# Patient Record
Sex: FEMALE | Race: WHITE | NOT HISPANIC OR LATINO | Employment: OTHER | URBAN - METROPOLITAN AREA
[De-identification: names, ages, dates, MRNs, and addresses within clinical notes are randomized per-mention and may not be internally consistent; named-entity substitution may affect disease eponyms.]

---

## 2017-01-05 DIAGNOSIS — R20.0 NUMBNESS: Primary | ICD-10-CM

## 2017-01-05 DIAGNOSIS — F41.9 ANXIETY: ICD-10-CM

## 2017-01-05 DIAGNOSIS — I10 HYPERTENSION GOAL BP (BLOOD PRESSURE) < 140/90: Primary | ICD-10-CM

## 2017-01-05 RX ORDER — NORTRIPTYLINE HCL 10 MG
30 CAPSULE ORAL AT BEDTIME
Qty: 90 CAPSULE | Refills: 6 | OUTPATIENT
Start: 2017-01-05

## 2017-01-05 NOTE — TELEPHONE ENCOUNTER
Losartan      Last Written Prescription Date: 08/04/16  Last Fill Quantity: 90, # refills: 2  Last Office Visit with [facility] prescribing provider: 09/20/16   Next 5 appointments (look out 90 days)     Mar 20, 2017  1:20 PM   Return Visit with Bradford Colvin MD   [facility] [place] ([facility])    [address]  [place] [address]   [phone]                   POTASSIUM   Date Value Ref Range Status   12/12/2016 3.8 3.4 - 5.3 mmol/L Final     CREATININE   Date Value Ref Range Status   12/12/2016 0.81 0.52 - 1.04 mg/dL Final     BP Readings from Last 3 Encounters:   12/19/16 126/79   12/09/16 116/76   11/28/16 125/75     Bupropion       Last Written Prescription Date: 10/11/16  Last Fill Quantity: 60; # refills: 2  Last Office Visit with [facility] or [facility] prescribing provider:  09/20/16   Next 5 appointments (look out 90 days)     Mar 20, 2017  1:20 PM   Return Visit with Bradford Colvin MD   [facility] [place] ([facility] [place])    [address]  [place] [address]   [phone]                   Last PHQ-9 score on record=   PHQ-9 SCORE 5/18/2015   Total Score 3       AST       18   12/12/2016  ALT       21   12/12/2016

## 2017-01-05 NOTE — TELEPHONE ENCOUNTER
NORTRIPTYLINE     Last Written Prescription Date: 9-19-16  Last Fill Quantity: 90, # refills: 6  Last Office Visit with [facility] or [facility] prescribing provider: 9-20-16   Next 5 appointments (look out 90 days)     Mar 20, 2017  1:20 PM   Return Visit with Bradford Colvin MD   [facility] [place] ([facility] [place])    [address]  [place] [address]   [phone]                   BP Readings from Last 3 Encounters:   12/19/16 126/79   12/09/16 116/76   11/28/16 125/75     Last PHQ-9 score on record=   PHQ-9 SCORE 5/18/2015   Total Score 3

## 2017-01-06 RX ORDER — BUPROPION HYDROCHLORIDE 75 MG/1
75 TABLET ORAL 2 TIMES DAILY
Qty: 60 TABLET | Refills: 2 | Status: SHIPPED | OUTPATIENT
Start: 2017-01-06 | End: 2017-05-14

## 2017-01-06 RX ORDER — LOSARTAN POTASSIUM 25 MG/1
25 TABLET ORAL DAILY
Qty: 90 TABLET | Refills: 1 | Status: SHIPPED | OUTPATIENT
Start: 2017-01-06 | End: 2017-07-17

## 2017-01-09 DIAGNOSIS — M32.9 SYSTEMIC LUPUS ERYTHEMATOSUS (H): Primary | ICD-10-CM

## 2017-01-09 NOTE — TELEPHONE ENCOUNTER
azaTHIOprine (IMURAN) 50 MG tablet      Last Written Prescription Date:  12/19/16  Last Fill Quantity: 270,   # refills: 1  Last Office Visit with [facility] provider: 12/19/16  Future Office visit:    Next 5 appointments (look out 90 days)     Mar 20, 2017  1:20 PM   Return Visit with Bradford Covlin MD   [facility] [place] ([facility] [place])    [address]  [place] [address]   [phone]                   Routing refill request to provider for review/approval because:  Drug not on the [facility] or [facility] refill protocol or controlled substance

## 2017-01-11 RX ORDER — AZATHIOPRINE 50 MG/1
150 TABLET ORAL DAILY
Qty: 270 TABLET | Refills: 1 | OUTPATIENT
Start: 2017-01-11

## 2017-01-11 NOTE — TELEPHONE ENCOUNTER
Rheumatology team: Refill request for azathioprine was received and refused. The last azathioprine prescription was written on 12/19/2016 for a 90 day supply with one refill. Please check with the patient and the pharmacy to clarify.    Bradford Colvin MD  1/11/2017 12:20 PM

## 2017-01-11 NOTE — TELEPHONE ENCOUNTER
Called and spoke to Pharmacy. Pharmacist states that this was sent in error. Also called patient and she states that she has not requested medication to be filled. Closing encounter.

## 2017-01-26 ENCOUNTER — MYC MEDICAL ADVICE (OUTPATIENT)
Dept: RHEUMATOLOGY | Facility: CLINIC | Age: 42
End: 2017-01-26

## 2017-02-01 ENCOUNTER — THERAPY VISIT (OUTPATIENT)
Dept: OCCUPATIONAL THERAPY | Facility: CLINIC | Age: 42
End: 2017-02-01
Payer: COMMERCIAL

## 2017-02-01 DIAGNOSIS — M79.641 PAIN OF RIGHT HAND: Primary | ICD-10-CM

## 2017-02-01 PROCEDURE — 29125 APPL SHORT ARM SPLINT STATIC: CPT | Mod: GO | Performed by: OCCUPATIONAL THERAPIST

## 2017-02-01 PROCEDURE — 97112 NEUROMUSCULAR REEDUCATION: CPT | Mod: GO | Performed by: OCCUPATIONAL THERAPIST

## 2017-02-01 PROCEDURE — 97110 THERAPEUTIC EXERCISES: CPT | Mod: GO | Performed by: OCCUPATIONAL THERAPIST

## 2017-02-01 NOTE — PROGRESS NOTES
Hand Therapy Progress Note    Current Date:  2017    Reporting period is 2016 to 2017    Diagnosis:   Right hand pain  DOI:  16 (therapy referral)    Subjective:   Subjective changes noted by patient:  I still have a lot of pain and my left hand hurts now too.    Functional changes noted by patient:  Improvement in Self Care Tasks (hygiene)  Patient has noted adverse reaction to:  None    Objective:  ROM:  WNL's fingers, thumb and wrist    Strength:   (Measured in pounds)    16   Trials Left Right Right   1  2  3 56  57  58 46+  54+  51+ 65+  58+  64+   Average: 57 50 62     Lat Pinch  16   Trials Left Right Right   1  2  3 20  19  18 15+  16+  13+ 15+  14+  12+   Average: 19 15 14     3 Pt Pinch  16   Trials Left Right Right   1  2  3 18  17  18 12+  12+  12+ 9+  11+  9+   Average: 18 12 10     Edema:  None    Sensation:  Intermittent glove like numbness all fingers; per patient report    Resisted Testin/8/16   Supination + slight   Pronation +   Wrist Ext  +   EDC +   Wrist Flex +   FDS ++   EPB +   APL +   EPL +     Palpation:  Diffuse tenderness elbow, forearm, wrist and hand   16   LEP - + slight   Extensors + slight + slight   PIN site + slight + slight   Dorsal wrist + +   Dorsal hand + +   MEP - +   Flexors - +   Volar wrist + +   Palm ++ -   1st dc + slight +   Thenars + +     Special Tests:  Pain Report:  - none    + mild    ++ moderate    +++ severe    16   Finkelsteins - +   ULTT Radial nerve + end range +   ULTT Median nerve ++ wrist ext +     Pain Report:  VAS(0-10) 16   Least: 1/10 1/10   Worst: 7/10 5-6/10   Location:  Diffuse through hand and thumb    Please refer to the daily flowsheet for treatment provided today.     Assessment:  Response to therapy has been improvement to:  Strength:    Pain:  intensity of pain is decreased slightly  Response to therapy has been lack of progress in:   Strength:  pinch  Pain:  Continued diffuse tenderness  Paresthesias:  Glove like numbness and tingling    Overall Assessment:  Patient is progressing slowly and would benefit from continued therapy to achieve rehab potential  STG/LTG:  STGoals have been reviewed and progress or achievement has occurred;  see goal sheet for details and updates.  I have re-evaluated this patient and find that the nature, scope, duration and intensity of the therapy is appropriate for the medical condition of the patient.    Plan:  Frequency/Duration:  Recommend continuing with the current treatment plan. 2 X a month, once daily  for 2 months    Recommendations for Continued Therapy  Treatment Plan:    Modalities:  US and Paraffin  Therapeutic Exercise:  AROM, PROM, Tendon Gliding, Isotonics, Isometrics and Stabilization  Neuromuscular re-education:  Nerve Gliding, Posture and Kinesiotaping  Manual Techniques:  Friction massage and Myofascial release  Orthotic Fabrication:  Forearm based orthosis  Self Care:  Self Care Tasks and Ergonomic Considerations    Home Exercise Program:  Warmth for stiffness to hand and forearm extensors and flexors  MFR to palm and forearm extensors and flexors with tennis ball  Ice/cold after activity for pain  Tendon gliding with 3 fists and FDS  Forearm E/F stretches  Partial proximal median nerve gliding  Partial radial nerve gliding  Gentle isometric  and pinch strengthening    Next Visit:  See in 2 weeks  Assess response to for  and pinch strengthening  Progress to wrist isometrics  Consider US if pain more localized or paraffin for diffuse joint pain/stiffness  Assess response to night resting orthosis

## 2017-02-02 ENCOUNTER — MYC MEDICAL ADVICE (OUTPATIENT)
Dept: RHEUMATOLOGY | Facility: CLINIC | Age: 42
End: 2017-02-02

## 2017-02-02 DIAGNOSIS — M32.9 SYSTEMIC LUPUS ERYTHEMATOSUS (H): Primary | ICD-10-CM

## 2017-02-02 NOTE — TELEPHONE ENCOUNTER
The patient replied via another my chart message encounter. She wants to restart Benlysta.  I placed the order for the infusion and replied to her in the other my chart encounter.    Bradford Colvin MD  2/2/2017 5:07 PM

## 2017-02-17 ENCOUNTER — INFUSION THERAPY VISIT (OUTPATIENT)
Dept: INFUSION THERAPY | Facility: CLINIC | Age: 42
End: 2017-02-17
Payer: COMMERCIAL

## 2017-02-17 VITALS
HEART RATE: 85 BPM | SYSTOLIC BLOOD PRESSURE: 120 MMHG | WEIGHT: 139.9 LBS | DIASTOLIC BLOOD PRESSURE: 72 MMHG | OXYGEN SATURATION: 97 % | BODY MASS INDEX: 22.58 KG/M2 | TEMPERATURE: 97.4 F

## 2017-02-17 DIAGNOSIS — M32.9 SYSTEMIC LUPUS ERYTHEMATOSUS (H): Primary | ICD-10-CM

## 2017-02-17 PROCEDURE — 99207 ZZC NO CHARGE LOS: CPT

## 2017-02-17 PROCEDURE — 96413 CHEMO IV INFUSION 1 HR: CPT | Performed by: NURSE PRACTITIONER

## 2017-02-17 RX ADMIN — Medication 250 ML: at 14:08

## 2017-02-17 NOTE — PROGRESS NOTES
"Infusion Nursing Note:  Yovana Enriquez presents today for Benlysta.    Patient seen by provider today: No   present during visit today: Not Applicable.    Note: Patient new to this infusion center. Stated at [place] she was given Tyoenol, benadry and methylpred as a pre-medication fro m.    Intravenous Access:  Peripheral IV placed.    Treatment Conditions:  Rheumatology Infusion Checklist: PRIOR TO INFUSION OF BIOLOGICAL MEDICATIONS OR ANY OF THESE AS LISTED: Benlysta (benlimumab) \".rheumbiologicalchecklist\"    Prior to Infusion of biological medications or any of these as listed:    1. Elevated temperature, fever, chills, productive cough or abnormal vital signs, night sweats, coughing up blood or sputum, no appetite or abnormal vital signs : NO    2. Open wounds or new incisions: NO    3. Recent hospitalization: NO    4.  Recent surgeries:  NO    5. Any upcoming surgeries or dental procedures?:NO    6. Any current or recent bouts of illness or infection? On any antibiotics? : NO    7. Any new, sudden or worsening abdominal pain :NO    8. Vaccination within 4 weeks? Patient or someone in the household is scheduled to receive vaccination? No live virus vaccines prior to or during treatment :NO    9. Any nervous system diseases [i.e. multiple sclerosis, Guillain-[place], seizures, neurological  changes]: NO    10. Pregnant or breast feeding; or plans on pregnancy in the future: NO    11. Signs of worsening depression or suicidal ideations while taking benlysta:NO    12. New-onset medical symptoms: NO    13.  New cancer diagnosis or on chemotherapy or radiation NO    14.  Evaluate for any sign of active TB [Unexplained weight loss, Loss of appetite, Night sweats, Fever, Fatigue, Chills, Coughing for 3 weeks or longer, Hemoptysis (coughing up blood), Chest pain]: NO    **Note: If answered yes to any of the above, hold the infusion and contact ordering rheumatologist or on-call rheumatologist. "   .      Post Infusion Assessment:  Patient tolerated infusion without incident.  Site patent and intact, free from redness, edema or discomfort.  No evidence of extravasations.  Access discontinued per protocol.  Rheumatology Post Infusion: POST-INFUSION OF BIOLOGICAL MEDICATION:    Reviewed with patient.  Given biologic medication or medication hand-out. Inform patient if any fever, chills or signs of infection, new symptoms, abdominal pain, heart palpitations, shortness of breath, reaction, weakness, neurological changes, seek medical attention immediately and should not receive infusions. No live virus vaccines prior to or during treatment or up to 6 months post infusion. If the patient has an upcoming procedure or surgery, this should be discussed with the rheumatologist and surgeon or provider..    Discharge Plan:   Discharge instructions reviewed with: Patient.  Patient and/or family verbalized understanding of discharge instructions and all questions answered.  Copy of AVS reviewed with patient and/or family.  Patient will schedule return visit for 4 weeks from now upon departure.  Patient discharged in stable condition accompanied by: self.  Departure Mode: Ambulatory.    Nancy Padilla RN

## 2017-02-17 NOTE — MR AVS SNAPSHOT
After Visit Summary   2/17/2017    Yovana Enriquez    MRN: [id number]           Patient Information     Date Of Birth          1975        Visit Information        Provider Department      2/17/2017 1:30 PM [place] [facility]        Today's Diagnoses     Systemic lupus erythematosus (H)    -  1       Follow-ups after your visit        Your next 10 appointments already scheduled     Feb 22, 2017  2:30 PM CST   [facility] with Bridgett Plascencia   [facility] [place] ([facility] [place] [facility])    [address]  [place] [address] [phone]            Mar 15, 2017  6:00 PM CDT   LAB with BE LAB   [facility] [place] ([facility]    [address]  [place] [address] [phone]           Patient must bring picture ID.  Patient should be prepared to give a urine specimen  Please do not eat 10-12 hours before your appointment if you are coming in fasting for labs on lipids, cholesterol, or glucose (sugar).  Pregnant women should follow their Care Team instructions. Water with medications is okay. Do not drink coffee or other fluids.   If you have concerns about taking  your medications, please ask at office or if scheduling via "[employer]"t, send a message by clicking on Secure Messaging, Message Your Care Team.            Mar 17, 2017  1:30 PM CDT   Level 2 with [place] [facility] ([facility])    [address] [place] [id number] [phone]            Mar 20, 2017  1:20 PM CDT   Return Visit with Bradford Colvin MD   [facility] [place] ([facility])    [address]  [place] [address] [phone]              Who to contact     If you have questions or need follow up information about today's clinic visit or your schedule please contact [facility] directly at [phone].  Normal  or non-critical lab and imaging results will be communicated to you by [employer], letter or phone within 4 business days after the clinic has received the results. If you do not hear from us within 7 days, please contact the clinic through [employer] or phone. If you have a critical or abnormal lab result, we will notify you by phone as soon as possible.  Submit refill requests through [employer] or call your pharmacy and they will forward the refill request to us. Please allow 3 business days for your refill to be completed.          Additional Information About Your Visit        [employer] Information     [employer] gives you secure access to your electronic health record. If you see a primary care provider, you can also send messages to your care team and make appointments. If you have questions, please call your primary care clinic.  If you do not have a primary care provider, please call [phone] and they will assist you.      [employer] is an electronic gateway that provides easy, online access to your medical records. With [employer], you can request a clinic appointment, read your test results, renew a prescription or communicate with your care team.     To access your existing account, please contact your [facility] or call [phone] for assistance.        Care EveryWhere ID     This is your Care EveryWhere ID. This could be used by other organizations to access your [facility] medical records  [id number]        Your Vitals Were     Pulse Temperature Pulse Oximetry BMI (Body Mass Index)          85 97.4  F (36.3  C) (Oral) 97% 22.58 kg/m2         Blood Pressure from Last 3 Encounters:   02/17/17 120/72   12/19/16 126/79   12/09/16 116/76    Weight from Last 3 Encounters:   02/17/17 63.5 kg (139 lb 14.4 oz)   12/19/16 65.1 kg (143 lb 9.6 oz)   11/28/16 64.9 kg (143 lb)              Today, you had the following     No orders found for display       Primary Care Provider Office Phone # Fax #     Bradford Mario PA-C [phone]       [facility] [place] [address]  [place] [address]        Thank you!     Thank you for choosing [facility]  for your care. Our goal is always to provide you with excellent care. Hearing back from our patients is one way we can continue to improve our services. Please take a few minutes to complete the written survey that you may receive in the mail after your visit with us. Thank you!             Your Updated Medication List - Protect others around you: Learn how to safely use, store and throw away your medicines at www.disposemymeds.org.          This list is accurate as of: 2/17/17  4:12 PM.  Always use your most recent med list.                   Brand Name Dispense Instructions for use    amLODIPine 5 MG tablet    NORVASC    90 tablet    Take 1 tablet (5 mg) by mouth daily       azaTHIOprine 50 MG tablet    IMURAN    270 tablet    Take 3 tablets (150 mg) by mouth daily       blood glucose monitoring test strip    no brand specified    1 Month    Used for testing glucose 2-3 times daily       buPROPion 75 MG tablet    WELLBUTRIN    60 tablet    Take 1 tablet (75 mg) by mouth 2 times daily       Calcium + D3 600-200 MG-UNIT Tabs      1 tablet daily       cevimeline 30 MG capsule    EVOXAC    180 capsule    Take 1 capsule (30 mg) by mouth 2 times daily       diclofenac 1 % Gel topical gel    VOLTAREN    100 g    Apply 2-4 grams to knees or shoulders four times daily as needed using enclosed dosing card.       FIRST-MOUTHWASH BLM Susp     100 mL    Take 5-10 mLs by mouth every 6 hours as needed       FLOVENT IN          fluticasone 50 MCG/ACT spray    FLONASE    1 Bottle    Spray 1-2 sprays into both nostrils daily       hydroxychloroquine 200 MG tablet    PLAQUENIL    180 tablet    Take 1 tablet (200 mg) by mouth 2 times daily       hyoscyamine 0.125 MG tablet    ANASPAZ/LEVSIN    40 tablet    Take 1-2 tablets (125-250 mcg) by mouth  every 4 hours as needed for cramping       losartan 25 MG tablet    COZAAR    90 tablet    Take 1 tablet (25 mg) by mouth daily       MICROLET LANCETS Misc     100 each    1 each daily.       MULTIVITAMIN PO      Take  by mouth.       nortriptyline 10 MG capsule    PAMELOR    90 capsule    Take 3 capsules (30 mg) by mouth At Bedtime       omeprazole 20 MG CR capsule    priLOSEC    30 capsule    Take 1 capsule (20 mg) by mouth daily       predniSONE 5 MG tablet    DELTASONE    90 tablet    Take 1 tablet (5 mg) by mouth daily       pregabalin 150 MG capsule    LYRICA    270 capsule    Take 1 capsule (150 mg) by mouth 3 times daily . 3 month supply.

## 2017-03-24 ENCOUNTER — INFUSION THERAPY VISIT (OUTPATIENT)
Dept: INFUSION THERAPY | Facility: CLINIC | Age: 42
End: 2017-03-24
Payer: COMMERCIAL

## 2017-03-24 VITALS
OXYGEN SATURATION: 99 % | WEIGHT: 136 LBS | DIASTOLIC BLOOD PRESSURE: 70 MMHG | SYSTOLIC BLOOD PRESSURE: 108 MMHG | BODY MASS INDEX: 21.95 KG/M2 | TEMPERATURE: 97 F | HEART RATE: 93 BPM

## 2017-03-24 DIAGNOSIS — M32.9 SYSTEMIC LUPUS ERYTHEMATOSUS (H): Primary | ICD-10-CM

## 2017-03-24 PROCEDURE — 99207 ZZC NO CHARGE LOS: CPT

## 2017-03-24 PROCEDURE — 96413 CHEMO IV INFUSION 1 HR: CPT | Performed by: INTERNAL MEDICINE

## 2017-03-24 RX ADMIN — Medication 250 ML: at 14:13

## 2017-03-24 ASSESSMENT — PAIN SCALES - GENERAL: PAINLEVEL: NO PAIN (1)

## 2017-03-24 NOTE — PROGRESS NOTES
"Infusion Nursing Note:  Yovana Enriquez presents today for Benlysta.    Patient seen by provider today: No   present during visit today: Not Applicable.    Note: N/A.    Intravenous Access:  Peripheral IV placed.    Treatment Conditions:  Rheumatology Infusion Checklist: PRIOR TO INFUSION OF BIOLOGICAL MEDICATIONS OR ANY OF THESE AS LISTED: Benlysta (benlimumab) \".rheumbiologicalchecklist\"    Prior to Infusion of biological medications or any of these as listed:    1. Elevated temperature, fever, chills, productive cough or abnormal vital signs, night sweats, coughing up blood or sputum, no appetite or abnormal vital signs : NO    2. Open wounds or new incisions: NO    3. Recent hospitalization: NO    4.  Recent surgeries:  NO    5. Any upcoming surgeries or dental procedures?:NO    6. Any current or recent bouts of illness or infection? On any antibiotics? : NO    7. Any new, sudden or worsening abdominal pain :NO    8. Vaccination within 4 weeks? Patient or someone in the household is scheduled to receive vaccination? No live virus vaccines prior to or during treatment :NO    9. Any nervous system diseases [i.e. multiple sclerosis, Guillain-[place], seizures, neurological  changes]: NO    10. Pregnant or breast feeding; or plans on pregnancy in the future: NO    11. Signs of worsening depression or suicidal ideations while taking benlysta:NO    12. New-onset medical symptoms: NO    13.  New cancer diagnosis or on chemotherapy or radiation NO    14.  Evaluate for any sign of active TB [Unexplained weight loss, Loss of appetite, Night sweats, Fever, Fatigue, Chills, Coughing for 3 weeks or longer, Hemoptysis (coughing up blood), Chest pain]: NO    **Note: If answered yes to any of the above, hold the infusion and contact ordering rheumatologist or on-call rheumatologist.   .      Post Infusion Assessment:  Patient tolerated infusion without incident.  No evidence of extravasations.  Access discontinued " per protocol.    Discharge Plan:    Patient will return 4/21/17 for next appointment.   Patient discharged in stable condition accompanied by: self and friend.    Nancy Knight RN

## 2017-03-24 NOTE — MR AVS SNAPSHOT
After Visit Summary   3/24/2017    Yovana Enriquez    MRN: [id number]           Patient Information     Date Of Birth          1975        Visit Information        Provider Department      3/24/2017 1:30 PM Leonore 8 [facility]        Today's Diagnoses     Systemic lupus erythematosus (H)    -  1       Follow-ups after your visit        Follow-up notes from your care team     Return in about 4 weeks (around 4/21/2017).      Your next 10 appointments already scheduled     Apr 03, 2017  6:00 PM CDT   LAB with BE LAB   [facility]    [address] [facility]  [place] [id number] [phone]           Patient must bring picture ID.  Patient should be prepared to give a urine specimen  Please do not eat 10-12 hours before your appointment if you are coming in fasting for labs on lipids, cholesterol, or glucose (sugar).  Pregnant women should follow their Care Team instructions. Water with medications is okay. Do not drink coffee or other fluids.   If you have concerns about taking  your medications, please ask at office or if scheduling via [employer], send a message by clicking on Secure Messaging, Message Your Care Team.            Apr 10, 2017  3:00 PM CDT   Return Visit with Bradford Colvin MD   [facility] [place] ([facility])    [address] [facility] [id number] [phone]            Apr 21, 2017  1:30 PM CDT   Level 2 with [address] ([facility])    [address] [place] [id number] [phone]              Who to contact     If you have questions or need follow up information about today's clinic visit or your schedule please contact [facility] directly at [phone].  Normal or non-critical lab and imaging results will be communicated to you by MyChart, letter or phone within 4 business days after the  clinic has received the results. If you do not hear from us within 7 days, please contact the clinic through "[employer] or phone. If you have a critical or abnormal lab result, we will notify you by phone as soon as possible.  Submit refill requests through "[employer] or call your pharmacy and they will forward the refill request to us. Please allow 3 business days for your refill to be completed.          Additional Information About Your Visit        [employer] Information     "[employer] gives you secure access to your electronic health record. If you see a primary care provider, you can also send messages to your care team and make appointments. If you have questions, please call your primary care clinic.  If you do not have a primary care provider, please call [phone] and they will assist you.      "[employer] is an electronic gateway that provides easy, online access to your medical records. With "[employer] you can request a clinic appointment, read your test results, renew a prescription or communicate with your care team.     To access your existing account, please contact your [facility] or call [phone] for assistance.        Care EveryWhere ID     This is your Care EveryWhere ID. This could be used by other organizations to access your [place] medical records  [id number]        Your Vitals Were     Pulse Temperature Pulse Oximetry BMI (Body Mass Index)          93 97  F (36.1  C) (Oral) 99% 21.95 kg/m2         Blood Pressure from Last 3 Encounters:   03/24/17 108/70   02/17/17 120/72   12/19/16 126/79    Weight from Last 3 Encounters:   03/24/17 61.7 kg (136 lb)   02/17/17 63.5 kg (139 lb 14.4 oz)   12/19/16 65.1 kg (143 lb 9.6 oz)              We Performed the Following     Treatment Conditions     Treatment Conditions     Treatment Conditions     Treatment Conditions        Primary Care Provider Office Phone # Fax #    Bradford Mario PA-C [phone]       [place]  [facility] [place] [address]  [place] [address]        Thank you!     Thank you for choosing [facility]  for your care. Our goal is always to provide you with excellent care. Hearing back from our patients is one way we can continue to improve our services. Please take a few minutes to complete the written survey that you may receive in the mail after your visit with us. Thank you!             Your Updated Medication List - Protect others around you: Learn how to safely use, store and throw away your medicines at www.disposemymeds.org.          This list is accurate as of: 3/24/17  4:12 PM.  Always use your most recent med list.                   Brand Name Dispense Instructions for use    amLODIPine 5 MG tablet    NORVASC    90 tablet    Take 1 tablet (5 mg) by mouth daily       azaTHIOprine 50 MG tablet    IMURAN    270 tablet    Take 3 tablets (150 mg) by mouth daily       blood glucose monitoring test strip    no brand specified    1 Month    Used for testing glucose 2-3 times daily       buPROPion 75 MG tablet    WELLBUTRIN    60 tablet    Take 1 tablet (75 mg) by mouth 2 times daily       Calcium + D3 600-200 MG-UNIT Tabs      1 tablet daily       cevimeline 30 MG capsule    EVOXAC    180 capsule    Take 1 capsule (30 mg) by mouth 2 times daily       diclofenac 1 % Gel topical gel    VOLTAREN    100 g    Apply 2-4 grams to knees or shoulders four times daily as needed using enclosed dosing card.       FIRST-MOUTHWASH BLM Susp     100 mL    Take 5-10 mLs by mouth every 6 hours as needed       FLOVENT IN          fluticasone 50 MCG/ACT spray    FLONASE    1 Bottle    Spray 1-2 sprays into both nostrils daily       hydroxychloroquine 200 MG tablet    PLAQUENIL    180 tablet    Take 1 tablet (200 mg) by mouth 2 times daily       hyoscyamine 0.125 MG tablet    ANASPAZ/LEVSIN    40 tablet    Take 1-2 tablets (125-250 mcg) by mouth every 4 hours as needed for cramping       losartan 25 MG  tablet    COZAAR    90 tablet    Take 1 tablet (25 mg) by mouth daily       MICROLET LANCETS Misc     100 each    1 each daily.       MULTIVITAMIN PO      Take  by mouth.       nortriptyline 10 MG capsule    PAMELOR    90 capsule    Take 3 capsules (30 mg) by mouth At Bedtime       omeprazole 20 MG CR capsule    priLOSEC    30 capsule    Take 1 capsule (20 mg) by mouth daily       predniSONE 5 MG tablet    DELTASONE    90 tablet    Take 1 tablet (5 mg) by mouth daily       pregabalin 150 MG capsule    LYRICA    270 capsule    Take 1 capsule (150 mg) by mouth 3 times daily . 3 month supply.

## 2017-04-05 DIAGNOSIS — M32.9 SYSTEMIC LUPUS ERYTHEMATOSUS (H): ICD-10-CM

## 2017-04-05 LAB
ALBUMIN UR-MCNC: NEGATIVE MG/DL
AMORPH CRY #/AREA URNS HPF: ABNORMAL /HPF
APPEARANCE UR: CLEAR
BASOPHILS # BLD AUTO: 0 10E9/L (ref 0–0.2)
BASOPHILS NFR BLD AUTO: 0.4 %
BILIRUB UR QL STRIP: NEGATIVE
COLOR UR AUTO: YELLOW
DIFFERENTIAL METHOD BLD: ABNORMAL
EOSINOPHIL # BLD AUTO: 0 10E9/L (ref 0–0.7)
EOSINOPHIL NFR BLD AUTO: 0.8 %
ERYTHROCYTE [DISTWIDTH] IN BLOOD BY AUTOMATED COUNT: 11.8 % (ref 10–15)
ERYTHROCYTE [SEDIMENTATION RATE] IN BLOOD BY WESTERGREN METHOD: 5 MM/H (ref 0–20)
GLUCOSE UR STRIP-MCNC: NEGATIVE MG/DL
HCT VFR BLD AUTO: 33.6 % (ref 35–47)
HGB BLD-MCNC: 11.6 G/DL (ref 11.7–15.7)
HGB UR QL STRIP: NEGATIVE
KETONES UR STRIP-MCNC: NEGATIVE MG/DL
LEUKOCYTE ESTERASE UR QL STRIP: NEGATIVE
LYMPHOCYTES # BLD AUTO: 0.7 10E9/L (ref 0.8–5.3)
LYMPHOCYTES NFR BLD AUTO: 13.2 %
MCH RBC QN AUTO: 34.7 PG (ref 26.5–33)
MCHC RBC AUTO-ENTMCNC: 34.5 G/DL (ref 31.5–36.5)
MCV RBC AUTO: 101 FL (ref 78–100)
MONOCYTES # BLD AUTO: 0.3 10E9/L (ref 0–1.3)
MONOCYTES NFR BLD AUTO: 6.1 %
MUCOUS THREADS #/AREA URNS LPF: PRESENT /LPF
NEUTROPHILS # BLD AUTO: 4.2 10E9/L (ref 1.6–8.3)
NEUTROPHILS NFR BLD AUTO: 79.5 %
NITRATE UR QL: NEGATIVE
NON-SQ EPI CELLS #/AREA URNS LPF: ABNORMAL /LPF
PH UR STRIP: 7 PH (ref 5–7)
PLATELET # BLD AUTO: 210 10E9/L (ref 150–450)
RBC # BLD AUTO: 3.34 10E12/L (ref 3.8–5.2)
RBC #/AREA URNS AUTO: ABNORMAL /HPF (ref 0–2)
SP GR UR STRIP: 1.02 (ref 1–1.03)
URN SPEC COLLECT METH UR: ABNORMAL
UROBILINOGEN UR STRIP-ACNC: 1 EU/DL (ref 0.2–1)
WBC # BLD AUTO: 5.2 10E9/L (ref 4–11)
WBC #/AREA URNS AUTO: ABNORMAL /HPF (ref 0–2)

## 2017-04-05 PROCEDURE — 85025 COMPLETE CBC W/AUTO DIFF WBC: CPT | Performed by: INTERNAL MEDICINE

## 2017-04-05 PROCEDURE — 86225 DNA ANTIBODY NATIVE: CPT | Performed by: INTERNAL MEDICINE

## 2017-04-05 PROCEDURE — 81001 URINALYSIS AUTO W/SCOPE: CPT | Performed by: INTERNAL MEDICINE

## 2017-04-05 PROCEDURE — 82550 ASSAY OF CK (CPK): CPT | Performed by: INTERNAL MEDICINE

## 2017-04-05 PROCEDURE — 85652 RBC SED RATE AUTOMATED: CPT | Performed by: INTERNAL MEDICINE

## 2017-04-05 PROCEDURE — 86140 C-REACTIVE PROTEIN: CPT | Performed by: INTERNAL MEDICINE

## 2017-04-05 PROCEDURE — 84156 ASSAY OF PROTEIN URINE: CPT | Performed by: INTERNAL MEDICINE

## 2017-04-05 PROCEDURE — 86160 COMPLEMENT ANTIGEN: CPT | Performed by: INTERNAL MEDICINE

## 2017-04-05 PROCEDURE — 80053 COMPREHEN METABOLIC PANEL: CPT | Performed by: INTERNAL MEDICINE

## 2017-04-05 PROCEDURE — 36415 COLL VENOUS BLD VENIPUNCTURE: CPT | Performed by: INTERNAL MEDICINE

## 2017-04-06 LAB
ALBUMIN SERPL-MCNC: 3.7 G/DL (ref 3.4–5)
ALP SERPL-CCNC: 49 U/L (ref 40–150)
ALT SERPL W P-5'-P-CCNC: 25 U/L (ref 0–50)
ANION GAP SERPL CALCULATED.3IONS-SCNC: 7 MMOL/L (ref 3–14)
AST SERPL W P-5'-P-CCNC: 20 U/L (ref 0–45)
BILIRUB SERPL-MCNC: 0.2 MG/DL (ref 0.2–1.3)
BUN SERPL-MCNC: 9 MG/DL (ref 7–30)
C3 SERPL-MCNC: 58 MG/DL (ref 76–169)
C4 SERPL-MCNC: 10 MG/DL (ref 15–50)
CALCIUM SERPL-MCNC: 8.3 MG/DL (ref 8.5–10.1)
CHLORIDE SERPL-SCNC: 106 MMOL/L (ref 94–109)
CK SERPL-CCNC: 133 U/L (ref 30–225)
CO2 SERPL-SCNC: 27 MMOL/L (ref 20–32)
CREAT SERPL-MCNC: 0.81 MG/DL (ref 0.52–1.04)
CREAT UR-MCNC: 93 MG/DL
CRP SERPL-MCNC: <2.9 MG/L (ref 0–8)
GFR SERPL CREATININE-BSD FRML MDRD: 77 ML/MIN/1.7M2
GLUCOSE SERPL-MCNC: 94 MG/DL (ref 70–99)
POTASSIUM SERPL-SCNC: 3.9 MMOL/L (ref 3.4–5.3)
PROT SERPL-MCNC: 6.3 G/DL (ref 6.8–8.8)
PROT UR-MCNC: 0.14 G/L
PROT/CREAT 24H UR: 0.15 G/G CR (ref 0–0.2)
SODIUM SERPL-SCNC: 140 MMOL/L (ref 133–144)

## 2017-04-07 LAB — DSDNA AB SER-ACNC: NORMAL IU/ML

## 2017-04-10 ENCOUNTER — OFFICE VISIT (OUTPATIENT)
Dept: RHEUMATOLOGY | Facility: CLINIC | Age: 42
End: 2017-04-10
Payer: COMMERCIAL

## 2017-04-10 VITALS
HEIGHT: 66 IN | SYSTOLIC BLOOD PRESSURE: 104 MMHG | OXYGEN SATURATION: 100 % | DIASTOLIC BLOOD PRESSURE: 71 MMHG | WEIGHT: 139.4 LBS | HEART RATE: 92 BPM | BODY MASS INDEX: 22.4 KG/M2

## 2017-04-10 DIAGNOSIS — M35.00 SJOGREN'S SYNDROME (H): ICD-10-CM

## 2017-04-10 DIAGNOSIS — Z79.899 HIGH RISK MEDICATION USE: ICD-10-CM

## 2017-04-10 DIAGNOSIS — I73.00 RAYNAUD'S PHENOMENON WITHOUT GANGRENE: ICD-10-CM

## 2017-04-10 DIAGNOSIS — M32.9 SYSTEMIC LUPUS ERYTHEMATOSUS (H): Primary | ICD-10-CM

## 2017-04-10 PROCEDURE — 99213 OFFICE O/P EST LOW 20 MIN: CPT | Performed by: INTERNAL MEDICINE

## 2017-04-10 RX ORDER — CEVIMELINE HYDROCHLORIDE 30 MG/1
30 CAPSULE ORAL 2 TIMES DAILY
Qty: 180 CAPSULE | Refills: 1 | Status: SHIPPED | OUTPATIENT
Start: 2017-04-10 | End: 2017-07-10

## 2017-04-10 RX ORDER — AZATHIOPRINE 50 MG/1
150 TABLET ORAL DAILY
Qty: 270 TABLET | Refills: 1 | Status: SHIPPED | OUTPATIENT
Start: 2017-04-10 | End: 2017-07-10

## 2017-04-10 RX ORDER — HYDROXYCHLOROQUINE SULFATE 200 MG/1
200 TABLET, FILM COATED ORAL 2 TIMES DAILY
Qty: 180 TABLET | Refills: 1 | Status: SHIPPED | OUTPATIENT
Start: 2017-04-10 | End: 2017-07-10

## 2017-04-10 RX ORDER — PREDNISONE 5 MG/1
TABLET ORAL
Qty: 134 TABLET | Refills: 0 | Status: SHIPPED | OUTPATIENT
Start: 2017-04-10 | End: 2017-07-10

## 2017-04-10 RX ORDER — AMLODIPINE BESYLATE 5 MG/1
5 TABLET ORAL DAILY
Qty: 90 TABLET | Refills: 1 | Status: SHIPPED | OUTPATIENT
Start: 2017-04-10 | End: 2017-06-15

## 2017-04-10 NOTE — MR AVS SNAPSHOT
After Visit Summary   4/10/2017    Yovana Enriquez    MRN: [id number]           Patient Information     Date Of Birth          1975        Visit Information        Provider Department      4/10/2017 3:00 PM Bradford Colvin MD [facility] [place]        Today's Diagnoses     Systemic lupus erythematosus (H)    -  1    High risk medication use        Sicca syndrome (H)        Raynaud's phenomenon without gangrene          Care Instructions      Dr. Colvin s Rheumatology Clinics  Locations Clinic Hours Telephone Number   [place]  [address] [facility]  [place] [address]     Wednesday: 7:20AM - 4:00PM  Thursday:     7:20AM - 4:00PM  Friday:          7:20AM - 11:00AM       To schedule an appointment with  Dr. Colvin,  please contact  Specialty [phone]       [place]  [address]  [place] [address]       Monday:       7:20AM - 4:00PM      [address] [place] [address]       Tuesday:      7:20AM - 4:00PM          Thank you!    Qing Zapata CMA            Follow-ups after your visit        Your next 10 appointments already scheduled     2017  1:30 PM CDT   Level 2 with [address] ([facility])    [address] [place] [id number] [phone]            2017  6:00 PM CDT   LAB with BE LAB   [facility] [place] ([facility])    [address] [facility] [phone]           Patient must bring picture ID.  Patient should be prepared to give a urine specimen  Please do not eat 10-12 hours before your appointment if you are coming in fasting for labs on lipids, cholesterol, or glucose (sugar).  Pregnant women should follow their Care Team instructions. Water with medications is okay. Do not drink coffee or other fluids.   If you have concerns about taking  your medications, please ask  at office or if scheduling via [employer], send a message by clicking on Secure Messaging, Message Your Care Team.            Jul 10, 2017  1:20 PM CDT   Return Visit with Bradford Colvin MD   [facility] [place] ([facility] [place])    [address] [facility] [place] [id number] [phone]              Future tests that were ordered for you today     Open Future Orders        Priority Expected Expires Ordered    M Tuberculosis by Quantiferon Routine 7/3/2017 8/4/2017 4/10/2017    CK total Routine 7/3/2017 8/4/2017 4/10/2017    Complement C3 Routine 7/3/2017 8/4/2017 4/10/2017    DNA double stranded antibodies Routine 7/3/2017 8/4/2017 4/10/2017    CRP inflammation Routine 7/3/2017 8/4/2017 4/10/2017    Comprehensive metabolic panel Routine 7/3/2017 8/4/2017 4/10/2017    Complement C4 Routine 7/3/2017 8/4/2017 4/10/2017    Erythrocyte sedimentation rate auto Routine 7/3/2017 8/4/2017 4/10/2017    Protein  random urine Routine 7/3/2017 8/4/2017 4/10/2017    UA with Microscopic reflex to Culture Routine 7/3/2017 8/4/2017 4/10/2017    CBC with platelets differential Routine 7/3/2017 8/4/2017 4/10/2017            Who to contact     If you have questions or need follow up information about today's clinic visit or your schedule please contact [facility] [place] directly at [phone].  Normal or non-critical lab and imaging results will be communicated to you by [employer], letter or phone within 4 business days after the clinic has received the results. If you do not hear from us within 7 days, please contact the clinic through [employer] or phone. If you have a critical or abnormal lab result, we will notify you by phone as soon as possible.  Submit refill requests through tidy or call your pharmacy and they will forward the refill request to us. Please allow 3 business days for your refill to be completed.          Additional Information About Your Visit        [employer] Information     tidy gives  "you secure access to your electronic health record. If you see a primary care provider, you can also send messages to your care team and make appointments. If you have questions, please call your primary care clinic.  If you do not have a primary care provider, please call [phone] and they will assist you.        Care EveryWhere ID     This is your Care EveryWhere ID. This could be used by other organizations to access your [place] medical records  [id number]        Your Vitals Were     Pulse Height Pulse Oximetry BMI (Body Mass Index)          92 1.676 m (5' 6\") 100% 22.5 kg/m2         Blood Pressure from Last 3 Encounters:   04/10/17 104/71   03/24/17 108/70   02/17/17 120/72    Weight from Last 3 Encounters:   04/10/17 63.2 kg (139 lb 6.4 oz)   03/24/17 61.7 kg (136 lb)   02/17/17 63.5 kg (139 lb 14.4 oz)                 Today's Medication Changes          These changes are accurate as of: 4/10/17  3:19 PM.  If you have any questions, ask your nurse or doctor.               These medicines have changed or have updated prescriptions.        Dose/Directions    predniSONE 5 MG tablet   Commonly known as:  DELTASONE   This may have changed:    - how much to take  - how to take this  - when to take this  - additional instructions   Used for:  Systemic lupus erythematosus (H)   Changed by:  Bradford Colvin MD        Prednisone 40mg daily x2days, then 20mg daily x5days, then 15mg daily x5days, then 10mg daily x5days, then 5mg thereafter.   Quantity:  134 tablet   Refills:  0            Where to get your medicines      These [id number] [place] [facility] [address]  [address] [address]     Phone:  [phone]     amLODIPine 5 MG tablet    azaTHIOprine 50 MG tablet    cevimeline 30 MG capsule    hydroxychloroquine 200 MG tablet    predniSONE 5 MG tablet                Primary Care Provider Office Phone # Fax #    Bradford Mraio PA-C [phone] "       [facility] [place] [address]  [facility] [address]        Thank you!     Thank you for choosing [facility]  for your care. Our goal is always to provide you with excellent care. Hearing back from our patients is one way we can continue to improve our services. Please take a few minutes to complete the written survey that you may receive in the mail after your visit with us. Thank you!             Your Updated Medication List - Protect others around you: Learn how to safely use, store and throw away your medicines at www.disposemymeds.org.          This list is accurate as of: 4/10/17  3:19 PM.  Always use your most recent med list.                   Brand Name Dispense Instructions for use    amLODIPine 5 MG tablet    NORVASC    90 tablet    Take 1 tablet (5 mg) by mouth daily       azaTHIOprine 50 MG tablet    IMURAN    270 tablet    Take 3 tablets (150 mg) by mouth daily       blood glucose monitoring test strip    no brand specified    1 Month    Used for testing glucose 2-3 times daily       buPROPion 75 MG tablet    WELLBUTRIN    60 tablet    Take 1 tablet (75 mg) by mouth 2 times daily       Calcium + D3 600-200 MG-UNIT Tabs      1 tablet daily       cevimeline 30 MG capsule    EVOXAC    180 capsule    Take 1 capsule (30 mg) by mouth 2 times daily       diclofenac 1 % Gel topical gel    VOLTAREN    100 g    Apply 2-4 grams to knees or shoulders four times daily as needed using enclosed dosing card.       FIRST-MOUTHWASH BLM Susp     100 mL    Take 5-10 mLs by mouth every 6 hours as needed       FLOVENT IN          fluticasone 50 MCG/ACT spray    FLONASE    1 Bottle    Spray 1-2 sprays into both nostrils daily       hydroxychloroquine 200 MG tablet    PLAQUENIL    180 tablet    Take 1 tablet (200 mg) by mouth 2 times daily       hyoscyamine 0.125 MG tablet    ANASPAZ/LEVSIN    40 tablet    Take 1-2 tablets (125-250 mcg) by mouth every 4 hours as needed for cramping       losartan 25 MG  tablet    COZAAR    90 tablet    Take 1 tablet (25 mg) by mouth daily       MICROLET LANCETS Misc     100 each    1 each daily.       MULTIVITAMIN PO      Take  by mouth.       nortriptyline 10 MG capsule    PAMELOR    90 capsule    Take 3 capsules (30 mg) by mouth At Bedtime       omeprazole 20 MG CR capsule    priLOSEC    30 capsule    Take 1 capsule (20 mg) by mouth daily       predniSONE 5 MG tablet    DELTASONE    134 tablet    Prednisone 40mg daily x2days, then 20mg daily x5days, then 15mg daily x5days, then 10mg daily x5days, then 5mg thereafter.       pregabalin 150 MG capsule    LYRICA    270 capsule    Take 1 capsule (150 mg) by mouth 3 times daily . 3 month supply.

## 2017-04-10 NOTE — PATIENT INSTRUCTIONS
Dr. Colvin s Rheumatology Clinics  Locations Clinic Hours Telephone Number   [place]  [address]  [place] [address]     Wednesday: 7:20AM - 4:00PM  Thursday:     7:20AM - 4:00PM  Friday:          7:20AM - 11:00AM       To schedule an appointment with  Dr. Colvin,  please contact  Specialty [phone]       [place]  [address]  [place] [address]       Monday:       7:20AM - 4:00PM      [place]y [place]  [address]  [place] [address]       Tuesday:      7:20AM - 4:00PM          Thank you!    Qing Zapata CMA

## 2017-04-10 NOTE — PROGRESS NOTES
Rheumatology Clinic Visit      Yovana Enriquez MRN# [id number]   YOB: 1975 Age: 41 year old      Date of visit: 4/10/17   PCP: Bradford Mario    Chief Complaint   Patient presents with:  Systemic Lupus Erythematous: patient states she is doing all right off and on      Assessment and Plan     1. Systemic lupus erythematosus (YANELIS by EIA positive in the past, leukopenia/lymphocytopenia, hypocomplementemia, polyarthralgias, oral sores, history of malar rash, photophobia, photosensitivity, Raynaud's Phenomenon): Previously on methotrexate that was discontinued for unclear reasons but the patient reports that she tolerated it well. Currently on azathioprine 150 mg (2.30mg/kg; calculated 12/19/2016), hydroxychloroquine 400 mg daily, prednisone 5 mg daily, and Benlysta.  Note that Benlysta was previously discontinued because there was concern that her mild leukopenia may represent a flare of lupus and rituximab was going to be considered; however, the leukopenia was mild and although she improved with prednisone it is unclear if she was truly having a lupus flare or if her leukocyte count was varying in the range that she typically varies. Given that she was not having any other symptoms for this and her leukocyte count improved, Benlysta was restarted. Later, she wanted to discontinue Benlysta but she had worsening of her symptoms and therefore was restarted with improvement of her symptoms again. Overall, she tells me that she has been doing okay, but more recently with worsening arthralgias across her MCPs and with worsening fatigue. Couple levels are also slightly lower, they could correlate with an increased disease activity that could explain her symptoms. Treat current symptoms as mild flare with prednisone taper.    - Continue azathioprine 150 mg daily  - Continue Evoxac 30 mg twice a day  - Continue hydroxychloroquine 200 mg twice a day (toxicity monitoring eye exam last done on 11/28/2016)  -  Prednisone taper: Prednisone 40mg daily x2days, then 20mg daily x5days, then 15mg daily x5days, then 10mg daily x5days, then 5mg daily thereafter  - Continue Benlysta infusions   - Labs one week prior to next rheumatology clinic visit: CBC, CMP, ESR, CRP, CK, C3, C4, dsDNA, UA, Uprotein:creatinine    2. Secondary Sjogren syndrome: Doing well with Evoxac. Dry eyes are not much of an issue currently. See #1.    3. Raynaud's Phenomenon: Doing well with amlodipine 5mg daily.  - Continue amlodipine 5mg daily (may discontinue during warmer months)    4. Bilateral trochanteric bursitis: She receives steroid injections from pain clinic.    5. Fibromyalgia: Managed by the pain clinic and PCP.    6. GI upset: Symptoms of irritable bowel syndrome. She also believes that she has some food allergies, specifically dairy and gluten.  She has seen GI in the past.     Ms. Enriquez verbalized agreement with and understanding of the rational for the diagnosis and treatment plan.  All questions were answered to best of my ability and the patient's satisfaction. Ms. Enriquez was advised to contact the clinic with any questions that may arise after the clinic visit.      Thank you for involving me in the care of the patient    Return to clinic: 3 months      HPI   Yovana Enriquez is a 41 year old female with medical history significant for subclinical hyperthyroidism, hypertension, irritable bowel syndrome, fibromyalgia, hypertension, non-celiac gluten sensitivity (reportedly with bowel biopsies and laboratory workup that did not show celiac disease), anxiety, and systemic lupus erythematosus who presents for follow-up of SLE.    Today, Ms. Enriquez reports that in the last couple weeks she has felt more run down. Worsening fatigue. Also having pain across the bilateral MCPs that is worse in the morning and improves with activity. Morning stiffness for approximately 30-60 minutes. Raynaud's phenomenon is well controlled with amlodipine.  She says that her Raynaud's phenomenon is also improving with the warmer weather recently. Bilateral trochanteric bursitis and she is planning to see the pain clinic for repeat injection soon.     Denies fevers, chills, nausea, vomiting. No abdominal pain.  No chest pain/pressure, palpitations, or shortness of breath. No neck pain. No rash currently. No LE swelling.  She has photosensitivity and photophobia.   Sicca symptoms are well-controlled with Evoxac and frequent sips of water. No eye pain or redness. No history of serositis. No history of DVT, pulmonary embolism, or miscarriage.    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  PULM: No SOB, wheeze, cough.  GI:  See history of present illness. No blood in stool. No abdominal pain, nausea, vomiting. See history of present illness  : No blood in urine.  MSK: See HPI.  NEURO: See history of present illness  EXT: No LE swelling    Active Problem List     Patient Active Problem List   Diagnosis     Systemic lupus erythematosus (H)     Menorrhagia     History of ovarian cyst     Dysmenorrhea     History of gestational diabetes mellitus, not currently pregnant     Intramural leiomyoma of uterus     Hyperlipidemia LDL goal <130     Subclinical hyperthyroidism     Anxiety     High risk medication use     Fibromyalgia     Chronic constipation     Irritable bowel syndrome     Health Care Home     Hypertension goal BP (blood pressure) < 140/90     Non-celiac gluten sensitivity     Pain of right hand     Raynaud's phenomenon without gangrene     Past Medical History     Past Medical History:   Diagnosis Date     Chronic constipation 12/16/2013     Dysmenorrhea 10/1/2012     Fibromyalgia 11/15/2013     Health Care Home 3/19/2014    **See  Letters for [facility] Care Plan: Emergency Care Plan       High risk medication use 9/13/2013     History of gestational diabetes mellitus, not currently pregnant 10/1/2012     History of ovarian cyst 10/1/2012     Hyperlipidemia LDL goal <130 10/18/2012     Hypertension goal BP (blood pressure) < 140/90 1/19/2015     Intramural leiomyoma of uterus 10/5/2012     Irritable bowel syndrome 3/11/2014    Patient states no history colonoscopy       Menorrhagia 10/1/2012     Non-celiac gluten sensitivity 2/8/2016     PONV (postoperative nausea and vomiting)      Subclinical hyperthyroidism 1/17/2013     Systemic lupus erythematosus (H) 4/23/2012     Past Surgical History     Past Surgical History:   Procedure Laterality Date     COLONOSCOPY N/A 2/20/2015    Procedure: COMBINED COLONOSCOPY, SINGLE OR MULTIPLE BIOPSY/POLYPECTOMY BY BIOPSY;  Surgeon: Fred Cullen MD;  Location: [facility] OR     COLONOSCOPY WITH CO2 INSUFFLATION N/A 2/20/2015    Procedure: COLONOSCOPY WITH CO2 INSUFFLATION;  Surgeon: Fred Cullen MD;  Location:  OR     ENT SURGERY  10-24-14    Bottom lip biopsies     ESOPHAGOSCOPY, GASTROSCOPY, DUODENOSCOPY (EGD), COMBINED N/A 2/20/2015    Procedure: COMBINED ESOPHAGOSCOPY, GASTROSCOPY, DUODENOSCOPY (EGD), BIOPSY SINGLE OR MULTIPLE;  Surgeon: Fred Cullen MD;  Location: [facility] OR      BREATH HYDROGEN TEST  12/31/2013    Procedure: HYDROGEN BREATH TEST;  Surgeon: Camden Almazan MD;  Location: [facility]      HYSTEROSCOPY, SURGICAL; W/ ENDOMETRIAL ABLATION, ANY METHOD  10-19-12     SHOULDER SURGERY Right     R shoulder     TUBAL LIGATION  2004     Allergy   No Known Allergies  Current Medication List     Current Outpatient Prescriptions   Medication Sig     losartan (COZAAR) 25 MG tablet Take 1 tablet (25 mg) by mouth daily     buPROPion (WELLBUTRIN) 75 MG tablet Take 1 tablet (75 mg) by mouth 2 times daily     hyoscyamine (ANASPAZ/LEVSIN) 0.125 MG tablet Take 1-2 tablets (125-250  mcg) by mouth every 4 hours as needed for cramping     predniSONE (DELTASONE) 5 MG tablet Take 1 tablet (5 mg) by mouth daily     hydroxychloroquine (PLAQUENIL) 200 MG tablet Take 1 tablet (200 mg) by mouth 2 times daily     azaTHIOprine (IMURAN) 50 MG tablet Take 3 tablets (150 mg) by mouth daily     cevimeline (EVOXAC) 30 MG capsule Take 1 capsule (30 mg) by mouth 2 times daily     amLODIPine (NORVASC) 5 MG tablet Take 1 tablet (5 mg) by mouth daily     pregabalin (LYRICA) 150 MG capsule Take 1 capsule (150 mg) by mouth 3 times daily . 3 month supply.     nortriptyline (PAMELOR) 10 MG capsule Take 3 capsules (30 mg) by mouth At Bedtime     fluticasone (FLONASE) 50 MCG/ACT nasal spray Spray 1-2 sprays into both nostrils daily     omeprazole (PRILOSEC) 20 MG capsule Take 1 capsule (20 mg) by mouth daily     DPH-Lido-AlHydr-MgHydr-Simeth (FIRST-MOUTHWASH BLM) SUSP Take 5-10 mLs by mouth every 6 hours as needed     diclofenac (VOLTAREN) 1 % GEL Apply 2-4 grams to knees or shoulders four times daily as needed using enclosed dosing card.     Calcium Carb-Cholecalciferol (CALCIUM + D3) 600-200 MG-UNIT TABS 1 tablet daily     Fluticasone Propionate, Inhal, (FLOVENT IN)      Multiple Vitamin (MULTIVITAMIN OR) Take  by mouth.     blood glucose test strip Used for testing glucose 2-3 times daily     MICROLET LANCETS MISC 1 each daily.     No current facility-administered medications for this visit.          Social History   See HPI    Family History     Family History   Problem Relation Age of Onset     Respiratory Maternal Grandfather      CANCER Maternal Grandfather      Asthma Son      Circulatory Maternal Grandmother      Brain anurysm     Hypertension Mother      Glaucoma Mother 50     drops     Hypertension Sister      Hypertension Sister      DIABETES No family hx of      CEREBROVASCULAR DISEASE No family hx of      Thyroid Disease No family hx of      Macular Degeneration No family hx of        Physical Exam     Temp  "Readings from Last 3 Encounters:   03/24/17 97  F (36.1  C) (Oral)   02/17/17 97.4  F (36.3  C) (Oral)   12/09/16 97  F (36.1  C) (Oral)     BP Readings from Last 5 Encounters:   04/10/17 104/71   03/24/17 108/70   02/17/17 120/72   12/19/16 126/79   12/09/16 116/76     Pulse Readings from Last 1 Encounters:   04/10/17 92     Resp Readings from Last 1 Encounters:   08/05/16 16     Estimated body mass index is 22.5 kg/(m^2) as calculated from the following:    Height as of this encounter: 1.676 m (5' 6\").    Weight as of this encounter: 63.2 kg (139 lb 6.4 oz).    GEN: NAD  HEENT: MMM. No oral lesions. Anicteric, noninjected sclera  CV: S1, S2. RRR. No m/r/g.  PULM: CTA bilaterally. No w/c.  MSK:  Hands, wrists, elbows, and shoulders without swelling or tenderness to palpation. Bilateral hips tender to direct palpation. Knees, ankles, and feet without swelling or tenderness to palpation. Negative MTP squeeze. All fibromyalgia tender points are positive.   NEURO: UE and LE strengths 5/5 and equal bilaterally.   SKIN: No rash. No evidence of current or past ischemic insults to the distal fingertips by inspection.   EXT: No LE edema  PSYCH: Alert. Appropriate.    Labs / Imaging (select studies)   RF/CCP  Recent Labs   Lab Test  09/13/13   1504  02/13/12   1404   CCPABY  <20 Interpretation:  Negative  <20 Interpretation:  Negative   RHF  12   --      YANELIS/RNP/Sm/SSA/SSB/dsDNA  Recent Labs   Lab Test  04/05/17   1820  12/12/16   1827  09/20/16   1518  07/25/16   1433  03/23/16   1759  01/04/16   1806   07/01/14   1211   09/13/13   1504  02/13/12   1404   ANAIGG   --    --    --   <1:40  Reference range: <1:40  (Note)  <1:40  INTERPRETIVE INFORMATION: YANELIS by IFA, IgG  Anti-nuclear antibodies (YANELIS) are seen in a variety of  systemic rheumatic diseases and are determined by indirect  fluorescence assay (IFA) using HEp-2 substrate with an  IgG-specific conjugate. YANELIS titers less than or equal to  1:80 have variable relevance " while titers greater than or  equal to 1:160 are considered clinically significant. These  antibodies may precede clinical disease onset; however,  healthy individuals and those with advanced age have been  reported to be positive for YANELIS. When observed, one of the  five basic patterns is reported: homogeneous,  peripheral/rim, speckled, centromere, or nucleolar. If  cytoplasmic fluorescence is observed, it is noted. IFA  methodology is subjective and has occasionally been shown  to lack sensitivity for anti-SSA/Ro antibodies.  Negative results do not necessarily rule out the presence  of SSc. If clinical suspicion remains, consider further  te sting for U3-RNP, PM/Scl, or Th/To antibodies associated  with SSc.  Performed by [facility],  [address] [place] [address],[place] [id number] [phone]  [email or web address], Alcon Krishna MD, Lab. Director     --    --    --    --    --    --    --    RNPIGG   --    --    --    --    --    --    --   <0.2  Negative     --    --    --    SMIGG   --    --    --    --   <0.2  Negative   Antibody index (AI) values reflect qualitative changes in antibody   concentration that cannot be directly associated with clinical condition or   disease state.    <0.2  Negative   Antibody index (AI) values reflect qualitative changes in antibody   concentration that cannot be directly associated with clinical condition or   disease state.     --   <0.2  Negative     --    --    --    ENASM   --    --    --    --    --    --    --    --    --   0  0   SSAIGG   --    --    --    --    --    --    --   <0.2  Negative     --    --    --    ENASSA   --    --    --    --    --    --    --    --    --   3  12   SSBIGG   --    --    --    --    --    --    --   <0.2  Negative     --    --    --    ENASSB   --    --    --    --    --    --    --    --    --   0  0   ENARMP   --    --    --    --    --    --    --    --    --   0  0   SCLIGG   --    --    --    --    --    --    --   <0.2  Negative     --     --    --    [id number]  58*  69*  84  80  82  111   < >   --    < >  81  78*   [id number]  10*  12*  14*  12*  13*  18   < >   --    < >  14*  14*    < > = values in this interval not displayed.     Recent Labs   Lab Test  04/05/17 1820  12/12/16   1827  09/20/16   1518  07/25/16   1433  03/23/16   1759   DNA  <1  Negative    <1  Negative    <1  Negative    <1  Negative    <1  Negative       Antiphospholipid Antibodies  Recent Labs   Lab Test  09/13/13   1504  02/13/12   1404   CARG  <15.0 Interpretation:  Negative  <15.0 Interpretation:  Negative   SANCHO  <12.5 Interpretation:  Negative  <12.5 Interpretation:  Negative   LUPINT   --   Negative (Note) COMMENTS: INR is normal. APTT is normal.  1:2 Mix is not indicated. DRVVT Screen is normal. Thrombin time is normal. NEGATIVE TEST; A LUPUS ANTICOAGULANT WAS NOT DETECTED IN THIS SPECIMEN WITHIN THE LIMITS OF THE TESTING REPERTOIRE. If the clinical picture is strongly suggestive of an antiphospholipid syndrome, recommend anticardiolipin and beta-2-glycoprotein (IgG and IgM) antibody tests. Hyun Denis M.D.  [phone] 2-.  NINR =  1.01 N = 0.86-1.14  (No additional charge) NTT = 16.0 N = 13.0-19.0 sec  (No additional charge)  APTT'S:    Seconds Reagent =  Stago LS Normal  =  36 Patient  =  41 1:2 Mix  =  N/A Reference =  31-43   DILUTE VANNESSA VIPER VENOM TEST: DRVVT Screen Ratio = 0.80 Normal = <1.28      CBC  Recent Labs   Lab Test  04/05/17 1820 12/12/16 1827 09/20/16   1518   WBC  5.2  7.6  4.7   RBC  3.34*  3.64*  3.74*   HGB  11.6*  12.7  12.7   HCT  33.6*  36.8  37.0   MCV  101*  101*  99   RDW  11.8  12.9  12.3   PLT  210  251  273   MCH  34.7*  34.9*  34.0*   MCHC  34.5  34.5  34.3   NEUTROPHIL  79.5  85.4  63.4   LYMPH  13.2  7.9  25.4   MONOCYTE  6.1  5.8  8.7   EOSINOPHIL  0.8  0.5  1.9   BASOPHIL  0.4  0.4  0.6   ANEU  4.2  6.4  3.0   ALYM  0.7*  0.6*  1.2   ZOË  0.3  0.4  0.4   AEOS  0.0  0.0  0.1   ABAS  0.0  0.0  0.0     CMP  Recent  Labs   Lab Test  04/05/17 1820 12/12/16 1827  09/20/16   1518   NA  140  140  140   POTASSIUM  3.9  3.8  4.0   CHLORIDE  106  104  108   CO2  27  31  29   ANIONGAP  7  5  3   GLC  94  98  100*   BUN  9  12  11   CR  0.81  0.81  0.86   GFRESTIMATED  77  77  72   GFRESTBLACK  >90   GFR Calc    >90   GFR Calc    88   MELANIA  8.3*  8.7  8.5   BILITOTAL  0.2  0.4  0.2   ALBUMIN  3.7  3.8  3.5   PROTTOTAL  6.3*  6.5*  6.3*   ALKPHOS  49  61  53   AST  20  18  23   ALT  25  21  25     HgA1c  Recent Labs   Lab Test  10/05/12   0824   A1C  5.3     Calcium/VitaminD  Recent Labs   Lab Test  04/05/17 1820 12/12/16 1827  09/20/16   1518   01/17/13   1039   MELANIA  8.3*  8.7  8.5   < >  9.4   VITDT   --    --    --    --   55    < > = values in this interval not displayed.     ESR/CRP  Recent Labs   Lab Test  04/05/17 1820 12/12/16   1827  09/20/16   1518   SED  5  3  5   CRP  <2.9  <2.9  <2.9     CK/Aldolase  Recent Labs   Lab Test  04/05/17 1820 12/12/16   1827  09/20/16   1518   CKT  133  71  131     TSH/T4  Recent Labs   Lab Test  10/07/16   0714  02/27/15   1058  01/30/15   1156  04/02/14   1539   TSH  0.37*   --   0.45  0.33*   T4  1.16  1.01   --   1.00     Lipid Panel  Recent Labs   Lab Test  10/07/16   0714  12/18/12   1112   CHOL  137  135   TRIG  48  108   HDL  75  63   LDL  52  51   VLDL   --   22   CHOLHDLRATIO   --   2.1   NHDL  62   --      Hepatitis B  Recent Labs   Lab Test  03/29/16   1417  02/13/14   1835  05/22/12   1633   HEPBANG  Nonreactive  Negative  Negative     Hepatitis C  Recent Labs   Lab Test  03/29/16   1417  02/13/14   1835  05/22/12   1633   HCVAB  Nonreactive   Assay performance characteristics have not been established for newborns,   infants, and children    Negative  Negative     Lyme ab screening  Recent Labs   Lab Test  05/30/15   1355   LYMEGM  0.55     Tuberculosis Screening  Recent Labs   Lab Test  03/29/16   1417  02/13/14   1835  05/22/12   1632    TBRSLT  Negative  Negative  Negative   TBAGN  0.00  0.00  0.00     Immunization History     Immunization History   Administered Date(s) Administered     Influenza (IIV3) 10/01/2012, 09/23/2013     Influenza Vaccine IM 3yrs+ 4 Valent IIV4 09/20/2016     Pneumococcal (PCV 13) 04/25/2016     Pneumococcal 23 valent 10/01/2012     Tdap (Adacel,Boostrix) 12/17/2010          Chart documentation done in part with Dragon Voice recognition Software. Although reviewed after completion, some word and grammatical error may remain.    Bradford Colvin MD

## 2017-04-10 NOTE — NURSING NOTE
"Chief Complaint   Patient presents with     Systemic Lupus Erythematous     patient states she is doing all right off and on       Initial /71 (BP Location: Left arm, Patient Position: Chair, Cuff Size: Adult Regular)  Pulse 92  Ht 1.676 m (5' 6\")  Wt 63.2 kg (139 lb 6.4 oz)  SpO2 100%  BMI 22.5 kg/m2 Estimated body mass index is 22.5 kg/(m^2) as calculated from the following:    Height as of this encounter: 1.676 m (5' 6\").    Weight as of this encounter: 63.2 kg (139 lb 6.4 oz).  BP completed using cuff size: regular         RAPID3 (0-30) Cumulative Score  10.7          RAPID3 Weighted Score (divide #4 by 3 and that is the weighted score)  3.567         "

## 2017-04-12 ENCOUNTER — OFFICE VISIT (OUTPATIENT)
Dept: PALLIATIVE MEDICINE | Facility: CLINIC | Age: 42
End: 2017-04-12
Payer: COMMERCIAL

## 2017-04-12 VITALS
HEIGHT: 65 IN | SYSTOLIC BLOOD PRESSURE: 113 MMHG | BODY MASS INDEX: 22.99 KG/M2 | WEIGHT: 138 LBS | HEART RATE: 99 BPM | DIASTOLIC BLOOD PRESSURE: 75 MMHG

## 2017-04-12 DIAGNOSIS — M76.891 ENTHESOPATHY OF HIP REGION ON BOTH SIDES: Primary | ICD-10-CM

## 2017-04-12 DIAGNOSIS — M76.892 ENTHESOPATHY OF HIP REGION ON BOTH SIDES: Primary | ICD-10-CM

## 2017-04-12 PROCEDURE — 20610 DRAIN/INJ JOINT/BURSA W/O US: CPT | Mod: 50 | Performed by: PSYCHIATRY & NEUROLOGY

## 2017-04-12 ASSESSMENT — PAIN SCALES - GENERAL: PAINLEVEL: MILD PAIN (3)

## 2017-04-12 NOTE — NURSING NOTE
"Chief Complaint   Patient presents with     Pain     Hip pain        Initial /75  Pulse 99  Ht 1.651 m (5' 5\")  Wt 62.6 kg (138 lb)  BMI 22.96 kg/m2 Estimated body mass index is 22.96 kg/(m^2) as calculated from the following:    Height as of this encounter: 1.651 m (5' 5\").    Weight as of this encounter: 62.6 kg (138 lb).  Medication Reconciliation: complete     Soraya Razo MA      "

## 2017-04-12 NOTE — PROGRESS NOTES
Pre procedure Diagnosis: bilateral trochanteric bursitis   Post procedure Diagnosis: Same  Procedure performed: bilateral trochanteric bursa injections   Anesthesia: none  Complications: none  Operators: Magdalena Lockett MD     Indications:   Yovana Enriquez is a 41 year old female seen by me in clinic.  She has bilateral hip pain, and last had a bursa injection in 11/2016 that was helpful.  Exam shows bilateral trochanteric bursa tenderness and they have tried conservative treatment including medications.    Options/alternatives, benefits and risks were discussed with the patient including bleeding, infection, tissue trauma including tendons and muscles, and weakness.  Questions were answered to her satisfaction and she agrees to proceed. Voluntary informed consent was obtained and signed.     Vitals were reviewed: Yes  Allergies were reviewed:  Yes   Medications were reviewed:  Yes   Pre-procedure pain score: 3/10    Procedure:  After getting informed consent, a Pause for the Cause was performed.  Patient was prepped and draped in sterile fashion.     The area over the right trochanter was palpated, and the tender area was identified, corresponding to the area of the trochanteric bursa.  The area was cleaned with Chloroprep.   A 25 G 3.5 inch needle was inserted, aiming towards the trochanter.  When bone was encountered, the needle was slightly drawn.  A solution containing local anesthesic and steroid was injected.  The needle was removed.  Hemostasis was achieved. Bandaids were placed as appropriate.    The procedure was repeated on the opposite side.    In total, 3ml of 0.5% bupivacaine, 3ml of 1% lidocaine, and 40mg of kenalog was injected, divided equally between the two sides.    Hemostasis was achieved, the area was cleaned, and bandaids were placed when appropriate.  The patient tolerated the procedure well, and was taken to the recovery room.    Images were saved to PACS.    Post-procedure pain score:  3/10  Follow-up includes:   -f/u phone call in one week  -f/u with referring provider    Magdalena Lockett MD  [facility]

## 2017-04-12 NOTE — MR AVS SNAPSHOT
After Visit Summary   4/12/2017    Yovana Enriquez    MRN: [id number]           Patient Information     Date Of Birth          1975        Visit Information        Provider Department      4/12/2017 1:30 PM Keena Lockett MD [facility]        Care Instructions    [facility]   Post Procedure Instructions    Today you had: k   bursa injection      Medications used:  lidocaine   bupivicaine   kenolog           Go to the emergency room if you develop any shortness of breath    Monitor the injection sites for signs and symptoms of infection-fever, chills, redness, swelling, warmth, or drainage to areas.    You may have soreness at injection sites for up to 24 hours.    You may apply ice to the painful areas to help minimize the discomfort of the needle pokes.    Do not apply heat to sites for at least 12 hours.    You may use anti-inflammatory medications or Tylenol for pain control if necessary  Nurse line: [phone]  After hours provider line: [phone]  Appointment line: [phone]            Follow-ups after your visit        Your next 10 appointments already scheduled     Apr 20, 2017  1:30 PM CDT   Level 2 with Pr[facility] ([facility])    [id number] [place] [address] [phone]            Jul 03, 2017  6:00 PM CDT   LAB with BE LAB   [facility] ([facility])    [address] [facility] [id number] [phone]           Patient must bring picture ID.  Patient should be prepared to give a urine specimen  Please do not eat 10-12 hours before your appointment if you are coming in fasting for labs on lipids, cholesterol, or glucose (sugar).  Pregnant women should follow their Care Team instructions. Water with medications is okay. Do not drink coffee or other fluids.   If you have concerns about taking  your medications, please ask at  "office or if scheduling via Traiana, send a message by clicking on Secure Messaging, Message Your Care Team.            Jul 10, 2017  1:20 PM CDT   Return Visit with Bradford Colvin MD   [place] [facility] ([facility] [place])    [address]  [place] [address] [phone]              Who to contact     If you have questions or need follow up information about today's clinic visit or your schedule please contact [facility] [place] directly at [phone].  Normal or non-critical lab and imaging results will be communicated to you by Ioterahart, letter or phone within 4 business days after the clinic has received the results. If you do not hear from us within 7 days, please contact the clinic through Se[employer]at[employer] or phone. If you have a critical or abnormal lab result, we will notify you by phone as soon as possible.  Submit refill requests through Traiana or call your pharmacy and they will forward the refill request to us. Please allow 3 business days for your refill to be completed.          Additional Information About Your Visit        Traiana Information     Traiana gives you secure access to your electronic health record. If you see a primary care provider, you can also send messages to your care team and make appointments. If you have questions, please call your primary care clinic.  If you do not have a primary care provider, please call [phone] and they will assist you.        Care EveryWhere ID     This is your Care EveryWhere ID. This could be used by other organizations to access your [place] medical records  [id number]        Your Vitals Were     Pulse Height BMI (Body Mass Index)             99 1.651 m (5' 5\") 22.96 kg/m2          Blood Pressure from Last 3 Encounters:   04/12/17 113/75   04/10/17 104/71   03/24/17 108/70    Weight from Last 3 Encounters:   04/12/17 62.6 kg (138 lb)   04/10/17 63.2 kg (139 lb 6.4 oz)   03/24/17 61.7 kg (136 lb)              Today, " you had the following     No orders found for display       Primary Care Provider Office Phone # Fax #    Bradford Mario PA-C [phone]       [facility] [address] [place] [facility] [address]        Thank you!     Thank you for choosing [facility]  for your care. Our goal is always to provide you with excellent care. Hearing back from our patients is one way we can continue to improve our services. Please take a few minutes to complete the written survey that you may receive in the mail after your visit with us. Thank you!             Your Updated Medication List - Protect others around you: Learn how to safely use, store and throw away your medicines at www.disposemymeds.org.          This list is accurate as of: 4/12/17  1:39 PM.  Always use your most recent med list.                   Brand Name Dispense Instructions for use    amLODIPine 5 MG tablet    NORVASC    90 tablet    Take 1 tablet (5 mg) by mouth daily       azaTHIOprine 50 MG tablet    IMURAN    270 tablet    Take 3 tablets (150 mg) by mouth daily       blood glucose monitoring test strip    no brand specified    1 Month    Used for testing glucose 2-3 times daily       buPROPion 75 MG tablet    WELLBUTRIN    60 tablet    Take 1 tablet (75 mg) by mouth 2 times daily       Calcium + D3 600-200 MG-UNIT Tabs      1 tablet daily       cevimeline 30 MG capsule    EVOXAC    180 capsule    Take 1 capsule (30 mg) by mouth 2 times daily       diclofenac 1 % Gel topical gel    VOLTAREN    100 g    Apply 2-4 grams to knees or shoulders four times daily as needed using enclosed dosing card.       FIRST-MOUTHWASH BLM Susp     100 mL    Take 5-10 mLs by mouth every 6 hours as needed       FLOVENT IN          fluticasone 50 MCG/ACT spray    FLONASE    1 Bottle    Spray 1-2 sprays into both nostrils daily       hydroxychloroquine 200 MG tablet    PLAQUENIL    180 tablet    Take 1 tablet (200 mg) by mouth 2 times daily        hyoscyamine 0.125 MG tablet    ANASPAZ/LEVSIN    40 tablet    Take 1-2 tablets (125-250 mcg) by mouth every 4 hours as needed for cramping       losartan 25 MG tablet    COZAAR    90 tablet    Take 1 tablet (25 mg) by mouth daily       MICROLET LANCETS Misc     100 each    1 each daily.       MULTIVITAMIN PO      Take  by mouth.       nortriptyline 10 MG capsule    PAMELOR    90 capsule    Take 3 capsules (30 mg) by mouth At Bedtime       omeprazole 20 MG CR capsule    priLOSEC    30 capsule    Take 1 capsule (20 mg) by mouth daily       predniSONE 5 MG tablet    DELTASONE    134 tablet    Prednisone 40mg daily x2days, then 20mg daily x5days, then 15mg daily x5days, then 10mg daily x5days, then 5mg thereafter.       pregabalin 150 MG capsule    LYRICA    270 capsule    Take 1 capsule (150 mg) by mouth 3 times daily . 3 month supply.

## 2017-04-18 NOTE — PROGRESS NOTES
Discharge Summary - Hand Therapy  Pt cancelled appointment 2/15/17 and has not returned for therapy.  Assume all goals met to pt satisfaction.  D/C Hand Therapy.

## 2017-04-20 ENCOUNTER — TELEPHONE (OUTPATIENT)
Dept: RHEUMATOLOGY | Facility: CLINIC | Age: 42
End: 2017-04-20

## 2017-04-20 NOTE — TELEPHONE ENCOUNTER
Huddled with Dr. Colvin, patient should wait a week to have her infusion due to the cold. Qing Zapata CMA    Called patient and informed her she should cancel and wait till next week when she feels better. Patient stated she will call and cancel. Thanked me for the quick call back. Qing Zapata CMA

## 2017-04-20 NOTE — TELEPHONE ENCOUNTER
Patient has a cold and today she is scheduled for her infusion wondering if this is ok for her to do  Please call to advice  Thank you

## 2017-04-25 ENCOUNTER — INFUSION THERAPY VISIT (OUTPATIENT)
Dept: INFUSION THERAPY | Facility: CLINIC | Age: 42
End: 2017-04-25
Payer: COMMERCIAL

## 2017-04-25 VITALS
RESPIRATION RATE: 18 BRPM | SYSTOLIC BLOOD PRESSURE: 119 MMHG | OXYGEN SATURATION: 97 % | BODY MASS INDEX: 23.05 KG/M2 | TEMPERATURE: 98.6 F | WEIGHT: 138.5 LBS | DIASTOLIC BLOOD PRESSURE: 76 MMHG | HEART RATE: 90 BPM

## 2017-04-25 DIAGNOSIS — M32.9 SYSTEMIC LUPUS ERYTHEMATOSUS (H): Primary | ICD-10-CM

## 2017-04-25 PROCEDURE — 96413 CHEMO IV INFUSION 1 HR: CPT | Performed by: INTERNAL MEDICINE

## 2017-04-25 PROCEDURE — 99207 ZZC NO CHARGE NURSE ONLY: CPT

## 2017-04-25 RX ADMIN — Medication 250 ML: at 14:52

## 2017-04-25 ASSESSMENT — PAIN SCALES - GENERAL: PAINLEVEL: MILD PAIN (2)

## 2017-04-25 NOTE — PROGRESS NOTES
Infusion Nursing Note:  Yovana Enriquez presents today for Benlysta.    Patient seen by provider today: No   present during visit today: Not Applicable.    Note: N/A.    Intravenous Access:  Peripheral IV placed.    Treatment Conditions:  Rheumatology Infusion Checklist: PRIOR TO INFUSION OF BIOLOGICAL MEDICATIONS OR ANY OF THESE AS LISTED: Benlysta (benlimumab)     Prior to Infusion of biological medications or any of these as listed:    1. Elevated temperature, fever, chills, productive cough or abnormal vital signs, night sweats, coughing up blood or sputum, no appetite or abnormal vital signs : NO    2. Open wounds or new incisions: NO    3. Recent hospitalization: NO    4.  Recent surgeries:  NO    5. Any upcoming surgeries or dental procedures?:NO    6. Any current or recent bouts of illness or infection? On any antibiotics? : NO    7. Any new, sudden or worsening abdominal pain :NO    8. Vaccination within 4 weeks? Patient or someone in the household is scheduled to receive vaccination? No live virus vaccines prior to or during treatment :NO    9. Any nervous system diseases [i.e. multiple sclerosis, Guillain-Tuscumbia, seizures, neurological  changes]: NO    10. Pregnant or breast feeding; or plans on pregnancy in the future: NO    11. Signs of worsening depression or suicidal ideations while taking benlysta:NO    12. New-onset medical symptoms: NO    13.  New cancer diagnosis or on chemotherapy or radiation NO    14.  Evaluate for any sign of active TB [Unexplained weight loss, Loss of appetite, Night sweats, Fever, Fatigue, Chills, Coughing for 3 weeks or longer, Hemoptysis (coughing up blood), Chest pain]: NO    **Note: If answered yes to any of the above, hold the infusion and contact ordering rheumatologist or on-call rheumatologist.   .      Post Infusion Assessment:  Patient tolerated infusion without incident.  Blood return noted pre and post infusion.  Site patent and intact, free from  redness, edema or discomfort.  No evidence of extravasations.  Access discontinued per protocol.    Discharge Plan:   Copy of AVS reviewed with patient and/or family.  Patient will return 5/26 for next appointment.  Patient discharged in stable condition accompanied by: self.  Departure Mode: Ambulatory.    Yessica Guzmán RN

## 2017-04-25 NOTE — MR AVS SNAPSHOT
After Visit Summary   4/25/2017    Yovana Enriquez    MRN: [id number]           Patient Information     Date Of Birth          1975        Visit Information        Provider Department      4/25/2017 2:30 PM [address]        Today's Diagnoses     Systemic lupus erythematosus (H)    -  1       Follow-ups after your visit        Your next 10 appointments already scheduled     May 26, 2017  1:30 PM CDT   Level 2 with [address] ([facility])    [address] [place] [id number] [phone]            Jul 03, 2017  6:00 PM CDT   LAB with BE LAB   [facility]    [address] [facility] [address] [phone]           Patient must bring picture ID.  Patient should be prepared to give a urine specimen  Please do not eat 10-12 hours before your appointment if you are coming in fasting for labs on lipids, cholesterol, or glucose (sugar).  Pregnant women should follow their Care Team instructions. Water with medications is okay. Do not drink coffee or other fluids.   If you have concerns about taking  your medications, please ask at office or if scheduling via [employer], send a message by clicking on Secure Messaging, Message Your Care Team.            Jul 10, 2017  1:20 PM CDT   Return Visit with Bradford Colvin MD   [facility] ([facility]    [address] [facility] [id number] [phone]              Who to contact     If you have questions or need follow up information about today's clinic visit or your schedule please contact [facility] directly at [phone].  Normal or non-critical lab and imaging results will be communicated to you by MyChart, letter or phone within 4 business days after the clinic has received the results. If you do not hear from us within 7 days, please contact  the clinic through [employer] or phone. If you have a critical or abnormal lab result, we will notify you by phone as soon as possible.  Submit refill requests through [employer] or call your pharmacy and they will forward the refill request to us. Please allow 3 business days for your refill to be completed.          Additional Information About Your Visit        VolvantharAdScale Information     [employer] gives you secure access to your electronic health record. If you see a primary care provider, you can also send messages to your care team and make appointments. If you have questions, please call your primary care clinic.  If you do not have a primary care provider, please call [phone] and they will assist you.      [employer] is an electronic gateway that provides easy, online access to your medical records. With [employer], you can request a clinic appointment, read your test results, renew a prescription or communicate with your care team.     To access your existing account, please contact your [facility] or call [phone] for assistance.        Care EveryWhere ID     This is your Care EveryWhere ID. This could be used by other organizations to access your [place] medical records  [id number]        Your Vitals Were     Pulse Temperature Respirations Pulse Oximetry BMI (Body Mass Index)       90 98.6  F (37  C) (Oral) 18 97% 23.05 kg/m2        Blood Pressure from Last 3 Encounters:   04/25/17 119/76   04/12/17 113/75   04/10/17 104/71    Weight from Last 3 Encounters:   04/25/17 62.8 kg (138 lb 8 oz)   04/12/17 62.6 kg (138 lb)   04/10/17 63.2 kg (139 lb 6.4 oz)              Today, you had the following     No orders found for display       Primary Care Provider Office Phone # Fax #    Bradford Mario PA-C [phone]       [facility] [place] [address] [place] [address]        Thank you!     Thank you for choosing [facility] [[email or web address]]  for your care. Our goal  is always to provide you with excellent care. Hearing back from our patients is one way we can continue to improve our services. Please take a few minutes to complete the written survey that you may receive in the mail after your visit with us. Thank you!             Your Updated Medication List - Protect others around you: Learn how to safely use, store and throw away your medicines at www.disposemymeds.org.          This list is accurate as of: 4/25/17  3:52 PM.  Always use your most recent med list.                   Brand Name Dispense Instructions for use    amLODIPine 5 MG tablet    NORVASC    90 tablet    Take 1 tablet (5 mg) by mouth daily       azaTHIOprine 50 MG tablet    IMURAN    270 tablet    Take 3 tablets (150 mg) by mouth daily       blood glucose monitoring test strip    no brand specified    1 Month    Used for testing glucose 2-3 times daily       buPROPion 75 MG tablet    WELLBUTRIN    60 tablet    Take 1 tablet (75 mg) by mouth 2 times daily       Calcium + D3 600-200 MG-UNIT Tabs      1 tablet daily       cevimeline 30 MG capsule    EVOXAC    180 capsule    Take 1 capsule (30 mg) by mouth 2 times daily       diclofenac 1 % Gel topical gel    VOLTAREN    100 g    Apply 2-4 grams to knees or shoulders four times daily as needed using enclosed dosing card.       FLOVENT IN          fluticasone 50 MCG/ACT spray    FLONASE    1 Bottle    Spray 1-2 sprays into both nostrils daily       hydroxychloroquine 200 MG tablet    PLAQUENIL    180 tablet    Take 1 tablet (200 mg) by mouth 2 times daily       hyoscyamine 0.125 MG tablet    ANASPAZ/LEVSIN    40 tablet    Take 1-2 tablets (125-250 mcg) by mouth every 4 hours as needed for cramping       lidocaine visc 2% & diphenhydramine 12.5mg/5mL & maalox/mylanta w/simethicone (1:1:1 v/v/v) Susp compounding kit     100 mL    Take 5-10 mLs by mouth every 6 hours as needed       losartan 25 MG tablet    COZAAR    90 tablet    Take 1 tablet (25 mg) by mouth daily        MICROLET LANCETS Misc     100 each    1 each daily.       MULTIVITAMIN PO      Take  by mouth.       nortriptyline 10 MG capsule    PAMELOR    90 capsule    Take 3 capsules (30 mg) by mouth At Bedtime       omeprazole 20 MG CR capsule    priLOSEC    30 capsule    Take 1 capsule (20 mg) by mouth daily       predniSONE 5 MG tablet    DELTASONE    134 tablet    Prednisone 40mg daily x2days, then 20mg daily x5days, then 15mg daily x5days, then 10mg daily x5days, then 5mg thereafter.       pregabalin 150 MG capsule    LYRICA    270 capsule    Take 1 capsule (150 mg) by mouth 3 times daily . 3 month supply.

## 2017-04-26 ENCOUNTER — OFFICE VISIT (OUTPATIENT)
Dept: URGENT CARE | Facility: URGENT CARE | Age: 42
End: 2017-04-26
Payer: COMMERCIAL

## 2017-04-26 VITALS
BODY MASS INDEX: 23.1 KG/M2 | DIASTOLIC BLOOD PRESSURE: 82 MMHG | HEART RATE: 88 BPM | SYSTOLIC BLOOD PRESSURE: 128 MMHG | TEMPERATURE: 97.1 F | OXYGEN SATURATION: 100 % | WEIGHT: 138.8 LBS

## 2017-04-26 DIAGNOSIS — J01.90 ACUTE SINUSITIS WITH SYMPTOMS > 10 DAYS: Primary | ICD-10-CM

## 2017-04-26 DIAGNOSIS — J31.0 CHRONIC RHINITIS: ICD-10-CM

## 2017-04-26 DIAGNOSIS — M32.9 SYSTEMIC LUPUS ERYTHEMATOSUS (H): ICD-10-CM

## 2017-04-26 PROCEDURE — 99214 OFFICE O/P EST MOD 30 MIN: CPT | Performed by: FAMILY MEDICINE

## 2017-04-26 NOTE — MR AVS SNAPSHOT
After Visit Summary   4/26/2017    Yovana Enriquez    MRN: [id number]           Patient Information     Date Of Birth          1975        Visit Information        Provider Department      4/26/2017 7:30 PM Loren Alberts MD [facility]        Today's Diagnoses     Acute sinusitis with symptoms > 10 days    -  1    Chronic rhinitis        Systemic lupus erythematosus (H)           Follow-ups after your visit        Your next 10 appointments already scheduled     May 26, 2017  1:30 PM CDT   Level 2 with [address] ([facility])    [address] [place] [id number] [phone]            Jul 03, 2017  6:00 PM CDT   LAB with BE LAB   [facility]    [address] [facility] [id number] [phone]           Patient must bring picture ID.  Patient should be prepared to give a urine specimen  Please do not eat 10-12 hours before your appointment if you are coming in fasting for labs on lipids, cholesterol, or glucose (sugar).  Pregnant women should follow their Care Team instructions. Water with medications is okay. Do not drink coffee or other fluids.   If you have concerns about taking  your medications, please ask at office or if scheduling via [employer], send a message by clicking on Secure Messaging, Message Your Care Team.            Jul 10, 2017  1:20 PM CDT   Return Visit with Bradford Colvin MD   [facility] ([facility])    [address] [facility] [address] [phone]              Who to contact     If you have questions or need follow up information about today's clinic visit or your schedule please contact [facility] directly at [phone].  Normal or non-critical lab and imaging results will be communicated to you by MyChart, letter or phone within 4 business days after the clinic has  received the results. If you do not hear from us within 7 days, please contact the clinic through [employer] or phone. If you have a critical or abnormal lab result, we will notify you by phone as soon as possible.  Submit refill requests through Sensee or call your pharmacy and they will forward the refill request to us. Please allow 3 business days for your refill to be completed.          Additional Information About Your Visit        Sensee Information     S[employer]e gives you secure access to your electronic health record. If you see a primary care provider, you can also send messages to your care team and make appointments. If you have questions, please call your primary care clinic.  If you do not have a primary care provider, please call [phone] and they will assist you.        Care EveryWhere ID     This is your Care EveryWhere ID. This could be used by other organizations to access your [place] medical records  [id number]        Your Vitals Were     Pulse Temperature Pulse Oximetry BMI (Body Mass Index)          88 97.1  F (36.2  C) (Oral) 100% 23.1 kg/m2         Blood Pressure from Last 3 Encounters:   04/26/17 128/82   04/25/17 119/76   04/12/17 113/75    Weight from Last 3 Encounters:   04/26/17 138 lb 12.8 oz (63 kg)   04/25/17 138 lb 8 oz (62.8 kg)   04/12/17 138 lb (62.6 kg)              Today, you had the following     No orders found for display         Today's Medication Changes          These changes are accurate as of: 4/26/17 10:25 PM.  If you have any questions, ask your nurse or doctor.               Start taking these medicines.        Dose/Directions    amoxicillin-clavulanate 875-125 MG per tablet   Commonly known as:  AUGMENTIN   Used for:  Acute sinusitis with symptoms > 10 days        Dose:  1 tablet   Take 1 tablet by mouth 2 times daily for 10 days   Quantity:  20 tablet   Refills:  0            Where to get your medicines      These medications were sent to [facility] [address] [facility] -  [place], [place] - [address] [facility] [address]     Phone:  [phone]     amoxicillin-clavulanate 875-125 MG per tablet                Primary Care Provider Office Phone # Fax #    Bradford Mario PA-C [phone]       [facility] [place] [address]  [facility] [address]        Thank you!     Thank you for choosing [facility]  for your care. Our goal is always to provide you with excellent care. Hearing back from our patients is one way we can continue to improve our services. Please take a few minutes to complete the written survey that you may receive in the mail after your visit with us. Thank you!             Your Updated Medication List - Protect others around you: Learn how to safely use, store and throw away your medicines at www.disposemymeds.org.          This list is accurate as of: 4/26/17 10:25 PM.  Always use your most recent med list.                   Brand Name Dispense Instructions for use    amLODIPine 5 MG tablet    NORVASC    90 tablet    Take 1 tablet (5 mg) by mouth daily       amoxicillin-clavulanate 875-125 MG per tablet    AUGMENTIN    20 tablet    Take 1 tablet by mouth 2 times daily for 10 days       azaTHIOprine 50 MG tablet    IMURAN    270 tablet    Take 3 tablets (150 mg) by mouth daily       blood glucose monitoring test strip    no brand specified    1 Month    Used for testing glucose 2-3 times daily       buPROPion 75 MG tablet    WELLBUTRIN    60 tablet    Take 1 tablet (75 mg) by mouth 2 times daily       Calcium + D3 600-200 MG-UNIT Tabs      1 tablet daily       cevimeline 30 MG capsule    EVOXAC    180 capsule    Take 1 capsule (30 mg) by mouth 2 times daily       diclofenac 1 % Gel topical gel    VOLTAREN    100 g    Apply 2-4 grams to knees or shoulders four times daily as needed using enclosed dosing card.       FLOVENT IN          fluticasone 50 MCG/ACT spray    FLONASE    1 Bottle    Spray 1-2 sprays  into both nostrils daily       hydroxychloroquine 200 MG tablet    PLAQUENIL    180 tablet    Take 1 tablet (200 mg) by mouth 2 times daily       hyoscyamine 0.125 MG tablet    ANASPAZ/LEVSIN    40 tablet    Take 1-2 tablets (125-250 mcg) by mouth every 4 hours as needed for cramping       lidocaine visc 2% & diphenhydramine 12.5mg/5mL & maalox/mylanta w/simethicone (1:1:1 v/v/v) Susp compounding kit     100 mL    Take 5-10 mLs by mouth every 6 hours as needed       losartan 25 MG tablet    COZAAR    90 tablet    Take 1 tablet (25 mg) by mouth daily       MICROLET LANCETS Misc     100 each    1 each daily.       MULTIVITAMIN PO      Take  by mouth.       nortriptyline 10 MG capsule    PAMELOR    90 capsule    Take 3 capsules (30 mg) by mouth At Bedtime       omeprazole 20 MG CR capsule    priLOSEC    30 capsule    Take 1 capsule (20 mg) by mouth daily       predniSONE 5 MG tablet    DELTASONE    134 tablet    Prednisone 40mg daily x2days, then 20mg daily x5days, then 15mg daily x5days, then 10mg daily x5days, then 5mg thereafter.       pregabalin 150 MG capsule    LYRICA    270 capsule    Take 1 capsule (150 mg) by mouth 3 times daily . 3 month supply.

## 2017-04-27 NOTE — PROGRESS NOTES
SUBJECTIVE:                                                    Yovana Enriquez is a 41 year old female who presents to clinic today for the following health issues:      RESPIRATORY SYMPTOMS      Duration: X 2 week    Description  nasal congestion, facial pain/pressure and headache    Severity: moderate    Accompanying signs and symptoms: None    History (predisposing factors):  none    Precipitating or alleviating factors: None    Therapies tried and outcome:  Ibuprofen     Has had a cold for 2 weeks gotten worse past week.  A lot of sinus congestion  Colds, sinus congestion, facial pain  Cough Yes  Greenish discharge  Fever No  Progressively getting worse: YES  Thought was getting better then started getting worse: YES  Getting better:  No  Exposure to pertussis or pertussis like symptoms: No    Problem list and histories reviewed & adjusted, as indicated.  Additional history: as documented    Problem list, Medication list, Allergies, and Medical/Social/Surgical histories reviewed in EPIC and updated as appropriate.    ROS:  Constitutional, HEENT, cardiovascular, pulmonary, gi and gu systems are negative, except as otherwise noted.    OBJECTIVE:                                                    /82  Pulse 88  Temp 97.1  F (36.2  C) (Oral)  Wt 138 lb 12.8 oz (63 kg)  SpO2 100%  BMI 23.1 kg/m2  Body mass index is 23.1 kg/(m^2).  GENERAL: healthy, alert and no distress  EYES: Eyes grossly normal to inspection, PERRL and conjunctivae and sclerae normal  HENT: ear canals and TM's normal, nose and mouth without ulcers or lesions  Sinuses: turbinates erythematous maxillary sinus tenderness   NECK: no adenopathy, no asymmetry, masses, or scars and thyroid normal to palpation  RESP: lungs clear to auscultation - no rales, rhonchi or wheezes   CV: regular rate and rhythm, normal S1 S2, no S3 or S4, no murmur, click or rub, no peripheral edema and peripheral pulses strong  ABDOMEN: soft, nontender, no  hepatosplenomegaly, no masses and bowel sounds normal  MS: no gross musculoskeletal defects noted, no edema  SKIN: no suspicious lesions or rashes  NEURO: Normal strength and tone, mentation intact and speech normal  PSYCH: mentation appears normal, affect normal/bright    Diagnostic Test Results:  No results found for this or any previous visit (from the past 24 hour(s)).     ASSESSMENT/PLAN:                                                        ICD-10-CM    1. Acute sinusitis with symptoms > 10 days J01.90 amoxicillin-clavulanate (AUGMENTIN) 875-125 MG per tablet   2. Chronic rhinitis J31.0    3. Systemic lupus erythematosus (H) M32.9      Prescribed with augmentin  Side effects discussed warned about GI side effects and risk of cdiff.  Chronic rhinitis continue flonase  SLE on immunemodulators increase risk of infection. Follow up with rheumatology  Recommend follow up with primary care provider if no relief, sooner if worse  Adverse reactions of medications discussed.  Over the counter medications discussed.   Aware to come back in if with worsening symptoms or if no relief despite treatment plan  Patient voiced understanding and had no further questions.     MD Loren Moe MD  [facility]

## 2017-04-27 NOTE — NURSING NOTE
"Chief Complaint   Patient presents with     Sinus Problem       Initial /82  Pulse 88  Temp 97.1  F (36.2  C) (Oral)  Wt 138 lb 12.8 oz (63 kg)  SpO2 100%  BMI 23.1 kg/m2 Estimated body mass index is 23.1 kg/(m^2) as calculated from the following:    Height as of 4/12/17: 5' 5\" (1.651 m).    Weight as of this encounter: 138 lb 12.8 oz (63 kg).  Medication Reconciliation: complete   Josselin Cantor CMA      "

## 2017-05-14 DIAGNOSIS — F41.9 ANXIETY: ICD-10-CM

## 2017-05-15 DIAGNOSIS — R20.0 NUMBNESS: ICD-10-CM

## 2017-05-15 RX ORDER — BUPROPION HYDROCHLORIDE 75 MG/1
TABLET ORAL
Qty: 60 TABLET | Refills: 1 | Status: SHIPPED | OUTPATIENT
Start: 2017-05-15 | End: 2017-07-17

## 2017-05-15 RX ORDER — NORTRIPTYLINE HCL 10 MG
30 CAPSULE ORAL AT BEDTIME
Qty: 90 CAPSULE | Refills: 1 | Status: SHIPPED | OUTPATIENT
Start: 2017-05-15 | End: 2017-07-17

## 2017-05-15 NOTE — TELEPHONE ENCOUNTER
Nortriptyline     Last Written Prescription Date: 04/16/17  Last Fill Quantity: 90, # refills: 6  Last Office Visit with [facility], [facility] or  Health prescribing provider: 09/20/16   Next 5 appointments (look out 90 days)     Jul 10, 2017  1:20 PM CDT   Return Visit with Bradford Colvin MD   [facility] [place] ([facility])    [address]  [place] [address]   [phone]                   BP Readings from Last 3 Encounters:   04/26/17 128/82   04/25/17 119/76   04/12/17 113/75     Last PHQ-9 score on record=   PHQ-9 SCORE 5/18/2015   Total Score 3

## 2017-05-15 NOTE — TELEPHONE ENCOUNTER
BUPROPION       Last Written Prescription Date: 4-16-17  Last Fill Quantity: 60; # refills: 2  Last Office Visit with [facility], [facility] or [facility] prescribing provider:  4-10-17   Next 5 appointments (look out 90 days)     Jul 10, 2017  1:20 PM CDT   Return Visit with Bradford Colvin MD   [facility] [place] ([facility] [place])    [address]  [place] [id number] [phone]                   Last PHQ-9 score on record=   PHQ-9 SCORE 5/18/2015   Total Score 3       Lab Results   Component Value Date    AST 20 04/05/2017     Lab Results   Component Value Date    ALT 25 04/05/2017

## 2017-05-26 ENCOUNTER — INFUSION THERAPY VISIT (OUTPATIENT)
Dept: INFUSION THERAPY | Facility: CLINIC | Age: 42
End: 2017-05-26
Payer: COMMERCIAL

## 2017-05-26 VITALS
WEIGHT: 137 LBS | DIASTOLIC BLOOD PRESSURE: 74 MMHG | HEART RATE: 91 BPM | TEMPERATURE: 98.2 F | SYSTOLIC BLOOD PRESSURE: 113 MMHG | RESPIRATION RATE: 16 BRPM | OXYGEN SATURATION: 97 % | BODY MASS INDEX: 22.8 KG/M2

## 2017-05-26 DIAGNOSIS — M32.9 SYSTEMIC LUPUS ERYTHEMATOSUS (H): Primary | ICD-10-CM

## 2017-05-26 PROCEDURE — 99207 ZZC NO CHARGE LOS: CPT

## 2017-05-26 PROCEDURE — 96413 CHEMO IV INFUSION 1 HR: CPT | Performed by: INTERNAL MEDICINE

## 2017-05-26 RX ADMIN — Medication 250 ML: at 14:02

## 2017-05-26 NOTE — MR AVS SNAPSHOT
After Visit Summary   5/26/2017    Yovana Enriquez    MRN: [id number]           Patient Information     Date Of Birth          1975        Visit Information        Provider Department      5/26/2017 1:30 PM BAY 4 INFUSION [facility]        Today's Diagnoses     Systemic lupus erythematosus (H)    -  1       Follow-ups after your visit        Your next 10 appointments already scheduled     Jun 23, 2017  8:00 AM CDT   Level 2 with [place] [facility] ([facility])    [address] [place] [id number] [phone]            Jul 03, 2017  6:00 PM CDT   LAB with BE LAB   [facility] ([facility])    [address] [address] [phone]           Patient must bring picture ID.  Patient should be prepared to give a urine specimen  Please do not eat 10-12 hours before your appointment if you are coming in fasting for labs on lipids, cholesterol, or glucose (sugar).  Pregnant women should follow their Care Team instructions. Water with medications is okay. Do not drink coffee or other fluids.   If you have concerns about taking  your medications, please ask at office or if scheduling via [email or web address], send a message by clicking on Secure Messaging, Message Your Care Team.            Jul 10, 2017  1:20 PM CDT   Return Visit with Bradford Colvin MD   [facility] ([facility])    [address] [id number] [phone]              Who to contact     If you have questions or need follow up information about today's clinic visit or your schedule please contact [facility] directly at [phone].  Normal or non-critical lab and imaging results will be communicated to you by MyChart, letter or phone within 4 business days after the clinic has received the results. If you do not hear from us within 7 days, please contact  the clinic through [email or web address] or phone. If you have a critical or abnormal lab result, we will notify you by phone as soon as possible.  Submit refill requests through [email or web address] or call your pharmacy and they will forward the refill request to us. Please allow 3 business days for your refill to be completed.          Additional Information About Your Visit        [employer] Information     [email or web address] gives you secure access to your electronic health record. If you see a primary care provider, you can also send messages to your care team and make appointments. If you have questions, please call your primary care clinic.  If you do not have a primary care provider, please call [phone] and they will assist you.      [email or web address] is an electronic gateway that provides easy, online access to your medical records. With [email or web address], you can request a clinic appointment, read your test results, renew a prescription or communicate with your care team.     To access your existing account, please contact your [facility] or call [phone] for assistance.        Care EveryWhere ID     This is your Care EveryWhere ID. This could be used by other organizations to access your [place] medical records  [id number]        Your Vitals Were     Pulse Temperature Respirations Pulse Oximetry BMI (Body Mass Index)       91 98.2  F (36.8  C) (Oral) 16 97% 22.8 kg/m2        Blood Pressure from Last 3 Encounters:   05/26/17 113/74   04/26/17 128/82   04/25/17 119/76    Weight from Last 3 Encounters:   05/26/17 62.1 kg (137 lb)   04/26/17 63 kg (138 lb 12.8 oz)   04/25/17 62.8 kg (138 lb 8 oz)              Today, you had the following     No orders found for display       Primary Care Provider Office Phone # Fax #    Bradford Mario PA-C [phone]       [facility] [place] [place] [address]        Thank you!     Thank you for choosing [facility]  for your care. Our goal  is always to provide you with excellent care. Hearing back from our patients is one way we can continue to improve our services. Please take a few minutes to complete the written survey that you may receive in the mail after your visit with us. Thank you!             Your Updated Medication List - Protect others around you: Learn how to safely use, store and throw away your medicines at www.disposemymeds.org.          This list is accurate as of: 5/26/17  4:21 PM.  Always use your most recent med list.                   Brand Name Dispense Instructions for use    amLODIPine 5 MG tablet    NORVASC    90 tablet    Take 1 tablet (5 mg) by mouth daily       azaTHIOprine 50 MG tablet    IMURAN    270 tablet    Take 3 tablets (150 mg) by mouth daily       blood glucose monitoring test strip    no brand specified    1 Month    Used for testing glucose 2-3 times daily       buPROPion 75 MG tablet    WELLBUTRIN    60 tablet    TAKE 1 TABLET (75 MG) BY MOUTH 2 TIMES DAILY       Calcium + D3 600-200 MG-UNIT Tabs      1 tablet daily       cevimeline 30 MG capsule    EVOXAC    180 capsule    Take 1 capsule (30 mg) by mouth 2 times daily       diclofenac 1 % Gel topical gel    VOLTAREN    100 g    Apply 2-4 grams to knees or shoulders four times daily as needed using enclosed dosing card.       FLOVENT IN          fluticasone 50 MCG/ACT spray    FLONASE    1 Bottle    Spray 1-2 sprays into both nostrils daily       hydroxychloroquine 200 MG tablet    PLAQUENIL    180 tablet    Take 1 tablet (200 mg) by mouth 2 times daily       hyoscyamine 0.125 MG tablet    ANASPAZ/LEVSIN    40 tablet    Take 1-2 tablets (125-250 mcg) by mouth every 4 hours as needed for cramping       lidocaine visc 2% & diphenhydramine 12.5mg/5mL & maalox/mylanta w/simethicone (1:1:1 v/v/v) Susp compounding kit     100 mL    Take 5-10 mLs by mouth every 6 hours as needed       losartan 25 MG tablet    COZAAR    90 tablet    Take 1 tablet (25 mg) by mouth daily        MICROLET LANCETS Misc     100 each    1 each daily.       MULTIVITAMIN PO      Take  by mouth.       nortriptyline 10 MG capsule    PAMELOR    90 capsule    Take 3 capsules (30 mg) by mouth At Bedtime       omeprazole 20 MG CR capsule    priLOSEC    30 capsule    Take 1 capsule (20 mg) by mouth daily       predniSONE 5 MG tablet    DELTASONE    134 tablet    Prednisone 40mg daily x2days, then 20mg daily x5days, then 15mg daily x5days, then 10mg daily x5days, then 5mg thereafter.       pregabalin 150 MG capsule    LYRICA    270 capsule    Take 1 capsule (150 mg) by mouth 3 times daily . 3 month supply.

## 2017-05-26 NOTE — PROGRESS NOTES
"Infusion Nursing Note:  Yovana Enriquez presents today for Benlysta.    Patient seen by provider today: No   present during visit today: Not Applicable.    Note: N/A.    Intravenous Access:  Peripheral IV placed.    Treatment Conditions:  Rheumatology Infusion Checklist: PRIOR TO INFUSION OF BIOLOGICAL MEDICATIONS OR ANY OF THESE AS LISTED: Benlysta (benlimumab) \".rheumbiologicalchecklist\"    Prior to Infusion of biological medications or any of these as listed:    1. Elevated temperature, fever, chills, productive cough or abnormal vital signs, night sweats, coughing up blood or sputum, no appetite or abnormal vital signs : NO    2. Open wounds or new incisions: NO    3. Recent hospitalization: NO    4.  Recent surgeries:  NO    5. Any upcoming surgeries or dental procedures?:No      6. Any current or recent bouts of illness or infection? On any antibiotics? : NO    7. Any new, sudden or worsening abdominal pain :NO    8. Vaccination within 4 weeks? Patient or someone in the household is scheduled to receive vaccination? No live virus vaccines prior to or during treatment :NO    9. Any nervous system diseases [i.e. multiple sclerosis, Guillain-Garnavillo, seizures, neurological  changes]: NO    10. Pregnant or breast feeding; or plans on pregnancy in the future: NO    11. Signs of worsening depression or suicidal ideations while taking benlysta:NO    12. New-onset medical symptoms: NO    13.  New cancer diagnosis or on chemotherapy or radiation NO    14.  Evaluate for any sign of active TB [Unexplained weight loss, Loss of appetite, Night sweats, Fever, Fatigue, Chills, Coughing for 3 weeks or longer, Hemoptysis (coughing up blood), Chest pain]: NO    **Note: If answered yes to any of the above, hold the infusion and contact ordering rheumatologist or on-call rheumatologist.   .      Post Infusion Assessment:  Patient tolerated infusion without incident.  No evidence of extravasations.  Access " discontinued per protocol.  Rheumatology Post Infusion: POST-INFUSION OF BIOLOGICAL MEDICATION:    Reviewed with patient.  Inform patient if any fever, chills or signs of infection, new symptoms, abdominal pain, heart palpitations, shortness of breath, reaction, weakness, neurological changes, seek medical attention immediately and should not receive infusions. No live virus vaccines prior to or during treatment or up to 6 months post infusion. If the patient has an upcoming procedure or surgery, this should be discussed with the rheumatologist and surgeon or provider..    Discharge Plan:   Discharge instructions reviewed with: Patient.  Patient and/or family verbalized understanding of discharge instructions and all questions answered.  Patient discharged in stable condition accompanied by: self.  Departure Mode: Ambulatory.    Nacny Padilla RN

## 2017-06-15 DIAGNOSIS — I73.00 RAYNAUD'S PHENOMENON WITHOUT GANGRENE: ICD-10-CM

## 2017-06-15 RX ORDER — AMLODIPINE BESYLATE 5 MG/1
5 TABLET ORAL DAILY
Qty: 90 TABLET | Refills: 1 | Status: SHIPPED | OUTPATIENT
Start: 2017-06-15 | End: 2017-07-17

## 2017-06-15 NOTE — TELEPHONE ENCOUNTER
Prescription approved per [facility] Refill Protocol.  Katelyn Calderon, RN - BC

## 2017-06-15 NOTE — TELEPHONE ENCOUNTER
amLODIPine (NORVASC) 5 MG tablet      Last Written Prescription Date: 4/10/17  Last Fill Quantity: 90, # refills: 1    Last Office Visit with [facility], [facility] or [facility] prescribing provider:  4/10/17   Future Office Visit:    Next 5 appointments (look out 90 days)     Jul 10, 2017  1:20 PM CDT   Return Visit with Bradford Colvin MD   [facility] [place] ([facility] [place])    [address] [facility] [address]  [place] [id number] [phone]                    BP Readings from Last 3 Encounters:   05/26/17 113/74   04/26/17 128/82   04/25/17 119/76

## 2017-06-23 ENCOUNTER — INFUSION THERAPY VISIT (OUTPATIENT)
Dept: INFUSION THERAPY | Facility: CLINIC | Age: 42
End: 2017-06-23
Payer: COMMERCIAL

## 2017-06-23 VITALS
BODY MASS INDEX: 22.53 KG/M2 | DIASTOLIC BLOOD PRESSURE: 81 MMHG | TEMPERATURE: 97.7 F | RESPIRATION RATE: 18 BRPM | WEIGHT: 135.4 LBS | SYSTOLIC BLOOD PRESSURE: 119 MMHG | OXYGEN SATURATION: 100 %

## 2017-06-23 DIAGNOSIS — M32.9 SYSTEMIC LUPUS ERYTHEMATOSUS (H): Primary | ICD-10-CM

## 2017-06-23 PROCEDURE — 96413 CHEMO IV INFUSION 1 HR: CPT | Performed by: INTERNAL MEDICINE

## 2017-06-23 PROCEDURE — 99207 ZZC NO CHARGE LOS: CPT

## 2017-06-23 RX ADMIN — Medication 250 ML: at 08:58

## 2017-06-23 ASSESSMENT — PAIN SCALES - GENERAL: PAINLEVEL: MILD PAIN (3)

## 2017-06-23 NOTE — PROGRESS NOTES
"Infusion Nursing Note:  Yovana Enriquez presents today for Benlysta.    Patient seen by provider today: No   present during visit today: Not Applicable.    Note: N/A.    Intravenous Access:  Peripheral IV placed.    Treatment Conditions:  Rheumatology Infusion Checklist: PRIOR TO INFUSION OF BIOLOGICAL MEDICATIONS OR ANY OF THESE AS LISTED: Benlysta (benlimumab) \".rheumbiologicalchecklist\"    Prior to Infusion of biological medications or any of these as listed:    1. Elevated temperature, fever, chills, productive cough or abnormal vital signs, night sweats, coughing up blood or sputum, no appetite or abnormal vital signs : NO    2. Open wounds or new incisions: NO    3. Recent hospitalization: NO    4.  Recent surgeries:  NO    5. Any upcoming surgeries or dental procedures?:NO    6. Any current or recent bouts of illness or infection? On any antibiotics? : NO    7. Any new, sudden or worsening abdominal pain :NO    8. Vaccination within 4 weeks? Patient or someone in the household is scheduled to receive vaccination? No live virus vaccines prior to or during treatment :NO    9. Any nervous system diseases [i.e. multiple sclerosis, Guillain-Dewitt, seizures, neurological  changes]: NO    10. Pregnant or breast feeding; or plans on pregnancy in the future: NO    11. Signs of worsening depression or suicidal ideations while taking benlysta:NO    12. New-onset medical symptoms: NO    13.  New cancer diagnosis or on chemotherapy or radiation NO    14.  Evaluate for any sign of active TB [Unexplained weight loss, Loss of appetite, Night sweats, Fever, Fatigue, Chills, Coughing for 3 weeks or longer, Hemoptysis (coughing up blood), Chest pain]: NO    **Note: If answered yes to any of the above, hold the infusion and contact ordering rheumatologist or on-call rheumatologist.   .      Post Infusion Assessment:  Patient tolerated infusion without incident.    Discharge Plan:   RTC as scheduled " 7/20.    MIRNA GARCIA RN

## 2017-06-23 NOTE — MR AVS SNAPSHOT
After Visit Summary   6/23/2017    Yovana Enriquez    MRN: [id number]           Patient Information     Date Of Birth          1975        Visit Information        Provider Department      6/23/2017 8:00 AM BAY 1 INFUSION [facility]        Today's Diagnoses     Systemic lupus erythematosus (H)    -  1       Follow-ups after your visit        Your next 10 appointments already scheduled     Jul 03, 2017  6:00 PM CDT   LAB with BE LAB   [facility] [place] ([facility])    [address]  [place] [address] [phone]           Patient must bring picture ID.  Patient should be prepared to give a urine specimen  Please do not eat 10-12 hours before your appointment if you are coming in fasting for labs on lipids, cholesterol, or glucose (sugar).  Pregnant women should follow their Care Team instructions. Water with medications is okay. Do not drink coffee or other fluids.   If you have concerns about taking  your medications, please ask at office or if scheduling via FastPay, send a message by clicking on Secure Messaging, Message Your Care Team.            Jul 10, 2017  1:20 PM CDT   Return Visit with Bradford Colvin MD   [facility] [place] ([facility])    [address]  [place] [address] [phone]            Jul 20, 2017  2:00 PM CDT   Level 2 with BAY 2 INFUSION   [facility]    [address] [place] [id number] [phone]            Aug 18, 2017  2:00 PM CDT   Level 2 with BAY 2 INFUSION   [facility]    [address] [place] [id number] [phone]              Who to contact     If you have questions or need follow up information about today's clinic visit or your schedule please contact [facility] directly at [phone].  Normal or non-critical lab and  imaging results will be communicated to you by MyChart, letter or phone within 4 business days after the clinic has received the results. If you do not hear from us within 7 days, please contact the clinic through Connectbright or phone. If you have a critical or abnormal lab result, we will notify you by phone as soon as possible.  Submit refill requests through Connectbright or call your pharmacy and they will forward the refill request to us. Please allow 3 business days for your refill to be completed.          Additional Information About Your Visit        TwijectorharChayamuni Information     Connectbright gives you secure access to your electronic health record. If you see a primary care provider, you can also send messages to your care team and make appointments. If you have questions, please call your primary care clinic.  If you do not have a primary care provider, please call [phone] and they will assist you.      Connectbright is an electronic gateway that provides easy, online access to your medical records. With Connectbright, you can request a clinic appointment, read your test results, renew a prescription or communicate with your care team.     To access your existing account, please contact your [facility] or call [phone] for assistance.        Care EveryWhere ID     This is your Care EveryWhere ID. This could be used by other organizations to access your [facility] medical records  [id number]        Your Vitals Were     Temperature Respirations Pulse Oximetry BMI (Body Mass Index)          97.7  F (36.5  C) (Oral) 18 100% 22.53 kg/m2         Blood Pressure from Last 3 Encounters:   06/23/17 119/81   05/26/17 113/74   04/26/17 128/82    Weight from Last 3 Encounters:   06/23/17 61.4 kg (135 lb 6.4 oz)   05/26/17 62.1 kg (137 lb)   04/26/17 63 kg (138 lb 12.8 oz)              Today, you had the following     No orders found for display       Primary Care Provider Office Phone # Fax #    Bradford Mcfarland  HELLEN Mario [phone]       [facility] [place] [address]  [place] [address]        Equal Access to Services     YADYMARTIN VANNESSA : Hadii mare ku hadjoceo Sobeali, waaxda luqadaha, qaybta kaalmada adeangleyada, rosy madrigal lasenthilallan washing[place]. So [facility] [phone].    ATENCIÓN: Si habla español, tiene a dexter disposición servicios gratuitos de asistencia lingüística. Llame al [phone].    We comply with applicable federal civil rights laws and [place] laws. We do not discriminate on the basis of race, color, national origin, age, disability sex, sexual orientation or gender identity.            Thank you!     Thank you for choosing [facility]  for your care. Our goal is always to provide you with excellent care. Hearing back from our patients is one way we can continue to improve our services. Please take a few minutes to complete the written survey that you may receive in the mail after your visit with us. Thank you!             Your Updated Medication List - Protect others around you: Learn how to safely use, store and throw away your medicines at www.disposemymeds.org.          This list is accurate as of: 6/23/17  3:58 PM.  Always use your most recent med list.                   Brand Name Dispense Instructions for use Diagnosis    amLODIPine 5 MG tablet    NORVASC    90 tablet    Take 1 tablet (5 mg) by mouth daily    Raynaud's phenomenon without gangrene       azaTHIOprine 50 MG tablet    IMURAN    270 tablet    Take 3 tablets (150 mg) by mouth daily    Systemic lupus erythematosus (H)       blood glucose monitoring test strip    no brand specified    1 Month    Used for testing glucose 2-3 times daily    History of gestational diabetes mellitus, not currently pregnant       buPROPion 75 MG tablet    WELLBUTRIN    60 tablet    TAKE 1 TABLET (75 MG) BY MOUTH 2 TIMES DAILY    Anxiety       Calcium + D3 600-200 MG-UNIT Tabs      1 tablet daily        cevimeline 30 MG  capsule    EVOXAC    180 capsule    Take 1 capsule (30 mg) by mouth 2 times daily    Systemic lupus erythematosus (H), Sjogren's syndrome (H)       diclofenac 1 % Gel topical gel    VOLTAREN    100 g    Apply 2-4 grams to knees or shoulders four times daily as needed using enclosed dosing card.    Fibromyalgia, SLE (systemic lupus erythematosus) (H)       FLOVENT IN           fluticasone 50 MCG/ACT spray    FLONASE    1 Bottle    Spray 1-2 sprays into both nostrils daily    Chronic rhinitis       hydroxychloroquine 200 MG tablet    PLAQUENIL    180 tablet    Take 1 tablet (200 mg) by mouth 2 times daily    Systemic lupus erythematosus (H), High risk medication use       hyoscyamine 0.125 MG tablet    ANASPAZ/LEVSIN    40 tablet    Take 1-2 tablets (125-250 mcg) by mouth every 4 hours as needed for cramping    Irritable bowel syndrome, unspecified type       lidocaine visc 2% & diphenhydramine 12.5mg/5mL & maalox/mylanta w/simethicone (1:1:1 v/v/v) Susp compounding kit     100 mL    Take 5-10 mLs by mouth every 6 hours as needed    Oral ulcer       losartan 25 MG tablet    COZAAR    90 tablet    Take 1 tablet (25 mg) by mouth daily    Hypertension goal BP (blood pressure) < 140/90       MICROLET LANCETS Misc     100 each    1 each daily.    Other abnormal glucose       MULTIVITAMIN PO      Take  by mouth.        nortriptyline 10 MG capsule    PAMELOR    90 capsule    Take 3 capsules (30 mg) by mouth At Bedtime    Numbness       omeprazole 20 MG CR capsule    priLOSEC    30 capsule    Take 1 capsule (20 mg) by mouth daily    Systemic lupus erythematosus (H)       predniSONE 5 MG tablet    DELTASONE    134 tablet    Prednisone 40mg daily x2days, then 20mg daily x5days, then 15mg daily x5days, then 10mg daily x5days, then 5mg thereafter.    Systemic lupus erythematosus (H)       pregabalin 150 MG capsule    LYRICA    270 capsule    Take 1 capsule (150 mg) by mouth 3 times daily . 3 month supply.    Fibromyalgia

## 2017-07-03 DIAGNOSIS — Z79.899 HIGH RISK MEDICATION USE: ICD-10-CM

## 2017-07-03 DIAGNOSIS — M32.9 SYSTEMIC LUPUS ERYTHEMATOSUS (H): ICD-10-CM

## 2017-07-03 LAB
ALBUMIN SERPL-MCNC: 3.6 G/DL (ref 3.4–5)
ALBUMIN UR-MCNC: NEGATIVE MG/DL
ALP SERPL-CCNC: 53 U/L (ref 40–150)
ALT SERPL W P-5'-P-CCNC: 26 U/L (ref 0–50)
ANION GAP SERPL CALCULATED.3IONS-SCNC: 7 MMOL/L (ref 3–14)
APPEARANCE UR: CLEAR
AST SERPL W P-5'-P-CCNC: 20 U/L (ref 0–45)
BASOPHILS # BLD AUTO: 0 10E9/L (ref 0–0.2)
BASOPHILS NFR BLD AUTO: 0.2 %
BILIRUB SERPL-MCNC: 0.3 MG/DL (ref 0.2–1.3)
BILIRUB UR QL STRIP: NEGATIVE
BUN SERPL-MCNC: 8 MG/DL (ref 7–30)
CALCIUM SERPL-MCNC: 8.3 MG/DL (ref 8.5–10.1)
CHLORIDE SERPL-SCNC: 108 MMOL/L (ref 94–109)
CK SERPL-CCNC: 127 U/L (ref 30–225)
CO2 SERPL-SCNC: 26 MMOL/L (ref 20–32)
COLOR UR AUTO: YELLOW
CREAT SERPL-MCNC: 0.85 MG/DL (ref 0.52–1.04)
CREAT UR-MCNC: 112 MG/DL
CRP SERPL-MCNC: <2.9 MG/L (ref 0–8)
DIFFERENTIAL METHOD BLD: ABNORMAL
EOSINOPHIL # BLD AUTO: 0 10E9/L (ref 0–0.7)
EOSINOPHIL NFR BLD AUTO: 0.6 %
ERYTHROCYTE [DISTWIDTH] IN BLOOD BY AUTOMATED COUNT: 12.4 % (ref 10–15)
ERYTHROCYTE [SEDIMENTATION RATE] IN BLOOD BY WESTERGREN METHOD: 5 MM/H (ref 0–20)
GFR SERPL CREATININE-BSD FRML MDRD: 73 ML/MIN/1.7M2
GLUCOSE SERPL-MCNC: 106 MG/DL (ref 70–99)
GLUCOSE UR STRIP-MCNC: NEGATIVE MG/DL
HCT VFR BLD AUTO: 36.2 % (ref 35–47)
HGB BLD-MCNC: 12.8 G/DL (ref 11.7–15.7)
HGB UR QL STRIP: NEGATIVE
KETONES UR STRIP-MCNC: NEGATIVE MG/DL
LEUKOCYTE ESTERASE UR QL STRIP: NEGATIVE
LYMPHOCYTES # BLD AUTO: 0.5 10E9/L (ref 0.8–5.3)
LYMPHOCYTES NFR BLD AUTO: 10.1 %
MCH RBC QN AUTO: 35.1 PG (ref 26.5–33)
MCHC RBC AUTO-ENTMCNC: 35.4 G/DL (ref 31.5–36.5)
MCV RBC AUTO: 99 FL (ref 78–100)
MONOCYTES # BLD AUTO: 0.2 10E9/L (ref 0–1.3)
MONOCYTES NFR BLD AUTO: 4.7 %
NEUTROPHILS # BLD AUTO: 4 10E9/L (ref 1.6–8.3)
NEUTROPHILS NFR BLD AUTO: 84.4 %
NITRATE UR QL: NEGATIVE
NON-SQ EPI CELLS #/AREA URNS LPF: NORMAL /LPF
PH UR STRIP: 7 PH (ref 5–7)
PLATELET # BLD AUTO: 309 10E9/L (ref 150–450)
POTASSIUM SERPL-SCNC: 4 MMOL/L (ref 3.4–5.3)
PROT SERPL-MCNC: 6.5 G/DL (ref 6.8–8.8)
PROT UR-MCNC: 0.16 G/L
PROT/CREAT 24H UR: 0.15 G/G CR (ref 0–0.2)
RBC # BLD AUTO: 3.65 10E12/L (ref 3.8–5.2)
RBC #/AREA URNS AUTO: NORMAL /HPF (ref 0–2)
SODIUM SERPL-SCNC: 141 MMOL/L (ref 133–144)
SP GR UR STRIP: 1.02 (ref 1–1.03)
URN SPEC COLLECT METH UR: NORMAL
UROBILINOGEN UR STRIP-ACNC: 0.2 EU/DL (ref 0.2–1)
WBC # BLD AUTO: 4.7 10E9/L (ref 4–11)
WBC #/AREA URNS AUTO: NORMAL /HPF (ref 0–2)

## 2017-07-03 PROCEDURE — 86480 TB TEST CELL IMMUN MEASURE: CPT | Performed by: INTERNAL MEDICINE

## 2017-07-03 PROCEDURE — 85025 COMPLETE CBC W/AUTO DIFF WBC: CPT | Performed by: INTERNAL MEDICINE

## 2017-07-03 PROCEDURE — 86140 C-REACTIVE PROTEIN: CPT | Performed by: INTERNAL MEDICINE

## 2017-07-03 PROCEDURE — 86225 DNA ANTIBODY NATIVE: CPT | Performed by: INTERNAL MEDICINE

## 2017-07-03 PROCEDURE — 85652 RBC SED RATE AUTOMATED: CPT | Performed by: INTERNAL MEDICINE

## 2017-07-03 PROCEDURE — 82550 ASSAY OF CK (CPK): CPT | Performed by: INTERNAL MEDICINE

## 2017-07-03 PROCEDURE — 36415 COLL VENOUS BLD VENIPUNCTURE: CPT | Performed by: INTERNAL MEDICINE

## 2017-07-03 PROCEDURE — 84156 ASSAY OF PROTEIN URINE: CPT | Performed by: INTERNAL MEDICINE

## 2017-07-03 PROCEDURE — 80053 COMPREHEN METABOLIC PANEL: CPT | Performed by: INTERNAL MEDICINE

## 2017-07-03 PROCEDURE — 81001 URINALYSIS AUTO W/SCOPE: CPT | Performed by: INTERNAL MEDICINE

## 2017-07-03 PROCEDURE — 86160 COMPLEMENT ANTIGEN: CPT | Performed by: INTERNAL MEDICINE

## 2017-07-05 LAB
C3 SERPL-MCNC: 68 MG/DL (ref 76–169)
C4 SERPL-MCNC: 15 MG/DL (ref 15–50)
M TB TUBERC IFN-G BLD QL: NEGATIVE
M TB TUBERC IFN-G/MITOGEN IGNF BLD: 0 IU/ML

## 2017-07-07 LAB — DSDNA AB SER-ACNC: NORMAL IU/ML

## 2017-07-10 ENCOUNTER — OFFICE VISIT (OUTPATIENT)
Dept: RHEUMATOLOGY | Facility: CLINIC | Age: 42
End: 2017-07-10
Payer: COMMERCIAL

## 2017-07-10 VITALS
TEMPERATURE: 98.7 F | HEIGHT: 65 IN | WEIGHT: 135.6 LBS | DIASTOLIC BLOOD PRESSURE: 78 MMHG | SYSTOLIC BLOOD PRESSURE: 118 MMHG | HEART RATE: 96 BPM | BODY MASS INDEX: 22.59 KG/M2 | OXYGEN SATURATION: 98 %

## 2017-07-10 DIAGNOSIS — Z79.899 HIGH RISK MEDICATION USE: ICD-10-CM

## 2017-07-10 DIAGNOSIS — M35.01 SJOGREN'S SYNDROME WITH KERATOCONJUNCTIVITIS SICCA (H): ICD-10-CM

## 2017-07-10 DIAGNOSIS — M32.19 OTHER SYSTEMIC LUPUS ERYTHEMATOSUS WITH OTHER ORGAN INVOLVEMENT (H): Primary | ICD-10-CM

## 2017-07-10 DIAGNOSIS — M75.41 IMPINGEMENT SYNDROME OF RIGHT SHOULDER: ICD-10-CM

## 2017-07-10 PROCEDURE — 99213 OFFICE O/P EST LOW 20 MIN: CPT | Mod: 25 | Performed by: INTERNAL MEDICINE

## 2017-07-10 PROCEDURE — 20610 DRAIN/INJ JOINT/BURSA W/O US: CPT | Mod: RT | Performed by: INTERNAL MEDICINE

## 2017-07-10 RX ORDER — CEVIMELINE HYDROCHLORIDE 30 MG/1
30 CAPSULE ORAL 2 TIMES DAILY
Qty: 180 CAPSULE | Refills: 1 | Status: SHIPPED | OUTPATIENT
Start: 2017-07-10 | End: 2017-11-06

## 2017-07-10 RX ORDER — AZATHIOPRINE 50 MG/1
150 TABLET ORAL DAILY
Qty: 270 TABLET | Refills: 1 | Status: SHIPPED | OUTPATIENT
Start: 2017-07-10 | End: 2017-11-06

## 2017-07-10 RX ORDER — PREDNISONE 5 MG/1
5 TABLET ORAL DAILY
Qty: 90 TABLET | Refills: 1 | Status: SHIPPED | OUTPATIENT
Start: 2017-07-10 | End: 2017-11-06

## 2017-07-10 RX ORDER — HYDROXYCHLOROQUINE SULFATE 200 MG/1
200 TABLET, FILM COATED ORAL 2 TIMES DAILY
Qty: 180 TABLET | Refills: 1 | Status: SHIPPED | OUTPATIENT
Start: 2017-07-10 | End: 2017-11-06

## 2017-07-10 RX ORDER — TRIAMCINOLONE ACETONIDE 40 MG/ML
40 INJECTION, SUSPENSION INTRA-ARTICULAR; INTRAMUSCULAR ONCE
Qty: 1 ML | Refills: 0 | OUTPATIENT
Start: 2017-07-10 | End: 2017-07-10

## 2017-07-10 NOTE — MR AVS SNAPSHOT
After Visit Summary   7/10/2017    Yovana Enriquez    MRN: [id number]           Patient Information     Date Of Birth          1975        Visit Information        Provider Department      7/10/2017 1:20 PM Bradford Colvin MD [facility] [place]        Today's Diagnoses     Other systemic lupus erythematosus with other organ involvement (H)    -  1    Sjogren's syndrome with keratoconjunctivitis sicca (H)        Impingement syndrome of right shoulder        High risk medication use          Care Instructions      Dr. Colvin s Rheumatology Clinics  Locations Clinic Hours Telephone Number   [facility]  [address]  [place] [address]     Wednesday: 7:20AM - 4:00PM  Thursday:     7:20AM - 4:00PM     Friday:          7:20AM - 11:00AM       To schedule an appointment with  Dr. Colvin,  please contact  Specialty [phone]       [facility] [place]  [address]  [place] [address]       Monday:       7:20AM - 4:00PM      [facility]  [address] [place] [address]       Tuesday:      7:20AM - 4:00PM          Thank you!    Qing Zapata CMA              Follow-ups after your visit        Additional Services     [facility] PT, HAND, AND CHIROPRACTIC REFERRAL       **This order will print in the [facility] Scheduling Office**    Physical Therapy, Hand Therapy and Chiropractic Care are available through:    *[facility]  *[facility]  *[facility]    Call one number to schedule at any of the above locations: [phone].    Your provider has referred you to: Physical Therapy at [facility] or [facility]    Indication/Reason for Referral: right shoulder pain  Onset of Illness: months  Therapy Orders: Evaluate and Treat  Special Programs: None  Special Request: None    [place]      Additional Comments for the Therapist or Chiropractor: right shoulder impingement syndrome.  She would like to learn  the exercises that can be done at home.     Please be aware that coverage of these services is subject to the terms and limitations of your health insurance plan.  Call member services at your health plan with any benefit or coverage questions.      Please bring the following to your appointment:    *Your personal calendar for scheduling future appointments  *Comfortable clothing                  Your next 10 appointments already scheduled     Jul 17, 2017  5:40 PM CDT   MyChart Long with Bradford Mario PA-C   [facility] [place] ([facility] [place])    [address]  [place] [address]   [phone]            Jul 20, 2017  2:00 PM CDT   Level 2 with BAY 2 INFUSION   [facility] ([facility]    [address] [place] [address]   [phone]            Aug 18, 2017  2:00 PM CDT   Level 2 with BAY 2 INFUSION   [facility] ([facility])    [address] [place] [address]   [phone]            Oct 02, 2017  6:00 PM CDT   LAB with BE LAB   [facility] [place] ([facility] [place])    [address]  [place] [address]   [phone]           Patient must bring picture ID.  Patient should be prepared to give a urine specimen  Please do not eat 10-12 hours before your appointment if you are coming in fasting for labs on lipids, cholesterol, or glucose (sugar).  Pregnant women should follow their Care Team instructions. Water with medications is okay. Do not drink coffee or other fluids.   If you have concerns about taking  your medications, please ask at office or if scheduling via Sympoz, send a message by clicking on Secure Messaging, Message Your Care Team.            Oct 09, 2017  1:20 PM CDT   Return Visit with Bradford Colvin MD   [facility] ([facility] [place])    [address]  [place] [address]   [phone]              Future  tests that were ordered for you today     Open Future Orders        Priority Expected Expires Ordered    CBC with platelets differential Routine 10/3/2017 10/29/2017 7/10/2017    CK total Routine 10/3/2017 10/29/2017 7/10/2017    Complement C3 Routine 10/3/2017 10/29/2017 7/10/2017    DNA double stranded antibodies Routine 10/3/2017 10/29/2017 7/10/2017    CRP inflammation Routine 10/3/2017 10/29/2017 7/10/2017    Comprehensive metabolic panel Routine 10/3/2017 10/29/2017 7/10/2017    Complement C4 Routine 10/3/2017 10/29/2017 7/10/2017    Erythrocyte sedimentation rate auto Routine 10/3/2017 10/29/2017 7/10/2017    Protein  random urine Routine 10/3/2017 10/29/2017 7/10/2017    UA with Microscopic reflex to Culture Routine 10/3/2017 10/29/2017 7/10/2017            Who to contact     If you have questions or need follow up information about today's clinic visit or your schedule please contact [facility] directly at [phone].  Normal or non-critical lab and imaging results will be communicated to you by Sleep Solutionshart, letter or phone within 4 business days after the clinic has received the results. If you do not hear from us within 7 days, please contact the clinic through [employer] or phone. If you have a critical or abnormal lab result, we will notify you by phone as soon as possible.  Submit refill requests through Roomish or call your pharmacy and they will forward the refill request to us. Please allow 3 business days for your refill to be completed.          Additional Information About Your Visit        Sleep Solutionshart Information     Roomish gives you secure access to your electronic health record. If you see a primary care provider, you can also send messages to your care team and make appointments. If you have questions, please call your primary care clinic.  If you do not have a primary care provider, please call [phone] and they will assist you.        Care EveryWhere ID     This is your Care  "EveryWhere ID. This could be used by other organizations to access your [facility] medical records  [id number]        Your Vitals Were     Pulse Temperature Height Pulse Oximetry BMI (Body Mass Index)       96 98.7  F (37.1  C) (Oral) 1.651 m (5' 5\") 98% 22.57 kg/m2        Blood Pressure from Last 3 Encounters:   07/10/17 118/78   06/23/17 119/81   05/26/17 113/74    Weight from Last 3 Encounters:   07/10/17 61.5 kg (135 lb 9.6 oz)   06/23/17 61.4 kg (135 lb 6.4 oz)   05/26/17 62.1 kg (137 lb)              We Performed the Following     [facility] PT, HAND, AND CHIROPRACTIC REFERRAL          Today's Medication Changes          These changes are accurate as of: 7/10/17  1:56 PM.  If you have any questions, ask your nurse or doctor.               These medicines have changed or have updated prescriptions.        Dose/Directions    predniSONE 5 MG tablet   Commonly known as:  DELTASONE   This may have changed:    - how much to take  - how to take this  - when to take this  - additional instructions   Used for:  Other systemic lupus erythematosus with other organ involvement (H)   Changed by:  Bradford Colvin MD        Dose:  5 mg   Take 1 tablet (5 mg) by mouth daily   Quantity:  90 tablet   Refills:  1            Where to get your medicines      These medications [id number] [place] - [address]  [address], [place] [address]     Phone:  [phone]     azaTHIOprine 50 MG tablet    cevimeline 30 MG capsule    hydroxychloroquine 200 MG tablet    predniSONE 5 MG tablet                Primary Care Provider Office Phone # Fax #    Bradford Mario PA-C [phone]       [facility] [place] [address]  [place] [address]        Equal Access to Services     [facility] VANNESSA AH: Deshaun ayerso Sorenata, waaxda luqadaha, qaybta kaalmada adeegyada, waxay elliot washing[place]. So [facility] [phone].    ATENCIÓN: Si habla español, tiene a dexter disposición servicios " benedicto de asistencia lingüística. Cee galaviz [phone].    We comply with applicable federal civil rights laws and [place] laws. We do not discriminate on the basis of race, color, national origin, age, disability sex, sexual orientation or gender identity.            Thank you!     Thank you for choosing [facility]  for your care. Our goal is always to provide you with excellent care. Hearing back from our patients is one way we can continue to improve our services. Please take a few minutes to complete the written survey that you may receive in the mail after your visit with us. Thank you!             Your Updated Medication List - Protect others around you: Learn how to safely use, store and throw away your medicines at www.disposemymeds.org.          This list is accurate as of: 7/10/17  1:56 PM.  Always use your most recent med list.                   Brand Name Dispense Instructions for use Diagnosis    amLODIPine 5 MG tablet    NORVASC    90 tablet    Take 1 tablet (5 mg) by mouth daily    Raynaud's phenomenon without gangrene       azaTHIOprine 50 MG tablet    IMURAN    270 tablet    Take 3 tablets (150 mg) by mouth daily    Other systemic lupus erythematosus with other organ involvement (H)       blood glucose monitoring test strip    no brand specified    1 Month    Used for testing glucose 2-3 times daily    History of gestational diabetes mellitus, not currently pregnant       buPROPion 75 MG tablet    WELLBUTRIN    60 tablet    TAKE 1 TABLET (75 MG) BY MOUTH 2 TIMES DAILY    Anxiety       Calcium + D3 600-200 MG-UNIT Tabs      1 tablet daily        cevimeline 30 MG capsule    EVOXAC    180 capsule    Take 1 capsule (30 mg) by mouth 2 times daily    Other systemic lupus erythematosus with other organ involvement (H), Sjogren's syndrome with keratoconjunctivitis sicca (H)       diclofenac 1 % Gel topical gel    VOLTAREN    100 g    Apply 2-4 grams to knees or shoulders four times daily  as needed using enclosed dosing card.    Fibromyalgia, SLE (systemic lupus erythematosus) (H)       FLOVENT IN           fluticasone 50 MCG/ACT spray    FLONASE    1 Bottle    Spray 1-2 sprays into both nostrils daily    Chronic rhinitis       hydroxychloroquine 200 MG tablet    PLAQUENIL    180 tablet    Take 1 tablet (200 mg) by mouth 2 times daily    Other systemic lupus erythematosus with other organ involvement (H), High risk medication use       hyoscyamine 0.125 MG tablet    ANASPAZ/LEVSIN    40 tablet    Take 1-2 tablets (125-250 mcg) by mouth every 4 hours as needed for cramping    Irritable bowel syndrome, unspecified type       lidocaine visc 2% & diphenhydramine 12.5mg/5mL & maalox/mylanta w/simethicone (1:1:1 v/v/v) Susp compounding kit     100 mL    Take 5-10 mLs by mouth every 6 hours as needed    Oral ulcer       losartan 25 MG tablet    COZAAR    90 tablet    Take 1 tablet (25 mg) by mouth daily    Hypertension goal BP (blood pressure) < 140/90       MICROLET LANCETS Misc     100 each    1 each daily.    Other abnormal glucose       MULTIVITAMIN PO      Take  by mouth.        nortriptyline 10 MG capsule    PAMELOR    90 capsule    Take 3 capsules (30 mg) by mouth At Bedtime    Numbness       omeprazole 20 MG CR capsule    priLOSEC    30 capsule    Take 1 capsule (20 mg) by mouth daily    Systemic lupus erythematosus (H)       predniSONE 5 MG tablet    DELTASONE    90 tablet    Take 1 tablet (5 mg) by mouth daily    Other systemic lupus erythematosus with other organ involvement (H)       pregabalin 150 MG capsule    LYRICA    270 capsule    Take 1 capsule (150 mg) by mouth 3 times daily . 3 month supply.    Fibromyalgia

## 2017-07-10 NOTE — NURSING NOTE
"Chief Complaint   Patient presents with     Systemic Lupus Erythematous     patient states she had a flare for 2 weeks ago that lasted a week, then she caught a virus.       Initial /78 (BP Location: Left arm, Patient Position: Chair, Cuff Size: Adult Regular)  Pulse 96  Temp 98.7  F (37.1  C) (Oral)  Ht 1.651 m (5' 5\")  Wt 61.5 kg (135 lb 9.6 oz)  SpO2 98%  BMI 22.57 kg/m2 Estimated body mass index is 22.57 kg/(m^2) as calculated from the following:    Height as of this encounter: 1.651 m (5' 5\").    Weight as of this encounter: 61.5 kg (135 lb 9.6 oz).  BP completed using cuff size: regular         RAPID3 (0-30) Cumulative Score  9.3          RAPID3 Weighted Score (divide #4 by 3 and that is the weighted score)  3.1         "

## 2017-07-10 NOTE — NURSING NOTE
The following medication was given:     MEDICATION: Kenalog 40 mg  SITE: Right shoulder  DOSE: 1 ml  LOT #: [id number]   :  [employer]  EXPIRATION DATE:  10/01/2018  NDC#: 2653-4477-94

## 2017-07-10 NOTE — PROGRESS NOTES
Rheumatology Clinic Visit      Yovana Enriquez MRN# [id number]   YOB: 1975 Age: 42 year old      Date of visit: 7/10/17   PCP: Bradford Mario    Chief Complaint   Patient presents with:  Systemic Lupus Erythematous: patient states she had a flare for 2 weeks ago that lasted a week, then she caught a virus.      Assessment and Plan     1. Systemic lupus erythematosus (YANELIS by EIA positive in the past, leukopenia/lymphocytopenia, hypocomplementemia, polyarthralgias, oral sores, history of malar rash, photophobia, photosensitivity, Raynaud's Phenomenon): Previously on methotrexate that was discontinued for unclear reasons but the patient reports that she tolerated it well. Currently on azathioprine 150 mg (2.30mg/kg; calculated 12/19/2016), hydroxychloroquine 400 mg daily, prednisone 5 mg daily, and Benlysta.  Note that Benlysta was previously discontinued because there was concern that her mild leukopenia may represent a flare of lupus and rituximab was going to be considered; however, the leukopenia was mild and although she improved with prednisone it is unclear if she was truly having a lupus flare or if her leukocyte count was varying in the range that she typically varies. Given that she was not having any other symptoms for this and her leukocyte count improved, Benlysta was restarted. Later, she wanted to discontinue Benlysta but she had worsening of her symptoms and therefore was restarted with improvement of her symptoms again. Overall, stable disease.  Labs reviewed with Ms. Enriquez today.    - Continue azathioprine 150 mg daily  - Continue Evoxac 30 mg twice a day  - Continue hydroxychloroquine 200 mg twice a day (toxicity monitoring eye exam last done on 11/28/2016)  - Continue prednisone 5mg daily  - Continue Benlysta infusions   - Labs 1 week prior to the next rheumatology clinic visit: CBC, CMP, ESR, CRP, CK, C3, C4, dsDNA, UA, Uprotein:creatinine    2. Secondary Sjogren syndrome: Doing  well with Evoxac. Dry eyes are not much of an issue currently. See #1.    3. Raynaud's Phenomenon: Doing well with amlodipine 5mg daily.  - Continue amlodipine 5mg daily (may discontinue during warmer months)    4. Bilateral trochanteric bursitis: She receives steroid injections from the pain clinic.    5. Fibromyalgia: Managed by the pain clinic and PCP.    6. Impingement syndrome of the right shoulder: The diagnosis and treatment options were discussed in detail today. Steroid injection of the right shoulder today, as documented in the procedure section. Physical therapy referral given.    Ms. Enriquez verbalized agreement with and understanding of the rational for the diagnosis and treatment plan.  All questions were answered to best of my ability and the patient's satisfaction. Ms. Enriquez was advised to contact the clinic with any questions that may arise after the clinic visit.      Thank you for involving me in the care of the patient    Return to clinic: 3 months      HPI   Yovana Enriquez is a 42 year old female with medical history significant for subclinical hyperthyroidism, hypertension, irritable bowel syndrome, fibromyalgia, hypertension, non-celiac gluten sensitivity (reportedly with bowel biopsies and laboratory workup that did not show celiac disease), anxiety, and systemic lupus erythematosus who presents for follow-up of SLE.    Today, Ms. Enriquez reports that she was doing well until she decided to go fishing with her . She believes that the sun exposure caused her to have worsening fatigue and worsening joint pain for approximately one week. She then got a GI illness that typically last 24 hours for everybody else around her but caused an illness for her to last 7 days. She is now back to feeling better, but still has some achiness in her knees, hands, wrists, feet, and right shoulder. She says that her right shoulder is the most symptomatic. The shoulder is worse when she lays on the  affected side and when she raises her arm above her head. Morning stiffness for approximately 30-60 minutes. Raynaud's phenomenon is controlled with amlodipine.      Denies fevers, chills, nausea, vomiting. No abdominal pain.  No chest pain/pressure, palpitations, or shortness of breath. No neck pain. No rash currently. No LE swelling.  She has photosensitivity and photophobia.   Sicca symptoms are well-controlled with Evoxac and frequent sips of water. No eye pain or redness. No history of serositis. No history of DVT, pulmonary embolism, or miscarriage.    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  PULM: No SOB, wheeze, cough.  GI:  See history of present illness. No blood in stool. No abdominal pain, nausea, vomiting. See history of present illness  : No blood in urine.  MSK: See HPI.  NEURO: See history of present illness  EXT: No LE swelling    Active Problem List     Patient Active Problem List   Diagnosis     Systemic lupus erythematosus (H)     Menorrhagia     History of ovarian cyst     Dysmenorrhea     History of gestational diabetes mellitus, not currently pregnant     Intramural leiomyoma of uterus     Hyperlipidemia LDL goal <130     Subclinical hyperthyroidism     Anxiety     High risk medication use     Fibromyalgia     Chronic constipation     Irritable bowel syndrome     Health Care Home     Hypertension goal BP (blood pressure) < 140/90     Non-celiac gluten sensitivity     Raynaud's phenomenon without gangrene     Sjogren's syndrome (H)     Past Medical History     Past Medical History:   Diagnosis Date     Chronic constipation 12/16/2013     Dysmenorrhea 10/1/2012     Fibromyalgia 11/15/2013     Health Care Home 3/19/2014    **See Letters for [facility] Care Plan:  Emergency Care Plan       High risk medication use 9/13/2013     History of gestational diabetes mellitus, not currently pregnant 10/1/2012     History of ovarian cyst 10/1/2012     Hyperlipidemia LDL goal <130 10/18/2012     Hypertension goal BP (blood pressure) < 140/90 1/19/2015     Intramural leiomyoma of uterus 10/5/2012     Irritable bowel syndrome 3/11/2014    Patient states no history colonoscopy       Menorrhagia 10/1/2012     Non-celiac gluten sensitivity 2/8/2016     PONV (postoperative nausea and vomiting)      Subclinical hyperthyroidism 1/17/2013     Systemic lupus erythematosus (H) 4/23/2012     Past Surgical History     Past Surgical History:   Procedure Laterality Date     COLONOSCOPY N/A 2/20/2015    Procedure: COMBINED COLONOSCOPY, SINGLE OR MULTIPLE BIOPSY/POLYPECTOMY BY BIOPSY;  Surgeon: Fred Cullen MD;  Location: [facility]     COLONOSCOPY WITH CO2 INSUFFLATION N/A 2/20/2015    Procedure: COLONOSCOPY WITH CO2 INSUFFLATION;  Surgeon: Fred Cullen MD;  Location: [facility]     ENT SURGERY  10-24-14    Bottom lip biopsies     ESOPHAGOSCOPY, GASTROSCOPY, DUODENOSCOPY (EGD), COMBINED N/A 2/20/2015    Procedure: COMBINED ESOPHAGOSCOPY, GASTROSCOPY, DUODENOSCOPY (EGD), BIOPSY SINGLE OR MULTIPLE;  Surgeon: Fred Cullen MD;  Location: [facility]     HC BREATH HYDROGEN TEST  12/31/2013    Procedure: HYDROGEN BREATH TEST;  Surgeon: Camden Almazan MD;  Location: [facility]     HC HYSTEROSCOPY, SURGICAL; W/ ENDOMETRIAL ABLATION, ANY METHOD  10-19-12     SHOULDER SURGERY Right     R shoulder     TUBAL LIGATION  2004     Allergy   No Known Allergies  Current Medication List     Current Outpatient Prescriptions   Medication Sig     amLODIPine (NORVASC) 5 MG tablet Take 1 tablet (5 mg) by mouth daily     buPROPion (WELLBUTRIN) 75 MG tablet TAKE 1 TABLET (75 MG) BY MOUTH 2 TIMES DAILY     nortriptyline (PAMELOR) 10 MG capsule Take 3 capsules (30 mg) by mouth At Bedtime     predniSONE  (DELTASONE) 5 MG tablet Prednisone 40mg daily x2days, then 20mg daily x5days, then 15mg daily x5days, then 10mg daily x5days, then 5mg thereafter.     hydroxychloroquine (PLAQUENIL) 200 MG tablet Take 1 tablet (200 mg) by mouth 2 times daily     azaTHIOprine (IMURAN) 50 MG tablet Take 3 tablets (150 mg) by mouth daily     cevimeline (EVOXAC) 30 MG capsule Take 1 capsule (30 mg) by mouth 2 times daily     losartan (COZAAR) 25 MG tablet Take 1 tablet (25 mg) by mouth daily     hyoscyamine (ANASPAZ/LEVSIN) 0.125 MG tablet Take 1-2 tablets (125-250 mcg) by mouth every 4 hours as needed for cramping     pregabalin (LYRICA) 150 MG capsule Take 1 capsule (150 mg) by mouth 3 times daily . 3 month supply.     fluticasone (FLONASE) 50 MCG/ACT nasal spray Spray 1-2 sprays into both nostrils daily     omeprazole (PRILOSEC) 20 MG capsule Take 1 capsule (20 mg) by mouth daily     DPH-Lido-AlHydr-MgHydr-Simeth (FIRST-MOUTHWASH BLM) SUSP Take 5-10 mLs by mouth every 6 hours as needed     diclofenac (VOLTAREN) 1 % GEL Apply 2-4 grams to knees or shoulders four times daily as needed using enclosed dosing card.     blood glucose test strip Used for testing glucose 2-3 times daily     Calcium Carb-Cholecalciferol (CALCIUM + D3) 600-200 MG-UNIT TABS 1 tablet daily     Fluticasone Propionate, Inhal, (FLOVENT IN)      MICROLET LANCETS MISC 1 each daily.     Multiple Vitamin (MULTIVITAMIN OR) Take  by mouth.     No current facility-administered medications for this visit.          Social History   See HPI    Family History     Family History   Problem Relation Age of Onset     Respiratory Maternal Grandfather      CANCER Maternal Grandfather      Asthma Son      Circulatory Maternal Grandmother      Brain anurysm     Hypertension Mother      Glaucoma Mother 50     drops     Hypertension Sister      Hypertension Sister      DIABETES No family hx of      CEREBROVASCULAR DISEASE No family hx of      Thyroid Disease No family hx of       "Macular Degeneration No family hx of        Physical Exam     Temp Readings from Last 3 Encounters:   07/10/17 98.7  F (37.1  C) (Oral)   06/23/17 97.7  F (36.5  C) (Oral)   05/26/17 98.2  F (36.8  C) (Oral)     BP Readings from Last 5 Encounters:   07/10/17 118/78   06/23/17 119/81   05/26/17 113/74   04/26/17 128/82   04/25/17 119/76     Pulse Readings from Last 1 Encounters:   07/10/17 96     Resp Readings from Last 1 Encounters:   06/23/17 18     Estimated body mass index is 22.57 kg/(m^2) as calculated from the following:    Height as of this encounter: 1.651 m (5' 5\").    Weight as of this encounter: 61.5 kg (135 lb 9.6 oz).    GEN: NAD  HEENT: MMM. No oral lesions. Anicteric, noninjected sclera  CV: S1, S2. RRR. No m/r/g.  PULM: CTA bilaterally. No w/c.  MSK:  Hands, wrists, and elbows without swelling or tenderness to palpation. Left shoulder without swelling or tenderness to palpation. Right shoulder tender to palpation on the most lateral aspect and with abduction above 90 ; no swelling or increased warmth. Bilateral hips tender to direct palpation. Knees, ankles, and feet without swelling or tenderness to palpation. Negative MTP squeeze. All fibromyalgia tender points are positive.   NEURO: UE and LE strengths 5/5 and equal bilaterally.   SKIN: No rash. No evidence of current or past ischemic insults to the distal fingertips by inspection.   EXT: No LE edema  PSYCH: Alert. Appropriate.    Labs / Imaging (select studies)   RF/CCP  Recent Labs   Lab Test  09/13/13   1504  02/13/12   1404   CCPABY  <20 Interpretation:  Negative  <20 Interpretation:  Negative   RHF  12   --      YANELIS/RNP/Sm/SSA/SSB  Recent Labs   Lab Test  07/25/16   1433  03/23/16   1759  01/04/16   1806  07/01/14   1211  09/13/13   1504  02/13/12   1404   ANAIGG  <1:40  Reference range: <1:40  (Note)  <1:40  INTERPRETIVE INFORMATION: YANELIS by IFA, IgG  Anti-nuclear antibodies (YANELIS) are seen in a variety of  systemic rheumatic diseases and are " determined by indirect  fluorescence assay (IFA) using HEp-2 substrate with an  IgG-specific conjugate. YANELIS titers less than or equal to  1:80 have variable relevance while titers greater than or  equal to 1:160 are considered clinically significant. These  antibodies may precede clinical disease onset; however,  healthy individuals and those with advanced age have been  reported to be positive for YANELIS. When observed, one of the  five basic patterns is reported: homogeneous,  peripheral/rim, speckled, centromere, or nucleolar. If  cytoplasmic fluorescence is observed, it is noted. IFA  methodology is subjective and has occasionally been shown  to lack sensitivity for anti-SSA/Ro antibodies.  Negative results do not necessarily rule out the presence  of SSc. If clinical suspicion remains, consider further  te sting for U3-RNP, PM/Scl, or Th/To antibodies associated  with SSc.  Performed by [email or web address],  [address] [place] [id number] [phone]  [email or web address] Alcon Krishna MD, Lab. Director     --    --    --    --    --    RNPIGG   --    --    --   <0.2  Negative     --    --    SMIGG   --   <0.2  Negative   Antibody index (AI) values reflect qualitative changes in antibody   concentration that cannot be directly associated with clinical condition or   disease state.    <0.2  Negative   Antibody index (AI) values reflect qualitative changes in antibody   concentration that cannot be directly associated with clinical condition or   disease state.    <0.2  Negative     --    --    ENASM   --    --    --    --   0  0   SSAIGG   --    --    --   <0.2  Negative     --    --    ENASSA   --    --    --    --   3  12   SSBIGG   --    --    --   <0.2  Negative     --    --    ENASSB   --    --    --    --   0  0   ENARMP   --    --    --    --   0  0   SCLIGG   --    --    --   <0.2  Negative     --    --      dsDNA  Recent Labs   Lab Test  07/03/17   1447  04/05/17   1820  12/12/16   1827  09/20/16   1516   07/25/16   1433  03/23/16   1759  01/04/16   1806  10/13/15   1805  07/07/15   1802   DNA  <1  Negative    <1  Negative    <1  Negative    <1  Negative    <1  Negative    <1  Negative    <15  Interpretation:  Negative    <15  Interpretation:  Negative    <15  Interpretation:  Negative       C3/C4  Recent Labs   Lab Test  07/03/17   1447  04/05/17   1820  12/12/16   1827  09/20/16   1518  07/25/16   1433  03/23/16   1759  01/04/16   1806  10/13/15   1805  07/07/15   1802   [id number]  68*  58*  69*  84  80  82  111  77  84   Y1HMHGV  15  10*  12*  14*  12*  13*  18  12*  13*     Histone Antibody  Recent Labs   Lab Test  02/13/12   1404   HSTO  0.4     Antiphospholipid Antibodies  Recent Labs   Lab Test  09/13/13   1504  02/13/12   1404   CARG  <15.0 Interpretation:  Negative  <15.0 Interpretation:  Negative   SANCHO  <12.5 Interpretation:  Negative  <12.5 Interpretation:  Negative   LUPINT   --   Negative (Note) COMMENTS: INR is normal. APTT is normal.  1:2 Mix is not indicated. DRVVT Screen is normal. Thrombin time is normal. NEGATIVE TEST; A LUPUS ANTICOAGULANT WAS NOT DETECTED IN THIS SPECIMEN WITHIN THE LIMITS OF THE TESTING REPERTOIRE. If the clinical picture is strongly suggestive of an antiphospholipid syndrome, recommend anticardiolipin and beta-2-glycoprotein (IgG and IgM) antibody tests. Hyun Denis M.D.  [phone] 2-.  NINR =  1.01 N = 0.86-1.14  (No additional charge) NTT = 16.0 N = 13.0-19.0 sec  (No additional charge)  APTT'S:    Seconds Reagent =  Stago LS Normal  =  36 Patient  =  41 1:2 Mix  =  N/A Reference =  31-43   DILUTE VANNESSA VIPER VENOM TEST: DRVVT Screen Ratio = 0.80 Normal = <1.28      CBC  Recent Labs   Lab Test  07/03/17   1447  04/05/17   1820  12/12/16   1827   WBC  4.7  5.2  7.6   RBC  3.65*  3.34*  3.64*   HGB  12.8  11.6*  12.7   HCT  36.2  33.6*  36.8   MCV  99  101*  101*   RDW  12.4  11.8  12.9   PLT  309  210  251   MCH  35.1*  34.7*  34.9*   MCHC  35.4  34.5  34.5    NEUTROPHIL  84.4  79.5  85.4   LYMPH  10.1  13.2  7.9   MONOCYTE  4.7  6.1  5.8   EOSINOPHIL  0.6  0.8  0.5   BASOPHIL  0.2  0.4  0.4   ANEU  4.0  4.2  6.4   ALYM  0.5*  0.7*  0.6*   ZOË  0.2  0.3  0.4   AEOS  0.0  0.0  0.0   ABAS  0.0  0.0  0.0     CMP  Recent Labs   Lab Test  07/03/17   1447 04/05/17   1820  12/12/16 1827 09/20/16   1518  07/25/16   1433  03/23/16   1759   NA  141  140  140  140  137  140   POTASSIUM  4.0  3.9  3.8  4.0  4.2  3.5   CHLORIDE  108  106  104  108  106  103   CO2  26  27  31  29  25  28   ANIONGAP  7  7  5  3  6  9   GLC  106*  94  98  100*  133*  92   BUN  8  9  12  11  13  11   CR  0.85  0.81  0.81  0.86  0.84  0.86   GFRESTIMATED  73  77  77  72  74  72   GFRESTBLACK  88  >90   GFR Calc    >90   GFR Calc    88  90  88   MELANIA  8.3*  8.3*  8.7  8.5  8.3*  8.9   BILITOTAL  0.3  0.2  0.4  0.2  0.4  0.5   ALBUMIN  3.6  3.7  3.8  3.5  3.5  4.0   PROTTOTAL  6.5*  6.3*  6.5*  6.3*  6.5*  7.2   ALKPHOS  53  49  61  53  54  71   AST  20  20  18  23  25  33   ALT  26  25  21  25  28  37     HgA1c  Recent Labs   Lab Test  10/05/12   0824   A1C  5.3     Calcium/VitaminD  Recent Labs   Lab Test  07/03/17 1447 04/05/17 1820 12/12/16 1827 01/17/13   1039   MELANIA  8.3*  8.3*  8.7   < >  9.4   VITDT   --    --    --    --   55    < > = values in this interval not displayed.     ESR/CRP  Recent Labs   Lab Test  07/03/17 1447 04/05/17 1820 12/12/16   1827   SED  5  5  3   CRP  <2.9  <2.9  <2.9     CK/Aldolase  Recent Labs   Lab Test  07/03/17   1447  04/05/17   1820  12/12/16   1827   CKT  127  133  71     TSH/T4  Recent Labs   Lab Test  10/07/16   0714  02/27/15   1058  01/30/15   1156  04/02/14   1539   TSH  0.37*   --   0.45  0.33*   T4  1.16  1.01   --   1.00     Lipid Panel  Recent Labs   Lab Test  10/07/16   0714  12/18/12   1112   CHOL  137  135   TRIG  48  108   HDL  75  63   LDL  52  51   VLDL   --   22   CHOLHDLRATIO   --   2.1   NHDL  62    --      Hepatitis B  Recent Labs   Lab Test  03/29/16   1417  02/13/14   1835  05/22/12   1633   HEPBANG  Nonreactive  Negative  Negative     Hepatitis C  Recent Labs   Lab Test  03/29/16   1417  02/13/14   1835  05/22/12   1633   HCVAB  Nonreactive   Assay performance characteristics have not been established for newborns,   infants, and children    Negative  Negative     Lyme ab screening  Recent Labs   Lab Test  05/30/15   1355   LYMEGM  0.55     Tuberculosis Screening  Recent Labs   Lab Test  07/03/17   1447  03/29/16   1417  02/13/14   1835   TBRSLT  Negative  Negative  Negative   TBAGN  0.00  0.00  0.00     UA  Recent Labs   Lab Test  07/03/17   1447  04/05/17   1825  12/12/16   1835  09/20/16   1524   03/23/16   1807  01/04/16   1821   07/07/15   1802   COLOR  Yellow  Yellow  Yellow  Yellow   < >  Yellow  Yellow   < >  Yellow   APPEARANCE  Clear  Clear  Clear  Clear   < >  Clear  Clear   < >  Clear   URINEGLC  Negative  Negative  Negative  Negative   < >  Negative  Negative   < >  Negative   URINEBILI  Negative  Negative  Negative  Negative   < >  Negative  Negative   < >  Negative   SG  1.020  1.025  >1.030  >1.030   < >  >1.030  >1.030   < >  1.020   URINEPH  7.0  7.0  6.5  6.0   < >  6.0  7.0   < >  7.0   PROTEIN  Negative  Negative  Negative  Negative   < >  Negative  Negative   < >  Negative   UROBILINOGEN  0.2  1.0  0.2  0.2   < >  0.2  0.2   < >  0.2   NITRITE  Negative  Negative  Negative  Negative   < >  Negative  Negative   < >  Negative   UBLD  Negative  Negative  Small*  Negative   < >  Negative  Negative   < >  Negative   LEUKEST  Negative  Negative  Negative  Negative   < >  Negative  Negative   < >  Negative   WBCU  O - 2  O - 2  10-25*  O - 2   < >  O - 2  O - 2   < >  O - 2   RBCU  O - 2  O - 2  O - 2  O - 2   < >  O - 2  O - 2   < >  O - 2   SQUAMOUSEPI  Few  Few  Few   --    --   Few   --    --   Few   BACTERIA   --    --   Moderate*   --    --    --   Few*   --   Few*   MUCOUS   --    Present*   --   Present*   --   Present*  Present*   --    --     < > = values in this interval not displayed.     Urine Microscopic  Recent Labs   Lab Test  07/03/17   1447  04/05/17   1825  12/12/16   1835  09/20/16   1524   03/23/16   1807  01/04/16   1821   07/07/15   1802   WBCU  O - 2  O - 2  10-25*  O - 2   < >  O - 2  O - 2   < >  O - 2   RBCU  O - 2  O - 2  O - 2  O - 2   < >  O - 2  O - 2   < >  O - 2   SQUAMOUSEPI  Few  Few  Few   --    --   Few   --    --   Few   BACTERIA   --    --   Moderate*   --    --    --   Few*   --   Few*   MUCOUS   --   Present*   --   Present*   --   Present*  Present*   --    --     < > = values in this interval not displayed.     Urine Protein  Recent Labs   Lab Test  07/03/17   1447  04/05/17 1825 12/12/16   1835   UTP  0.16  0.14  0.19   UTPG  0.15  0.15  0.18   UCRR  112  93  106     Immunization History     Immunization History   Administered Date(s) Administered     Influenza (IIV3) 10/01/2012, 09/23/2013     Influenza Vaccine IM 3yrs+ 4 Valent IIV4 09/20/2016     Pneumococcal (PCV 13) 04/25/2016     Pneumococcal 23 valent 10/01/2012     Tdap (Adacel,Boostrix) 12/17/2010       Procedure     Procedure: Steroid injection of the right shoulder   Indication: Pain, impingement syndrome of the right shoulder    The procedure was explained in detail. Risks including infection, pain, structural damage such as cartilage damage and tendon rupture, fat atrophy, skin hyper-/hypo-pigmentation, and medication reaction was explained. The need for rest of the affected joint for one week after the procedure was explained.  The option of not doing the procedure was also provided. All questions were answered and the patient consented to the procedure.     A time-out was performed and the correct patient, procedure, and laterality were verified.    The right shoulder was examined and location for injection was identified. The area was cleaned with chlorhexidine, twice.  Ethyl chloride  was then used for topical anaesthetic.  Then a mixture of lidocaine 1% 2 mL and Kenalog 40mg was injected into the intra-articular space.     The patient tolerated the procedure well. No complications.    MEDICATION: Kenalog 40 mg  LOT #: [id number]   : beSUCCESS  EXPIRATION DATE: 10/01/2018  NDC#: 3400-1636-62          Chart documentation done in part with Dragon Voice recognition Software. Although reviewed after completion, some word and grammatical error may remain.    Bradford Colvin MD

## 2017-07-10 NOTE — PATIENT INSTRUCTIONS
Dr. Colvin s [facility] Clin[facility]  Locations Clinic Hours Telephone Number   [facility]  [address]  [place] [address]     Wednesday: 7:20AM - 4:00PM  Thursday:     7:20AM - 4:00PM     Friday:          7:20AM - 11:00AM       To schedule an appointment with  Dr. Colvin,  please contact  Specialty [phone]       Antony [place]  [address]  [place] [address]       Monday:       7:20AM - 4:00PM      Antony Zavaleta  [address]  [place] [address]       Tuesday:      7:20AM - 4:00PM          Thank you!    Qing Zapata CMA

## 2017-07-15 DIAGNOSIS — R20.0 NUMBNESS: ICD-10-CM

## 2017-07-15 DIAGNOSIS — F41.9 ANXIETY: ICD-10-CM

## 2017-07-15 RX ORDER — BUPROPION HYDROCHLORIDE 75 MG/1
TABLET ORAL
Qty: 60 TABLET | Refills: 1 | Status: CANCELLED | OUTPATIENT
Start: 2017-07-15

## 2017-07-15 RX ORDER — NORTRIPTYLINE HCL 10 MG
CAPSULE ORAL
Qty: 90 CAPSULE | Refills: 1 | Status: CANCELLED | OUTPATIENT
Start: 2017-07-15

## 2017-07-17 ENCOUNTER — OFFICE VISIT (OUTPATIENT)
Dept: FAMILY MEDICINE | Facility: CLINIC | Age: 42
End: 2017-07-17
Payer: COMMERCIAL

## 2017-07-17 VITALS
HEART RATE: 92 BPM | SYSTOLIC BLOOD PRESSURE: 127 MMHG | DIASTOLIC BLOOD PRESSURE: 80 MMHG | RESPIRATION RATE: 16 BRPM | TEMPERATURE: 98 F | BODY MASS INDEX: 21.99 KG/M2 | HEIGHT: 65 IN | OXYGEN SATURATION: 100 % | WEIGHT: 132 LBS

## 2017-07-17 DIAGNOSIS — K21.9 GASTROESOPHAGEAL REFLUX DISEASE WITHOUT ESOPHAGITIS: Primary | ICD-10-CM

## 2017-07-17 DIAGNOSIS — K58.0 IRRITABLE BOWEL SYNDROME WITH DIARRHEA: ICD-10-CM

## 2017-07-17 DIAGNOSIS — R20.0 NUMBNESS: ICD-10-CM

## 2017-07-17 DIAGNOSIS — M79.7 FIBROMYALGIA: ICD-10-CM

## 2017-07-17 DIAGNOSIS — I10 HYPERTENSION GOAL BP (BLOOD PRESSURE) < 140/90: ICD-10-CM

## 2017-07-17 DIAGNOSIS — F41.9 ANXIETY: ICD-10-CM

## 2017-07-17 DIAGNOSIS — I73.00 RAYNAUD'S PHENOMENON WITHOUT GANGRENE: ICD-10-CM

## 2017-07-17 PROCEDURE — 99214 OFFICE O/P EST MOD 30 MIN: CPT | Performed by: PHYSICIAN ASSISTANT

## 2017-07-17 RX ORDER — NORTRIPTYLINE HCL 10 MG
30 CAPSULE ORAL AT BEDTIME
Qty: 90 CAPSULE | Refills: 1 | Status: SHIPPED | OUTPATIENT
Start: 2017-07-17 | End: 2017-09-22

## 2017-07-17 RX ORDER — LOSARTAN POTASSIUM 25 MG/1
25 TABLET ORAL DAILY
Qty: 90 TABLET | Refills: 1 | Status: SHIPPED | OUTPATIENT
Start: 2017-07-17 | End: 2018-01-10

## 2017-07-17 RX ORDER — AMLODIPINE BESYLATE 5 MG/1
5 TABLET ORAL DAILY
Qty: 90 TABLET | Refills: 1 | Status: SHIPPED | OUTPATIENT
Start: 2017-07-17 | End: 2018-02-05

## 2017-07-17 RX ORDER — PREGABALIN 150 MG/1
150 CAPSULE ORAL 3 TIMES DAILY
Qty: 270 CAPSULE | Refills: 1 | Status: SHIPPED | OUTPATIENT
Start: 2017-07-17 | End: 2017-09-06

## 2017-07-17 RX ORDER — BUPROPION HYDROCHLORIDE 75 MG/1
TABLET ORAL
Qty: 60 TABLET | Refills: 1 | Status: SHIPPED | OUTPATIENT
Start: 2017-07-17 | End: 2017-09-22

## 2017-07-17 NOTE — PROGRESS NOTES
SUBJECTIVE:                                                    Yovana Enriquez is a 42 year old female who presents to clinic today for the following health issues:      Hypertension Follow-up      Outpatient blood pressures are not being checked.    Low Salt Diet: low salt      Amount of exercise or physical activity: 4-5 days/week for an average of 30-45 minutes    Problems taking medications regularly: No    Medication side effects: none    Diet: low salt      Recheck of gerd. Will d/c omeprazole in favor of ranitidine.    Problem list and histories reviewed & adjusted, as indicated.  Additional history: as documented        Reviewed and updated as needed this visit by clinical staff  Tobacco  Allergies  Meds  Problems  Med Hx  Surg Hx  Fam Hx  Soc Hx        Reviewed and updated as needed this visit by Provider  Tobacco  Allergies  Meds  Problems  Med Hx  Surg Hx  Fam Hx  Soc Hx          All other systems negative except as outline above  OBJECTIVE:  Eye exam - right eye normal lid, conjunctiva, cornea, pupil and fundus, left eye normal lid, conjunctiva, cornea, pupil and fundus.  Thyroid exam reveals thyroid is normal in size without nodules or tenderness.  CHEST:chest clear to IPPA, no tachypnea, retractions or cyanosis and S1, S2 normal, no murmur, no gallop, rate regular.  The abdomen is soft without tenderness, guarding, mass, rebound or organomegaly. Bowel sounds are normal. No CVA tenderness or inguinal adenopathy noted.  Yovana was seen today for hypertension.    Diagnoses and all orders for this visit:    Gastroesophageal reflux disease without esophagitis  -     ranitidine (ZANTAC) 300 MG tablet; Take 1 tablet (300 mg) by mouth 2 times daily    Raynaud's phenomenon without gangrene  -     amLODIPine (NORVASC) 5 MG tablet; Take 1 tablet (5 mg) by mouth daily    Anxiety  -     buPROPion (WELLBUTRIN) 75 MG tablet; TAKE 1 TABLET (75 MG) BY MOUTH 2 TIMES DAILY    Numbness  -      nortriptyline (PAMELOR) 10 MG capsule; Take 3 capsules (30 mg) by mouth At Bedtime    Hypertension goal BP (blood pressure) < 140/90  -     losartan (COZAAR) 25 MG tablet; Take 1 tablet (25 mg) by mouth daily    Fibromyalgia  -     pregabalin (LYRICA) 150 MG capsule; Take 1 capsule (150 mg) by mouth 3 times daily . 3 month supply.    Irritable bowel syndrome with diarrhea  -     Cancel: GASTROENTEROLOGY ADULT REF CONSULT ONLY  -     GASTROENTEROLOGY ADULT REF CONSULT ONLY      work on lifestyle modification  Recheck in 6 mos.

## 2017-07-17 NOTE — MR AVS SNAPSHOT
After Visit Summary   7/17/2017    Yovana Enriquez    MRN: [id number]           Patient Information     Date Of Birth          1975        Visit Information        Provider Department      7/17/2017 5:40 PM Bradford Mario PA-C [facility] [place]        Today's Diagnoses     Gastroesophageal reflux disease without esophagitis    -  1    Raynaud's phenomenon without gangrene        Anxiety        Numbness        Hypertension goal BP (blood pressure) < 140/90        Fibromyalgia        Irritable bowel syndrome with diarrhea           Follow-ups after your visit        Additional Services     GASTROENTEROLOGY ADULT REF CONSULT ONLY       Preferred Location: [facility], [facility] [phone]      Please be aware that coverage of these services is subject to the terms and limitations of your health insurance plan.  Call member services at your health plan with any benefit or coverage questions.  Any procedures must be performed at a [facility] facility OR coordinated by your clinic's referral office.    Please bring the following with you to your appointment:    (1) Any X-Rays, CTs or MRIs which have been performed.  Contact the facility where they were done to arrange for  prior to your scheduled appointment.    (2) List of current medications   (3) This referral request   (4) Any documents/labs given to you for this referral                  Your next 10 appointments already scheduled     Jul 20, 2017  2:00 PM CDT   [facility]    [address] [place] [address] [phone]            Aug 01, 2017  2:10 PM CDT   KELSEY Extremity with aSul Helton PT   [facility] [place] [facility] ([facility] [place])    [address]  [place] [address] [phone]            Aug 18, 2017  2:00 PM CDT   Level 2 [facility] [place] [facility]   [facility] (  [address] [address]   [phone]            Oct 02, 2017  6:00 PM CDT   LAB with BE LAB   [facility] [place] ([facility])    [address] [facility] [facility]  [place] [address] [phone]           Patient must bring picture ID.  Patient should be prepared to give a urine specimen  Please do not eat 10-12 hours before your appointment if you are coming in fasting for labs on lipids, cholesterol, or glucose (sugar).  Pregnant women should follow their Care Team instructions. Water with medications is okay. Do not drink coffee or other fluids.   If you have concerns about taking  your medications, please ask at office or if scheduling via [employer], send a message by clicking on Secure Messaging, Message Your Care Team.            Oct 09, 2017  1:20 PM CDT   Return Visit with Bradford Colvin MD   [facility] [place] ([facility])    [address] [facility] [facility]  [place] [id number] [phone]              Who to contact     Normal or non-critical lab and imaging results will be communicated to you by [employer]hart, letter or phone within 4 business days after the clinic has received the results. If you do not hear from us within 7 days, please contact the clinic through [employer] or phone. If you have a critical or abnormal lab result, we will notify you by phone as soon as possible.  Submit refill requests through [employer] or call your pharmacy and they will forward the refill request to us. Please allow 3 business days for your refill to be completed.          If you need to speak with a  for additional information , please call: [phone]             Additional Information About Your Visit        [employer] Information     [employer] gives you secure access to your electronic health record. If you see a primary care provider, you can also send messages to your care team and make appointments. If you have questions,  "please call your primary care clinic.  If you do not have a primary care provider, please call [phone] and they will assist you.        Care EveryWhere ID     This is your Care EveryWhere ID. This could be used by other organizations to access your [place] medical records  [id number]        Your Vitals Were     Pulse Temperature Respirations Height Pulse Oximetry BMI (Body Mass Index)    92 98  F (36.7  C) (Tympanic) 16 5' 5\" (1.651 m) 100% 21.97 kg/m2       Blood Pressure from Last 3 Encounters:   07/17/17 127/80   07/10/17 118/78   06/23/17 119/81    Weight from Last 3 Encounters:   07/17/17 132 lb (59.9 kg)   07/10/17 135 lb 9.6 oz (61.5 kg)   06/23/17 135 lb 6.4 oz (61.4 kg)              We Performed the Following     GASTROENTEROLOGY ADULT REF CONSULT ONLY          Today's Medication Changes          These changes are accurate as of: 7/17/17  6:21 PM.  If you have any questions, ask your nurse or doctor.               Start taking these medicines.        Dose/Directions    ranitidine 300 MG tablet   Commonly known as:  ZANTAC   Used for:  Gastroesophageal reflux disease without esophagitis   Started by:  Bradford Mario PA-C        Dose:  300 mg   Take 1 tablet (300 mg) by mouth 2 times daily   Quantity:  180 tablet   Refills:  3         These medicines have changed or have updated prescriptions.        Dose/Directions    buPROPion 75 MG tablet   Commonly known as:  WELLBUTRIN   This may have changed:  See the new instructions.   Used for:  Anxiety   Changed by:  Bradford Mario PA-C        TAKE 1 TABLET (75 MG) BY MOUTH 2 TIMES DAILY   Quantity:  60 tablet   Refills:  1            Where to get your medicines      These [id number] [place] - [address]  [address] [place] [address]     Phone:  [phone]     amLODIPine 5 MG tablet    buPROPion 75 MG tablet    losartan 25 MG tablet    nortriptyline 10 MG capsule    ranitidine 300 MG tablet       "   Some of these will need a paper prescription and others can be bought over the counter.  Ask your nurse if you have questions.     Bring a paper prescription for each of these medications     pregabalin 150 MG capsule                Primary Care Provider Office Phone # Fax #    Bradford Mario PA-C [phone]       [facility] [address]  [facility] [address]        Equal Access to Services     MARTIN HURD : Hadii aad ku hadasho Soomaali, waaxda luqadaha, qaybta kaalmada adeegyada, waxay idiin hayaan adeeg kharash la'aan . So [facility] [phone].    ATENCIÓN: Si habla español, tiene a dexter disposición servicios gratuitos de asistencia lingüística. Llame al [phone].    We comply with applicable federal civil rights laws and [place] laws. We do not discriminate on the basis of race, color, national origin, age, disability sex, sexual orientation or gender identity.            Thank you!     Thank you for choosing [facility]  for your care. Our goal is always to provide you with excellent care. Hearing back from our patients is one way we can continue to improve our services. Please take a few minutes to complete the written survey that you may receive in the mail after your visit with us. Thank you!             Your Updated Medication List - Protect others around you: Learn how to safely use, store and throw away your medicines at www.disposemymeds.org.          This list is accurate as of: 7/17/17  6:21 PM.  Always use your most recent med list.                   Brand Name Dispense Instructions for use Diagnosis    amLODIPine 5 MG tablet    NORVASC    90 tablet    Take 1 tablet (5 mg) by mouth daily    Raynaud's phenomenon without gangrene       azaTHIOprine 50 MG tablet    IMURAN    270 tablet    Take 3 tablets (150 mg) by mouth daily    Other systemic lupus erythematosus with other organ involvement (H)       blood glucose monitoring test strip    no brand specified     1 Month    Used for testing glucose 2-3 times daily    History of gestational diabetes mellitus, not currently pregnant       buPROPion 75 MG tablet    WELLBUTRIN    60 tablet    TAKE 1 TABLET (75 MG) BY MOUTH 2 TIMES DAILY    Anxiety       Calcium + D3 600-200 MG-UNIT Tabs      1 tablet daily        cevimeline 30 MG capsule    EVOXAC    180 capsule    Take 1 capsule (30 mg) by mouth 2 times daily    Other systemic lupus erythematosus with other organ involvement (H), Sjogren's syndrome with keratoconjunctivitis sicca (H)       diclofenac 1 % Gel topical gel    VOLTAREN    100 g    Apply 2-4 grams to knees or shoulders four times daily as needed using enclosed dosing card.    Fibromyalgia, SLE (systemic lupus erythematosus) (H)       FLOVENT IN           fluticasone 50 MCG/ACT spray    FLONASE    1 Bottle    Spray 1-2 sprays into both nostrils daily    Chronic rhinitis       hydroxychloroquine 200 MG tablet    PLAQUENIL    180 tablet    Take 1 tablet (200 mg) by mouth 2 times daily    Other systemic lupus erythematosus with other organ involvement (H), High risk medication use       hyoscyamine 0.125 MG tablet    ANASPAZ/LEVSIN    40 tablet    Take 1-2 tablets (125-250 mcg) by mouth every 4 hours as needed for cramping    Irritable bowel syndrome, unspecified type       lidocaine visc 2% & diphenhydramine 12.5mg/5mL & maalox/mylanta w/simethicone (1:1:1 v/v/v) Susp compounding kit     100 mL    Take 5-10 mLs by mouth every 6 hours as needed    Oral ulcer       losartan 25 MG tablet    COZAAR    90 tablet    Take 1 tablet (25 mg) by mouth daily    Hypertension goal BP (blood pressure) < 140/90       MICROLET LANCETS Misc     100 each    1 each daily.    Other abnormal glucose       MULTIVITAMIN PO      Take  by mouth.        nortriptyline 10 MG capsule    PAMELOR    90 capsule    Take 3 capsules (30 mg) by mouth At Bedtime    Numbness       predniSONE 5 MG tablet    DELTASONE    90 tablet    Take 1 tablet (5 mg)  by mouth daily    Other systemic lupus erythematosus with other organ involvement (H)       pregabalin 150 MG capsule    LYRICA    270 capsule    Take 1 capsule (150 mg) by mouth 3 times daily . 3 month supply.    Fibromyalgia       ranitidine 300 MG tablet    ZANTAC    180 tablet    Take 1 tablet (300 mg) by mouth 2 times daily    Gastroesophageal reflux disease without esophagitis

## 2017-07-20 ENCOUNTER — INFUSION THERAPY VISIT (OUTPATIENT)
Dept: INFUSION THERAPY | Facility: CLINIC | Age: 42
End: 2017-07-20
Payer: COMMERCIAL

## 2017-07-20 VITALS
DIASTOLIC BLOOD PRESSURE: 79 MMHG | TEMPERATURE: 97.4 F | OXYGEN SATURATION: 97 % | RESPIRATION RATE: 16 BRPM | HEART RATE: 90 BPM | SYSTOLIC BLOOD PRESSURE: 128 MMHG

## 2017-07-20 DIAGNOSIS — M32.19 OTHER SYSTEMIC LUPUS ERYTHEMATOSUS WITH OTHER ORGAN INVOLVEMENT (H): Primary | ICD-10-CM

## 2017-07-20 PROCEDURE — 99207 ZZC NO CHARGE LOS: CPT

## 2017-07-20 PROCEDURE — 96413 CHEMO IV INFUSION 1 HR: CPT | Performed by: NURSE PRACTITIONER

## 2017-07-20 RX ADMIN — Medication 100 ML: at 15:04

## 2017-07-20 ASSESSMENT — PAIN SCALES - GENERAL: PAINLEVEL: MILD PAIN (2)

## 2017-07-20 NOTE — PROGRESS NOTES
"Infusion Nursing Note:  Yovana L Attica presents today for Benylsta.    Patient seen by provider today: No   present during visit today: Not Applicable.    Note:    PRIOR TO INFUSION OF BIOLOGICAL MEDICATIONS OR ANY OF THESE AS LISTED: Benlysta (benlimumab) \".rheumbiologicalchecklist\"    Prior to Infusion of biological medications or any of these as listed:    1. Elevated temperature, fever, chills, productive cough or abnormal vital signs, night sweats, coughing up blood or sputum, no appetite or abnormal vital signs : NO    2. Open wounds or new incisions: NO    3. Recent hospitalization: NO    4.  Recent surgeries:  NO    5. Any upcoming surgeries or dental procedures?:NO    6. Any current or recent bouts of illness or infection? On any antibiotics? : NO    7. Any new, sudden or worsening abdominal pain :NO    8. Vaccination within 4 weeks? Patient or someone in the household is scheduled to receive vaccination? No live virus vaccines prior to or during treatment :NO    9. Any nervous system diseases [i.e. multiple sclerosis, Guillain-Birdsnest, seizures, neurological  changes]: NO    10. Pregnant or breast feeding; or plans on pregnancy in the future: NO    11. Signs of worsening depression or suicidal ideations while taking benlysta:NO    12. New-onset medical symptoms: NO    13.  New cancer diagnosis or on chemotherapy or radiation NO    14.  Evaluate for any sign of active TB [Unexplained weight loss, Loss of appetite, Night sweats, Fever, Fatigue, Chills, Coughing for 3 weeks or longer, Hemoptysis (coughing up blood), Chest pain]: NO    **Note: If answered yes to any of the above, hold the infusion and contact ordering rheumatologist or on-call rheumatologist.     Intravenous Access:  Peripheral IV placed.    Treatment Conditions:  Not Applicable.      Post Infusion Assessment:  Patient tolerated infusion without incident.  No evidence of extravasations.  Access discontinued per " protocol.    Discharge Plan:   Schedule reviewed with patient and/or family.  Patient will return 8/10 for next appointment.  Patient discharged in stable condition accompanied by: self.  Departure Mode: Ambulatory.    Radha Sauceda RN

## 2017-07-20 NOTE — MR AVS SNAPSHOT
After Visit Summary   7/20/2017    Yovana Enriquez    MRN: [id number]           Patient Information     Date Of Birth          1975        Visit Information        Provider Department      7/20/2017 2:00 PM [address]        Today's Diagnoses     Other systemic lupus erythematosus with other organ involvement (H)    -  1       Follow-ups after your visit        Your next 10 appointments already scheduled     Aug 01, 2017  2:10 PM CDT   KELSEY Extremity with Saul Helton PT   [facility] [place] [facility] ([facility] [place])    [address] [facility] [address]  [place] [address]   [phone]            Aug 18, 2017  2:00 PM CDT   Level 2 with [address] ([facility])    [address] [place] [address]   [phone]            Oct 02, 2017  6:00 PM CDT   LAB with BE LAB   [facility] [place] ([facility])    [address]  [place] [address]   [phone]           Patient must bring picture ID.  Patient should be prepared to give a urine specimen  Please do not eat 10-12 hours before your appointment if you are coming in fasting for labs on lipids, cholesterol, or glucose (sugar).  Pregnant women should follow their Care Team instructions. Water with medications is okay. Do not drink coffee or other fluids.   If you have concerns about taking  your medications, please ask at office or if scheduling via [employer], send a message by clicking on Secure Messaging, Message Your Care Team.            Oct 09, 2017  1:20 PM CDT   Return Visit with Bradford Colvin MD   [facility] [place] ([facility])    [address]  [place] [address]   [phone]              Who to contact     If you have questions or need follow up information about today's clinic visit or your schedule please contact [facility]  CLINICS directly at [phone].  Normal or non-critical lab and imaging results will be communicated to you by [employer], letter or phone within 4 business days after the clinic has received the results. If you do not hear from us within 7 days, please contact the clinic through [employer] or phone. If you have a critical or abnormal lab result, we will notify you by phone as soon as possible.  Submit refill requests through [employer] or call your pharmacy and they will forward the refill request to us. Please allow 3 business days for your refill to be completed.          Additional Information About Your Visit        [employer] Information     PL[employer] gives you secure access to your electronic health record. If you see a primary care provider, you can also send messages to your care team and make appointments. If you have questions, please call your primary care clinic.  If you do not have a primary care provider, please call [phone] and they will assist you.      [employer] is an electronic gateway that provides easy, online access to your medical records. With PLDT, you can request a clinic appointment, read your test results, renew a prescription or communicate with your care team.     To access your existing account, please contact your [facility] or call [phone] for assistance.        Care EveryWhere ID     This is your Care EveryWhere ID. This could be used by other organizations to access your [place] medical records  [id number]        Your Vitals Were     Pulse Temperature Respirations Pulse Oximetry          90 97.4  F (36.3  C) (Oral) 16 97%         Blood Pressure from Last 3 Encounters:   07/20/17 128/79   07/17/17 127/80   07/10/17 118/78    Weight from Last 3 Encounters:   07/17/17 59.9 kg (132 lb)   07/10/17 61.5 kg (135 lb 9.6 oz)   06/23/17 61.4 kg (135 lb 6.4 oz)              Today, you had the following     No orders found for display       Primary Care  Provider Office Phone # Fax #    Bradford Mario PA-C [phone]       [facility] [place] [address]  [place] [address]        Equal Access to Services     BINTA HURD : Hadii aad ku hadjoceo Soomaali, waaxda luqadaha, qaybta kaalmada adeegyada, waxay idiin haysylvestern adeangle madrigal laparvez [place]. So [facility] [phone].    ATENCIÓN: Si habla español, tiene a dexter disposición servicios gratuitos de asistencia lingüística. Llame al [phone].    We comply with applicable federal civil rights laws and [place] laws. We do not discriminate on the basis of race, color, national origin, age, disability sex, sexual orientation or gender identity.            Thank you!     Thank you for choosing [facility]  for your care. Our goal is always to provide you with excellent care. Hearing back from our patients is one way we can continue to improve our services. Please take a few minutes to complete the written survey that you may receive in the mail after your visit with us. Thank you!             Your Updated Medication List - Protect others around you: Learn how to safely use, store and throw away your medicines at www.disposemymeds.org.          This list is accurate as of: 7/20/17 11:59 PM.  Always use your most recent med list.                   Brand Name Dispense Instructions for use Diagnosis    amLODIPine 5 MG tablet    NORVASC    90 tablet    Take 1 tablet (5 mg) by mouth daily    Raynaud's phenomenon without gangrene       azaTHIOprine 50 MG tablet    IMURAN    270 tablet    Take 3 tablets (150 mg) by mouth daily    Other systemic lupus erythematosus with other organ involvement (H)       blood glucose monitoring test strip    no brand specified    1 Month    Used for testing glucose 2-3 times daily    History of gestational diabetes mellitus, not currently pregnant       buPROPion 75 MG tablet    WELLBUTRIN    60 tablet    TAKE 1 TABLET (75 MG) BY MOUTH 2 TIMES DAILY    Anxiety        Calcium + D3 600-200 MG-UNIT Tabs      1 tablet daily        cevimeline 30 MG capsule    EVOXAC    180 capsule    Take 1 capsule (30 mg) by mouth 2 times daily    Other systemic lupus erythematosus with other organ involvement (H), Sjogren's syndrome with keratoconjunctivitis sicca (H)       diclofenac 1 % Gel topical gel    VOLTAREN    100 g    Apply 2-4 grams to knees or shoulders four times daily as needed using enclosed dosing card.    Fibromyalgia, SLE (systemic lupus erythematosus) (H)       FLOVENT IN           fluticasone 50 MCG/ACT spray    FLONASE    1 Bottle    Spray 1-2 sprays into both nostrils daily    Chronic rhinitis       hydroxychloroquine 200 MG tablet    PLAQUENIL    180 tablet    Take 1 tablet (200 mg) by mouth 2 times daily    Other systemic lupus erythematosus with other organ involvement (H), High risk medication use       hyoscyamine 0.125 MG tablet    ANASPAZ/LEVSIN    40 tablet    Take 1-2 tablets (125-250 mcg) by mouth every 4 hours as needed for cramping    Irritable bowel syndrome, unspecified type       lidocaine visc 2% & diphenhydramine 12.5mg/5mL & maalox/mylanta w/simethicone (1:1:1 v/v/v) Susp compounding kit     100 mL    Take 5-10 mLs by mouth every 6 hours as needed    Oral ulcer       losartan 25 MG tablet    COZAAR    90 tablet    Take 1 tablet (25 mg) by mouth daily    Hypertension goal BP (blood pressure) < 140/90       MICROLET LANCETS Misc     100 each    1 each daily.    Other abnormal glucose       MULTIVITAMIN PO      Take  by mouth.        nortriptyline 10 MG capsule    PAMELOR    90 capsule    Take 3 capsules (30 mg) by mouth At Bedtime    Numbness       predniSONE 5 MG tablet    DELTASONE    90 tablet    Take 1 tablet (5 mg) by mouth daily    Other systemic lupus erythematosus with other organ involvement (H)       pregabalin 150 MG capsule    LYRICA    270 capsule    Take 1 capsule (150 mg) by mouth 3 times daily . 3 month supply.    Fibromyalgia       ranitidine  300 MG tablet    ZANTAC    180 tablet    Take 1 tablet (300 mg) by mouth 2 times daily    Gastroesophageal reflux disease without esophagitis

## 2017-08-07 ENCOUNTER — OFFICE VISIT (OUTPATIENT)
Dept: FAMILY MEDICINE | Facility: CLINIC | Age: 42
End: 2017-08-07
Payer: COMMERCIAL

## 2017-08-07 VITALS
DIASTOLIC BLOOD PRESSURE: 82 MMHG | OXYGEN SATURATION: 99 % | TEMPERATURE: 98.3 F | HEIGHT: 65 IN | SYSTOLIC BLOOD PRESSURE: 130 MMHG | RESPIRATION RATE: 18 BRPM | WEIGHT: 132 LBS | HEART RATE: 85 BPM | BODY MASS INDEX: 21.99 KG/M2

## 2017-08-07 DIAGNOSIS — J01.01 ACUTE RECURRENT MAXILLARY SINUSITIS: Primary | ICD-10-CM

## 2017-08-07 DIAGNOSIS — J20.9 ACUTE BRONCHITIS, UNSPECIFIED ORGANISM: ICD-10-CM

## 2017-08-07 PROCEDURE — 99214 OFFICE O/P EST MOD 30 MIN: CPT | Performed by: PHYSICIAN ASSISTANT

## 2017-08-07 RX ORDER — ALBUTEROL SULFATE 90 UG/1
2 AEROSOL, METERED RESPIRATORY (INHALATION) EVERY 6 HOURS PRN
Qty: 1 INHALER | Refills: 1 | Status: SHIPPED | OUTPATIENT
Start: 2017-08-07 | End: 2018-07-12

## 2017-08-07 RX ORDER — CODEINE PHOSPHATE AND GUAIFENESIN 10; 100 MG/5ML; MG/5ML
2 SOLUTION ORAL
Qty: 120 ML | Refills: 0 | Status: SHIPPED | OUTPATIENT
Start: 2017-08-07 | End: 2017-11-03

## 2017-08-07 RX ORDER — METHYLPREDNISOLONE 4 MG
TABLET, DOSE PACK ORAL
Qty: 21 TABLET | Refills: 0 | Status: SHIPPED | OUTPATIENT
Start: 2017-08-07 | End: 2017-10-13

## 2017-08-07 RX ORDER — AZITHROMYCIN 250 MG/1
TABLET, FILM COATED ORAL
Qty: 6 TABLET | Refills: 0 | Status: SHIPPED | OUTPATIENT
Start: 2017-08-07 | End: 2017-10-13

## 2017-08-07 RX ORDER — BENZONATATE 200 MG/1
200 CAPSULE ORAL 3 TIMES DAILY PRN
Qty: 21 CAPSULE | Refills: 0 | Status: SHIPPED | OUTPATIENT
Start: 2017-08-07 | End: 2017-11-03

## 2017-08-07 NOTE — MR AVS SNAPSHOT
After Visit Summary   8/7/2017    Yovana Enriquez    MRN: [id number]           Patient Information     Date Of Birth          1975        Visit Information        Provider Department      8/7/2017 6:20 PM Bradford Mario PA-C [facility]        Today's Diagnoses     Acute recurrent maxillary sinusitis    -  1    Acute bronchitis, unspecified organism           Follow-ups after your visit        Your next 10 appointments already scheduled     Aug 18, 2017  2:00 PM CDT   Level 2 with [address] ([facility])    [address] [place] [id number] [phone]            Oct 02, 2017  6:00 PM CDT   LAB with [facility]   [facility] ([facility]    [address] [facility] [id number] [phone]           Patient must bring picture ID. Patient should be prepared to give a urine specimen  Please do not eat 10-12 hours before your appointment if you are coming in fasting for labs on lipids, cholesterol, or glucose (sugar). Pregnant women should follow their Care Team instructions. Water with medications is okay. Do not drink coffee or other fluids. If you have concerns about taking  your medications, please ask at office or if scheduling via [facility], send a message by clicking on Secure Messaging, Message Your Care Team.            Oct 09, 2017  1:20 PM CDT   Return Visit with Bradford Colvin MD   [facility] ([facility])    [address] [facility] [id number] [phone]              Who to contact     Normal or non-critical lab and imaging results will be communicated to you by MyChart, letter or phone within 4 business days after the clinic has received the results. If you do not hear from us within 7 days, please contact the clinic through MyChart or phone. If you have a critical or abnormal lab result, we will notify you by phone as soon as  "possible.  Submit refill requests through [employer] or call your pharmacy and they will forward the refill request to us. Please allow 3 business days for your refill to be completed.          If you need to speak with a  for additional information , please call: [phone]             Additional Information About Your Visit        [employer] Information     [employer] gives you secure access to your electronic health record. If you see a primary care provider, you can also send messages to your care team and make appointments. If you have questions, please call your primary care clinic.  If you do not have a primary care provider, please call [phone] and they will assist you.        Care EveryWhere ID     This is your Care EveryWhere ID. This could be used by other organizations to access your [facility] medical records  [id number]        Your Vitals Were     Pulse Temperature Respirations Height Pulse Oximetry BMI (Body Mass Index)    85 98.3  F (36.8  C) (Tympanic) 18 5' 5\" (1.651 m) 99% 21.97 kg/m2       Blood Pressure from Last 3 Encounters:   08/07/17 130/82   07/20/17 128/79   07/17/17 127/80    Weight from Last 3 Encounters:   08/07/17 132 lb (59.9 kg)   07/17/17 132 lb (59.9 kg)   07/10/17 135 lb 9.6 oz (61.5 kg)              Today, you had the following     No orders found for display         Today's Medication Changes          These changes are accurate as of: 8/7/17  6:44 PM.  If you have any questions, ask your nurse or doctor.               Start taking these medicines.        Dose/Directions    albuterol 108 (90 BASE) MCG/ACT Inhaler   Commonly known as:  PROAIR HFA/PROVENTIL HFA/VENTOLIN HFA   Used for:  Acute bronchitis, unspecified organism   Started by:  Bradford Mario PA-C        Dose:  2 puff   Inhale 2 puffs into the lungs every 6 hours as needed for shortness of breath / dyspnea or wheezing   Quantity:  1 Inhaler   Refills:  1       azithromycin 250 MG tablet   Commonly " known as:  ZITHROMAX   Used for:  Acute recurrent maxillary sinusitis   Started by:  Bradford Mario PA-C        Two tablets first day, then one tablet daily for four days.   Quantity:  6 tablet   Refills:  0       benzonatate 200 MG capsule   Commonly known as:  TESSALON   Used for:  Acute bronchitis, unspecified organism   Started by:  Bradford Mario PA-C        Dose:  200 mg   Take 1 capsule (200 mg) by mouth 3 times daily as needed for cough   Quantity:  21 capsule   Refills:  0       guaiFENesin-codeine 100-10 MG/5ML Soln solution   Commonly known as:  ROBITUSSIN AC   Used for:  Acute bronchitis, unspecified organism   Started by:  Bradford Mario PA-C        Dose:  2 tsp.   Take 10 mLs by mouth nightly as needed for cough   Quantity:  120 mL   Refills:  0       methylPREDNISolone 4 MG tablet   Commonly known as:  MEDROL DOSEPAK   Used for:  Acute bronchitis, unspecified organism, Acute recurrent maxillary sinusitis   Started by:  Bradford Mario PA-C        Follow package instructions   Quantity:  21 tablet   Refills:  0            Where to get your medicines      These medications were sent to [id number] [place] [facility] [place] - [address]  [address] [place] [address]     Phone:  [phone]     albuterol 108 (90 BASE) MCG/ACT Inhaler    azithromycin 250 MG tablet    benzonatate 200 MG capsule    methylPREDNISolone 4 MG tablet         Some of these will need a paper prescription and others can be bought over the counter.  Ask your nurse if you have questions.     Bring a paper prescription for each of these medications     guaiFENesin-codeine 100-10 MG/5ML Soln solution                Primary Care Provider Office Phone # Fax #    Bradford Mario PA-C [phone]       [facility] [place] [address]  [place] [address]        Equal Access to Services     BINTA HURD AH: Deshaun Villafana, waarlethda corazonadaha, qaybta kaalsuma mcdermott, rosy dooley  yris ordazmirandamaximus ying'aan ah. So [facility] [phone].    ATENCIÓN: Si payton morales, tiene a dexter disposición servicios gratuitos de asistencia lingüística. Cee galaviz [phone].    We comply with applicable federal civil rights laws and [place] laws. We do not discriminate on the basis of race, color, national origin, age, disability sex, sexual orientation or gender identity.            Thank you!     Thank you for choosing [facility]  for your care. Our goal is always to provide you with excellent care. Hearing back from our patients is one way we can continue to improve our services. Please take a few minutes to complete the written survey that you may receive in the mail after your visit with us. Thank you!             Your Updated Medication List - Protect others around you: Learn how to safely use, store and throw away your medicines at www.disposemymeds.org.          This list is accurate as of: 8/7/17  6:44 PM.  Always use your most recent med list.                   Brand Name Dispense Instructions for use Diagnosis    albuterol 108 (90 BASE) MCG/ACT Inhaler    PROAIR HFA/PROVENTIL HFA/VENTOLIN HFA    1 Inhaler    Inhale 2 puffs into the lungs every 6 hours as needed for shortness of breath / dyspnea or wheezing    Acute bronchitis, unspecified organism       amLODIPine 5 MG tablet    NORVASC    90 tablet    Take 1 tablet (5 mg) by mouth daily    Raynaud's phenomenon without gangrene       azaTHIOprine 50 MG tablet    IMURAN    270 tablet    Take 3 tablets (150 mg) by mouth daily    Other systemic lupus erythematosus with other organ involvement (H)       azithromycin 250 MG tablet    ZITHROMAX    6 tablet    Two tablets first day, then one tablet daily for four days.    Acute recurrent maxillary sinusitis       benzonatate 200 MG capsule    TESSALON    21 capsule    Take 1 capsule (200 mg) by mouth 3 times daily as needed for cough    Acute bronchitis, unspecified organism       blood glucose  monitoring test strip    no brand specified    1 Month    Used for testing glucose 2-3 times daily    History of gestational diabetes mellitus, not currently pregnant       buPROPion 75 MG tablet    WELLBUTRIN    60 tablet    TAKE 1 TABLET (75 MG) BY MOUTH 2 TIMES DAILY    Anxiety       Calcium + D3 600-200 MG-UNIT Tabs      1 tablet daily        cevimeline 30 MG capsule    EVOXAC    180 capsule    Take 1 capsule (30 mg) by mouth 2 times daily    Other systemic lupus erythematosus with other organ involvement (H), Sjogren's syndrome with keratoconjunctivitis sicca (H)       diclofenac 1 % Gel topical gel    VOLTAREN    100 g    Apply 2-4 grams to knees or shoulders four times daily as needed using enclosed dosing card.    Fibromyalgia, SLE (systemic lupus erythematosus) (H)       FLOVENT IN           fluticasone 50 MCG/ACT spray    FLONASE    1 Bottle    Spray 1-2 sprays into both nostrils daily    Chronic rhinitis       guaiFENesin-codeine 100-10 MG/5ML Soln solution    ROBITUSSIN AC    120 mL    Take 10 mLs by mouth nightly as needed for cough    Acute bronchitis, unspecified organism       hydroxychloroquine 200 MG tablet    PLAQUENIL    180 tablet    Take 1 tablet (200 mg) by mouth 2 times daily    Other systemic lupus erythematosus with other organ involvement (H), High risk medication use       hyoscyamine 0.125 MG tablet    ANASPAZ/LEVSIN    40 tablet    Take 1-2 tablets (125-250 mcg) by mouth every 4 hours as needed for cramping    Irritable bowel syndrome, unspecified type       lidocaine visc 2% & diphenhydramine 12.5mg/5mL & maalox/mylanta w/simethicone (1:1:1 v/v/v) Susp compounding kit     100 mL    Take 5-10 mLs by mouth every 6 hours as needed    Oral ulcer       losartan 25 MG tablet    COZAAR    90 tablet    Take 1 tablet (25 mg) by mouth daily    Hypertension goal BP (blood pressure) < 140/90       methylPREDNISolone 4 MG tablet    MEDROL DOSEPAK    21 tablet    Follow package instructions    Acute  bronchitis, unspecified organism, Acute recurrent maxillary sinusitis       MICROLET LANCETS Misc     100 each    1 each daily.    Other abnormal glucose       MULTIVITAMIN PO      Take  by mouth.        nortriptyline 10 MG capsule    PAMELOR    90 capsule    Take 3 capsules (30 mg) by mouth At Bedtime    Numbness       predniSONE 5 MG tablet    DELTASONE    90 tablet    Take 1 tablet (5 mg) by mouth daily    Other systemic lupus erythematosus with other organ involvement (H)       pregabalin 150 MG capsule    LYRICA    270 capsule    Take 1 capsule (150 mg) by mouth 3 times daily . 3 month supply.    Fibromyalgia       ranitidine 300 MG tablet    ZANTAC    180 tablet    Take 1 tablet (300 mg) by mouth 2 times daily    Gastroesophageal reflux disease without esophagitis

## 2017-08-07 NOTE — PROGRESS NOTES
SUBJECTIVE:                                                    Yovana Enriquez is a 42 year old female who presents to clinic today for the following health issues:      Acute Illness   Acute illness concerns: sinus congestion  Onset: increased over the past 2 months but ongoing over the past 2 years    Fever: no    Chills/Sweats: no    Headache (location?): YES    Sinus Pressure:YES    Conjunctivitis:  no    Ear Pain: YES: bilateral    Rhinorrhea: YES    Congestion: YES    Sore Throat: no     Cough: YES-productive of clear sputum    Wheeze: no    Decreased Appetite: no    Nausea: no    Vomiting: no    Diarrhea:  no    Dysuria/Freq.: no    Fatigue/Achiness: no    Sick/Strep Exposure: no     Therapies Tried and outcome: none            Problem list and histories reviewed & adjusted, as indicated.  Additional history: as documented        Reviewed and updated as needed this [facility]       Reviewed and updated as needed this visit by Provider         All other systems negative except as outline above  OBJECTIVE:  ENT exam reveals - bilateral TM fluid noted, neck without nodes, throat normal without erythema or exudate, maxillary sinus tender, post nasal drip noted, nasal mucosa congested and nasal mucosa pale and congested.  CHEST:chest clear to IPPA, no tachypnea, retractions or cyanosis and S1, S2 normal, no murmur, no gallop, rate regular.    Yovana was seen today for sinus problem.    Diagnoses and all orders for this visit:    Acute recurrent maxillary sinusitis  -     azithromycin (ZITHROMAX) 250 MG tablet; Two tablets first day, then one tablet daily for four days.  -     methylPREDNISolone (MEDROL DOSEPAK) 4 MG tablet; Follow package instructions    Acute bronchitis, unspecified organism  -     methylPREDNISolone (MEDROL DOSEPAK) 4 MG tablet; Follow package instructions  -     guaiFENesin-codeine (ROBITUSSIN AC) 100-10 MG/5ML SOLN solution; Take 10 mLs by mouth nightly  as needed for cough  -     benzonatate (TESSALON) 200 MG capsule; Take 1 capsule (200 mg) by mouth 3 times daily as needed for cough  -     albuterol (PROAIR HFA/PROVENTIL HFA/VENTOLIN HFA) 108 (90 BASE) MCG/ACT Inhaler; Inhale 2 puffs into the lungs every 6 hours as needed for shortness of breath / dyspnea or wheezing      Advised supportive and symptomatic treatment.  Follow up with Provider - if condition persists or worsens.

## 2017-08-18 ENCOUNTER — INFUSION THERAPY VISIT (OUTPATIENT)
Dept: INFUSION THERAPY | Facility: CLINIC | Age: 42
End: 2017-08-18
Payer: COMMERCIAL

## 2017-08-18 VITALS
DIASTOLIC BLOOD PRESSURE: 85 MMHG | HEART RATE: 89 BPM | TEMPERATURE: 97.2 F | WEIGHT: 135.7 LBS | RESPIRATION RATE: 16 BRPM | BODY MASS INDEX: 22.58 KG/M2 | OXYGEN SATURATION: 99 % | SYSTOLIC BLOOD PRESSURE: 124 MMHG

## 2017-08-18 DIAGNOSIS — M32.19 OTHER SYSTEMIC LUPUS ERYTHEMATOSUS WITH OTHER ORGAN INVOLVEMENT (H): Primary | ICD-10-CM

## 2017-08-18 PROCEDURE — 99207 ZZC NO CHARGE LOS: CPT

## 2017-08-18 PROCEDURE — 96413 CHEMO IV INFUSION 1 HR: CPT | Performed by: INTERNAL MEDICINE

## 2017-08-18 RX ADMIN — Medication 250 ML: at 14:32

## 2017-08-18 NOTE — PROGRESS NOTES
"Infusion Nursing Note:  Yovana Enriquez presents today for Benlysta.    Patient seen by provider today: No   present during visit today: Not Applicable.    Note: N/A.    Intravenous Access:  Peripheral IV placed.    Treatment Conditions:  Rheumatology Infusion Checklist: PRIOR TO INFUSION OF BIOLOGICAL MEDICATIONS OR ANY OF THESE AS LISTED: Benlysta (benlimumab) \".rheumbiologicalchecklist\"    Prior to Infusion of biological medications or any of these as listed:    1. Elevated temperature, fever, chills, productive cough or abnormal vital signs, night sweats, coughing up blood or sputum, no appetite or abnormal vital signs : NO    2. Open wounds or new incisions: NO    3. Recent hospitalization: NO    4.  Recent surgeries:  NO    5. Any upcoming surgeries or dental procedures?:NO    6. Any current or recent bouts of illness or infection? On any antibiotics? : NO    7. Any new, sudden or worsening abdominal pain :NO    8. Vaccination within 4 weeks? Patient or someone in the household is scheduled to receive vaccination? No live virus vaccines prior to or during treatment :NO    9. Any nervous system diseases [i.e. multiple sclerosis, Guillain-[place], seizures, neurological  changes]: NO    10. Pregnant or breast feeding; or plans on pregnancy in the future: NO    11. Signs of worsening depression or suicidal ideations while taking benlysta:NO    12. New-onset medical symptoms: NO    13.  New cancer diagnosis or on chemotherapy or radiation NO    14.  Evaluate for any sign of active TB [Unexplained weight loss, Loss of appetite, Night sweats, Fever, Fatigue, Chills, Coughing for 3 weeks or longer, Hemoptysis (coughing up blood), Chest pain]: NO    **Note: If answered yes to any of the above, hold the infusion and contact ordering rheumatologist or on-call rheumatologist.   .        Post Infusion Assessment:  Patient tolerated infusion without incident.  No evidence of extravasations.  Access " discontinued per protocol.  Rheumatology Post Infusion: POST-INFUSION OF BIOLOGICAL MEDICATION:    Reviewed with patient. Inform patient if any fever, chills or signs of infection, new symptoms, abdominal pain, heart palpitations, shortness of breath, reaction, weakness, neurological changes, seek medical attention immediately and should not receive infusions. No live virus vaccines prior to or during treatment or up to 6 months post infusion. If the patient has an upcoming procedure or surgery, this should be discussed with the rheumatologist and surgeon or provider..      Discharge Plan:   Discharge instructions reviewed with: Patient.  Patient and/or family verbalized understanding of discharge instructions and all questions answered.  Patient discharged in stable condition accompanied by: self.  Departure Mode: Ambulatory.    Nancy Padilla RN

## 2017-08-18 NOTE — MR AVS SNAPSHOT
After Visit Summary   8/18/2017    Yovana Enriquez    MRN: [id number]           Patient Information     Date Of Birth          1975        Visit Information        Provider Department      8/18/2017 2:00 PM BAY 8 INFUSION [facility]        Today's Diagnoses     Other systemic lupus erythematosus with other organ involvement (H)    -  1       Follow-ups after your visit        Your next 10 appointments already scheduled     Sep 15, 2017  1:30 PM CDT   Level 2 with BAY 10 INFUSION   [facility] ([facility]    [address] [place] [address] [phone]            Oct 02, 2017  6:00 PM CDT   LAB with BE LAB   [facility]    [address] [facility] [id number] [phone]           Patient must bring picture ID. Patient should be prepared to give a urine specimen  Please do not eat 10-12 hours before your appointment if you are coming in fasting for labs on lipids, cholesterol, or glucose (sugar). Pregnant women should follow their Care Team instructions. Water with medications is okay. Do not drink coffee or other fluids. If you have concerns about taking  your medications, please ask at office or if scheduling via [employer], send a message by clicking on Secure Messaging, Message Your Care Team.            Oct 09, 2017  1:20 PM CDT   Return Visit with Bradford Colvin MD   [facility]    [address] [facility] [id number] [phone]            Oct 13, 2017  1:30 PM CDT   Level 2 with [place] 3 INFUSION   [facility]    [address] [place] [address] [phone]              Who to contact     If you have questions or need follow up information about today's clinic visit or your schedule please contact [facility] directly at  [phone].  Normal or non-critical lab and imaging results will be communicated to you by AquaHydratehart, letter or phone within 4 business days after the clinic has received the results. If you do not hear from us within 7 days, please contact the clinic through Dennoot or phone. If you have a critical or abnormal lab result, we will notify you by phone as soon as possible.  Submit refill requests through iSpye or call your pharmacy and they will forward the refill request to us. Please allow 3 business days for your refill to be completed.          Additional Information About Your Visit        [email or web address] Information     iSpye gives you secure access to your electronic health record. If you see a primary care provider, you can also send messages to your care team and make appointments. If you have questions, please call your primary care clinic.  If you do not have a primary care provider, please call [phone] and they will assist you.      iSpye is an electronic gateway that provides easy, online access to your medical records. With iSpye, you can request a clinic appointment, read your test results, renew a prescription or communicate with your care team.     To access your existing account, please contact your [facility] or call [phone] for assistance.        Care EveryWhere ID     This is your Care EveryWhere ID. This could be used by other organizations to access your [place] medical records  [id number]        Your Vitals Were     Pulse Temperature Respirations Pulse Oximetry BMI (Body Mass Index)       89 97.2  F (36.2  C) (Oral) 16 99% 22.58 kg/m2        Blood Pressure from Last 3 Encounters:   08/18/17 124/85   08/07/17 130/82   07/20/17 128/79    Weight from Last 3 Encounters:   08/18/17 61.6 kg (135 lb 11.2 oz)   08/07/17 59.9 kg (132 lb)   07/17/17 59.9 kg (132 lb)              Today, you had the following     No orders found for display       Primary Care  Provider Office Phone # Fax #    Bradford Mario PA-C [phone]       [address] [place] [address]        Equal Access to Services     BINTA HURD : Hadii aad ku hadasho Soomaali, waaxda luqadaha, qaybta kaalmada adeegyada, waxpaula arroyon yris chongtwin [place]. So [facility] [phone].    ATENCIÓN: Si habla español, tiene a dexter disposición servicios gratuitos de asistencia lingüística. Llame al [phone].    We comply with applicable federal civil rights laws and [place] laws. We do not discriminate on the basis of race, color, national origin, age, disability sex, sexual orientation or gender identity.            Thank you!     Thank you for choosing [facility]  for your care. Our goal is always to provide you with excellent care. Hearing back from our patients is one way we can continue to improve our services. Please take a few minutes to complete the written survey that you may receive in the mail after your visit with us. Thank you!             Your Updated Medication List - Protect others around you: Learn how to safely use, store and throw away your medicines at www.disposemymeds.org.          This list is accurate as of: 8/18/17  4:13 PM.  Always use your most recent med list.                   Brand Name Dispense Instructions for use Diagnosis    albuterol 108 (90 BASE) MCG/ACT Inhaler    PROAIR HFA/PROVENTIL HFA/VENTOLIN HFA    1 Inhaler    Inhale 2 puffs into the lungs every 6 hours as needed for shortness of breath / dyspnea or wheezing    Acute bronchitis, unspecified organism       amLODIPine 5 MG tablet    NORVASC    90 tablet    Take 1 tablet (5 mg) by mouth daily    Raynaud's phenomenon without gangrene       azaTHIOprine 50 MG tablet    IMURAN    270 tablet    Take 3 tablets (150 mg) by mouth daily    Other systemic lupus erythematosus with other organ involvement (H)       azithromycin 250 MG tablet    ZITHROMAX    6 tablet    Two tablets first  day, then one tablet daily for four days.    Acute recurrent maxillary sinusitis       benzonatate 200 MG capsule    TESSALON    21 capsule    Take 1 capsule (200 mg) by mouth 3 times daily as needed for cough    Acute bronchitis, unspecified organism       blood glucose monitoring test strip    no brand specified    1 Month    Used for testing glucose 2-3 times daily    History of gestational diabetes mellitus, not currently pregnant       buPROPion 75 MG tablet    WELLBUTRIN    60 tablet    TAKE 1 TABLET (75 MG) BY MOUTH 2 TIMES DAILY    Anxiety       Calcium + D3 600-200 MG-UNIT Tabs      1 tablet daily        cevimeline 30 MG capsule    EVOXAC    180 capsule    Take 1 capsule (30 mg) by mouth 2 times daily    Other systemic lupus erythematosus with other organ involvement (H), Sjogren's syndrome with keratoconjunctivitis sicca (H)       diclofenac 1 % Gel topical gel    VOLTAREN    100 g    Apply 2-4 grams to knees or shoulders four times daily as needed using enclosed dosing card.    Fibromyalgia, SLE (systemic lupus erythematosus) (H)       FLOVENT IN           fluticasone 50 MCG/ACT spray    FLONASE    1 Bottle    Spray 1-2 sprays into both nostrils daily    Chronic rhinitis       guaiFENesin-codeine 100-10 MG/5ML Soln solution    ROBITUSSIN AC    120 mL    Take 10 mLs by mouth nightly as needed for cough    Acute bronchitis, unspecified organism       hydroxychloroquine 200 MG tablet    PLAQUENIL    180 tablet    Take 1 tablet (200 mg) by mouth 2 times daily    Other systemic lupus erythematosus with other organ involvement (H), High risk medication use       hyoscyamine 0.125 MG tablet    ANASPAZ/LEVSIN    40 tablet    Take 1-2 tablets (125-250 mcg) by mouth every 4 hours as needed for cramping    Irritable bowel syndrome, unspecified type       lidocaine visc 2% & diphenhydramine 12.5mg/5mL & maalox/mylanta w/simethicone (1:1:1 v/v/v) Susp compounding kit     100 mL    Take 5-10 mLs by mouth every 6 hours  as needed    Oral ulcer       losartan 25 MG tablet    COZAAR    90 tablet    Take 1 tablet (25 mg) by mouth daily    Hypertension goal BP (blood pressure) < 140/90       methylPREDNISolone 4 MG tablet    MEDROL DOSEPAK    21 tablet    Follow package instructions    Acute bronchitis, unspecified organism, Acute recurrent maxillary sinusitis       MICROLET LANCETS Misc     100 each    1 each daily.    Other abnormal glucose       MULTIVITAMIN PO      Take  by mouth.        nortriptyline 10 MG capsule    PAMELOR    90 capsule    Take 3 capsules (30 mg) by mouth At Bedtime    Numbness       predniSONE 5 MG tablet    DELTASONE    90 tablet    Take 1 tablet (5 mg) by mouth daily    Other systemic lupus erythematosus with other organ involvement (H)       pregabalin 150 MG capsule    LYRICA    270 capsule    Take 1 capsule (150 mg) by mouth 3 times daily . 3 month supply.    Fibromyalgia       ranitidine 300 MG tablet    ZANTAC    180 tablet    Take 1 tablet (300 mg) by mouth 2 times daily    Gastroesophageal reflux disease without esophagitis

## 2017-09-06 ENCOUNTER — TELEPHONE (OUTPATIENT)
Dept: PALLIATIVE MEDICINE | Facility: CLINIC | Age: 42
End: 2017-09-06

## 2017-09-06 DIAGNOSIS — M79.7 FIBROMYALGIA: ICD-10-CM

## 2017-09-06 RX ORDER — PREGABALIN 150 MG/1
150 CAPSULE ORAL 3 TIMES DAILY
Qty: 270 CAPSULE | Refills: 0 | Status: SHIPPED | OUTPATIENT
Start: 2017-09-06 | End: 2018-02-04

## 2017-09-06 NOTE — TELEPHONE ENCOUNTER
Please determine plan of care.  I last saw her in November, and wanted 3 mon followup  If stable, then I will do this refill and have her get refills from PCP, and she can see me prn.    Signed Prescriptions:                        Disp   Refills    pregabalin (LYRICA) 150 MG capsule         270 ca*0        Sig: Take 1 capsule (150 mg) by mouth 3 times daily . 3           month supply.  Authorizing Provider: TETE PANIAGUA MD  [facility]

## 2017-09-06 NOTE — TELEPHONE ENCOUNTER
[facility]    Tennille Peña RN-BSN  [facility] [place]

## 2017-09-06 NOTE — TELEPHONE ENCOUNTER
Information below was relayed to the patient.    AVNI CulverN, RN  Care Coordinator  [facility] [place]

## 2017-09-15 ENCOUNTER — INFUSION THERAPY VISIT (OUTPATIENT)
Dept: INFUSION THERAPY | Facility: CLINIC | Age: 42
End: 2017-09-15
Payer: COMMERCIAL

## 2017-09-15 VITALS
OXYGEN SATURATION: 97 % | WEIGHT: 137 LBS | TEMPERATURE: 97 F | HEART RATE: 85 BPM | SYSTOLIC BLOOD PRESSURE: 136 MMHG | RESPIRATION RATE: 16 BRPM | BODY MASS INDEX: 22.8 KG/M2 | DIASTOLIC BLOOD PRESSURE: 86 MMHG

## 2017-09-15 DIAGNOSIS — M32.19 OTHER SYSTEMIC LUPUS ERYTHEMATOSUS WITH OTHER ORGAN INVOLVEMENT (H): Primary | ICD-10-CM

## 2017-09-15 PROCEDURE — 99207 ZZC NO CHARGE LOS: CPT

## 2017-09-15 PROCEDURE — 96413 CHEMO IV INFUSION 1 HR: CPT | Performed by: INTERNAL MEDICINE

## 2017-09-15 RX ADMIN — Medication 250 ML: at 13:57

## 2017-09-15 NOTE — PROGRESS NOTES
"Infusion Nursing Note:  Yovana Enriquez presents today for Benlysta.    Patient seen by provider today: No   present during visit today: Not Applicable.    Note: N/A.    Intravenous Access:  Peripheral IV placed.    Treatment Conditions:  Rheumatology Infusion Checklist: PRIOR TO INFUSION OF BIOLOGICAL MEDICATIONS OR ANY OF THESE AS LISTED: Benlysta (benlimumab) \".rheumbiologicalchecklist\"    Prior to Infusion of biological medications or any of these as listed:    1. Elevated temperature, fever, chills, productive cough or abnormal vital signs, night sweats, coughing up blood or sputum, no appetite or abnormal vital signs : NO    2. Open wounds or new incisions: NO    3. Recent hospitalization: NO    4.  Recent surgeries:  NO    5. Any upcoming surgeries or dental procedures?:NO    6. Any current or recent bouts of illness or infection? On any antibiotics? : NO    7. Any new, sudden or worsening abdominal pain :NO    8. Vaccination within 4 weeks? Patient or someone in the household is scheduled to receive vaccination? No live virus vaccines prior to or during treatment :NO    9. Any nervous system diseases [i.e. multiple sclerosis, Guillain-[place], seizures, neurological  changes]: NO    10. Pregnant or breast feeding; or plans on pregnancy in the future: NO    11. Signs of worsening depression or suicidal ideations while taking benlysta:NO    12. New-onset medical symptoms: NO    13.  New cancer diagnosis or on chemotherapy or radiation NO    14.  Evaluate for any sign of active TB [Unexplained weight loss, Loss of appetite, Night sweats, Fever, Fatigue, Chills, Coughing for 3 weeks or longer, Hemoptysis (coughing up blood), Chest pain]: NO    **Note: If answered yes to any of the above, hold the infusion and contact ordering rheumatologist or on-call rheumatologist.   .        Post Infusion Assessment:  Patient tolerated infusion without incident.  Site patent and intact, free from redness, edema " or discomfort.  No evidence of extravasations.  Access discontinued per protocol.  Rheumatology Post Infusion: POST-INFUSION OF BIOLOGICAL MEDICATION:    Reviewed with patient. Inform patient if any fever, chills or signs of infection, new symptoms, abdominal pain, heart palpitations, shortness of breath, reaction, weakness, neurological changes, seek medical attention immediately and should not receive infusions. No live virus vaccines prior to or during treatment or up to 6 months post infusion. If the patient has an upcoming procedure or surgery, this should be discussed with the rheumatologist and surgeon or provider..      Discharge Plan:   Discharge instructions reviewed with: Patient.  Patient and/or family verbalized understanding of discharge instructions and all questions answered.  Patient discharged in stable condition accompanied by: self.  Departure Mode: Ambulatory.    Nancy Padilla RN

## 2017-09-15 NOTE — MR AVS SNAPSHOT
After Visit Summary   9/15/2017    Yovana Enriquez    MRN: [id number]           Patient Information     Date Of Birth          1975        Visit Information        Provider Department      9/15/2017 1:30 PM [place] [facility]        Today's Diagnoses     Other systemic lupus erythematosus with other organ involvement (H)    -  1       Follow-ups after your visit        Your next 10 appointments already scheduled     Oct 02, 2017  6:00 PM CDT   [facility]    [address] [facility]  [place] [address]   [phone]           Patient must bring picture ID. Patient should be prepared to give a urine specimen  Please do not eat 10-12 hours before your appointment if you are coming in fasting for labs on lipids, cholesterol, or glucose (sugar). Pregnant women should follow their Care Team instructions. Water with medications is okay. Do not drink coffee or other fluids. If you have concerns about taking  your medications, please ask at office or if scheduling via [employer], send a message by clicking on Secure Messaging, Message Your Care Team.            Oct 09, 2017  1:20 PM CDT   Return Visit with Bradford Colvin MD   [facility] [place] ([facility])    [address] [facility]  [place] [id number] [phone]            Oct 13, 2017  1:30 PM CDT   Level 2 with [address] ([facility])    [address] [place] [address] [phone]            Jan 03, 2018  7:00 AM CST   (Arrive by 6:45 AM)   New Patient Visit with Yuval Griffiths PA-C   [facility] ([email or web address])    [address] [place] [address]  4th Floor  [facility] [address] [phone]              Who to contact     If you have questions or need follow up information about today's clinic visit or your schedule  please contact [facility] directly at [phone].  Normal or non-critical lab and imaging results will be communicated to you by SVTC Technologieshart, letter or phone within 4 business days after the clinic has received the results. If you do not hear from us within 7 days, please contact the clinic through [employer] or phone. If you have a critical or abnormal lab result, we will notify you by phone as soon as possible.  Submit refill requests through [employer] or call your pharmacy and they will forward the refill request to us. Please allow 3 business days for your refill to be completed.          Additional Information About Your Visit        [employer] Information     [employer] gives you secure access to your electronic health record. If you see a primary care provider, you can also send messages to your care team and make appointments. If you have questions, please call your primary care clinic.  If you do not have a primary care provider, please call [phone] and they will assist you.      [employer] is an electronic gateway that provides easy, online access to your medical records. With [employer], you can request a clinic appointment, read your test results, renew a prescription or communicate with your care team.     To access your existing account, please contact your [facility] or call [phone] for assistance.        Care EveryWhere ID     This is your Care EveryWhere ID. This could be used by other organizations to access your [place] medical records  [id number]        Your Vitals Were     Pulse Temperature Respirations Pulse Oximetry BMI (Body Mass Index)       85 97  F (36.1  C) (Oral) 16 97% 22.8 kg/m2        Blood Pressure from Last 3 Encounters:   09/15/17 136/86   08/18/17 124/85   08/07/17 130/82    Weight from Last 3 Encounters:   09/15/17 62.1 kg (137 lb)   08/18/17 61.6 kg (135 lb 11.2 oz)   08/07/17 59.9 kg (132 lb)              Today, you had the following      No orders found for display       Primary Care Provider Office Phone # Fax #    Bradford Shihanna Mario PA-C [phone]       [address] [place] [address]        Equal Access to Services     BINTA HURD : Hadii aad ku hadjoceo Soomaali, waaxda luqadaha, qaybta kaalmada adeegyada, waxpaula arroyon adeangle madrigal lasenthilallan . So [facility] [phone].    ATENCIÓN: Si habla español, tiene a dexter disposición servicios gratuitos de asistencia lingüística. Ll[facility] [phone].    We comply with applicable federal civil rights laws and [place] laws. We do not discriminate on the basis of race, color, national origin, age, disability sex, sexual orientation or gender identity.            Thank you!     Thank you for choosing [facility]  for your care. Our goal is always to provide you with excellent care. Hearing back from our patients is one way we can continue to improve our services. Please take a few minutes to complete the written survey that you may receive in the mail after your visit with us. Thank you!             Your Updated Medication List - Protect others around you: Learn how to safely use, store and throw away your medicines at www.disposemymeds.org.          This list is accurate as of: 9/15/17  3:56 PM.  Always use your most recent med list.                   Brand Name Dispense Instructions for use Diagnosis    albuterol 108 (90 BASE) MCG/ACT Inhaler    PROAIR HFA/PROVENTIL HFA/VENTOLIN HFA    1 Inhaler    Inhale 2 puffs into the lungs every 6 hours as needed for shortness of breath / dyspnea or wheezing    Acute bronchitis, unspecified organism       amLODIPine 5 MG tablet    NORVASC    90 tablet    Take 1 tablet (5 mg) by mouth daily    Raynaud's phenomenon without gangrene       azaTHIOprine 50 MG tablet    IMURAN    270 tablet    Take 3 tablets (150 mg) by mouth daily    Other systemic lupus erythematosus with other organ involvement (H)       azithromycin 250 MG  tablet    ZITHROMAX    6 tablet    Two tablets first day, then one tablet daily for four days.    Acute recurrent maxillary sinusitis       benzonatate 200 MG capsule    TESSALON    21 capsule    Take 1 capsule (200 mg) by mouth 3 times daily as needed for cough    Acute bronchitis, unspecified organism       blood glucose monitoring test strip    no brand specified    1 Month    Used for testing glucose 2-3 times daily    History of gestational diabetes mellitus, not currently pregnant       buPROPion 75 MG tablet    WELLBUTRIN    60 tablet    TAKE 1 TABLET (75 MG) BY MOUTH 2 TIMES DAILY    Anxiety       Calcium + D3 600-200 MG-UNIT Tabs      1 tablet daily        cevimeline 30 MG capsule    EVOXAC    180 capsule    Take 1 capsule (30 mg) by mouth 2 times daily    Other systemic lupus erythematosus with other organ involvement (H), Sjogren's syndrome with keratoconjunctivitis sicca (H)       diclofenac 1 % Gel topical gel    VOLTAREN    100 g    Apply 2-4 grams to knees or shoulders four times daily as needed using enclosed dosing card.    Fibromyalgia, SLE (systemic lupus erythematosus) (H)       FLOVENT IN           fluticasone 50 MCG/ACT spray    FLONASE    1 Bottle    Spray 1-2 sprays into both nostrils daily    Chronic rhinitis       guaiFENesin-codeine 100-10 MG/5ML Soln solution    ROBITUSSIN AC    120 mL    Take 10 mLs by mouth nightly as needed for cough    Acute bronchitis, unspecified organism       hydroxychloroquine 200 MG tablet    PLAQUENIL    180 tablet    Take 1 tablet (200 mg) by mouth 2 times daily    Other systemic lupus erythematosus with other organ involvement (H), High risk medication use       hyoscyamine 0.125 MG tablet    ANASPAZ/LEVSIN    40 tablet    Take 1-2 tablets (125-250 mcg) by mouth every 4 hours as needed for cramping    Irritable bowel syndrome, unspecified type       lidocaine visc 2% & diphenhydramine 12.5mg/5mL & maalox/mylanta w/simethicone (1:1:1 v/v/v) Susp compounding  kit     100 mL    Take 5-10 mLs by mouth every 6 hours as needed    Oral ulcer       losartan 25 MG tablet    COZAAR    90 tablet    Take 1 tablet (25 mg) by mouth daily    Hypertension goal BP (blood pressure) < 140/90       methylPREDNISolone 4 MG tablet    MEDROL DOSEPAK    21 tablet    Follow package instructions    Acute bronchitis, unspecified organism, Acute recurrent maxillary sinusitis       MICROLET LANCETS Misc     100 each    1 each daily.    Other abnormal glucose       MULTIVITAMIN PO      Take  by mouth.        nortriptyline 10 MG capsule    PAMELOR    90 capsule    Take 3 capsules (30 mg) by mouth At Bedtime    Numbness       predniSONE 5 MG tablet    DELTASONE    90 tablet    Take 1 tablet (5 mg) by mouth daily    Other systemic lupus erythematosus with other organ involvement (H)       pregabalin 150 MG capsule    LYRICA    270 capsule    Take 1 capsule (150 mg) by mouth 3 times daily . 3 month supply.    Fibromyalgia       ranitidine 300 MG tablet    ZANTAC    180 tablet    Take 1 tablet (300 mg) by mouth 2 times daily    Gastroesophageal reflux disease without esophagitis

## 2017-09-20 ENCOUNTER — TELEPHONE (OUTPATIENT)
Dept: GASTROENTEROLOGY | Facility: CLINIC | Age: 42
End: 2017-09-20

## 2017-09-20 NOTE — TELEPHONE ENCOUNTER
----- Message from Raheel Mathew sent at 9/15/2017  9:47 AM CDT -----  Regarding: Sent message below - Pt didn't hear back yet so per Sera, sending a request to you  8/28/17 1:55 pm inbasket to Surgery [facility] -1E - IBD and Lupus Dx - symptoms getting worse -  appointment scheduled on 1/3/18 per Pt - Can she get in sooner - please call her at [phone]  Raheel      Thank You  Raheel - call center

## 2017-10-02 DIAGNOSIS — M32.19 OTHER SYSTEMIC LUPUS ERYTHEMATOSUS WITH OTHER ORGAN INVOLVEMENT (H): ICD-10-CM

## 2017-10-02 LAB
ALBUMIN UR-MCNC: NEGATIVE MG/DL
APPEARANCE UR: CLEAR
BASOPHILS # BLD AUTO: 0 10E9/L (ref 0–0.2)
BASOPHILS NFR BLD AUTO: 0.5 %
BILIRUB UR QL STRIP: NEGATIVE
COLOR UR AUTO: YELLOW
DIFFERENTIAL METHOD BLD: ABNORMAL
EOSINOPHIL # BLD AUTO: 0 10E9/L (ref 0–0.7)
EOSINOPHIL NFR BLD AUTO: 0.3 %
ERYTHROCYTE [DISTWIDTH] IN BLOOD BY AUTOMATED COUNT: 11.8 % (ref 10–15)
ERYTHROCYTE [SEDIMENTATION RATE] IN BLOOD BY WESTERGREN METHOD: 4 MM/H (ref 0–20)
GLUCOSE UR STRIP-MCNC: NEGATIVE MG/DL
HCT VFR BLD AUTO: 37.8 % (ref 35–47)
HGB BLD-MCNC: 13 G/DL (ref 11.7–15.7)
HGB UR QL STRIP: NEGATIVE
KETONES UR STRIP-MCNC: NEGATIVE MG/DL
LEUKOCYTE ESTERASE UR QL STRIP: NEGATIVE
LYMPHOCYTES # BLD AUTO: 0.9 10E9/L (ref 0.8–5.3)
LYMPHOCYTES NFR BLD AUTO: 13.4 %
MCH RBC QN AUTO: 34.9 PG (ref 26.5–33)
MCHC RBC AUTO-ENTMCNC: 34.4 G/DL (ref 31.5–36.5)
MCV RBC AUTO: 102 FL (ref 78–100)
MONOCYTES # BLD AUTO: 0.5 10E9/L (ref 0–1.3)
MONOCYTES NFR BLD AUTO: 7.3 %
NEUTROPHILS # BLD AUTO: 5.2 10E9/L (ref 1.6–8.3)
NEUTROPHILS NFR BLD AUTO: 78.5 %
NITRATE UR QL: NEGATIVE
NON-SQ EPI CELLS #/AREA URNS LPF: NORMAL /LPF
PH UR STRIP: 6 PH (ref 5–7)
PLATELET # BLD AUTO: 239 10E9/L (ref 150–450)
RBC # BLD AUTO: 3.72 10E12/L (ref 3.8–5.2)
RBC #/AREA URNS AUTO: NORMAL /HPF
SOURCE: NORMAL
SP GR UR STRIP: 1.01 (ref 1–1.03)
UROBILINOGEN UR STRIP-ACNC: 0.2 EU/DL (ref 0.2–1)
WBC # BLD AUTO: 6.6 10E9/L (ref 4–11)
WBC #/AREA URNS AUTO: NORMAL /HPF

## 2017-10-02 PROCEDURE — 86225 DNA ANTIBODY NATIVE: CPT | Performed by: INTERNAL MEDICINE

## 2017-10-02 PROCEDURE — 36415 COLL VENOUS BLD VENIPUNCTURE: CPT | Performed by: INTERNAL MEDICINE

## 2017-10-02 PROCEDURE — 85652 RBC SED RATE AUTOMATED: CPT | Performed by: INTERNAL MEDICINE

## 2017-10-02 PROCEDURE — 84156 ASSAY OF PROTEIN URINE: CPT | Performed by: INTERNAL MEDICINE

## 2017-10-02 PROCEDURE — 82550 ASSAY OF CK (CPK): CPT | Performed by: INTERNAL MEDICINE

## 2017-10-02 PROCEDURE — 82306 VITAMIN D 25 HYDROXY: CPT | Performed by: INTERNAL MEDICINE

## 2017-10-02 PROCEDURE — 86160 COMPLEMENT ANTIGEN: CPT | Performed by: INTERNAL MEDICINE

## 2017-10-02 PROCEDURE — 80053 COMPREHEN METABOLIC PANEL: CPT | Performed by: INTERNAL MEDICINE

## 2017-10-02 PROCEDURE — 81001 URINALYSIS AUTO W/SCOPE: CPT | Performed by: INTERNAL MEDICINE

## 2017-10-02 PROCEDURE — 86140 C-REACTIVE PROTEIN: CPT | Performed by: INTERNAL MEDICINE

## 2017-10-02 PROCEDURE — 85025 COMPLETE CBC W/AUTO DIFF WBC: CPT | Performed by: INTERNAL MEDICINE

## 2017-10-03 LAB
ALBUMIN SERPL-MCNC: 3.8 G/DL (ref 3.4–5)
ALP SERPL-CCNC: 60 U/L (ref 40–150)
ALT SERPL W P-5'-P-CCNC: 21 U/L (ref 0–50)
ANION GAP SERPL CALCULATED.3IONS-SCNC: 8 MMOL/L (ref 3–14)
AST SERPL W P-5'-P-CCNC: 22 U/L (ref 0–45)
BILIRUB SERPL-MCNC: 0.2 MG/DL (ref 0.2–1.3)
BUN SERPL-MCNC: 10 MG/DL (ref 7–30)
CALCIUM SERPL-MCNC: 8.7 MG/DL (ref 8.5–10.1)
CHLORIDE SERPL-SCNC: 103 MMOL/L (ref 94–109)
CK SERPL-CCNC: 134 U/L (ref 30–225)
CO2 SERPL-SCNC: 27 MMOL/L (ref 20–32)
CREAT SERPL-MCNC: 0.88 MG/DL (ref 0.52–1.04)
CREAT UR-MCNC: 48 MG/DL
CRP SERPL-MCNC: <2.9 MG/L (ref 0–8)
DEPRECATED CALCIDIOL+CALCIFEROL SERPL-MC: 37 UG/L (ref 20–75)
GFR SERPL CREATININE-BSD FRML MDRD: 70 ML/MIN/1.7M2
GLUCOSE SERPL-MCNC: 112 MG/DL (ref 70–99)
POTASSIUM SERPL-SCNC: 3.7 MMOL/L (ref 3.4–5.3)
PROT SERPL-MCNC: 6.8 G/DL (ref 6.8–8.8)
PROT UR-MCNC: 0.07 G/L
PROT/CREAT 24H UR: 0.14 G/G CR (ref 0–0.2)
SODIUM SERPL-SCNC: 138 MMOL/L (ref 133–144)

## 2017-10-04 LAB
C3 SERPL-MCNC: 75 MG/DL (ref 76–169)
C4 SERPL-MCNC: 15 MG/DL (ref 15–50)
DSDNA AB SER-ACNC: <1 IU/ML

## 2017-10-12 NOTE — TELEPHONE ENCOUNTER
APPT INFORMATION    Date & Time: 10/18/17 1PM    Reason for Appt: IBS Sx's    Referring Name/Clinic: Fabiano [facility],  [facility] [place]      Yes / No COMMENT / NOTES DATE & ACTION   Patient Contacted? No Pt was referred. 10/12/17     RECORDS CLINIC NAME  (use N/A if no records ) DATE & ACTION RECEIVED RECS & IMG? Y/N   (may include other helpful notes)   External Clinics: N/A           Internal Clinics:  [facility] [place] Mario PA-C  Recs and referral in Epic / Images in PACS   Colonoscopy & Upper EGD 2/20/15     [facility] [place] Rivera MD   Recs are in [facility] Tegan [place]OVER CNP   Recs are in Epic

## 2017-10-13 ENCOUNTER — INFUSION THERAPY VISIT (OUTPATIENT)
Dept: INFUSION THERAPY | Facility: CLINIC | Age: 42
End: 2017-10-13
Payer: COMMERCIAL

## 2017-10-13 VITALS
TEMPERATURE: 97.5 F | DIASTOLIC BLOOD PRESSURE: 64 MMHG | WEIGHT: 139.8 LBS | BODY MASS INDEX: 23.26 KG/M2 | RESPIRATION RATE: 18 BRPM | SYSTOLIC BLOOD PRESSURE: 113 MMHG | HEART RATE: 89 BPM

## 2017-10-13 DIAGNOSIS — M32.19 OTHER SYSTEMIC LUPUS ERYTHEMATOSUS WITH OTHER ORGAN INVOLVEMENT (H): Primary | ICD-10-CM

## 2017-10-13 PROCEDURE — 96413 CHEMO IV INFUSION 1 HR: CPT | Performed by: NURSE PRACTITIONER

## 2017-10-13 PROCEDURE — 99207 ZZC NO CHARGE NURSE ONLY: CPT

## 2017-10-13 RX ADMIN — Medication 250 ML: at 13:50

## 2017-10-13 ASSESSMENT — ENCOUNTER SYMPTOMS
EYE PAIN: 0
CHILLS: 1
DEPRESSION: 0
STIFFNESS: 1
RECTAL PAIN: 1
DIARRHEA: 1
TASTE DISTURBANCE: 0
HALLUCINATIONS: 0
FEVER: 0
NAUSEA: 1
FATIGUE: 1
JAUNDICE: 0
POOR WOUND HEALING: 0
INCREASED ENERGY: 1
DECREASED APPETITE: 1
NECK PAIN: 1
MYALGIAS: 1
HEARTBURN: 1
JOINT SWELLING: 0
ARTHRALGIAS: 1
ALTERED TEMPERATURE REGULATION: 1
EYE REDNESS: 0
SINUS PAIN: 0
WEIGHT GAIN: 1
DECREASED CONCENTRATION: 0
VOMITING: 1
BOWEL INCONTINENCE: 0
SINUS CONGESTION: 0
ABDOMINAL PAIN: 1
RECTAL BLEEDING: 0
POLYPHAGIA: 1
TROUBLE SWALLOWING: 0
EYE IRRITATION: 0
CONSTIPATION: 1
MUSCLE WEAKNESS: 1
NIGHT SWEATS: 1
DOUBLE VISION: 0
SKIN CHANGES: 0
SMELL DISTURBANCE: 0
NERVOUS/ANXIOUS: 0
BLOATING: 1
PANIC: 0
SORE THROAT: 0
WEIGHT LOSS: 1
HOARSE VOICE: 0
EYE WATERING: 0
POLYDIPSIA: 1
NAIL CHANGES: 0
BLOOD IN STOOL: 0
INSOMNIA: 1
BACK PAIN: 1
NECK MASS: 0
MUSCLE CRAMPS: 0

## 2017-10-13 ASSESSMENT — PAIN SCALES - GENERAL: PAINLEVEL: NO PAIN (1)

## 2017-10-13 NOTE — PROGRESS NOTES
"Infusion Nursing Note:  Yovana Enriquez presents today for benlysta  .    Patient seen by provider today: No   present during visit today: Not Applicable.    Note: N/A.    Intravenous Access:  Peripheral IV placed.    Treatment Conditions:  Rheumatology Infusion Checklist: PRIOR TO INFUSION OF BIOLOGICAL MEDICATIONS OR ANY OF THESE AS LISTED: Benlysta (benlimumab) \".rheumbiologicalchecklist\"    Prior to Infusion of biological medications or any of these as listed:    1. Elevated temperature, fever, chills, productive cough or abnormal vital signs, night sweats, coughing up blood or sputum, no appetite or abnormal vital signs : NO    2. Open wounds or new incisions: NO    3. Recent hospitalization: NO    4.  Recent surgeries:  NO    5. Any upcoming surgeries or dental procedures?:NO    6. Any current or recent bouts of illness or infection? On any antibiotics? : NO    7. Any new, sudden or worsening abdominal pain :NO    8. Vaccination within 4 weeks? Patient or someone in the household is scheduled to receive vaccination? No live virus vaccines prior to or during treatment :NO    9. Any nervous system diseases [i.e. multiple sclerosis, Guillain-B[place]lehem, seizures, neurological  changes]: NO    10. Pregnant or breast feeding; or plans on pregnancy in the future: NO    11. Signs of worsening depression or suicidal ideations while taking benlysta:NO    12. New-onset medical symptoms: NO    13.  New cancer diagnosis or on chemotherapy or radiation NO    14.  Evaluate for any sign of active TB [Unexplained weight loss, Loss of appetite, Night sweats, Fever, Fatigue, Chills, Coughing for 3 weeks or longer, Hemoptysis (coughing up blood), Chest pain]: NO    **Note: If answered yes to any of the above, hold the infusion and contact ordering rheumatologist or on-call rheumatologist.   .        Post Infusion Assessment:  Patient tolerated infusion without incident.  Site patent and intact, free from redness, edema " or discomfort.  No evidence of extravasations.  Access discontinued per protocol.  Rheumatology Post Infusion: POST-INFUSION OF BIOLOGICAL MEDICATION:    Reviewed with patient.  Given biologic medication or medication hand-out. Inform patient if any fever, chills or signs of infection, new symptoms, abdominal pain, heart palpitations, shortness of breath, reaction, weakness, neurological changes, seek medical attention immediately and should not receive infusions. No live virus vaccines prior to or during treatment or up to 6 months post infusion. If the patient has an upcoming procedure or surgery, this should be discussed with the rheumatologist and surgeon or provider..      Discharge Plan:   Patient and/or family verbalized understanding of discharge instructions and all questions answered.    Catalina Cutler RN

## 2017-10-13 NOTE — MR AVS SNAPSHOT
After Visit Summary   10/13/2017    Yovana Enriquez    MRN: [id number]           Patient Information     Date Of Birth          1975        Visit Information        Provider Department      10/13/2017 1:30 PM BAY 3 INFUSION [facility]        Today's Diagnoses     Other systemic lupus erythematosus with other organ involvement (H)    -  1       Follow-ups after your visit        Your next 10 appointments already scheduled     Oct 18, 2017  1:00 PM CDT   (Arrive by 12:45 PM)   New Patient Visit with Inez Sawyer MD   [facility] ([facility])    [address] [place] [address]   [phone]            Nov 06, 2017  3:40 PM CST   Return Visit with Bradford Colvin MD   [facility] ([facility])    [address] [place] [id number] [phone]            Nov 10, 2017  1:30 PM CST   Level 2 with BAY 6 INFUSION   [facility] ([facility])    [address] [place] [address] [phone]            Dec 08, 2017  1:30 PM CST   Level 2 with BAY 3 INFUSION   [facility] ([facility])    [address] [place] [address] [phone]              Who to contact     If you have questions or need follow up information about today's clinic visit or your schedule please contact [facility] directly at [phone].  Normal or non-critical lab and imaging results will be communicated to you by MyChart, letter or phone within 4 business days after the clinic has received the results. If you do not hear from us within 7 days, please contact the clinic through MyChart or phone. If you have a critical or abnormal lab result, we will notify you by phone as soon as possible.  Submit refill requests through [employer] or call your pharmacy and they will forward the  refill request to us. Please allow 3 business days for your refill to be completed.          Additional Information About Your Visit        Dunamuhar[employer] Information     [employer] gives you secure access to your electronic health record. If you see a primary care provider, you can also send messages to your care team and make appointments. If you have questions, please call your primary care clinic.  If you do not have a primary care provider, please call [phone] and they will assist you.      [employer] is an electronic gateway that provides easy, online access to your medical records. With [employer], you can request a clinic appointment, read your test results, renew a prescription or communicate with your care team.     To access your existing account, please contact your [facility] or call [phone] for assistance.        Care EveryWhere ID     This is your Care EveryWhere ID. This could be used by other organizations to access your [place] medical records  [id number]        Your Vitals Were     Pulse Temperature Respirations BMI (Body Mass Index)          89 97.5  F (36.4  C) (Oral) 18 23.26 kg/m2         Blood Pressure from Last 3 Encounters:   10/13/17 113/64   09/15/17 136/86   08/18/17 124/85    Weight from Last 3 Encounters:   10/13/17 63.4 kg (139 lb 12.8 oz)   09/15/17 62.1 kg (137 lb)   08/18/17 61.6 kg (135 lb 11.2 oz)              Today, you had the following     No orders found for display       Primary Care Provider Office Phone # Fax #    Bradford Mario PA-C [phone]       [address]  [place] [address]        Equal Access to Services     MARITN [facility] CenterNEMESIO : Hadii aad ku hadasho Soomaali, waaxda luqadaha, qaybta kaalmada adeangleyatiburcio, rosy [place]. So [facility] [phone].    ATENCIÓN: Si habla español, tiene a dexter disposición servicios gratuitos de asistencia lingüística. Llame al [phone].    We comply with applicable  federal civil rights laws and [place] laws. We do not discriminate on the basis of race, color, national origin, age, disability, sex, sexual orientation, or gender identity.            Thank you!     Thank you for choosing [facility]  for your care. Our goal is always to provide you with excellent care. Hearing back from our patients is one way we can continue to improve our services. Please take a few minutes to complete the written survey that you may receive in the mail after your visit with us. Thank you!             Your Updated Medication List - Protect others around you: Learn how to safely use, store and throw away your medicines at www.disposemymeds.org.          This list is accurate as of: 10/13/17  3:17 PM.  Always use your most recent med list.                   Brand Name Dispense Instructions for use Diagnosis    albuterol 108 (90 BASE) MCG/ACT Inhaler    PROAIR HFA/PROVENTIL HFA/VENTOLIN HFA    1 Inhaler    Inhale 2 puffs into the lungs every 6 hours as needed for shortness of breath / dyspnea or wheezing    Acute bronchitis, unspecified organism       amLODIPine 5 MG tablet    NORVASC    90 tablet    Take 1 tablet (5 mg) by mouth daily    Raynaud's phenomenon without gangrene       azaTHIOprine 50 MG tablet    IMURAN    270 tablet    Take 3 tablets (150 mg) by mouth daily    Other systemic lupus erythematosus with other organ involvement (H)       benzonatate 200 MG capsule    TESSALON    21 capsule    Take 1 capsule (200 mg) by mouth 3 times daily as needed for cough    Acute bronchitis, unspecified organism       blood glucose monitoring test strip    no brand specified    1 Month    Used for testing glucose 2-3 times daily    History of gestational diabetes mellitus, not currently pregnant       buPROPion 75 MG tablet    WELLBUTRIN    60 tablet    TAKE 1 TABLET BY MOUTH 2 TIMES DAILY    Anxiety       Calcium + D3 600-200 MG-UNIT Tabs      1 tablet daily        cevimeline 30  MG capsule    EVOXAC    180 capsule    Take 1 capsule (30 mg) by mouth 2 times daily    Other systemic lupus erythematosus with other organ involvement (H), Sjogren's syndrome with keratoconjunctivitis sicca (H)       diclofenac 1 % Gel topical gel    VOLTAREN    100 g    Apply 2-4 grams to knees or shoulders four times daily as needed using enclosed dosing card.    Fibromyalgia, SLE (systemic lupus erythematosus) (H)       FIRST-MOUTHWASH BLM MT Susp compounding kit     100 mL    Take 5-10 mLs by mouth every 6 hours as needed    Oral ulcer       FLOVENT IN           fluticasone 50 MCG/ACT spray    FLONASE    1 Bottle    Spray 1-2 sprays into both nostrils daily    Chronic rhinitis       guaiFENesin-codeine 100-10 MG/5ML Soln solution    ROBITUSSIN AC    120 mL    Take 10 mLs by mouth nightly as needed for cough    Acute bronchitis, unspecified organism       hydroxychloroquine 200 MG tablet    PLAQUENIL    180 tablet    Take 1 tablet (200 mg) by mouth 2 times daily    Other systemic lupus erythematosus with other organ involvement (H), High risk medication use       hyoscyamine 0.125 MG tablet    ANASPAZ/LEVSIN    40 tablet    Take 1-2 tablets (125-250 mcg) by mouth every 4 hours as needed for cramping    Irritable bowel syndrome, unspecified type       losartan 25 MG tablet    COZAAR    90 tablet    Take 1 tablet (25 mg) by mouth daily    Hypertension goal BP (blood pressure) < 140/90       MICROLET LANCETS Misc     100 each    1 each daily.    Other abnormal glucose       MULTIVITAMIN PO      Take  by mouth.        nortriptyline 10 MG capsule    PAMELOR    90 capsule    TAKE 3 CAPSULES BY MOUTH AT BEDTIME    Numbness       predniSONE 5 MG tablet    DELTASONE    90 tablet    Take 1 tablet (5 mg) by mouth daily    Other systemic lupus erythematosus with other organ involvement (H)       pregabalin 150 MG capsule    LYRICA    270 capsule    Take 1 capsule (150 mg) by mouth 3 times daily . 3 month supply.     Fibromyalgia       ranitidine 300 MG tablet    ZANTAC    180 tablet    Take 1 tablet (300 mg) by mouth 2 times daily    Gastroesophageal reflux disease without esophagitis

## 2017-10-18 ENCOUNTER — PRE VISIT (OUTPATIENT)
Dept: GASTROENTEROLOGY | Facility: CLINIC | Age: 42
End: 2017-10-18

## 2017-10-18 ENCOUNTER — OFFICE VISIT (OUTPATIENT)
Dept: GASTROENTEROLOGY | Facility: CLINIC | Age: 42
End: 2017-10-18

## 2017-10-18 VITALS
WEIGHT: 139.3 LBS | OXYGEN SATURATION: 100 % | DIASTOLIC BLOOD PRESSURE: 71 MMHG | HEART RATE: 99 BPM | BODY MASS INDEX: 23.18 KG/M2 | TEMPERATURE: 98.6 F | SYSTOLIC BLOOD PRESSURE: 112 MMHG

## 2017-10-18 DIAGNOSIS — K59.04 CHRONIC IDIOPATHIC CONSTIPATION: ICD-10-CM

## 2017-10-18 DIAGNOSIS — K59.04 CHRONIC IDIOPATHIC CONSTIPATION: Primary | ICD-10-CM

## 2017-10-18 LAB
T4 FREE SERPL-MCNC: 1.24 NG/DL (ref 0.76–1.46)
TSH SERPL DL<=0.005 MIU/L-ACNC: 0.33 MU/L (ref 0.4–4)

## 2017-10-18 ASSESSMENT — PAIN SCALES - GENERAL: PAINLEVEL: NO PAIN (0)

## 2017-10-18 NOTE — MR AVS SNAPSHOT
After Visit Summary   10/18/2017    Yovana Enriquez    MRN: [id number]           Patient Information     Date Of Birth          1975        Visit Information        Provider Department      10/18/2017 1:00 PM Inez Sawyer MD [facility]        Today's Diagnoses     Chronic idiopathic constipation    -  1      Care Instructions    Thank you for coming today; it was a pleasure meeting you!    As discussed, I suspect you have pelvic floor dysfunction (aka pelvic dyssynergia).  I recommend the following:    ---Pelvic floor clinic consultation for anorectal manometry with possible biofeedback pending results of manometry testing  ---Discuss with your other providers regarding possibly changing the nortriptyline and amlodipine, since they can be constipating  ---Miralax daily (titrate to dose needed to achieve 1-2 BMs per day). Please send me a [employer] message on how you're doing in the next 2 weeks. If unwell with persistent constipation, will consider prescription anti-constipation medication.   ---Continue FODMAP diet for possible overlap IBS-C   ---Check TSH/free T4 today    Please return for follow up in 2-3 months. Keep me posted on [employer].            Follow-ups after your visit        Follow-up notes from your care team     Return in about 3 months (around 1/18/2018).      Your next 10 appointments already scheduled     Oct 18, 2017  2:30 PM CDT   LAB with [facility] ([facility])    [address] [place] [address] [phone]           Patient must bring picture ID. Patient should be prepared to give a urine specimen  Please do not eat 10-12 hours before your appointment if you are coming in fasting for labs on lipids, cholesterol, or glucose (sugar). Pregnant women should follow their Care Team instructions. Water with medications is okay. Do not drink coffee or other fluids. If you have  concerns about taking  your medications, please ask at office or if scheduling via Storyz, send a message by clicking on Secure Messaging, Message Your Care Team.            Nov 06, 2017  3:40 PM CST   Return Visit with Bradford Colvin MD   [facility] ([facility])    [address]  [place] [address]   [phone]            Nov 10, 2017  1:30 PM CST   Level 2 with BAY 6 INFUSION   [facility] ([facility])    [address] [place] [id number] [phone]            Dec 08, 2017  1:30 PM CST   Level 2 with BAY 3 INFUSION   [facility] ([facility])    [address] [place] [address] [phone]            Elias 10, 2018  1:40 PM CST   (Arrive by 1:25 PM)   Return Visit with Inez Sawyer MD   [facility] ([facility])    [address] [place] [address] [phone]              Future tests that were ordered for you today     Open Future Orders        Priority Expected Expires Ordered    TSH with free T4 reflex Routine 10/18/2017 11/17/2017 10/18/2017            Who to contact     Please call your clinic at [phone] to:    Ask questions about your health    Make or cancel appointments    Discuss your medicines    Learn about your test results    Speak to your doctor   If you have compliments or concerns about an experience at your clinic, or if you wish to file a complaint, please contact [facility] Patient Relations at [phone] or email us at [email or web address]         Additional Information About Your Visit        [employer] Experience[employer] Secure [employer] Information     [employer] gives you secure access to your electronic health record. If you see a primary care provider, you can also send messages to your care team and make appointments. If you have questions, please call your  primary care clinic.  If you do not have a primary care provider, please call [phone] and they will assist you.      [employer] is an electronic gateway that provides easy, online access to your medical records. With [employer], you can request a clinic appointment, read your test results, renew a prescription or communicate with your care team.     To access your existing account, please contact your [facility] or call [phone] for assistance.        Care EveryWhere ID     This is your Care EveryWhere ID. This could be used by other organizations to access your [facility] medical records  [id number]        Your Vitals Were     Pulse Temperature Pulse Oximetry BMI (Body Mass Index)          99 98.6  F (37  C) (Oral) 100% 23.18 kg/m2         Blood Pressure from Last 3 Encounters:   10/18/17 112/71   10/13/17 113/64   09/15/17 136/86    Weight from Last 3 Encounters:   10/18/17 63.2 kg (139 lb 4.8 oz)   10/13/17 63.4 kg (139 lb 12.8 oz)   09/15/17 62.1 kg (137 lb)               Primary Care Provider Office Phone # Fax #    Bradford Mario PA-C [phone]       [address] [place] [place] [address]        Equal Access to Services     BINTA HURD : Hadii aad ku hadasho Soomaali, waaxda luqadaha, qaybta kaalmada adeegyada, waxay idiin hayaan adeeg kharamaximus laparvez . So [facility] [phone].    ATENCIÓN: Si habla español, tiene a dexter disposición servicios gratuitos de asistencia lingüística. Llame al [phone].    We comply with applicable federal civil rights laws and [place] laws. We do not discriminate on the basis of race, color, national origin, age, disability, sex, sexual orientation, or gender identity.            Thank you!     Thank you for choosing [facility]  for your care. Our goal is always to provide you with excellent care. Hearing back from our patients is one way we can continue to improve our services. Please take a few  minutes to complete the written survey that you may receive in the mail after your visit with us. Thank you!             Your Updated Medication List - Protect others around you: Learn how to safely use, store and throw away your medicines at www.disposemymeds.org.          This list is accurate as of: 10/18/17  2:18 PM.  Always use your most recent med list.                   Brand Name Dispense Instructions for use Diagnosis    albuterol 108 (90 BASE) MCG/ACT Inhaler    PROAIR HFA/PROVENTIL HFA/VENTOLIN HFA    1 Inhaler    Inhale 2 puffs into the lungs every 6 hours as needed for shortness of breath / dyspnea or wheezing    Acute bronchitis, unspecified organism       amLODIPine 5 MG tablet    NORVASC    90 tablet    Take 1 tablet (5 mg) by mouth daily    Raynaud's phenomenon without gangrene       azaTHIOprine 50 MG tablet    IMURAN    270 tablet    Take 3 tablets (150 mg) by mouth daily    Other systemic lupus erythematosus with other organ involvement (H)       benzonatate 200 MG capsule    TESSALON    21 capsule    Take 1 capsule (200 mg) by mouth 3 times daily as needed for cough    Acute bronchitis, unspecified organism       blood glucose monitoring test strip    no brand specified    1 Month    Used for testing glucose 2-3 times daily    History of gestational diabetes mellitus, not currently pregnant       buPROPion 75 MG tablet    WELLBUTRIN    60 tablet    TAKE 1 TABLET BY MOUTH 2 TIMES DAILY    Anxiety       Calcium + D3 600-200 MG-UNIT Tabs      1 tablet daily        cevimeline 30 MG capsule    EVOXAC    180 capsule    Take 1 capsule (30 mg) by mouth 2 times daily    Other systemic lupus erythematosus with other organ involvement (H), Sjogren's syndrome with keratoconjunctivitis sicca (H)       diclofenac 1 % Gel topical gel    VOLTAREN    100 g    Apply 2-4 grams to knees or shoulders four times daily as needed using enclosed dosing card.    Fibromyalgia, SLE (systemic lupus erythematosus) (H)        FIRST-MOUTHWASH BLM MT Susp compounding kit     100 mL    Take 5-10 mLs by mouth every 6 hours as needed    Oral ulcer       FLOVENT IN           fluticasone 50 MCG/ACT spray    FLONASE    1 Bottle    Spray 1-2 sprays into both nostrils daily    Chronic rhinitis       guaiFENesin-codeine 100-10 MG/5ML Soln solution    ROBITUSSIN AC    120 mL    Take 10 mLs by mouth nightly as needed for cough    Acute bronchitis, unspecified organism       hydroxychloroquine 200 MG tablet    PLAQUENIL    180 tablet    Take 1 tablet (200 mg) by mouth 2 times daily    Other systemic lupus erythematosus with other organ involvement (H), High risk medication use       hyoscyamine 0.125 MG tablet    ANASPAZ/LEVSIN    40 tablet    Take 1-2 tablets (125-250 mcg) by mouth every 4 hours as needed for cramping    Irritable bowel syndrome, unspecified type       losartan 25 MG tablet    COZAAR    90 tablet    Take 1 tablet (25 mg) by mouth daily    Hypertension goal BP (blood pressure) < 140/90       MICROLET LANCETS Misc     100 each    1 each daily.    Other abnormal glucose       MULTIVITAMIN PO      Take  by mouth.        nortriptyline 10 MG capsule    PAMELOR    90 capsule    TAKE 3 CAPSULES BY MOUTH AT BEDTIME    Numbness       predniSONE 5 MG tablet    DELTASONE    90 tablet    Take 1 tablet (5 mg) by mouth daily    Other systemic lupus erythematosus with other organ involvement (H)       pregabalin 150 MG capsule    LYRICA    270 capsule    Take 1 capsule (150 mg) by mouth 3 times daily . 3 month supply.    Fibromyalgia       ranitidine 300 MG tablet    ZANTAC    180 tablet    Take 1 tablet (300 mg) by mouth 2 times daily    Gastroesophageal reflux disease without esophagitis

## 2017-10-18 NOTE — PATIENT INSTRUCTIONS
Thank you for coming today; it was a pleasure meeting you!    As discussed, I suspect you have pelvic floor dysfunction (aka pelvic dyssynergia).  I recommend the following:    ---Pelvic floor clinic consultation for anorectal manometry with possible biofeedback pending results of manometry testing  ---Discuss with your other providers regarding possibly changing the nortriptyline and amlodipine, since they can be constipating  ---Miralax daily (titrate to dose needed to achieve 1-2 BMs per day). Please send me a [employer] message on how you're doing in the next 2 weeks. If unwell with persistent constipation, will consider prescription anti-constipation medication.   ---Stop Fiber  ---Continue FODMAP diet for possible overlap IBS-C   ---Check TSH/free T4 today    Please return for follow up in 2-3 months. Keep me posted on [employer].

## 2017-10-18 NOTE — NURSING NOTE
Chief Complaint   Patient presents with     Consult     new pt       Vitals:    10/18/17 1309   BP: 112/71   Pulse: 99   Temp: 98.6  F (37  C)   TempSrc: Oral   SpO2: 100%   Weight: 139 lb 4.8 oz       Body mass index is 23.18 kg/(m^2).      WOUND EVALUATION:

## 2017-10-18 NOTE — PROGRESS NOTES
"GI CLINIC VISIT    CC/REFERRING MD:  Bradford Mario  REASON FOR CONSULTATION:   Bradford Mario for   Chief Complaint   Patient presents with     Consult     new pt       ASSESSMENT/PLAN:  Ms. Enriquez is a 41 yo woman with chronic constipation in the form of infrequent bowel movements and incomplete evacuation.  Her episodes of diarrhea occur in the setting of stool build up after weeks of no bowel movements. Her JOSE shows paradoxical squeeze, consistent with pelvic floor dysfunction, for which she is at increased risk given four vaginal deliveries.     # Chronic constipation, with suspected pelvic floor dysfunction  # Abdominal pain  # Fatigue  # TSH low   ---Referral to pelvic floor center for anorectal manometry testing with biofeedback, pending manometry result  ---Stop fiber supplementation since this will bulk up stool and worsen fecal stasis  ---Start Miralax; will likely need high dose. Titrate to 1-2 BMs per day. May benefit from Linzess or Amitiza if ineffective.   ---Given there has been some benefit from avoiding certain FODMAP foods, OK to continue for possible IBS overlap  ---Check TSH/free T4 today, given TSH was recently abnormally low       RTC: Return in about 3 months (around 1/18/2018).     A total of 60 minutes, face to face, was spent with this patient, >50% of which was counseling regarding the above delineated issues.      Inez Sawyer MD   of Medicine  Division of Gastroenterology, Hepatology and Nutrition  [facility]    HPI  Ms. Enriquez is a 41 yo woman who is presenting to GI clinic for evaluation of \"IBS\", which was diagnosed about 2 years ago, per the patient.     At baseline, she reports having constipation that was previously treated with Miralax. Over the past 2 years, has had worsening symptoms.  Reports central and lower abdominal pain, severe bloating, weakness, diarrhea.     Her bowel habits have a cyclical pattern as below:  Day #1: up to 10 " loose (non-bloody) BMs times per day. Has severe central/lower abdominal pain and bloating.   Days #2-day #5 No BMs during this period. Second day: feels drained and weak. Around day 4/5, experiences abdominal pain and bloating.    Day #6-1 to 2 weeks: feels OK. Has 1 BM during entire period. Stool is hard with a sense of incomplete evacuation.     Added Benefiber 2 weeks ago, taking 2-3 times per day.  As a result, has had a BM once weekly (still come in spurts).    Endorses chills, fatigue; no fever.  Works as a day care worker.   Has been trying to follow FODMAP diet. Garlic, onion, gluten, dairy trigger episodes of abdominal pain/bloating.       Has tried Miralax, benefiber, dulcolax without regulation of stools.      No abdominal surgeries. Weight fluctuates. No personal distressers.Had 4 vaginal deliveries.     ROS:    No fevers   No weight loss  No blurry vision, double vision or change in vision  No sore throat  No lymphadenopathy  No headache, paraesthesias, or weakness in a limb  No shortness of breath or wheezing  No chest pain or pressure  No arthralgias or myalgias  No rashes or skin changes  No odynophagia or dysphagia  No BRBPR, hematochezia, melena  No dysuria, frequency or urgency  No hot/cold intolerance or polyria    PROBLEM LIST  Patient Active Problem List    Diagnosis Date Noted     Fibromyalgia 11/15/2013     Priority: High     High risk medication use 09/13/2013     Priority: High     Sjogren's syndrome (H) 04/10/2017     Priority: Medium     Raynaud's phenomenon without gangrene 12/19/2016     Priority: Medium     Non-celiac gluten sensitivity 02/08/2016     Priority: Medium     Hypertension goal BP (blood pressure) < 140/90 01/19/2015     Priority: Medium     Health Care Home 03/19/2014     Priority: Medium     **See Letters for [facility] Care Plan: Emergency Care Plan         Irritable bowel syndrome 03/11/2014     Priority: Medium     Patient states no history colonoscopy         Chronic  constipation 12/16/2013     Priority: Medium     Anxiety 04/10/2013     Priority: Medium     Subclinical hyperthyroidism 01/17/2013     Priority: Medium     Hyperlipidemia LDL goal <130 10/18/2012     Priority: Medium     Intramural leiomyoma of uterus 10/05/2012     Priority: Medium     Menorrhagia 10/01/2012     Priority: Medium     History of ovarian cyst 10/01/2012     Priority: Medium     Dysmenorrhea 10/01/2012     Priority: Medium     History of gestational diabetes mellitus, not currently pregnant 10/01/2012     Priority: Medium     Systemic lupus erythematosus (H) 04/23/2012     Priority: Medium     Positive antinuclear antibody, photosensitivity and synovitis         PERTINENT PAST MEDICAL HISTORY:  Past Medical History:   Diagnosis Date     Chronic constipation 12/16/2013     Dysmenorrhea 10/1/2012     Fibromyalgia 11/15/2013     Health Care Home 3/19/2014    **See Letters for H Care Plan: Emergency Care Plan       High risk medication use 9/13/2013     History of gestational diabetes mellitus, not currently pregnant 10/1/2012     History of ovarian cyst 10/1/2012     Hyperlipidemia LDL goal <130 10/18/2012     Hypertension goal BP (blood pressure) < 140/90 1/19/2015     Intramural leiomyoma of uterus 10/5/2012     Irritable bowel syndrome 3/11/2014    Patient states no history colonoscopy       Menorrhagia 10/1/2012     Non-celiac gluten sensitivity 2/8/2016     PONV (postoperative nausea and vomiting)      Subclinical hyperthyroidism 1/17/2013     Systemic lupus erythematosus (H) 4/23/2012       PREVIOUS SURGERIES:  Past Surgical History:   Procedure Laterality Date     COLONOSCOPY N/A 2/20/2015    Procedure: COMBINED COLONOSCOPY, SINGLE OR MULTIPLE BIOPSY/POLYPECTOMY BY BIOPSY;  Surgeon: Fred Cullen MD;  Location: [facility] OR     COLONOSCOPY WITH CO2 INSUFFLATION N/A 2/20/2015    Procedure: COLONOSCOPY WITH CO2 INSUFFLATION;  Surgeon: Fred Cullen MD;  Location: [facility] OR     ENT  SURGERY  10-24-14    Bottom lip biopsies     ESOPHAGOSCOPY, GASTROSCOPY, DUODENOSCOPY (EGD), COMBINED N/A 2/20/2015    Procedure: COMBINED ESOPHAGOSCOPY, GASTROSCOPY, DUODENOSCOPY (EGD), BIOPSY SINGLE OR MULTIPLE;  Surgeon: Fred Cullen MD;  Location: [facility] OR     HC BREATH HYDROGEN TEST  12/31/2013    Procedure: HYDROGEN BREATH TEST;  Surgeon: Camden Almazan MD;  Location: [facility]     HC HYSTEROSCOPY, SURGICAL; W/ ENDOMETRIAL ABLATION, ANY METHOD  10-19-12     SHOULDER SURGERY Right     R shoulder     TUBAL LIGATION  2004       PREVIOUS ENDOSCOPY:  cscope 2/2015 (indication diarrhea) to TI showed rectal erythema, possible cecal compression. Bx showed non-specific mild chronic inflammation.     CT a/p 2/2015 was normal without evidence of kristina-cecal lesions other than small bowel in RLQ. There was fecal debris distending the colon from the cecum to prox sigmoid.     ALLERGIES:   No Known Allergies    PERTINENT MEDICATIONS:    Current Outpatient Prescriptions:      buPROPion (WELLBUTRIN) 75 MG tablet, TAKE 1 TABLET BY MOUTH 2 TIMES DAILY, Disp: 60 tablet, Rfl: 2     nortriptyline (PAMELOR) 10 MG capsule, TAKE 3 CAPSULES BY MOUTH AT BEDTIME, Disp: 90 capsule, Rfl: 2     pregabalin (LYRICA) 150 MG capsule, Take 1 capsule (150 mg) by mouth 3 times daily . 3 month supply., Disp: 270 capsule, Rfl: 0     guaiFENesin-codeine (ROBITUSSIN AC) 100-10 MG/5ML SOLN solution, Take 10 mLs by mouth nightly as needed for cough, Disp: 120 mL, Rfl: 0     benzonatate (TESSALON) 200 MG capsule, Take 1 capsule (200 mg) by mouth 3 times daily as needed for cough, Disp: 21 capsule, Rfl: 0     albuterol (PROAIR HFA/PROVENTIL HFA/VENTOLIN HFA) 108 (90 BASE) MCG/ACT Inhaler, Inhale 2 puffs into the lungs every 6 hours as needed for shortness of breath / dyspnea or wheezing, Disp: 1 Inhaler, Rfl: 1     amLODIPine (NORVASC) 5 MG tablet, Take 1 tablet (5 mg) by mouth daily, Disp: 90 tablet, Rfl: 1     losartan (COZAAR) 25 MG  tablet, Take 1 tablet (25 mg) by mouth daily, Disp: 90 tablet, Rfl: 1     ranitidine (ZANTAC) 300 MG tablet, Take 1 tablet (300 mg) by mouth 2 times daily, Disp: 180 tablet, Rfl: 3     predniSONE (DELTASONE) 5 MG tablet, Take 1 tablet (5 mg) by mouth daily, Disp: 90 tablet, Rfl: 1     hydroxychloroquine (PLAQUENIL) 200 MG tablet, Take 1 tablet (200 mg) by mouth 2 times daily, Disp: 180 tablet, Rfl: 1     azaTHIOprine (IMURAN) 50 MG tablet, Take 3 tablets (150 mg) by mouth daily, Disp: 270 tablet, Rfl: 1     cevimeline (EVOXAC) 30 MG capsule, Take 1 capsule (30 mg) by mouth 2 times daily, Disp: 180 capsule, Rfl: 1     hyoscyamine (ANASPAZ/LEVSIN) 0.125 MG tablet, Take 1-2 tablets (125-250 mcg) by mouth every 4 hours as needed for cramping, Disp: 40 tablet, Rfl: 1     fluticasone (FLONASE) 50 MCG/ACT nasal spray, Spray 1-2 sprays into both nostrils daily, Disp: 1 Bottle, Rfl: 11     DPH-Lido-AlHydr-MgHydr-Simeth (FIRST-MOUTHWASH BLM) SUSP, Take 5-10 mLs by mouth every 6 hours as needed, Disp: 100 mL, Rfl: 2     diclofenac (VOLTAREN) 1 % GEL, Apply 2-4 grams to knees or shoulders four times daily as needed using enclosed dosing card., Disp: 100 g, Rfl: 4     blood glucose test strip, Used for testing glucose 2-3 times daily, Disp: 1 Month, Rfl: 5     Calcium Carb-Cholecalciferol (CALCIUM + D3) 600-200 MG-UNIT TABS, 1 tablet daily, Disp: , Rfl:      Fluticasone Propionate, Inhal, (FLOVENT IN), , Disp: , Rfl:      MICROLET LANCETS MISC, 1 each daily., Disp: 100 each, Rfl: 2     Multiple Vitamin (MULTIVITAMIN OR), Take  by mouth., Disp: , Rfl:     SOCIAL HISTORY:  Social History     Social History     Marital status:      Spouse name: Brian     Number of children: 4     Years of education: 12     Occupational History      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     Social History Main Topics     Smoking status: Never Smoker     Smokeless tobacco: Never Used      Comment: Lives in smoke free household     Alcohol use No      Drug use: No     Sexual activity: Yes     Partners: Male     Birth control/ protection: Surgical      Comment: tubal     Other Topics Concern     Not on file     Social History Narrative       FAMILY HISTORY:  Family History   Problem Relation Age of Onset     Respiratory Maternal Grandfather      CANCER Maternal Grandfather      Asthma Son      Circulatory Maternal Grandmother      Brain anurysm     Hypertension Mother      Glaucoma Mother 50     drops     Hypertension Sister      Hypertension Sister      DIABETES No family hx of      CEREBROVASCULAR DISEASE No family hx of      Thyroid Disease No family hx of      Macular Degeneration No family hx of        Past/family/social history reviewed and no changes    PHYSICAL EXAMINATION:  Constitutional: aaox3, cooperative, pleasant, not dyspneic/diaphoretic, no acute distress  Vitals reviewed: /71  Pulse 99  Temp 98.6  F (37  C) (Oral)  Wt 63.2 kg (139 lb 4.8 oz)  SpO2 100%  BMI 23.18 kg/m2  Wt:   Wt Readings from Last 2 Encounters:   10/18/17 63.2 kg (139 lb 4.8 oz)   10/13/17 63.4 kg (139 lb 12.8 oz)      Eyes: Sclera anicteric/injected  Ears/nose/mouth/throat: Normal oropharynx without ulcers or exudate, mucus membranes moist, hearing intact  Neck: supple, thyroid normal size  CV: RRR, no murmurs. No edema  Respiratory: CTA bl. Unlabored breathing  Lymph: No axillary, submandibular, supraclavicular or inguinal lymphadenopathy  Abd: soft, nondistended, +bs, no hepatosplenomegaly; mild TTP in lower abdomen, no peritoneal signs  Skin: warm, perfused, no jaundice. Pressure type pain in the lower abdomen. No RT or IG.   Psych: Normal affect  MSK: Normal gait  Rectal: small skin tag at 11 o'clock position. No hemorrhoids or other perianal lesions. Increased internal sphincter tone. There is appropriate squeeze upon command and relaxation. However, upon attempted finger evacuation, there is paradoxical sphincter squeeze and near absent perineal descent.       PERTINENT STUDIES:    Orders Only on 10/02/2017   Component Date Value Ref Range Status     WBC 10/02/2017 6.6  4.0 - 11.0 10e9/L Final     RBC Count 10/02/2017 3.72* 3.8 - 5.2 10e12/L Final     Hemoglobin 10/02/2017 13.0  11.7 - 15.7 g/dL Final     Hematocrit 10/02/2017 37.8  35.0 - 47.0 % Final     MCV 10/02/2017 102* 78 - 100 fl Final     MCH 10/02/2017 34.9* 26.5 - 33.0 pg Final     MCHC 10/02/2017 34.4  31.5 - 36.5 g/dL Final     RDW 10/02/2017 11.8  10.0 - 15.0 % Final     Platelet Count 10/02/2017 239  150 - 450 10e9/L Final     Diff Method 10/02/2017 Automated Method   Final     % Neutrophils 10/02/2017 78.5  % Final     % Lymphocytes 10/02/2017 13.4  % Final     % Monocytes 10/02/2017 7.3  % Final     % Eosinophils 10/02/2017 0.3  % Final     % Basophils 10/02/2017 0.5  % Final     Absolute Neutrophil 10/02/2017 5.2  1.6 - 8.3 10e9/L Final     Absolute Lymphocytes 10/02/2017 0.9  0.8 - 5.3 10e9/L Final     Absolute Monocytes 10/02/2017 0.5  0.0 - 1.3 10e9/L Final     Absolute Eosinophils 10/02/2017 0.0  0.0 - 0.7 10e9/L Final     Absolute Basophils 10/02/2017 0.0  0.0 - 0.2 10e9/L Final     CK Total 10/02/2017 134  30 - 225 U/L Final     Complement C3 10/02/2017 75* 76 - 169 mg/dL Final     DNA-ds 10/02/2017 <1  <10 IU/mL Final     CRP Inflammation 10/02/2017 <2.9  0.0 - 8.0 mg/L Final     Sodium 10/02/2017 138  133 - 144 mmol/L Final     Potassium 10/02/2017 3.7  3.4 - 5.3 mmol/L Final     Chloride 10/02/2017 103  94 - 109 mmol/L Final     Carbon Dioxide 10/02/2017 27  20 - 32 mmol/L Final     Anion Gap 10/02/2017 8  3 - 14 mmol/L Final     Glucose 10/02/2017 112* 70 - 99 mg/dL Final     Urea Nitrogen 10/02/2017 10  7 - 30 mg/dL Final     Creatinine 10/02/2017 0.88  0.52 - 1.04 mg/dL Final     GFR Estimate 10/02/2017 70  >60 mL/min/1.7m2 Final     GFR Estimate If Black 10/02/2017 84  >60 mL/min/1.7m2 Final     Calcium 10/02/2017 8.7  8.5 - 10.1 mg/dL Final     Bilirubin Total 10/02/2017 0.2  0.2 - 1.3  mg/dL Final     Albumin 10/02/2017 3.8  3.4 - 5.0 g/dL Final     Protein Total 10/02/2017 6.8  6.8 - 8.8 g/dL Final     Alkaline Phosphatase 10/02/2017 60  40 - 150 U/L Final     ALT 10/02/2017 21  0 - 50 U/L Final     AST 10/02/2017 22  0 - 45 U/L Final     Complement C4 10/02/2017 15  15 - 50 mg/dL Final     Sed Rate 10/02/2017 4  0 - 20 mm/h Final     Protein Random Urine 10/02/2017 0.07  g/L Final     Protein Total Urine g/gr Creatinine 10/02/2017 0.14  0 - 0.2 g/g Cr Final     Color Urine 10/02/2017 Yellow   Final     Appearance Urine 10/02/2017 Clear   Final     Glucose Urine 10/02/2017 Negative  NEG^Negative mg/dL Final     Bilirubin Urine 10/02/2017 Negative  NEG^Negative Final     Ketones Urine 10/02/2017 Negative  NEG^Negative mg/dL Final     Specific Gravity Urine 10/02/2017 1.010  1.003 - 1.035 Final     pH Urine 10/02/2017 6.0  5.0 - 7.0 pH Final     Protein Albumin Urine 10/02/2017 Negative  NEG^Negative mg/dL Final     Urobilinogen Urine 10/02/2017 0.2  0.2 - 1.0 EU/dL Final     Nitrite Urine 10/02/2017 Negative  NEG^Negative Final     Blood Urine 10/02/2017 Negative  NEG^Negative Final     Leukocyte Esterase Urine 10/02/2017 Negative  NEG^Negative Final     Source 10/02/2017 Midstream Urine   Final     WBC Urine 10/02/2017 O - 2  OTO2^O - 2 /HPF Final     RBC Urine 10/02/2017 O - 2  OTO2^O - 2 /HPF Final     Squamous Epithelial /LPF Urine 10/02/2017 Few  FEW^Few /LPF Final     Vitamin D Deficiency screening 10/02/2017 37  20 - 75 ug/L Final     Creatinine Urine 10/02/2017 48  mg/dL Final

## 2017-10-18 NOTE — LETTER
"10/18/2017       RE: Yovana Enriquez  [address] [facility] [id number]     Dear Colleague,    Thank you for referring your patient, Yovana Enriquez, to the [facility] GASTROENTEROLOGY AND IBD CLINIC at [facility]. Please see a copy of my visit note below.    GI CLINIC VISIT    CC/REFERRING MD:  Bradford Mario  REASON FOR CONSULTATION:   Bradford Mario for   Chief Complaint   Patient presents with     Consult     new pt       ASSESSMENT/PLAN:  Ms. Enriquez is a 43 yo woman with chronic constipation in the form of infrequent bowel movements and incomplete evacuation.  Her episodes of diarrhea occur in the setting of stool build up after weeks of no bowel movements. Her JOSE shows paradoxical squeeze, consistent with pelvic floor dysfunction, for which she is at increased risk given four vaginal deliveries.     # Chronic constipation, with suspected pelvic floor dysfunction  # Abdominal pain  # Fatigue  # TSH low   ---Referral to pelvic floor center for anorectal manometry testing with biofeedback, pending manometry result  ---Stop fiber supplementation since this will bulk up stool and worsen fecal stasis  ---Start Miralax; will likely need high dose. Titrate to 1-2 BMs per day. May benefit from Linzess or Amitiza if ineffective.   ---Given there has been some benefit from avoiding certain FODMAP foods, OK to continue for possible IBS overlap  ---Check TSH/free T4 today, given TSH was recently abnormally low       RTC: Return in about 3 months (around 1/18/2018).     A total of 60 minutes, face to face, was spent with this patient, >50% of which was counseling regarding the above delineated issues.      Inez Sawyer MD   of Medicine  Division of Gastroenterology, Hepatology and Nutrition  [facility]    HPI  Ms. Enriquez is a 43 yo woman who is presenting to GI clinic for evaluation of \"IBS\", which was diagnosed about 2 years ago, " per the patient.     At baseline, she reports having constipation that was previously treated with Miralax. Over the past 2 years, has had worsening symptoms.  Reports central and lower abdominal pain, severe bloating, weakness, diarrhea.     Her bowel habits have a cyclical pattern as below:  Day #1: up to 10 loose (non-bloody) BMs times per day. Has severe central/lower abdominal pain and bloating.   Days #2-day #5 No BMs during this period. Second day: feels drained and weak. Around day 4/5, experiences abdominal pain and bloating.    Day #6-1 to 2 weeks: feels OK. Has 1 BM during entire period. Stool is hard with a sense of incomplete evacuation.     Added Benefiber 2 weeks ago, taking 2-3 times per day.  As a result, has had a BM once weekly (still come in spurts).    Endorses chills, fatigue; no fever.  Works as a day care worker.   Has been trying to follow FODMAP diet. Garlic, onion, gluten, dairy trigger episodes of abdominal pain/bloating.       Has tried Miralax, benefiber, dulcolax without regulation of stools.      No abdominal surgeries. Weight fluctuates. No personal distressers.Had 4 vaginal deliveries.     ROS:    No fevers   No weight loss  No blurry vision, double vision or change in vision  No sore throat  No lymphadenopathy  No headache, paraesthesias, or weakness in a limb  No shortness of breath or wheezing  No chest pain or pressure  No arthralgias or myalgias  No rashes or skin changes  No odynophagia or dysphagia  No BRBPR, hematochezia, melena  No dysuria, frequency or urgency  No hot/cold intolerance or polyria    PROBLEM LIST  Patient Active Problem List    Diagnosis Date Noted     Fibromyalgia 11/15/2013     Priority: High     High risk medication use 09/13/2013     Priority: High     Sjogren's syndrome (H) 04/10/2017     Priority: Medium     Raynaud's phenomenon without gangrene 12/19/2016     Priority: Medium     Non-celiac gluten sensitivity 02/08/2016     Priority: Medium      Hypertension goal BP (blood pressure) < 140/90 01/19/2015     Priority: Medium     Health Care Home 03/19/2014     Priority: Medium     **See Letters for [facility] Care Plan: Emergency Care Plan         Irritable bowel syndrome 03/11/2014     Priority: Medium     Patient states no history colonoscopy         Chronic constipation 12/16/2013     Priority: Medium     Anxiety 04/10/2013     Priority: Medium     Subclinical hyperthyroidism 01/17/2013     Priority: Medium     Hyperlipidemia LDL goal <130 10/18/2012     Priority: Medium     Intramural leiomyoma of uterus 10/05/2012     Priority: Medium     Menorrhagia 10/01/2012     Priority: Medium     History of ovarian cyst 10/01/2012     Priority: Medium     Dysmenorrhea 10/01/2012     Priority: Medium     History of gestational diabetes mellitus, not currently pregnant 10/01/2012     Priority: Medium     Systemic lupus erythematosus (H) 04/23/2012     Priority: Medium     Positive antinuclear antibody, photosensitivity and synovitis         PERTINENT PAST MEDICAL HISTORY:  Past Medical History:   Diagnosis Date     Chronic constipation 12/16/2013     Dysmenorrhea 10/1/2012     Fibromyalgia 11/15/2013     Health Care Home 3/19/2014    **See Letters for [facility] Care Plan: Emergency Care Plan       High risk medication use 9/13/2013     History of gestational diabetes mellitus, not currently pregnant 10/1/2012     History of ovarian cyst 10/1/2012     Hyperlipidemia LDL goal <130 10/18/2012     Hypertension goal BP (blood pressure) < 140/90 1/19/2015     Intramural leiomyoma of uterus 10/5/2012     Irritable bowel syndrome 3/11/2014    Patient states no history colonoscopy       Menorrhagia 10/1/2012     Non-celiac gluten sensitivity 2/8/2016     PONV (postoperative nausea and vomiting)      Subclinical hyperthyroidism 1/17/2013     Systemic lupus erythematosus (H) 4/23/2012       PREVIOUS SURGERIES:  Past Surgical History:   Procedure Laterality Date     COLONOSCOPY N/A 2/20/2015     Procedure: COMBINED COLONOSCOPY, SINGLE OR MULTIPLE BIOPSY/POLYPECTOMY BY BIOPSY;  Surgeon: Fred Cullen MD;  Location: [facility] OR     COLONOSCOPY WITH CO2 INSUFFLATION N/A 2/20/2015    Procedure: COLONOSCOPY WITH CO2 INSUFFLATION;  Surgeon: Fred Cullen MD;  Location: [facility] OR     ENT SURGERY  10-24-14    Bottom lip biopsies     ESOPHAGOSCOPY, GASTROSCOPY, DUODENOSCOPY (EGD), COMBINED N/A 2/20/2015    Procedure: COMBINED ESOPHAGOSCOPY, GASTROSCOPY, DUODENOSCOPY (EGD), BIOPSY SINGLE OR MULTIPLE;  Surgeon: Fred Cullen MD;  Location: [facility] OR     HC BREATH HYDROGEN TEST  12/31/2013    Procedure: HYDROGEN BREATH TEST;  Surgeon: Camden Almazan MD;  Location: [facility]     HC HYSTEROSCOPY, SURGICAL; W/ ENDOMETRIAL ABLATION, ANY METHOD  10-19-12     SHOULDER SURGERY Right     R shoulder     TUBAL LIGATION  2004       PREVIOUS ENDOSCOPY:  cscope 2/2015 (indication diarrhea) to TI showed rectal erythema, possible cecal compression. Bx showed non-specific mild chronic inflammation.     CT a/p 2/2015 was normal without evidence of kristina-cecal lesions other than small bowel in RLQ. There was fecal debris distending the colon from the cecum to prox sigmoid.     ALLERGIES:   No Known Allergies    PERTINENT MEDICATIONS:    Current Outpatient Prescriptions:      buPROPion (WELLBUTRIN) 75 MG tablet, TAKE 1 TABLET BY MOUTH 2 TIMES DAILY, Disp: 60 tablet, Rfl: 2     nortriptyline (PAMELOR) 10 MG capsule, TAKE 3 CAPSULES BY MOUTH AT BEDTIME, Disp: 90 capsule, Rfl: 2     pregabalin (LYRICA) 150 MG capsule, Take 1 capsule (150 mg) by mouth 3 times daily . 3 month supply., Disp: 270 capsule, Rfl: 0     guaiFENesin-codeine (ROBITUSSIN AC) 100-10 MG/5ML SOLN solution, Take 10 mLs by mouth nightly as needed for cough, Disp: 120 mL, Rfl: 0     benzonatate (TESSALON) 200 MG capsule, Take 1 capsule (200 mg) by mouth 3 times daily as needed for cough, Disp: 21 capsule, Rfl: 0     albuterol (PROAIR HFA/PROVENTIL  HFA/VENTOLIN HFA) 108 (90 BASE) MCG/ACT Inhaler, Inhale 2 puffs into the lungs every 6 hours as needed for shortness of breath / dyspnea or wheezing, Disp: 1 Inhaler, Rfl: 1     amLODIPine (NORVASC) 5 MG tablet, Take 1 tablet (5 mg) by mouth daily, Disp: 90 tablet, Rfl: 1     losartan (COZAAR) 25 MG tablet, Take 1 tablet (25 mg) by mouth daily, Disp: 90 tablet, Rfl: 1     ranitidine (ZANTAC) 300 MG tablet, Take 1 tablet (300 mg) by mouth 2 times daily, Disp: 180 tablet, Rfl: 3     predniSONE (DELTASONE) 5 MG tablet, Take 1 tablet (5 mg) by mouth daily, Disp: 90 tablet, Rfl: 1     hydroxychloroquine (PLAQUENIL) 200 MG tablet, Take 1 tablet (200 mg) by mouth 2 times daily, Disp: 180 tablet, Rfl: 1     azaTHIOprine (IMURAN) 50 MG tablet, Take 3 tablets (150 mg) by mouth daily, Disp: 270 tablet, Rfl: 1     cevimeline (EVOXAC) 30 MG capsule, Take 1 capsule (30 mg) by mouth 2 times daily, Disp: 180 capsule, Rfl: 1     hyoscyamine (ANASPAZ/LEVSIN) 0.125 MG tablet, Take 1-2 tablets (125-250 mcg) by mouth every 4 hours as needed for cramping, Disp: 40 tablet, Rfl: 1     fluticasone (FLONASE) 50 MCG/ACT nasal spray, Spray 1-2 sprays into both nostrils daily, Disp: 1 Bottle, Rfl: 11     DPH-Lido-AlHydr-MgHydr-Simeth (FIRST-MOUTHWASH BLM) SUSP, Take 5-10 mLs by mouth every 6 hours as needed, Disp: 100 mL, Rfl: 2     diclofenac (VOLTAREN) 1 % GEL, Apply 2-4 grams to knees or shoulders four times daily as needed using enclosed dosing card., Disp: 100 g, Rfl: 4     blood glucose test strip, Used for testing glucose 2-3 times daily, Disp: 1 Month, Rfl: 5     Calcium Carb-Cholecalciferol (CALCIUM + D3) 600-200 MG-UNIT TABS, 1 tablet daily, Disp: , Rfl:      Fluticasone Propionate, Inhal, (FLOVENT IN), , Disp: , Rfl:      MICROLET LANCETS MISC, 1 each daily., Disp: 100 each, Rfl: 2     Multiple Vitamin (MULTIVITAMIN OR), Take  by mouth., Disp: , Rfl:     SOCIAL HISTORY:  Social History     Social History     Marital status:       Spouse name: Brian     Number of children: 4     Years of education: 12     Occupational History      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     Social History Main Topics     Smoking status: Never Smoker     Smokeless tobacco: Never Used      Comment: Lives in smoke free household     Alcohol use No     Drug use: No     Sexual activity: Yes     Partners: Male     Birth control/ protection: Surgical      Comment: tubal     Other Topics Concern     Not on file     Social History Narrative       FAMILY HISTORY:  Family History   Problem Relation Age of Onset     Respiratory Maternal Grandfather      CANCER Maternal Grandfather      Asthma Son      Circulatory Maternal Grandmother      Brain anurysm     Hypertension Mother      Glaucoma Mother 50     drops     Hypertension Sister      Hypertension Sister      DIABETES No family hx of      CEREBROVASCULAR DISEASE No family hx of      Thyroid Disease No family hx of      Macular Degeneration No family hx of        Past/family/social history reviewed and no changes    PHYSICAL EXAMINATION:  Constitutional: aaox3, cooperative, pleasant, not dyspneic/diaphoretic, no acute distress  Vitals reviewed: /71  Pulse 99  Temp 98.6  F (37  C) (Oral)  Wt 63.2 kg (139 lb 4.8 oz)  SpO2 100%  BMI 23.18 kg/m2  Wt:   Wt Readings from Last 2 Encounters:   10/18/17 63.2 kg (139 lb 4.8 oz)   10/13/17 63.4 kg (139 lb 12.8 oz)      Eyes: Sclera anicteric/injected  Ears/nose/mouth/throat: Normal oropharynx without ulcers or exudate, mucus membranes moist, hearing intact  Neck: supple, thyroid normal size  CV: RRR, no murmurs. No edema  Respiratory: CTA bl. Unlabored breathing  Lymph: No axillary, submandibular, supraclavicular or inguinal lymphadenopathy  Abd: soft, nondistended, +bs, no hepatosplenomegaly; mild TTP in lower abdomen, no peritoneal signs  Skin: warm, perfused, no jaundice. Pressure type pain in the lower abdomen. No RT or IG.   Psych: Normal affect  MSK: Normal  gait  Rectal: small skin tag at 11 o'clock position. No hemorrhoids or other perianal lesions. Increased internal sphincter tone. There is appropriate squeeze upon command and relaxation. However, upon attempted finger evacuation, there is paradoxical sphincter squeeze and near absent perineal descent.      PERTINENT STUDIES:    Orders Only on 10/02/2017   Component Date Value Ref Range Status     WBC 10/02/2017 6.6  4.0 - 11.0 10e9/L Final     RBC Count 10/02/2017 3.72* 3.8 - 5.2 10e12/L Final     Hemoglobin 10/02/2017 13.0  11.7 - 15.7 g/dL Final     Hematocrit 10/02/2017 37.8  35.0 - 47.0 % Final     MCV 10/02/2017 102* 78 - 100 fl Final     MCH 10/02/2017 34.9* 26.5 - 33.0 pg Final     MCHC 10/02/2017 34.4  31.5 - 36.5 g/dL Final     RDW 10/02/2017 11.8  10.0 - 15.0 % Final     Platelet Count 10/02/2017 239  150 - 450 10e9/L Final     Diff Method 10/02/2017 Automated Method   Final     % Neutrophils 10/02/2017 78.5  % Final     % Lymphocytes 10/02/2017 13.4  % Final     % Monocytes 10/02/2017 7.3  % Final     % Eosinophils 10/02/2017 0.3  % Final     % Basophils 10/02/2017 0.5  % Final     Absolute Neutrophil 10/02/2017 5.2  1.6 - 8.3 10e9/L Final     Absolute Lymphocytes 10/02/2017 0.9  0.8 - 5.3 10e9/L Final     Absolute Monocytes 10/02/2017 0.5  0.0 - 1.3 10e9/L Final     Absolute Eosinophils 10/02/2017 0.0  0.0 - 0.7 10e9/L Final     Absolute Basophils 10/02/2017 0.0  0.0 - 0.2 10e9/L Final     CK Total 10/02/2017 134  30 - 225 U/L Final     Complement C3 10/02/2017 75* 76 - 169 mg/dL Final     DNA-ds 10/02/2017 <1  <10 IU/mL Final     CRP Inflammation 10/02/2017 <2.9  0.0 - 8.0 mg/L Final     Sodium 10/02/2017 138  133 - 144 mmol/L Final     Potassium 10/02/2017 3.7  3.4 - 5.3 mmol/L Final     Chloride 10/02/2017 103  94 - 109 mmol/L Final     Carbon Dioxide 10/02/2017 27  20 - 32 mmol/L Final     Anion Gap 10/02/2017 8  3 - 14 mmol/L Final     Glucose 10/02/2017 112* 70 - 99 mg/dL Final     Urea Nitrogen  10/02/2017 10  7 - 30 mg/dL Final     Creatinine 10/02/2017 0.88  0.52 - 1.04 mg/dL Final     GFR Estimate 10/02/2017 70  >60 mL/min/1.7m2 Final     GFR Estimate If Black 10/02/2017 84  >60 mL/min/1.7m2 Final     Calcium 10/02/2017 8.7  8.5 - 10.1 mg/dL Final     Bilirubin Total 10/02/2017 0.2  0.2 - 1.3 mg/dL Final     Albumin 10/02/2017 3.8  3.4 - 5.0 g/dL Final     Protein Total 10/02/2017 6.8  6.8 - 8.8 g/dL Final     Alkaline Phosphatase 10/02/2017 60  40 - 150 U/L Final     ALT 10/02/2017 21  0 - 50 U/L Final     AST 10/02/2017 22  0 - 45 U/L Final     Complement C4 10/02/2017 15  15 - 50 mg/dL Final     Sed Rate 10/02/2017 4  0 - 20 mm/h Final     Protein Random Urine 10/02/2017 0.07  g/L Final     Protein Total Urine g/gr Creatinine 10/02/2017 0.14  0 - 0.2 g/g Cr Final     Color Urine 10/02/2017 Yellow   Final     Appearance Urine 10/02/2017 Clear   Final     Glucose Urine 10/02/2017 Negative  NEG^Negative mg/dL Final     Bilirubin Urine 10/02/2017 Negative  NEG^Negative Final     Ketones Urine 10/02/2017 Negative  NEG^Negative mg/dL Final     Specific Gravity Urine 10/02/2017 1.010  1.003 - 1.035 Final     pH Urine 10/02/2017 6.0  5.0 - 7.0 pH Final     Protein Albumin Urine 10/02/2017 Negative  NEG^Negative mg/dL Final     Urobilinogen Urine 10/02/2017 0.2  0.2 - 1.0 EU/dL Final     Nitrite Urine 10/02/2017 Negative  NEG^Negative Final     Blood Urine 10/02/2017 Negative  NEG^Negative Final     Leukocyte Esterase Urine 10/02/2017 Negative  NEG^Negative Final     Source 10/02/2017 Midstream Urine   Final     WBC Urine 10/02/2017 O - 2  OTO2^O - 2 /HPF Final     RBC Urine 10/02/2017 O - 2  OTO2^O - 2 /HPF Final     Squamous Epithelial /LPF Urine 10/02/2017 Few  FEW^Few /LPF Final     Vitamin D Deficiency screening 10/02/2017 37  20 - 75 ug/L Final     Creatinine Urine 10/02/2017 48  mg/dL Final       Again, thank you for allowing me to participate in the care of your patient.      Sincerely,    Inez  MD Silverio

## 2017-10-30 ENCOUNTER — TRANSFERRED RECORDS (OUTPATIENT)
Dept: HEALTH INFORMATION MANAGEMENT | Facility: CLINIC | Age: 42
End: 2017-10-30

## 2017-11-03 ENCOUNTER — OFFICE VISIT (OUTPATIENT)
Dept: FAMILY MEDICINE | Facility: CLINIC | Age: 42
End: 2017-11-03
Payer: COMMERCIAL

## 2017-11-03 VITALS
TEMPERATURE: 97.2 F | HEART RATE: 82 BPM | DIASTOLIC BLOOD PRESSURE: 82 MMHG | SYSTOLIC BLOOD PRESSURE: 120 MMHG | BODY MASS INDEX: 22.76 KG/M2 | WEIGHT: 136.8 LBS | OXYGEN SATURATION: 100 %

## 2017-11-03 DIAGNOSIS — N30.01 ACUTE CYSTITIS WITH HEMATURIA: ICD-10-CM

## 2017-11-03 DIAGNOSIS — R30.0 DYSURIA: Primary | ICD-10-CM

## 2017-11-03 LAB
ALBUMIN UR-MCNC: NEGATIVE MG/DL
APPEARANCE UR: CLEAR
BACTERIA #/AREA URNS HPF: ABNORMAL /HPF
BILIRUB UR QL STRIP: NEGATIVE
COLOR UR AUTO: YELLOW
GLUCOSE UR STRIP-MCNC: NEGATIVE MG/DL
HGB UR QL STRIP: ABNORMAL
KETONES UR STRIP-MCNC: NEGATIVE MG/DL
LEUKOCYTE ESTERASE UR QL STRIP: ABNORMAL
NITRATE UR QL: NEGATIVE
PH UR STRIP: 7.5 PH (ref 5–7)
RBC #/AREA URNS AUTO: ABNORMAL /HPF
SOURCE: ABNORMAL
SP GR UR STRIP: 1.01 (ref 1–1.03)
UROBILINOGEN UR STRIP-ACNC: 0.2 EU/DL (ref 0.2–1)
WBC #/AREA URNS AUTO: ABNORMAL /HPF
WBC CLUMPS #/AREA URNS HPF: PRESENT /HPF

## 2017-11-03 PROCEDURE — 81001 URINALYSIS AUTO W/SCOPE: CPT | Performed by: NURSE PRACTITIONER

## 2017-11-03 PROCEDURE — 87086 URINE CULTURE/COLONY COUNT: CPT | Performed by: NURSE PRACTITIONER

## 2017-11-03 PROCEDURE — 99213 OFFICE O/P EST LOW 20 MIN: CPT | Performed by: NURSE PRACTITIONER

## 2017-11-03 RX ORDER — PHENAZOPYRIDINE HYDROCHLORIDE 200 MG/1
200 TABLET, FILM COATED ORAL 3 TIMES DAILY PRN
Qty: 6 TABLET | Refills: 0 | Status: SHIPPED | OUTPATIENT
Start: 2017-11-03 | End: 2018-01-03

## 2017-11-03 RX ORDER — SULFAMETHOXAZOLE/TRIMETHOPRIM 800-160 MG
1 TABLET ORAL 2 TIMES DAILY
Qty: 10 TABLET | Refills: 0 | Status: SHIPPED | OUTPATIENT
Start: 2017-11-03 | End: 2017-11-08

## 2017-11-03 NOTE — NURSING NOTE
"Chief Complaint   Patient presents with     UTI       Initial /82  Pulse 82  Temp 97.2  F (36.2  C) (Oral)  Wt 136 lb 12.8 oz (62.1 kg)  SpO2 100%  BMI 22.76 kg/m2 Estimated body mass index is 22.76 kg/(m^2) as calculated from the following:    Height as of 8/7/17: 5' 5\" (1.651 m).    Weight as of this encounter: 136 lb 12.8 oz (62.1 kg).  Medication Reconciliation: complete     Mabel Blanco MA  "

## 2017-11-03 NOTE — PROGRESS NOTES
SUBJECTIVE:   Yovana Enriquez is a 42 year old female who presents to clinic today for the following health issues:      Urinary SYMPTOMS     Onset: 2 weeks    Description:   Painful urination (Dysuria): YES  Blood in urine (Hematuria): YES  Frequency/Urgency: YES  Abdominal/Pelvic/Flank Pain : YES    Other vaginal symptoms: none    Progression of Symptoms:  worsening    History:   History of frequent UTI's: no   History of kidney stones: no   Sexually Active: YES  New Partner: no     Possibility of pregnancy: No     Therapies Tried and outcome: none       Problem list and histories reviewed & adjusted, as indicated.  Additional history: as documented    Patient Active Problem List   Diagnosis     Systemic lupus erythematosus (H)     Menorrhagia     History of ovarian cyst     Dysmenorrhea     History of gestational diabetes mellitus, not currently pregnant     Intramural leiomyoma of uterus     Hyperlipidemia LDL goal <130     Subclinical hyperthyroidism     Anxiety     High risk medication use     Fibromyalgia     Chronic constipation     Irritable bowel syndrome     Health Care Home     Hypertension goal BP (blood pressure) < 140/90     Non-celiac gluten sensitivity     Raynaud's phenomenon without gangrene     Sjogren's syndrome (H)     Past Surgical History:   Procedure Laterality Date     COLONOSCOPY N/A 2/20/2015    Procedure: COMBINED COLONOSCOPY, SINGLE OR MULTIPLE BIOPSY/POLYPECTOMY BY BIOPSY;  Surgeon: Fred Cullen MD;  Location:  OR     COLONOSCOPY WITH CO2 INSUFFLATION N/A 2/20/2015    Procedure: COLONOSCOPY WITH CO2 INSUFFLATION;  Surgeon: Fred Cullen MD;  Location:  OR     ENT SURGERY  10-24-14    Bottom lip biopsies     ESOPHAGOSCOPY, GASTROSCOPY, DUODENOSCOPY (EGD), COMBINED N/A 2/20/2015    Procedure: COMBINED ESOPHAGOSCOPY, GASTROSCOPY, DUODENOSCOPY (EGD), BIOPSY SINGLE OR MULTIPLE;  Surgeon: Fred Cullen MD;  Location:  OR      BREATH HYDROGEN  TEST  12/31/2013    Procedure: HYDROGEN BREATH TEST;  Surgeon: Camden Almazan MD;  Location:  GI      HYSTEROSCOPY, SURGICAL; W/ ENDOMETRIAL ABLATION, ANY METHOD  10-19-12     SHOULDER SURGERY Right     R shoulder     TUBAL LIGATION  2004       Social History   Substance Use Topics     Smoking status: Never Smoker     Smokeless tobacco: Never Used      Comment: Lives in smoke free household     Alcohol use No     Family History   Problem Relation Age of Onset     Respiratory Maternal Grandfather      CANCER Maternal Grandfather      Asthma Son      Circulatory Maternal Grandmother      Brain anurysm     Hypertension Mother      Glaucoma Mother 50     drops     Hypertension Sister      Hypertension Sister      DIABETES No family hx of      CEREBROVASCULAR DISEASE No family hx of      Thyroid Disease No family hx of      Macular Degeneration No family hx of          Current Outpatient Prescriptions   Medication Sig Dispense Refill     phenazopyridine (PYRIDIUM) 200 MG tablet Take 1 tablet (200 mg) by mouth 3 times daily as needed for irritation 6 tablet 0     sulfamethoxazole-trimethoprim (BACTRIM DS/SEPTRA DS) 800-160 MG per tablet Take 1 tablet by mouth 2 times daily for 5 days 10 tablet 0     buPROPion (WELLBUTRIN) 75 MG tablet TAKE 1 TABLET BY MOUTH 2 TIMES DAILY 60 tablet 2     nortriptyline (PAMELOR) 10 MG capsule TAKE 3 CAPSULES BY MOUTH AT BEDTIME 90 capsule 2     pregabalin (LYRICA) 150 MG capsule Take 1 capsule (150 mg) by mouth 3 times daily . 3 month supply. 270 capsule 0     albuterol (PROAIR HFA/PROVENTIL HFA/VENTOLIN HFA) 108 (90 BASE) MCG/ACT Inhaler Inhale 2 puffs into the lungs every 6 hours as needed for shortness of breath / dyspnea or wheezing 1 Inhaler 1     amLODIPine (NORVASC) 5 MG tablet Take 1 tablet (5 mg) by mouth daily 90 tablet 1     losartan (COZAAR) 25 MG tablet Take 1 tablet (25 mg) by mouth daily 90 tablet 1     ranitidine (ZANTAC) 300 MG tablet Take 1 tablet (300 mg) by  mouth 2 times daily 180 tablet 3     predniSONE (DELTASONE) 5 MG tablet Take 1 tablet (5 mg) by mouth daily 90 tablet 1     hydroxychloroquine (PLAQUENIL) 200 MG tablet Take 1 tablet (200 mg) by mouth 2 times daily 180 tablet 1     azaTHIOprine (IMURAN) 50 MG tablet Take 3 tablets (150 mg) by mouth daily 270 tablet 1     cevimeline (EVOXAC) 30 MG capsule Take 1 capsule (30 mg) by mouth 2 times daily 180 capsule 1     fluticasone (FLONASE) 50 MCG/ACT nasal spray Spray 1-2 sprays into both nostrils daily 1 Bottle 11     diclofenac (VOLTAREN) 1 % GEL Apply 2-4 grams to knees or shoulders four times daily as needed using enclosed dosing card. 100 g 4     blood glucose test strip Used for testing glucose 2-3 times daily 1 Month 5     Calcium Carb-Cholecalciferol (CALCIUM + D3) 600-200 MG-UNIT TABS 1 tablet daily       Fluticasone Propionate, Inhal, (FLOVENT IN)        MICROLET LANCETS MISC 1 each daily. 100 each 2     Multiple Vitamin (MULTIVITAMIN OR) Take  by mouth.       DPH-Lido-AlHydr-MgHydr-Simeth (FIRST-MOUTHWASH BLM) SUSP Take 5-10 mLs by mouth every 6 hours as needed (Patient not taking: Reported on 11/3/2017) 100 mL 2     No Known Allergies  BP Readings from Last 3 Encounters:   11/03/17 120/82   10/18/17 112/71   10/13/17 113/64    Wt Readings from Last 3 Encounters:   11/03/17 136 lb 12.8 oz (62.1 kg)   10/18/17 139 lb 4.8 oz (63.2 kg)   10/13/17 139 lb 12.8 oz (63.4 kg)                  Labs reviewed in EPIC        Reviewed and updated as needed this visit by clinical staffTobacco  Meds  Med Hx  Surg Hx  Fam Hx  Soc Hx      Reviewed and updated as needed this visit by Provider         ROS:  Constitutional, HEENT, cardiovascular, pulmonary, GI, , musculoskeletal, neuro, skin, endocrine and psych systems are negative, except as otherwise noted.      OBJECTIVE:   /82  Pulse 82  Temp 97.2  F (36.2  C) (Oral)  Wt 136 lb 12.8 oz (62.1 kg)  SpO2 100%  BMI 22.76 kg/m2  Body mass index is 22.76  kg/(m^2).  GENERAL: healthy, alert and no distress  RESP: lungs clear to auscultation - no rales, rhonchi or wheezes  CV: regular rate and rhythm, normal S1 S2, no S3 or S4, no murmur, click or rub, no peripheral edema and peripheral pulses strong  ABDOMEN: soft, nontender, no hepatosplenomegaly, no masses and bowel sounds normal; no CVA tenderness  SKIN: no suspicious lesions or rashes  PSYCH: mentation appears normal, affect normal/bright    Diagnostic Test Results:  See orders    ASSESSMENT/PLAN:         ICD-10-CM    1. Dysuria R30.0 phenazopyridine (PYRIDIUM) 200 MG tablet   2. Acute cystitis with hematuria N30.01 UA reflex to Microscopic and Culture     sulfamethoxazole-trimethoprim (BACTRIM DS/SEPTRA DS) 800-160 MG per tablet     Urine Microscopic     Urine Culture Aerobic Bacterial       See Patient Instructions    WHITNEY Alvarado  [facility] [place]

## 2017-11-03 NOTE — MR AVS SNAPSHOT
"              After Visit Summary   11/3/2017    Yovana Enriquez    MRN: [id number]           Patient Information     Date Of Birth          1975        Visit Information        Provider Department      11/3/2017 11:20 AM Shannon Bowen NP [facility]        Today's Diagnoses     Urinary symptom or sign    -  1    Dysuria        Acute cystitis with hematuria          Care Instructions       * BLADDER INFECTION,Female (Adult)    A bladder infection (\"cystitis\" or \"UTI\") usually causes a constant urge to urinate and a burning when passing urine. Urine may be cloudy, smelly or dark. There may be pain in the lower abdomen. A bladder infection occurs when bacteria from the vaginal area enter the bladder opening (urethra). This can occur from sexual intercourse, wearing tight clothing, dehydration and other factors.  HOME CARE:  1. Drink lots of fluids (at least 6-8 glasses a day, unless you must restrict fluids for other medical reasons). This will force the medicine into your urinary system and flush the bacteria out of your body. Cranberry juice has been shown to help clear out the bacteria.  2. Avoid sexual intercourse until your symptoms are gone.  3. A bladder infection is treated with antibiotics. You may also be given Pyridium (generic = phenazopyridine) to reduce the burning sensation. This medicine will cause your urine to become a bright orange color. The orange urine may stain clothing. You may wear a pad or panty-liner to protect clothing.  PREVENTING FUTURE INFECTIONS:  1. Always wipe from front to back after a bowel movement.  2. Keep the genital area clean and dry.  3. Drink plenty of fluids each day to avoid dehydration.  4. Urinate right after intercourse to flush out the bladder.  5. Wear cotton underwear and cotton-lined panty hose; avoid tight-fitting pants.  6. If you are on birth control pills and are having frequent bladder infections, discuss with your doctor.  FOLLOW UP: " Return to this facility or see your doctor if ALL symptoms are not gone after three days of treatment.  GET PROMPT MEDICAL ATTENTION if any of the following occur:    Fever over 101 F (38.3 C)    No improvement by the third day of treatment    Increasing back or abdominal pain    Repeated vomiting; unable to keep medicine down    Weakness, dizziness or fainting    Vaginal discharge    Pain, redness or swelling in the labia (outer vaginal area)    0241-8833 [employer]. [address] [place] [address], [place] [address]. All rights reserved. This information is not intended as a substitute for professional medical care. Always follow your healthcare professional's instructions.  This information has been modified by your health care provider with permission from the publisher.            Follow-ups after your visit        Follow-up notes from your care team     Return if symptoms worsen or fail to improve.      Your next 10 appointments already scheduled     Nov 06, 2017  3:40 PM CST   [facility]    [address] [facility] [id number] [phone]            Nov 08, 2017  2:00 PM CST   [facility] Ruma [facility]    [address] [facility] [address] [phone]            Nov 10, 2017  1:30 PM CST   Level 2 with BAY 6 INFUSION   [facility]    [address] [address] [place] [address]   [phone]            Dec 08, 2017  1:30 PM CST   Level 2 with BAY 3 INFUSION   [facility]    [address] [address] [place] [address]   [phone]            Elias 10, 2018  1:40 PM CST   (Arrive by 1:25 PM)   Return Visit with Inez Sawyer MD   [facility] ([facility])    [address] [place] [address]  [facility] [address] [phone]              Who to contact     Normal or non-critical lab and imaging results will be communicated to you by [employer], letter or phone within 4 business days after the clinic has received the results. If you do not hear from us within 7 days, please contact the clinic through [employer] or phone. If you have a critical or abnormal lab result, we will notify you by phone as soon as possible.  Submit refill requests through [employer] or call your pharmacy and they will forward the refill request to us. Please allow 3 business days for your refill to be completed.          If you need to speak with a  for additional information , please call: [phone]             Additional Information About Your Visit        [employer] Information     [employer] gives you secure access to your electronic health record. If you see a primary care provider, you can also send messages to your care team and make appointments. If you have questions, please call your primary care clinic.  If you do not have a primary care provider, please call [phone] and they will assist you.        Care EveryWhere ID     This is your Care EveryWhere ID. This could be used by other organizations to access your [place] medical records  [id number]        Your Vitals Were     Pulse Temperature Pulse Oximetry BMI (Body Mass Index)          82 97.2  F (36.2  C) (Oral) 100% 22.76 kg/m2         Blood Pressure from Last 3 Encounters:   11/03/17 120/82   10/18/17 112/71   10/13/17 113/64    Weight from Last 3 Encounters:   11/03/17 136 lb 12.8 oz (62.1 kg)   10/18/17 139 lb 4.8 oz (63.2 kg)   10/13/17 139 lb 12.8 oz (63.4 kg)              We Performed the Following     UA reflex to Microscopic and Culture     Urine Microscopic          Today's Medication Changes          These changes are accurate as of: 11/3/17 11:45 AM.  If you have any questions, ask your nurse or doctor.               Start taking  these medicines.        Dose/Directions    phenazopyridine 200 MG tablet   Commonly known as:  PYRIDIUM   Used for:  Dysuria   Started by:  Shannon Bowen NP        Dose:  200 mg   Take 1 tablet (200 mg) by mouth 3 times daily as needed for irritation   Quantity:  6 tablet   Refills:  0       sulfamethoxazole-trimethoprim 800-160 MG per tablet   Commonly known as:  BACTRIM DS/SEPTRA DS   Used for:  Acute cystitis with hematuria   Started by:  Shannon Bowen NP        Dose:  1 tablet   Take 1 tablet by mouth 2 times daily for 5 days   Quantity:  10 tablet   Refills:  0            Where to get your medicines      These medications were sent to [place] [address] [facility] [place] - [address]  [address] [place] [address]     Phone:  [phone]     phenazopyridine 200 MG tablet    sulfamethoxazole-trimethoprim 800-160 MG per tablet                Primary Care Provider Office Phone # Fax #    Bradford Mario PA-C [phone]       [address] [facility] [address]  [place] [address]        Equal Access to Services     [facility]: Hadii aad ku hadasho Soomaali, waaxda luqadaha, qaybta kaalmada adeegyada, waxay elliot haytwin scott . So [facility] [phone].    ATENCIÓN: Si habla español, tiene a dexter disposición servicios gratuitos de asistencia lingüística. [facility] [phone].    We comply with applicable federal civil rights laws and [place] laws. We do not discriminate on the basis of race, color, national origin, age, disability, sex, sexual orientation, or gender identity.            Thank you!     Thank you for choosing [facility]  for your care. Our goal is always to provide you with excellent care. Hearing back from our patients is one way we can continue to improve our services. Please take a few minutes to complete the written survey that you may receive in the mail after your visit with us. Thank you!             Your Updated Medication List - Protect others  around you: Learn how to safely use, store and throw away your medicines at www.disposemymeds.org.          This list is accurate as of: 11/3/17 11:45 AM.  Always use your most recent med list.                   Brand Name Dispense Instructions for use Diagnosis    albuterol 108 (90 BASE) MCG/ACT Inhaler    PROAIR HFA/PROVENTIL HFA/VENTOLIN HFA    1 Inhaler    Inhale 2 puffs into the lungs every 6 hours as needed for shortness of breath / dyspnea or wheezing    Acute bronchitis, unspecified organism       amLODIPine 5 MG tablet    NORVASC    90 tablet    Take 1 tablet (5 mg) by mouth daily    Raynaud's phenomenon without gangrene       azaTHIOprine 50 MG tablet    IMURAN    270 tablet    Take 3 tablets (150 mg) by mouth daily    Other systemic lupus erythematosus with other organ involvement (H)       blood glucose monitoring test strip    no brand specified    1 Month    Used for testing glucose 2-3 times daily    History of gestational diabetes mellitus, not currently pregnant       buPROPion 75 MG tablet    WELLBUTRIN    60 tablet    TAKE 1 TABLET BY MOUTH 2 TIMES DAILY    Anxiety       Calcium + D3 600-200 MG-UNIT Tabs      1 tablet daily        cevimeline 30 MG capsule    EVOXAC    180 capsule    Take 1 capsule (30 mg) by mouth 2 times daily    Other systemic lupus erythematosus with other organ involvement (H), Sjogren's syndrome with keratoconjunctivitis sicca (H)       diclofenac 1 % Gel topical gel    VOLTAREN    100 g    Apply 2-4 grams to knees or shoulders four times daily as needed using enclosed dosing card.    Fibromyalgia, SLE (systemic lupus erythematosus) (H)       FIRST-MOUTHWASH BLM MT Susp compounding kit     100 mL    Take 5-10 mLs by mouth every 6 hours as needed    Oral ulcer       FLOVENT IN           fluticasone 50 MCG/ACT spray    FLONASE    1 Bottle    Spray 1-2 sprays into both nostrils daily    Chronic rhinitis       hydroxychloroquine 200 MG tablet    PLAQUENIL    180 tablet    Take 1  tablet (200 mg) by mouth 2 times daily    Other systemic lupus erythematosus with other organ involvement (H), High risk medication use       losartan 25 MG tablet    COZAAR    90 tablet    Take 1 tablet (25 mg) by mouth daily    Hypertension goal BP (blood pressure) < 140/90       MICROLET LANCETS Misc     100 each    1 each daily.    Other abnormal glucose       MULTIVITAMIN PO      Take  by mouth.        nortriptyline 10 MG capsule    PAMELOR    90 capsule    TAKE 3 CAPSULES BY MOUTH AT BEDTIME    Numbness       phenazopyridine 200 MG tablet    PYRIDIUM    6 tablet    Take 1 tablet (200 mg) by mouth 3 times daily as needed for irritation    Dysuria       predniSONE 5 MG tablet    DELTASONE    90 tablet    Take 1 tablet (5 mg) by mouth daily    Other systemic lupus erythematosus with other organ involvement (H)       pregabalin 150 MG capsule    LYRICA    270 capsule    Take 1 capsule (150 mg) by mouth 3 times daily . 3 month supply.    Fibromyalgia       ranitidine 300 MG tablet    ZANTAC    180 tablet    Take 1 tablet (300 mg) by mouth 2 times daily    Gastroesophageal reflux disease without esophagitis       sulfamethoxazole-trimethoprim 800-160 MG per tablet    BACTRIM DS/SEPTRA DS    10 tablet    Take 1 tablet by mouth 2 times daily for 5 days    Acute cystitis with hematuria

## 2017-11-03 NOTE — PATIENT INSTRUCTIONS
"   * BLADDER INFECTION,Female (Adult)    A bladder infection (\"cystitis\" or \"UTI\") usually causes a constant urge to urinate and a burning when passing urine. Urine may be cloudy, smelly or dark. There may be pain in the lower abdomen. A bladder infection occurs when bacteria from the vaginal area enter the bladder opening (urethra). This can occur from sexual intercourse, wearing tight clothing, dehydration and other factors.  HOME CARE:  1. Drink lots of fluids (at least 6-8 glasses a day, unless you must restrict fluids for other medical reasons). This will force the medicine into your urinary system and flush the bacteria out of your body. Cranberry juice has been shown to help clear out the bacteria.  2. Avoid sexual intercourse until your symptoms are gone.  3. A bladder infection is treated with antibiotics. You may also be given Pyridium (generic = phenazopyridine) to reduce the burning sensation. This medicine will cause your urine to become a bright orange color. The orange urine may stain clothing. You may wear a pad or panty-liner to protect clothing.  PREVENTING FUTURE INFECTIONS:  1. Always wipe from front to back after a bowel movement.  2. Keep the genital area clean and dry.  3. Drink plenty of fluids each day to avoid dehydration.  4. Urinate right after intercourse to flush out the bladder.  5. Wear cotton underwear and cotton-lined panty hose; avoid tight-fitting pants.  6. If you are on birth control pills and are having frequent bladder infections, discuss with your doctor.  FOLLOW UP: Return to this facility or see your doctor if ALL symptoms are not gone after three days of treatment.  GET PROMPT MEDICAL ATTENTION if any of the following occur:    Fever over 101 F (38.3 C)    No improvement by the third day of treatment    Increasing back or abdominal pain    Repeated vomiting; unable to keep medicine down    Weakness, dizziness or fainting    Vaginal discharge    Pain, redness or swelling in " the labia (outer vaginal area)    7914-5321 [employer]. [address] [place] [address], [place] [address]. All rights reserved. This information is not intended as a substitute for professional medical care. Always follow your healthcare professional's instructions.  This information has been modified by your health care provider with permission from the publisher.

## 2017-11-04 LAB
BACTERIA SPEC CULT: NORMAL
SPECIMEN SOURCE: NORMAL

## 2017-11-06 ENCOUNTER — OFFICE VISIT (OUTPATIENT)
Dept: RHEUMATOLOGY | Facility: CLINIC | Age: 42
End: 2017-11-06
Payer: COMMERCIAL

## 2017-11-06 VITALS
WEIGHT: 137 LBS | BODY MASS INDEX: 22.82 KG/M2 | HEART RATE: 85 BPM | SYSTOLIC BLOOD PRESSURE: 116 MMHG | DIASTOLIC BLOOD PRESSURE: 80 MMHG | HEIGHT: 65 IN | OXYGEN SATURATION: 100 %

## 2017-11-06 DIAGNOSIS — Z79.899 HIGH RISK MEDICATION USE: ICD-10-CM

## 2017-11-06 DIAGNOSIS — M35.01 SJOGREN'S SYNDROME WITH KERATOCONJUNCTIVITIS SICCA (H): ICD-10-CM

## 2017-11-06 DIAGNOSIS — M32.19 OTHER SYSTEMIC LUPUS ERYTHEMATOSUS WITH OTHER ORGAN INVOLVEMENT (H): Primary | ICD-10-CM

## 2017-11-06 DIAGNOSIS — Z23 NEED FOR PROPHYLACTIC VACCINATION AGAINST STREPTOCOCCUS PNEUMONIAE (PNEUMOCOCCUS): ICD-10-CM

## 2017-11-06 PROCEDURE — 90471 IMMUNIZATION ADMIN: CPT | Performed by: INTERNAL MEDICINE

## 2017-11-06 PROCEDURE — 99213 OFFICE O/P EST LOW 20 MIN: CPT | Mod: 25 | Performed by: INTERNAL MEDICINE

## 2017-11-06 PROCEDURE — 90732 PPSV23 VACC 2 YRS+ SUBQ/IM: CPT | Performed by: INTERNAL MEDICINE

## 2017-11-06 RX ORDER — CEVIMELINE HYDROCHLORIDE 30 MG/1
30 CAPSULE ORAL 2 TIMES DAILY
Qty: 180 CAPSULE | Refills: 1 | Status: SHIPPED | OUTPATIENT
Start: 2017-11-06 | End: 2018-02-05

## 2017-11-06 RX ORDER — AZATHIOPRINE 50 MG/1
150 TABLET ORAL DAILY
Qty: 270 TABLET | Refills: 1 | Status: SHIPPED | OUTPATIENT
Start: 2017-11-06 | End: 2018-02-05

## 2017-11-06 RX ORDER — PREDNISONE 2.5 MG/1
2.5 TABLET ORAL DAILY
Qty: 30 TABLET | Refills: 0 | Status: SHIPPED | OUTPATIENT
Start: 2017-11-06 | End: 2017-12-06

## 2017-11-06 RX ORDER — HYDROXYCHLOROQUINE SULFATE 200 MG/1
200 TABLET, FILM COATED ORAL DAILY
Qty: 180 TABLET | Refills: 1 | Status: SHIPPED | OUTPATIENT
Start: 2017-11-06 | End: 2018-01-03

## 2017-11-06 NOTE — NURSING NOTE
"Chief Complaint   Patient presents with     Systemic Lupus Erythematous     patient states she has a little pain, stiffness and tired       Initial /80 (BP Location: Left arm, Patient Position: Chair, Cuff Size: Adult Regular)  Pulse 85  Ht 1.651 m (5' 5\")  Wt 62.1 kg (137 lb)  SpO2 100%  BMI 22.8 kg/m2 Estimated body mass index is 22.8 kg/(m^2) as calculated from the following:    Height as of this encounter: 1.651 m (5' 5\").    Weight as of this encounter: 62.1 kg (137 lb).  BP completed using cuff size: regular         RAPID3 (0-30) Cumulative Score  7.2          RAPID3 Weighted Score (divide #4 by 3 and that is the weighted score)  2.4         "

## 2017-11-06 NOTE — NURSING NOTE
Screening Questionnaire for Adult Immunization    Are you sick today?   No   Do you have allergies to medications, food, a vaccine component or latex?   No   Have you ever had a serious reaction after receiving a vaccination?   No   Do you have a long-term health problem with heart disease, lung disease, asthma, kidney disease, metabolic disease (e.g. diabetes), anemia, or other blood disorder?   No   Do you have cancer, leukemia, HIV/AIDS, or any other immune system problem?   No   In the past 3 months, have you taken medications that affect  your immune system, such as prednisone, other steroids, or anticancer drugs; drugs for the treatment of rheumatoid arthritis, Crohn s disease, or psoriasis; or have you had radiation treatments?   No   Have you had a seizure, or a brain or other nervous system problem?   No   During the past year, have you received a transfusion of blood or blood     products, or been given immune (gamma) globulin or antiviral drug?   No   For women: Are you pregnant or is there a chance you could become        pregnant during the next month?   No   Have you received any vaccinations in the past 4 weeks?   No     Immunization questionnaire answers were all negative.        Per orders of Dr. Colvin, injection of Pneumovax 23 given by Qing Zapata. Patient instructed to remain in clinic for 15 minutes afterwards, and to report any adverse reaction to me immediately.       Screening performed by Qing Zapata on 11/6/2017 at 4:33 PM.

## 2017-11-06 NOTE — MR AVS SNAPSHOT
After Visit Summary   11/6/2017    Yovana Enriquez    MRN: [id number]           Patient Information     Date Of Birth          1975        Visit Information        Provider Department      11/6/2017 3:40 PM Bradford Colvin MD [facility]        Today's Diagnoses     Other systemic lupus erythematosus with other organ involvement (H)        Sjogren's syndrome with keratoconjunctivitis sicca (H)        High risk medication use           Follow-ups after your visit        Your next 10 appointments already scheduled     Nov 08, 2017  2:00 PM CST   New Visit with Ruma Randolph OD   [facility] ([facility])    [address] [facility] [address] [phone]            Nov 10, 2017  1:30 PM CST   Level 2 with [place] 6 INFUSION   [facility] ([facility])    [address] [place] [id number] [phone]            Dec 08, 2017  1:30 PM CST   Level 2 with [place] 3 INFUSION   [facility] ([facility])    [address] [place] [address]   [phone]            Elias 10, 2018  1:40 PM CST   (Arrive by 1:25 PM)   Return Visit with Inez Sawyer MD   [facility] ([facility])    [address] [place] [id number] [phone]            Jan 29, 2018  6:00 PM CST   LAB with BE LAB   [facility]    [address] [facility] [id number] [phone]           Please do not eat 10-12 hours before your appointment if you are coming in fasting for labs on lipids, cholesterol, or glucose (sugar). This does not apply to pregnant women. Water, hot tea and black coffee (with nothing added) are okay. Do not drink other fluids, diet soda or chew gum.            Feb 05, 2018 10:20 AM CST   Return Visit with Bradford Colvin MD   [facility]  [place] ([facility] [place])    [address] [facility]  [place] [address] [phone]              Future tests that were ordered for you today     Open Future Orders        Priority Expected Expires Ordered    CBC with platelets differential Routine 1/30/2018 2/16/2018 11/6/2017    CK total Routine 1/30/2018 2/16/2018 11/6/2017    Complement C3 Routine 1/30/2018 2/16/2018 11/6/2017    DNA double stranded antibodies Routine 1/30/2018 2/16/2018 11/6/2017    CRP inflammation Routine 1/30/2018 2/16/2018 11/6/2017    Comprehensive metabolic panel Routine 1/30/2018 2/16/2018 11/6/2017    Complement C4 Routine 1/30/2018 2/16/2018 11/6/2017    Erythrocyte sedimentation rate auto Routine 1/30/2018 2/16/2018 11/6/2017    Protein  random urine with Creat Ratio Routine 1/30/2018 2/16/2018 11/6/2017    UA with Microscopic reflex to Culture Routine 1/30/2018 2/16/2018 11/6/2017            Who to contact     If you have questions or need follow up information about today's clinic visit or your schedule please contact [facility] [place] directly at [phone].  Normal or non-critical lab and imaging results will be communicated to you by [employer], letter or phone within 4 business days after the clinic has received the results. If you do not hear from us within 7 days, please contact the clinic through [employer] or phone. If you have a critical or abnormal lab result, we will notify you by phone as soon as possible.  Submit refill requests through [employer] or call your pharmacy and they will forward the refill request to us. Please allow 3 business days for your refill to be completed.          Additional Information About Your Visit        [employer] Information     [employer] gives you secure access to your electronic health record. If you see a primary care provider, you can also send messages to your care team and make appointments. If you have questions, please call your primary care clinic.  If you do not have a  "primary care provider, please call [phone] and they will assist you.        Care EveryWhere ID     This is your Care EveryWhere ID. This could be used by other organizations to access your [place] medical records  [id number]        Your Vitals Were     Pulse Height Pulse Oximetry BMI (Body Mass Index)          85 1.651 m (5' 5\") 100% 22.8 kg/m2         Blood Pressure from Last 3 Encounters:   11/06/17 116/80   11/03/17 120/82   10/18/17 112/71    Weight from Last 3 Encounters:   11/06/17 62.1 kg (137 lb)   11/03/17 62.1 kg (136 lb 12.8 oz)   10/18/17 63.2 kg (139 lb 4.8 oz)                 Today's Medication Changes          These changes are accurate as of: 11/6/17  4:30 PM.  If you have any questions, ask your nurse or doctor.               These medicines have changed or have updated prescriptions.        Dose/Directions    hydroxychloroquine 200 MG tablet   Commonly known as:  PLAQUENIL   This may have changed:  when to take this   Used for:  Other systemic lupus erythematosus with other organ involvement (H)   Changed by:  Bradford Colvin MD        Dose:  200 mg   Take 1 tablet (200 mg) by mouth daily   Quantity:  180 tablet   Refills:  1       predniSONE 2.5 MG tablet   Commonly known as:  DELTASONE   This may have changed:    - medication strength  - how much to take   Used for:  Other systemic lupus erythematosus with other organ involvement (H)   Changed by:  Bradford Colvin MD        Dose:  2.5 mg   Take 1 tablet (2.5 mg) by mouth daily   Quantity:  30 tablet   Refills:  0            Where to get your medicines      These medications [id number] [place] - [address]  [address] [place] [address]     Phone:  [phone]     azaTHIOprine 50 MG tablet    cevimeline 30 MG capsule    hydroxychloroquine 200 MG tablet    predniSONE 2.5 MG tablet                Primary Care Provider Office Phone # Fax #    Bradford Mario PA-C [phone] [address] [facility] [place] [address]        Equal Access to Services     BINTA HURD : Hadii aad ku hadjoceo Sobeali, waaxda luqadaha, qaybta kaalmada baldemar, rosy ordazmirandamaximus scott . So [facility] [phone].    ATENCIÓN: Si habla español, tiene a dexter disposición servicios gratuitos de asistencia lingüística. [facility] [phone].    We comply with applicable federal civil rights laws and [place] laws. We do not discriminate on the basis of race, color, national origin, age, disability, sex, sexual orientation, or gender identity.            Thank you!     Thank you for choosing [facility]  for your care. Our goal is always to provide you with excellent care. Hearing back from our patients is one way we can continue to improve our services. Please take a few minutes to complete the written survey that you may receive in the mail after your visit with us. Thank you!             Your Updated Medication List - Protect others around you: Learn how to safely use, store and throw away your medicines at www.disposemymeds.org.          This list is accurate as of: 11/6/17  4:30 PM.  Always use your most recent med list.                   Brand Name Dispense Instructions for use Diagnosis    albuterol 108 (90 BASE) MCG/ACT Inhaler    PROAIR HFA/PROVENTIL HFA/VENTOLIN HFA    1 Inhaler    Inhale 2 puffs into the lungs every 6 hours as needed for shortness of breath / dyspnea or wheezing    Acute bronchitis, unspecified organism       amLODIPine 5 MG tablet    NORVASC    90 tablet    Take 1 tablet (5 mg) by mouth daily    Raynaud's phenomenon without gangrene       azaTHIOprine 50 MG tablet    IMURAN    270 tablet    Take 3 tablets (150 mg) by mouth daily    Other systemic lupus erythematosus with other organ involvement (H)       blood glucose monitoring test strip    no brand specified    1 Month    Used for testing glucose 2-3 times daily    History of gestational diabetes mellitus, not currently  pregnant       buPROPion 75 MG tablet    WELLBUTRIN    60 tablet    TAKE 1 TABLET BY MOUTH 2 TIMES DAILY    Anxiety       Calcium + D3 600-200 MG-UNIT Tabs      1 tablet daily        cevimeline 30 MG capsule    EVOXAC    180 capsule    Take 1 capsule (30 mg) by mouth 2 times daily    Sjogren's syndrome with keratoconjunctivitis sicca (H)       diclofenac 1 % Gel topical gel    VOLTAREN    100 g    Apply 2-4 grams to knees or shoulders four times daily as needed using enclosed dosing card.    Fibromyalgia, SLE (systemic lupus erythematosus) (H)       FIRST-MOUTHWASH BLM MT Susp compounding kit     100 mL    Take 5-10 mLs by mouth every 6 hours as needed    Oral ulcer       FLOVENT IN           fluticasone 50 MCG/ACT spray    FLONASE    1 Bottle    Spray 1-2 sprays into both nostrils daily    Chronic rhinitis       hydroxychloroquine 200 MG tablet    PLAQUENIL    180 tablet    Take 1 tablet (200 mg) by mouth daily    Other systemic lupus erythematosus with other organ involvement (H)       losartan 25 MG tablet    COZAAR    90 tablet    Take 1 tablet (25 mg) by mouth daily    Hypertension goal BP (blood pressure) < 140/90       MICROLET LANCETS Misc     100 each    1 each daily.    Other abnormal glucose       MULTIVITAMIN PO      Take  by mouth.        nortriptyline 10 MG capsule    PAMELOR    90 capsule    TAKE 3 CAPSULES BY MOUTH AT BEDTIME    Numbness       phenazopyridine 200 MG tablet    PYRIDIUM    6 tablet    Take 1 tablet (200 mg) by mouth 3 times daily as needed for irritation    Dysuria       predniSONE 2.5 MG tablet    DELTASONE    30 tablet    Take 1 tablet (2.5 mg) by mouth daily    Other systemic lupus erythematosus with other organ involvement (H)       pregabalin 150 MG capsule    LYRICA    270 capsule    Take 1 capsule (150 mg) by mouth 3 times daily . 3 month supply.    Fibromyalgia       ranitidine 300 MG tablet    ZANTAC    180 tablet    Take 1 tablet (300 mg) by mouth 2 times daily     Gastroesophageal reflux disease without esophagitis       sulfamethoxazole-trimethoprim 800-160 MG per tablet    BACTRIM DS/SEPTRA DS    10 tablet    Take 1 tablet by mouth 2 times daily for 5 days    Acute cystitis with hematuria

## 2017-11-06 NOTE — PROGRESS NOTES
Rheumatology Clinic Visit      Yovana Enriquez MRN# [id number]   YOB: 1975 Age: 42 year old      Date of visit: 11/06/17   PCP: Bradford Mario    Chief Complaint   Patient presents with:  Systemic Lupus Erythematous: patient states she has a little pain, stiffness and tired      Assessment and Plan     1. Systemic lupus erythematosus (YANELIS by EIA positive in the past, leukopenia/lymphocytopenia, hypocomplementemia, polyarthralgias, oral sores, history of malar rash, photophobia, photosensitivity, Raynaud's Phenomenon): Previously on methotrexate that was discontinued for unclear reasons but the patient reports that she tolerated it well. Currently on azathioprine 150 mg (2.40mg/kg; calculated 11/6/2017), hydroxychloroquine 400 mg daily, prednisone 5 mg daily, and Benlysta.  Note that Benlysta was previously discontinued because there was concern that her mild leukopenia may represent a flare of lupus and rituximab was going to be considered; however, the leukopenia was mild and although she improved with prednisone it is unclear if she was truly having a lupus flare or if her leukocyte count was varying in the range that she typically varies. Given that she was not having any other symptoms for this and her leukocyte count improved, Benlysta was restarted. Later, she wanted to discontinue Benlysta but she had worsening of her symptoms and therefore was restarted with improvement of her symptoms again. Overall, stable disease.  Labs reviewed with Ms. Enriquez today.  Reduce prednisone and HCQ as noted below.  If still doing well at f/u then will plan to reduce AZA.  - Continue azathioprine 150 mg daily  - Continue Evoxac 30 mg twice a day  - Reduce hydroxychloroquine to 200mg daily (toxicity monitoring eye exam last done on 11/28/2016; reportedly planned to repeat next week)  - Reduce prednisone to 2.5mg daily x30days, then stop.   - Continue Benlysta infusions   - Labs 1 week prior to the next  rheumatology clinic visit: CBC, CMP, ESR, CRP, CK, C3, C4, dsDNA, UA, Uprotein:creatinine    2. Secondary Sjogren syndrome: Doing well with Evoxac. Dry eyes are not much of an issue currently. See #1.    3. Raynaud's Phenomenon: Amlodipine 5mg daily during colder months was just started by Ms. Enriquez last week.   - Continue amlodipine 5mg daily (may discontinue during warmer months)    4. Bilateral trochanteric bursitis: She receives steroid injections from the pain clinic.    5. Fibromyalgia: Managed by the pain clinic and PCP.    6. Vaccinations: Vaccinations reviewed with Ms. Enriquez.  Risks and benefits of vaccinations were discussed.  - Influenza: up to date  - [id number]: up to date  - [id number]: will receive today    Ms. Enriquez verbalized agreement with and understanding of the rational for the diagnosis and treatment plan.  All questions were answered to best of my ability and the patient's satisfaction. Ms. Enriquez was advised to contact the clinic with any questions that may arise after the clinic visit.      Thank you for involving me in the care of the patient    Return to clinic: 3 months      HPI   Yovana Enriquez is a 42 year old female with medical history significant for subclinical hyperthyroidism, hypertension, irritable bowel syndrome, fibromyalgia, hypertension, non-celiac gluten sensitivity (reportedly with bowel biopsies and laboratory workup that did not show celiac disease), anxiety, and systemic lupus erythematosus who presents for follow-up of SLE.    Today, Ms. Enriquez reports that she is doing well.  Shoulder injection at the last visit was very effective.  Joint pains are minimal.  Morning stiffness for no more than 30-45 minutes.  She is still varying her diet as she finds that certain foods, not well identified though, seem to affect her a lot.  She reports already having GI and Allergy workup.  Raynaud's phenomenon is tx'd with amlodipine, just restarted last week since it is  getting colder.     Denies fevers, chills, nausea, vomiting. No abdominal pain.  No chest pain/pressure, palpitations, or shortness of breath. No neck pain. No rash currently. No LE swelling.  She has photosensitivity and photophobia.   Sicca symptoms are well-controlled with Evoxac and frequent sips of water. No eye pain or redness. No history of serositis. No history of DVT, pulmonary embolism, or miscarriage.    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  PULM: No SOB, wheeze, cough.  GI:  See history of present illness. No blood in stool. No abdominal pain, nausea, vomiting.  : No blood in urine.  MSK: See HPI.  NEURO: See history of present illness  EXT: No LE swelling    Active Problem List     Patient Active Problem List   Diagnosis     Systemic lupus erythematosus (H)     Menorrhagia     History of ovarian cyst     Dysmenorrhea     History of gestational diabetes mellitus, not currently pregnant     Intramural leiomyoma of uterus     Hyperlipidemia LDL goal <130     Subclinical hyperthyroidism     Anxiety     High risk medication use     Fibromyalgia     Chronic constipation     Irritable bowel syndrome     Health Care Home     Hypertension goal BP (blood pressure) < 140/90     Non-celiac gluten sensitivity     Raynaud's phenomenon without gangrene     Sjogren's syndrome (H)     Past Medical History     Past Medical History:   Diagnosis Date     Chronic constipation 12/16/2013     Dysmenorrhea 10/1/2012     Fibromyalgia 11/15/2013     Health Care Home 3/19/2014    **See Letters for [facility] Care Plan: Emergency Care Plan       High risk medication use 9/13/2013     History of gestational diabetes mellitus, not currently pregnant 10/1/2012     History of ovarian cyst  10/1/2012     Hyperlipidemia LDL goal <130 10/18/2012     Hypertension goal BP (blood pressure) < 140/90 1/19/2015     Intramural leiomyoma of uterus 10/5/2012     Irritable bowel syndrome 3/11/2014    Patient states no history colonoscopy       Menorrhagia 10/1/2012     Non-celiac gluten sensitivity 2/8/2016     PONV (postoperative nausea and vomiting)      Subclinical hyperthyroidism 1/17/2013     Systemic lupus erythematosus (H) 4/23/2012     Past Surgical History     Past Surgical History:   Procedure Laterality Date     COLONOSCOPY N/A 2/20/2015    Procedure: COMBINED COLONOSCOPY, SINGLE OR MULTIPLE BIOPSY/POLYPECTOMY BY BIOPSY;  Surgeon: Fred Cullen MD;  Location: [facility]     COLONOSCOPY WITH CO2 INSUFFLATION N/A 2/20/2015    Procedure: COLONOSCOPY WITH CO2 INSUFFLATION;  Surgeon: Fred Cullen MD;  Location: [facility]     ENT SURGERY  10-24-14    Bottom lip biopsies     ESOPHAGOSCOPY, GASTROSCOPY, DUODENOSCOPY (EGD), COMBINED N/A 2/20/2015    Procedure: COMBINED ESOPHAGOSCOPY, GASTROSCOPY, DUODENOSCOPY (EGD), BIOPSY SINGLE OR MULTIPLE;  Surgeon: Fred Cullen MD;  Location: [facility] OR     HC BREATH HYDROGEN TEST  12/31/2013    Procedure: HYDROGEN BREATH TEST;  Surgeon: Camden Almazan MD;  Location: [facility]     HC HYSTEROSCOPY, SURGICAL; W/ ENDOMETRIAL ABLATION, ANY METHOD  10-19-12     SHOULDER SURGERY Right     R shoulder     TUBAL LIGATION  2004     Allergy   No Known Allergies  Current Medication List     Current Outpatient Prescriptions   Medication Sig     phenazopyridine (PYRIDIUM) 200 MG tablet Take 1 tablet (200 mg) by mouth 3 times daily as needed for irritation     sulfamethoxazole-trimethoprim (BACTRIM DS/SEPTRA DS) 800-160 MG per tablet Take 1 tablet by mouth 2 times daily for 5 days     buPROPion (WELLBUTRIN) 75 MG tablet TAKE 1 TABLET BY MOUTH 2 TIMES DAILY     nortriptyline (PAMELOR) 10 MG capsule TAKE 3 CAPSULES BY MOUTH AT BEDTIME     pregabalin (LYRICA) 150 MG  capsule Take 1 capsule (150 mg) by mouth 3 times daily . 3 month supply.     albuterol (PROAIR HFA/PROVENTIL HFA/VENTOLIN HFA) 108 (90 BASE) MCG/ACT Inhaler Inhale 2 puffs into the lungs every 6 hours as needed for shortness of breath / dyspnea or wheezing     amLODIPine (NORVASC) 5 MG tablet Take 1 tablet (5 mg) by mouth daily     losartan (COZAAR) 25 MG tablet Take 1 tablet (25 mg) by mouth daily     ranitidine (ZANTAC) 300 MG tablet Take 1 tablet (300 mg) by mouth 2 times daily     predniSONE (DELTASONE) 5 MG tablet Take 1 tablet (5 mg) by mouth daily     hydroxychloroquine (PLAQUENIL) 200 MG tablet Take 1 tablet (200 mg) by mouth 2 times daily     azaTHIOprine (IMURAN) 50 MG tablet Take 3 tablets (150 mg) by mouth daily     cevimeline (EVOXAC) 30 MG capsule Take 1 capsule (30 mg) by mouth 2 times daily     fluticasone (FLONASE) 50 MCG/ACT nasal spray Spray 1-2 sprays into both nostrils daily     diclofenac (VOLTAREN) 1 % GEL Apply 2-4 grams to knees or shoulders four times daily as needed using enclosed dosing card.     blood glucose test strip Used for testing glucose 2-3 times daily     Calcium Carb-Cholecalciferol (CALCIUM + D3) 600-200 MG-UNIT TABS 1 tablet daily     Fluticasone Propionate, Inhal, (FLOVENT IN)      MICROLET LANCETS MISC 1 each daily.     Multiple Vitamin (MULTIVITAMIN OR) Take  by mouth.     DPH-Lido-AlHydr-MgHydr-Simeth (FIRST-MOUTHWASH BLM) SUSP Take 5-10 mLs by mouth every 6 hours as needed (Patient not taking: Reported on 11/3/2017)     No current facility-administered medications for this visit.          Social History   See HPI    Family History     Family History   Problem Relation Age of Onset     Respiratory Maternal Grandfather      CANCER Maternal Grandfather      Asthma Son      Circulatory Maternal Grandmother      Brain anurysm     Hypertension Mother      Glaucoma Mother 50     drops     Hypertension Sister      Hypertension Sister      DIABETES No family hx of       "CEREBROVASCULAR DISEASE No family hx of      Thyroid Disease No family hx of      Macular Degeneration No family hx of        Physical Exam     Temp Readings from Last 3 Encounters:   11/03/17 97.2  F (36.2  C) (Oral)   10/18/17 98.6  F (37  C) (Oral)   10/13/17 97.5  F (36.4  C) (Oral)     BP Readings from Last 5 Encounters:   11/06/17 116/80   11/03/17 120/82   10/18/17 112/71   10/13/17 113/64   09/15/17 136/86     Pulse Readings from Last 1 Encounters:   11/06/17 85     Resp Readings from Last 1 Encounters:   10/13/17 18     Estimated body mass index is 22.8 kg/(m^2) as calculated from the following:    Height as of this encounter: 1.651 m (5' 5\").    Weight as of this encounter: 62.1 kg (137 lb).    GEN: NAD  HEENT: MMM. No oral lesions. Anicteric, noninjected sclera  CV: S1, S2. RRR. No m/r/g.  PULM: CTA bilaterally. No w/c.  MSK:  Hands, wrists, elbows, shoulders, knees, ankles, and MTPs without swelling or tenderness to palpation. Bilateral hips tender to direct palpation.   NEURO: UE and LE strengths 5/5 and equal bilaterally.   SKIN: No rash. No evidence of current or past ischemic insults to the distal fingertips by visual inspection.   EXT: No LE edema  PSYCH: Alert. Appropriate.    Labs / Imaging (select studies)   RF/CCP  Recent Labs   Lab Test  09/13/13   1504  02/13/12   1404   CCPABY  <20 Interpretation:  Negative  <20 Interpretation:  Negative   RHF  12   --      YANELIS  Recent Labs   Lab Test  07/25/16   1433   ANAIGG  <1:40  Reference range: <1:40  (Note)  <1:40  INTERPRETIVE INFORMATION: YANELIS by IFA, IgG  Anti-nuclear antibodies (YANELIS) are seen in a variety of  systemic rheumatic diseases and are determined by indirect  fluorescence assay (IFA) using HEp-2 substrate with an  IgG-specific conjugate. YANELIS titers less than or equal to  1:80 have variable relevance while titers greater than or  equal to 1:160 are considered clinically significant. These  antibodies may precede clinical disease onset; " however,  healthy individuals and those with advanced age have been  reported to be positive for YANELIS. When observed, one of the  five basic patterns is reported: homogeneous,  peripheral/rim, speckled, centromere, or nucleolar. If  cytoplasmic fluorescence is observed, it is noted. IFA  methodology is subjective and has occasionally been shown  to lack sensitivity for anti-SSA/Ro antibodies.  Negative results do not necessarily rule out the presence  of SSc. If clinical suspicion remains, consider further  te sting for U3-RNP, PM/Scl, or Th/To antibodies associated  with SSc.  Performed by [email or web address],  [address] [place] [id number] [phone]  [email or web address], Alcon Krishna MD, Lab. Director       RNP/Sm/SSA/SSB  Recent Labs   Lab Test  03/23/16   1759  01/04/16   1806  07/01/14   1211  09/13/13   1504  02/13/12   1404   RNPIGG   --    --   <0.2  Negative     --    --    SMIGG  <0.2  Negative   Antibody index (AI) values reflect qualitative changes in antibody   concentration that cannot be directly associated with clinical condition or   disease state.    <0.2  Negative   Antibody index (AI) values reflect qualitative changes in antibody   concentration that cannot be directly associated with clinical condition or   disease state.    <0.2  Negative     --    --    ENASM   --    --    --   0  0   SSAIGG   --    --   <0.2  Negative     --    --    ENASSA   --    --    --   3  12   SSBIGG   --    --   <0.2  Negative     --    --    ENASSB   --    --    --   0  0   ENARMP   --    --    --   0  0   SCLIGG   --    --   <0.2  Negative     --    --      dsDNA  Recent Labs   Lab Test  10/02/17   1814  07/03/17   1447  04/05/17   1820  12/12/16   1827  09/20/16   1518  07/25/16   1433  03/23/16   1759  01/04/16   1806  10/13/15   1805   DNA  <1  <1  Negative    <1  Negative    <1  Negative    <1  Negative    <1  Negative    <1  Negative    <15  Interpretation:  Negative    <15  Interpretation:  Negative        C3/C4  Recent Labs   Lab Test  10/02/17   1814  07/03/17   1447  04/05/17   1820  12/12/16   1827  09/20/16   1518  07/25/16   1433  03/23/16   1759  01/04/16   1806  10/13/15   1805   W9SVWQP  75*  68*  58*  69*  84  80  82  111  77   [id number]  15  15  10*  12*  14*  12*  13*  18  12*     Histone Antibody  Recent Labs   Lab Test  02/13/12   1404   HSTO  0.4     Antiphospholipid Antibodies  Recent Labs   Lab Test  09/13/13   1504  02/13/12   1404   CARG  <15.0 Interpretation:  Negative  <15.0 Interpretation:  Negative   SANCHO  <12.5 Interpretation:  Negative  <12.5 Interpretation:  Negative   LUPINT   --   Negative (Note) COMMENTS: INR is normal. APTT is normal.  1:2 Mix is not indicated. DRVVT Screen is normal. Thrombin time is normal. NEGATIVE TEST; A LUPUS ANTICOAGULANT WAS NOT DETECTED IN THIS SPECIMEN WITHIN THE LIMITS OF THE TESTING REPERTOIRE. If the clinical picture is strongly suggestive of an antiphospholipid syndrome, recommend anticardiolipin and beta-2-glycoprotein (IgG and IgM) antibody tests. Hyun Denis M.D.  [phone] 2-.  NINR =  1.01 N = 0.86-1.14  (No additional charge) NTT = 16.0 N = 13.0-19.0 sec  (No additional charge)  APTT'S:    Seconds Reagent =  Stago LS Normal  =  36 Patient  =  41 1:2 Mix  =  N/A Reference =  31-43   DILUTE VANNESSA VIPER VENOM TEST: DRVVT Screen Ratio = 0.80 Normal = <1.28      IgG  Recent Labs   Lab Test  01/30/15   1156  04/02/14   1539   IGG   --   897   IGA  109  107   IGM   --   361*     CBC  Recent Labs   Lab Test  10/02/17   1814  07/03/17   1447  04/05/17   1820   WBC  6.6  4.7  5.2   RBC  3.72*  3.65*  3.34*   HGB  13.0  12.8  11.6*   HCT  37.8  36.2  33.6*   MCV  102*  99  101*   RDW  11.8  12.4  11.8   PLT  239  309  210   MCH  34.9*  35.1*  34.7*   MCHC  34.4  35.4  34.5   NEUTROPHIL  78.5  84.4  79.5   LYMPH  13.4  10.1  13.2   MONOCYTE  7.3  4.7  6.1   EOSINOPHIL  0.3  0.6  0.8   BASOPHIL  0.5  0.2  0.4   ANEU  5.2  4.0  4.2   ALYM  0.9   0.5*  0.7*   ZOË  0.5  0.2  0.3   AEOS  0.0  0.0  0.0   ABAS  0.0  0.0  0.0     CMP  Recent Labs   Lab Test  10/02/17   1814  07/03/17   1447  04/05/17   1820  12/12/16   1827  09/20/16   1518  07/25/16   1433   NA  138  141  140  140  140  137   POTASSIUM  3.7  4.0  3.9  3.8  4.0  4.2   CHLORIDE  103  108  106  104  108  106   CO2  27  26  27  31  29  25   ANIONGAP  8  7  7  5  3  6   GLC  112*  106*  94  98  100*  133*   BUN  10  8  9  12  11  13   CR  0.88  0.85  0.81  0.81  0.86  0.84   GFRESTIMATED  70  73  77  77  72  74   GFRESTBLACK  84  88  >90   GFR Calc    >90   GFR Calc    88  90   MELANIA  8.7  8.3*  8.3*  8.7  8.5  8.3*   BILITOTAL  0.2  0.3  0.2  0.4  0.2  0.4   ALBUMIN  3.8  3.6  3.7  3.8  3.5  3.5   PROTTOTAL  6.8  6.5*  6.3*  6.5*  6.3*  6.5*   ALKPHOS  60  53  49  61  53  54   AST  22  20  20  18  23  25   ALT  21  26  25  21  25  28     HgA1c  Recent Labs   Lab Test  10/05/12   0824   A1C  5.3     Calcium/VitaminD  Recent Labs   Lab Test  10/02/17   1814  07/03/17   1447  04/05/17   1820   01/17/13   1039   MELANIA  8.7  8.3*  8.3*   < >  9.4   VITDT  37   --    --    --   55    < > = values in this interval not displayed.     ESR/CRP  Recent Labs   Lab Test  10/02/17   1814  07/03/17   1447 04/05/17   1820   SED  4  5  5   CRP  <2.9  <2.9  <2.9     CK/Aldolase  Recent Labs   Lab Test  10/02/17   1814  07/03/17   1447  04/05/17   1820   CKT  134  127  133     TSH/T4  Recent Labs   Lab Test  10/18/17   1445  10/07/16   0714  02/27/15   1058  01/30/15   1156   TSH  0.33*  0.37*   --   0.45   T4  1.24  1.16  1.01   --      Lipid Panel  Recent Labs   Lab Test  10/07/16   0714  12/18/12   1112   CHOL  137  135   TRIG  48  108   HDL  75  63   LDL  52  51   VLDL   --   22   CHOLHDLRATIO   --   2.1   NHDL  62   --      Hepatitis B  Recent Labs   Lab Test  03/29/16   1417  02/13/14   1835  05/22/12   1633   HEPBANG  Nonreactive  Negative  Negative     Hepatitis C  Recent Labs    Lab Test  03/29/16   1417  02/13/14   1835  05/22/12   1633   HCVAB  Nonreactive   Assay performance characteristics have not been established for newborns,   infants, and children    Negative  Negative     Lyme ab screening  Recent Labs   Lab Test  05/30/15   1355   LYMEGM  0.55     Tuberculosis Screening  Recent Labs   Lab Test  07/03/17   1447  03/29/16   1417  02/13/14   1835   TBRSLT  Negative  Negative  Negative   TBAGN  0.00  0.00  0.00     UA  Recent Labs   Lab Test  11/03/17   1115  10/02/17   1817  07/03/17   1447  04/05/17   1825  12/12/16   1835  09/20/16   1524   03/23/16   1807  01/04/16   1821   COLOR  Yellow  Yellow  Yellow  Yellow  Yellow  Yellow   < >  Yellow  Yellow   APPEARANCE  Clear  Clear  Clear  Clear  Clear  Clear   < >  Clear  Clear   URINEGLC  Negative  Negative  Negative  Negative  Negative  Negative   < >  Negative  Negative   URINEBILI  Negative  Negative  Negative  Negative  Negative  Negative   < >  Negative  Negative   SG  1.010  1.010  1.020  1.025  >1.030  >1.030   < >  >1.030  >1.030   URINEPH  7.5*  6.0  7.0  7.0  6.5  6.0   < >  6.0  7.0   PROTEIN  Negative  Negative  Negative  Negative  Negative  Negative   < >  Negative  Negative   UROBILINOGEN  0.2  0.2  0.2  1.0  0.2  0.2   < >  0.2  0.2   NITRITE  Negative  Negative  Negative  Negative  Negative  Negative   < >  Negative  Negative   UBLD  Small*  Negative  Negative  Negative  Small*  Negative   < >  Negative  Negative   LEUKEST  Moderate*  Negative  Negative  Negative  Negative  Negative   < >  Negative  Negative   WBCU  5-10*  O - 2  O - 2  O - 2  10-25*  O - 2   < >  O - 2  O - 2   RBCU  O - 2  O - 2  O - 2  O - 2  O - 2  O - 2   < >  O - 2  O - 2   SQUAMOUSEPI   --   Few  Few  Few  Few   --    --   Few   --    BACTERIA  Few*   --    --    --   Moderate*   --    --    --   Few*   MUCOUS   --    --    --   Present*   --   Present*   --   Present*  Present*    < > = values in this interval not displayed.     Urine  Microscopic  Recent Labs   Lab Test  11/03/17   1115  10/02/17   1817  07/03/17   1447  04/05/17   1825  12/12/16   1835  09/20/16   1524   03/23/16   1807  01/04/16   1821   WBCU  5-10*  O - 2  O - 2  O - 2  10-25*  O - 2   < >  O - 2  O - 2   RBCU  O - 2  O - 2  O - 2  O - 2  O - 2  O - 2   < >  O - 2  O - 2   SQUAMOUSEPI   --   Few  Few  Few  Few   --    --   Few   --    BACTERIA  Few*   --    --    --   Moderate*   --    --    --   Few*   MUCOUS   --    --    --   Present*   --   Present*   --   Present*  Present*    < > = values in this interval not displayed.     Urine Protein  Recent Labs   Lab Test  10/02/17   1817  07/03/17   1447  04/05/17   1825   UTP  0.07  0.16  0.14   UTPG  0.14  0.15  0.15   UCRR  48  112  93       Immunization History     Immunization History   Administered Date(s) Administered     Influenza (IIV3) 10/01/2012, 09/23/2013     Influenza Vaccine IM 3yrs+ 4 Valent IIV4 09/20/2016, 10/30/2017     Pneumococcal (PCV 13) 04/25/2016     Pneumococcal 23 valent 10/01/2012, 11/06/2017     Tdap (Adacel,Boostrix) 12/17/2010            Chart documentation done in part with Dragon Voice recognition Software. Although reviewed after completion, some word and grammatical error may remain.    Bradford Colvin MD

## 2017-11-10 ENCOUNTER — INFUSION THERAPY VISIT (OUTPATIENT)
Dept: INFUSION THERAPY | Facility: CLINIC | Age: 42
End: 2017-11-10
Payer: COMMERCIAL

## 2017-11-10 VITALS
TEMPERATURE: 98 F | DIASTOLIC BLOOD PRESSURE: 83 MMHG | OXYGEN SATURATION: 100 % | WEIGHT: 135.4 LBS | BODY MASS INDEX: 22.53 KG/M2 | SYSTOLIC BLOOD PRESSURE: 126 MMHG | HEART RATE: 91 BPM | RESPIRATION RATE: 18 BRPM

## 2017-11-10 DIAGNOSIS — M32.19 OTHER SYSTEMIC LUPUS ERYTHEMATOSUS WITH OTHER ORGAN INVOLVEMENT (H): Primary | ICD-10-CM

## 2017-11-10 PROCEDURE — 96413 CHEMO IV INFUSION 1 HR: CPT | Performed by: INTERNAL MEDICINE

## 2017-11-10 PROCEDURE — 99207 ZZC NO CHARGE LOS: CPT

## 2017-11-10 RX ADMIN — Medication 500 ML: at 13:59

## 2017-11-10 ASSESSMENT — PAIN SCALES - GENERAL: PAINLEVEL: MILD PAIN (2)

## 2017-11-10 NOTE — PROGRESS NOTES
"Infusion Nursing Note:  Yovana Enriquez presents today for Benlysta.    Patient seen by provider today: No   present during visit today: Not Applicable.    Note: N/A.    Intravenous Access:  Peripheral IV placed.    Treatment Conditions:  Rheumatology Infusion Checklist: PRIOR TO INFUSION OF BIOLOGICAL MEDICATIONS OR ANY OF THESE AS LISTED: Benlysta (benlimumab) \".rheumbiologicalchecklist\"    Prior to Infusion of biological medications or any of these as listed:    1. Elevated temperature, fever, chills, productive cough or abnormal vital signs, night sweats, coughing up blood or sputum, no appetite or abnormal vital signs : NO    2. Open wounds or new incisions: NO    3. Recent hospitalization: NO    4.  Recent surgeries:  NO    5. Any upcoming surgeries or dental procedures?:NO    6. Any current or recent bouts of illness or infection? On any antibiotics? : NO    7. Any new, sudden or worsening abdominal pain :NO    8. Vaccination within 4 weeks? Patient or someone in the household is scheduled to receive vaccination? No live virus vaccines prior to or during treatment :NO    9. Any nervous system diseases [i.e. multiple sclerosis, Guillain-[place], seizures, neurological  changes]: NO    10. Pregnant or breast feeding; or plans on pregnancy in the future: NO    11. Signs of worsening depression or suicidal ideations while taking benlysta:NO    12. New-onset medical symptoms: NO    13.  New cancer diagnosis or on chemotherapy or radiation NO    14.  Evaluate for any sign of active TB [Unexplained weight loss, Loss of appetite, Night sweats, Fever, Fatigue, Chills, Coughing for 3 weeks or longer, Hemoptysis (coughing up blood), Chest pain]: NO    **Note: If answered yes to any of the above, hold the infusion and contact ordering rheumatologist or on-call rheumatologist.     Post Infusion Assessment:  Patient tolerated infusion without incident.  Blood return noted pre and post infusion.  Site patent " and intact, free from redness, edema or discomfort.  Access discontinued per protocol.  Rheumatology Post Infusion: POST-INFUSION OF BIOLOGICAL MEDICATION:    Reviewed with patient.  Given biologic medication or medication hand-out. Inform patient if any fever, chills or signs of infection, new symptoms, abdominal pain, heart palpitations, shortness of breath, reaction, weakness, neurological changes, seek medical attention immediately and should not receive infusions. No live virus vaccines prior to or during treatment or up to 6 months post infusion. If the patient has an upcoming procedure or surgery, this should be discussed with the rheumatologist and surgeon or provider..      Discharge Plan:   Patient discharged in stable condition accompanied by: self.  Departure Mode: Ambulatory.    Cele Arias RN

## 2017-11-10 NOTE — MR AVS SNAPSHOT
After Visit Summary   11/10/2017    Yovana Enriquez    MRN: [id number]           Patient Information     Date Of Birth          1975        Visit Information        Provider Department      11/10/2017 1:30 PM [facility]        Today's Diagnoses     Other systemic lupus erythematosus with other organ involvement (H)    -  1       Follow-ups after your visit        Your next 10 appointments already scheduled     Nov 27, 2017 12:40 PM CST   New Visit with Ruma Randolph OD   [facility]    [address] [facility] [address] [phone]            Dec 08, 2017  1:30 PM CST   Level 2 with [facility] ([facility])    [address] [place] [id number] [phone]            Elias 10, 2018  1:40 PM CST   (Arrive by 1:25 PM)   Return Visit with Inez Sawyer MD   [facility] ([facility])    [address] [place] [id number] [phone]            Jan 29, 2018  6:00 PM CST   LAB with BE LAB   [facility] ([facility]    [address] [facility] [id number] [phone]           Please do not eat 10-12 hours before your appointment if you are coming in fasting for labs on lipids, cholesterol, or glucose (sugar). This does not apply to pregnant women. Water, hot tea and black coffee (with nothing added) are okay. Do not drink other fluids, diet soda or chew gum.            Feb 05, 2018 10:20 AM CST   Return Visit with Bradford Colvin MD   [facility] [place] ([facility])    [address] [facility] [phone]              Who to contact     If you have questions or need follow up information about today's clinic visit or your schedule please contact [facility] directly at  [phone].  Normal or non-critical lab and imaging results will be communicated to you by Kaposthart, letter or phone within 4 business days after the clinic has received the results. If you do not hear from us within 7 days, please contact the clinic through HuoBit or phone. If you have a critical or abnormal lab result, we will notify you by phone as soon as possible.  Submit refill requests through GoChime or call your pharmacy and they will forward the refill request to us. Please allow 3 business days for your refill to be completed.          Additional Information About Your Visit        [employer] Information     Go[employer] gives you secure access to your electronic health record. If you see a primary care provider, you can also send messages to your care team and make appointments. If you have questions, please call your primary care clinic.  If you do not have a primary care provider, please call [phone] and they will assist you.      GoChime is an electronic gateway that provides easy, online access to your medical records. With GoChime, you can request a clinic appointment, read your test results, renew a prescription or communicate with your care team.     To access your existing account, please contact your [facility] or call [phone] for assistance.        Care EveryWhere ID     This is your Care EveryWhere ID. This could be used by other organizations to access your [place] medical records  [id number]        Your Vitals Were     Pulse Temperature Respirations Pulse Oximetry BMI (Body Mass Index)       91 98  F (36.7  C) (Oral) 18 100% 22.53 kg/m2        Blood Pressure from Last 3 Encounters:   11/10/17 126/83   11/06/17 116/80   11/03/17 120/82    Weight from Last 3 Encounters:   11/10/17 61.4 kg (135 lb 6.4 oz)   11/06/17 62.1 kg (137 lb)   11/03/17 62.1 kg (136 lb 12.8 oz)              Today, you had the following     No orders found for display       Primary  Care Provider Office Phone # Fax #    Bradford Mario PA-C [phone]       [address] [place] [address]        Equal Access to Services     BINTA HURD : Hadii aad ku hadasho Soomaali, waaxda luqadaha, qaybta kaalmada adeegyada, waxpaula arroyon yris madrigal laDarintwin [place]. So [facility] [phone].    ATENCIÓN: Si habla español, tiene a dexter disposición servicios gratuitos de asistencia lingüística. Llame al [phone].    We comply with applicable federal civil rights laws and [place] laws. We do not discriminate on the basis of race, color, national origin, age, disability, sex, sexual orientation, or gender identity.            Thank you!     Thank you for choosing [facility]  for your care. Our goal is always to provide you with excellent care. Hearing back from our patients is one way we can continue to improve our services. Please take a few minutes to complete the written survey that you may receive in the mail after your visit with us. Thank you!             Your Updated Medication List - Protect others around you: Learn how to safely use, store and throw away your medicines at www.disposemymeds.org.          This list is accurate as of: 11/10/17  5:01 PM.  Always use your most recent med list.                   Brand Name Dispense Instructions for use Diagnosis    albuterol 108 (90 BASE) MCG/ACT Inhaler    PROAIR HFA/PROVENTIL HFA/VENTOLIN HFA    1 Inhaler    Inhale 2 puffs into the lungs every 6 hours as needed for shortness of breath / dyspnea or wheezing    Acute bronchitis, unspecified organism       amLODIPine 5 MG tablet    NORVASC    90 tablet    Take 1 tablet (5 mg) by mouth daily    Raynaud's phenomenon without gangrene       azaTHIOprine 50 MG tablet    IMURAN    270 tablet    Take 3 tablets (150 mg) by mouth daily    Other systemic lupus erythematosus with other organ involvement (H)       blood glucose monitoring test strip    no brand specified    1  Month    Used for testing glucose 2-3 times daily    History of gestational diabetes mellitus, not currently pregnant       buPROPion 75 MG tablet    WELLBUTRIN    60 tablet    TAKE 1 TABLET BY MOUTH 2 TIMES DAILY    Anxiety       Calcium + D3 600-200 MG-UNIT Tabs      1 tablet daily        cevimeline 30 MG capsule    EVOXAC    180 capsule    Take 1 capsule (30 mg) by mouth 2 times daily    Sjogren's syndrome with keratoconjunctivitis sicca (H)       diclofenac 1 % Gel topical gel    VOLTAREN    100 g    Apply 2-4 grams to knees or shoulders four times daily as needed using enclosed dosing card.    Fibromyalgia, SLE (systemic lupus erythematosus) (H)       FIRST-MOUTHWASH BLM MT Susp compounding kit     100 mL    Take 5-10 mLs by mouth every 6 hours as needed    Oral ulcer       FLOVENT IN           fluticasone 50 MCG/ACT spray    FLONASE    1 Bottle    Spray 1-2 sprays into both nostrils daily    Chronic rhinitis       hydroxychloroquine 200 MG tablet    PLAQUENIL    180 tablet    Take 1 tablet (200 mg) by mouth daily    Other systemic lupus erythematosus with other organ involvement (H)       losartan 25 MG tablet    COZAAR    90 tablet    Take 1 tablet (25 mg) by mouth daily    Hypertension goal BP (blood pressure) < 140/90       MICROLET LANCETS Misc     100 each    1 each daily.    Other abnormal glucose       MULTIVITAMIN PO      Take  by mouth.        nortriptyline 10 MG capsule    PAMELOR    90 capsule    TAKE 3 CAPSULES BY MOUTH AT BEDTIME    Numbness       phenazopyridine 200 MG tablet    PYRIDIUM    6 tablet    Take 1 tablet (200 mg) by mouth 3 times daily as needed for irritation    Dysuria       predniSONE 2.5 MG tablet    DELTASONE    30 tablet    Take 1 tablet (2.5 mg) by mouth daily    Other systemic lupus erythematosus with other organ involvement (H)       pregabalin 150 MG capsule    LYRICA    270 capsule    Take 1 capsule (150 mg) by mouth 3 times daily . 3 month supply.    Fibromyalgia        ranitidine 300 MG tablet    ZANTAC    180 tablet    Take 1 tablet (300 mg) by mouth 2 times daily    Gastroesophageal reflux disease without esophagitis

## 2017-12-08 ENCOUNTER — INFUSION THERAPY VISIT (OUTPATIENT)
Dept: INFUSION THERAPY | Facility: CLINIC | Age: 42
End: 2017-12-08
Payer: COMMERCIAL

## 2017-12-08 VITALS
SYSTOLIC BLOOD PRESSURE: 116 MMHG | TEMPERATURE: 97.7 F | BODY MASS INDEX: 22.27 KG/M2 | DIASTOLIC BLOOD PRESSURE: 81 MMHG | HEART RATE: 78 BPM | OXYGEN SATURATION: 100 % | WEIGHT: 133.8 LBS | RESPIRATION RATE: 16 BRPM

## 2017-12-08 DIAGNOSIS — M32.19 OTHER SYSTEMIC LUPUS ERYTHEMATOSUS WITH OTHER ORGAN INVOLVEMENT (H): Primary | ICD-10-CM

## 2017-12-08 PROCEDURE — 99207 ZZC NO CHARGE NURSE ONLY: CPT

## 2017-12-08 PROCEDURE — 96413 CHEMO IV INFUSION 1 HR: CPT | Performed by: NURSE PRACTITIONER

## 2017-12-08 RX ADMIN — Medication 250 ML: at 13:53

## 2017-12-08 ASSESSMENT — PAIN SCALES - GENERAL: PAINLEVEL: MODERATE PAIN (4)

## 2017-12-08 NOTE — PATIENT INSTRUCTIONS
EDUCATION POST BIOLOGICAL/CHEMOTHERAPY INFUSION  Call the triage nurse at your clinic or seek medical attention if you have chills and/or temperature greater than or equal to 100.5, uncontrolled nausea/vomiting, diarrhea, constipation, dizziness, shortness of breath, chest pain, heart palpitations, weakness or any other new or concerning symptoms, questions or concerns.  You can not have any live virus vaccines prior to or during treatment or up to 6 months post infusion.  If you have an upcoming surgery, medical procedure or dental procedure during treatment, this should be discussed with your ordering physician and your surgeon/dentist.  If you are having any concerning symptom, if you are unsure if you should get your next infusion or wish to speak to a provider before your next infusion, please call your care coordinator or triage nurse at your clinic to notify them so we can adequately serve you.    Yessica Guzmán RN

## 2017-12-08 NOTE — MR AVS SNAPSHOT
After Visit Summary   12/8/2017    Yovana Enriquez    MRN: [id number]           Patient Information     Date Of Birth          1975        Visit Information        Provider Department      12/8/2017 1:30 PM [place] [facility] INF[facility]ION [facility]        Today's Diagnoses     Other systemic lupus erythematosus with other organ involvement (H)    -  1      Care Instructions    EDUCATION POST BIOLOGICAL/CHEMOTHERAPY INFUSION  Call the triage nurse at your clinic or seek medical attention if you have chills and/or temperature greater than or equal to 100.5, uncontrolled nausea/vomiting, diarrhea, constipation, dizziness, shortness of breath, chest pain, heart palpitations, weakness or any other new or concerning symptoms, questions or concerns.  You can not have any live virus vaccines prior to or during treatment or up to 6 months post infusion.  If you have an upcoming surgery, medical procedure or dental procedure during treatment, this should be discussed with your ordering physician and your surgeon/dentist.  If you are having any concerning symptom, if you are unsure if you should get your next infusion or wish to speak to a provider before your next infusion, please call your care coordinator or triage nurse at your clinic to notify them so we can adequately serve you.    Yessica Guzmán RN            Follow-ups after your visit        Your next 10 appointments already scheduled     Jan 05, 2018  1:00 PM CST   Level 2 with [place] [facility] INFUSION   [facility] ([facility])    [address] [place] [id number] [phone]            Jan 08, 2018  9:00 AM CST   PROCEDURE with Keena Lockett MD   [facility] ([facility])    [address] [place] [id number] [phone]            Elias 10, 2018  1:40 PM CST   (Arrive by 1:25 PM)   Return Visit with Inez Sawyer MD   [facility] ([facility])    [address] [facility] [address] [facility] [address]   [phone]            Jan 29, 2018  6:00 PM CST   LAB with BE LAB   [facility] [place] ([facility])    [address] [facility]  [place] [address]   [phone]           Please do not eat 10-12 hours before your appointment if you are coming in fasting for labs on lipids, cholesterol, or glucose (sugar). This does not apply to pregnant women. Water, hot tea and black coffee (with nothing added) are okay. Do not drink other fluids, diet soda or chew gum.            Feb 02, 2018  1:00 PM CST   Level 2 with [place] [facility] INFUSION   [facility] ([facility])    [address] [place] [id number] [phone]            Feb 05, 2018 10:20 AM CST   Return Visit with Bradford Colvin MD   [facility] [place] ([facility])    [address] [facility]  [place] [address]   [phone]            Mar 02, 2018  1:00 PM CST   Level 2 with [place] 8 INFUSION   [facility] ([facility])    [address] [place] [id number] [phone]              Who to contact     If you have questions or need follow up information about today's clinic visit or your schedule please contact [facility] directly at [phone].  Normal or non-critical lab and imaging results will be communicated to you by MyChart, letter or phone within 4 business days after the clinic has received the results. If you do not hear from us within 7 days, please contact the clinic through MyChart or phone. If you have a critical or abnormal lab result, we will notify you by phone as soon as possible.  Submit refill requests through [employer] or call your pharmacy and they will forward the refill request to us. Please allow 3 business days for your refill to be completed.           Additional Information About Your Visit        M[employer]of[employer] Information     [employer] gives you secure access to your electronic health record. If you see a primary care provider, you can also send messages to your care team and make appointments. If you have questions, please call your primary care clinic.  If you do not have a primary care provider, please call [phone] and they will assist you.      [employer] is an electronic gateway that provides easy, online access to your medical records. With [employer], you can request a clinic appointment, read your test results, renew a prescription or communicate with your care team.     To access your existing account, please contact your [facility] or call [phone] for assistance.        Care EveryWhere ID     This is your Care EveryWhere ID. This could be used by other organizations to access your [place] medical records  [id number]        Your Vitals Were     Pulse Temperature Respirations Pulse Oximetry BMI (Body Mass Index)       78 97.7  F (36.5  C) (Oral) 16 100% 22.27 kg/m2        Blood Pressure from Last 3 Encounters:   12/08/17 116/81   11/10/17 126/83   11/06/17 116/80    Weight from Last 3 Encounters:   12/08/17 60.7 kg (133 lb 12.8 oz)   11/10/17 61.4 kg (135 lb 6.4 oz)   11/06/17 62.1 kg (137 lb)              Today, you had the following     No orders found for display       Primary Care Provider Office Phone # Fax #    Bradford Mario PA-C [phone]       [address]  [place] [address]        Equal Access to Services     [facility]: Hadii aad ku hadasho Soomaali, waaxda luqadaha, qaybta kaalmada adeegyada, rosy zarate haytwin scott . So [facility] [phone].    ATENCIÓN: Si habla español, tiene a dexter disposición servicios gratuitos de asistencia lingüística. Llame al [phone].    We comply with applicable federal civil rights laws and [place] laws. We do not discriminate on the  basis of race, color, national origin, age, disability, sex, sexual orientation, or gender identity.            Thank you!     Thank you for choosing [facility]  for your care. Our goal is always to provide you with excellent care. Hearing back from our patients is one way we can continue to improve our services. Please take a few minutes to complete the written survey that you may receive in the mail after your visit with us. Thank you!             Your Updated Medication List - Protect others around you: Learn how to safely use, store and throw away your medicines at www.disposemymeds.org.          This list is accurate as of: 12/8/17  4:06 PM.  Always use your most recent med list.                   Brand Name Dispense Instructions for use Diagnosis    albuterol 108 (90 BASE) MCG/ACT Inhaler    PROAIR HFA/PROVENTIL HFA/VENTOLIN HFA    1 Inhaler    Inhale 2 puffs into the lungs every 6 hours as needed for shortness of breath / dyspnea or wheezing    Acute bronchitis, unspecified organism       amLODIPine 5 MG tablet    NORVASC    90 tablet    Take 1 tablet (5 mg) by mouth daily    Raynaud's phenomenon without gangrene       azaTHIOprine 50 MG tablet    IMURAN    270 tablet    Take 3 tablets (150 mg) by mouth daily    Other systemic lupus erythematosus with other organ involvement (H)       blood glucose monitoring test strip    no brand specified    1 Month    Used for testing glucose 2-3 times daily    History of gestational diabetes mellitus, not currently pregnant       buPROPion 75 MG tablet    WELLBUTRIN    60 tablet    TAKE 1 TABLET BY MOUTH 2 TIMES DAILY    Anxiety       Calcium + D3 600-200 MG-UNIT Tabs      1 tablet daily        cevimeline 30 MG capsule    EVOXAC    180 capsule    Take 1 capsule (30 mg) by mouth 2 times daily    Sjogren's syndrome with keratoconjunctivitis sicca (H)       diclofenac 1 % Gel topical gel    VOLTAREN    100 g    Apply 2-4 grams to knees or shoulders four  times daily as needed using enclosed dosing card.    Fibromyalgia, SLE (systemic lupus erythematosus) (H)       FIRST-MOUTHWASH BLM MT Susp compounding kit     100 mL    Take 5-10 mLs by mouth every 6 hours as needed    Oral ulcer       FLOVENT IN           fluticasone 50 MCG/ACT spray    FLONASE    1 Bottle    Spray 1-2 sprays into both nostrils daily    Chronic rhinitis       hydroxychloroquine 200 MG tablet    PLAQUENIL    180 tablet    Take 1 tablet (200 mg) by mouth daily    Other systemic lupus erythematosus with other organ involvement (H)       losartan 25 MG tablet    COZAAR    90 tablet    Take 1 tablet (25 mg) by mouth daily    Hypertension goal BP (blood pressure) < 140/90       MICROLET LANCETS Misc     100 each    1 each daily.    Other abnormal glucose       MULTIVITAMIN PO      Take  by mouth.        nortriptyline 10 MG capsule    PAMELOR    90 capsule    TAKE 3 CAPSULES BY MOUTH AT BEDTIME    Numbness       phenazopyridine 200 MG tablet    PYRIDIUM    6 tablet    Take 1 tablet (200 mg) by mouth 3 times daily as needed for irritation    Dysuria       pregabalin 150 MG capsule    LYRICA    270 capsule    Take 1 capsule (150 mg) by mouth 3 times daily . 3 month supply.    Fibromyalgia       ranitidine 300 MG tablet    ZANTAC    180 tablet    Take 1 tablet (300 mg) by mouth 2 times daily    Gastroesophageal reflux disease without esophagitis

## 2017-12-08 NOTE — PROGRESS NOTES
Infusion Nursing Note:  Yovana Enriquez presents today for benlysta.    Patient seen by provider today: No   present during visit today: Not Applicable.    Intravenous Access:  Peripheral IV placed.    Treatment Conditions:  ~~~ NOTE: If the patient answers yes to any of the questions below, hold the infusion and contact ordering provider or on-call provider.    1. Have you recently had an elevated temperature, fever, chills, productive cough, coughing for 3 weeks or longer or hemoptysis,  abnormal vital signs, night sweats,  chest pain or have you noticed a decrease in your appetite, unexplained weight loss or fatigue? No  2. Do you have any open wounds or new incisions? No  3. Do you have any recent or upcoming hospitalizations, surgeries or dental procedures? No  4. Do you currently have or recently have had any signs of illness or infection or are you on any antibiotics? No  5. Have you had any new, sudden or worsening abdominal pain? No  6. Have you or anyone in your household received a live vaccination in the past 4 weeks? Please note:  No live vaccines while on biologic/chemotherapy until 6 months after the last treatment.  Patient can receive the flu vaccine (shot only) and the pneumovax.  It is optimal for the patient to get these vaccines mid cycle, but they can be given at any time as long as it is not on the day of the infusion. No  7. Have you recently been diagnosed with any new nervous system diseases (ie. Multiple sclerosis, Guillain [place]gel[place], seizures, neurological changes) or cancer diagnosis? Are you on any form of radiation or chemotherapy? No  8. Are you pregnant or breast feeding or do you have plans of pregnancy in the future? No  9. Have you been having any signs of worsening depression or suicidal ideations?  (benlysta only) No  10. Have there been any other new onset medical symptoms? No  .      Post Infusion Assessment:  Patient tolerated infusion without incident.  Blood  return noted pre and post infusion.  Site patent and intact, free from redness, edema or discomfort.  No evidence of extravasations.  Access discontinued per protocol.    Discharge Plan:   Copy of AVS reviewed with patient and/or family.  Patient will return 1/5/18 for next appointment.  Patient discharged in stable condition accompanied by: self.  Departure Mode: Ambulatory.    Yessica Guzmán RN

## 2017-12-12 ENCOUNTER — TELEPHONE (OUTPATIENT)
Dept: RHEUMATOLOGY | Facility: CLINIC | Age: 42
End: 2017-12-12

## 2017-12-21 ENCOUNTER — MYC MEDICAL ADVICE (OUTPATIENT)
Dept: RHEUMATOLOGY | Facility: CLINIC | Age: 42
End: 2017-12-21

## 2017-12-21 DIAGNOSIS — M32.19 OTHER SYSTEMIC LUPUS ERYTHEMATOSUS WITH OTHER ORGAN INVOLVEMENT (H): Primary | ICD-10-CM

## 2017-12-21 RX ORDER — PREDNISONE 5 MG/1
TABLET ORAL
Qty: 120 TABLET | Refills: 0 | Status: SHIPPED | OUTPATIENT
Start: 2017-12-21 | End: 2018-02-05

## 2017-12-21 NOTE — TELEPHONE ENCOUNTER
This message was received after I had already called the patient.  It does not change the plan outlined in that call.    Bradford Colvin MD  12/21/2017 2:27 PM

## 2017-12-21 NOTE — TELEPHONE ENCOUNTER
Rheumatology Telephone Note:    I called and spoke with Ms. Enriquez.  Worsening joint pain and swelling that was better controlled when on prednisone 5mg daily.  Return of symptoms when on 2.5mg daily.  Therefore, will use prednisone as follows:  - predniSONE (DELTASONE) 5 MG tablet; Prednisone 20mg daily x5days, then 15mg daily x5days, then 10mg daily x5days, then 5mg daily thereafter.  Dispense: 120 tablet; Refill: 0    All questions were answered and she thanked me for the call.     Bradford Colvin MD  12/21/2017 12:15 PM

## 2017-12-23 DIAGNOSIS — R20.0 NUMBNESS: ICD-10-CM

## 2017-12-23 DIAGNOSIS — F41.9 ANXIETY: ICD-10-CM

## 2017-12-26 RX ORDER — NORTRIPTYLINE HCL 10 MG
CAPSULE ORAL
Qty: 90 CAPSULE | Refills: 0 | Status: SHIPPED | OUTPATIENT
Start: 2017-12-26 | End: 2018-01-26

## 2017-12-26 RX ORDER — BUPROPION HYDROCHLORIDE 75 MG/1
TABLET ORAL
Qty: 60 TABLET | Refills: 0 | Status: SHIPPED | OUTPATIENT
Start: 2017-12-26 | End: 2018-01-26

## 2017-12-26 NOTE — TELEPHONE ENCOUNTER
Medication is being filled for 1 time refill only due to:  Patient needs to be seen because Due for follow up in January. Pt given 1 month refill with reminder. .

## 2018-01-03 ENCOUNTER — OFFICE VISIT (OUTPATIENT)
Dept: INTERNAL MEDICINE | Facility: CLINIC | Age: 43
End: 2018-01-03
Payer: COMMERCIAL

## 2018-01-03 VITALS
DIASTOLIC BLOOD PRESSURE: 80 MMHG | BODY MASS INDEX: 22.96 KG/M2 | RESPIRATION RATE: 16 BRPM | HEART RATE: 86 BPM | WEIGHT: 138 LBS | SYSTOLIC BLOOD PRESSURE: 136 MMHG | TEMPERATURE: 98.4 F

## 2018-01-03 DIAGNOSIS — R30.0 DYSURIA: Primary | ICD-10-CM

## 2018-01-03 DIAGNOSIS — M32.19 OTHER SYSTEMIC LUPUS ERYTHEMATOSUS WITH OTHER ORGAN INVOLVEMENT (H): ICD-10-CM

## 2018-01-03 DIAGNOSIS — R82.90 NONSPECIFIC FINDING ON EXAMINATION OF URINE: ICD-10-CM

## 2018-01-03 LAB
ALBUMIN UR-MCNC: NEGATIVE MG/DL
APPEARANCE UR: ABNORMAL
BACTERIA #/AREA URNS HPF: ABNORMAL /HPF
BILIRUB UR QL STRIP: NEGATIVE
COLOR UR AUTO: YELLOW
GLUCOSE UR STRIP-MCNC: NEGATIVE MG/DL
HGB UR QL STRIP: ABNORMAL
KETONES UR STRIP-MCNC: NEGATIVE MG/DL
LEUKOCYTE ESTERASE UR QL STRIP: ABNORMAL
NITRATE UR QL: NEGATIVE
NON-SQ EPI CELLS #/AREA URNS LPF: ABNORMAL /LPF
PH UR STRIP: 7 PH (ref 5–7)
RBC #/AREA URNS AUTO: ABNORMAL /HPF
SOURCE: ABNORMAL
SP GR UR STRIP: 1.02 (ref 1–1.03)
UROBILINOGEN UR STRIP-ACNC: 1 EU/DL (ref 0.2–1)
WBC #/AREA URNS AUTO: ABNORMAL /HPF
WBC CLUMPS #/AREA URNS HPF: PRESENT /HPF

## 2018-01-03 PROCEDURE — 99214 OFFICE O/P EST MOD 30 MIN: CPT | Performed by: INTERNAL MEDICINE

## 2018-01-03 PROCEDURE — 87186 SC STD MICRODIL/AGAR DIL: CPT | Performed by: INTERNAL MEDICINE

## 2018-01-03 PROCEDURE — 87088 URINE BACTERIA CULTURE: CPT | Performed by: INTERNAL MEDICINE

## 2018-01-03 PROCEDURE — 87086 URINE CULTURE/COLONY COUNT: CPT | Performed by: INTERNAL MEDICINE

## 2018-01-03 PROCEDURE — 81001 URINALYSIS AUTO W/SCOPE: CPT | Performed by: INTERNAL MEDICINE

## 2018-01-03 RX ORDER — AMOXICILLIN AND CLAVULANATE POTASSIUM 500; 125 MG/1; MG/1
1 TABLET, FILM COATED ORAL 2 TIMES DAILY
Qty: 14 TABLET | Refills: 0 | Status: SHIPPED | OUTPATIENT
Start: 2018-01-03 | End: 2018-01-05

## 2018-01-03 NOTE — MR AVS SNAPSHOT
After Visit Summary   1/3/2018    Yovana Enriquez    MRN: [id number]           Patient Information     Date Of Birth          1975        Visit Information        Provider Department      1/3/2018 6:00 PM Morris Holder MD [facility]        Today's Diagnoses     Dysuria    -  1    Nonspecific finding on examination of urine           Follow-ups after your visit        Follow-up notes from your care team     Return if symptoms worsen or fail to improve.      Your next 10 appointments already scheduled     Jan 05, 2018  1:00 PM CST   Level 2 with [place] 8 INFUSION   [facility]    [address] [place] [address]   [phone]            Jan 08, 2018  9:00 AM CST   PROCEDURE with Keena Lockett MD   [facility] ([facility])    [address] [id number] [phone]            Elias 10, 2018  1:40 PM CST   (Arrive by 1:25 PM)   Return Visit with Inez Sawyer MD   [facility] ([facility])    [address] [place] [id number] [phone]            Jan 29, 2018  6:00 PM CST   LAB with BE LAB   [facility] ([facility])    [address] [id number] [phone]           Please do not eat 10-12 hours before your appointment if you are coming in fasting for labs on lipids, cholesterol, or glucose (sugar). This does not apply to pregnant women. Water, hot tea and black coffee (with nothing added) are okay. Do not drink other fluids, diet soda or chew gum.            Feb 02, 2018  1:00 PM CST   Level 2 with [place] 4 INFUSION   [facility]    [address] [place] [address]   [phone]            Feb 05, 2018 10:20 AM CST   Return Visit with [place] Colvin MD   [place]  [facility] ([facility])    [address] [place] [id number] [phone]            Mar 02, 2018  1:00 PM CST   Level 2 with BAY 8 INFUSION   [facility] ([facility])    [address] [place] [address] [phone]              Who to contact     Normal or non-critical lab and imaging results will be communicated to you by Kreditechhart, letter or phone within 4 business days after the clinic has received the results. If you do not hear from us within 7 days, please contact the clinic through TrackTikt or phone. If you have a critical or abnormal lab result, we will notify you by phone as soon as possible.  Submit refill requests through CallistoTV or call your pharmacy and they will forward the refill request to us. Please allow 3 business days for your refill to be completed.          If you need to speak with a  for additional information , please call: [phone]             Additional Information About Your Visit        CallistoTV Information     CallistoTV gives you secure access to your electronic health record. If you see a primary care provider, you can also send messages to your care team and make appointments. If you have questions, please call your primary care clinic.  If you do not have a primary care provider, please call [phone] and they will assist you.        Care EveryWhere ID     This is your Care EveryWhere ID. This could be used by other organizations to access your [facility] medical records  [id number]        Your Vitals Were     Pulse Temperature Respirations BMI (Body Mass Index)          86 98.4  F (36.9  C) (Oral) 16 22.96 kg/m2         Blood Pressure from Last 3 Encounters:   01/03/18 136/80   12/08/17 116/81   11/10/17 126/83    Weight from Last 3 Encounters:   01/03/18 138 lb (62.6 kg)   12/08/17 133 lb 12.8 oz (60.7 kg)   11/10/17 135 lb 6.4 oz (61.4 kg)              We Performed the  Following     *UA reflex to Microscopic and Culture ([place] [facility] (except [place] and [place])     Urine Culture Aerobic Bacterial     Urine Microscopic          Today's Medication Changes          These changes are accurate as of: 1/3/18  6:52 PM.  If you have any questions, ask your nurse or doctor.               Start taking these medicines.        Dose/Directions    amoxicillin-clavulanate 500-125 MG per tablet   Commonly known as:  AUGMENTIN   Used for:  Dysuria   Started by:  Morris Holder MD        Dose:  1 tablet   Take 1 tablet by mouth 2 times daily   Quantity:  14 tablet   Refills:  0            Where to get your medicines      These [id number] [place] - [address]  [address] [place] [address]     Phone:  [phone]     amoxicillin-clavulanate 500-125 MG per tablet                Primary Care Provider Office Phone # Fax #    [place] Mario PA-C [phone] [address]  [place] [address]        Equal Access to Services     [facility]: Hadii aad ku hadasho Soomaali, waaxda luqadaha, qaybta kaalmada adeegyada, waxay idiin hayaan adeeg kharash sonia . So [facility] [phone].    ATENCIÓN: Si habla español, tiene a dexter disposición servicios gratuitos de asistencia lingüística. Ll[facility] [phone].    We comply with applicable federal civil rights laws and [place] laws. We do not discriminate on the basis of race, color, national origin, age, disability, sex, sexual orientation, or gender identity.            Thank you!     Thank you for choosing [facility]  for your care. Our goal is always to provide you with excellent care. Hearing back from our patients is one way we can continue to improve our services. Please take a few minutes to complete the written survey that you may receive in the mail after your visit with us. Thank you!             Your Updated Medication List - Protect others  around you: Learn how to safely use, store and throw away your medicines at www.disposemymeds.org.          This list is accurate as of: 1/3/18  6:52 PM.  Always use your most recent med list.                   Brand Name Dispense Instructions for use Diagnosis    albuterol 108 (90 BASE) MCG/ACT Inhaler    PROAIR HFA/PROVENTIL HFA/VENTOLIN HFA    1 Inhaler    Inhale 2 puffs into the lungs every 6 hours as needed for shortness of breath / dyspnea or wheezing    Acute bronchitis, unspecified organism       amLODIPine 5 MG tablet    NORVASC    90 tablet    Take 1 tablet (5 mg) by mouth daily    Raynaud's phenomenon without gangrene       amoxicillin-clavulanate 500-125 MG per tablet    AUGMENTIN    14 tablet    Take 1 tablet by mouth 2 times daily    Dysuria       azaTHIOprine 50 MG tablet    IMURAN    270 tablet    Take 3 tablets (150 mg) by mouth daily    Other systemic lupus erythematosus with other organ involvement (H)       blood glucose monitoring test strip    no brand specified    1 Month    Used for testing glucose 2-3 times daily    History of gestational diabetes mellitus, not currently pregnant       buPROPion 75 MG tablet    WELLBUTRIN    60 tablet    TAKE 1 TABLET BY MOUTH 2 TIMES DAILY    Anxiety       Calcium + D3 600-200 MG-UNIT Tabs      1 tablet daily        cevimeline 30 MG capsule    EVOXAC    180 capsule    Take 1 capsule (30 mg) by mouth 2 times daily    Sjogren's syndrome with keratoconjunctivitis sicca (H)       diclofenac 1 % Gel topical gel    VOLTAREN    100 g    Apply 2-4 grams to knees or shoulders four times daily as needed using enclosed dosing card.    Fibromyalgia, SLE (systemic lupus erythematosus) (H)       FLOVENT IN           fluticasone 50 MCG/ACT spray    FLONASE    1 Bottle    Spray 1-2 sprays into both nostrils daily    Chronic rhinitis       hydroxychloroquine 200 MG tablet    PLAQUENIL    180 tablet    Take 1 tablet (200 mg) by mouth daily    Other systemic lupus  erythematosus with other organ involvement (H)       losartan 25 MG tablet    COZAAR    90 tablet    Take 1 tablet (25 mg) by mouth daily    Hypertension goal BP (blood pressure) < 140/90       MICROLET LANCETS Misc     100 each    1 each daily.    Other abnormal glucose       MULTIVITAMIN PO      Take  by mouth.        nortriptyline 10 MG capsule    PAMELOR    90 capsule    TAKE 3 CAPSULES BY MOUTH AT BEDTIME    Numbness       predniSONE 5 MG tablet    DELTASONE    120 tablet    Prednisone 20mg daily x5days, then 15mg daily x5days, then 10mg daily x5days, then 5mg daily thereafter.    Other systemic lupus erythematosus with other organ involvement (H)       pregabalin 150 MG capsule    LYRICA    270 capsule    Take 1 capsule (150 mg) by mouth 3 times daily . 3 month supply.    Fibromyalgia       ranitidine 300 MG tablet    ZANTAC    180 tablet    Take 1 tablet (300 mg) by mouth 2 times daily    Gastroesophageal reflux disease without esophagitis

## 2018-01-04 NOTE — TELEPHONE ENCOUNTER
Routing refill request to provider for review/approval because:  Drug not on the [facility] refill protocol     Requested Prescriptions   Pending Prescriptions Disp Refills     hydroxychloroquine (PLAQUENIL) 200 MG tablet 180 tablet 1     Sig: Take 1 tablet (200 mg) by mouth daily    There is no refill protocol information for this order        Katelyn Calderon RN - BC

## 2018-01-04 NOTE — PROGRESS NOTES
SUBJECTIVE:   Yovana Enriquez is a 42 year old female who presents to clinic today for the following health issues:      URINARY TRACT SYMPTOMS  Onset: 2 days    Description:   Painful urination (Dysuria): YES  Blood in urine (Hematuria): YES  Delay in urine (Hesitency): YES    Intensity: moderate, severe    Progression of Symptoms:  worsening and constant    Accompanying Signs & Symptoms:  Fever/chills: no   Flank pain YES  Nausea and vomiting: YES- nausea  Any vaginal symptoms: abnormal vaginal bleeding  Abdominal/Pelvic Pain: YES    History:   History of frequent UTI's: YES- recently  History of kidney stones: no   Sexually Active: YES  Possibility of pregnancy: No    Precipitating factors:   none    Therapies Tried and outcome: Increase fluid intake    1. Dysuria    2. Nonspecific finding on examination of urine        PMH: Updated and/or reviewed in chart.    PSH: Updated and/or reviewed in chart.    Family History: Updated and/or reviewed in chart.     ROS:  Constitutional, HEENT, cardiovascular, pulmonary, gi and gu systems are otherwise negative.    OBJECTIVE:                                                    /80 (BP Location: Left arm, Patient Position: Sitting, Cuff Size: Adult Regular)  Pulse 86  Temp 98.4  F (36.9  C) (Oral)  Resp 16  Wt 138 lb (62.6 kg)  BMI 22.96 kg/m2    GEN: no acute distress   No significant abdominal discomfort or back tenderness to palpation     Results for orders placed or performed in visit on 01/03/18   *UA reflex to Microscopic and Culture ([place] [facility] (except [place] and [place])   Result Value Ref Range    Color Urine Yellow     Appearance Urine Slightly Cloudy     Glucose Urine Negative NEG^Negative mg/dL    Bilirubin Urine Negative NEG^Negative    Ketones Urine Negative NEG^Negative mg/dL    Specific Gravity Urine 1.020 1.003 - 1.035    Blood Urine Small (A) NEG^Negative    pH Urine 7.0 5.0 - 7.0 pH    Protein Albumin Urine Negative  NEG^Negative mg/dL    Urobilinogen Urine 1.0 0.2 - 1.0 EU/dL    Nitrite Urine Negative NEG^Negative    Leukocyte Esterase Urine Small (A) NEG^Negative    Source Midstream Urine    Urine Microscopic   Result Value Ref Range    WBC Urine 25-50 (A) OTO2^O - 2 /HPF    RBC Urine 5-10 (A) OTO2^O - 2 /HPF    WBC Clumps Present (A) NEG^Negative /HPF    Squamous Epithelial /LPF Urine Few FEW^Few /LPF    Bacteria Urine Moderate (A) NEG^Negative /HPF   Urine Culture Aerobic Bacterial   Result Value Ref Range    Specimen Description Midstream Urine     Culture Micro (A)      >100,000 colonies/mL  Lactose fermenting gram negative rods  Susceptibility testing in progress      Culture Micro <10,000 colonies/mL  mixed urogenital love         ASSESSMENT/PLAN:                                                    1. UTI / Dysuria  2. Nonspecific finding on examination of urine  Empiric Augmentin OK - checked for medication interactions with her SLE and Sjogren's medications and found minor interaction with Bactrim (possible marrow suppression with prolonged combined use).  If no improvement, patient to call within 2 days otherwise we'll tailor from sensitivities ASAP.  - *UA reflex to Microscopic and Culture ([place] [facility] (except [place] and [place])  - Urine Microscopic  - amoxicillin-clavulanate (AUGMENTIN) 500-125 MG per tablet; Take 1 tablet by mouth 2 times daily  Dispense: 14 tablet; Refill: 0  - Urine Culture Aerobic Bacterial     Orders Placed This Encounter     *UA reflex to Microscopic and Culture ([place] [facility] (except [place] and [place])     Urine Microscopic     amoxicillin-clavulanate (AUGMENTIN) 500-125 MG per tablet      Addendum    1/5/2018 8:53 AM resistent to amox-clav, sensitive to cefepime; changed treatment to cefdinir 300 mg twice daily x 5 days.  [facility] msg sent to patient - staff to call and confirm receipt.        Morris Holder MD

## 2018-01-04 NOTE — NURSING NOTE
"Chief Complaint   Patient presents with     UTI       Initial /80 (BP Location: Left arm, Patient Position: Sitting, Cuff Size: Adult Regular)  Pulse 86  Temp 98.4  F (36.9  C) (Oral)  Resp 16  Wt 138 lb (62.6 kg)  BMI 22.96 kg/m2 Estimated body mass index is 22.96 kg/(m^2) as calculated from the following:    Height as of 11/6/17: 5' 5\" (1.651 m).    Weight as of this encounter: 138 lb (62.6 kg).  Medication Reconciliation: complete    "

## 2018-01-05 ENCOUNTER — INFUSION THERAPY VISIT (OUTPATIENT)
Dept: INFUSION THERAPY | Facility: CLINIC | Age: 43
End: 2018-01-05
Payer: COMMERCIAL

## 2018-01-05 VITALS
RESPIRATION RATE: 16 BRPM | BODY MASS INDEX: 23.18 KG/M2 | TEMPERATURE: 97.6 F | WEIGHT: 139.3 LBS | DIASTOLIC BLOOD PRESSURE: 74 MMHG | HEART RATE: 68 BPM | SYSTOLIC BLOOD PRESSURE: 122 MMHG | OXYGEN SATURATION: 98 %

## 2018-01-05 DIAGNOSIS — M32.19 OTHER SYSTEMIC LUPUS ERYTHEMATOSUS WITH OTHER ORGAN INVOLVEMENT (H): Primary | ICD-10-CM

## 2018-01-05 LAB
BACTERIA SPEC CULT: ABNORMAL
BACTERIA SPEC CULT: ABNORMAL
SPECIMEN SOURCE: ABNORMAL

## 2018-01-05 PROCEDURE — 99207 ZZC NO CHARGE LOS: CPT

## 2018-01-05 PROCEDURE — 96413 CHEMO IV INFUSION 1 HR: CPT | Performed by: NURSE PRACTITIONER

## 2018-01-05 RX ORDER — CEFDINIR 300 MG/1
300 CAPSULE ORAL 2 TIMES DAILY
Qty: 10 CAPSULE | Refills: 0 | Status: SHIPPED | OUTPATIENT
Start: 2018-01-05 | End: 2018-01-10

## 2018-01-05 RX ADMIN — Medication 250 ML: at 13:35

## 2018-01-05 NOTE — PROGRESS NOTES
"Infusion Nursing Note:  Yovana MARY Zoe presents today for Benlysta.    Patient seen by provider today: No   present during visit today: Not Applicable.    Note: N/A.    Intravenous Access:  PIV    Treatment Conditions:  Rheumatology Infusion Checklist: PRIOR TO INFUSION OF BIOLOGICAL MEDICATIONS OR ANY OF THESE AS LISTED: Benlysta (benlimumab) \".rheumbiologicalchecklist\"    Prior to Infusion of biological medications or any of these as listed:    1. Elevated temperature, fever, chills, productive cough or abnormal vital signs, night sweats, coughing up blood or sputum, no appetite or abnormal vital signs : NO    2. Open wounds or new incisions: NO    3. Recent hospitalization: NO    4.  Recent surgeries:  NO    5. Any upcoming surgeries or dental procedures?:NO    6. Any current or recent bouts of illness or infection? On any antibiotics? : NO    7. Any new, sudden or worsening abdominal pain :NO    8. Vaccination within 4 weeks? Patient or someone in the household is scheduled to receive vaccination? No live virus vaccines prior to or during treatment :NO    9. Any nervous system diseases [i.e. multiple sclerosis, Guillain-[place], seizures, neurological  changes]: NO    10. Pregnant or breast feeding; or plans on pregnancy in the future: NO    11. Signs of worsening depression or suicidal ideations while taking benlysta:NO    12. New-onset medical symptoms: NO    13.  New cancer diagnosis or on chemotherapy or radiation NO    14.  Evaluate for any sign of active TB [Unexplained weight loss, Loss of appetite, Night sweats, Fever, Fatigue, Chills, Coughing for 3 weeks or longer, Hemoptysis (coughing up blood), Chest pain]: NO    **Note: If answered yes to any of the above, hold the infusion and contact ordering rheumatologist or on-call rheumatologist.   .        Post Infusion Assessment:  Patient tolerated infusion without incident.  No evidence of extravasations.  Access discontinued per " protocol.    Discharge Plan:   Discharge instructions reviewed with: Patient.  Patient and/or family verbalized understanding of discharge instructions and all questions answered.  Patient discharged in stable condition accompanied by: self.  Departure Mode: Ambulatory.    Nancy Padilla RN

## 2018-01-05 NOTE — MR AVS SNAPSHOT
After Visit Summary   1/5/2018    Yovana Enriquez    MRN: [id number]           Patient Information     Date Of Birth          1975        Visit Information        Provider Department      1/5/2018 1:00 PM BAY 8 INFUSION [facility]        Today's Diagnoses     Other systemic lupus erythematosus with other organ involvement (H)    -  1       Follow-ups after your visit        Your next 10 appointments already scheduled     Jan 08, 2018  9:00 AM CST   PROCEDURE with Keena Lockett MD   [facility] [place] ([facility] [place])    [address]  [place] [address]   [phone]            Elias 10, 2018  1:40 PM CST   (Arrive by 1:25 PM)   Return Visit with Inez Sawyer MD   [facility] ([facility])    [address] [place] [address] [phone]            Jan 29, 2018  6:00 PM CST   LAB with BE LAB   [facility] [place] ([facility] [place])    [address]  [place] [address]   [phone]           Please do not eat 10-12 hours before your appointment if you are coming in fasting for labs on lipids, cholesterol, or glucose (sugar). This does not apply to pregnant women. Water, hot tea and black coffee (with nothing added) are okay. Do not drink other fluids, diet soda or chew gum.            Feb 02, 2018  1:00 PM CST   Level 2 with BAY 4 INFUSION   [facility] ([facility])    [address] [address] [place] [address] [phone]            Feb 05, 2018 10:20 AM CST   Return Visit with Bradford Colvin MD   [facility] [place] ([facility] [place])    [address]  [place] [address]   [phone]            Mar 02, 2018  1:00 PM CST   Level 2 with BAY 8 INFUSION   [facility] ([facility])    [address] [place] [address]  [id number] [phone]              Who to contact     If you have questions or need follow up information about today's clinic visit or your schedule please contact [facility] directly at [phone].  Normal or non-critical lab and imaging results will be communicated to you by [employer], letter or phone within 4 business days after the clinic has received the results. If you do not hear from us within 7 days, please contact the clinic through [employer] or phone. If you have a critical or abnormal lab result, we will notify you by phone as soon as possible.  Submit refill requests through [employer] or call your pharmacy and they will forward the refill request to us. Please allow 3 business days for your refill to be completed.          Additional Information About Your Visit        ShotClip Information     [employer]Clip gives you secure access to your electronic health record. If you see a primary care provider, you can also send messages to your care team and make appointments. If you have questions, please call your primary care clinic.  If you do not have a primary care provider, please call [phone] and they will assist you.      ShotClip is an electronic gateway that provides easy, online access to your medical records. With [employer], you can request a clinic appointment, read your test results, renew a prescription or communicate with your care team.     To access your existing account, please contact your [facility] or call [phone] for assistance.        Care EveryWhere ID     This is your Care EveryWhere ID. This could be used by other organizations to access your [facility] medical records  [id number]        Your Vitals Were     Pulse Temperature Respirations Pulse Oximetry BMI (Body Mass Index)       68 97.6  F (36.4  C) (Oral) 16 98% 23.18 kg/m2        Blood Pressure from Last 3 Encounters:   01/05/18 122/74   01/03/18 136/80   12/08/17 116/81    Weight  from Last 3 Encounters:   01/05/18 63.2 kg (139 lb 4.8 oz)   01/03/18 62.6 kg (138 lb)   12/08/17 60.7 kg (133 lb 12.8 oz)              Today, you had the following     No orders found for display       Primary Care Provider Office Phone # Fax #    Bradford Bruna Mario PA-C [phone]       [address]  [place] [address]        Equal Access to Services     [facility] : Hadii aad ku hadasho Soomaali, waaxda luqadaha, qaybta kaalmada adeegyada, waxay idiin hayaan adeeg kharash laparvez . So [facility] [phone].    ATENCIÓN: Si habla español, tiene a dexter disposición servicios gratuitos de asistencia lingüística. Llame al [phone].    We comply with applicable federal civil rights laws and [place] laws. We do not discriminate on the basis of race, color, national origin, age, disability, sex, sexual orientation, or gender identity.            Thank you!     Thank you for choosing [facility]  for your care. Our goal is always to provide you with excellent care. Hearing back from our patients is one way we can continue to improve our services. Please take a few minutes to complete the written survey that you may receive in the mail after your visit with us. Thank you!             Your Updated Medication List - Protect others around you: Learn how to safely use, store and throw away your medicines at www.disposemymeds.org.          This list is accurate as of: 1/5/18  4:13 PM.  Always use your most recent med list.                   Brand Name Dispense Instructions for use Diagnosis    albuterol 108 (90 BASE) MCG/ACT Inhaler    PROAIR HFA/PROVENTIL HFA/VENTOLIN HFA    1 Inhaler    Inhale 2 puffs into the lungs every 6 hours as needed for shortness of breath / dyspnea or wheezing    Acute bronchitis, unspecified organism       amLODIPine 5 MG tablet    NORVASC    90 tablet    Take 1 tablet (5 mg) by mouth daily    Raynaud's phenomenon without gangrene       azaTHIOprine 50 MG tablet     IMURAN    270 tablet    Take 3 tablets (150 mg) by mouth daily    Other systemic lupus erythematosus with other organ involvement (H)       blood glucose monitoring test strip    no brand specified    1 Month    Used for testing glucose 2-3 times daily    History of gestational diabetes mellitus, not currently pregnant       buPROPion 75 MG tablet    WELLBUTRIN    60 tablet    TAKE 1 TABLET BY MOUTH 2 TIMES DAILY    Anxiety       Calcium + D3 600-200 MG-UNIT Tabs      1 tablet daily        cefdinir 300 MG capsule    OMNICEF    10 capsule    Take 1 capsule (300 mg) by mouth 2 times daily for 5 days    Dysuria       cevimeline 30 MG capsule    EVOXAC    180 capsule    Take 1 capsule (30 mg) by mouth 2 times daily    Sjogren's syndrome with keratoconjunctivitis sicca (H)       diclofenac 1 % Gel topical gel    VOLTAREN    100 g    Apply 2-4 grams to knees or shoulders four times daily as needed using enclosed dosing card.    Fibromyalgia, SLE (systemic lupus erythematosus) (H)       FLOVENT IN           fluticasone 50 MCG/ACT spray    FLONASE    1 Bottle    Spray 1-2 sprays into both nostrils daily    Chronic rhinitis       hydroxychloroquine 200 MG tablet    PLAQUENIL    180 tablet    Take 1 tablet (200 mg) by mouth daily    Other systemic lupus erythematosus with other organ involvement (H)       losartan 25 MG tablet    COZAAR    90 tablet    Take 1 tablet (25 mg) by mouth daily    Hypertension goal BP (blood pressure) < 140/90       MICROLET LANCETS Misc     100 each    1 each daily.    Other abnormal glucose       MULTIVITAMIN PO      Take  by mouth.        nortriptyline 10 MG capsule    PAMELOR    90 capsule    TAKE 3 CAPSULES BY MOUTH AT BEDTIME    Numbness       predniSONE 5 MG tablet    DELTASONE    120 tablet    Prednisone 20mg daily x5days, then 15mg daily x5days, then 10mg daily x5days, then 5mg daily thereafter.    Other systemic lupus erythematosus with other organ involvement (H)       pregabalin 150  MG capsule    LYRICA    270 capsule    Take 1 capsule (150 mg) by mouth 3 times daily . 3 month supply.    Fibromyalgia       ranitidine 300 MG tablet    ZANTAC    180 tablet    Take 1 tablet (300 mg) by mouth 2 times daily    Gastroesophageal reflux disease without esophagitis

## 2018-01-07 RX ORDER — HYDROXYCHLOROQUINE SULFATE 200 MG/1
200 TABLET, FILM COATED ORAL DAILY
Qty: 180 TABLET | Refills: 1 | Status: SHIPPED | OUTPATIENT
Start: 2018-01-07 | End: 2018-02-05

## 2018-01-09 NOTE — PROGRESS NOTES
Pre procedure Diagnosis: bilateral trochanteric bursitis   Post procedure Diagnosis: Same  Procedure performed: bilateral trochanteric bursa injections   Anesthesia: none  Complications: none  Operators: Magdalena Lockett MD, Ingrid Carmona MD    Indications:   Yovana Enriquez is a 41 year old female seen by me in clinic.  She has bilateral hip pain, and last had a bursa injection in 4/2017 that was helpful. It lasted 4-5 months and provided 90% relief.  Exam shows bilateral trochanteric bursa tenderness and they have tried conservative treatment including medications.    Options/alternatives, benefits and risks were discussed with the patient including bleeding, infection, tissue trauma including tendons and muscles, and weakness.  Questions were answered to her satisfaction and she agrees to proceed. Voluntary informed consent was obtained and signed.     Vitals were reviewed: Yes  Allergies were reviewed:  Yes   Medications were reviewed:  Yes   Pre-procedure pain score: 2/10    Procedure:  After getting informed consent, a Pause for the Cause was performed.  Patient was prepped and draped in sterile fashion.     The area over the right trochanter was palpated, and the tender area was identified, corresponding to the area of the trochanteric bursa.  The area was cleaned with Chloroprep.   A 25 G 3.5 inch needle was inserted, aiming towards the trochanter.  When bone was encountered, the needle was slightly drawn.  A solution containing local anesthesic and steroid was injected.  The needle was removed.  Hemostasis was achieved. Bandaids were placed as appropriate.    The procedure was repeated on the opposite side.    In total, 3ml of 0.5% bupivacaine, 3ml of 1% lidocaine, and 40mg of kenalog was injected, divided equally between the two sides.    Hemostasis was achieved, the area was cleaned, and bandaids were placed when appropriate.  The patient tolerated the procedure well, and was taken to the recovery  room.    Images were saved to PACS.    Post-procedure pain score: <2/10  Follow-up includes:   -f/u phone call in one week  -f/u with referring provider    Magdalena Lockett MD  [facility]

## 2018-01-10 ENCOUNTER — OFFICE VISIT (OUTPATIENT)
Dept: PALLIATIVE MEDICINE | Facility: CLINIC | Age: 43
End: 2018-01-10
Payer: COMMERCIAL

## 2018-01-10 VITALS — DIASTOLIC BLOOD PRESSURE: 77 MMHG | SYSTOLIC BLOOD PRESSURE: 123 MMHG | HEART RATE: 98 BPM

## 2018-01-10 DIAGNOSIS — M79.7 FIBROMYALGIA: Primary | ICD-10-CM

## 2018-01-10 DIAGNOSIS — M76.891 ENTHESOPATHY OF HIP REGION ON BOTH SIDES: ICD-10-CM

## 2018-01-10 DIAGNOSIS — I10 HYPERTENSION GOAL BP (BLOOD PRESSURE) < 140/90: ICD-10-CM

## 2018-01-10 DIAGNOSIS — M76.892 ENTHESOPATHY OF HIP REGION ON BOTH SIDES: ICD-10-CM

## 2018-01-10 PROCEDURE — 20610 DRAIN/INJ JOINT/BURSA W/O US: CPT | Mod: 50 | Performed by: PSYCHIATRY & NEUROLOGY

## 2018-01-10 ASSESSMENT — PAIN SCALES - GENERAL: PAINLEVEL: MILD PAIN (2)

## 2018-01-10 NOTE — NURSING NOTE
"Chief Complaint   Patient presents with     Pain       Initial /77  Pulse 98 Estimated body mass index is 23.18 kg/(m^2) as calculated from the following:    Height as of 11/6/17: 1.651 m (5' 5\").    Weight as of 1/5/18: 63.2 kg (139 lb 4.8 oz).  Medication Reconciliation: complete     Soraya Razo MA      "

## 2018-01-10 NOTE — TELEPHONE ENCOUNTER
"Requested Prescriptions   Pending Prescriptions Disp Refills     losartan (COZAAR) 25 MG tablet [Pharmacy Med Name: LOSARTAN POTASSIUM 25 MG TAB] 90 tablet 1    Last Written Prescription Date:  10-11-17  Last Fill Quantity: 90,  # refills: 1   Last Office Visit with [facility], [facility] or [facility] prescribing provider:  1-3-18   Future Office Visit:    Next 5 appointments (look out 90 days)     Feb 05, 2018 10:20 AM CST   Return Visit with Bradford Colvin MD   [facility] [place] ([facility])    [address] [facility] [id number] [phone]                Sig: TAKE 1 TABLET BY MOUTH DAILY    Angiotensin-II Receptors Passed    1/10/2018  1:15 AM       Passed - Blood pressure under 140/90 in past 12 months.    BP Readings from Last 3 Encounters:   01/05/18 122/74   01/03/18 136/80   12/08/17 116/81                Passed - Recent or future visit with authorizing provider's specialty    Patient had office visit in the last year or has a visit in the next 30 days with authorizing provider.  See \"Patient Info\" tab in inbasket, or \"Choose Columns\" in Meds & Orders section of the refill encounter.              Passed - Patient is age 18 or older       Passed - No active pregnancy on record       Passed - Normal serum creatinine on file in past 12 months    Recent Labs   Lab Test  10/02/17   1814   CR  0.88            Passed - Normal serum potassium on file in past 12 months    Recent Labs   Lab Test  10/02/17   1814   POTASSIUM  3.7                   Passed - No positive pregnancy test in past 12 months        "

## 2018-01-10 NOTE — PATIENT INSTRUCTIONS
[facility]   Post Procedure Instructions    Today you had:   bursa injection      Medications used:  lidocaine   bupivicaine   kenolog           Go to the emergency room if you develop any shortness of breath    Monitor the injection sites for signs and symptoms of infection-fever, chills, redness, swelling, warmth, or drainage to areas.    You may have soreness at injection sites for up to 24 hours.    You may apply ice to the painful areas to help minimize the discomfort of the needle pokes.    Do not apply heat to sites for at least 12 hours.    You may use anti-inflammatory medications or Tylenol for pain control if necessary  Nurse line: [phone]  After hours provider line: [phone]  Appointment line: [phone]

## 2018-01-10 NOTE — MR AVS SNAPSHOT
After Visit Summary   1/10/2018    Yovana Enriquez    MRN: [id number]           Patient Information     Date Of Birth          1975        Visit Information        Provider Department      1/10/2018 2:00 PM Keena Lockett MD [facility] [place]        Care Instructions    [facility]   Post Procedure Instructions    Today you had:   bursa injection      Medications used:  lidocaine   bupivicaine   kenolog           Go to the emergency room if you develop any shortness of breath    Monitor the injection sites for signs and symptoms of infection-fever, chills, redness, swelling, warmth, or drainage to areas.    You may have soreness at injection sites for up to 24 hours.    You may apply ice to the painful areas to help minimize the discomfort of the needle pokes.    Do not apply heat to sites for at least 12 hours.    You may use anti-inflammatory medications or Tylenol for pain control if necessary  Nurse line: [phone]  After hours provider line: [phone]  Appointment line: [phone]              Follow-ups after your visit        Your next 10 appointments already scheduled     Jan 29, 2018  6:00 PM CST   LAB with BE LAB   [facility] [place] ([facility])    [address] [facility] [id number] [phone]           Please do not eat 10-12 hours before your appointment if you are coming in fasting for labs on lipids, cholesterol, or glucose (sugar). This does not apply to pregnant women. Water, hot tea and black coffee (with nothing added) are okay. Do not drink other fluids, diet soda or chew gum.            Feb 02, 2018  1:00 PM CST   Level 2 with [address] ([facility])    [address] [place] [id number] [phone]            Feb 05, 2018 10:20 AM CST   Return Visit with Bradford Colvin MD   [facility] [place] ([facility])     [address] [facility] [place] [address]   [phone]            Mar 02, 2018  1:00 PM CST   Level 2 with BAY 8 [facility] [place] ([facility])    [address] [place] [id number] [phone]            Mar 28, 2018  1:40 PM CDT   (Arrive by 1:25 PM)   Return Visit with Inez Sawyer MD   [facility] ([facility] [place])    [address] [facility]  4th [address]  [facility] [address] [phone]              Who to contact     If you have questions or need follow up information about today's clinic visit or your schedule please contact [facility] directly at [phone].  Normal or non-critical lab and imaging results will be communicated to you by MyChart, letter or phone within 4 business days after the clinic has received the results. If you do not hear from us within 7 days, please contact the clinic through MyChart or phone. If you have a critical or abnormal lab result, we will notify you by phone as soon as possible.  Submit refill requests through [employer] or call your pharmacy and they will forward the refill request to us. Please allow 3 business days for your refill to be completed.          Additional Information About Your Visit        [employer] Information     [employer] gives you secure access to your electronic health record. If you see a primary care provider, you can also send messages to your care team and make appointments. If you have questions, please call your primary care clinic.  If you do not have a primary care provider, please call [phone] and they will assist you.        Care EveryWhere ID     This is your Care EveryWhere ID. This could be used by other organizations to access your [place] medical records  [id number]        Your Vitals Were     Pulse                   98            Blood Pressure from Last 3 Encounters:   01/10/18 123/77   01/05/18 122/74    01/03/18 136/80    Weight from Last 3 Encounters:   01/05/18 63.2 kg (139 lb 4.8 oz)   01/03/18 62.6 kg (138 lb)   12/08/17 60.7 kg (133 lb 12.8 oz)              Today, you had the following     No orders found for display       Primary Care Provider Office Phone # Fax #    Bradford Shihanna Mario PA-C [phone]       [address] [facility] [address]        Equal Access to Services     [facility]: Hadii aad ku hadasho Soomaali, waaxda luqadaha, qaybta kaalmada adeegyada, waxay idiin hayaan adeeg kharamaximus scott . So [facility] [phone].    ATENCIÓN: Si habla español, tiene a dexter disposición servicios gratuitos de asistencia lingüística. [facility] [phone].    We comply with applicable federal civil rights laws and [place] laws. We do not discriminate on the basis of race, color, national origin, age, disability, sex, sexual orientation, or gender identity.            Thank you!     Thank you for choosing [facility]  for your care. Our goal is always to provide you with excellent care. Hearing back from our patients is one way we can continue to improve our services. Please take a few minutes to complete the written survey that you may receive in the mail after your visit with us. Thank you!             Your Updated Medication List - Protect others around you: Learn how to safely use, store and throw away your medicines at www.disposemymeds.org.          This list is accurate as of: 1/10/18  2:16 PM.  Always use your most recent med list.                   Brand Name Dispense Instructions for use Diagnosis    albuterol 108 (90 BASE) MCG/ACT Inhaler    PROAIR HFA/PROVENTIL HFA/VENTOLIN HFA    1 Inhaler    Inhale 2 puffs into the lungs every 6 hours as needed for shortness of breath / dyspnea or wheezing    Acute bronchitis, unspecified organism       amLODIPine 5 MG tablet    NORVASC    90 tablet    Take 1 tablet (5 mg) by mouth daily    Raynaud's phenomenon without gangrene        azaTHIOprine 50 MG tablet    IMURAN    270 tablet    Take 3 tablets (150 mg) by mouth daily    Other systemic lupus erythematosus with other organ involvement (H)       blood glucose monitoring test strip    no brand specified    1 Month    Used for testing glucose 2-3 times daily    History of gestational diabetes mellitus, not currently pregnant       buPROPion 75 MG tablet    WELLBUTRIN    60 tablet    TAKE 1 TABLET BY MOUTH 2 TIMES DAILY    Anxiety       Calcium + D3 600-200 MG-UNIT Tabs      1 tablet daily        cefdinir 300 MG capsule    OMNICEF    10 capsule    Take 1 capsule (300 mg) by mouth 2 times daily for 5 days    Dysuria       cevimeline 30 MG capsule    EVOXAC    180 capsule    Take 1 capsule (30 mg) by mouth 2 times daily    Sjogren's syndrome with keratoconjunctivitis sicca (H)       diclofenac 1 % Gel topical gel    VOLTAREN    100 g    Apply 2-4 grams to knees or shoulders four times daily as needed using enclosed dosing card.    Fibromyalgia, SLE (systemic lupus erythematosus) (H)       FLOVENT IN           fluticasone 50 MCG/ACT spray    FLONASE    1 Bottle    Spray 1-2 sprays into both nostrils daily    Chronic rhinitis       hydroxychloroquine 200 MG tablet    PLAQUENIL    180 tablet    Take 1 tablet (200 mg) by mouth daily    Other systemic lupus erythematosus with other organ involvement (H)       losartan 25 MG tablet    COZAAR    90 tablet    Take 1 tablet (25 mg) by mouth daily    Hypertension goal BP (blood pressure) < 140/90       MICROLET LANCETS Misc     100 each    1 each daily.    Other abnormal glucose       MULTIVITAMIN PO      Take  by mouth.        nortriptyline 10 MG capsule    PAMELOR    90 capsule    TAKE 3 CAPSULES BY MOUTH AT BEDTIME    Numbness       predniSONE 5 MG tablet    DELTASONE    120 tablet    Prednisone 20mg daily x5days, then 15mg daily x5days, then 10mg daily x5days, then 5mg daily thereafter.    Other systemic lupus erythematosus with other organ involvement  (H)       pregabalin 150 MG capsule    LYRICA    270 capsule    Take 1 capsule (150 mg) by mouth 3 times daily . 3 month supply.    Fibromyalgia       ranitidine 300 MG tablet    ZANTAC    180 tablet    Take 1 tablet (300 mg) by mouth 2 times daily    Gastroesophageal reflux disease without esophagitis

## 2018-01-11 RX ORDER — LOSARTAN POTASSIUM 25 MG/1
TABLET ORAL
Qty: 90 TABLET | Refills: 1 | Status: SHIPPED | OUTPATIENT
Start: 2018-01-11 | End: 2018-06-29

## 2018-01-11 NOTE — TELEPHONE ENCOUNTER
Prescription approved per [facility] Refill Protocol.  Elizabeth Bergeron RN

## 2018-01-16 ENCOUNTER — TRANSFERRED RECORDS (OUTPATIENT)
Dept: HEALTH INFORMATION MANAGEMENT | Facility: CLINIC | Age: 43
End: 2018-01-16

## 2018-01-26 DIAGNOSIS — R20.0 NUMBNESS: ICD-10-CM

## 2018-01-26 DIAGNOSIS — F41.9 ANXIETY: ICD-10-CM

## 2018-01-26 NOTE — TELEPHONE ENCOUNTER
Routing refill request to provider for review/approval because:  Kiesha given x1 and patient did not follow up, please advise  Patient needs to be seen because:  Advised to follow up in 6 months

## 2018-01-26 NOTE — TELEPHONE ENCOUNTER
"Requested Prescriptions   Pending Prescriptions Disp Refills     buPROPion (WELLBUTRIN) 75 MG tablet [Pharmacy Med Name: BUPROPION HCL 75 MG TABLET] 60 tablet 0    Last Written Prescription Date:  12/26/17  Last Fill Quantity: 60,  # refills: 0   Last Office Visit with [facility] provider:  01/03/18   Future Office Visit:  02/05/18 Colvin  Next 5 appointments (look out 90 days)     Feb 05, 2018 10:20 AM CST   Return Visit with Bradford Colvin MD   [facility] [place] ([facility])    [address]  [place] [address]   [phone]                  Sig: TAKE 1 TABLET BY MOUTH 2 TIMES DAILY    SSRIs Protocol Passed    1/26/2018  1:04 AM       Passed - Recent or future visit with authorizing provider    Patient had office visit in the last year or has a visit in the next 30 days with authorizing provider.  See \"Patient Info\" tab in inbasket, or \"Choose Columns\" in Meds & Orders section of the refill encounter.            Passed - Medication is Bupropion    If the medication is Bupropion (Wellbutrin), and the patient is taking for smoking cessation; OK to refill.         Passed - Patient is age 18 or older       Passed - No active pregnancy on record       Passed - No positive pregnancy test in last 12 months        nortriptyline (PAMELOR) 10 MG capsule [Pharmacy Med Name: NORTRIPTYLINE HCL 10 MG CAP] 90 capsule 0    Last Written Prescription Date:  12/26/17  Last Fill Quantity: 90,  # refills: 0   Last Office Visit with [facility] provider:  01/03/18   Future Office Visit:  02/05/18 Colvin  Next 5 appointments (look out 90 days)     Feb 05, 2018 10:20 AM CST   Return Visit with Bradford Colvin MD   [facility]    [address]  [place] [address]   [phone]                  Sig: TAKE 3 CAPSULES BY MOUTH AT BEDTIME    Tricyclic Antidepressants Protocol Passed    1/26/2018  1:04 AM       Passed - Blood pressure under 140/90    BP Readings from " "Last 3 Encounters:   01/10/18 123/77   01/05/18 122/74   01/03/18 136/80                Passed - Recent (12 mo) or future (30 d) visit with authorizing provider's specialty     Patient had office visit in the last year or has a visit in the next 30 days with authorizing provider.  See \"Patient Info\" tab in inbasket, or \"Choose Columns\" in Meds & Orders section of the refill encounter.            Passed - Patient is age 18 or older       Passed - No active pregnancy on record       Passed - No positive pregnancy test in past 12 months          "

## 2018-01-28 ENCOUNTER — OFFICE VISIT (OUTPATIENT)
Dept: URGENT CARE | Facility: URGENT CARE | Age: 43
End: 2018-01-28
Payer: COMMERCIAL

## 2018-01-28 VITALS
DIASTOLIC BLOOD PRESSURE: 83 MMHG | HEART RATE: 104 BPM | OXYGEN SATURATION: 100 % | BODY MASS INDEX: 23.03 KG/M2 | TEMPERATURE: 97.8 F | SYSTOLIC BLOOD PRESSURE: 126 MMHG | WEIGHT: 138.38 LBS

## 2018-01-28 DIAGNOSIS — Z79.899 HIGH RISK MEDICATION USE: ICD-10-CM

## 2018-01-28 DIAGNOSIS — M32.19 OTHER SYSTEMIC LUPUS ERYTHEMATOSUS WITH OTHER ORGAN INVOLVEMENT (H): ICD-10-CM

## 2018-01-28 DIAGNOSIS — R68.89 FLU-LIKE SYMPTOMS: ICD-10-CM

## 2018-01-28 DIAGNOSIS — R05.9 COUGH: ICD-10-CM

## 2018-01-28 DIAGNOSIS — R06.2 WHEEZING: ICD-10-CM

## 2018-01-28 DIAGNOSIS — J01.90 ACUTE SINUSITIS WITH SYMPTOMS > 10 DAYS: Primary | ICD-10-CM

## 2018-01-28 LAB
FLUAV+FLUBV AG SPEC QL: NEGATIVE
FLUAV+FLUBV AG SPEC QL: NEGATIVE
SPECIMEN SOURCE: NORMAL

## 2018-01-28 PROCEDURE — 99214 OFFICE O/P EST MOD 30 MIN: CPT | Performed by: FAMILY MEDICINE

## 2018-01-28 PROCEDURE — 87804 INFLUENZA ASSAY W/OPTIC: CPT | Performed by: FAMILY MEDICINE

## 2018-01-28 RX ORDER — CODEINE PHOSPHATE/GUAIFENESIN 10-100MG/5
5 LIQUID (ML) ORAL EVERY 8 HOURS PRN
Qty: 150 ML | Refills: 0 | Status: SHIPPED | OUTPATIENT
Start: 2018-01-28 | End: 2018-02-07

## 2018-01-28 RX ORDER — ALBUTEROL SULFATE 90 UG/1
2 AEROSOL, METERED RESPIRATORY (INHALATION) EVERY 4 HOURS PRN
Qty: 1 INHALER | Refills: 0 | Status: SHIPPED | OUTPATIENT
Start: 2018-01-28 | End: 2020-07-03

## 2018-01-28 NOTE — PROGRESS NOTES
Cc: cough    A week and a half now cough  Coughing so much not sleeping very good  Feels it in her chest  Very congested in the sinuses   Tired   May have had a fever but not documented   No thoughts of harming self or others     Colds, sinus congestion, facial pain  Cough Yes  Greenish discharge  Fever No  Progressively getting worse: YES  Thought was getting better then started getting worse: YES  Getting better:  No  Exposure to pertussis or pertussis like symptoms: No    Problem list and histories reviewed & adjusted, as indicated.  Additional history: as documented    Problem list, Medication list, Allergies, and Medical/Social/Surgical histories reviewed in [facility] [place] and updated as appropriate.    ROS:  Constitutional, HEENT, cardiovascular, pulmonary, gi and gu systems are negative, except as otherwise noted.    OBJECTIVE:                                                    /83  Pulse 104  Temp 97.8  F (36.6  C) (Tympanic)  Wt 138 lb 6 oz (62.8 kg)  SpO2 100%  BMI 23.03 kg/m2  Body mass index is 23.03 kg/(m^2).  GENERAL: healthy, alert and no distress  EYES: Eyes grossly normal to inspection, PERRL and conjunctivae and sclerae normal  HENT: ear canals and TM's normal, nose and mouth without ulcers or lesions  Sinuses: turbinates erythematous maxillary sinus tenderness   NECK: no adenopathy, no asymmetry, masses, or scars and thyroid normal to palpation  RESP: lungs clear to auscultation - no rales, rhonchi or wheezes   CV: regular rate and rhythm, normal S1 S2, no S3 or S4, no murmur, click or rub, no peripheral edema and peripheral pulses strong  ABDOMEN: soft, nontender, no hepatosplenomegaly, no masses and bowel sounds normal  MS: no gross musculoskeletal defects noted, no edema  SKIN: no suspicious lesions or rashes  NEURO: Normal strength and tone, mentation intact and speech normal  PSYCH: mentation appears normal, affect normal/bright    Diagnostic Test Results:  Results for orders placed or  performed in visit on 01/28/18 (from the past 24 hour(s))   Influenza A/B antigen   Result Value Ref Range    Influenza A/B Agn Specimen Nasal     Influenza A Negative NEG^Negative    Influenza B Negative NEG^Negative        ASSESSMENT/PLAN:                                                        ICD-10-CM    1. Acute sinusitis with symptoms > 10 days J01.90 amoxicillin-clavulanate (AUGMENTIN) 875-125 MG per tablet   2. High risk medication use Z79.899 Influenza A/B antigen   3. Other systemic lupus erythematosus with other organ involvement (H) M32.19    4. Flu-like symptoms R68.89 Influenza A/B antigen   5. Wheezing R06.2 albuterol (PROAIR HFA/PROVENTIL HFA/VENTOLIN HFA) 108 (90 BASE) MCG/ACT Inhaler     Spacer/Aero-Holding Chambers (SPACER/AERO-HOLD CHAMBER MASK) EDITH   6. Cough R05 guaiFENesin-codeine (GUAIFENESIN AC) 100-10 MG/5ML SYRP syrup     Prescribed with augmentin  Side effects discussed warned about GI side effects and risk of cdiff.  Patient declined Tamiflu. Influenza test negative  Prescribed with albuterol as needed wheezing. Per patient she had some wheezing at night none heard in clinic  Trial albuterol with spacer if persistent come in  Alarm signs or symptoms discussed, if present recommend go to ER   Prescribed with guaifenesin AC as needed cough. Warned sedating and habit forming  Sedating medications given. Aware not to drive or operate machinery while on these medications. Caution with .   No thoughts of harming self or others   Recommend follow up with primary care provider if no relief , sooner if worse  Adverse reactions of medications discussed.  Over the counter medications discussed.   Aware to come back in if with worsening symptoms or if no relief despite treatment plan  Patient voiced understanding and had no further questions.     MD Loren Moe MD  [facility]

## 2018-01-28 NOTE — MR AVS SNAPSHOT
After Visit Summary   1/28/2018    Yovana Enriquez    MRN: [id number]           Patient Information     Date Of Birth          1975        Visit Information        Provider Department      1/28/2018 12:40 PM Loren Alberts MD [facility]        Today's Diagnoses     Acute sinusitis with symptoms > 10 days    -  1    High risk medication use        Other systemic lupus erythematosus with other organ involvement (H)        Flu-like symptoms        Wheezing        Cough          Care Instructions      Discharge Instructions: Using an Inhaler  Your healthcare provider prescribed medicine that you will breathe in (inhale) by using an inhaler or metered-dose inhaler (MDI). An inhaler is a pressurized sprayer that gives you a measured amount of medicine. A spacer (holding chamber) may also be used. Spacers make it easier to inhale medicine correctly. Using the inhaler the right way is important so that the medicine gets to your lungs to help you breathe better. MDIs are a common type of inhaler, and these instructions refer to this type of inhaler. Other type of inhalers are also available. These include dry powder inhalers (DPIs). Check with your healthcare provider if you aren't sure which type of inhaler you are to use when you get home.  Home care  MDI without spacer    Remove the cap and shake the inhaler.    Take a deep breath and breathe out (exhale) all the way.    Place the inhaler in your mouth. Close your lips around it.    As you breathe in deeply, press down on the inhaler to release the medicine. Hold your breath for a count of 10, or as long as you can comfortably. Then slowly breathe out.          MDI with spacer    Remove the caps from the inhaler and spacer and shake the inhaler.    Take a deep breath and breathe out (exhale) all the way. Put the spacer between your teeth and close your lips tightly around the spacer.    Spray 1 puff into the spacer by  pressing down on the inhaler. Then slowly breathe in as deeply as you can. If you breathe in too quickly, you may hear a whistling sound in the spacer.    Take the spacer out of your mouth. Hold your breath for a count of 10, or as long as you can comfortably. Then slowly breathe out.    Follow-up with your provider  As soon as you can, make all of your follow-up appointments as directed by our staff.  When to call 911  Call 911 right away if you have:    Shortness of breath that does not get better after taking your quick-relief medicine    Trouble walking and talking because of shortness of breath    Blue lips or fingernails    Severe wheezing    Peak flow readings less than 50% of your personal best   Date Last Reviewed: 10/1/2016    1417-0695 [employer]. [address] [place] [address]. All rights reserved. This information is not intended as a substitute for professional medical care. Always follow your healthcare professional's instructions.                Follow-ups after your visit        Your next 10 appointments already scheduled     Jan 29, 2018  6:00 PM CST   LAB with BE LAB   [facility] [place] ([facility]    [address]  [place] [address]   [phone]           Please do not eat 10-12 hours before your appointment if you are coming in fasting for labs on lipids, cholesterol, or glucose (sugar). This does not apply to pregnant women. Water, hot tea and black coffee (with nothing added) are okay. Do not drink other fluids, diet soda or chew gum.            Feb 02, 2018  1:00 PM CST   Level 2 with [address] ([facility])    [address] [place] [address]   [phone]            Feb 05, 2018 10:20 AM CST   Return Visit with Bradford Colvin MD   [facility] [place] ([facility]    [address]  [place] [address]   [phone]            Mar  02, 2018  1:00 PM CST   Level 2 with [place] [address] INFUSION   [facility] ([facility]    [address] [place] [address] [phone]            Mar 28, 2018  1:40 PM CDT   (Arrive by 1:25 PM)   Return Visit with Inez Sawyer MD   [facility] ([facility])    [address]  4th [address]  [facility] [address] [phone]              Who to contact     If you have questions or need follow up information about today's clinic visit or your schedule please contact [facility] directly at [phone].  Normal or non-critical lab and imaging results will be communicated to you by MyChart, letter or phone within 4 business days after the clinic has received the results. If you do not hear from us within 7 days, please contact the clinic through [employer] or phone. If you have a critical or abnormal lab result, we will notify you by phone as soon as possible.  Submit refill requests through [employer] or call your pharmacy and they will forward the refill request to us. Please allow 3 business days for your refill to be completed.          Additional Information About Your Visit        [employer] Information     [employer] gives you secure access to your electronic health record. If you see a primary care provider, you can also send messages to your care team and make appointments. If you have questions, please call your primary care clinic.  If you do not have a primary care provider, please call [phone] and they will assist you.        Care EveryWhere ID     This is your Care EveryWhere ID. This could be used by other organizations to access your [place] medical records  [id number]        Your Vitals Were     Pulse Temperature Pulse Oximetry BMI (Body Mass Index)          104 97.8  F (36.6  C) (Tympanic) 100% 23.03 kg/m2         Blood Pressure from Last 3 Encounters:   01/28/18 126/83   01/10/18  123/77   01/05/18 122/74    Weight from Last 3 Encounters:   01/28/18 138 lb 6 oz (62.8 kg)   01/05/18 139 lb 4.8 oz (63.2 kg)   01/03/18 138 lb (62.6 kg)              We Performed the Following     Influenza A/B antigen          Today's Medication Changes          These changes are accurate as of 1/28/18  1:11 PM.  If you have any questions, ask your nurse or doctor.               Start taking these medicines.        Dose/Directions    amoxicillin-clavulanate 875-125 MG per tablet   Commonly known as:  AUGMENTIN   Used for:  Acute sinusitis with symptoms > 10 days   Started by:  Loren Alberts MD        Dose:  1 tablet   Take 1 tablet by mouth 2 times daily for 10 days   Quantity:  20 tablet   Refills:  0       guaiFENesin-codeine 100-10 MG/5ML Syrp syrup   Commonly known as:  guaiFENesin AC   Used for:  Cough   Started by:  Loren Alberts MD        Dose:  5 mL   Take 5 mLs by mouth every 8 hours as needed for cough or congestion May cause sedating. Preferably take at bedtime.   Quantity:  150 mL   Refills:  0       spacer/aero-hold chamber mask Wendy   Used for:  Wheezing   Started by:  Loren Alberts MD        1 Adult size spacer to use with MDI inhalers.   Quantity:  1 each   Refills:  0         These medicines have changed or have updated prescriptions.        Dose/Directions    * albuterol 108 (90 BASE) MCG/ACT Inhaler   Commonly known as:  PROAIR HFA/PROVENTIL HFA/VENTOLIN HFA   This may have changed:  Another medication with the same name was added. Make sure you understand how and when to take each.   Used for:  Acute bronchitis, unspecified organism   Changed by:  Loren Alberts MD        Dose:  2 puff   Inhale 2 puffs into the lungs every 6 hours as needed for shortness of breath / dyspnea or wheezing   Quantity:  1 Inhaler   Refills:  1       * albuterol 108 (90 BASE) MCG/ACT Inhaler   Commonly known as:  PROAIR HFA/PROVENTIL HFA/VENTOLIN HFA   This may have  changed:  You were already taking a medication with the same name, and this prescription was added. Make sure you understand how and when to take each.   Used for:  Wheezing   Changed by:  Loren Alberts MD        Dose:  2 puff   Inhale 2 puffs into the lungs every 4 hours as needed for shortness of breath / dyspnea or wheezing Use with spacer   Quantity:  1 Inhaler   Refills:  0       * Notice:  This list has 2 medication(s) that are the same as other medications prescribed for you. Read the directions carefully, and ask your doctor or other care provider to review them with you.      Stop taking these medicines if you haven't already. Please contact your care team if you have questions.     FLOVENT IN   Stopped by:  Loren Alberts MD                Where to get your medicines      These [id number] [place] - [address]  [address] [place] [address]     Phone:  [phone]     albuterol 108 (90 BASE) MCG/ACT Inhaler    amoxicillin-clavulanate 875-125 MG per tablet    spacer/aero-hold chamber mask Wendy         Some of these will need a paper prescription and others can be bought over the counter.  Ask your nurse if you have questions.     Bring a paper prescription for each of these medications     guaiFENesin-codeine 100-10 MG/5ML Syrp syrup                Primary Care Provider Office Phone # Fax #    Bradford Mario PA-C [phone] [address]  [place] [address]        Equal Access to Services     [facility] AH: Hadii aad ku hadasho Soomaali, waaxda luqadaha, qaybta kaalmada adeegyada, waxay idiin hayaan yris ying'twin . So [facility] [phone].    ATENCIÓN: Si habla español, tiene a dexter disposición servicios gratuitos de asistencia lingüística. Llame al [phone].    We comply with applicable federal civil rights laws and [place] laws. We do not discriminate on the basis of race, color, national origin,  age, disability, sex, sexual orientation, or gender identity.            Thank you!     Thank you for choosing [facility]  [facility]. Our goal is always to provide you with excellent care. Hearing back from our patients is one way we can continue to improve our services. Please take a few minutes to complete the written survey that you may receive in the mail after your visit with us. Thank you!             Your Updated Medication List - Protect others around you: Learn how to safely use, store and throw away your medicines at www.disposemymeds.org.          This list is accurate as of 1/28/18  1:11 PM.  Always use your most recent med list.                   Brand Name Dispense Instructions for use Diagnosis    * albuterol 108 (90 BASE) MCG/ACT Inhaler    PROAIR HFA/PROVENTIL HFA/VENTOLIN HFA    1 Inhaler    Inhale 2 puffs into the lungs every 6 hours as needed for shortness of breath / dyspnea or wheezing    Acute bronchitis, unspecified organism       * albuterol 108 (90 BASE) MCG/ACT Inhaler    PROAIR HFA/PROVENTIL HFA/VENTOLIN HFA    1 Inhaler    Inhale 2 puffs into the lungs every 4 hours as needed for shortness of breath / dyspnea or wheezing Use with spacer    Wheezing       amLODIPine 5 MG tablet    NORVASC    90 tablet    Take 1 tablet (5 mg) by mouth daily    Raynaud's phenomenon without gangrene       amoxicillin-clavulanate 875-125 MG per tablet    AUGMENTIN    20 tablet    Take 1 tablet by mouth 2 times daily for 10 days    Acute sinusitis with symptoms > 10 days       azaTHIOprine 50 MG tablet    IMURAN    270 tablet    Take 3 tablets (150 mg) by mouth daily    Other systemic lupus erythematosus with other organ involvement (H)       blood glucose monitoring test strip    no brand specified    1 Month    Used for testing glucose 2-3 times daily    History of gestational diabetes mellitus, not currently pregnant       buPROPion 75 MG tablet    WELLBUTRIN    60 tablet    TAKE 1 TABLET  BY MOUTH 2 TIMES DAILY    Anxiety       Calcium + D3 600-200 MG-UNIT Tabs      1 tablet daily        cevimeline 30 MG capsule    EVOXAC    180 capsule    Take 1 capsule (30 mg) by mouth 2 times daily    Sjogren's syndrome with keratoconjunctivitis sicca (H)       diclofenac 1 % Gel topical gel    VOLTAREN    100 g    Apply 2-4 grams to knees or shoulders four times daily as needed using enclosed dosing card.    Fibromyalgia, SLE (systemic lupus erythematosus) (H)       fluticasone 50 MCG/ACT spray    FLONASE    1 Bottle    Spray 1-2 sprays into both nostrils daily    Chronic rhinitis       guaiFENesin-codeine 100-10 MG/5ML Syrp syrup    guaiFENesin AC    150 mL    Take 5 mLs by mouth every 8 hours as needed for cough or congestion May cause sedating. Preferably take at bedtime.    Cough       hydroxychloroquine 200 MG tablet    PLAQUENIL    180 tablet    Take 1 tablet (200 mg) by mouth daily    Other systemic lupus erythematosus with other organ involvement (H)       losartan 25 MG tablet    COZAAR    90 tablet    TAKE 1 TABLET BY MOUTH DAILY    Hypertension goal BP (blood pressure) < 140/90       MICROLET LANCETS Misc     100 each    1 each daily.    Other abnormal glucose       MULTIVITAMIN PO      Take  by mouth.        nortriptyline 10 MG capsule    PAMELOR    90 capsule    TAKE 3 CAPSULES BY MOUTH AT BEDTIME    Numbness       predniSONE 5 MG tablet    DELTASONE    120 tablet    Prednisone 20mg daily x5days, then 15mg daily x5days, then 10mg daily x5days, then 5mg daily thereafter.    Other systemic lupus erythematosus with other organ involvement (H)       pregabalin 150 MG capsule    LYRICA    270 capsule    Take 1 capsule (150 mg) by mouth 3 times daily . 3 month supply.    Fibromyalgia       ranitidine 300 MG tablet    ZANTAC    180 tablet    Take 1 tablet (300 mg) by mouth 2 times daily    Gastroesophageal reflux disease without esophagitis       spacer/aero-hold chamber mask Wendy     1 each    1 Adult size  spacer to use with MDI inhalers.    Wheezing       * Notice:  This list has 2 medication(s) that are the same as other medications prescribed for you. Read the directions carefully, and ask your doctor or other care provider to review them with you.

## 2018-01-28 NOTE — PATIENT INSTRUCTIONS
Discharge Instructions: Using an Inhaler  Your healthcare provider prescribed medicine that you will breathe in (inhale) by using an inhaler or metered-dose inhaler (MDI). An inhaler is a pressurized sprayer that gives you a measured amount of medicine. A spacer (holding chamber) may also be used. Spacers make it easier to inhale medicine correctly. Using the inhaler the right way is important so that the medicine gets to your lungs to help you breathe better. MDIs are a common type of inhaler, and these instructions refer to this type of inhaler. Other type of inhalers are also available. These include dry powder inhalers (DPIs). Check with your healthcare provider if you aren't sure which type of inhaler you are to use when you get home.  Home care  MDI without spacer    Remove the cap and shake the inhaler.    Take a deep breath and breathe out (exhale) all the way.    Place the inhaler in your mouth. Close your lips around it.    As you breathe in deeply, press down on the inhaler to release the medicine. Hold your breath for a count of 10, or as long as you can comfortably. Then slowly breathe out.          MDI with spacer    Remove the caps from the inhaler and spacer and shake the inhaler.    Take a deep breath and breathe out (exhale) all the way. Put the spacer between your teeth and close your lips tightly around the spacer.    Spray 1 puff into the spacer by pressing down on the inhaler. Then slowly breathe in as deeply as you can. If you breathe in too quickly, you may hear a whistling sound in the spacer.    Take the spacer out of your mouth. Hold your breath for a count of 10, or as long as you can comfortably. Then slowly breathe out.    Follow-up with your provider  As soon as you can, make all of your follow-up appointments as directed by our staff.  When to call 911  Call 911 right away if you have:    Shortness of breath that does not get better after taking your quick-relief medicine    Trouble  walking and talking because of shortness of breath    Blue lips or fingernails    Severe wheezing    Peak flow readings less than 50% of your personal best   Date Last Reviewed: 10/1/2016    2802-5202 [employer]. [address] [place] [address], [place] [address]. All rights reserved. This information is not intended as a substitute for professional medical care. Always follow your healthcare professional's instructions.

## 2018-01-29 RX ORDER — BUPROPION HYDROCHLORIDE 75 MG/1
TABLET ORAL
Qty: 60 TABLET | Refills: 0 | Status: SHIPPED | OUTPATIENT
Start: 2018-01-29 | End: 2018-02-05

## 2018-01-29 RX ORDER — NORTRIPTYLINE HCL 10 MG
CAPSULE ORAL
Qty: 90 CAPSULE | Refills: 0 | Status: SHIPPED | OUTPATIENT
Start: 2018-01-29 | End: 2018-02-05

## 2018-01-29 NOTE — TELEPHONE ENCOUNTER
rayna is due for a recheck. Have her make an appt to see me for a recheck of her blood pressure and cholesterol

## 2018-01-30 DIAGNOSIS — M32.19 OTHER SYSTEMIC LUPUS ERYTHEMATOSUS WITH OTHER ORGAN INVOLVEMENT (H): ICD-10-CM

## 2018-01-30 LAB
ALBUMIN UR-MCNC: NEGATIVE MG/DL
APPEARANCE UR: CLEAR
BILIRUB UR QL STRIP: NEGATIVE
COLOR UR AUTO: YELLOW
ERYTHROCYTE [SEDIMENTATION RATE] IN BLOOD BY WESTERGREN METHOD: 9 MM/H (ref 0–20)
GLUCOSE UR STRIP-MCNC: NEGATIVE MG/DL
HGB UR QL STRIP: NEGATIVE
KETONES UR STRIP-MCNC: NEGATIVE MG/DL
LEUKOCYTE ESTERASE UR QL STRIP: NEGATIVE
NITRATE UR QL: NEGATIVE
PH UR STRIP: 7 PH (ref 5–7)
RBC #/AREA URNS AUTO: NORMAL /HPF
SOURCE: NORMAL
SP GR UR STRIP: 1.01 (ref 1–1.03)
UROBILINOGEN UR STRIP-ACNC: 0.2 EU/DL (ref 0.2–1)
WBC #/AREA URNS AUTO: NORMAL /HPF

## 2018-01-30 PROCEDURE — 86140 C-REACTIVE PROTEIN: CPT | Performed by: INTERNAL MEDICINE

## 2018-01-30 PROCEDURE — 80053 COMPREHEN METABOLIC PANEL: CPT | Performed by: INTERNAL MEDICINE

## 2018-01-30 PROCEDURE — 86225 DNA ANTIBODY NATIVE: CPT | Performed by: INTERNAL MEDICINE

## 2018-01-30 PROCEDURE — 36415 COLL VENOUS BLD VENIPUNCTURE: CPT | Performed by: INTERNAL MEDICINE

## 2018-01-30 PROCEDURE — 86160 COMPLEMENT ANTIGEN: CPT | Performed by: INTERNAL MEDICINE

## 2018-01-30 PROCEDURE — 84156 ASSAY OF PROTEIN URINE: CPT | Performed by: INTERNAL MEDICINE

## 2018-01-30 PROCEDURE — 82550 ASSAY OF CK (CPK): CPT | Performed by: INTERNAL MEDICINE

## 2018-01-30 PROCEDURE — 81001 URINALYSIS AUTO W/SCOPE: CPT | Performed by: INTERNAL MEDICINE

## 2018-01-30 PROCEDURE — 85652 RBC SED RATE AUTOMATED: CPT | Performed by: INTERNAL MEDICINE

## 2018-01-30 PROCEDURE — 85025 COMPLETE CBC W/AUTO DIFF WBC: CPT | Performed by: INTERNAL MEDICINE

## 2018-01-30 NOTE — TELEPHONE ENCOUNTER
Reason for Call:  Medication or medication refill:    Do you use a [facility]?  Name of the pharmacy and phone number for the current request:    [facility] (Pharmacy) [phone]         Name of the medication requested: PREDNISONE  12/12/2017 for 5 mg tablets    Other request:     Can we leave a detailed message on this number? YES    Phone number patient can be reached at:     Best Time: any    Call taken on 1/30/2018 at 9:42 AM by Monica Urrutia

## 2018-01-31 LAB
ALBUMIN SERPL-MCNC: 3.8 G/DL (ref 3.4–5)
ALP SERPL-CCNC: 67 U/L (ref 40–150)
ALT SERPL W P-5'-P-CCNC: 25 U/L (ref 0–50)
ANION GAP SERPL CALCULATED.3IONS-SCNC: 8 MMOL/L (ref 3–14)
AST SERPL W P-5'-P-CCNC: 26 U/L (ref 0–45)
BASOPHILS # BLD AUTO: 0 10E9/L (ref 0–0.2)
BASOPHILS NFR BLD AUTO: 0.5 %
BILIRUB SERPL-MCNC: 0.4 MG/DL (ref 0.2–1.3)
BUN SERPL-MCNC: 9 MG/DL (ref 7–30)
C3 SERPL-MCNC: 86 MG/DL (ref 76–169)
C4 SERPL-MCNC: 21 MG/DL (ref 15–50)
CALCIUM SERPL-MCNC: 8.7 MG/DL (ref 8.5–10.1)
CHLORIDE SERPL-SCNC: 100 MMOL/L (ref 94–109)
CK SERPL-CCNC: 168 U/L (ref 30–225)
CO2 SERPL-SCNC: 30 MMOL/L (ref 20–32)
CREAT SERPL-MCNC: 0.7 MG/DL (ref 0.52–1.04)
CREAT UR-MCNC: 44 MG/DL
CRP SERPL-MCNC: <2.9 MG/L (ref 0–8)
DIFFERENTIAL METHOD BLD: ABNORMAL
EOSINOPHIL # BLD AUTO: 0.1 10E9/L (ref 0–0.7)
EOSINOPHIL NFR BLD AUTO: 0.9 %
ERYTHROCYTE [DISTWIDTH] IN BLOOD BY AUTOMATED COUNT: 12.1 % (ref 10–15)
GFR SERPL CREATININE-BSD FRML MDRD: >90 ML/MIN/1.7M2
GLUCOSE SERPL-MCNC: 91 MG/DL (ref 70–99)
HCT VFR BLD AUTO: 37.9 % (ref 35–47)
HGB BLD-MCNC: 13 G/DL (ref 11.7–15.7)
LYMPHOCYTES # BLD AUTO: 0.7 10E9/L (ref 0.8–5.3)
LYMPHOCYTES NFR BLD AUTO: 13 %
MCH RBC QN AUTO: 34.9 PG (ref 26.5–33)
MCHC RBC AUTO-ENTMCNC: 34.3 G/DL (ref 31.5–36.5)
MCV RBC AUTO: 102 FL (ref 78–100)
MONOCYTES # BLD AUTO: 0.3 10E9/L (ref 0–1.3)
MONOCYTES NFR BLD AUTO: 5.4 %
NEUTROPHILS # BLD AUTO: 4.6 10E9/L (ref 1.6–8.3)
NEUTROPHILS NFR BLD AUTO: 80.2 %
PLATELET # BLD AUTO: 281 10E9/L (ref 150–450)
POTASSIUM SERPL-SCNC: 3.5 MMOL/L (ref 3.4–5.3)
PROT SERPL-MCNC: 7 G/DL (ref 6.8–8.8)
PROT UR-MCNC: 0.06 G/L
PROT/CREAT 24H UR: 0.13 G/G CR (ref 0–0.2)
RBC # BLD AUTO: 3.73 10E12/L (ref 3.8–5.2)
SODIUM SERPL-SCNC: 138 MMOL/L (ref 133–144)
WBC # BLD AUTO: 5.7 10E9/L (ref 4–11)

## 2018-02-01 ENCOUNTER — MYC MEDICAL ADVICE (OUTPATIENT)
Dept: RHEUMATOLOGY | Facility: CLINIC | Age: 43
End: 2018-02-01

## 2018-02-01 LAB — DSDNA AB SER-ACNC: <1 IU/ML

## 2018-02-01 NOTE — TELEPHONE ENCOUNTER
Contacted Patient to see if she had requested Prednisone.  Patient stated she is on Auto refill and has plenty of Prednisone, no need currently.    Grace Pardo, CMA

## 2018-02-02 ENCOUNTER — INFUSION THERAPY VISIT (OUTPATIENT)
Dept: INFUSION THERAPY | Facility: CLINIC | Age: 43
End: 2018-02-02
Payer: COMMERCIAL

## 2018-02-02 VITALS
OXYGEN SATURATION: 100 % | TEMPERATURE: 97.4 F | BODY MASS INDEX: 23.36 KG/M2 | RESPIRATION RATE: 18 BRPM | SYSTOLIC BLOOD PRESSURE: 113 MMHG | HEART RATE: 86 BPM | DIASTOLIC BLOOD PRESSURE: 79 MMHG | WEIGHT: 140.4 LBS

## 2018-02-02 DIAGNOSIS — M32.19 OTHER SYSTEMIC LUPUS ERYTHEMATOSUS WITH OTHER ORGAN INVOLVEMENT (H): Primary | ICD-10-CM

## 2018-02-02 PROCEDURE — 99207 ZZC NO CHARGE LOS: CPT

## 2018-02-02 PROCEDURE — 96413 CHEMO IV INFUSION 1 HR: CPT | Performed by: INTERNAL MEDICINE

## 2018-02-02 RX ADMIN — Medication 250 ML: at 14:07

## 2018-02-02 ASSESSMENT — PAIN SCALES - GENERAL: PAINLEVEL: NO PAIN (0)

## 2018-02-02 NOTE — PROGRESS NOTES
"Infusion Nursing Note:  Yovana Enriquez presents today for Benylsta.    Patient seen by provider today: No   present during visit today: Not Applicable.    Note:     See below; pt is currently on antibiotic, but denies fever and reports feeling better. Okay to proceed with Benylsta today.    PRIOR TO INFUSION OF BIOLOGICAL MEDICATIONS OR ANY OF THESE AS LISTED: Benlysta (benlimumab) \".rheumbiologicalchecklist\"    Prior to Infusion of biological medications or any of these as listed:    1. Elevated temperature, fever, chills, productive cough or abnormal vital signs, night sweats, coughing up blood or sputum, no appetite or abnormal vital signs : NO    2. Open wounds or new incisions: NO    3. Recent hospitalization: NO    4.  Recent surgeries:  NO    5. Any upcoming surgeries or dental procedures?:NO    6. Any current or recent bouts of illness or infection? On any antibiotics? :     YES: see My Chart Message from 2/1/18  \"Alfonso Yen,   I am glad to hear you are feeling better. If no fever and symptoms improved you can go ahead with the infusion.     FERNANDA Schultz      Last read by Yovana Enriquez at 10:32 AM on 2/1/2018.\"    7. Any new, sudden or worsening abdominal pain :NO    8. Vaccination within 4 weeks? Patient or someone in the household is scheduled to receive vaccination? No live virus vaccines prior to or during treatment :NO    9. Any nervous system diseases [i.e. multiple sclerosis, Guillain-[place]illed[place], seizures, neurological  changes]: NO    10. Pregnant or breast feeding; or plans on pregnancy in the future: NO    11. Signs of worsening depression or suicidal ideations while taking benlysta:NO    12. New-onset medical symptoms: recent bronchitis started antibiotics on 1/29      13.  New cancer diagnosis or on chemotherapy or radiation NO    14.  Evaluate for any sign of active TB [Unexplained weight loss, Loss of appetite, Night sweats, Fever, Fatigue, Chills, Coughing for 3 weeks or " longer, Hemoptysis (coughing up blood), Chest pain]: NO    **Note: If answered yes to any of the above, hold the infusion and contact ordering rheumatologist or on-call rheumatologist.     Intravenous Access:  Peripheral IV placed.    Treatment Conditions:  Not Applicable.      Post Infusion Assessment:  Patient tolerated infusion without incident.  No evidence of extravasations.  Access discontinued per protocol.  Rheumatology Post Infusion: POST-INFUSION OF BIOLOGICAL MEDICATION:    Reviewed with patient.  Given biologic medication or medication hand-out. Inform patient if any fever, chills or signs of infection, new symptoms, abdominal pain, heart palpitations, shortness of breath, reaction, weakness, neurological changes, seek medical attention immediately and should not receive infusions. No live virus vaccines prior to or during treatment or up to 6 months post infusion. If the patient has an upcoming procedure or surgery, this should be discussed with the rheumatologist and surgeon or provider..    Discharge Plan:   Copy of AVS reviewed with patient and/or family.  Patient will return 2/5 to see Dr. Colvin and then 3/2 for next infusion appointment.  Patient discharged in stable condition accompanied by: self.  Departure Mode: Ambulatory.    Radha Sauceda RN

## 2018-02-02 NOTE — MR AVS SNAPSHOT
After Visit Summary   2/2/2018    Yovana Enriquez    MRN: [id number]           Patient Information     Date Of Birth          1975        Visit Information        Provider Department      2/2/2018 1:00 PM [facility]        Today's Diagnoses     Other systemic lupus erythematosus with other organ involvement (H)    -  1       Follow-ups after your visit        Your next 10 appointments already scheduled     Feb 05, 2018 10:20 AM CST   [facility]    [address] [facility] [address]   [phone]            Feb 05, 2018  5:40 PM CST   MyChart Long with Bradford Mario PA-C   [facility]    [address] [facility] [address]   [phone]            Mar 02, 2018  1:00 PM CST   Level 2 with [facility] ([facility])    [address] [place] [id number] [phone]            Mar 28, 2018  1:40 PM CDT   (Arrive by 1:25 PM)   Return Visit with Inez Sawyer MD   [facility] ([facility])    [address] [place] [address] [phone]              Who to contact     If you have questions or need follow up information about today's clinic visit or your schedule please contact [facility] directly at [phone].  Normal or non-critical lab and imaging results will be communicated to you by MyChart, letter or phone within 4 business days after the clinic has received the results. If you do not hear from us within 7 days, please contact the clinic through MyChart or phone. If you have a critical or abnormal lab result, we will notify you by phone as soon as possible.  Submit refill requests through [employer] or call your pharmacy and they will forward the  refill request to us. Please allow 3 business days for your refill to be completed.          Additional Information About Your Visit        [employer] Information     [employer] gives you secure access to your electronic health record. If you see a primary care provider, you can also send messages to your care team and make appointments. If you have questions, please call your primary care clinic.  If you do not have a primary care provider, please call [phone] and they will assist you.      [employer] is an electronic gateway that provides easy, online access to your medical records. With [employer], you can request a clinic appointment, read your test results, renew a prescription or communicate with your care team.     To access your existing account, please contact your [facility] or call [phone] for assistance.        Care EveryWhere ID     This is your Care EveryWhere ID. This could be used by other organizations to access your [place] medical records  [id number]        Your Vitals Were     Pulse Temperature Respirations Pulse Oximetry BMI (Body Mass Index)       86 97.4  F (36.3  C) (Oral) 18 100% 23.36 kg/m2        Blood Pressure from Last 3 Encounters:   02/02/18 113/79   01/28/18 126/83   01/10/18 123/77    Weight from Last 3 Encounters:   02/02/18 63.7 kg (140 lb 6.4 oz)   01/28/18 62.8 kg (138 lb 6 oz)   01/05/18 63.2 kg (139 lb 4.8 oz)              Today, you had the following     No orders found for display       Primary Care Provider Office Phone # Fax #    Bradford Mario PA-C [phone]       [address] [place] [address]        Equal Access to Services     MARTIN HURD : Hadii aad ku hadasho Soomaali, waaxda luqadaha, qaybta kaalmada adeegyada, rosy scott . So [facility] [phone].    ATENCIÓN: Si habla español, tiene a dexter disposición servicios gratuitos de asistencia lingüística. Llame al [phone].    We comply  with applicable federal civil rights laws and [place] laws. We do not discriminate on the basis of race, color, national origin, age, disability, sex, sexual orientation, or gender identity.            Thank you!     Thank you for choosing [facility]  for your care. Our goal is always to provide you with excellent care. Hearing back from our patients is one way we can continue to improve our services. Please take a few minutes to complete the written survey that you may receive in the mail after your visit with us. Thank you!             Your Updated Medication List - Protect others around you: Learn how to safely use, store and throw away your medicines at www.disposemymeds.org.          This list is accurate as of 2/2/18  3:57 PM.  Always use your most recent med list.                   Brand Name Dispense Instructions for use Diagnosis    * albuterol 108 (90 BASE) MCG/ACT Inhaler    PROAIR HFA/PROVENTIL HFA/VENTOLIN HFA    1 Inhaler    Inhale 2 puffs into the lungs every 6 hours as needed for shortness of breath / dyspnea or wheezing    Acute bronchitis, unspecified organism       * albuterol 108 (90 BASE) MCG/ACT Inhaler    PROAIR HFA/PROVENTIL HFA/VENTOLIN HFA    1 Inhaler    Inhale 2 puffs into the lungs every 4 hours as needed for shortness of breath / dyspnea or wheezing Use with spacer    Wheezing       amLODIPine 5 MG tablet    NORVASC    90 tablet    Take 1 tablet (5 mg) by mouth daily    Raynaud's phenomenon without gangrene       amoxicillin-clavulanate 875-125 MG per tablet    AUGMENTIN    20 tablet    Take 1 tablet by mouth 2 times daily for 10 days    Acute sinusitis with symptoms > 10 days       azaTHIOprine 50 MG tablet    IMURAN    270 tablet    Take 3 tablets (150 mg) by mouth daily    Other systemic lupus erythematosus with other organ involvement (H)       blood glucose monitoring test strip    no brand specified    1 Month    Used for testing glucose 2-3 times daily     History of gestational diabetes mellitus, not currently pregnant       buPROPion 75 MG tablet    WELLBUTRIN    60 tablet    TAKE 1 TABLET BY MOUTH 2 TIMES DAILY    Anxiety       Calcium + D3 600-200 MG-UNIT Tabs      1 tablet daily        cevimeline 30 MG capsule    EVOXAC    180 capsule    Take 1 capsule (30 mg) by mouth 2 times daily    Sjogren's syndrome with keratoconjunctivitis sicca (H)       diclofenac 1 % Gel topical gel    VOLTAREN    100 g    Apply 2-4 grams to knees or shoulders four times daily as needed using enclosed dosing card.    Fibromyalgia, SLE (systemic lupus erythematosus) (H)       fluticasone 50 MCG/ACT spray    FLONASE    1 Bottle    Spray 1-2 sprays into both nostrils daily    Chronic rhinitis       guaiFENesin-codeine 100-10 MG/5ML Syrp syrup    guaiFENesin AC    150 mL    Take 5 mLs by mouth every 8 hours as needed for cough or congestion May cause sedating. Preferably take at bedtime.    Cough       hydroxychloroquine 200 MG tablet    PLAQUENIL    180 tablet    Take 1 tablet (200 mg) by mouth daily    Other systemic lupus erythematosus with other organ involvement (H)       losartan 25 MG tablet    COZAAR    90 tablet    TAKE 1 TABLET BY MOUTH DAILY    Hypertension goal BP (blood pressure) < 140/90       MICROLET LANCETS Misc     100 each    1 each daily.    Other abnormal glucose       MULTIVITAMIN PO      Take  by mouth.        nortriptyline 10 MG capsule    PAMELOR    90 capsule    TAKE 3 CAPSULES BY MOUTH AT BEDTIME    Numbness       predniSONE 5 MG tablet    DELTASONE    120 tablet    Prednisone 20mg daily x5days, then 15mg daily x5days, then 10mg daily x5days, then 5mg daily thereafter.    Other systemic lupus erythematosus with other organ involvement (H)       pregabalin 150 MG capsule    LYRICA    270 capsule    Take 1 capsule (150 mg) by mouth 3 times daily . 3 month supply.    Fibromyalgia       ranitidine 300 MG tablet    ZANTAC    180 tablet    Take 1 tablet (300 mg) by  mouth 2 times daily    Gastroesophageal reflux disease without esophagitis       spacer/aero-hold chamber mask Wendy     1 each    1 Adult size spacer to use with MDI inhalers.    Wheezing       * Notice:  This list has 2 medication(s) that are the same as other medications prescribed for you. Read the directions carefully, and ask your doctor or other care provider to review them with you.

## 2018-02-04 ENCOUNTER — MYC REFILL (OUTPATIENT)
Dept: PALLIATIVE MEDICINE | Facility: CLINIC | Age: 43
End: 2018-02-04

## 2018-02-04 DIAGNOSIS — M79.7 FIBROMYALGIA: ICD-10-CM

## 2018-02-05 ENCOUNTER — OFFICE VISIT (OUTPATIENT)
Dept: RHEUMATOLOGY | Facility: CLINIC | Age: 43
End: 2018-02-05
Payer: COMMERCIAL

## 2018-02-05 ENCOUNTER — OFFICE VISIT (OUTPATIENT)
Dept: FAMILY MEDICINE | Facility: CLINIC | Age: 43
End: 2018-02-05
Payer: COMMERCIAL

## 2018-02-05 VITALS
HEIGHT: 65 IN | OXYGEN SATURATION: 100 % | HEART RATE: 92 BPM | DIASTOLIC BLOOD PRESSURE: 80 MMHG | SYSTOLIC BLOOD PRESSURE: 140 MMHG | BODY MASS INDEX: 23.43 KG/M2 | WEIGHT: 140.6 LBS

## 2018-02-05 VITALS
SYSTOLIC BLOOD PRESSURE: 124 MMHG | DIASTOLIC BLOOD PRESSURE: 84 MMHG | BODY MASS INDEX: 23.32 KG/M2 | HEART RATE: 90 BPM | WEIGHT: 140 LBS | OXYGEN SATURATION: 99 % | HEIGHT: 65 IN

## 2018-02-05 DIAGNOSIS — E78.5 HYPERLIPIDEMIA LDL GOAL <130: ICD-10-CM

## 2018-02-05 DIAGNOSIS — I73.00 RAYNAUD'S PHENOMENON WITHOUT GANGRENE: ICD-10-CM

## 2018-02-05 DIAGNOSIS — M32.19 OTHER SYSTEMIC LUPUS ERYTHEMATOSUS WITH OTHER ORGAN INVOLVEMENT (H): Primary | ICD-10-CM

## 2018-02-05 DIAGNOSIS — R20.0 NUMBNESS: ICD-10-CM

## 2018-02-05 DIAGNOSIS — M35.01 SJOGREN'S SYNDROME WITH KERATOCONJUNCTIVITIS SICCA (H): ICD-10-CM

## 2018-02-05 DIAGNOSIS — I10 HYPERTENSION GOAL BP (BLOOD PRESSURE) < 140/90: ICD-10-CM

## 2018-02-05 DIAGNOSIS — F41.9 ANXIETY: Primary | ICD-10-CM

## 2018-02-05 PROCEDURE — 99213 OFFICE O/P EST LOW 20 MIN: CPT | Performed by: PHYSICIAN ASSISTANT

## 2018-02-05 PROCEDURE — 99214 OFFICE O/P EST MOD 30 MIN: CPT | Performed by: INTERNAL MEDICINE

## 2018-02-05 RX ORDER — NORTRIPTYLINE HCL 10 MG
CAPSULE ORAL
Qty: 90 CAPSULE | Refills: 1 | Status: SHIPPED | OUTPATIENT
Start: 2018-02-05 | End: 2018-04-30

## 2018-02-05 RX ORDER — PREDNISONE 2.5 MG/1
2.5-5 TABLET ORAL DAILY
Qty: 180 TABLET | Refills: 1 | Status: SHIPPED | OUTPATIENT
Start: 2018-02-05 | End: 2018-06-11

## 2018-02-05 RX ORDER — AZATHIOPRINE 50 MG/1
150 TABLET ORAL DAILY
Qty: 270 TABLET | Refills: 1 | Status: SHIPPED | OUTPATIENT
Start: 2018-02-05 | End: 2018-06-11

## 2018-02-05 RX ORDER — HYDROXYCHLOROQUINE SULFATE 200 MG/1
TABLET, FILM COATED ORAL
Qty: 135 TABLET | Refills: 1 | Status: SHIPPED | OUTPATIENT
Start: 2018-02-05 | End: 2018-02-12

## 2018-02-05 RX ORDER — PREGABALIN 150 MG/1
150 CAPSULE ORAL 3 TIMES DAILY
Qty: 270 CAPSULE | Refills: 0 | Status: SHIPPED | OUTPATIENT
Start: 2018-02-05 | End: 2018-07-09

## 2018-02-05 RX ORDER — AMLODIPINE BESYLATE 5 MG/1
10 TABLET ORAL DAILY
Qty: 90 TABLET | Refills: 1 | Status: SHIPPED | OUTPATIENT
Start: 2018-02-05 | End: 2018-05-29

## 2018-02-05 RX ORDER — CEVIMELINE HYDROCHLORIDE 30 MG/1
30 CAPSULE ORAL 2 TIMES DAILY
Qty: 180 CAPSULE | Refills: 3 | Status: SHIPPED | OUTPATIENT
Start: 2018-02-05 | End: 2018-10-08

## 2018-02-05 RX ORDER — BUPROPION HYDROCHLORIDE 75 MG/1
TABLET ORAL
Qty: 180 TABLET | Refills: 1 | Status: SHIPPED | OUTPATIENT
Start: 2018-02-05 | End: 2019-04-10

## 2018-02-05 ASSESSMENT — ANXIETY QUESTIONNAIRES
1. FEELING NERVOUS, ANXIOUS, OR ON EDGE: NOT AT ALL
IF YOU CHECKED OFF ANY PROBLEMS ON THIS QUESTIONNAIRE, HOW DIFFICULT HAVE THESE PROBLEMS MADE IT FOR YOU TO DO YOUR WORK, TAKE CARE OF THINGS AT HOME, OR GET ALONG WITH OTHER PEOPLE: NOT DIFFICULT AT ALL
6. BECOMING EASILY ANNOYED OR IRRITABLE: SEVERAL DAYS
5. BEING SO RESTLESS THAT IT IS HARD TO SIT STILL: NOT AT ALL
7. FEELING AFRAID AS IF SOMETHING AWFUL MIGHT HAPPEN: NOT AT ALL
3. WORRYING TOO MUCH ABOUT DIFFERENT THINGS: NOT AT ALL
2. NOT BEING ABLE TO STOP OR CONTROL WORRYING: NOT AT ALL
GAD7 TOTAL SCORE: 1

## 2018-02-05 ASSESSMENT — PATIENT HEALTH QUESTIONNAIRE - PHQ9: 5. POOR APPETITE OR OVEREATING: NOT AT ALL

## 2018-02-05 NOTE — TELEPHONE ENCOUNTER
Signed Prescriptions:                        Disp   Refills    pregabalin (LYRICA) 150 MG capsule         270 ca*0        Sig: Take 1 capsule (150 mg) by mouth 3 times daily . 3           month supply.  Authorizing Provider: TETE PANIAGUA MD  [facility]

## 2018-02-05 NOTE — MR AVS SNAPSHOT
After Visit Summary   2/5/2018    Yovana Enriquez    MRN: [id number]           Patient Information     Date Of Birth          1975        Visit Information        Provider Department      2/5/2018 10:20 AM Bradford Colvin MD [facility]        Today's Diagnoses     Other systemic lupus erythematosus with other organ involvement (H)    -  1    Sjogren's syndrome with keratoconjunctivitis sicca (H)        Raynaud's phenomenon without gangrene           Follow-ups after your visit        Your next 10 appointments already scheduled     Feb 05, 2018  5:40 PM CST   MyChart Long with Bradford Mario PA-C   [facility] [place] ([facility])    [address] [facility]  [place] [address]   [phone]            Mar 02, 2018  1:00 PM CST   Level 2 with [address] ([facility])    [address] [place] [address] [phone]            Mar 28, 2018  1:40 PM CDT   (Arrive by 1:25 PM)   Return Visit with Inez Sawyer MD   [facility] ([facility])    [address] [place] [address]  [address] [facility] [id number] [phone]            Jun 04, 2018  6:00 PM CDT   LAB with [facility]   [facility] ([facility])    [address]  [place] [address]   [phone]           Please do not eat 10-12 hours before your appointment if you are coming in fasting for labs on lipids, cholesterol, or glucose (sugar). This does not apply to pregnant women. Water, hot tea and black coffee (with nothing added) are okay. Do not drink other fluids, diet soda or chew gum.            Jun 11, 2018 12:40 PM CDT   Return Visit with Bradford Colvin MD   [facility] ([facility])    [address] [facility]  [place] [address]   [phone]              Future tests that were ordered for you today     Open  Future Orders        Priority Expected Expires Ordered    CBC with platelets differential Routine 4/27/2018 5/24/2018 2/5/2018    CK total Routine 4/27/2018 5/24/2018 2/5/2018    DNA double stranded antibodies Routine 4/27/2018 5/24/2018 2/5/2018    CRP inflammation Routine 4/27/2018 5/24/2018 2/5/2018    Comprehensive metabolic panel Routine 4/27/2018 5/24/2018 2/5/2018    Complement C4 Routine 4/27/2018 5/24/2018 2/5/2018    Protein  random urine with Creat Ratio Routine 4/27/2018 5/24/2018 2/5/2018    UA with Microscopic reflex to Culture Routine 4/27/2018 5/24/2018 2/5/2018    Erythrocyte sedimentation rate auto Routine 4/27/2018 5/24/2018 2/5/2018    Complement C3 Routine 4/27/2018 5/24/2018 2/5/2018            Who to contact     If you have questions or need follow up information about today's clinic visit or your schedule please contact [facility] directly at [phone].  Normal or non-critical lab and imaging results will be communicated to you by HTPhart, letter or phone within 4 business days after the clinic has received the results. If you do not hear from us within 7 days, please contact the clinic through [employer] or phone. If you have a critical or abnormal lab result, we will notify you by phone as soon as possible.  Submit refill requests through [employer] or call your pharmacy and they will forward the refill request to us. Please allow 3 business days for your refill to be completed.          Additional Information About Your Visit        [employer] Information     [employer] gives you secure access to your electronic health record. If you see a primary care provider, you can also send messages to your care team and make appointments. If you have questions, please call your primary care clinic.  If you do not have a primary care provider, please call [phone] and they will assist you.        Care EveryWhere ID     This is your Care EveryWhere ID. This could be used by other organizations  "to access your [place] medical records  [id number]        Your Vitals Were     Pulse Height Pulse Oximetry BMI (Body Mass Index)          92 1.651 m (5' 5\") 100% 23.4 kg/m2         Blood Pressure from Last 3 Encounters:   02/05/18 140/80   02/02/18 113/79   01/28/18 126/83    Weight from Last 3 Encounters:   02/05/18 63.8 kg (140 lb 9.6 oz)   02/02/18 63.7 kg (140 lb 6.4 oz)   01/28/18 62.8 kg (138 lb 6 oz)                 Today's Medication Changes          These changes are accurate as of 2/5/18 10:36 AM.  If you have any questions, ask your nurse or doctor.               Start taking these medicines.        Dose/Directions    Belimumab 200 MG/ML Soaj   Used for:  Other systemic lupus erythematosus with other organ involvement (H)   Started by:  Bradford Colvin MD        Dose:  200 mg   Inject 200 mg Subcutaneous every 7 days   Quantity:  4 Syringe   Refills:  3         These medicines have changed or have updated prescriptions.        Dose/Directions    amLODIPine 5 MG tablet   Commonly known as:  NORVASC   This may have changed:  how much to take   Used for:  Raynaud's phenomenon without gangrene   Changed by:  Bradford Colvin MD        Dose:  10 mg   Take 2 tablets (10 mg) by mouth daily   Quantity:  90 tablet   Refills:  1       hydroxychloroquine 200 MG tablet   Commonly known as:  PLAQUENIL   This may have changed:    - how much to take  - how to take this  - when to take this  - additional instructions   Used for:  Other systemic lupus erythematosus with other organ involvement (H)   Changed by:  Bradford Colvin MD        Hydroxychloroquine 200mg in the morning and 100mg in the evening.   Quantity:  135 tablet   Refills:  1       predniSONE 2.5 MG tablet   Commonly known as:  DELTASONE   This may have changed:    - medication strength  - how much to take  - how to take this  - when to take this  - additional instructions   Used for:  Other systemic lupus erythematosus with other organ involvement (H) "   Changed by:  Bradford Colvin MD        Dose:  2.5-5 mg   Take 1-2 tablets (2.5-5 mg) by mouth daily   Quantity:  180 tablet   Refills:  1            Where to get your medicines      These medications [facility] [place] - [address]  [address] [place] [address]     Phone:  [phone]     amLODIPine 5 MG tablet    azaTHIOprine 50 MG tablet    cevimeline 30 MG capsule    hydroxychloroquine 200 MG tablet    predniSONE 2.5 MG tablet         These medications were sent to [facility] - [place], [place] - [address]  [address]  [facility] [address]    Hours:  Mon-Fri 8:30am-5:00pm Toll Free [phone] Phone:  [phone]     Belimumab 200 MG/ML Soaj                Primary Care Provider Office Phone # Fax #    Bradford Mario PA-C [phone] [address]  [place] [address]        Equal Access to Services     [facility]: Hadii aad ku hadasho Soomaali, waaxda luqadaha, qaybta kaalmada adeegyada, rosy zarate haytwin scott . So [facility] [phone].    ATENCIÓN: Si habla español, tiene a dexter disposición servicios gratuitos de asistencia lingüística. [facility] [phone].    We comply with applicable federal civil rights laws and [place] laws. We do not discriminate on the basis of race, color, national origin, age, disability, sex, sexual orientation, or gender identity.            Thank you!     Thank you for choosing [facility]  for your care. Our goal is always to provide you with excellent care. Hearing back from our patients is one way we can continue to improve our services. Please take a few minutes to complete the written survey that you may receive in the mail after your visit with us. Thank you!             Your Updated Medication List - Protect others around you: Learn how to safely use, store and throw away your medicines at www.disposemymeds.org.          This  list is accurate as of 2/5/18 10:36 AM.  Always use your most recent med list.                   Brand Name Dispense Instructions for use Diagnosis    * albuterol 108 (90 BASE) MCG/ACT Inhaler    PROAIR HFA/PROVENTIL HFA/VENTOLIN HFA    1 Inhaler    Inhale 2 puffs into the lungs every 6 hours as needed for shortness of breath / dyspnea or wheezing    Acute bronchitis, unspecified organism       * albuterol 108 (90 BASE) MCG/ACT Inhaler    PROAIR HFA/PROVENTIL HFA/VENTOLIN HFA    1 Inhaler    Inhale 2 puffs into the lungs every 4 hours as needed for shortness of breath / dyspnea or wheezing Use with spacer    Wheezing       amLODIPine 5 MG tablet    NORVASC    90 tablet    Take 2 tablets (10 mg) by mouth daily    Raynaud's phenomenon without gangrene       amoxicillin-clavulanate 875-125 MG per tablet    AUGMENTIN    20 tablet    Take 1 tablet by mouth 2 times daily for 10 days    Acute sinusitis with symptoms > 10 days       azaTHIOprine 50 MG tablet    IMURAN    270 tablet    Take 3 tablets (150 mg) by mouth daily    Other systemic lupus erythematosus with other organ involvement (H)       Belimumab 200 MG/ML Soaj     4 Syringe    Inject 200 mg Subcutaneous every 7 days    Other systemic lupus erythematosus with other organ involvement (H)       blood glucose monitoring test strip    no brand specified    1 Month    Used for testing glucose 2-3 times daily    History of gestational diabetes mellitus, not currently pregnant       buPROPion 75 MG tablet    WELLBUTRIN    60 tablet    TAKE 1 TABLET BY MOUTH 2 TIMES DAILY    Anxiety       Calcium + D3 600-200 MG-UNIT Tabs      1 tablet daily        cevimeline 30 MG capsule    EVOXAC    180 capsule    Take 1 capsule (30 mg) by mouth 2 times daily    Sjogren's syndrome with keratoconjunctivitis sicca (H)       diclofenac 1 % Gel topical gel    VOLTAREN    100 g    Apply 2-4 grams to knees or shoulders four times daily as needed using enclosed dosing card.    Fibromyalgia,  SLE (systemic lupus erythematosus) (H)       fluticasone 50 MCG/ACT spray    FLONASE    1 Bottle    Spray 1-2 sprays into both nostrils daily    Chronic rhinitis       guaiFENesin-codeine 100-10 MG/5ML Syrp syrup    guaiFENesin AC    150 mL    Take 5 mLs by mouth every 8 hours as needed for cough or congestion May cause sedating. Preferably take at bedtime.    Cough       hydroxychloroquine 200 MG tablet    PLAQUENIL    135 tablet    Hydroxychloroquine 200mg in the morning and 100mg in the evening.    Other systemic lupus erythematosus with other organ involvement (H)       losartan 25 MG tablet    COZAAR    90 tablet    TAKE 1 TABLET BY MOUTH DAILY    Hypertension goal BP (blood pressure) < 140/90       MICROLET LANCETS Misc     100 each    1 each daily.    Other abnormal glucose       MULTIVITAMIN PO      Take  by mouth.        nortriptyline 10 MG capsule    PAMELOR    90 capsule    TAKE 3 CAPSULES BY MOUTH AT BEDTIME    Numbness       predniSONE 2.5 MG tablet    DELTASONE    180 tablet    Take 1-2 tablets (2.5-5 mg) by mouth daily    Other systemic lupus erythematosus with other organ involvement (H)       pregabalin 150 MG capsule    LYRICA    270 capsule    Take 1 capsule (150 mg) by mouth 3 times daily . 3 month supply.    Fibromyalgia       ranitidine 300 MG tablet    ZANTAC    180 tablet    Take 1 tablet (300 mg) by mouth 2 times daily    Gastroesophageal reflux disease without esophagitis       spacer/aero-hold chamber mask Wendy     1 each    1 Adult size spacer to use with MDI inhalers.    Wheezing       * Notice:  This list has 2 medication(s) that are the same as other medications prescribed for you. Read the directions carefully, and ask your doctor or other care provider to review them with you.

## 2018-02-05 NOTE — MR AVS SNAPSHOT
After Visit Summary   2/5/2018    Yovana Enriquez    MRN: [id number]           Patient Information     Date Of Birth          1975        Visit Information        Provider Department      2/5/2018 5:40 PM Bradford Mario PA-C [place] [facility]        Today's Diagnoses     Anxiety    -  1    Numbness        Hyperlipidemia LDL goal <130        Hypertension goal BP (blood pressure) < 140/90           Follow-ups after your visit        Your next 10 appointments already scheduled     Mar 02, 2018  1:00 PM CST   Level 2 with [address] ([facility])    [id number] [place] [address]   [phone]            Mar 28, 2018  1:40 PM CDT   (Arrive by 1:25 PM)   Return Visit with Inez Sawyer MD   [facility] ([facility])    [address] [address]   [phone]            Jun 04, 2018  6:00 PM CDT   LAB with BE LAB   [facility] ([facility])    [address] [facility] [address]   [phone]           Please do not eat 10-12 hours before your appointment if you are coming in fasting for labs on lipids, cholesterol, or glucose (sugar). This does not apply to pregnant women. Water, hot tea and black coffee (with nothing added) are okay. Do not drink other fluids, diet soda or chew gum.            Jun 11, 2018 12:40 PM CDT   Return Visit with Bradford Colvin MD   [facility] ([facility])    [address] [facility] [address]   [phone]              Future tests that were ordered for you today     Open Future Orders        Priority Expected Expires Ordered    Lipid panel reflex to direct LDL Fasting Routine  2/5/2019 2/5/2018    CBC with platelets differential Routine 4/27/2018 5/24/2018 2/5/2018    CK total Routine 4/27/2018 5/24/2018 2/5/2018    DNA double  "stranded antibodies Routine 4/27/2018 5/24/2018 2/5/2018    CRP inflammation Routine 4/27/2018 5/24/2018 2/5/2018    Comprehensive metabolic panel Routine 4/27/2018 5/24/2018 2/5/2018    Complement C4 Routine 4/27/2018 5/24/2018 2/5/2018    Protein  random urine with Creat Ratio Routine 4/27/2018 5/24/2018 2/5/2018    UA with Microscopic reflex to Culture Routine 4/27/2018 5/24/2018 2/5/2018    Erythrocyte sedimentation rate auto Routine 4/27/2018 5/24/2018 2/5/2018    Complement C3 Routine 4/27/2018 5/24/2018 2/5/2018            Who to contact     Normal or non-critical lab and imaging results will be communicated to you by [employer], letter or phone within 4 business days after the clinic has received the results. If you do not hear from us within 7 days, please contact the clinic through [employer] or phone. If you have a critical or abnormal lab result, we will notify you by phone as soon as possible.  Submit refill requests through [employer] or call your pharmacy and they will forward the refill request to us. Please allow 3 business days for your refill to be completed.          If you need to speak with a  for additional information , please call: [phone]             Additional Information About Your Visit        [employer] Information     [employer] gives you secure access to your electronic health record. If you see a primary care provider, you can also send messages to your care team and make appointments. If you have questions, please call your primary care clinic.  If you do not have a primary care provider, please call [phone] and they will assist you.        Care EveryWhere ID     This is your Care EveryWhere ID. This could be used by other organizations to access your [place] medical records  [id number]        Your Vitals Were     Pulse Height Pulse Oximetry BMI (Body Mass Index)          90 5' 5\" (1.651 m) 99% 23.3 kg/m2         Blood Pressure from Last 3 Encounters:   02/05/18 " 124/84   02/05/18 140/80   02/02/18 113/79    Weight from Last 3 Encounters:   02/05/18 140 lb (63.5 kg)   02/05/18 140 lb 9.6 oz (63.8 kg)   02/02/18 140 lb 6.4 oz (63.7 kg)                 Today's Medication Changes          These changes are accurate as of 2/5/18  6:33 PM.  If you have any questions, ask your nurse or doctor.               Start taking these medicines.        Dose/Directions    Belimumab 200 MG/ML Soaj   Used for:  Other systemic lupus erythematosus with other organ involvement (H)   Started by:  Bradford Colvin MD        Dose:  200 mg   Inject 200 mg Subcutaneous every 7 days   Quantity:  4 Syringe   Refills:  3         These medicines have changed or have updated prescriptions.        Dose/Directions    amLODIPine 5 MG tablet   Commonly known as:  NORVASC   This may have changed:  how much to take   Used for:  Raynaud's phenomenon without gangrene   Changed by:  Bradford Colvin MD        Dose:  10 mg   Take 2 tablets (10 mg) by mouth daily   Quantity:  90 tablet   Refills:  1       buPROPion 75 MG tablet   Commonly known as:  WELLBUTRIN   This may have changed:  See the new instructions.   Used for:  Anxiety   Changed by:  Bradford Mario PA-C        TAKE 1 TABLET BY MOUTH 2 TIMES DAILY   Quantity:  180 tablet   Refills:  1       hydroxychloroquine 200 MG tablet   Commonly known as:  PLAQUENIL   This may have changed:    - how much to take  - how to take this  - when to take this  - additional instructions   Used for:  Other systemic lupus erythematosus with other organ involvement (H)   Changed by:  Bradford Colvin MD        Hydroxychloroquine 200mg in the morning and 100mg in the evening.   Quantity:  135 tablet   Refills:  1       nortriptyline 10 MG capsule   Commonly known as:  PAMELOR   This may have changed:  See the new instructions.   Used for:  Numbness   Changed by:  Bradford Mario PA-C        TAKE 3 CAPSULES BY MOUTH AT BEDTIME   Quantity:  90 capsule   Refills:  1        predniSONE 2.5 MG tablet   Commonly known as:  DELTASONE   This may have changed:    - medication strength  - how much to take  - how to take this  - when to take this  - additional instructions   Used for:  Other systemic lupus erythematosus with other organ involvement (H)   Changed by:  Bradford Colvin MD        Dose:  2.5-5 mg   Take 1-2 tablets (2.5-5 mg) by mouth daily   Quantity:  180 tablet   Refills:  1            Where to get your medicines      These medications were [facility] to [facility] [place] - [address]  [address] [place] [address]     Phone:  [phone]     amLODIPine 5 MG tablet    azaTHIOprine 50 MG tablet    buPROPion 75 MG tablet    cevimeline 30 MG capsule    hydroxychloroquine 200 MG tablet    nortriptyline 10 MG capsule    predniSONE 2.5 MG tablet         These medications were sent to [facility] - [facility] [address]  [address] [facility] [address]    Hours:  Mon-Fri 8:30am-5:00pm Toll Free [phone] Phone:  [phone]     Belimumab 200 MG/ML Soaj                Primary Care Provider Office Phone # Fax #    Bradford Mario PA-C [phone] [address] [facility] [address]  [place] [address]        Equal Access to Services     M[facility]IN [facility] County[facility]EMESIO AH: Hadii aad ku hadasho Soomaali, waaxda luqadaha, qaybta kaalmada adeegyada, rosy zarate haytwin scott . So [facility] [phone].    ATENCIÓN: Si habla español, tiene a dexter disposición servicios gratuitos de asistencia lingüística. Cee al [phone].    We comply with applicable federal civil rights laws and [place] laws. We do not discriminate on the basis of race, color, national origin, age, disability, sex, sexual orientation, or gender identity.            Thank you!     Thank you for choosing [facility]  for your care. Our goal is always to provide you with excellent care. Hearing back from our patients is one way we can  continue to improve our services. Please take a few minutes to complete the written survey that you may receive in the mail after your visit with us. Thank you!             Your Updated Medication List - Protect others around you: Learn how to safely use, store and throw away your medicines at www.disposemymeds.org.          This list is accurate as of 2/5/18  6:33 PM.  Always use your most recent med list.                   Brand Name Dispense Instructions for use Diagnosis    * albuterol 108 (90 BASE) MCG/ACT Inhaler    PROAIR HFA/PROVENTIL HFA/VENTOLIN HFA    1 Inhaler    Inhale 2 puffs into the lungs every 6 hours as needed for shortness of breath / dyspnea or wheezing    Acute bronchitis, unspecified organism       * albuterol 108 (90 BASE) MCG/ACT Inhaler    PROAIR HFA/PROVENTIL HFA/VENTOLIN HFA    1 Inhaler    Inhale 2 puffs into the lungs every 4 hours as needed for shortness of breath / dyspnea or wheezing Use with spacer    Wheezing       amLODIPine 5 MG tablet    NORVASC    90 tablet    Take 2 tablets (10 mg) by mouth daily    Raynaud's phenomenon without gangrene       amoxicillin-clavulanate 875-125 MG per tablet    AUGMENTIN    20 tablet    Take 1 tablet by mouth 2 times daily for 10 days    Acute sinusitis with symptoms > 10 days       azaTHIOprine 50 MG tablet    IMURAN    270 tablet    Take 3 tablets (150 mg) by mouth daily    Other systemic lupus erythematosus with other organ involvement (H)       Belimumab 200 MG/ML Soaj     4 Syringe    Inject 200 mg Subcutaneous every 7 days    Other systemic lupus erythematosus with other organ involvement (H)       blood glucose monitoring test strip    no brand specified    1 Month    Used for testing glucose 2-3 times daily    History of gestational diabetes mellitus, not currently pregnant       buPROPion 75 MG tablet    WELLBUTRIN    180 tablet    TAKE 1 TABLET BY MOUTH 2 TIMES DAILY    Anxiety       Calcium + D3 600-200 MG-UNIT Tabs      1 tablet daily         cevimeline 30 MG capsule    EVOXAC    180 capsule    Take 1 capsule (30 mg) by mouth 2 times daily    Sjogren's syndrome with keratoconjunctivitis sicca (H)       diclofenac 1 % Gel topical gel    VOLTAREN    100 g    Apply 2-4 grams to knees or shoulders four times daily as needed using enclosed dosing card.    Fibromyalgia, SLE (systemic lupus erythematosus) (H)       fluticasone 50 MCG/ACT spray    FLONASE    1 Bottle    Spray 1-2 sprays into both nostrils daily    Chronic rhinitis       guaiFENesin-codeine 100-10 MG/5ML Syrp syrup    guaiFENesin AC    150 mL    Take 5 mLs by mouth every 8 hours as needed for cough or congestion May cause sedating. Preferably take at bedtime.    Cough       hydroxychloroquine 200 MG tablet    PLAQUENIL    135 tablet    Hydroxychloroquine 200mg in the morning and 100mg in the evening.    Other systemic lupus erythematosus with other organ involvement (H)       losartan 25 MG tablet    COZAAR    90 tablet    TAKE 1 TABLET BY MOUTH DAILY    Hypertension goal BP (blood pressure) < 140/90       MICROLET LANCETS Misc     100 each    1 each daily.    Other abnormal glucose       MULTIVITAMIN PO      Take  by mouth.        nortriptyline 10 MG capsule    PAMELOR    90 capsule    TAKE 3 CAPSULES BY MOUTH AT BEDTIME    Numbness       predniSONE 2.5 MG tablet    DELTASONE    180 tablet    Take 1-2 tablets (2.5-5 mg) by mouth daily    Other systemic lupus erythematosus with other organ involvement (H)       pregabalin 150 MG capsule    LYRICA    270 capsule    Take 1 capsule (150 mg) by mouth 3 times daily . 3 month supply.    Fibromyalgia       ranitidine 300 MG tablet    ZANTAC    180 tablet    Take 1 tablet (300 mg) by mouth 2 times daily    Gastroesophageal reflux disease without esophagitis       spacer/aero-hold chamber mask Wendy     1 each    1 Adult size spacer to use with [facility] inhalers.    Wheezing       * Notice:  This list has 2 medication(s) that are the same as other  medications prescribed for you. Read the directions carefully, and ask your doctor or other care provider to review them with you.

## 2018-02-05 NOTE — PROGRESS NOTES
Rheumatology Clinic Visit      Yovana Enriquez MRN# [id number]   YOB: 1975 Age: 42 year old      Date of visit: 2/05/18   PCP: Bradford Mario    Chief Complaint   Patient presents with:  Systemic Lupus Erythematous: patient states she has some joint pain and sores in mouth.       Assessment and Plan     1. Systemic lupus erythematosus (YANELIS by EIA positive in the past, leukopenia/lymphocytopenia, hypocomplementemia, polyarthralgias, oral sores, history of malar rash, photophobia, photosensitivity, Raynaud's Phenomenon): Previously on methotrexate that was discontinued for unclear reasons but the patient reports that she tolerated it well. Currently on azathioprine 150 mg (2.40mg/kg; calculated 11/6/2017), hydroxychloroquine 400 mg daily, prednisone 5 mg daily, and Benlysta.  Note that Benlysta was previously discontinued because there was concern that her mild leukopenia may represent a flare of lupus and rituximab was going to be considered; however, the leukopenia was mild and although she improved with prednisone it is unclear if she was truly having a lupus flare or if her leukocyte count was varying in the range that she typically varies. Given that she was not having any other symptoms for this and her leukocyte count improved, Benlysta was restarted. Later, she wanted to discontinue Benlysta but she had worsening of her symptoms and therefore was restarted with improvement of her symptoms again. Overall, stable disease.  Labs reviewed with Ms. Enriquez today.  Having some joint pain; suspect oral sore not SLE related. Increase hydroxychloroquine to 300 mg daily. Change Benlysta to SQ formulation because of patient preference. Prednisone dose was reduced previously from 5mg daily to 2.5mg daily with worsening arthritis; advised slower taper such as alternating dose between 5mg and 2.5mg daily.  - Continue azathioprine 150 mg daily  - Continue Evoxac 30 mg twice a day  - Increase hydroxychloroquine  to 200mg in the AM and 100mg in the PM (toxicity monitoring eye exam last done on 11/28/2016; advised her to update the eye exam)  - Prednisone 2.5-5mg daily  - Discontinue Benlysta infusions  - Start Benlysta SQ; advised that SQ administration should be started no sooner than her last IV dose  - Labs 1 week prior to the next rheumatology clinic visit: CBC, CMP, ESR, CRP, CK, C3, C4, dsDNA, UA, Uprotein:creatinine    2. Secondary Sjogren syndrome: Doing well with Evoxac. Dry eyes are not much of an issue currently. See #1.    3. Raynaud's Phenomenon: Amlodipine 5mg daily is effective; but still with symptoms so will increase amlodipine to 10mg daily.  Blood pressure 140/80 today. Risks of lightheadedness, dizziness, and peripheral edema was reviewed.   - Increase amlodipine to 10mg daily (may discontinue during warmer months)    4. Bilateral trochanteric bursitis: She receives steroid injections from the pain clinic.    5. Fibromyalgia: Managed by the pain clinic and PCP.    Ms. Enriquez verbalized agreement with and understanding of the rational for the diagnosis and treatment plan.  All questions were answered to best of my ability and the patient's satisfaction. Ms. Enriquez was advised to contact the clinic with any questions that may arise after the clinic visit.      Thank you for involving me in the care of the patient    Return to clinic: 3 months      HPI   Yovana Enriquez is a 42 year old female with medical history significant for subclinical hyperthyroidism, hypertension, irritable bowel syndrome, fibromyalgia, hypertension, non-celiac gluten sensitivity (reportedly with bowel biopsies and laboratory workup that did not show celiac disease), anxiety, and systemic lupus erythematosus who presents for follow-up of SLE.    Today, Ms. Enriquez reports that she is doing okay.  She has had bronchitis for the past 2 weeks and during this time she has had some worsening brain fog.  She says that the bronchitis  is nearly resolved now.  Raynaud's phenomenon is doing better with amlodipine 5 mg daily, but she is still symptomatic.  No lightheadedness, dizziness, or peripheral edema.  She says that she looked at her labs and saw that things have improved and she is very happy with this.  Joint pain primarily in her PIPs; with morning stiffness in his joints for approximately 30 minutes.  Note that since she was last seen, she had worsening arthritis that was thought to be due to prednisone dose reduction from 5 mg daily to 2.5 mg daily and so she was given a prednisone taper that was very effective.  She would like to change Benlysta to SQ formulation instead of IV.  She did not have her Plaquenil toxicity monitoring eye exam done because the clinic was reportedly behind schedule so she chose to leave and reschedule but forgot to reschedule.    Oral sore present; painful.    Denies fevers, chills, nausea, vomiting. No abdominal pain.  No chest pain/pressure, palpitations, or shortness of breath. No neck pain. No rash currently. No LE swelling.  She has photosensitivity and photophobia.   Sicca symptoms are well-controlled with Evoxac and frequent sips of water. No eye pain or redness. No history of serositis. No history of DVT, pulmonary embolism, or miscarriage.    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  PULM: No SOB, wheeze, cough.  GI:  See history of present illness. No blood in stool. No abdominal pain, nausea, vomiting.  : No blood in urine.  MSK: See HPI.  NEURO: See history of present illness  EXT: No LE swelling    Active Problem List     Patient Active Problem List   Diagnosis     Systemic lupus erythematosus (H)     Menorrhagia     History of ovarian cyst     Dysmenorrhea      History of gestational diabetes mellitus, not currently pregnant     Intramural leiomyoma of uterus     Hyperlipidemia LDL goal <130     Subclinical hyperthyroidism     Anxiety     High risk medication use     Fibromyalgia     Chronic constipation     Irritable bowel syndrome     Health Care Home     Hypertension goal BP (blood pressure) < 140/90     Non-celiac gluten sensitivity     Raynaud's phenomenon without gangrene     Sjogren's syndrome (H)     Past Medical History     Past Medical History:   Diagnosis Date     Chronic constipation 12/16/2013     Dysmenorrhea 10/1/2012     Fibromyalgia 11/15/2013     Health Care Home 3/19/2014    **See Letters for [facility] Care Plan: Emergency Care Plan       High risk medication use 9/13/2013     History of gestational diabetes mellitus, not currently pregnant 10/1/2012     History of ovarian cyst 10/1/2012     Hyperlipidemia LDL goal <130 10/18/2012     Hypertension goal BP (blood pressure) < 140/90 1/19/2015     Intramural leiomyoma of uterus 10/5/2012     Irritable bowel syndrome 3/11/2014    Patient states no history colonoscopy       Menorrhagia 10/1/2012     Non-celiac gluten sensitivity 2/8/2016     PONV (postoperative nausea and vomiting)      Subclinical hyperthyroidism 1/17/2013     Systemic lupus erythematosus (H) 4/23/2012     Past Surgical History     Past Surgical History:   Procedure Laterality Date     COLONOSCOPY N/A 2/20/2015    Procedure: COMBINED COLONOSCOPY, SINGLE OR MULTIPLE BIOPSY/POLYPECTOMY BY BIOPSY;  Surgeon: Fred Cullen MD;  Location: [facility] OR     COLONOSCOPY WITH CO2 INSUFFLATION N/A 2/20/2015    Procedure: COLONOSCOPY WITH CO2 INSUFFLATION;  Surgeon: Fred Cullen MD;  Location: [facility] OR     ENT SURGERY  10-24-14    Bottom lip biopsies     ESOPHAGOSCOPY, GASTROSCOPY, DUODENOSCOPY (EGD), COMBINED N/A 2/20/2015    Procedure: COMBINED ESOPHAGOSCOPY, GASTROSCOPY, DUODENOSCOPY (EGD), BIOPSY SINGLE OR MULTIPLE;  Surgeon: Subhash  Fred Howell MD;  Location:  OR     HC BREATH HYDROGEN TEST  12/31/2013    Procedure: HYDROGEN BREATH TEST;  Surgeon: Camden Almazan MD;  Location: [facility]      HYSTEROSCOPY, SURGICAL; W/ ENDOMETRIAL ABLATION, ANY METHOD  10-19-12     SHOULDER SURGERY Right     R shoulder     TUBAL LIGATION  2004     Allergy   No Known Allergies  Current Medication List     Current Outpatient Prescriptions   Medication Sig     predniSONE (DELTASONE) 2.5 MG tablet Take 1-2 tablets (2.5-5 mg) by mouth daily     azaTHIOprine (IMURAN) 50 MG tablet Take 3 tablets (150 mg) by mouth daily     cevimeline (EVOXAC) 30 MG capsule Take 1 capsule (30 mg) by mouth 2 times daily     hydroxychloroquine (PLAQUENIL) 200 MG tablet Hydroxychloroquine 200mg in the morning and 100mg in the evening.     amLODIPine (NORVASC) 5 MG tablet Take 2 tablets (10 mg) by mouth daily     Belimumab 200 MG/ML SOAJ Inject 200 mg Subcutaneous every 7 days     buPROPion (WELLBUTRIN) 75 MG tablet TAKE 1 TABLET BY MOUTH 2 TIMES DAILY     nortriptyline (PAMELOR) 10 MG capsule TAKE 3 CAPSULES BY MOUTH AT BEDTIME     albuterol (PROAIR HFA/PROVENTIL HFA/VENTOLIN HFA) 108 (90 BASE) MCG/ACT Inhaler Inhale 2 puffs into the lungs every 4 hours as needed for shortness of breath / dyspnea or wheezing Use with spacer     Spacer/Aero-Holding Chambers (SPACER/AERO-HOLD CHAMBER MASK) EDITH 1 Adult size spacer to use with [facility]I inhalers.     guaiFENesin-codeine (GUAIFENESIN AC) 100-10 MG/5ML SYRP syrup Take 5 mLs by mouth every 8 hours as needed for cough or congestion May cause sedating. Preferably take at bedtime.     amoxicillin-clavulanate (AUGMENTIN) 875-125 MG per tablet Take 1 tablet by mouth 2 times daily for 10 days     losartan (COZAAR) 25 MG tablet TAKE 1 TABLET BY MOUTH DAILY     pregabalin (LYRICA) 150 MG capsule Take 1 capsule (150 mg) by mouth 3 times daily . 3 month supply.     albuterol (PROAIR HFA/PROVENTIL HFA/VENTOLIN HFA) 108 (90 BASE) MCG/ACT Inhaler  Inhale 2 puffs into the lungs every 6 hours as needed for shortness of breath / dyspnea or wheezing     ranitidine (ZANTAC) 300 MG tablet Take 1 tablet (300 mg) by mouth 2 times daily     fluticasone (FLONASE) 50 MCG/ACT nasal spray Spray 1-2 sprays into both nostrils daily     diclofenac (VOLTAREN) 1 % GEL Apply 2-4 grams to knees or shoulders four times daily as needed using enclosed dosing card.     blood glucose test strip Used for testing glucose 2-3 times daily     Calcium Carb-Cholecalciferol (CALCIUM + D3) 600-200 MG-UNIT TABS 1 tablet daily     MICROLET LANCETS MISC 1 each daily.     Multiple Vitamin (MULTIVITAMIN OR) Take  by mouth.     [DISCONTINUED] azaTHIOprine (IMURAN) 50 MG tablet Take 3 tablets (150 mg) by mouth daily     [DISCONTINUED] amLODIPine (NORVASC) 5 MG tablet Take 1 tablet (5 mg) by mouth daily     No current facility-administered medications for this visit.          Social History   See HPI    Family History     Family History   Problem Relation Age of Onset     Respiratory Maternal Grandfather      CANCER Maternal Grandfather      Asthma Son      Circulatory Maternal Grandmother      Brain anurysm     Hypertension Mother      Glaucoma Mother 50     drops     Hypertension Sister      Hypertension Sister      DIABETES No family hx of      CEREBROVASCULAR DISEASE No family hx of      Thyroid Disease No family hx of      Macular Degeneration No family hx of        Physical Exam     Temp Readings from Last 3 Encounters:   02/02/18 97.4  F (36.3  C) (Oral)   01/28/18 97.8  F (36.6  C) (Tympanic)   01/05/18 97.6  F (36.4  C) (Oral)     BP Readings from Last 5 Encounters:   02/05/18 140/80   02/02/18 113/79   01/28/18 126/83   01/10/18 123/77   01/05/18 122/74     Pulse Readings from Last 1 Encounters:   02/05/18 92     Resp Readings from Last 1 Encounters:   02/02/18 18     Estimated body mass index is 23.4 kg/(m^2) as calculated from the following:    Height as of this encounter: 1.651 m (5'  "5\").    Weight as of this encounter: 63.8 kg (140 lb 9.6 oz).    GEN: NAD  HEENT: MMM. Small erythematous lesion on the inner lower lip. Anicteric, noninjected sclera  CV: S1, S2. RRR. No m/r/g.  PULM: CTA bilaterally. No w/c.  MSK:  Hands, wrists, elbows, shoulders, knees, ankles, and MTPs without swelling or tenderness to palpation. Bilateral hips tender to direct palpation.  Joints without increased warmth or overlying erythema.   NEURO: UE and LE strengths 5/5 and equal bilaterally.   SKIN: No rash. No evidence of current or past ischemic insults to the distal fingertips by visual inspection.   EXT: No LE edema  PSYCH: Alert. Appropriate.    Labs / Imaging (select studies)   RF/CCP  Recent Labs   Lab Test  09/13/13   1504  02/13/12   1404   CCPABY  <20 Interpretation:  Negative  <20 Interpretation:  Negative   RHF  12   --      YANELIS  Recent Labs   Lab Test  07/25/16   1433   ANAIGG  <1:40  Reference range: <1:40  (Note)  <1:40  INTERPRETIVE INFORMATION: YANELIS by IFA, IgG  Anti-nuclear antibodies (YANELIS) are seen in a variety of  systemic rheumatic diseases and are determined by indirect  fluorescence assay (IFA) using HEp-2 substrate with an  IgG-specific conjugate. YANELIS titers less than or equal to  1:80 have variable relevance while titers greater than or  equal to 1:160 are considered clinically significant. These  antibodies may precede clinical disease onset; however,  healthy individuals and those with advanced age have been  reported to be positive for YANELIS. When observed, one of the  five basic patterns is reported: homogeneous,  peripheral/rim, speckled, centromere, or nucleolar. If  cytoplasmic fluorescence is observed, it is noted. IFA  methodology is subjective and has occasionally been shown  to lack sensitivity for anti-SSA/Ro antibodies.  Negative results do not necessarily rule out the presence  of SSc. If clinical suspicion remains, consider further  te sting for U3-RNP, PM/Scl, or Th/To antibodies " associated  with SSc.  Performed by [facility],  [address] [place] [id number] [phone]  [email or web address], Alcon Krishna MD, Lab. Director       RNP/Sm/SSA/SSB  Recent Labs   Lab Test  03/23/16 1759 01/04/16   1806 07/01/14   1211  09/13/13   1504  02/13/12   1404   RNPIGG   --    --   <0.2  Negative     --    --    SMIGG  <0.2  Negative   Antibody index (AI) values reflect qualitative changes in antibody   concentration that cannot be directly associated with clinical condition or   disease state.    <0.2  Negative   Antibody index (AI) values reflect qualitative changes in antibody   concentration that cannot be directly associated with clinical condition or   disease state.    <0.2  Negative     --    --    ENASM   --    --    --   0  0   SSAIGG   --    --   <0.2  Negative     --    --    ENASSA   --    --    --   3  12   SSBIGG   --    --   <0.2  Negative     --    --    ENASSB   --    --    --   0  0   ENARMP   --    --    --   0  0   SCLIGG   --    --   <0.2  Negative     --    --      dsDNA  Recent Labs   Lab Test  01/30/18   1752  10/02/17   1814  07/03/17   1447  04/05/17   1820  12/12/16   1827 09/20/16   1518  07/25/16   1433  03/23/16   1759  01/04/16   1806   DNA  <1  <1  <1  Negative    <1  Negative    <1  Negative    <1  Negative    <1  Negative    <1  Negative    <15  Interpretation:  Negative       C3/C4  Recent Labs   Lab Test  01/30/18   1752  10/02/17   1814  07/03/17   1447  04/05/17   1820  12/12/16   1827 09/20/16   1518  07/25/16   1433  03/23/16   1759 01/04/16   1806   D3WVEEH  86  75*  68*  58*  69*  84  80  82  111   X3FMERL  21  15  15  10*  12*  14*  12*  13*  18     Histone Antibody  Recent Labs   Lab Test  02/13/12   1404   HSTO  0.4     Antiphospholipid Antibodies  Recent Labs   Lab Test  09/13/13   1504  02/13/12   1404   CARG  <15.0 Interpretation:  Negative  <15.0 Interpretation:  Negative   SANCHO  <12.5 Interpretation:  Negative  <12.5 Interpretation:   Negative   LUPINT   --   Negative (Note) COMMENTS: INR is normal. APTT is normal.  1:2 Mix is not indicated. DRVVT Screen is normal. Thrombin time is normal. NEGATIVE TEST; A LUPUS ANTICOAGULANT WAS NOT DETECTED IN THIS SPECIMEN WITHIN THE LIMITS OF THE TESTING REPERTOIRE. If the clinical picture is strongly suggestive of an antiphospholipid syndrome, recommend anticardiolipin and beta-2-glycoprotein (IgG and IgM) antibody tests. Hyun Denis M.D.  [phone] 2-.  NINR =  1.01 N = 0.86-1.14  (No additional charge) NTT = 16.0 N = 13.0-19.0 sec  (No additional charge)  APTT'S:    Seconds Reagent =  Stago LS Normal  =  36 Patient  =  41 1:2 Mix  =  N/A Reference =  31-43   DILUTE VANNESSA VIPER VENOM TEST: DRVVT Screen Ratio = 0.80 Normal = <1.28      CBC  Recent Labs   Lab Test  01/30/18   1752  10/02/17   1814  07/03/17   1447   WBC  5.7  6.6  4.7   RBC  3.73*  3.72*  3.65*   HGB  13.0  13.0  12.8   HCT  37.9  37.8  36.2   MCV  102*  102*  99   RDW  12.1  11.8  12.4   PLT  281  239  309   MCH  34.9*  34.9*  35.1*   MCHC  34.3  34.4  35.4   NEUTROPHIL  80.2  78.5  84.4   LYMPH  13.0  13.4  10.1   MONOCYTE  5.4  7.3  4.7   EOSINOPHIL  0.9  0.3  0.6   BASOPHIL  0.5  0.5  0.2   ANEU  4.6  5.2  4.0   ALYM  0.7*  0.9  0.5*   ZOË  0.3  0.5  0.2   AEOS  0.1  0.0  0.0   ABAS  0.0  0.0  0.0     CMP  Recent Labs   Lab Test  01/30/18   1752  10/02/17   1814  07/03/17   1447  04/05/17   1820  12/12/16   1827  09/20/16   1518   NA  138  138  141  140  140  140   POTASSIUM  3.5  3.7  4.0  3.9  3.8  4.0   CHLORIDE  100  103  108  106  104  108   CO2  30  27  26  27  31  29   ANIONGAP  8  8  7  7  5  3   GLC  91  112*  106*  94  98  100*   BUN  9  10  8  9  12  11   CR  0.70  0.88  0.85  0.81  0.81  0.86   GFRESTIMATED  >90  70  73  77  77  72   GFRESTBLACK  >90  84  88  >90   GFR Calc    >90   GFR Calc    88   MELANIA  8.7  8.7  8.3*  8.3*  8.7  8.5   BILITOTAL  0.4  0.2  0.3  0.2  0.4   0.2   ALBUMIN  3.8  3.8  3.6  3.7  3.8  3.5   PROTTOTAL  7.0  6.8  6.5*  6.3*  6.5*  6.3*   ALKPHOS  67  60  53  49  61  53   AST  26  22  20  20  18  23   ALT  25  21  26  25  21  25     HgA1c  Recent Labs   Lab Test  10/05/12   0824   A1C  5.3     Calcium/VitaminD  Recent Labs   Lab Test  01/30/18   1752  10/02/17   1814  07/03/17   1447   01/17/13   1039   MELANIA  8.7  8.7  8.3*   < >  9.4   VITDT   --   37   --    --   55    < > = values in this interval not displayed.     ESR/CRP  Recent Labs   Lab Test  01/30/18   1752  10/02/17   1814  07/03/17   1447   SED  9  4  5   CRP  <2.9  <2.9  <2.9     CK/Aldolase  Recent Labs   Lab Test  01/30/18   1752  10/02/17   1814  07/03/17   1447   CKT  168  134  127     TSH/T4  Recent Labs   Lab Test  10/18/17   1445  10/07/16   0714  02/27/15   1058  01/30/15   1156   TSH  0.33*  0.37*   --   0.45   T4  1.24  1.16  1.01   --      Hepatitis B  Recent Labs   Lab Test  03/29/16   1417  02/13/14   1835  05/22/12   1633   HEPBANG  Nonreactive  Negative  Negative     Hepatitis C  Recent Labs   Lab Test  03/29/16   1417  02/13/14   1835  05/22/12   1633   HCVAB  Nonreactive   Assay performance characteristics have not been established for newborns,   infants, and children    Negative  Negative     Lyme ab screening  Recent Labs   Lab Test  05/30/15   1355   LYMEGM  0.55     Tuberculosis Screening  Recent Labs   Lab Test  07/03/17   1447  03/29/16   1417  02/13/14   1835   TBRSLT  Negative  Negative  Negative   TBAGN  0.00  0.00  0.00     UA  Recent Labs   Lab Test  01/30/18   1753  01/03/18   1816  11/03/17   1115  10/02/17   1817  07/03/17   1447  04/05/17   1825  12/12/16   1835  09/20/16   1524   03/23/16   1807   COLOR  Yellow  Yellow  Yellow  Yellow  Yellow  Yellow  Yellow  Yellow   < >  Yellow   APPEARANCE  Clear  Slightly Cloudy  Clear  Clear  Clear  Clear  Clear  Clear   < >  Clear   URINEGLC  Negative  Negative  Negative  Negative  Negative  Negative  Negative  Negative   < >   Negative   URINEBILI  Negative  Negative  Negative  Negative  Negative  Negative  Negative  Negative   < >  Negative   SG  1.015  1.020  1.010  1.010  1.020  1.025  >1.030  >1.030   < >  >1.030   URINEPH  7.0  7.0  7.5*  6.0  7.0  7.0  6.5  6.0   < >  6.0   PROTEIN  Negative  Negative  Negative  Negative  Negative  Negative  Negative  Negative   < >  Negative   UROBILINOGEN  0.2  1.0  0.2  0.2  0.2  1.0  0.2  0.2   < >  0.2   NITRITE  Negative  Negative  Negative  Negative  Negative  Negative  Negative  Negative   < >  Negative   UBLD  Negative  Small*  Small*  Negative  Negative  Negative  Small*  Negative   < >  Negative   LEUKEST  Negative  Small*  Moderate*  Negative  Negative  Negative  Negative  Negative   < >  Negative   WBCU  O - 2  25-50*  5-10*  O - 2  O - 2  O - 2  10-25*  O - 2   < >  O - 2   RBCU  O - 2  5-10*  O - 2  O - 2  O - 2  O - 2  O - 2  O - 2   < >  O - 2   SQUAMOUSEPI   --   Few   --   Few  Few  Few  Few   --    --   Few   BACTERIA   --   Moderate*  Few*   --    --    --   Moderate*   --    --    --    MUCOUS   --    --    --    --    --   Present*   --   Present*   --   Present*    < > = values in this interval not displayed.     Urine Microscopic  Recent Labs   Lab Test  01/30/18   1753  01/03/18   1816  11/03/17   1115  10/02/17   1817  07/03/17   1447  04/05/17   1825  12/12/16   1835  09/20/16   1524   03/23/16   1807   WBCU  O - 2  25-50*  5-10*  O - 2  O - 2  O - 2  10-25*  O - 2   < >  O - 2   RBCU  O - 2  5-10*  O - 2  O - 2  O - 2  O - 2  O - 2  O - 2   < >  O - 2   SQUAMOUSEPI   --   Few   --   Few  Few  Few  Few   --    --   Few   BACTERIA   --   Moderate*  Few*   --    --    --   Moderate*   --    --    --    MUCOUS   --    --    --    --    --   Present*   --   Present*   --   Present*    < > = values in this interval not displayed.     Urine Protein  Recent Labs   Lab Test  01/30/18   1753  10/02/17   1817  07/03/17   1447   UTP  0.06  0.07  0.16   UTPG  0.13  0.14  0.15   UCRR   44  48  112     Immunization History     Immunization History   Administered Date(s) Administered     Influenza (IIV3) PF 10/01/2012, 09/23/2013     Influenza Vaccine IM 3yrs+ 4 Valent IIV4 09/20/2016, 10/30/2017     Pneumo Conj 13-V (2010&after) 04/25/2016     Pneumococcal 23 valent 10/01/2012, 11/06/2017     Tdap (Adacel,Boostrix) 12/17/2010          Chart documentation done in part with Dragon Voice recognition Software. Although reviewed after completion, some word and grammatical error may remain.    Bradford Colvin MD

## 2018-02-05 NOTE — TELEPHONE ENCOUNTER
Signed Rx faxed to [facility] [employer], [place]. RightFax confirmed.

## 2018-02-05 NOTE — NURSING NOTE
"Chief Complaint   Patient presents with     Systemic Lupus Erythematous     patient states she has some joint pain and sores in mouth.        Initial /80 (BP Location: Left arm, Patient Position: Chair, Cuff Size: Adult Regular)  Pulse 92  Ht 1.651 m (5' 5\")  Wt 63.8 kg (140 lb 9.6 oz)  SpO2 100%  BMI 23.4 kg/m2 Estimated body mass index is 23.4 kg/(m^2) as calculated from the following:    Height as of this encounter: 1.651 m (5' 5\").    Weight as of this encounter: 63.8 kg (140 lb 9.6 oz).  BP completed using cuff size: regular         RAPID3 (0-30) Cumulative Score  9.8          RAPID3 Weighted Score (divide #4 by 3 and that is the weighted score)  3.3         "

## 2018-02-05 NOTE — TELEPHONE ENCOUNTER
Message from [facility]:  Original authorizing provider: MD Luiza Greenhy LYN Enriquez would like a refill of the following medications:  pregabalin (LYRICA) 150 MG capsule [Keena Lockett MD]    Preferred pharmacy: [facility] [address] [facility], [place] - [address]    Comment:

## 2018-02-06 ENCOUNTER — TELEPHONE (OUTPATIENT)
Dept: RHEUMATOLOGY | Facility: CLINIC | Age: 43
End: 2018-02-06

## 2018-02-06 ASSESSMENT — ANXIETY QUESTIONNAIRES: GAD7 TOTAL SCORE: 1

## 2018-02-06 ASSESSMENT — PATIENT HEALTH QUESTIONNAIRE - PHQ9: SUM OF ALL RESPONSES TO PHQ QUESTIONS 1-9: 4

## 2018-02-06 NOTE — PROGRESS NOTES
SUBJECTIVE:   oYvana Enriquez is a 42 year old female who presents to clinic today for the following health issues:      Hyperlipidemia Follow-Up      Rate your low fat/cholesterol diet?: not monitoring fat    Taking statin?  No    Other lipid medications/supplements?:  none    Hypertension Follow-up      Outpatient blood pressures are not being checked.    Low Salt Diet: not monitoring salt    Depression and Anxiety Follow-Up    Status since last visit: stable     Other associated symptoms:None    Complicating factors:     Significant life event: No     Current substance abuse: None    No flowsheet data found.  RICHARD-7 SCORE 4/10/2013 5/18/2015   Total Score 8 4     In the past two weeks have you had thoughts of suicide or self-harm?  No.    Do you have concerns about your personal safety or the safety of others?   No  PHQ-9  English  PHQ-9   Any Language  RICHARD-7  Suicide Assessment Five-step Evaluation and Treatment (SAFE-T)    Amount of exercise or physical activity: None    Problems taking medications regularly: No    Medication side effects: none    Diet: regular (no restrictions)            Problem list and histories reviewed & adjusted, as indicated.  Additional history: as documented    BP Readings from Last 3 Encounters:   02/05/18 124/84   02/05/18 140/80   02/02/18 113/79    Wt Readings from Last 3 Encounters:   02/05/18 140 lb (63.5 kg)   02/05/18 140 lb 9.6 oz (63.8 kg)   02/02/18 140 lb 6.4 oz (63.7 kg)                    Reviewed and updated as needed this visit by clinical staff  Tobacco  Allergies  Meds       Reviewed and updated as needed this visit by Provider         All other systems negative except as outline above  OBJECTIVE:    Eye exam - right eye normal lid, conjunctiva, cornea, pupil and fundus, left eye normal lid, conjunctiva, cornea, pupil and fundus.  Thyroid not palpable, not enlarged, no nodules detected.  CHEST:chest clear to IPPA, no tachypnea, retractions or cyanosis and S1,  S2 normal, no murmur, no gallop, rate regular.  No edema    Yovana was seen today for hypertension, lipids and anxiety.    Diagnoses and all orders for this visit:    Anxiety  -     buPROPion (WELLBUTRIN) 75 MG tablet; TAKE 1 TABLET BY MOUTH 2 TIMES DAILY    Numbness  -     nortriptyline (PAMELOR) 10 MG capsule; TAKE 3 CAPSULES BY MOUTH AT BEDTIME    Hyperlipidemia LDL goal <130  -     Lipid panel reflex to direct LDL Fasting; Future    Hypertension goal BP (blood pressure) < 140/90      work on lifestyle modification

## 2018-02-06 NOTE — TELEPHONE ENCOUNTER
[facility] Prior Authorization Team   Phone: [phone]  Fax: [phone]    PA Initiation    Medication: Benlysta - Pending  Insurance Company: [employer] - Phone [phone] Fax [phone]  Pharmacy Filling the Rx: [facility] [place], [place] - [address] [place] [address] [id number]  Filling Pharmacy Phone:    Filling Pharmacy Fax:    Start Date: 2/6/2018

## 2018-02-07 NOTE — TELEPHONE ENCOUNTER
[facility] Prior Authorization Team   Phone: [phone]  Fax: [phone]    Prior Authorization Approval    Authorization Effective Date: 1/6/2018  Authorization Expiration Date: 2/6/2019  Medication: Benlysta - approved  Approved Dose/Quantity: 200mg once weekly  Reference #:     Insurance Company: [employer] - Phone [phone] Fax [phone]  Expected CoPay:       CoPay Card Available:      Foundation Assistance Needed:    Which Pharmacy is filling the prescription (Not needed for infusion/clinic administered): [facility] [place], [place] - [address] [place] [address] [phone]  Pharmacy Notified: Yes  Patient Notified: Yes

## 2018-02-11 ENCOUNTER — MYC MEDICAL ADVICE (OUTPATIENT)
Dept: RHEUMATOLOGY | Facility: CLINIC | Age: 43
End: 2018-02-11

## 2018-02-11 DIAGNOSIS — M32.19 OTHER SYSTEMIC LUPUS ERYTHEMATOSUS WITH OTHER ORGAN INVOLVEMENT (H): ICD-10-CM

## 2018-02-12 RX ORDER — HYDROXYCHLOROQUINE SULFATE 200 MG/1
TABLET, FILM COATED ORAL
Qty: 135 TABLET | Refills: 1
Start: 2018-02-12 | End: 2018-06-11

## 2018-02-14 ENCOUNTER — MYC MEDICAL ADVICE (OUTPATIENT)
Dept: RHEUMATOLOGY | Facility: CLINIC | Age: 43
End: 2018-02-14

## 2018-02-14 DIAGNOSIS — M32.19 OTHER SYSTEMIC LUPUS ERYTHEMATOSUS WITH OTHER ORGAN INVOLVEMENT (H): Primary | ICD-10-CM

## 2018-02-21 ENCOUNTER — ALLIED HEALTH/NURSE VISIT (OUTPATIENT)
Dept: NURSING | Facility: CLINIC | Age: 43
End: 2018-02-21
Payer: COMMERCIAL

## 2018-02-21 DIAGNOSIS — Z53.9 DIAGNOSIS NOT YET DEFINED: Primary | ICD-10-CM

## 2018-02-21 NOTE — NURSING NOTE
Patient brought medication with her. Discussed medication, side effects and reactions, disposal, missed dose, when to contact provider, talked through injection teaching.  Patient then demonstrated good technique with self injection of medication. Patient waited for 20 minutes, no problems, no questions, patient left in good condition.  Alicia Root RN

## 2018-02-21 NOTE — MR AVS SNAPSHOT
After Visit Summary   2/21/2018    Yovana Enriquez    MRN: [id number]           Patient Information     Date Of Birth          1975        Visit Information        Provider Department      2/21/2018 1:30 PM BE RN [facility]y [facility]        Today's Diagnoses     DIAGNOSIS NOT YET DEFINED    -  1       Follow-ups after your visit        Your next 10 appointments already scheduled     Mar 02, 2018  1:00 PM CST   Level 2 with BAY 8 INFUSION   [facility]    [address] [address] [place] [id number] [phone]            Mar 10, 2018  9:15 AM CST   LAB with AN LAB   [facility]    [address] [facility] [address] [address] [phone]           Please do not eat 10-12 hours before your appointment if you are coming in fasting for labs on lipids, cholesterol, or glucose (sugar). This does not apply to pregnant women. Water, hot tea and black coffee (with nothing added) are okay. Do not drink other fluids, diet soda or chew gum.            Mar 28, 2018  1:40 PM CDT   (Arrive by 1:25 PM)   Return Visit with Inez Sawyer MD   [facility] ([facility])    [address] [id number] [phone]            Jun 04, 2018  6:00 PM CDT   LAB with BE LAB   [facility]    [address] [facility]  [place] [id number] [phone]           Please do not eat 10-12 hours before your appointment if you are coming in fasting for labs on lipids, cholesterol, or glucose (sugar). This does not apply to pregnant women. Water, hot tea and black coffee (with nothing added) are okay. Do not drink other fluids, diet soda or chew gum.            Jun 11, 2018 12:40 PM CDT   Return Visit with Bradford Colvin MD   [facility]    [address] [facility]  [place] [address] [phone]              Who to contact     If you have questions or need follow up information about today's clinic visit or your schedule please contact [facility] [place] directly at [phone].  Normal or non-critical lab and imaging results will be communicated to you by MyChart, letter or phone within 4 business days after the clinic has received the results. If you do not hear from us within 7 days, please contact the clinic through MyChart or phone. If you have a critical or abnormal lab result, we will notify you by phone as soon as possible.  Submit refill requests through [employer] or call your pharmacy and they will forward the refill request to us. Please allow 3 business days for your refill to be completed.          Additional Information About Your Visit        [employer] Information     [employer] gives you secure access to your electronic health record. If you see a primary care provider, you can also send messages to your care team and make appointments. If you have questions, please call your primary care clinic.  If you do not have a primary care provider, please call [phone] and they will assist you.        Care EveryWhere ID     This is your Care EveryWhere ID. This could be used by other organizations to access your [place] medical records  [id number]         Blood Pressure from Last 3 Encounters:   02/05/18 124/84   02/05/18 140/80   02/02/18 113/79    Weight from Last 3 Encounters:   02/05/18 140 lb (63.5 kg)   02/05/18 140 lb 9.6 oz (63.8 kg)   02/02/18 140 lb 6.4 oz (63.7 kg)              Today, you had the following     No orders found for display       Primary Care Provider Office Phone # Fax #    Bradford Mario PA-C [phone]       [address] [place] [address]        Equal Access to Services     BINTA HURD AH: Hadii aad ku hadasho Soomaali, waaxda luqadaha, qaybta kaalmada adeegyada, waxay elliot scott  ah. So [facility] [phone].    ATENCIÓN: Si payton morales, tiene a dexter disposición servicios gratuitos de asistencia lingüística. Cee al [phone].    We comply with applicable federal civil rights laws and [place] laws. We do not discriminate on the basis of race, color, national origin, age, disability, sex, sexual orientation, or gender identity.            Thank you!     Thank you for choosing [facility]  for your care. Our goal is always to provide you with excellent care. Hearing back from our patients is one way we can continue to improve our services. Please take a few minutes to complete the written survey that you may receive in the mail after your visit with us. Thank you!             Your Updated Medication List - Protect others around you: Learn how to safely use, store and throw away your medicines at www.disposemymeds.org.          This list is accurate as of 2/21/18  2:22 PM.  Always use your most recent med list.                   Brand Name Dispense Instructions for use Diagnosis    * albuterol 108 (90 BASE) MCG/ACT Inhaler    PROAIR HFA/PROVENTIL HFA/VENTOLIN HFA    1 Inhaler    Inhale 2 puffs into the lungs every 6 hours as needed for shortness of breath / dyspnea or wheezing    Acute bronchitis, unspecified organism       * albuterol 108 (90 BASE) MCG/ACT Inhaler    PROAIR HFA/PROVENTIL HFA/VENTOLIN HFA    1 Inhaler    Inhale 2 puffs into the lungs every 4 hours as needed for shortness of breath / dyspnea or wheezing Use with spacer    Wheezing       amLODIPine 5 MG tablet    NORVASC    90 tablet    Take 2 tablets (10 mg) by mouth daily    Raynaud's phenomenon without gangrene       azaTHIOprine 50 MG tablet    IMURAN    270 tablet    Take 3 tablets (150 mg) by mouth daily    Other systemic lupus erythematosus with other organ involvement (H)       Belimumab 200 MG/ML Soaj     4 Syringe    Inject 200 mg Subcutaneous every 7 days . Hold for signs of infection and seek medical  attention. Autoinjector    Other systemic lupus erythematosus with other organ involvement (H)       blood glucose monitoring test strip    no brand specified    1 Month    Used for testing glucose 2-3 times daily    History of gestational diabetes mellitus, not currently pregnant       buPROPion 75 MG tablet    WELLBUTRIN    180 tablet    TAKE 1 TABLET BY MOUTH 2 TIMES DAILY    Anxiety       Calcium + D3 600-200 MG-UNIT Tabs      1 tablet daily        cevimeline 30 MG capsule    EVOXAC    180 capsule    Take 1 capsule (30 mg) by mouth 2 times daily    Sjogren's syndrome with keratoconjunctivitis sicca (H)       diclofenac 1 % Gel topical gel    VOLTAREN    100 g    Apply 2-4 grams to knees or shoulders four times daily as needed using enclosed dosing card.    Fibromyalgia, SLE (systemic lupus erythematosus) (H)       fluticasone 50 MCG/ACT spray    FLONASE    1 Bottle    Spray 1-2 sprays into both nostrils daily    Chronic rhinitis       hydroxychloroquine 200 MG tablet    PLAQUENIL    135 tablet    Hydroxychloroquine 200mg in the morning and 100mg in the evening.    Other systemic lupus erythematosus with other organ involvement (H)       losartan 25 MG tablet    COZAAR    90 tablet    TAKE 1 TABLET BY MOUTH DAILY    Hypertension goal BP (blood pressure) < 140/90       MICROLET LANCETS Misc     100 each    1 each daily.    Other abnormal glucose       MULTIVITAMIN PO      Take  by mouth.        nortriptyline 10 MG capsule    PAMELOR    90 capsule    TAKE 3 CAPSULES BY MOUTH AT BEDTIME    Numbness       predniSONE 2.5 MG tablet    DELTASONE    180 tablet    Take 1-2 tablets (2.5-5 mg) by mouth daily    Other systemic lupus erythematosus with other organ involvement (H)       pregabalin 150 MG capsule    LYRICA    270 capsule    Take 1 capsule (150 mg) by mouth 3 times daily . 3 month supply.    Fibromyalgia       ranitidine 300 MG tablet    ZANTAC    180 tablet    Take 1 tablet (300 mg) by mouth 2 times daily     Gastroesophageal reflux disease without esophagitis       spacer/aero-hold chamber mask Wendy     1 each    1 Adult size spacer to use with MDI inhalers.    Wheezing       * Notice:  This list has 2 medication(s) that are the same as other medications prescribed for you. Read the directions carefully, and ask your doctor or other care provider to review them with you.

## 2018-03-14 ENCOUNTER — CARE COORDINATION (OUTPATIENT)
Dept: GASTROENTEROLOGY | Facility: CLINIC | Age: 43
End: 2018-03-14

## 2018-03-14 DIAGNOSIS — K63.8219 SMALL INTESTINAL BACTERIAL OVERGROWTH: Primary | ICD-10-CM

## 2018-03-14 NOTE — PROGRESS NOTES
"  Ruma called. States she used to have an \"episode\" once a month and for the past month is experiencing every 2-3 days.     Episode consists of abdominal cramping, nausea followed by \"mud\" like stool. States she continues this until she feels completely empty. During episode, she has no appetite, weakness. Then once this passes she would feel better. Now these episodes are occurring frequently and doesn't feel like she can rebound in between.     I asked about the pelvic floor center- states they didn't find much. Writer reviewed results- abnormal results that could be contributing and biofeedback was recommended. Taking miralax however she holds it during these episode.     Currently scheduled to see GI on 3/28. Will Route to Dr. Sawyer for further recommendations.       "

## 2018-03-17 ENCOUNTER — TELEPHONE (OUTPATIENT)
Dept: GASTROENTEROLOGY | Facility: CLINIC | Age: 43
End: 2018-03-17

## 2018-03-17 NOTE — TELEPHONE ENCOUNTER
I spoke to Ms. Enriquez on 3/14.  She is experiencing more episodes of diarrhea (despite using Miralax during times of constipation, which has improved). She says these episodes are similar to her episodes a few years ago that were treated with an antibiotic for SIBO.     Will plan for rifaximin 550 TID x 10 days to treat possible SIBO.  She had a positive HBT in 2013.

## 2018-03-21 ENCOUNTER — TELEPHONE (OUTPATIENT)
Dept: GASTROENTEROLOGY | Facility: CLINIC | Age: 43
End: 2018-03-21

## 2018-03-21 NOTE — TELEPHONE ENCOUNTER
Prior Authorization Specialty Medication Request    Medication/Dose: rifaxamin  ICD code (if different than what is on RX):  K63.89  Previously Tried and Failed:      Important Lab Values:   Rationale:  She is experiencing more episodes of diarrhea (despite using Miralax during times of constipation, which has improved). She says these episodes are similar to her episodes a few years ago that were treated with an antibiotic for SIBO.      Will plan for rifaximin 550 TID x 10 days to treat possible SIBO.  She had a positive HBT in 2013.     Insurance Name: [employer]  Insurance ID:   Insurance Phone Number:     Pharmacy Information (if different than what is on RX)  Name:    Phone:

## 2018-03-21 NOTE — TELEPHONE ENCOUNTER
PRIOR AUTHORIZATION DENIED    Medication: rifaximin (XIFAXAN) 550 MG TABS tablet-DENIED    Denial Date: 3/21/2018    Denial Rational:           Appeal Information:

## 2018-03-21 NOTE — TELEPHONE ENCOUNTER
Central Prior Authorization Team   Phone: [phone]      PA Initiation    Medication: rifaximin (XIFAXAN) 550 MG TABS tablet-PA Initiated  Insurance Company: [employer] - Phone [phone] Fax [phone]  Pharmacy Filling the Rx: [id number] [place] - [address]  Filling Pharmacy Phone: [phone]  Filling Pharmacy Fax: [phone]  Start Date: 3/21/2018

## 2018-03-22 ENCOUNTER — CARE COORDINATION (OUTPATIENT)
Dept: GASTROENTEROLOGY | Facility: CLINIC | Age: 43
End: 2018-03-22

## 2018-03-22 DIAGNOSIS — K63.8219 SMALL INTESTINAL BACTERIAL OVERGROWTH: Primary | ICD-10-CM

## 2018-03-22 RX ORDER — AMOXICILLIN AND CLAVULANATE POTASSIUM 500; 125 MG/1; MG/1
1 TABLET, FILM COATED ORAL 3 TIMES DAILY
Qty: 30 TABLET | Refills: 0 | Status: SHIPPED | OUTPATIENT
Start: 2018-03-22 | End: 2018-04-01

## 2018-03-27 ENCOUNTER — OFFICE VISIT (OUTPATIENT)
Dept: GASTROENTEROLOGY | Facility: CLINIC | Age: 43
End: 2018-03-27
Payer: COMMERCIAL

## 2018-03-27 VITALS — BODY MASS INDEX: 22.92 KG/M2 | OXYGEN SATURATION: 100 % | WEIGHT: 137.6 LBS | HEIGHT: 65 IN

## 2018-03-27 DIAGNOSIS — K58.9 IRRITABLE BOWEL SYNDROME: ICD-10-CM

## 2018-03-27 DIAGNOSIS — Z71.3 NUTRITIONAL COUNSELING: Primary | ICD-10-CM

## 2018-03-27 DIAGNOSIS — K63.8219 SMALL INTESTINAL BACTERIAL OVERGROWTH: Primary | ICD-10-CM

## 2018-03-27 DIAGNOSIS — M62.89 PELVIC FLOOR DYSFUNCTION: ICD-10-CM

## 2018-03-27 ASSESSMENT — ENCOUNTER SYMPTOMS
SMELL DISTURBANCE: 1
EYE PAIN: 0
WEAKNESS: 1
NAUSEA: 1
HOT FLASHES: 1
ORTHOPNEA: 0
TREMORS: 0
BOWEL INCONTINENCE: 0
NECK PAIN: 1
INSOMNIA: 1
DIZZINESS: 1
NECK MASS: 0
TASTE DISTURBANCE: 0
SINUS PAIN: 0
EYE IRRITATION: 0
MUSCLE WEAKNESS: 1
FEVER: 1
NUMBNESS: 1
VOMITING: 1
ORTHOPNEA: 0
PANIC: 1
NECK MASS: 0
HYPERTENSION: 1
CHILLS: 1
FEVER: 1
LIGHT-HEADEDNESS: 1
SINUS PAIN: 0
FATIGUE: 1
SEIZURES: 0
EXERCISE INTOLERANCE: 0
ALTERED TEMPERATURE REGULATION: 1
LEG PAIN: 0
HYPOTENSION: 0
HOT FLASHES: 1
BLOOD IN STOOL: 0
STIFFNESS: 1
WEIGHT GAIN: 0
WEIGHT LOSS: 1
WEIGHT GAIN: 0
EYE WATERING: 0
HALLUCINATIONS: 0
VOMITING: 1
EYE IRRITATION: 0
TINGLING: 1
NAIL CHANGES: 0
POOR WOUND HEALING: 1
SEIZURES: 0
TREMORS: 0
LEG PAIN: 0
PALPITATIONS: 1
DISTURBANCES IN COORDINATION: 1
CONSTIPATION: 1
TASTE DISTURBANCE: 0
HYPOTENSION: 0
NIGHT SWEATS: 1
SYNCOPE: 0
EYE REDNESS: 0
HOARSE VOICE: 0
HEARTBURN: 1
SINUS CONGESTION: 0
SKIN CHANGES: 0
MUSCLE WEAKNESS: 1
MYALGIAS: 1
BOWEL INCONTINENCE: 0
HALLUCINATIONS: 0
SYNCOPE: 0
MEMORY LOSS: 1
DISTURBANCES IN COORDINATION: 1
SLEEP DISTURBANCES DUE TO BREATHING: 0
BACK PAIN: 1
PANIC: 1
SMELL DISTURBANCE: 1
DIARRHEA: 1
SKIN CHANGES: 0
DOUBLE VISION: 0
HYPERTENSION: 1
EXERCISE INTOLERANCE: 0
INCREASED ENERGY: 1
NECK PAIN: 1
DECREASED CONCENTRATION: 1
MUSCLE CRAMPS: 0
RECTAL PAIN: 0
SORE THROAT: 0
STIFFNESS: 1
ARTHRALGIAS: 1
RECTAL PAIN: 0
NERVOUS/ANXIOUS: 1
JAUNDICE: 0
CONSTIPATION: 1
DECREASED CONCENTRATION: 1
MYALGIAS: 1
BLOATING: 1
DECREASED APPETITE: 1
ALTERED TEMPERATURE REGULATION: 1
NERVOUS/ANXIOUS: 1
WEAKNESS: 1
ARTHRALGIAS: 1
DECREASED LIBIDO: 0
ABDOMINAL PAIN: 1
DEPRESSION: 1
PARALYSIS: 0
NIGHT SWEATS: 1
POLYPHAGIA: 0
SPEECH CHANGE: 0
BACK PAIN: 1
JOINT SWELLING: 0
DIARRHEA: 1
POOR WOUND HEALING: 1
PARALYSIS: 0
WEIGHT LOSS: 1
HOARSE VOICE: 0
DIZZINESS: 1
POLYDIPSIA: 1
EYE WATERING: 0
EYE REDNESS: 0
SORE THROAT: 0
LIGHT-HEADEDNESS: 1
NAUSEA: 1
CHILLS: 1
LOSS OF CONSCIOUSNESS: 0
NAIL CHANGES: 0
TROUBLE SWALLOWING: 0
JAUNDICE: 0
DEPRESSION: 1
ABDOMINAL PAIN: 1
SPEECH CHANGE: 0
HEADACHES: 1
MEMORY LOSS: 1
JOINT SWELLING: 0
SINUS CONGESTION: 0
DECREASED APPETITE: 1
HEARTBURN: 1
BLOOD IN STOOL: 0
EYE PAIN: 0
DOUBLE VISION: 0
FATIGUE: 1
TROUBLE SWALLOWING: 0
DECREASED LIBIDO: 0
SLEEP DISTURBANCES DUE TO BREATHING: 0
INCREASED ENERGY: 1
NUMBNESS: 1
LOSS OF CONSCIOUSNESS: 0
POLYPHAGIA: 0
PALPITATIONS: 1
MUSCLE CRAMPS: 0
TINGLING: 1
HEADACHES: 1
INSOMNIA: 1
POLYDIPSIA: 1
BLOATING: 1

## 2018-03-27 ASSESSMENT — PAIN SCALES - GENERAL: PAINLEVEL: NO PAIN (0)

## 2018-03-27 NOTE — PROGRESS NOTES
"[facility] Outpatient Medical Nutrition Therapy      Time Spent:  60 minutes  Session Type:  Initial Individual Session  Referring Physician:  Dr. Inez Sawyer  Reason for RD Visit:   Nutritional counseling, IBS and recurrent SIBO.     Nutrition Assessment:  Patient is here for initial visit with Registered Dietitian (RD).  Patient is a 42 y.o. female with history of IBS, lupus, Sjogren syndrome, Raynaud's, fibromyalgia, constipation, pelvic floor dysfunction, hx gestational diabetes and SIBO. Pt c/o increasing issues with abdominal cramping, nausea, \"mud\" like stools over the past month prior to starting an antibiotic last week. Pt stated that when she has these \"bouts of sickness\", she can't eat and if tries will vomit. Pt stated that over the last month has had bouts of nausea and vomiting that last for 5-6 days and then afterwards feels weak, tired, stomach pains, severe headaches. 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 Will only have 1-2 days of reprieve where she could eat but then the bouts would start again. During those 5-6 days she could only eat/drink: 7 oz of 7 up, jello, 10 oz gatorade, 16.9 oz water and popsicles, olives or deli turkey. Pt estimated that she was eating ~500 calories per day. Pt stated that she has been having these bout for several years but prior to this past month would only have these issues ~1x/month. Progressively these bouts increased.  Patient reported that she had SIBO 5 years ago. MD start patient on an antibiotic last week, and pt discontinued most concentrated/added sugars at that time as well and reported feeling much better and able to eat meals regularly now.     Pt stated that in the past she had met with an RD and followed a lower FODMAP diet with elimination and challenge phases of adding foods back into diet.  Pt reported that she strictly follows a dairy/gluten free diet + does not tolerate garlic and " "onions and many tomatoes/tomato products. Cut out sugar last week. Reported that she previously was eating many foods containing sugar/added sugars such as GF cookies and brownies and waffles with syrup, sweetened vitamin/mineral supplements. Has not been eating any GF bread since starting the antibiotics as well. Pt typically uses lactose free milk but discontinued when she was having a lot of GI issues so uses almond milk and interested in trying lactose free milk again.     Diet Recall:  (Yesterday OR intake previously on days when she was feeling better and could eat):  Meal Food    Breakfast 2 waffles with peanut butter OR previously GF waffle with syrup OR dried cereal   Lunch Turkey Lettuce wrap with olives and orange OR, deli turkey and rodriguez OR Turkey sandwich on GF bread with chips and dessert (GF cookies)   Dinner 2 eggs and giles and cooked cauliflower OR GF pasta with butter OR chicken/beef and potatoes and green peas/carrots   Snacks 2 rice cakes with peanut butter and orange OR Dried cereal OR GF brownies OR candy OR oranges/grpes   Beverages 64 oz Water, coffee with creamer, grape juice cocktail   Alcohol Intake none     Height:   Ht Readings from Last 1 Encounters:   03/27/18 1.651 m (5' 5\")     Weight:    Wt Readings from Last 10 Encounters:   03/27/18 62.4 kg (137 lb 9.6 oz)   02/05/18 63.5 kg (140 lb)   02/05/18 63.8 kg (140 lb 9.6 oz)   02/02/18 63.7 kg (140 lb 6.4 oz)   01/28/18 62.8 kg (138 lb 6 oz)   01/05/18 63.2 kg (139 lb 4.8 oz)   01/03/18 62.6 kg (138 lb)   12/08/17 60.7 kg (133 lb 12.8 oz)   11/10/17 61.4 kg (135 lb 6.4 oz)   11/06/17 62.1 kg (137 lb)      BMI: 23.35 healthy     Labs:  Reviewed EMR  Pertinent Medications/vitamin and mineral supplements: Augmentin. Pt reported that she previously took chewable MVI, and calcium but not taking currently taking due to sweetened supplements and previously had few days where she could tolerated much PO so was afraid to take. Is planning to " restart unsweetened pill again.    Food Allergies:  NKFA. Pt reported gluten and lactose intolerance.  Estimated Nutrition Needs based on current body weight of 62k calories (30 kcals/kg), 62g protein (1g/kg).    MALNUTRITION:  % Weight Loss:  Weight loss does not meet criteria for malnutrition   % Intake:  </= 75% for >/= 1 month (severe malnutrition)  Subcutaneous Fat Loss:  None observed  Muscle Loss:  None observed  Fluid Retention:  None noted    Malnutrition Diagnosis: Patient does not meet two of the above criteria necessary for diagnosing malnutrition  In Context of:  Acute illness or injury    Nutrition Diagnosis:    Altered GI function related to nausea, vomiting, diarrhea, decreased appetite and intake and possible SIBO  as evidenced by pt report of having had an increase in these issues over the past month for periods of 5-6 days with only 1-2 days reprieve.    Nutrition Prescription:    Nutrition Intervention:    Nutrition Education/Counseling:  Reviewed diet guidelines for small bowel bacterial overgrowth. Explained recommendation to avoid concentrated sugar/added sugars. Discussed foods that may cause issues such as carbohydrates and fibers and reviewed examples of these foods. Since pt discontinued foods/most foods with added sugar and limiting/has decreased some breads and starches and has reported an improvement in symptoms and now able to eat over the past 4 days, recommended continuing to avoid concentrated sugars/added sugars and continue eating healthy balanced meals. Discussed tips for slowly adding in additional fiber/higher fiber foods 1-2 servings of these foods at a time to see if tolerated. recommended pt switch her vitamin/mineral supplements and discontinue sweetened chewable supplements and switch to unsweetened ones/pill. Told pt she could take daily over the counter probiotic if desired. Gave patient SIBO diet education handout. Since pt had previously followed low FODMAP  diet with elimination and challenge phases, briefly discussed some higher FODMAP foods and diet recommendations but pt with good previous knowledge of this diet. Answered pt's questions, and no further questions at this time. Patient verbalized understanding of education provided. See Goals below.     Educational Materials Provided:  [employer] diet education handout and list of goals and also provided RD contact info.    Goals:  -Continue to avoid concentrated sweets/sugar/added sugars  -Continue eating 3 balanced meals per day. As you are continuing to feel better then slowly add in more healthy complex carbohydrate foods, 1-2 serving every few days as tolerated.  -Continue eating lean protein sources at all meals and continue eating non starchy vegetables.  -Can restart multivitamin and calcium but switch to unsweetened pills  -Can start over the counter probiotic, if desired.    Nutrition Monitoring and Evaluation: Will monitor adherence to nutrition recommendations at any future RD visits.     Further Medical Nutrition Therapy:  PRN  Next Appointment (if applicable):  PRN  Patient was encouraged to call/contact RD with any further questions.    Yadira Lopez, MS, RD, LD

## 2018-03-27 NOTE — MR AVS SNAPSHOT
After Visit Summary   3/27/2018    Yovana Enriquez    MRN: [id number]           Patient Information     Date Of Birth          1975        Visit Information        Provider Department      3/27/2018 3:40 PM Inez Sawyer MD [facility]        Care Instructions    Dear Ruma,    As discussed, the plan will be:  --Continue Augmentin. If this is ineffective, we will appeal to your insurance company for rifaximin.   --Continue Miralax as needed  --Biofeedback therapy for pelvic floor dysfunction  --Maintain a low sugar and FODMAP diet     Return for follow up in 4 months    [place] [phone]          Follow-ups after your visit        Follow-up notes from your care team     Return in about 4 months (around 7/27/2018).      Your next 10 appointments already scheduled     Mar 29, 2018  2:45 PM CDT   Office Visit with Cephas Mawuena Agbeh, MD   [facility] ([facility])    [address] [address] [phone]           Bring a current list of meds and any records pertaining to this visit. For Physicals, please bring immunization records and any forms needing to be filled out. Please arrive 10 minutes early to complete paperwork.            Mar 31, 2018  9:45 AM CDT   Lab visit with AN LAB   [facility] ([facility])    [address] [address] [phone]           Please do not eat 10-12 hours before your appointment if you are coming in fasting for labs on lipids, cholesterol, or glucose (sugar). Does not apply to pregnant women.  Water with medications is okay. Do not drink coffee or other fluids.  If you have concerns about taking your medications, please send a message by clicking on Secure Messaging, Message Your Care Team.            Apr 02, 2018  1:50 PM CDT   (Arrive by 1:35 PM)   [facility] Only with Kareen Stoddard PT   [facility]  [place] [facility] ([facility] [place])    [address]  [address]  [place] [address]   [phone]            Apr 02, 2018  5:40 PM CDT   New Visit with Ruma Randolph OD   [facility]    [address] [facility] [address]   [phone]            Jun 04, 2018  6:00 PM CDT   LAB with [facility]   [facility]    [address] [place] [address]   [phone]           Please do not eat 10-12 hours before your appointment if you are coming in fasting for labs on lipids, cholesterol, or glucose (sugar). This does not apply to pregnant women. Water, hot tea and black coffee (with nothing added) are okay. Do not drink other fluids, diet soda or chew gum.            Jun 11, 2018 12:40 PM CDT   [facility] with Bradford Colvin MD   [facility]    [address] [place] [address]   [phone]            Jul 24, 2018  2:20 PM CDT   (Arrive by 2:05 PM)   Return Visit with Inez Sawyer MD   [facility] ([facility])    [address] [place] [address] [phone]              Who to contact     Please call your clinic at [phone] to:    Ask questions about your health    Make or cancel appointments    Discuss your medicines    Learn about your test results    Speak to your doctor            Additional Information About Your Visit        [facility] Information     [employer] gives you secure access to your electronic health record. If you see a primary care provider, you can also send messages to your care team and make appointments. If you have questions, please call your primary care clinic.  If you do not have a primary care provider, please call [phone] and they will assist you.      [employer] is an electronic gateway that provides easy, online access to your medical records.  "With MyChart, you can request a clinic appointment, read your test results, renew a prescription or communicate with your care team.     To access your existing account, please contact your [facility] or call [phone] for assistance.        Care EveryWhere ID     This is your Care EveryWhere ID. This could be used by other organizations to access your [facility] medical records  [id number]        Your Vitals Were     Height Pulse Oximetry BMI (Body Mass Index)             1.651 m (5' 5\") 100% 22.9 kg/m2          Blood Pressure from Last 3 Encounters:   02/05/18 124/84   02/05/18 140/80   02/02/18 113/79    Weight from Last 3 Encounters:   03/27/18 62.4 kg (137 lb 9.6 oz)   02/05/18 63.5 kg (140 lb)   02/05/18 63.8 kg (140 lb 9.6 oz)              Today, you had the following     No orders found for display       Primary Care Provider Office Phone # Fax #    Bradford Mario PA-C [phone]       [address] [facility] [address]        Equal Access to Services     [facility]: Hadii aad ku hadasho Soomaali, waaxda luqadaha, qaybta kaalmada adeegyada, waxay idiin haysylvestern yris scott . So [facility] [phone].    ATENCIÓN: Si habla español, tiene a dexter disposición servicios gratuitos de asistencia lingüística. Llame al [phone].    We comply with applicable federal civil rights laws and [place] laws. We do not discriminate on the basis of race, color, national origin, age, disability, sex, sexual orientation, or gender identity.            Thank you!     Thank you for choosing [facility]  for your care. Our goal is always to provide you with excellent care. Hearing back from our patients is one way we can continue to improve our services. Please take a few minutes to complete the written survey that you may receive in the mail after your visit with us. Thank you!             Your Updated Medication List - Protect others " around you: Learn how to safely use, store and throw away your medicines at www.disposemymeds.org.          This list is accurate as of 3/27/18  4:01 PM.  Always use your most recent med list.                   Brand Name Dispense Instructions for use Diagnosis    * albuterol 108 (90 BASE) MCG/ACT Inhaler    PROAIR HFA/PROVENTIL HFA/VENTOLIN HFA    1 Inhaler    Inhale 2 puffs into the lungs every 6 hours as needed for shortness of breath / dyspnea or wheezing    Acute bronchitis, unspecified organism       * albuterol 108 (90 BASE) MCG/ACT Inhaler    PROAIR HFA/PROVENTIL HFA/VENTOLIN HFA    1 Inhaler    Inhale 2 puffs into the lungs every 4 hours as needed for shortness of breath / dyspnea or wheezing Use with spacer    Wheezing       amLODIPine 5 MG tablet    NORVASC    90 tablet    Take 2 tablets (10 mg) by mouth daily    Raynaud's phenomenon without gangrene       amoxicillin-clavulanate 500-125 MG per tablet    AUGMENTIN    30 tablet    Take 1 tablet by mouth 3 times daily for 10 days    Small intestinal bacterial overgrowth       azaTHIOprine 50 MG tablet    IMURAN    270 tablet    Take 3 tablets (150 mg) by mouth daily    Other systemic lupus erythematosus with other organ involvement (H)       Belimumab 200 MG/ML Soaj     4 Syringe    Inject 200 mg Subcutaneous every 7 days . Hold for signs of infection and seek medical attention. Autoinjector    Other systemic lupus erythematosus with other organ involvement (H)       blood glucose monitoring test strip    no brand specified    1 Month    Used for testing glucose 2-3 times daily    History of gestational diabetes mellitus, not currently pregnant       buPROPion 75 MG tablet    WELLBUTRIN    180 tablet    TAKE 1 TABLET BY MOUTH 2 TIMES DAILY    Anxiety       Calcium + D3 600-200 MG-UNIT Tabs      1 tablet daily        cevimeline 30 MG capsule    EVOXAC    180 capsule    Take 1 capsule (30 mg) by mouth 2 times daily    Sjogren's syndrome with  keratoconjunctivitis sicca (H)       diclofenac 1 % Gel topical gel    VOLTAREN    100 g    Apply 2-4 grams to knees or shoulders four times daily as needed using enclosed dosing card.    Fibromyalgia, SLE (systemic lupus erythematosus) (H)       fluticasone 50 MCG/ACT spray    FLONASE    1 Bottle    Spray 1-2 sprays into both nostrils daily    Chronic rhinitis       hydroxychloroquine 200 MG tablet    PLAQUENIL    135 tablet    Hydroxychloroquine 200mg in the morning and 100mg in the evening.    Other systemic lupus erythematosus with other organ involvement (H)       losartan 25 MG tablet    COZAAR    90 tablet    TAKE 1 TABLET BY MOUTH DAILY    Hypertension goal BP (blood pressure) < 140/90       MICROLET LANCETS Misc     100 each    1 each daily.    Other abnormal glucose       MULTIVITAMIN PO      Take  by mouth.        nortriptyline 10 MG capsule    PAMELOR    90 capsule    TAKE 3 CAPSULES BY MOUTH AT BEDTIME    Numbness       predniSONE 2.5 MG tablet    DELTASONE    180 tablet    Take 1-2 tablets (2.5-5 mg) by mouth daily    Other systemic lupus erythematosus with other organ involvement (H)       pregabalin 150 MG capsule    LYRICA    270 capsule    Take 1 capsule (150 mg) by mouth 3 times daily . 3 month supply.    Fibromyalgia       ranitidine 300 MG tablet    ZANTAC    180 tablet    Take 1 tablet (300 mg) by mouth 2 times daily    Gastroesophageal reflux disease without esophagitis       spacer/aero-hold chamber mask Wendy     1 each    1 Adult size spacer to use with [facility] inhalers.    Wheezing       * Notice:  This list has 2 medication(s) that are the same as other medications prescribed for you. Read the directions carefully, and ask your doctor or other care provider to review them with you.

## 2018-03-27 NOTE — PATIENT INSTRUCTIONS
Dear Ruma,    As discussed, the plan will be:  --Continue Augmentin. If this is ineffective, we will appeal to your insurance company for rifaximin.   --Continue Miralax as needed  --Biofeedback therapy for pelvic floor dysfunction  --Maintain a low sugar and FODMAP diet     Return for follow up in 4 months    [place]alle[place]- [phone]

## 2018-03-27 NOTE — LETTER
"3/27/2018       RE: Yovana Enriquez  [address] [facility] [address]     Dear Colleague,    Thank you for referring your patient, Yovana Enriquez, to the [facility] GASTROENTEROLOGY AND IBD CLINIC at [facility]. Please see a copy of my visit note below.    GI CLINIC VISIT    CC/REFERRING MD:  Bradford Mario  REASON FOR CONSULTATION:   Bradford Mario for   Chief Complaint   Patient presents with     RECHECK     f/u       ASSESSMENT/PLAN:  Ms. Enriquez is a 43 yo woman presenting for follow up. During our last visit, we discussed chronic constipation.  She had a pelvic floor center evaluation that showed likely pelvic floor dysfunction and she is set to be seen for biofeedback therapy locally.  Of note, she has gas, bloating and abdominal pain. She had a positive HBT in the past and was treated with Flagyl and Keflex. Recently, she had a recurrence of these symptoms and was started on Augmentin.  She also cut out sugar from her diet and is reporting feeling well.     # SIBO, recurrent and persistent   # Possible IBS  ---Continue Augmentin to complete a 10 day course.   ---Continue low sugar diet, as discussed with Yadira Lopez (Nutritionist) today  ---If symptoms recur, consider rifaximin, low FODMAP diet.     # Constipation, outlet obstruction  ---Biofeedback therapy for pelvic floor dysfunction  ---Miralax, titrated to 1-2 well-formed BMs per day    RTC: Return in about 4 months (around 7/27/2018).     A total of 40 minutes, face to face, was spent with this patient, >50% of which was counseling regarding the above delineated issues.    Inez Sawyer MD   of Medicine  Division of Gastroenterology, Hepatology and Nutrition  [facility]    HPI  Ms. Enriquez is a 43 yo woman who is presenting to GI clinic for evaluation of \"IBS\", which was diagnosed about 2 years ago, per the patient.     At baseline, she reports having constipation " that was previously treated with Miralax. Over the past 2 years, has had worsening symptoms.  Reports central and lower abdominal pain, severe bloating, weakness, diarrhea.     Her bowel habits have a cyclical pattern as below:  Day #1: up to 10 loose (non-bloody) BMs times per day. Has severe central/lower abdominal pain and bloating.   Days #2-day #5 No BMs during this period. Second day: feels drained and weak. Around day 4/5, experiences abdominal pain and bloating.    Day #6-1 to 2 weeks: feels OK. Has 1 BM during entire period. Stool is hard with a sense of incomplete evacuation.     Added Benefiber 2 weeks ago, taking 2-3 times per day.  As a result, has had a BM once weekly (still come in spurts).    Endorses chills, fatigue; no fever.  Works as a day care worker.   Has been trying to follow FODMAP diet. Garlic, onion, gluten, dairy trigger episodes of abdominal pain/bloating.       Has tried Miralax, benefiber, dulcolax without regulation of stools.      No abdominal surgeries. Weight fluctuates. No personal distressers.Had 4 vaginal deliveries.     Interval history, 3/27/2018  Started Augmentin on Thursday for likely recurrent SIBO.   Cut out sugar from diet early last week.   Started to feel significantly better on Friday. Still has some weakness and occasional nausea.   Had a BM after Miralax in the setting of no stool x a few days.     ROS:    No fevers   No weight loss  No blurry vision, double vision or change in vision  No sore throat  No lymphadenopathy  No headache, paraesthesias, or weakness in a limb  No shortness of breath or wheezing  No chest pain or pressure  No arthralgias or myalgias  No rashes or skin changes  No odynophagia or dysphagia  No BRBPR, hematochezia, melena  No dysuria, frequency or urgency  No hot/cold intolerance or polyria    PROBLEM LIST  Patient Active Problem List    Diagnosis Date Noted     Fibromyalgia 11/15/2013     Priority: High     High risk medication use  09/13/2013     Priority: High     Sjogren's syndrome (H) 04/10/2017     Priority: Medium     Raynaud's phenomenon without gangrene 12/19/2016     Priority: Medium     Non-celiac gluten sensitivity 02/08/2016     Priority: Medium     Hypertension goal BP (blood pressure) < 140/90 01/19/2015     Priority: Medium     Health Care Home 03/19/2014     Priority: Medium     **See Letters for [facility] Care Plan: Emergency Care Plan         Irritable bowel syndrome 03/11/2014     Priority: Medium     Patient states no history colonoscopy         Chronic constipation 12/16/2013     Priority: Medium     Anxiety 04/10/2013     Priority: Medium     Subclinical hyperthyroidism 01/17/2013     Priority: Medium     Hyperlipidemia LDL goal <130 10/18/2012     Priority: Medium     Intramural leiomyoma of uterus 10/05/2012     Priority: Medium     Menorrhagia 10/01/2012     Priority: Medium     History of ovarian cyst 10/01/2012     Priority: Medium     Dysmenorrhea 10/01/2012     Priority: Medium     History of gestational diabetes mellitus, not currently pregnant 10/01/2012     Priority: Medium     Systemic lupus erythematosus (H) 04/23/2012     Priority: Medium     Positive antinuclear antibody, photosensitivity and synovitis         PERTINENT PAST MEDICAL HISTORY:  Past Medical History:   Diagnosis Date     Chronic constipation 12/16/2013     Dysmenorrhea 10/1/2012     Fibromyalgia 11/15/2013     Health Care Home 3/19/2014    **See Letters for HC Care Plan: Emergency Care Plan       High risk medication use 9/13/2013     History of gestational diabetes mellitus, not currently pregnant 10/1/2012     History of ovarian cyst 10/1/2012     Hyperlipidemia LDL goal <130 10/18/2012     Hypertension goal BP (blood pressure) < 140/90 1/19/2015     Intramural leiomyoma of uterus 10/5/2012     Irritable bowel syndrome 3/11/2014    Patient states no history colonoscopy       Menorrhagia 10/1/2012     Non-celiac gluten sensitivity 2/8/2016     PONV  (postoperative nausea and vomiting)      Subclinical hyperthyroidism 1/17/2013     Systemic lupus erythematosus (H) 4/23/2012       PREVIOUS SURGERIES:  Past Surgical History:   Procedure Laterality Date     COLONOSCOPY N/A 2/20/2015    Procedure: COMBINED COLONOSCOPY, SINGLE OR MULTIPLE BIOPSY/POLYPECTOMY BY BIOPSY;  Surgeon: Fred Cullen MD;  Location: [facility] OR     COLONOSCOPY WITH CO2 INSUFFLATION N/A 2/20/2015    Procedure: COLONOSCOPY WITH CO2 INSUFFLATION;  Surgeon: Fred Cullen MD;  Location: [facility] OR     ENT SURGERY  10-24-14    Bottom lip biopsies     ESOPHAGOSCOPY, GASTROSCOPY, DUODENOSCOPY (EGD), COMBINED N/A 2/20/2015    Procedure: COMBINED ESOPHAGOSCOPY, GASTROSCOPY, DUODENOSCOPY (EGD), BIOPSY SINGLE OR MULTIPLE;  Surgeon: Fred Cullen MD;  Location: [facility] OR     HC BREATH HYDROGEN TEST  12/31/2013    Procedure: HYDROGEN BREATH TEST;  Surgeon: Camden Almazan MD;  Location: [facility]     HC HYSTEROSCOPY, SURGICAL; W/ ENDOMETRIAL ABLATION, ANY METHOD  10-19-12     SHOULDER SURGERY Right     R shoulder     TUBAL LIGATION  2004       PREVIOUS ENDOSCOPY:  cscope 2/2015 (indication diarrhea) to TI showed rectal erythema, possible cecal compression. Bx showed non-specific mild chronic inflammation.     CT a/p 2/2015 was normal without evidence of kristina-cecal lesions other than small bowel in RLQ. There was fecal debris distending the colon from the cecum to prox sigmoid.    ALLERGIES:   No Known Allergies    PERTINENT MEDICATIONS:    Current Outpatient Prescriptions:      amoxicillin-clavulanate (AUGMENTIN) 500-125 MG per tablet, Take 1 tablet by mouth 3 times daily for 10 days, Disp: 30 tablet, Rfl: 0     Belimumab 200 MG/ML SOAJ, Inject 200 mg Subcutaneous every 7 days . Hold for signs of infection and seek medical attention. Autoinjector, Disp: 4 Syringe, Rfl: 3     hydroxychloroquine (PLAQUENIL) 200 MG tablet, Hydroxychloroquine 200mg in the morning and 100mg in the  evening., Disp: 135 tablet, Rfl: 1     pregabalin (LYRICA) 150 MG capsule, Take 1 capsule (150 mg) by mouth 3 times daily . 3 month supply., Disp: 270 capsule, Rfl: 0     predniSONE (DELTASONE) 2.5 MG tablet, Take 1-2 tablets (2.5-5 mg) by mouth daily, Disp: 180 tablet, Rfl: 1     azaTHIOprine (IMURAN) 50 MG tablet, Take 3 tablets (150 mg) by mouth daily, Disp: 270 tablet, Rfl: 1     cevimeline (EVOXAC) 30 MG capsule, Take 1 capsule (30 mg) by mouth 2 times daily, Disp: 180 capsule, Rfl: 3     amLODIPine (NORVASC) 5 MG tablet, Take 2 tablets (10 mg) by mouth daily, Disp: 90 tablet, Rfl: 1     buPROPion (WELLBUTRIN) 75 MG tablet, TAKE 1 TABLET BY MOUTH 2 TIMES DAILY, Disp: 180 tablet, Rfl: 1     nortriptyline (PAMELOR) 10 MG capsule, TAKE 3 CAPSULES BY MOUTH AT BEDTIME, Disp: 90 capsule, Rfl: 1     albuterol (PROAIR HFA/PROVENTIL HFA/VENTOLIN HFA) 108 (90 BASE) MCG/ACT Inhaler, Inhale 2 puffs into the lungs every 4 hours as needed for shortness of breath / dyspnea or wheezing Use with spacer, Disp: 1 Inhaler, Rfl: 0     Spacer/Aero-Holding Chambers (SPACER/AERO-HOLD CHAMBER MASK) EDITH, 1 Adult size spacer to use with MDI inhalers., Disp: 1 each, Rfl: 0     losartan (COZAAR) 25 MG tablet, TAKE 1 TABLET BY MOUTH DAILY, Disp: 90 tablet, Rfl: 1     albuterol (PROAIR HFA/PROVENTIL HFA/VENTOLIN HFA) 108 (90 BASE) MCG/ACT Inhaler, Inhale 2 puffs into the lungs every 6 hours as needed for shortness of breath / dyspnea or wheezing, Disp: 1 Inhaler, Rfl: 1     ranitidine (ZANTAC) 300 MG tablet, Take 1 tablet (300 mg) by mouth 2 times daily, Disp: 180 tablet, Rfl: 3     fluticasone (FLONASE) 50 MCG/ACT nasal spray, Spray 1-2 sprays into both nostrils daily, Disp: 1 Bottle, Rfl: 11     diclofenac (VOLTAREN) 1 % GEL, Apply 2-4 grams to knees or shoulders four times daily as needed using enclosed dosing card., Disp: 100 g, Rfl: 4     blood glucose test strip, Used for testing glucose 2-3 times daily, Disp: 1 Month, Rfl: 5      "Calcium Carb-Cholecalciferol (CALCIUM + D3) 600-200 MG-UNIT TABS, 1 tablet daily, Disp: , Rfl:      MICROLET LANCETS MISC, 1 each daily., Disp: 100 each, Rfl: 2     Multiple Vitamin (MULTIVITAMIN OR), Take  by mouth., Disp: , Rfl:     SOCIAL HISTORY:  Social History     Social History     Marital status:      Spouse name: Brian     Number of children: 4     Years of education: 12     Occupational History      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     Social History Main Topics     Smoking status: Never Smoker     Smokeless tobacco: Never Used      Comment: Lives in smoke free household     Alcohol use No     Drug use: No     Sexual activity: Yes     Partners: Male     Birth control/ protection: Surgical      Comment: tubal     Other Topics Concern     Not on file     Social History Narrative       FAMILY HISTORY:  Family History   Problem Relation Age of Onset     Respiratory Maternal Grandfather      CANCER Maternal Grandfather      Asthma Son      Circulatory Maternal Grandmother      Brain anurysm     Hypertension Mother      Glaucoma Mother 50     drops     Hypertension Sister      Hypertension Sister      DIABETES No family hx of      CEREBROVASCULAR DISEASE No family hx of      Thyroid Disease No family hx of      Macular Degeneration No family hx of        Past/family/social history reviewed and no changes    PHYSICAL EXAMINATION:  Constitutional: aaox3, cooperative, pleasant, not dyspneic/diaphoretic, no acute distress  Vitals reviewed: Ht 1.651 m (5' 5\")  Wt 62.4 kg (137 lb 9.6 oz)  SpO2 100%  BMI 22.9 kg/m2  Wt:   Wt Readings from Last 2 Encounters:   03/27/18 62.4 kg (137 lb 9.6 oz)   02/05/18 63.5 kg (140 lb)      Eyes: Sclera anicteric/injected  Ears/nose/mouth/throat: Normal oropharynx without ulcers or exudate, mucus membranes moist, hearing intact  Neck: supple, thyroid normal size  CV: RRR, no murmurs. No edema  Respiratory: CTA bl. Unlabored breathing  Lymph: No axillary, submandibular, " supraclavicular or inguinal lymphadenopathy  Abd: soft, nondistended, +bs, no hepatosplenomegaly; NTTP, no peritoneal signs  Skin: warm, perfused, no jaundice. Pressure type pain in the lower abdomen. No RT or IG.   Psych: Normal affect  MSK: Normal gait    PERTINENT STUDIES:    HBT on 12/31/2013 positive     Orders Only on 10/02/2017   Component Date Value Ref Range Status     WBC 10/02/2017 6.6  4.0 - 11.0 10e9/L Final     RBC Count 10/02/2017 3.72* 3.8 - 5.2 10e12/L Final     Hemoglobin 10/02/2017 13.0  11.7 - 15.7 g/dL Final     Hematocrit 10/02/2017 37.8  35.0 - 47.0 % Final     MCV 10/02/2017 102* 78 - 100 fl Final     MCH 10/02/2017 34.9* 26.5 - 33.0 pg Final     MCHC 10/02/2017 34.4  31.5 - 36.5 g/dL Final     RDW 10/02/2017 11.8  10.0 - 15.0 % Final     Platelet Count 10/02/2017 239  150 - 450 10e9/L Final     Diff Method 10/02/2017 Automated Method   Final     % Neutrophils 10/02/2017 78.5  % Final     % Lymphocytes 10/02/2017 13.4  % Final     % Monocytes 10/02/2017 7.3  % Final     % Eosinophils 10/02/2017 0.3  % Final     % Basophils 10/02/2017 0.5  % Final     Absolute Neutrophil 10/02/2017 5.2  1.6 - 8.3 10e9/L Final     Absolute Lymphocytes 10/02/2017 0.9  0.8 - 5.3 10e9/L Final     Absolute Monocytes 10/02/2017 0.5  0.0 - 1.3 10e9/L Final     Absolute Eosinophils 10/02/2017 0.0  0.0 - 0.7 10e9/L Final     Absolute Basophils 10/02/2017 0.0  0.0 - 0.2 10e9/L Final     CK Total 10/02/2017 134  30 - 225 U/L Final     Complement C3 10/02/2017 75* 76 - 169 mg/dL Final     DNA-ds 10/02/2017 <1  <10 IU/mL Final     CRP Inflammation 10/02/2017 <2.9  0.0 - 8.0 mg/L Final     Sodium 10/02/2017 138  133 - 144 mmol/L Final     Potassium 10/02/2017 3.7  3.4 - 5.3 mmol/L Final     Chloride 10/02/2017 103  94 - 109 mmol/L Final     Carbon Dioxide 10/02/2017 27  20 - 32 mmol/L Final     Anion Gap 10/02/2017 8  3 - 14 mmol/L Final     Glucose 10/02/2017 112* 70 - 99 mg/dL Final     Urea Nitrogen 10/02/2017 10  7 - 30  mg/dL Final     Creatinine 10/02/2017 0.88  0.52 - 1.04 mg/dL Final     GFR Estimate 10/02/2017 70  >60 mL/min/1.7m2 Final     GFR Estimate If Black 10/02/2017 84  >60 mL/min/1.7m2 Final     Calcium 10/02/2017 8.7  8.5 - 10.1 mg/dL Final     Bilirubin Total 10/02/2017 0.2  0.2 - 1.3 mg/dL Final     Albumin 10/02/2017 3.8  3.4 - 5.0 g/dL Final     Protein Total 10/02/2017 6.8  6.8 - 8.8 g/dL Final     Alkaline Phosphatase 10/02/2017 60  40 - 150 U/L Final     ALT 10/02/2017 21  0 - 50 U/L Final     AST 10/02/2017 22  0 - 45 U/L Final     Complement C4 10/02/2017 15  15 - 50 mg/dL Final     Sed Rate 10/02/2017 4  0 - 20 mm/h Final     Protein Random Urine 10/02/2017 0.07  g/L Final     Protein Total Urine g/gr Creatinine 10/02/2017 0.14  0 - 0.2 g/g Cr Final     Color Urine 10/02/2017 Yellow   Final     Appearance Urine 10/02/2017 Clear   Final     Glucose Urine 10/02/2017 Negative  NEG^Negative mg/dL Final     Bilirubin Urine 10/02/2017 Negative  NEG^Negative Final     Ketones Urine 10/02/2017 Negative  NEG^Negative mg/dL Final     Specific Gravity Urine 10/02/2017 1.010  1.003 - 1.035 Final     pH Urine 10/02/2017 6.0  5.0 - 7.0 pH Final     Protein Albumin Urine 10/02/2017 Negative  NEG^Negative mg/dL Final     Urobilinogen Urine 10/02/2017 0.2  0.2 - 1.0 EU/dL Final     Nitrite Urine 10/02/2017 Negative  NEG^Negative Final     Blood Urine 10/02/2017 Negative  NEG^Negative Final     Leukocyte Esterase Urine 10/02/2017 Negative  NEG^Negative Final     Source 10/02/2017 Midstream Urine   Final     WBC Urine 10/02/2017 O - 2  OTO2^O - 2 /HPF Final     RBC Urine 10/02/2017 O - 2  OTO2^O - 2 /HPF Final     Squamous Epithelial /LPF Urine 10/02/2017 Few  FEW^Few /LPF Final     Vitamin D Deficiency screening 10/02/2017 37  20 - 75 ug/L Final     Creatinine Urine 10/02/2017 48  mg/dL Final       Again, thank you for allowing me to participate in the care of your patient.      Sincerely,    Inez Sawyer MD

## 2018-03-27 NOTE — PATIENT INSTRUCTIONS
It was nice meeting you today:  -continue to avoid concentrated sweets/sugar/added sugars  -Continue eating 3 balanced meals per day. As you are continuing to feel better then slowly add in more healthy complex carbohydrate foods 1-2 serving every few days as tolerated.  -Continue eating lean protein sources at all meals and continue eating non starchy vegetables.  -Can restart multivitamin and calcium unsweetened pills  -Can start over the counter probiotic such as culturelle if desired.  If you would like to schedule a follow up appointment with Yadira Lopez Registered Dietitian, please call [phone].

## 2018-03-27 NOTE — LETTER
"3/27/2018       RE: Yovana Enriquez  [address]  [place] [address]     Dear Colleague,    Thank you for referring your patient, Yovana Enriquez, to the [facility] GASTROENTEROLOGY AND IBD CLINIC at [facility]. Please see a copy of my visit note below.    [facility] Outpatient Medical Nutrition Therapy      Time Spent:  60 minutes  Session Type:  Initial Individual Session  Referring Physician:  Dr. Inez Sawyer  Reason for RD Visit:   Nutritional counseling, IBS and recurrent SIBO.     Nutrition Assessment:  Patient is here for initial visit with Registered Dietitian (RD).  Patient is a 42 y.o. female with history of IBS, lupus, Sjogren syndrome, Raynaud's, fibromyalgia, constipation, pelvic floor dysfunction, hx gestational diabetes and SIBO. Pt c/o increasing issues with abdominal cramping, nausea, \"mud\" like stools over the past month prior to starting an antibiotic last week. Pt stated that when she has these \"bouts of sickness\", she can't eat and if tries will vomit. Pt stated that over the last month has had bouts of nausea and vomiting that last for 5-6 days and then afterwards feels weak, tired, stomach pains, severe headaches. 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 Will only have 1-2 days of reprieve where she could eat but then the bouts would start again. During those 5-6 days she could only eat/drink: 7 oz of 7 up, jello, 10 oz gatorade, 16.9 oz water and popsicles, olives or deli turkey. Pt estimated that she was eating ~500 calories per day. Pt stated that she has been having these bout for several years but prior to this past month would only have these issues ~1x/month. Progressively these bouts increased.  Patient reported that she had SIBO 5 years ago. MD start patient on an antibiotic last week, and pt discontinued most concentrated/added sugars at that time as well and " "reported feeling much better and able to eat meals regularly now.     Pt stated that in the past she had met with an RD and followed a lower FODMAP diet with elimination and challenge phases of adding foods back into diet.  Pt reported that she strictly follows a dairy/gluten free diet + does not tolerate garlic and onions and many tomatoes/tomato products. Cut out sugar last week. Reported that she previously was eating many foods containing sugar/added sugars such as GF cookies and brownies and waffles with syrup, sweetened vitamin/mineral supplements. Has not been eating any GF bread since starting the antibiotics as well. Pt typically uses lactose free milk but discontinued when she was having a lot of GI issues so uses almond milk and interested in trying lactose free milk again.     Diet Recall:  (Yesterday OR intake previously on days when she was feeling better and could eat):  Meal Food    Breakfast 2 waffles with peanut butter OR previously GF waffle with syrup OR dried cereal   Lunch Turkey Lettuce wrap with olives and orange OR, deli turkey and rodriguez OR Turkey sandwich on GF bread with chips and dessert (GF cookies)   Dinner 2 eggs and giles and cooked cauliflower OR GF pasta with butter OR chicken/beef and potatoes and green peas/carrots   Snacks 2 rice cakes with peanut butter and orange OR Dried cereal OR GF brownies OR candy OR oranges/grpes   Beverages 64 oz Water, coffee with creamer, grape juice cocktail   Alcohol Intake none     Height:   Ht Readings from Last 1 Encounters:   03/27/18 1.651 m (5' 5\")     Weight:    Wt Readings from Last 10 Encounters:   03/27/18 62.4 kg (137 lb 9.6 oz)   02/05/18 63.5 kg (140 lb)   02/05/18 63.8 kg (140 lb 9.6 oz)   02/02/18 63.7 kg (140 lb 6.4 oz)   01/28/18 62.8 kg (138 lb 6 oz)   01/05/18 63.2 kg (139 lb 4.8 oz)   01/03/18 62.6 kg (138 lb)   12/08/17 60.7 kg (133 lb 12.8 oz)   11/10/17 61.4 kg (135 lb 6.4 oz)   11/06/17 62.1 kg (137 lb)      BMI: 23.35 " healthy     Labs:  Reviewed EMR  Pertinent Medications/vitamin and mineral supplements: Augmentin. Pt reported that she previously took chewable MVI, and calcium but not taking currently taking due to sweetened supplements and previously had few days where she could tolerated much PO so was afraid to take. Is planning to restart unsweetened pill again.    Food Allergies:  NKFA. Pt reported gluten and lactose intolerance.  Estimated Nutrition Needs based on current body weight of 62k calories (30 kcals/kg), 62g protein (1g/kg).    MALNUTRITION:  % Weight Loss:  Weight loss does not meet criteria for malnutrition   % Intake:  </= 75% for >/= 1 month (severe malnutrition)  Subcutaneous Fat Loss:  None observed  Muscle Loss:  None observed  Fluid Retention:  None noted    Malnutrition Diagnosis: Patient does not meet two of the above criteria necessary for diagnosing malnutrition  In Context of:  Acute illness or injury    Nutrition Diagnosis:    Altered GI function related to nausea, vomiting, diarrhea, decreased appetite and intake and possible SIBO  as evidenced by pt report of having had an increase in these issues over the past month for periods of 5-6 days with only 1-2 days reprieve.    Nutrition Prescription:    Nutrition Intervention:    Nutrition Education/Counseling:  Reviewed diet guidelines for small bowel bacterial overgrowth. Explained recommendation to avoid concentrated sugar/added sugars. Discussed foods that may cause issues such as carbohydrates and fibers and reviewed examples of these foods. Since pt discontinued foods/most foods with added sugar and limiting/has decreased some breads and starches and has reported an improvement in symptoms and now able to eat over the past 4 days, recommended continuing to avoid concentrated sugars/added sugars and continue eating healthy balanced meals. Discussed tips for slowly adding in additional fiber/higher fiber foods 1-2 servings of these foods at  a time to see if tolerated. recommended pt switch her vitamin/mineral supplements and discontinue sweetened chewable supplements and switch to unsweetened ones/pill. Told pt she could take daily over the counter probiotic if desired. Gave patient SIBO diet education handout. Since pt had previously followed low FODMAP diet with elimination and challenge phases, briefly discussed some higher FODMAP foods and diet recommendations but pt with good previous knowledge of this diet. Answered pt's questions, and no further questions at this time. Patient verbalized understanding of education provided. See Goals below.     Educational Materials Provided:  SIBO diet education handout and list of goals and also provided RD contact info.    Goals:  -Continue to avoid concentrated sweets/sugar/added sugars  -Continue eating 3 balanced meals per day. As you are continuing to feel better then slowly add in more healthy complex carbohydrate foods, 1-2 serving every few days as tolerated.  -Continue eating lean protein sources at all meals and continue eating non starchy vegetables.  -Can restart multivitamin and calcium but switch to unsweetened pills  -Can start over the counter probiotic, if desired.    Nutrition Monitoring and Evaluation: Will monitor adherence to nutrition recommendations at any future RD visits.     Further Medical Nutrition Therapy:  PRN  Next Appointment (if applicable):  PRN  Patient was encouraged to call/contact RD with any further questions.    Again, thank you for allowing me to participate in the care of your patient.      Sincerely,    Yadira Lopez RD

## 2018-03-27 NOTE — MR AVS SNAPSHOT
After Visit Summary   3/27/2018    Yovana Enriquez    MRN: [id number]           Patient Information     Date Of Birth          1975        Visit Information        Provider Department      3/27/2018 2:30 PM Yadira Lopez RD [facility]        Care Instructions    It was nice meeting you today:  -continue to avoid concentrated sweets/sugar/added sugars  -Continue eating 3 balanced meals per day. As you are continuing to feel better then slowly add in more healthy complex carbohydrate foods 1-2 serving every few days as tolerated.  -Continue eating lean protein sources at all meals and continue eating non starchy vegetables.  -Can restart multivitamin and calcium unsweetened pills  -Can start over the counter probiotic such as culturelle if desired.  If you would like to schedule a follow up appointment with Yadira Lopez, Registered Dietitian, please call [phone].            Follow-ups after your visit        Your next 10 appointments already scheduled     Mar 27, 2018  3:40 PM CDT   (Arrive by 3:25 PM)   Return Visit with Inez Sawyer MD   [facility] ([facility])    [address] [address] [phone]            Mar 29, 2018  2:45 PM CDT   Office Visit with Cephas Mawuena Agbeh, MD   [facility] ([facility])    [address] [address] [phone]           Bring a current list of meds and any records pertaining to this visit. For Physicals, please bring immunization records and any forms needing to be filled out. Please arrive 10 minutes early to complete paperwork.            Mar 31, 2018  9:45 AM CDT   Lab visit with AN LAB   [facility] ([facility])    [address] [address] [phone]           Please do not eat 10-12 hours before your appointment if you  are coming in fasting for labs on lipids, cholesterol, or glucose (sugar). Does not apply to pregnant women.  Water with medications is okay. Do not drink coffee or other fluids.  If you have concerns about taking your medications, please send a message by clicking on Secure Messaging, Message Your Care Team.            Apr 02, 2018  1:50 PM CDT   (Arrive by 1:35 PM)   [facility] For Women Only with Kareen Stoddard PT   [facility] [place] [facility]    [address]  [place] [address]   [phone]            Apr 02, 2018  5:40 PM CDT   New Visit with Ruma Randolph OD   [facility])    [address] [address]   [phone]            Jun 04, 2018  6:00 PM CDT   LAB with BE LAB   [facility] ([facility])    [address] [address]   [phone]           Please do not eat 10-12 hours before your appointment if you are coming in fasting for labs on lipids, cholesterol, or glucose (sugar). This does not apply to pregnant women. Water, hot tea and black coffee (with nothing added) are okay. Do not drink other fluids, diet soda or chew gum.            Jun 11, 2018 12:40 PM CDT   Return Visit with Bradford Colvin MD   [facility] ([facility])    [address] [address] [address]   [phone]              Who to contact     Please call your clinic at [phone] to:    Ask questions about your health    Make or cancel appointments    Discuss your medicines    Learn about your test results    Speak to your doctor            Additional Information About Your Visit        [employer] Information     [facility] gives you secure access to your electronic health record. If you see a primary care provider, you can also send messages to your care team and make appointments. If you have questions, please call your primary care clinic.   If you do not have a primary care provider, please call [phone] and they will assist you.      [employer] is an electronic gateway that provides easy, online access to your medical records. With [employer], you can request a clinic appointment, read your test results, renew a prescription or communicate with your care team.     To access your existing account, please contact your [facility] or call [phone] for assistance.        Care EveryWhere ID     This is your Care EveryWhere ID. This could be used by other organizations to access your [place] medical records  [id number]         Blood Pressure from Last 3 Encounters:   02/05/18 124/84   02/05/18 140/80   02/02/18 113/79    Weight from Last 3 Encounters:   02/05/18 63.5 kg (140 lb)   02/05/18 63.8 kg (140 lb 9.6 oz)   02/02/18 63.7 kg (140 lb 6.4 oz)              Today, you had the following     No orders found for display       Primary Care Provider Office Phone # Fax #    Bradford Mario PA-C [phone]       [address] [facility] [address]        Equal Access to Services     MARTIN HURD : Hadii aad ku hadasho Soomaali, waaxda luqadaha, qaybta kaalmada adeegyada, waxay odalysin haysylvestern yris [place]. So [facility] [phone].    ATENCIÓN: Si habla español, tiene a dexter disposición servicios gratuitos de asistencia lingüística. Cee al [phone].    We comply with applicable federal civil rights laws and [place] laws. We do not discriminate on the basis of race, color, national origin, age, disability, sex, sexual orientation, or gender identity.            Thank you!     Thank you for choosing [facility]  for your care. Our goal is always to provide you with excellent care. Hearing back from our patients is one way we can continue to improve our services. Please take a few minutes to complete the written survey that you may receive in the mail after your visit with  us. Thank you!             Your Updated Medication List - Protect others around you: Learn how to safely use, store and throw away your medicines at www.disposemymeds.org.          This list is accurate as of 3/27/18  3:24 PM.  Always use your most recent med list.                   Brand Name Dispense Instructions for use Diagnosis    * albuterol 108 (90 BASE) MCG/ACT Inhaler    PROAIR HFA/PROVENTIL HFA/VENTOLIN HFA    1 Inhaler    Inhale 2 puffs into the lungs every 6 hours as needed for shortness of breath / dyspnea or wheezing    Acute bronchitis, unspecified organism       * albuterol 108 (90 BASE) MCG/ACT Inhaler    PROAIR HFA/PROVENTIL HFA/VENTOLIN HFA    1 Inhaler    Inhale 2 puffs into the lungs every 4 hours as needed for shortness of breath / dyspnea or wheezing Use with spacer    Wheezing       amLODIPine 5 MG tablet    NORVASC    90 tablet    Take 2 tablets (10 mg) by mouth daily    Raynaud's phenomenon without gangrene       amoxicillin-clavulanate 500-125 MG per tablet    AUGMENTIN    30 tablet    Take 1 tablet by mouth 3 times daily for 10 days    Small intestinal bacterial overgrowth       azaTHIOprine 50 MG tablet    IMURAN    270 tablet    Take 3 tablets (150 mg) by mouth daily    Other systemic lupus erythematosus with other organ involvement (H)       Belimumab 200 MG/ML Soaj     4 Syringe    Inject 200 mg Subcutaneous every 7 days . Hold for signs of infection and seek medical attention. Autoinjector    Other systemic lupus erythematosus with other organ involvement (H)       blood glucose monitoring test strip    no brand specified    1 Month    Used for testing glucose 2-3 times daily    History of gestational diabetes mellitus, not currently pregnant       buPROPion 75 MG tablet    WELLBUTRIN    180 tablet    TAKE 1 TABLET BY MOUTH 2 TIMES DAILY    Anxiety       Calcium + D3 600-200 MG-UNIT Tabs      1 tablet daily        cevimeline 30 MG capsule    EVOXAC    180 capsule    Take 1 capsule  (30 mg) by mouth 2 times daily    Sjogren's syndrome with keratoconjunctivitis sicca (H)       diclofenac 1 % Gel topical gel    VOLTAREN    100 g    Apply 2-4 grams to knees or shoulders four times daily as needed using enclosed dosing card.    Fibromyalgia, SLE (systemic lupus erythematosus) (H)       fluticasone 50 MCG/ACT spray    FLONASE    1 Bottle    Spray 1-2 sprays into both nostrils daily    Chronic rhinitis       hydroxychloroquine 200 MG tablet    PLAQUENIL    135 tablet    Hydroxychloroquine 200mg in the morning and 100mg in the evening.    Other systemic lupus erythematosus with other organ involvement (H)       losartan 25 MG tablet    COZAAR    90 tablet    TAKE 1 TABLET BY MOUTH DAILY    Hypertension goal BP (blood pressure) < 140/90       MICROLET LANCETS Misc     100 each    1 each daily.    Other abnormal glucose       MULTIVITAMIN PO      Take  by mouth.        nortriptyline 10 MG capsule    PAMELOR    90 capsule    TAKE 3 CAPSULES BY MOUTH AT BEDTIME    Numbness       predniSONE 2.5 MG tablet    DELTASONE    180 tablet    Take 1-2 tablets (2.5-5 mg) by mouth daily    Other systemic lupus erythematosus with other organ involvement (H)       pregabalin 150 MG capsule    LYRICA    270 capsule    Take 1 capsule (150 mg) by mouth 3 times daily . 3 month supply.    Fibromyalgia       ranitidine 300 MG tablet    ZANTAC    180 tablet    Take 1 tablet (300 mg) by mouth 2 times daily    Gastroesophageal reflux disease without esophagitis       spacer/aero-hold chamber mask Wendy     1 each    1 Adult size spacer to use with [facility]I inhalers.    Wheezing       * Notice:  This list has 2 medication(s) that are the same as other medications prescribed for you. Read the directions carefully, and ask your doctor or other care provider to review them with you.

## 2018-03-27 NOTE — PROGRESS NOTES
"GI CLINIC VISIT    CC/REFERRING MD:  Bradford Mario  REASON FOR CONSULTATION:   Bradford Mario for   Chief Complaint   Patient presents with     RECHECK     f/u       ASSESSMENT/PLAN:  Ms. Enriquez is a 41 yo woman presenting for follow up. During our last visit, we discussed chronic constipation.  She had a pelvic floor center evaluation that showed likely pelvic floor dysfunction and she is set to be seen for biofeedback therapy locally.  Of note, she has gas, bloating and abdominal pain. She had a positive HBT in the past and was treated with Flagyl and Keflex. Recently, she had a recurrence of these symptoms and was started on Augmentin.  She also cut out sugar from her diet and is reporting feeling well.     # SIBO, recurrent and persistent   # Possible IBS  ---Continue Augmentin to complete a 10 day course.   ---Continue low sugar diet, as discussed with Yadira Lopez (Nutritionist) today  ---If symptoms recur, consider rifaximin, low FODMAP diet.     # Constipation, outlet obstruction  ---Biofeedback therapy for pelvic floor dysfunction  ---Miralax, titrated to 1-2 well-formed BMs per day    RTC: Return in about 4 months (around 7/27/2018).     A total of 40 minutes, face to face, was spent with this patient, >50% of which was counseling regarding the above delineated issues.    Inez Sawyer MD   of Medicine  Division of Gastroenterology, Hepatology and Nutrition  [facility]    HPI  Ms. Enriquez is a 41 yo woman who is presenting to GI clinic for evaluation of \"IBS\", which was diagnosed about 2 years ago, per the patient.     At baseline, she reports having constipation that was previously treated with Miralax. Over the past 2 years, has had worsening symptoms.  Reports central and lower abdominal pain, severe bloating, weakness, diarrhea.     Her bowel habits have a cyclical pattern as below:  Day #1: up to 10 loose (non-bloody) BMs times per day. Has severe central/lower " abdominal pain and bloating.   Days #2-day #5 No BMs during this period. Second day: feels drained and weak. Around day 4/5, experiences abdominal pain and bloating.    Day #6-1 to 2 weeks: feels OK. Has 1 BM during entire period. Stool is hard with a sense of incomplete evacuation.     Added Benefiber 2 weeks ago, taking 2-3 times per day.  As a result, has had a BM once weekly (still come in spurts).    Endorses chills, fatigue; no fever.  Works as a day care worker.   Has been trying to follow FODMAP diet. Garlic, onion, gluten, dairy trigger episodes of abdominal pain/bloating.       Has tried Miralax, benefiber, dulcolax without regulation of stools.      No abdominal surgeries. Weight fluctuates. No personal distressers.Had 4 vaginal deliveries.     Interval history, 3/27/2018  Started Augmentin on Thursday for likely recurrent SIBO.   Cut out sugar from diet early last week.   Started to feel significantly better on Friday. Still has some weakness and occasional nausea.   Had a BM after Miralax in the setting of no stool x a few days.     ROS:    No fevers   No weight loss  No blurry vision, double vision or change in vision  No sore throat  No lymphadenopathy  No headache, paraesthesias, or weakness in a limb  No shortness of breath or wheezing  No chest pain or pressure  No arthralgias or myalgias  No rashes or skin changes  No odynophagia or dysphagia  No BRBPR, hematochezia, melena  No dysuria, frequency or urgency  No hot/cold intolerance or polyria    PROBLEM LIST  Patient Active Problem List    Diagnosis Date Noted     Fibromyalgia 11/15/2013     Priority: High     High risk medication use 09/13/2013     Priority: High     Sjogren's syndrome (H) 04/10/2017     Priority: Medium     Raynaud's phenomenon without gangrene 12/19/2016     Priority: Medium     Non-celiac gluten sensitivity 02/08/2016     Priority: Medium     Hypertension goal BP (blood pressure) < 140/90 01/19/2015     Priority: Medium      Health Care Home 03/19/2014     Priority: Medium     **See Letters for HC Care Plan: Emergency Care Plan         Irritable bowel syndrome 03/11/2014     Priority: Medium     Patient states no history colonoscopy         Chronic constipation 12/16/2013     Priority: Medium     Anxiety 04/10/2013     Priority: Medium     Subclinical hyperthyroidism 01/17/2013     Priority: Medium     Hyperlipidemia LDL goal <130 10/18/2012     Priority: Medium     Intramural leiomyoma of uterus 10/05/2012     Priority: Medium     Menorrhagia 10/01/2012     Priority: Medium     History of ovarian cyst 10/01/2012     Priority: Medium     Dysmenorrhea 10/01/2012     Priority: Medium     History of gestational diabetes mellitus, not currently pregnant 10/01/2012     Priority: Medium     Systemic lupus erythematosus (H) 04/23/2012     Priority: Medium     Positive antinuclear antibody, photosensitivity and synovitis         PERTINENT PAST MEDICAL HISTORY:  Past Medical History:   Diagnosis Date     Chronic constipation 12/16/2013     Dysmenorrhea 10/1/2012     Fibromyalgia 11/15/2013     Health Care Home 3/19/2014    **See Letters for HC Care Plan: Emergency Care Plan       High risk medication use 9/13/2013     History of gestational diabetes mellitus, not currently pregnant 10/1/2012     History of ovarian cyst 10/1/2012     Hyperlipidemia LDL goal <130 10/18/2012     Hypertension goal BP (blood pressure) < 140/90 1/19/2015     Intramural leiomyoma of uterus 10/5/2012     Irritable bowel syndrome 3/11/2014    Patient states no history colonoscopy       Menorrhagia 10/1/2012     Non-celiac gluten sensitivity 2/8/2016     PONV (postoperative nausea and vomiting)      Subclinical hyperthyroidism 1/17/2013     Systemic lupus erythematosus (H) 4/23/2012       PREVIOUS SURGERIES:  Past Surgical History:   Procedure Laterality Date     COLONOSCOPY N/A 2/20/2015    Procedure: COMBINED COLONOSCOPY, SINGLE OR MULTIPLE BIOPSY/POLYPECTOMY BY  BIOPSY;  Surgeon: Fred Cullen MD;  Location: [facility] OR     COLONOSCOPY WITH CO2 INSUFFLATION N/A 2/20/2015    Procedure: COLONOSCOPY WITH CO2 INSUFFLATION;  Surgeon: Fred Cullen MD;  Location: [facility] OR     ENT SURGERY  10-24-14    Bottom lip biopsies     ESOPHAGOSCOPY, GASTROSCOPY, DUODENOSCOPY (EGD), COMBINED N/A 2/20/2015    Procedure: COMBINED ESOPHAGOSCOPY, GASTROSCOPY, DUODENOSCOPY (EGD), BIOPSY SINGLE OR MULTIPLE;  Surgeon: Fred Cullen MD;  Location: [facility] OR     HC BREATH HYDROGEN TEST  12/31/2013    Procedure: HYDROGEN BREATH TEST;  Surgeon: Camden Almazan MD;  Location: [facility]     HC HYSTEROSCOPY, SURGICAL; W/ ENDOMETRIAL ABLATION, ANY METHOD  10-19-12     SHOULDER SURGERY Right     R shoulder     TUBAL LIGATION  2004       PREVIOUS ENDOSCOPY:  cscope 2/2015 (indication diarrhea) to TI showed rectal erythema, possible cecal compression. Bx showed non-specific mild chronic inflammation.     CT a/p 2/2015 was normal without evidence of kristina-cecal lesions other than small bowel in RLQ. There was fecal debris distending the colon from the cecum to prox sigmoid.    ALLERGIES:   No Known Allergies    PERTINENT MEDICATIONS:    Current Outpatient Prescriptions:      amoxicillin-clavulanate (AUGMENTIN) 500-125 MG per tablet, Take 1 tablet by mouth 3 times daily for 10 days, Disp: 30 tablet, Rfl: 0     Belimumab 200 MG/ML SOAJ, Inject 200 mg Subcutaneous every 7 days . Hold for signs of infection and seek medical attention. Autoinjector, Disp: 4 Syringe, Rfl: 3     hydroxychloroquine (PLAQUENIL) 200 MG tablet, Hydroxychloroquine 200mg in the morning and 100mg in the evening., Disp: 135 tablet, Rfl: 1     pregabalin (LYRICA) 150 MG capsule, Take 1 capsule (150 mg) by mouth 3 times daily . 3 month supply., Disp: 270 capsule, Rfl: 0     predniSONE (DELTASONE) 2.5 MG tablet, Take 1-2 tablets (2.5-5 mg) by mouth daily, Disp: 180 tablet, Rfl: 1     azaTHIOprine (IMURAN) 50 MG tablet,  Take 3 tablets (150 mg) by mouth daily, Disp: 270 tablet, Rfl: 1     cevimeline (EVOXAC) 30 MG capsule, Take 1 capsule (30 mg) by mouth 2 times daily, Disp: 180 capsule, Rfl: 3     amLODIPine (NORVASC) 5 MG tablet, Take 2 tablets (10 mg) by mouth daily, Disp: 90 tablet, Rfl: 1     buPROPion (WELLBUTRIN) 75 MG tablet, TAKE 1 TABLET BY MOUTH 2 TIMES DAILY, Disp: 180 tablet, Rfl: 1     nortriptyline (PAMELOR) 10 MG capsule, TAKE 3 CAPSULES BY MOUTH AT BEDTIME, Disp: 90 capsule, Rfl: 1     albuterol (PROAIR HFA/PROVENTIL HFA/VENTOLIN HFA) 108 (90 BASE) MCG/ACT Inhaler, Inhale 2 puffs into the lungs every 4 hours as needed for shortness of breath / dyspnea or wheezing Use with spacer, Disp: 1 Inhaler, Rfl: 0     Spacer/Aero-Holding Chambers (SPACER/AERO-HOLD CHAMBER MASK) EDITH, 1 Adult size spacer to use with MDI inhalers., Disp: 1 each, Rfl: 0     losartan (COZAAR) 25 MG tablet, TAKE 1 TABLET BY MOUTH DAILY, Disp: 90 tablet, Rfl: 1     albuterol (PROAIR HFA/PROVENTIL HFA/VENTOLIN HFA) 108 (90 BASE) MCG/ACT Inhaler, Inhale 2 puffs into the lungs every 6 hours as needed for shortness of breath / dyspnea or wheezing, Disp: 1 Inhaler, Rfl: 1     ranitidine (ZANTAC) 300 MG tablet, Take 1 tablet (300 mg) by mouth 2 times daily, Disp: 180 tablet, Rfl: 3     fluticasone (FLONASE) 50 MCG/ACT nasal spray, Spray 1-2 sprays into both nostrils daily, Disp: 1 Bottle, Rfl: 11     diclofenac (VOLTAREN) 1 % GEL, Apply 2-4 grams to knees or shoulders four times daily as needed using enclosed dosing card., Disp: 100 g, Rfl: 4     blood glucose test strip, Used for testing glucose 2-3 times daily, Disp: 1 Month, Rfl: 5     Calcium Carb-Cholecalciferol (CALCIUM + D3) 600-200 MG-UNIT TABS, 1 tablet daily, Disp: , Rfl:      MICROLET LANCETS MISC, 1 each daily., Disp: 100 each, Rfl: 2     Multiple Vitamin (MULTIVITAMIN OR), Take  by mouth., Disp: , Rfl:     SOCIAL HISTORY:  Social History     Social History     Marital status:       "Spouse name: Brian     Number of children: 4     Years of education: 12     Occupational History      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     Social History Main Topics     Smoking status: Never Smoker     Smokeless tobacco: Never Used      Comment: Lives in smoke free household     Alcohol use No     Drug use: No     Sexual activity: Yes     Partners: Male     Birth control/ protection: Surgical      Comment: tubal     Other Topics Concern     Not on file     Social History Narrative       FAMILY HISTORY:  Family History   Problem Relation Age of Onset     Respiratory Maternal Grandfather      CANCER Maternal Grandfather      Asthma Son      Circulatory Maternal Grandmother      Brain anurysm     Hypertension Mother      Glaucoma Mother 50     drops     Hypertension Sister      Hypertension Sister      DIABETES No family hx of      CEREBROVASCULAR DISEASE No family hx of      Thyroid Disease No family hx of      Macular Degeneration No family hx of        Past/family/social history reviewed and no changes    PHYSICAL EXAMINATION:  Constitutional: aaox3, cooperative, pleasant, not dyspneic/diaphoretic, no acute distress  Vitals reviewed: Ht 1.651 m (5' 5\")  Wt 62.4 kg (137 lb 9.6 oz)  SpO2 100%  BMI 22.9 kg/m2  Wt:   Wt Readings from Last 2 Encounters:   03/27/18 62.4 kg (137 lb 9.6 oz)   02/05/18 63.5 kg (140 lb)      Eyes: Sclera anicteric/injected  Ears/nose/mouth/throat: Normal oropharynx without ulcers or exudate, mucus membranes moist, hearing intact  Neck: supple, thyroid normal size  CV: RRR, no murmurs. No edema  Respiratory: CTA bl. Unlabored breathing  Lymph: No axillary, submandibular, supraclavicular or inguinal lymphadenopathy  Abd: soft, nondistended, +bs, no hepatosplenomegaly; NTTP, no peritoneal signs  Skin: warm, perfused, no jaundice. Pressure type pain in the lower abdomen. No RT or IG.   Psych: Normal affect  MSK: Normal gait    PERTINENT STUDIES:    HBT on 12/31/2013 positive     Orders Only " on 10/02/2017   Component Date Value Ref Range Status     WBC 10/02/2017 6.6  4.0 - 11.0 10e9/L Final     RBC Count 10/02/2017 3.72* 3.8 - 5.2 10e12/L Final     Hemoglobin 10/02/2017 13.0  11.7 - 15.7 g/dL Final     Hematocrit 10/02/2017 37.8  35.0 - 47.0 % Final     MCV 10/02/2017 102* 78 - 100 fl Final     MCH 10/02/2017 34.9* 26.5 - 33.0 pg Final     MCHC 10/02/2017 34.4  31.5 - 36.5 g/dL Final     RDW 10/02/2017 11.8  10.0 - 15.0 % Final     Platelet Count 10/02/2017 239  150 - 450 10e9/L Final     Diff Method 10/02/2017 Automated Method   Final     % Neutrophils 10/02/2017 78.5  % Final     % Lymphocytes 10/02/2017 13.4  % Final     % Monocytes 10/02/2017 7.3  % Final     % Eosinophils 10/02/2017 0.3  % Final     % Basophils 10/02/2017 0.5  % Final     Absolute Neutrophil 10/02/2017 5.2  1.6 - 8.3 10e9/L Final     Absolute Lymphocytes 10/02/2017 0.9  0.8 - 5.3 10e9/L Final     Absolute Monocytes 10/02/2017 0.5  0.0 - 1.3 10e9/L Final     Absolute Eosinophils 10/02/2017 0.0  0.0 - 0.7 10e9/L Final     Absolute Basophils 10/02/2017 0.0  0.0 - 0.2 10e9/L Final     CK Total 10/02/2017 134  30 - 225 U/L Final     Complement C3 10/02/2017 75* 76 - 169 mg/dL Final     DNA-ds 10/02/2017 <1  <10 IU/mL Final     CRP Inflammation 10/02/2017 <2.9  0.0 - 8.0 mg/L Final     Sodium 10/02/2017 138  133 - 144 mmol/L Final     Potassium 10/02/2017 3.7  3.4 - 5.3 mmol/L Final     Chloride 10/02/2017 103  94 - 109 mmol/L Final     Carbon Dioxide 10/02/2017 27  20 - 32 mmol/L Final     Anion Gap 10/02/2017 8  3 - 14 mmol/L Final     Glucose 10/02/2017 112* 70 - 99 mg/dL Final     Urea Nitrogen 10/02/2017 10  7 - 30 mg/dL Final     Creatinine 10/02/2017 0.88  0.52 - 1.04 mg/dL Final     GFR Estimate 10/02/2017 70  >60 mL/min/1.7m2 Final     GFR Estimate If Black 10/02/2017 84  >60 mL/min/1.7m2 Final     Calcium 10/02/2017 8.7  8.5 - 10.1 mg/dL Final     Bilirubin Total 10/02/2017 0.2  0.2 - 1.3 mg/dL Final     Albumin 10/02/2017 3.8   3.4 - 5.0 g/dL Final     Protein Total 10/02/2017 6.8  6.8 - 8.8 g/dL Final     Alkaline Phosphatase 10/02/2017 60  40 - 150 U/L Final     ALT 10/02/2017 21  0 - 50 U/L Final     AST 10/02/2017 22  0 - 45 U/L Final     Complement C4 10/02/2017 15  15 - 50 mg/dL Final     Sed Rate 10/02/2017 4  0 - 20 mm/h Final     Protein Random Urine 10/02/2017 0.07  g/L Final     Protein Total Urine g/gr Creatinine 10/02/2017 0.14  0 - 0.2 g/g Cr Final     Color Urine 10/02/2017 Yellow   Final     Appearance Urine 10/02/2017 Clear   Final     Glucose Urine 10/02/2017 Negative  NEG^Negative mg/dL Final     Bilirubin Urine 10/02/2017 Negative  NEG^Negative Final     Ketones Urine 10/02/2017 Negative  NEG^Negative mg/dL Final     Specific Gravity Urine 10/02/2017 1.010  1.003 - 1.035 Final     pH Urine 10/02/2017 6.0  5.0 - 7.0 pH Final     Protein Albumin Urine 10/02/2017 Negative  NEG^Negative mg/dL Final     Urobilinogen Urine 10/02/2017 0.2  0.2 - 1.0 EU/dL Final     Nitrite Urine 10/02/2017 Negative  NEG^Negative Final     Blood Urine 10/02/2017 Negative  NEG^Negative Final     Leukocyte Esterase Urine 10/02/2017 Negative  NEG^Negative Final     Source 10/02/2017 Midstream Urine   Final     WBC Urine 10/02/2017 O - 2  OTO2^O - 2 /HPF Final     RBC Urine 10/02/2017 O - 2  OTO2^O - 2 /HPF Final     Squamous Epithelial /LPF Urine 10/02/2017 Few  FEW^Few /LPF Final     Vitamin D Deficiency screening 10/02/2017 37  20 - 75 ug/L Final     Creatinine Urine 10/02/2017 48  mg/dL Final

## 2018-04-03 ENCOUNTER — MYC MEDICAL ADVICE (OUTPATIENT)
Dept: GASTROENTEROLOGY | Facility: CLINIC | Age: 43
End: 2018-04-03

## 2018-04-03 ENCOUNTER — TELEPHONE (OUTPATIENT)
Dept: GASTROENTEROLOGY | Facility: CLINIC | Age: 43
End: 2018-04-03

## 2018-04-03 DIAGNOSIS — K63.8219 SMALL INTESTINAL BACTERIAL OVERGROWTH: Primary | ICD-10-CM

## 2018-04-03 NOTE — TELEPHONE ENCOUNTER
Prior Authorization Specialty Medication Request    Medication/Dose: rifaximin 550mg twice daily for 10 days.   ICD code (if different than what is on RX):  K63.89  Previously Tried and Failed:  Positive HBT. Previously tried and failed Keflex, Flagyl, and Augmentin.   Important Lab Values:   Rationale: See above.     Insurance Name:   Insurance ID:   Insurance Phone Number:     Pharmacy Information (if different than what is on RX)  Name:    Phone:

## 2018-04-04 NOTE — TELEPHONE ENCOUNTER
Prior Authorization Approval    Authorization Effective Date: 3/3/2018  Authorization Expiration Date: 5/3/2018  Medication: rifaximin (XIFAXAN) 550 MG TABS tablet-P/A APPROVED  Approved Dose/Quantity: 20  Reference #: [id number]   Insurance Company: [employer] - Phone [phone] Fax [phone]  Expected CoPay: $10.00     CoPay Card Available:      Foundation Assistance Needed:    Which Pharmacy is filling the prescription (Not needed for infusion/clinic administered): [id number] [place] [facility] [place] - [address]  Pharmacy Notified: Yes  Patient Notified: Yes - Via Voicemail

## 2018-04-05 ENCOUNTER — OFFICE VISIT (OUTPATIENT)
Dept: OBGYN | Facility: CLINIC | Age: 43
End: 2018-04-05
Payer: COMMERCIAL

## 2018-04-05 VITALS
OXYGEN SATURATION: 100 % | HEART RATE: 86 BPM | TEMPERATURE: 98.1 F | SYSTOLIC BLOOD PRESSURE: 127 MMHG | DIASTOLIC BLOOD PRESSURE: 82 MMHG | BODY MASS INDEX: 22.63 KG/M2 | WEIGHT: 136 LBS

## 2018-04-05 DIAGNOSIS — N92.6 IRREGULAR MENSES: ICD-10-CM

## 2018-04-05 DIAGNOSIS — D25.1 INTRAMURAL AND SUBMUCOUS LEIOMYOMA OF UTERUS: ICD-10-CM

## 2018-04-05 DIAGNOSIS — D25.0 INTRAMURAL AND SUBMUCOUS LEIOMYOMA OF UTERUS: ICD-10-CM

## 2018-04-05 DIAGNOSIS — D25.1 INTRAMURAL LEIOMYOMA OF UTERUS: Primary | ICD-10-CM

## 2018-04-05 LAB — FSH SERPL-ACNC: 5.5 IU/L

## 2018-04-05 PROCEDURE — 99214 OFFICE O/P EST MOD 30 MIN: CPT | Performed by: OBSTETRICS & GYNECOLOGY

## 2018-04-05 PROCEDURE — 36415 COLL VENOUS BLD VENIPUNCTURE: CPT | Performed by: OBSTETRICS & GYNECOLOGY

## 2018-04-05 PROCEDURE — 83001 ASSAY OF GONADOTROPIN (FSH): CPT | Performed by: OBSTETRICS & GYNECOLOGY

## 2018-04-05 NOTE — MR AVS SNAPSHOT
After Visit Summary   4/5/2018    Yovana Enriquez    MRN: [id number]           Patient Information     Date Of Birth          1975        Visit Information        Provider Department      4/5/2018 1:15 PM Agbeh, Cephas Mawuena, MD [facility]        Today's Diagnoses     Intramural leiomyoma of uterus    -  1    Irregular menses          Care Instructions                                                           If you have any questions regarding your visit, Please contact your care team.    Women s Health CLINIC HOURS TELEPHONE NUMBER   Cephas Agbeh, M.D.    Sadia Alvarado- FERNANDA Garcia - RN    Elizabeth -         Monday-[facility]  8:00 am - 5 pm  Tuesday- [facility]  8:00am- 5 pm  Wednesday- Off  Thursday- [facility]  8:00 am- 5 pm  Friday-[place]  8:00 am 5 pm [facility]  [address] [place] [address]  [phone] ask for Women's Clinic    Imaging [phone]    [facility]  [address] [facility]  [place] [address]  [phone]  Imaging [phone]     Urgent Care locations:    [facility] Saturday and Sunday   9 am - 5 pm    Monday-Friday   12 pm - 8 pm  Saturday and Sunday   9 am - 5 pm   [phone]       If you need a medication refill, please contact your pharmacy. Please allow 3 business days for your refill to be completed.  As always, Thank you for trusting us with your healthcare needs!                  Follow-ups after your visit        Your next 10 appointments already scheduled     Apr 10, 2018  1:10 PM CDT   (Arrive by 12:55 PM)   [facility] For Women Only with Kareen Stoddard PT   [facility] Joaquin PT ([facility] [place])    [address] [place] [address]  [facility] [address] [phone]            Apr 14, 2018  9:30 AM CDT   Lab visit with AN LAB   [facility] ([facility])    [address] [facility] [address]   [phone]           Please do not eat 10-12 hours before your appointment if you are coming in fasting for labs on lipids, cholesterol, or glucose (sugar). Does not apply to pregnant women.  Water with medications is okay. Do not drink coffee or other fluids.  If you have concerns about taking your medications, please send a message by clicking on Secure Messaging, Message Your Care Team.            May 07, 2018  5:00 PM CDT   [facility]   [facility]    [address] [facility] [address]   [phone]            Jun 04, 2018  6:00 PM CDT   LAB with BE LAB   [facility] ([facility])    [address] [facility] [address]   [phone]           Please do not eat 10-12 hours before your appointment if you are coming in fasting for labs on lipids, cholesterol, or glucose (sugar). This does not apply to pregnant women. Water, hot tea and black coffee (with nothing added) are okay. Do not drink other fluids, diet soda or chew gum.            Jun 11, 2018 12:40 PM CDT   Return Visit with Bradford Colvin MD   [facility] ([facility])    [address] [facility] [address] [phone]            Jul 24, 2018  2:20 PM CDT   (Arrive by 2:05 PM)   Return Visit with Inez Sawyer MD   [facility] ([facility])    [address] [place] [address] [phone]              Future tests that were ordered for you today     Open Future Orders        Priority Expected Expires Ordered    US Pelvic Complete with Transvaginal Routine 7/4/2018 10/2/2018 4/5/2018            Who to contact     If you have questions or need follow up information about today's clinic visit or your schedule please contact [facility] [place] directly at [phone].  Normal or  non-critical lab and imaging results will be communicated to you by MyChart, letter or phone within 4 business days after the clinic has received the results. If you do not hear from us within 7 days, please contact the clinic through Advanced Telemetryt or phone. If you have a critical or abnormal lab result, we will notify you by phone as soon as possible.  Submit refill requests through imoji or call your pharmacy and they will forward the refill request to us. Please allow 3 business days for your refill to be completed.          Additional Information About Your Visit        imoji Information     imoji gives you secure access to your electronic health record. If you see a primary care provider, you can also send messages to your care team and make appointments. If you have questions, please call your primary care clinic.  If you do not have a primary care provider, please call [phone] and they will assist you.        Care EveryWhere ID     This is your Care EveryWhere ID. This could be used by other organizations to access your [place] medical records  [id number]        Your Vitals Were     Pulse Temperature Last Period Pulse Oximetry BMI (Body Mass Index)       86 98.1  F (36.7  C) (Oral) (LMP Unknown) 100% 22.63 kg/m2        Blood Pressure from Last 3 Encounters:   04/05/18 127/82   02/05/18 124/84   02/05/18 140/80    Weight from Last 3 Encounters:   04/05/18 136 lb (61.7 kg)   03/27/18 137 lb 9.6 oz (62.4 kg)   02/05/18 140 lb (63.5 kg)              We Performed the Following     Follicle stimulating hormone        Primary Care Provider Office Phone # Fax #    Bradford Mario PA-C [phone]       [address]  [place] [address]        Equal Access to Services     [facility] VANNESSA : Deshaun Villafana, rafal pimentel, soto mcdermott, rosy [place]. So [facility] [phone].    ATENCIÓN: Si habla español, tiene a dexter disposición servicios  benedicto de asistencia lingüística. Cee galaviz [phone].    We comply with applicable federal civil rights laws and [place] laws. We do not discriminate on the basis of race, color, national origin, age, disability, sex, sexual orientation, or gender identity.            Thank you!     Thank you for choosing [facility]  for your care. Our goal is always to provide you with excellent care. Hearing back from our patients is one way we can continue to improve our services. Please take a few minutes to complete the written survey that you may receive in the mail after your visit with us. Thank you!             Your Updated Medication List - Protect others around you: Learn how to safely use, store and throw away your medicines at www.disposemymeds.org.          This list is accurate as of 4/5/18  1:34 PM.  Always use your most recent med list.                   Brand Name Dispense Instructions for use Diagnosis    * albuterol 108 (90 BASE) MCG/ACT Inhaler    PROAIR HFA/PROVENTIL HFA/VENTOLIN HFA    1 Inhaler    Inhale 2 puffs into the lungs every 6 hours as needed for shortness of breath / dyspnea or wheezing    Acute bronchitis, unspecified organism       * albuterol 108 (90 BASE) MCG/ACT Inhaler    PROAIR HFA/PROVENTIL HFA/VENTOLIN HFA    1 Inhaler    Inhale 2 puffs into the lungs every 4 hours as needed for shortness of breath / dyspnea or wheezing Use with spacer    Wheezing       amLODIPine 5 MG tablet    NORVASC    90 tablet    Take 2 tablets (10 mg) by mouth daily    Raynaud's phenomenon without gangrene       azaTHIOprine 50 MG tablet    IMURAN    270 tablet    Take 3 tablets (150 mg) by mouth daily    Other systemic lupus erythematosus with other organ involvement (H)       Belimumab 200 MG/ML Soaj     4 Syringe    Inject 200 mg Subcutaneous every 7 days . Hold for signs of infection and seek medical attention. Autoinjector    Other systemic lupus erythematosus with other organ involvement (H)        blood glucose monitoring test strip    no brand specified    1 Month    Used for testing glucose 2-3 times daily    History of gestational diabetes mellitus, not currently pregnant       buPROPion 75 MG tablet    WELLBUTRIN    180 tablet    TAKE 1 TABLET BY MOUTH 2 TIMES DAILY    Anxiety       Calcium + D3 600-200 MG-UNIT Tabs      1 tablet daily        cevimeline 30 MG capsule    EVOXAC    180 capsule    Take 1 capsule (30 mg) by mouth 2 times daily    Sjogren's syndrome with keratoconjunctivitis sicca (H)       diclofenac 1 % Gel topical gel    VOLTAREN    100 g    Apply 2-4 grams to knees or shoulders four times daily as needed using enclosed dosing card.    Fibromyalgia, SLE (systemic lupus erythematosus) (H)       fluticasone 50 MCG/ACT spray    FLONASE    1 Bottle    Spray 1-2 sprays into both nostrils daily    Chronic rhinitis       hydroxychloroquine 200 MG tablet    PLAQUENIL    135 tablet    Hydroxychloroquine 200mg in the morning and 100mg in the evening.    Other systemic lupus erythematosus with other organ involvement (H)       losartan 25 MG tablet    COZAAR    90 tablet    TAKE 1 TABLET BY MOUTH DAILY    Hypertension goal BP (blood pressure) < 140/90       MICROLET LANCETS Misc     100 each    1 each daily.    Other abnormal glucose       MULTIVITAMIN PO      Take  by mouth.        nortriptyline 10 MG capsule    PAMELOR    90 capsule    TAKE 3 CAPSULES BY MOUTH AT BEDTIME    Numbness       predniSONE 2.5 MG tablet    DELTASONE    180 tablet    Take 1-2 tablets (2.5-5 mg) by mouth daily    Other systemic lupus erythematosus with other organ involvement (H)       pregabalin 150 MG capsule    LYRICA    270 capsule    Take 1 capsule (150 mg) by mouth 3 times daily . 3 month supply.    Fibromyalgia       ranitidine 300 MG tablet    ZANTAC    180 tablet    Take 1 tablet (300 mg) by mouth 2 times daily    Gastroesophageal reflux disease without esophagitis       rifaximin 550 MG Tabs tablet    XIFAXAN     20 tablet    Take 1 tablet (550 mg) by mouth 2 times daily for 10 days    Small intestinal bacterial overgrowth       spacer/aero-hold chamber mask Wendy     1 each    1 Adult size spacer to use with MDI inhalers.    Wheezing       * Notice:  This list has 2 medication(s) that are the same as other medications prescribed for you. Read the directions carefully, and ask your doctor or other care provider to review them with you.

## 2018-04-05 NOTE — PATIENT INSTRUCTIONS
If you have any questions regarding your visit, Please contact your care team.    Women s Health CLINIC HOURS TELEPHONE NUMBER   Stefanys Agbeh, M.D.    Sadia Alvarado- FERNANDA Garcia - FERNANDA Johnson -         Monday-[facility]  8:00 am - 5 pm  Tuesday- [facility]  8:00am- 5 pm  Wednesday- Off  Thursday- [facility]  8:00 am- 5 pm  Friday-[place]  8:00 am 5 pm [facility]  [address] [place] [address]  [phone] ask for Women's [facility]    Imaging [phone]    [facility]  [address] [facility]  [place] [address] [phone]  Imaging [phone]     Urgent Care locations:    [facility] Saturday and Sunday   9 am - 5 pm    Monday-Friday   12 pm - 8 pm  Saturday and Sunday   9 am - 5 pm   [phone]       If you need a medication refill, please contact your pharmacy. Please allow 3 business days for your refill to be completed.  As always, Thank you for trusting us with your healthcare needs!

## 2018-04-05 NOTE — NURSING NOTE
"Chief Complaint   Patient presents with     Vaginal Bleeding       Initial /82  Pulse 86  Temp 98.1  F (36.7  C) (Oral)  Wt 136 lb (61.7 kg)  LMP  (LMP Unknown)  SpO2 100%  BMI 22.63 kg/m2 Estimated body mass index is 22.63 kg/(m^2) as calculated from the following:    Height as of 3/27/18: 5' 5\" (1.651 m).    Weight as of this encounter: 136 lb (61.7 kg).  Medication Reconciliation: complete     Sylvia Estrada CMA      "

## 2018-04-05 NOTE — PROGRESS NOTES
Yovana is a 42 year old  referred here by self for consultation regarding irregular sotting. H/O endometrial ablation in . She gets about 4 episodes spotting yearly. However she has had weekly spotting in the last one month.  Also having hot flashes. Concerned about menopause. She denies any pelvic pain.  Has history of submucosal fibroid. Las U/S as bellow.  ULTRASOUND PELVIC COMPLETE WITH TRANSVAGINAL 2014 6:14 PM         HISTORY: Abdominal pain, right lower quadrant.      COMPARISON: None.     FINDINGS: Transabdominal and transvaginal imaging was performed.  Transvaginal imaging was performed to better visualize the endometrium  and adnexa. Uterus is normal in size and position measuring 9.3 x 4.3  x 4.5 cm. The endometrium is normal in thickness at 0.3 cm. There are  two uterine fibroids. A uterine body fibroid appears to be some  mucosal in location and measures 1.6 x 2.3 x 1.3 cm. A fundal fibroid  measures 2.4 x 2.0 x 2.0 cm. The right ovary is normal in size and  appearance. There is a complex cyst in left ovary measuring 2.2 x 2.2  x 1.9 cm. Color Doppler and Doppler waveform analysis of the ovaries  shows blood flow bilaterally. No torsion. No free pelvic fluid.     IMPRESSION  IMPRESSION:   1. There is a 2.2 cm complex left ovarian cyst.  2. There are two uterine fibroids. A uterine body fibroid appears to  be submucosal in location.       ROS: Ten point review of systems was reviewed and negative except the above.    Gyne: - abn pap (last pap ), - STD's    Past Medical History:   Diagnosis Date     Chronic constipation 2013     Dysmenorrhea 10/1/2012     Fibromyalgia 11/15/2013     Health Care Home 3/19/2014    **See Letters for [facility] Care Plan: Emergency Care Plan       High risk medication use 2013     History of gestational diabetes mellitus, not currently pregnant 10/1/2012     History of ovarian cyst 10/1/2012     Hyperlipidemia LDL goal <130 10/18/2012      Hypertension goal BP (blood pressure) < 140/90 1/19/2015     Intramural leiomyoma of uterus 10/5/2012     Irritable bowel syndrome 3/11/2014    Patient states no history colonoscopy       Menorrhagia 10/1/2012     Non-celiac gluten sensitivity 2/8/2016     PONV (postoperative nausea and vomiting)      Subclinical hyperthyroidism 1/17/2013     Systemic lupus erythematosus (H) 4/23/2012     Past Surgical History:   Procedure Laterality Date     COLONOSCOPY N/A 2/20/2015    Procedure: COMBINED COLONOSCOPY, SINGLE OR MULTIPLE BIOPSY/POLYPECTOMY BY BIOPSY;  Surgeon: Fred Cullen MD;  Location: [facility] OR     COLONOSCOPY WITH CO2 INSUFFLATION N/A 2/20/2015    Procedure: COLONOSCOPY WITH CO2 INSUFFLATION;  Surgeon: Fred Cullen MD;  Location: [facility] OR     ENT SURGERY  10-24-14    Bottom lip biopsies     ESOPHAGOSCOPY, GASTROSCOPY, DUODENOSCOPY (EGD), COMBINED N/A 2/20/2015    Procedure: COMBINED ESOPHAGOSCOPY, GASTROSCOPY, DUODENOSCOPY (EGD), BIOPSY SINGLE OR MULTIPLE;  Surgeon: Frde Cullen MD;  Location: [facility] OR     HC BREATH HYDROGEN TEST  12/31/2013    Procedure: HYDROGEN BREATH TEST;  Surgeon: Camden Almazan MD;  Location: [facility]     HC HYSTEROSCOPY, SURGICAL; W/ ENDOMETRIAL ABLATION, ANY METHOD  10-19-12     SHOULDER SURGERY Right     R shoulder     TUBAL LIGATION  2004     Patient Active Problem List   Diagnosis     Systemic lupus erythematosus (H)     Menorrhagia     History of ovarian cyst     Dysmenorrhea     History of gestational diabetes mellitus, not currently pregnant     Intramural leiomyoma of uterus     Hyperlipidemia LDL goal <130     Subclinical hyperthyroidism     Anxiety     High risk medication use     Fibromyalgia     Chronic constipation     Irritable bowel syndrome     Health Care Home     Hypertension goal BP (blood pressure) < 140/90     Non-celiac gluten sensitivity     Raynaud's phenomenon without gangrene     Sjogren's syndrome (H)       ALL/Meds: Her  medication and allergy histories were reviewed and are documented in their appropriate chart areas.    SH: - tob, - EtOH,     FH: Her family history was reviewed and documented in its appropriate chart area.    PE: /82  Pulse 86  Temp 98.1  F (36.7  C) (Oral)  Wt 136 lb (61.7 kg)  LMP  (LMP Unknown)  SpO2 100%  BMI 22.63 kg/m2  Body mass index is 22.63 kg/(m^2).    General Appearance:  healthy, alert, active, no distress  HEENT: NCAT  Abdomen: Soft, nontender.  Normal bowel sounds.  No masses  Pelvic:       - Ext: NEFG,        - Urethra: no lesions, no masses, no hypermobility       - Urethral Meatus: normal appearance, no       - Bladder: no tenderness, no masses       - Vagina: pink, moist, normal rugae, no lesions, no discharge       - Cervix: no lesions, parous       - Uterus: 9wk sized, AV/RV/Midplane, mobile, normal contour       - Adnexa: no masses, no tenderness       - Rectal: deferred, normal tone, negative guaic       - Pelvic support: no cystocele, no rectocele, no uterine prolapse    A/P    ICD-10-CM    1. Intramural leiomyoma of uterus D25.1 Follicle stimulating hormone     US Pelvic Complete with Transvaginal   2. Irregular menses N92.6 Follicle stimulating hormone     US Pelvic Complete with Transvaginal   3. Intramural and submucous leiomyoma of uterus D25.1 Follicle stimulating hormone    D25.0 US Pelvic Complete with Transvaginal     I discussed fibroids.  We discussed their origin, the fact that while they are benign, they do tend to grow slowly in response to estrogen.  We discussed the normal resolution that gradually occurs after menopause.  I explained that fibroids are very common and in many cases do not cause symptoms.  We discussed these symptoms, including menorrhagia, metrorrhagia, dysmenorrhea as well as the mass effect that fibroids can cause.  We discussed options for treatment.  These include conservative observation, symptomatic hormonal control, myomectomy,  uterine artery embolization, and hysterectomy.  We discussed the RISKS, BENEFITS, AND ALTERNATIVES of each of these options.  This includes the risk of post-procedure pain, passage of a necrosed fibroid and the need for post embolization hysterectomy.     Will notify of test results.    25 minutes was spent face to face with the patient today discussing her history, diagnosis, and follow-up plan as noted above.  Over 50% of the visit was spent in counseling and coordination of care.    Total Visit Time: 30 minutes.    CEPHAS AGBEH, MD.

## 2018-04-09 ENCOUNTER — RADIANT APPOINTMENT (OUTPATIENT)
Dept: ULTRASOUND IMAGING | Facility: CLINIC | Age: 43
End: 2018-04-09
Attending: OBSTETRICS & GYNECOLOGY
Payer: COMMERCIAL

## 2018-04-09 DIAGNOSIS — N83.201 CYSTS OF BOTH OVARIES: Primary | ICD-10-CM

## 2018-04-09 DIAGNOSIS — D25.1 INTRAMURAL LEIOMYOMA OF UTERUS: ICD-10-CM

## 2018-04-09 DIAGNOSIS — N92.6 IRREGULAR MENSES: ICD-10-CM

## 2018-04-09 DIAGNOSIS — N83.202 CYSTS OF BOTH OVARIES: Primary | ICD-10-CM

## 2018-04-09 DIAGNOSIS — D25.1 INTRAMURAL AND SUBMUCOUS LEIOMYOMA OF UTERUS: ICD-10-CM

## 2018-04-09 DIAGNOSIS — D25.0 INTRAMURAL AND SUBMUCOUS LEIOMYOMA OF UTERUS: ICD-10-CM

## 2018-04-09 PROCEDURE — 76856 US EXAM PELVIC COMPLETE: CPT

## 2018-04-09 PROCEDURE — 76830 TRANSVAGINAL US NON-OB: CPT

## 2018-04-30 DIAGNOSIS — R20.0 NUMBNESS: ICD-10-CM

## 2018-04-30 RX ORDER — NORTRIPTYLINE HCL 10 MG
CAPSULE ORAL
Qty: 90 CAPSULE | Refills: 1 | Status: SHIPPED | OUTPATIENT
Start: 2018-04-30 | End: 2018-08-11

## 2018-04-30 NOTE — TELEPHONE ENCOUNTER
"Requested Prescriptions   Pending Prescriptions Disp Refills     nortriptyline (PAMELOR) 10 MG capsule [Pharmacy Med Name: NORTRIPTYLINE HCL 10 MG CAP] 90 capsule 1    Last Written Prescription Date:  3/27/18  Last Fill Quantity: 90,  # refills: 1   Last office visit: 2/5/2018 with prescribing provider:  4/5/18 C Agbeh   Future Office Visit:   Next 5 appointments (look out 90 days)     Jun 11, 2018 12:40 PM CDT   Return Visit with Bradford Colvin MD   [facility]    [address] [facility] [address]   [phone]                Sig: TAKE 3 CAPSULES BY MOUTH AT BEDTIME    Tricyclic Agents ( Annual appt and no PHQ9) Passed    4/30/2018  1:38 AM       Passed - Blood Pressure under 140/90 in past 12 mos    BP Readings from Last 3 Encounters:   04/05/18 127/82   02/05/18 124/84   02/05/18 140/80                Passed - Recent (12 mo) or future (30 days) visit within authorizing provider's specialty    Patient had office visit in the last 12 months or has a visit in the next 30 days with authorizing provider or within the authorizing provider's specialty.  See \"Patient Info\" tab in inbasket, or \"Choose Columns\" in Meds & Orders section of the refill encounter.           Passed - Patient is age 18 or older       Passed - Patient is not pregnant       Passed - No positive pregnancy test on record in past 12 mos        "

## 2018-04-30 NOTE — TELEPHONE ENCOUNTER
Routing refill request to provider for review/approval because:  Drug not on the [facility] refill protocol for Dx

## 2018-05-07 ENCOUNTER — OFFICE VISIT (OUTPATIENT)
Dept: OPTOMETRY | Facility: CLINIC | Age: 43
End: 2018-05-07
Payer: COMMERCIAL

## 2018-05-07 DIAGNOSIS — M32.19 OTHER SYSTEMIC LUPUS ERYTHEMATOSUS WITH OTHER ORGAN INVOLVEMENT (H): ICD-10-CM

## 2018-05-07 DIAGNOSIS — H52.223 REGULAR ASTIGMATISM OF BOTH EYES: ICD-10-CM

## 2018-05-07 DIAGNOSIS — H52.4 PRESBYOPIA: ICD-10-CM

## 2018-05-07 DIAGNOSIS — Z79.899 HIGH RISK MEDICATION USE: Primary | ICD-10-CM

## 2018-05-07 PROCEDURE — 92014 COMPRE OPH EXAM EST PT 1/>: CPT | Performed by: OPTOMETRIST

## 2018-05-07 PROCEDURE — 92015 DETERMINE REFRACTIVE STATE: CPT | Performed by: OPTOMETRIST

## 2018-05-07 ASSESSMENT — VISUAL ACUITY
OD_SC: 20/200
OD_SC+: -1
METHOD: SNELLEN - LINEAR
OS_SC: 20/30
OS_SC: 20/200
OS_SC+: -1
OD_SC: 20/30

## 2018-05-07 ASSESSMENT — REFRACTION_MANIFEST
OD_AXIS: 006
OS_AXIS: 174
OS_ADD: +1.75
OD_SPHERE: -0.25
OS_SPHERE: -0.75
OS_AXIS: 010
METHOD_AUTOREFRACTION: 1
OD_SPHERE: -1.00
OD_CYLINDER: +0.75
OS_SPHERE: -0.25
OD_CYLINDER: +0.25
OD_ADD: +1.75
OS_CYLINDER: +0.50
OS_CYLINDER: +0.75
OD_AXIS: 015

## 2018-05-07 ASSESSMENT — CONF VISUAL FIELD
METHOD: COUNTING FINGERS
OD_NORMAL: 1
OS_NORMAL: 1

## 2018-05-07 ASSESSMENT — CUP TO DISC RATIO
OD_RATIO: 0.4
OS_RATIO: 0.4

## 2018-05-07 ASSESSMENT — KERATOMETRY
OS_K2POWER_DIOPTERS: 45.25
OD_K2POWER_DIOPTERS: 44.75
OD_K1POWER_DIOPTERS: 44.50
OS_K1POWER_DIOPTERS: 45.00
OS_AXISANGLE2_DEGREES: 172
OD_AXISANGLE2_DEGREES: 155

## 2018-05-07 ASSESSMENT — EXTERNAL EXAM - LEFT EYE: OS_EXAM: NORMAL

## 2018-05-07 ASSESSMENT — SLIT LAMP EXAM - LIDS
COMMENTS: NORMAL
COMMENTS: NORMAL

## 2018-05-07 ASSESSMENT — EXTERNAL EXAM - RIGHT EYE: OD_EXAM: NORMAL

## 2018-05-07 ASSESSMENT — TONOMETRY
OD_IOP_MMHG: 10
OS_IOP_MMHG: 11
IOP_METHOD: APPLANATION

## 2018-05-07 NOTE — PATIENT INSTRUCTIONS
Patient was advised of today's exam findings.  Optional to change glasses - small change in prescription   Reading glasses advised +1.50 or +1.25  Refer to [facility] [place]  for macula OCT and Visual field due to Plaquenil use   Return in 1 year for eye exam    Ruma Randolph O.D.  [facility]   [address] [place], [place] [id number] [phone]      Rosanna Odell and Bret  [facility]   [address]  [place] [address] [phone]

## 2018-05-07 NOTE — PROGRESS NOTES
Chief Complaint   Patient presents with     COMPREHENSIVE EYE EXAM     slightly ove a year ago         Takes 300 mg Plaquenil per day  Sees  Dr Colvin     Last Eye Exam: 9/26/2016  Dilated Previously: Yes    What are you currently using to see?  Glasses, usually uses the glasses for driving and movies. Also has OTC readers are that she uses to read. Uses +1.50's. She did not bring either pair to this appointment        Distance Vision Acuity: Satisfied with vision, glasses seem to work     Near Vision Acuity: Satisfied with vision while reading and using computer with readers    Eye Comfort: good and dry from time to time   Do you use eye drops? 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       OBJECTIVE: See Ophthalmology exam    ASSESSMENT:    ICD-10-CM    1. High risk medication use Z79.899 EYE EXAM (SIMPLE-NONBILLABLE)     OPHTHALMOLOGY ADULT REFERRAL   2. Other systemic lupus erythematosus with other organ involvement (H) M32.19 EYE EXAM (SIMPLE-NONBILLABLE)     OPHTHALMOLOGY ADULT REFERRAL   3. Regular astigmatism of both eyes H52.223 EYE EXAM (SIMPLE-NONBILLABLE)     REFRACTION   4. Presbyopia H52.4 EYE EXAM (SIMPLE-NONBILLABLE)     REFRACTION      PLAN:     Patient Instructions   Patient was advised of today's exam findings.  Optional to change glasses - small change in prescription   Reading glasses advised +1.50 or +1.25  Refer to [facility] [place]  for macula OCT and Visual field due to Plaquenil use   Return in 1 year for eye exam    Ruma Randolph O.D.  [facility]   [address] [place], [place] [id number] [phone]      Rosanna Odell and Bret  [facility]   [address] [place] [address]   [phone]

## 2018-05-07 NOTE — MR AVS SNAPSHOT
After Visit Summary   5/7/2018    Yovana Enriquez    MRN: [id number]           Patient Information     Date Of Birth          1975        Visit Information        Provider Department      5/7/2018 1:00 PM Ruma Randolph, BRYCE [facility]        Today's Diagnoses     Regular astigmatism of both eyes    -  1    Presbyopia          Care Instructions    Patient was advised of today's exam findings.  Optional to change glasses - small change in prescription   Reading glasses advised +1.50 or +1.25  Refer to [facility] [place]  for macula OCT and Visual field due to Plaquenil use   Return in 1 year for eye exam    Ruma Randolph O.D.  [facility]   [address], [place] [address] [phone]      Rosanna Odell and Bret  [facility]   [address] [place] [address]   [phone]                    Follow-ups after your visit        Your next 10 appointments already scheduled     Jun 04, 2018  6:00 PM CDT   LAB with BE LAB   [facility] ([facility])    [address] [facility] [address]   [phone]           Please do not eat 10-12 hours before your appointment if you are coming in fasting for labs on lipids, cholesterol, or glucose (sugar). This does not apply to pregnant women. Water, hot tea and black coffee (with nothing added) are okay. Do not drink other fluids, diet soda or chew gum.            Jun 11, 2018 12:40 PM CDT   Return Visit with Bradford Colvin MD   [facility] ([facility])    [address] [facility] [address]   [phone]            Aug 01, 2018  1:40 PM CDT   (Arrive by 1:25 PM)   Return Visit with Inez Sawyer MD   [facility] ([facility])    [address] [place] [address] [phone]              Who to contact     If  you have questions or need follow up information about today's clinic visit or your schedule please contact [facility] directly at [phone].  Normal or non-critical lab and imaging results will be communicated to you by MyChart, letter or phone within 4 business days after the clinic has received the results. If you do not hear from us within 7 days, please contact the clinic through MyChart or phone. If you have a critical or abnormal lab result, we will notify you by phone as soon as possible.  Submit refill requests through Amp[employer] Mobile or call your pharmacy and they will forward the refill request to us. Please allow 3 business days for your refill to be completed.          Additional Information About Your Visit        AdsItharMurfie Information     [employer] gives you secure access to your electronic health record. If you see a primary care provider, you can also send messages to your care team and make appointments. If you have questions, please call your primary care clinic.  If you do not have a primary care provider, please call [phone] and they will assist you.        Care EveryWhere ID     This is your Care EveryWhere ID. This could be used by other organizations to access your [place] medical records  [id number]         Blood Pressure from Last 3 Encounters:   04/05/18 127/82   02/05/18 124/84   02/05/18 140/80    Weight from Last 3 Encounters:   04/05/18 61.7 kg (136 lb)   03/27/18 62.4 kg (137 lb 9.6 oz)   02/05/18 63.5 kg (140 lb)              Today, you had the following     No orders found for display       Primary Care Provider Office Phone # Fax #    Bradford Mario PA-C [phone]       [address] [place] [address]        Equal Access to Services     [facility] VANNESSA : Hadii mare Villafana, waaxda lutriny, qaybta kaalmatiburcio mcdermott, rosy washington. So [facility] [place] [phone].    ATENCIÓN: Si habla español, tiene a dexter disposición  servicios gratuitos de asistencia lingüística. Cee galaviz [phone].    We comply with applicable federal civil rights laws and [place] laws. We do not discriminate on the basis of race, color, national origin, age, disability, sex, sexual orientation, or gender identity.            Thank you!     [facility]  [facility]. Our goal is always to provide you with excellent care. Hearing back from our patients is one way we can continue to improve our services. Please take a few minutes to complete the written survey that you may receive in the mail after your visit with us. Thank you!             Your Updated Medication List - Protect others around you: Learn how to safely use, store and throw away your medicines at www.disposemymeds.org.          This list is accurate as of 5/7/18  1:51 PM.  Always use your most recent med list.                   Brand Name Dispense Instructions for use Diagnosis    * albuterol 108 (90 Base) MCG/ACT Inhaler    PROAIR HFA/PROVENTIL HFA/VENTOLIN HFA    1 Inhaler    Inhale 2 puffs into the lungs every 6 hours as needed for shortness of breath / dyspnea or wheezing    Acute bronchitis, unspecified organism       * albuterol 108 (90 Base) MCG/ACT Inhaler    PROAIR HFA/PROVENTIL HFA/VENTOLIN HFA    1 Inhaler    Inhale 2 puffs into the lungs every 4 hours as needed for shortness of breath / dyspnea or wheezing Use with spacer    Wheezing       amLODIPine 5 MG tablet    NORVASC    90 tablet    Take 2 tablets (10 mg) by mouth daily    Raynaud's phenomenon without gangrene       azaTHIOprine 50 MG tablet    IMURAN    270 tablet    Take 3 tablets (150 mg) by mouth daily    Other systemic lupus erythematosus with other organ involvement (H)       Belimumab 200 MG/ML Soaj     4 Syringe    Inject 200 mg Subcutaneous every 7 days . Hold for signs of infection and seek medical attention. Autoinjector    Other systemic lupus erythematosus with other organ  involvement (H)       blood glucose monitoring test strip    no brand specified    1 Month    Used for testing glucose 2-3 times daily    History of gestational diabetes mellitus, not currently pregnant       buPROPion 75 MG tablet    WELLBUTRIN    180 tablet    TAKE 1 TABLET BY MOUTH 2 TIMES DAILY    Anxiety       Calcium + D3 600-200 MG-UNIT Tabs      1 tablet daily        cevimeline 30 MG capsule    EVOXAC    180 capsule    Take 1 capsule (30 mg) by mouth 2 times daily    Sjogren's syndrome with keratoconjunctivitis sicca (H)       diclofenac 1 % Gel topical gel    VOLTAREN    100 g    Apply 2-4 grams to knees or shoulders four times daily as needed using enclosed dosing card.    Fibromyalgia, SLE (systemic lupus erythematosus) (H)       fluticasone 50 MCG/ACT spray    FLONASE    1 Bottle    Spray 1-2 sprays into both nostrils daily    Chronic rhinitis       hydroxychloroquine 200 MG tablet    PLAQUENIL    135 tablet    Hydroxychloroquine 200mg in the morning and 100mg in the evening.    Other systemic lupus erythematosus with other organ involvement (H)       losartan 25 MG tablet    COZAAR    90 tablet    TAKE 1 TABLET BY MOUTH DAILY    Hypertension goal BP (blood pressure) < 140/90       MICROLET LANCETS Misc     100 each    1 each daily.    Other abnormal glucose       MULTIVITAMIN PO      Take  by mouth.        nortriptyline 10 MG capsule    PAMELOR    90 capsule    TAKE 3 CAPSULES BY MOUTH AT BEDTIME    Numbness       predniSONE 2.5 MG tablet    DELTASONE    180 tablet    Take 1-2 tablets (2.5-5 mg) by mouth daily    Other systemic lupus erythematosus with other organ involvement (H)       pregabalin 150 MG capsule    LYRICA    270 capsule    Take 1 capsule (150 mg) by mouth 3 times daily . 3 month supply.    Fibromyalgia       ranitidine 300 MG tablet    ZANTAC    180 tablet    Take 1 tablet (300 mg) by mouth 2 times daily    Gastroesophageal reflux disease without esophagitis       spacer/aero-hold  chamber mask Wendy     1 each    1 Adult size spacer to use with MDI inhalers.    Wheezing       * Notice:  This list has 2 medication(s) that are the same as other medications prescribed for you. Read the directions carefully, and ask your doctor or other care provider to review them with you.

## 2018-05-07 NOTE — LETTER
5/7/2018         RE: Yovana Enriquez  [address]  [place] [address]        Dear Colleague,    Thank you for referring your patient, Yovana Enriquez, to the [facility]. Please see a copy of my visit note below.    Chief Complaint   Patient presents with     COMPREHENSIVE EYE EXAM     slightly ove a year ago         Takes 300 mg Plaquenil per day  Sees  Dr Colvin     Last Eye Exam: 9/26/2016  Dilated Previously: Yes    What are you currently using to see?  Glasses, usually uses the glasses for driving and movies. Also has OTC readers are that she uses to read. Uses +1.50's. She did not bring either pair to this appointment        Distance Vision Acuity: Satisfied with vision, glasses seem to work     Near Vision Acuity: Satisfied with vision while reading and using computer with readers    Eye Comfort: good and dry from time to time   Do you use eye drops? 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       OBJECTIVE: See Ophthalmology exam    ASSESSMENT:    ICD-10-CM    1. High risk medication use Z79.899 EYE EXAM (SIMPLE-NONBILLABLE)     OPHTHALMOLOGY ADULT REFERRAL   2. Other systemic lupus erythematosus with other organ involvement (H) M32.19 EYE EXAM (SIMPLE-NONBILLABLE)     OPHTHALMOLOGY ADULT REFERRAL   3. Regular astigmatism of both eyes H52.223 EYE EXAM (SIMPLE-NONBILLABLE)     REFRACTION   4. Presbyopia H52.4 EYE EXAM (SIMPLE-NONBILLABLE)     REFRACTION      PLAN:     Patient Instructions   Patient was advised of today's exam findings.  Optional to change glasses - small change in prescription   Reading glasses advised +1.50 or +1.25  Refer to [facility] [place]  for macula OCT and Visual field due to Plaquenil use   Return in 1 year for eye exam    Ruma Randolph O.D.  [facility]   [address]  [place] [address]  [phone]      Rosanna Odell and Bret  [facility] [address] [place] [address]   [phone]                   Again, thank you for allowing me to participate in the care of your patient.        Sincerely,        Ruma Randolph, OD

## 2018-06-04 DIAGNOSIS — M32.19 OTHER SYSTEMIC LUPUS ERYTHEMATOSUS WITH OTHER ORGAN INVOLVEMENT (H): ICD-10-CM

## 2018-06-04 LAB
ALBUMIN UR-MCNC: 30 MG/DL
APPEARANCE UR: CLEAR
BACTERIA #/AREA URNS HPF: ABNORMAL /HPF
BASOPHILS # BLD AUTO: 0 10E9/L (ref 0–0.2)
BASOPHILS NFR BLD AUTO: 0.2 %
BILIRUB UR QL STRIP: NEGATIVE
COLOR UR AUTO: YELLOW
DIFFERENTIAL METHOD BLD: ABNORMAL
EOSINOPHIL # BLD AUTO: 0 10E9/L (ref 0–0.7)
EOSINOPHIL NFR BLD AUTO: 0.2 %
ERYTHROCYTE [DISTWIDTH] IN BLOOD BY AUTOMATED COUNT: 12.8 % (ref 10–15)
ERYTHROCYTE [SEDIMENTATION RATE] IN BLOOD BY WESTERGREN METHOD: 7 MM/H (ref 0–20)
GLUCOSE UR STRIP-MCNC: NEGATIVE MG/DL
HCT VFR BLD AUTO: 33.5 % (ref 35–47)
HGB BLD-MCNC: 11.6 G/DL (ref 11.7–15.7)
HGB UR QL STRIP: NEGATIVE
KETONES UR STRIP-MCNC: NEGATIVE MG/DL
LEUKOCYTE ESTERASE UR QL STRIP: NEGATIVE
LYMPHOCYTES # BLD AUTO: 0.5 10E9/L (ref 0.8–5.3)
LYMPHOCYTES NFR BLD AUTO: 5.8 %
MCH RBC QN AUTO: 35.6 PG (ref 26.5–33)
MCHC RBC AUTO-ENTMCNC: 34.6 G/DL (ref 31.5–36.5)
MCV RBC AUTO: 103 FL (ref 78–100)
MONOCYTES # BLD AUTO: 0.5 10E9/L (ref 0–1.3)
MONOCYTES NFR BLD AUTO: 5.3 %
NEUTROPHILS # BLD AUTO: 7.6 10E9/L (ref 1.6–8.3)
NEUTROPHILS NFR BLD AUTO: 88.5 %
NITRATE UR QL: NEGATIVE
NON-SQ EPI CELLS #/AREA URNS LPF: ABNORMAL /LPF
PH UR STRIP: 7 PH (ref 5–7)
PLATELET # BLD AUTO: 252 10E9/L (ref 150–450)
RBC # BLD AUTO: 3.26 10E12/L (ref 3.8–5.2)
RBC #/AREA URNS AUTO: ABNORMAL /HPF
SOURCE: ABNORMAL
SP GR UR STRIP: >1.03 (ref 1–1.03)
UROBILINOGEN UR STRIP-ACNC: 1 EU/DL (ref 0.2–1)
WBC # BLD AUTO: 8.6 10E9/L (ref 4–11)
WBC #/AREA URNS AUTO: ABNORMAL /HPF

## 2018-06-04 PROCEDURE — 85025 COMPLETE CBC W/AUTO DIFF WBC: CPT | Performed by: INTERNAL MEDICINE

## 2018-06-04 PROCEDURE — 81001 URINALYSIS AUTO W/SCOPE: CPT | Performed by: INTERNAL MEDICINE

## 2018-06-04 PROCEDURE — 82550 ASSAY OF CK (CPK): CPT | Performed by: INTERNAL MEDICINE

## 2018-06-04 PROCEDURE — 86225 DNA ANTIBODY NATIVE: CPT | Performed by: INTERNAL MEDICINE

## 2018-06-04 PROCEDURE — 86140 C-REACTIVE PROTEIN: CPT | Performed by: INTERNAL MEDICINE

## 2018-06-04 PROCEDURE — 86160 COMPLEMENT ANTIGEN: CPT | Performed by: INTERNAL MEDICINE

## 2018-06-04 PROCEDURE — 85652 RBC SED RATE AUTOMATED: CPT | Performed by: INTERNAL MEDICINE

## 2018-06-04 PROCEDURE — 84156 ASSAY OF PROTEIN URINE: CPT | Performed by: INTERNAL MEDICINE

## 2018-06-04 PROCEDURE — 36415 COLL VENOUS BLD VENIPUNCTURE: CPT | Performed by: INTERNAL MEDICINE

## 2018-06-04 PROCEDURE — 80053 COMPREHEN METABOLIC PANEL: CPT | Performed by: INTERNAL MEDICINE

## 2018-06-05 LAB
ALBUMIN SERPL-MCNC: 3.5 G/DL (ref 3.4–5)
ALP SERPL-CCNC: 48 U/L (ref 40–150)
ALT SERPL W P-5'-P-CCNC: 21 U/L (ref 0–50)
ANION GAP SERPL CALCULATED.3IONS-SCNC: 6 MMOL/L (ref 3–14)
AST SERPL W P-5'-P-CCNC: 22 U/L (ref 0–45)
BILIRUB SERPL-MCNC: 0.3 MG/DL (ref 0.2–1.3)
BUN SERPL-MCNC: 14 MG/DL (ref 7–30)
CALCIUM SERPL-MCNC: 8.1 MG/DL (ref 8.5–10.1)
CHLORIDE SERPL-SCNC: 105 MMOL/L (ref 94–109)
CK SERPL-CCNC: 91 U/L (ref 30–225)
CO2 SERPL-SCNC: 28 MMOL/L (ref 20–32)
CREAT SERPL-MCNC: 0.72 MG/DL (ref 0.52–1.04)
CREAT UR-MCNC: 155 MG/DL
CRP SERPL-MCNC: <2.9 MG/L (ref 0–8)
GFR SERPL CREATININE-BSD FRML MDRD: 87 ML/MIN/1.7M2
GLUCOSE SERPL-MCNC: 111 MG/DL (ref 70–99)
POTASSIUM SERPL-SCNC: 4.2 MMOL/L (ref 3.4–5.3)
PROT SERPL-MCNC: 6 G/DL (ref 6.8–8.8)
PROT UR-MCNC: 0.16 G/L
PROT/CREAT 24H UR: 0.1 G/G CR (ref 0–0.2)
SODIUM SERPL-SCNC: 139 MMOL/L (ref 133–144)

## 2018-06-06 LAB
C3 SERPL-MCNC: 60 MG/DL (ref 76–169)
C4 SERPL-MCNC: 13 MG/DL (ref 15–50)
DSDNA AB SER-ACNC: 1 IU/ML

## 2018-06-11 ENCOUNTER — OFFICE VISIT (OUTPATIENT)
Dept: RHEUMATOLOGY | Facility: CLINIC | Age: 43
End: 2018-06-11
Payer: COMMERCIAL

## 2018-06-11 VITALS
BODY MASS INDEX: 22.33 KG/M2 | DIASTOLIC BLOOD PRESSURE: 81 MMHG | WEIGHT: 134 LBS | SYSTOLIC BLOOD PRESSURE: 123 MMHG | OXYGEN SATURATION: 100 % | HEART RATE: 75 BPM | HEIGHT: 65 IN | RESPIRATION RATE: 14 BRPM

## 2018-06-11 DIAGNOSIS — Z79.899 HIGH RISK MEDICATIONS (NOT ANTICOAGULANTS) LONG-TERM USE: ICD-10-CM

## 2018-06-11 DIAGNOSIS — I73.00 RAYNAUD'S PHENOMENON WITHOUT GANGRENE: ICD-10-CM

## 2018-06-11 DIAGNOSIS — M32.19 OTHER SYSTEMIC LUPUS ERYTHEMATOSUS WITH OTHER ORGAN INVOLVEMENT (H): Primary | ICD-10-CM

## 2018-06-11 PROCEDURE — 99213 OFFICE O/P EST LOW 20 MIN: CPT | Performed by: INTERNAL MEDICINE

## 2018-06-11 RX ORDER — AZATHIOPRINE 50 MG/1
100 TABLET ORAL DAILY
Qty: 180 TABLET | Refills: 1 | Status: SHIPPED | OUTPATIENT
Start: 2018-06-11 | End: 2018-10-08

## 2018-06-11 RX ORDER — PREDNISONE 2.5 MG/1
2.5-5 TABLET ORAL DAILY
Qty: 180 TABLET | Refills: 1 | Status: SHIPPED | OUTPATIENT
Start: 2018-06-11 | End: 2018-10-08

## 2018-06-11 RX ORDER — HYDROXYCHLOROQUINE SULFATE 200 MG/1
TABLET, FILM COATED ORAL
Qty: 135 TABLET | Refills: 1 | Status: SHIPPED | OUTPATIENT
Start: 2018-06-11 | End: 2018-10-08

## 2018-06-11 RX ORDER — AMLODIPINE BESYLATE 5 MG/1
5 TABLET ORAL DAILY
Qty: 90 TABLET | Refills: 1 | Status: SHIPPED | OUTPATIENT
Start: 2018-06-11 | End: 2018-10-08

## 2018-06-11 NOTE — MR AVS SNAPSHOT
After Visit Summary   6/11/2018    Yovana Enriquez    MRN: [id number]           Patient Information     Date Of Birth          1975        Visit Information        Provider Department      6/11/2018 12:40 PM Bradford Colvin MD [facility]        Today's Diagnoses     Other systemic lupus erythematosus with other organ involvement (H)    -  1    Raynaud's phenomenon without gangrene        High risk medications (not anticoagulants) long-term use          Care Instructions    Rheumatology    Dr. Bradford Colvin         [facility]   (Monday)  [address]  [place] [address]       [facility]   (Tuesday)  [address] [place] [address]    [facility]   (Wed., Thurs., and Friday)  [address] [place] [address]    Phone number: [phone]  Thank you for choosing [place].  Qing Zapata CMA            Follow-ups after your visit        Your next 10 appointments already scheduled     Jul 09, 2018  6:15 PM CDT   US PELVIC COMPLETE W TRANSVAGINAL with BEUS1   [facility] ([facility])    [address] [id number] [phone]           Please bring a list of your medicines (including vitamins, minerals and over-the-counter drugs). Also, tell your doctor about any allergies you may have. Wear comfortable clothes and leave your valuables at home.  Adults: Drink six 8-ounce glasses of fluid one hour before your exam. Do NOT empty your bladder.  If you need to empty your bladder before your exam, try to release only a little bit of urine. Then, drink another 8oz glass of fluid.  Children: Children who are potty trained should drink at least 4 cups (32 oz) of liquid 45 minutes to one hour prior to the exam. The child s bladder must be full in order to achieve a diagnostic exam. If your child is very uncomfortable or has an urgent need to pee, please notify a technologist; they will try to find out  how much longer the wait may be and provide instructions to help relieve the pressure. Occasionally it is medically necessary to insert a urinary catheter to fill the bladder.  Please call the Imaging Department at your exam site with any questions.            Jul 12, 2018  1:00 PM CDT   Office Visit with Cephas Mawuena Agbeh, MD   [facility] [place] ([facility])    [address] [address]   [phone]           Bring a current list of meds and any records pertaining to this visit. For Physicals, please bring immunization records and any forms needing to be filled out. Please arrive 10 minutes early to complete paperwork.            Aug 15, 2018  4:20 PM CDT   (Arrive by 4:05 PM)   Return Visit with Ienz Sawyer MD   [facility] ([facility])    [address] [address]   [phone]            Oct 01, 2018  6:00 PM CDT   LAB with BE LAB   [facility] [place] ([facility])    [address] [address]   [phone]           Please do not eat 10-12 hours before your appointment if you are coming in fasting for labs on lipids, cholesterol, or glucose (sugar). This does not apply to pregnant women. Water, hot tea and black coffee (with nothing added) are okay. Do not drink other fluids, diet soda or chew gum.            Oct 08, 2018  2:00 PM CDT   Return Visit with Bradford Colvin MD   [facility] [place] ([facility])    [address] [address]   [phone]              Future tests that were ordered for you today     Open Future Orders        Priority Expected Expires Ordered    CBC with platelets differential Routine 9/4/2018 9/27/2018 6/11/2018    Comprehensive metabolic panel Routine 9/4/2018 9/27/2018 6/11/2018    CK total Routine 9/4/2018 9/27/2018 6/11/2018    Complement C3 Routine 9/4/2018  "9/27/2018 6/11/2018    DNA double stranded antibodies Routine 9/4/2018 9/27/2018 6/11/2018    CRP inflammation Routine 9/4/2018 9/27/2018 6/11/2018    Complement C4 Routine 9/4/2018 9/27/2018 6/11/2018    Erythrocyte sedimentation rate auto Routine 9/4/2018 9/27/2018 6/11/2018    Protein  random urine with Creat Ratio Routine 9/4/2018 9/27/2018 6/11/2018    UA reflex to Microscopic and Culture Routine 9/4/2018 9/27/2018 6/11/2018            Who to contact     If you have questions or need follow up information about today's clinic visit or your schedule please contact [facility] [place] directly at [phone].  Normal or non-critical lab and imaging results will be communicated to you by MyChart, letter or phone within 4 business days after the clinic has received the results. If you do not hear from us within 7 days, please contact the clinic through [employer] or phone. If you have a critical or abnormal lab result, we will notify you by phone as soon as possible.  Submit refill requests through V[employer]i or call your pharmacy and they will forward the refill request to us. Please allow 3 business days for your refill to be completed.          Additional Information About Your Visit        Vputi Information     [employer] gives you secure access to your electronic health record. If you see a primary care provider, you can also send messages to your care team and make appointments. If you have questions, please call your primary care clinic.  If you do not have a primary care provider, please call [phone] and they will assist you.        Care EveryWhere ID     This is your Care EveryWhere ID. This could be used by other organizations to access your [place] medical records  [id number]        Your Vitals Were     Pulse Respirations Height Pulse Oximetry BMI (Body Mass Index)       75 14 1.651 m (5' 5\") 100% 22.3 kg/m2        Blood Pressure from Last 3 Encounters:   06/11/18 123/81   04/05/18 127/82 "   02/05/18 124/84    Weight from Last 3 Encounters:   06/11/18 60.8 kg (134 lb)   04/05/18 61.7 kg (136 lb)   03/27/18 62.4 kg (137 lb 9.6 oz)                 Today's Medication Changes          These changes are accurate as of 6/11/18  1:08 PM.  If you have any questions, ask your nurse or doctor.               These medicines have changed or have updated prescriptions.        Dose/Directions    amLODIPine 5 MG tablet   Commonly known as:  NORVASC   This may have changed:  how much to take   Used for:  Raynaud's phenomenon without gangrene   Changed by:  Bradford Colvin MD        Dose:  5 mg   Take 1 tablet (5 mg) by mouth daily   Quantity:  90 tablet   Refills:  1       azaTHIOprine 50 MG tablet   Commonly known as:  IMURAN   This may have changed:  how much to take   Used for:  Other systemic lupus erythematosus with other organ involvement (H)   Changed by:  Bradford Colvin MD        Dose:  100 mg   Take 2 tablets (100 mg) by mouth daily   Quantity:  180 tablet   Refills:  1            Where to get your medicines      These [id number] [place] - [address]  [address] [place] [address]     Phone:  [phone]     amLODIPine 5 MG tablet    azaTHIOprine 50 MG tablet    hydroxychloroquine 200 MG tablet    predniSONE 2.5 MG tablet         Call your pharmacy to confirm that your medication is ready for pickup. It may take up to 24 hours for them to receive the prescription. If the prescription is not ready within 3 business days, please contact your clinic or your provider.     We will let you know when these medications are ready. If you don't hear back within 3 business days, please contact us.     Belimumab 200 MG/ML Soaj                Primary Care Provider Office Phone # Fax #    Bradford Mario PA-C [phone]       [address] [place] [address]        Equal Access to Services     BINTA HURD : rafal Rizzo  soto pimentelrosy rubi. So [facility] [phone].    ATENCIÓN: Si payton morales, tiene a dexter disposición servicios gratuitos de asistencia lingüística. Cee al [phone].    We comply with applicable federal civil rights laws and [place] laws. We do not discriminate on the basis of race, color, national origin, age, disability, sex, sexual orientation, or gender identity.            Thank you!     Thank you for choosing [facility]  for your care. Our goal is always to provide you with excellent care. Hearing back from our patients is one way we can continue to improve our services. Please take a few minutes to complete the written survey that you may receive in the mail after your visit with us. Thank you!             Your Updated Medication List - Protect others around you: Learn how to safely use, store and throw away your medicines at www.disposemymeds.org.          This list is accurate as of 6/11/18  1:08 PM.  Always use your most recent med list.                   Brand Name Dispense Instructions for use Diagnosis    * albuterol 108 (90 Base) MCG/ACT Inhaler    PROAIR HFA/PROVENTIL HFA/VENTOLIN HFA    1 Inhaler    Inhale 2 puffs into the lungs every 6 hours as needed for shortness of breath / dyspnea or wheezing    Acute bronchitis, unspecified organism       * albuterol 108 (90 Base) MCG/ACT Inhaler    PROAIR HFA/PROVENTIL HFA/VENTOLIN HFA    1 Inhaler    Inhale 2 puffs into the lungs every 4 hours as needed for shortness of breath / dyspnea or wheezing Use with spacer    Wheezing       amLODIPine 5 MG tablet    NORVASC    90 tablet    Take 1 tablet (5 mg) by mouth daily    Raynaud's phenomenon without gangrene       azaTHIOprine 50 MG tablet    IMURAN    180 tablet    Take 2 tablets (100 mg) by mouth daily    Other systemic lupus erythematosus with other organ involvement (H)       Belimumab 200 MG/ML Soaj     4 Syringe    Inject 200 mg  Subcutaneous every 7 days . Hold for signs of infection and seek medical attention. Autoinjector    Other systemic lupus erythematosus with other organ involvement (H)       blood glucose monitoring test strip    no brand specified    1 Month    Used for testing glucose 2-3 times daily    History of gestational diabetes mellitus, not currently pregnant       buPROPion 75 MG tablet    WELLBUTRIN    180 tablet    TAKE 1 TABLET BY MOUTH 2 TIMES DAILY    Anxiety       Calcium + D3 600-200 MG-UNIT Tabs      1 tablet daily        cevimeline 30 MG capsule    EVOXAC    180 capsule    Take 1 capsule (30 mg) by mouth 2 times daily    Sjogren's syndrome with keratoconjunctivitis sicca (H)       diclofenac 1 % Gel topical gel    VOLTAREN    100 g    Apply 2-4 grams to knees or shoulders four times daily as needed using enclosed dosing card.    Fibromyalgia, SLE (systemic lupus erythematosus) (H)       fluticasone 50 MCG/ACT spray    FLONASE    1 Bottle    Spray 1-2 sprays into both nostrils daily    Chronic rhinitis       hydroxychloroquine 200 MG tablet    PLAQUENIL    135 tablet    Hydroxychloroquine 200mg in the morning and 100mg in the evening.    Other systemic lupus erythematosus with other organ involvement (H)       losartan 25 MG tablet    COZAAR    90 tablet    TAKE 1 TABLET BY MOUTH DAILY    Hypertension goal BP (blood pressure) < 140/90       MICROLET LANCETS Misc     100 each    1 each daily.    Other abnormal glucose       MULTIVITAMIN PO      Take  by mouth.        nortriptyline 10 MG capsule    PAMELOR    90 capsule    TAKE 3 CAPSULES BY MOUTH AT BEDTIME    Numbness       predniSONE 2.5 MG tablet    DELTASONE    180 tablet    Take 1-2 tablets (2.5-5 mg) by mouth daily    Other systemic lupus erythematosus with other organ involvement (H)       pregabalin 150 MG capsule    LYRICA    270 capsule    Take 1 capsule (150 mg) by mouth 3 times daily . 3 month supply.    Fibromyalgia       ranitidine 300 MG tablet     ZANTAC    180 tablet    Take 1 tablet (300 mg) by mouth 2 times daily    Gastroesophageal reflux disease without esophagitis       spacer/aero-hold chamber mask Wendy     1 each    1 Adult size spacer to use with MDI inhalers.    Wheezing       * Notice:  This list has 2 medication(s) that are the same as other medications prescribed for you. Read the directions carefully, and ask your doctor or other care provider to review them with you.

## 2018-06-11 NOTE — PATIENT INSTRUCTIONS
Rheumatology    Dr. Bradford Colvin         [place] [facility]   (Monday)  [address]  [place] [address]       [facility]   (Tuesday)  [address]  [place] [address]    [facility]   (Wed., Thurs., and Friday)  [address] [place] [address]    Phone number: [phone]  Thank you for choosing [place].  Qing Zapata CMA

## 2018-06-29 DIAGNOSIS — K21.9 GASTROESOPHAGEAL REFLUX DISEASE WITHOUT ESOPHAGITIS: ICD-10-CM

## 2018-06-29 DIAGNOSIS — I10 HYPERTENSION GOAL BP (BLOOD PRESSURE) < 140/90: ICD-10-CM

## 2018-06-29 RX ORDER — LOSARTAN POTASSIUM 25 MG/1
TABLET ORAL
Qty: 90 TABLET | Refills: 0 | Status: SHIPPED | OUTPATIENT
Start: 2018-06-29 | End: 2018-11-30

## 2018-06-29 NOTE — TELEPHONE ENCOUNTER
Prescription approved per [facility] Refill Protocol.  Elizabeth Bergeron RN

## 2018-06-29 NOTE — TELEPHONE ENCOUNTER
"Requested Prescriptions   Pending Prescriptions Disp Refills     ranitidine (ZANTAC) 300 MG tablet [Pharmacy Med Name: RANITIDINE 300 MG TABLET] 180 tablet 3    Last Written Prescription Date:  3/27/18  Last Fill Quantity: 180,  # refills: 3   Last office visit: 2/5/2018 with prescribing provider:  2/5/18 ?   Future Office Visit:   Next 5 appointments (look out 90 days)     Jul 12, 2018  1:00 PM CDT   Office Visit with Cephas Mawuena Agbeh, MD   [facility] ([facility])    [address] [address]   [phone]                Sig: TAKE 1 TABLET BY MOUTH 2 TIMES DAILY    H2 Blockers Protocol Passed    6/29/2018  1:37 AM       Passed - Patient is age 12 or older       Passed - Recent (12 mo) or future (30 days) visit within the authorizing provider's specialty    Patient had office visit in the last 12 months or has a visit in the next 30 days with authorizing provider or within the authorizing provider's specialty.  See \"Patient Info\" tab in inbasket, or \"Choose Columns\" in Meds & Orders section of the refill encounter.            losartan (COZAAR) 25 MG tablet [Pharmacy Med Name: LOSARTAN POTASSIUM 25 MG TAB] 90 tablet 1    Last Written Prescription Date:  3/27/18  Last Fill Quantity: 90,  # refills: 1   Last office visit: 2/5/2018 with prescribing provider:  2/5/18 ?   Future Office Visit:   Next 5 appointments (look out 90 days)     Jul 12, 2018  1:00 PM CDT   Office Visit with Cephas Mawuena Agbeh, MD   [facility] [place] ([facility])    [address] [address]   [phone]                Sig: TAKE 1 TABLET BY MOUTH DAILY    Angiotensin-II Receptors Passed    6/29/2018  1:37 AM       Passed - Blood pressure under 140/90 in past 12 months    BP Readings from Last 3 Encounters:   06/11/18 123/81   04/05/18 127/82   02/05/18 124/84                Passed - Recent (12 mo) or future (30 days) visit within the authorizing " "provider's specialty    Patient had office visit in the last 12 months or has a visit in the next 30 days with authorizing provider or within the authorizing provider's specialty.  See \"Patient Info\" tab in inbasket, or \"Choose Columns\" in Meds & Orders section of the refill encounter.           Passed - Patient is age 18 or older       Passed - No active pregnancy on record       Passed - Normal serum creatinine on file in past 12 months    Recent Labs   Lab Test  06/04/18   1758   CR  0.72            Passed - Normal serum potassium on file in past 12 months    Recent Labs   Lab Test  06/04/18   1758   POTASSIUM  4.2                   Passed - No positive pregnancy test in past 12 months        "

## 2018-07-09 ENCOUNTER — MYC REFILL (OUTPATIENT)
Dept: PALLIATIVE MEDICINE | Facility: CLINIC | Age: 43
End: 2018-07-09

## 2018-07-09 ENCOUNTER — RADIANT APPOINTMENT (OUTPATIENT)
Dept: ULTRASOUND IMAGING | Facility: CLINIC | Age: 43
End: 2018-07-09
Attending: OBSTETRICS & GYNECOLOGY
Payer: COMMERCIAL

## 2018-07-09 DIAGNOSIS — N83.202 CYSTS OF BOTH OVARIES: ICD-10-CM

## 2018-07-09 DIAGNOSIS — N83.201 CYSTS OF BOTH OVARIES: ICD-10-CM

## 2018-07-09 DIAGNOSIS — D25.0 INTRAMURAL AND SUBMUCOUS LEIOMYOMA OF UTERUS: ICD-10-CM

## 2018-07-09 DIAGNOSIS — N92.6 IRREGULAR MENSES: ICD-10-CM

## 2018-07-09 DIAGNOSIS — D25.1 INTRAMURAL AND SUBMUCOUS LEIOMYOMA OF UTERUS: ICD-10-CM

## 2018-07-09 DIAGNOSIS — D25.1 INTRAMURAL LEIOMYOMA OF UTERUS: ICD-10-CM

## 2018-07-09 DIAGNOSIS — M79.7 FIBROMYALGIA: ICD-10-CM

## 2018-07-09 PROCEDURE — 76856 US EXAM PELVIC COMPLETE: CPT

## 2018-07-09 PROCEDURE — 76830 TRANSVAGINAL US NON-OB: CPT

## 2018-07-09 NOTE — TELEPHONE ENCOUNTER
Message from [place]:  Original authorizing provider: MD Luiza Greenhy LYN Enriquez would like a refill of the following medications:  pregabalin (LYRICA) 150 MG capsule [Keena Lockett MD]    Preferred pharmacy: [facility] [address] [facility], [place] - [address]    Comment:

## 2018-07-10 RX ORDER — PREGABALIN 150 MG/1
150 CAPSULE ORAL 3 TIMES DAILY
Qty: 270 CAPSULE | Refills: 1 | Status: SHIPPED | OUTPATIENT
Start: 2018-07-10 | End: 2019-04-10

## 2018-07-10 NOTE — TELEPHONE ENCOUNTER
Signed Prescriptions:                        Disp   Refills    pregabalin (LYRICA) 150 MG capsule         270 ca*1        Sig: Take 1 capsule (150 mg) by mouth 3 times daily . 3           month supply.  Authorizing Provider: TETE PANIAGUA MD  [facility]

## 2018-07-10 NOTE — TELEPHONE ENCOUNTER
Received fax request from [facility] pharmacy requesting refill(s) for pregabalin (LYRICA) 150 MG capsule    Last refilled on 2/6/18    Pt last seen on 1/10/18  Next appt scheduled for none    Fátima Pruett MA  [facility]      Will facilitate refill.

## 2018-07-11 NOTE — TELEPHONE ENCOUNTER
Faxed Rx to the   [facility] [id number] [facility] [place] - [address]  [address]  [place] [address]  Phone: [phone] Fax: [phone]    Pt is informed     Fátima Pruett MA  [facility]

## 2018-07-12 ENCOUNTER — OFFICE VISIT (OUTPATIENT)
Dept: OBGYN | Facility: CLINIC | Age: 43
End: 2018-07-12
Payer: COMMERCIAL

## 2018-07-12 VITALS
OXYGEN SATURATION: 98 % | WEIGHT: 133.6 LBS | SYSTOLIC BLOOD PRESSURE: 133 MMHG | HEART RATE: 91 BPM | BODY MASS INDEX: 22.23 KG/M2 | DIASTOLIC BLOOD PRESSURE: 85 MMHG

## 2018-07-12 DIAGNOSIS — D25.1 INTRAMURAL LEIOMYOMA OF UTERUS: Primary | ICD-10-CM

## 2018-07-12 DIAGNOSIS — N83.202 CYST OF LEFT OVARY: ICD-10-CM

## 2018-07-12 PROCEDURE — 99214 OFFICE O/P EST MOD 30 MIN: CPT | Performed by: OBSTETRICS & GYNECOLOGY

## 2018-07-12 NOTE — PROGRESS NOTES
Yovana is a 43 year old  referred here by self for consultation regarding F/U of ultrasound results as bellow.  Her bleeding has improved in the last 2 months. Occasional spotting. Denies any pelvic pain or pressure.  H/O endometrial Ablation in ..  Results for orders placed or performed in visit on 18   US Pelvic Complete w Transvaginal    Narrative    ULTRASOUND PELVIC COMPLETE WITH TRANSVAGINAL IMAGING  2018 8:16 PM      HISTORY: Follow up fibroids.    TECHNIQUE:  Transvaginal images were performed to better evaluate the  patient's uterus, ovaries and endometrial stripe.    COMPARISON: Pelvic ultrasound performed 2018.    FINDINGS:  The uterus is measured at 10.5 x 5.1 x 6.9 cm. The  myometrium is heterogeneous, and there are at least five uterine  fibroids identified. The largest individual fibroid is intramural in  location and located within the uterine body on the left, measuring  4.2 cm. A total of three discrete fibroids were visualized on the  previous exam, likely due to differences in technique. There are  several small nonspecific cystic foci noted within the endometrium,  possibly related to blood products within the endometrial canal.  Endometrial stripe measures 1.3 cm and is otherwise within normal  limits for a premenopausal patient. A small area of mildly increased  vascularity within the cervix is noted. The right ovary is  unremarkable. A previously noted complex right ovarian cystic lesion  has resolved. The left ovary contains a simple cyst measuring 5 cm. No  solid adnexal masses are identified. No free pelvic fluid is present.  Color Doppler blood flow is noted within the ovaries bilaterally.       Impression    IMPRESSION:   1. Multiple uterine fibroids are again identified, with the largest  measuring 4.2 cm.  2. There is a left ovarian simple cyst measuring 5 cm.   3. There are several nonspecific cystic foci noted within the  endometrium, possibly related to  blood products within the endometrial  canal. These findings are new since the previous exam. A followup  pelvic ultrasound in 6-12 weeks may be helpful to ensure resolution.   4. There is a small area of slightly increased vascularity noted  within the cervix. This finding is not well visualized, and is of  uncertain clinical significance, but could also be further evaluated  at followup in 6-12 weeks.    ROBIN PRYOR MD       ROS: Ten point review of systems was reviewed and negative except the above.    Gyne: - abn pap (last pap ), - STD's    Past Medical History:   Diagnosis Date     Chronic constipation 12/16/2013     Dysmenorrhea 10/1/2012     Fibromyalgia 11/15/2013     Health Care Home 3/19/2014    **See Letters for [facility] Care Plan: Emergency Care Plan       High risk medication use 9/13/2013     History of gestational diabetes mellitus, not currently pregnant 10/1/2012     History of ovarian cyst 10/1/2012     Hyperlipidemia LDL goal <130 10/18/2012     Hypertension goal BP (blood pressure) < 140/90 1/19/2015     Intramural leiomyoma of uterus 10/5/2012     Irritable bowel syndrome 3/11/2014    Patient states no history colonoscopy       Menorrhagia 10/1/2012     Non-celiac gluten sensitivity 2/8/2016     PONV (postoperative nausea and vomiting)      Subclinical hyperthyroidism 1/17/2013     Systemic lupus erythematosus (H) 4/23/2012     Past Surgical History:   Procedure Laterality Date     COLONOSCOPY N/A 2/20/2015    Procedure: COMBINED COLONOSCOPY, SINGLE OR MULTIPLE BIOPSY/POLYPECTOMY BY BIOPSY;  Surgeon: Fred Cullen MD;  Location: [facility]     COLONOSCOPY WITH CO2 INSUFFLATION N/A 2/20/2015    Procedure: COLONOSCOPY WITH CO2 INSUFFLATION;  Surgeon: Fred Cullen MD;  Location: [facility] OR     ENT SURGERY  10-24-14    Bottom lip biopsies     ESOPHAGOSCOPY, GASTROSCOPY, DUODENOSCOPY (EGD), COMBINED N/A 2/20/2015    Procedure: COMBINED ESOPHAGOSCOPY, GASTROSCOPY, DUODENOSCOPY (EGD),  BIOPSY SINGLE OR MULTIPLE;  Surgeon: Fred Cullen MD;  Location: [facility] OR     HC BREATH HYDROGEN TEST  12/31/2013    Procedure: HYDROGEN BREATH TEST;  Surgeon: Camden Almazan MD;  Location: [facility]     HC HYSTEROSCOPY, SURGICAL; W/ ENDOMETRIAL ABLATION, ANY METHOD  10-19-12     SHOULDER SURGERY Right     R shoulder     TUBAL LIGATION  2004     Patient Active Problem List   Diagnosis     Systemic lupus erythematosus (H)     Menorrhagia     History of ovarian cyst     Dysmenorrhea     History of gestational diabetes mellitus, not currently pregnant     Intramural leiomyoma of uterus     Hyperlipidemia LDL goal <130     Subclinical hyperthyroidism     Anxiety     High risk medication use     Fibromyalgia     Chronic constipation     Irritable bowel syndrome     Health Care Home     Hypertension goal BP (blood pressure) < 140/90     Non-celiac gluten sensitivity     Raynaud's phenomenon without gangrene     Sjogren's syndrome (H)     Intramural and submucous leiomyoma of uterus       ALL/Meds: Her medication and allergy histories were reviewed and are documented in their appropriate chart areas.    SH: - tob, - EtOH,     FH: Her family history was reviewed and documented in its appropriate chart area.    PE: /85  Pulse 91  Wt 133 lb 9.6 oz (60.6 kg)  SpO2 98%  Breastfeeding? No  BMI 22.23 kg/m2  Body mass index is 22.23 kg/(m^2).      General:  WNWD female, NAD  Alert  Oriented x 3  Gait:  Normal  Skin:  Normal skin turgor  HEENT:  NC/AT, EOMI  Abdomen:  Non-tender, non-distended.  Pelvic exam:  Not performed  Extremities:  No clubbing, no cyanosis and no edema.      A/P    ICD-10-CM    1. Intramural leiomyoma of uterus D25.1 US Pelvic Complete with Transvaginal   2. Cyst of left ovary N83.202 US Pelvic Complete with Transvaginal     Discussed ovarian cysts.  Explained the difference between functional cysts, benign and malignant cystic tumors.  Discussed the increased concern associated  with increasing complexity.  Discussed the risk of rupture and torsion associated with the different types and sizes of cysts.  Explained that the only way to confirm the type of cyst is with surgical removal.  Discussed conservative management  with repeat imaging.   Her previous ovarian cysts have resolved over time.  Her Fibroids are stable.     ACOG pamphlets were provided  Will manage expectantly and repeat ultrasound in 12 weeks.  25 minutes was spent face to face with the patient today discussing her history, diagnosis, and follow-up plan as noted above.  Over 50% of the visit was spent in counseling and coordination of care.    Total Visit Time: 30 minutes.    CEPHAS AGBEH, MD.      CEPHAS AGBEH, MD.

## 2018-07-12 NOTE — PATIENT INSTRUCTIONS
If you have any questions regarding your visit, Please contact your care team.    Women s Health CLINIC HOURS TELEPHONE NUMBER   Cephas Agbeh, M.D.    Radha Johnson -         Monday-[facility]  8:00 am - 5 pm  Tuesday- [facility]  8:00am- 5 pm  Wednesday- Off  Thursday- [facility]  8:00 am- 5 pm  Friday-[place]  8:00 am 5 pm [facility]  [address]  [place], [place] [address] [phone] ask for [facility]    Imaging [phone]    [facility]  [address]  [place] [id number] [phone]  Imaging [phone]     Urgent Care locations:    [facility] Saturday and Sunday   9 am - 5 pm    Monday-Friday   12 pm - 8 pm  Saturday and Sunday   9 am - 5 pm   [phone]       If you need a medication refill, please contact your pharmacy. Please allow 3 business days for your refill to be completed.  As always, Thank you for trusting us with your healthcare needs!

## 2018-07-12 NOTE — MR AVS SNAPSHOT
After Visit Summary   7/12/2018    Yovana Enriquez    MRN: [id number]           Patient Information     Date Of Birth          1975        Visit Information        Provider Department      7/12/2018 1:00 PM Agbeh, Cephas Mawuena, MD [facility]        Care Instructions                                                           If you have any questions regarding your visit, Please contact your care team.    [facility] CLINIC HOURS TELEPHONE NUMBER   Cephas Agbeh, M.D.    Radha - FERNANDA Johnson -         Monday-[facility]  8:00 am - 5 pm  Tuesday- [facility]  8:00am- 5 pm  Wednesday- Off  Thursday- [facility]  8:00 am- 5 pm  Friday-[place]  8:00 am 5 pm [facility]  [address]  [place], [place] [address] [phone] ask for [facility]    Imaging [phone]    [facility]  [address]  [place] [address] [phone]  Imaging [phone]     Urgent Care locations:    [facility] Saturday and Sunday   9 am - 5 pm    Monday-Friday   12 pm - 8 pm  Saturday and Sunday   9 am - 5 pm   [phone]       If you need a medication refill, please contact your pharmacy. Please allow 3 business days for your refill to be completed.  As always, Thank you for trusting us with your healthcare needs!            Follow-ups after your visit        Your next 10 appointments already scheduled     Aug 15, 2018  4:20 PM CDT   (Arrive by 4:05 PM)   Return Visit with Inez Sawyer MD   [facility] ([facility])    [address] [facility] [address] [phone]            Oct 01, 2018  6:00 PM CDT   LAB with BE LAB   [facility] [place] ([facility] [place])    [address]  [place] [address] [phone]           Please do not eat 10-12 hours before your appointment if  you are coming in fasting for labs on lipids, cholesterol, or glucose (sugar). This does not apply to pregnant women. Water, hot tea and black coffee (with nothing added) are okay. Do not drink other fluids, diet soda or chew gum.            Oct 08, 2018  2:00 PM CDT   Return Visit with Bradford Colvin MD   [facility] [place] ([facility])    [address]  [place] [address] [phone]              Who to contact     If you have questions or need follow up information about today's clinic visit or your schedule please contact [facility] directly at [phone].  Normal or non-critical lab and imaging results will be communicated to you by Cloudword[employer]art, letter or phone within 4 business days after the clinic has received the results. If you do not hear from us within 7 days, please contact the clinic through Cloudword[employer]art or phone. If you have a critical or abnormal lab result, we will notify you by phone as soon as possible.  Submit refill requests through [employer] or call your pharmacy and they will forward the refill request to us. Please allow 3 business days for your refill to be completed.          Additional Information About Your Visit        [employer] Information     [employer] gives you secure access to your electronic health record. If you see a primary care provider, you can also send messages to your care team and make appointments. If you have questions, please call your primary care clinic.  If you do not have a primary care provider, please call [phone] and they will assist you.        Care EveryWhere ID     This is your Care EveryWhere ID. This could be used by other organizations to access your [facility] medical records  [id number]        Your Vitals Were     Pulse Pulse Oximetry Breastfeeding? BMI (Body Mass Index)          91 98% No 22.23 kg/m2         Blood Pressure from Last 3 Encounters:   07/12/18 133/85   06/11/18 123/81   04/05/18 127/82    Weight from  Last 3 Encounters:   07/12/18 133 lb 9.6 oz (60.6 kg)   06/11/18 134 lb (60.8 kg)   04/05/18 136 lb (61.7 kg)              Today, you had the following     No orders found for display         Today's Medication Changes          These changes are accurate as of 7/12/18  1:12 PM.  If you have any questions, ask your nurse or doctor.               These medicines have changed or have updated prescriptions.        Dose/Directions    albuterol 108 (90 Base) MCG/ACT Inhaler   Commonly known as:  PROAIR HFA/PROVENTIL HFA/VENTOLIN HFA   This may have changed:  Another medication with the same name was removed. Continue taking this medication, and follow the directions you see here.   Used for:  Wheezing   Changed by:  Agbeh, Cephas Mawuena, MD        Dose:  2 puff   Inhale 2 puffs into the lungs every 4 hours as needed for shortness of breath / dyspnea or wheezing Use with spacer   Quantity:  1 Inhaler   Refills:  0                Primary Care Provider Office Phone # Fax #    Bradford Mario PA-C [phone]       [address] [facility] [address]        Equal Access to Services     [facility] AH: Hadii aad ku hadasho Soomaali, waaxda luqadaha, qaybta kaalmada adeegyada, waxay elliot haysylvestern yris [place]. So [facility] [phone].    ATENCIÓN: Si habla español, tiene a dexter disposición servicios gratuitos de asistencia lingüística. Llame al [phone].    We comply with applicable federal civil rights laws and [place] laws. We do not discriminate on the basis of race, color, national origin, age, disability, sex, sexual orientation, or gender identity.            Thank you!     Thank you for choosing [facility]  for your care. Our goal is always to provide you with excellent care. Hearing back from our patients is one way we can continue to improve our services. Please take a few minutes to complete the written survey that you may receive in the mail after your visit with us. Thank  you!             Your Updated Medication List - Protect others around you: Learn how to safely use, store and throw away your medicines at www.disposemymeds.org.          This list is accurate as of 7/12/18  1:12 PM.  Always use your most recent med list.                   Brand Name Dispense Instructions for use Diagnosis    albuterol 108 (90 Base) MCG/ACT Inhaler    PROAIR HFA/PROVENTIL HFA/VENTOLIN HFA    1 Inhaler    Inhale 2 puffs into the lungs every 4 hours as needed for shortness of breath / dyspnea or wheezing Use with spacer    Wheezing       amLODIPine 5 MG tablet    NORVASC    90 tablet    Take 1 tablet (5 mg) by mouth daily    Raynaud's phenomenon without gangrene       azaTHIOprine 50 MG tablet    IMURAN    180 tablet    Take 2 tablets (100 mg) by mouth daily    Other systemic lupus erythematosus with other organ involvement (H)       Belimumab 200 MG/ML Soaj     4 Syringe    Inject 200 mg Subcutaneous every 7 days . Hold for signs of infection and seek medical attention. Autoinjector    Other systemic lupus erythematosus with other organ involvement (H)       blood glucose monitoring test strip    no brand specified    1 Month    Used for testing glucose 2-3 times daily    History of gestational diabetes mellitus, not currently pregnant       buPROPion 75 MG tablet    WELLBUTRIN    180 tablet    TAKE 1 TABLET BY MOUTH 2 TIMES DAILY    Anxiety       Calcium + D3 600-200 MG-UNIT Tabs      1 tablet daily        cevimeline 30 MG capsule    EVOXAC    180 capsule    Take 1 capsule (30 mg) by mouth 2 times daily    Sjogren's syndrome with keratoconjunctivitis sicca (H)       diclofenac 1 % Gel topical gel    VOLTAREN    100 g    Apply 2-4 grams to knees or shoulders four times daily as needed using enclosed dosing card.    Fibromyalgia, SLE (systemic lupus erythematosus) (H)       fluticasone 50 MCG/ACT spray    FLONASE    1 Bottle    Spray 1-2 sprays into both nostrils daily    Chronic rhinitis        hydroxychloroquine 200 MG tablet    PLAQUENIL    135 tablet    Hydroxychloroquine 200mg in the morning and 100mg in the evening.    Other systemic lupus erythematosus with other organ involvement (H)       losartan 25 MG tablet    COZAAR    90 tablet    TAKE 1 TABLET BY MOUTH DAILY    Hypertension goal BP (blood pressure) < 140/90       MICROLET LANCETS Misc     100 each    1 each daily.    Other abnormal glucose       MULTIVITAMIN PO      Take  by mouth.        nortriptyline 10 MG capsule    PAMELOR    90 capsule    TAKE 3 CAPSULES BY MOUTH AT BEDTIME    Numbness       predniSONE 2.5 MG tablet    DELTASONE    180 tablet    Take 1-2 tablets (2.5-5 mg) by mouth daily    Other systemic lupus erythematosus with other organ involvement (H)       pregabalin 150 MG capsule    LYRICA    270 capsule    Take 1 capsule (150 mg) by mouth 3 times daily . 3 month supply.    Fibromyalgia       ranitidine 300 MG tablet    ZANTAC    180 tablet    TAKE 1 TABLET BY MOUTH 2 TIMES DAILY    Gastroesophageal reflux disease without esophagitis       spacer/aero-hold chamber mask Wendy     1 each    1 Adult size spacer to use with [facility]I inhalers.    Wheezing

## 2018-07-31 ENCOUNTER — MYC MEDICAL ADVICE (OUTPATIENT)
Dept: GASTROENTEROLOGY | Facility: CLINIC | Age: 43
End: 2018-07-31

## 2018-07-31 NOTE — TELEPHONE ENCOUNTER
Having BM's 3-4 per hour. Took Imodium, has taken 3  Today. Today is first day she has tired. Tried activated charcoal, has had no relief as of now. Nauseated, cramping in abdomen. No vomiting. No blood in stools, last dose of Miralax yesterday morning.     Felt run down last night, no fevers, feeling fatigued. Forcing self to eat and drink, but little to no desire to eat.     Offered appointment for tomorrow with Lolly Sharif.     Patient would like to schedule.     08/01/2018 at 12p. Scheduled.

## 2018-08-11 DIAGNOSIS — R20.0 NUMBNESS: ICD-10-CM

## 2018-08-13 NOTE — TELEPHONE ENCOUNTER
"Requested Prescriptions   Pending Prescriptions Disp Refills     nortriptyline (PAMELOR) 10 MG capsule [Pharmacy Med Name: NORTRIPTYLINE HCL 10 MG CAP] 90 capsule 1    Last Written Prescription Date:  07/08/18  Last Fill Quantity: 90,  # refills: 0   Last office visit: 2/5/2018 with prescribing provider:  AZUCENA Mario   Future Office Visit:   Next 5 appointments (look out 90 days)     Oct 08, 2018  2:00 PM CDT   [facility] with Bradford Colvin MD   [facility]    [address] [address]   [phone]                  Sig: TAKE 3 CAPSULES BY MOUTH AT BEDTIME    Tricyclic Agents ( Annual appt and no PHQ9) Passed    8/11/2018 12:31 PM       Passed - Blood Pressure under 140/90 in past 12 mos    BP Readings from Last 3 Encounters:   07/12/18 133/85   06/11/18 123/81   04/05/18 127/82                Passed - Recent (12 mo) or future (30 days) visit within authorizing provider's specialty    Patient had office visit in the last 12 months or has a visit in the next 30 days with authorizing provider or within the authorizing provider's specialty.  See \"Patient Info\" tab in inbasket, or \"Choose Columns\" in Meds & Orders section of the refill encounter.           Passed - Patient is age 18 or older       Passed - Patient is not pregnant       Passed - No positive pregnancy test on record in past 12 mos          "

## 2018-08-13 NOTE — TELEPHONE ENCOUNTER
Routing refill request to provider for review/approval because:  Drug not on the [facility] refill protocol for numbness.  Elizabeth Bergeron RN

## 2018-08-14 ENCOUNTER — TELEPHONE (OUTPATIENT)
Dept: GASTROENTEROLOGY | Facility: CLINIC | Age: 43
End: 2018-08-14

## 2018-08-14 RX ORDER — NORTRIPTYLINE HCL 10 MG
CAPSULE ORAL
Qty: 90 CAPSULE | Refills: 1 | Status: SHIPPED | OUTPATIENT
Start: 2018-08-14 | End: 2018-11-08

## 2018-08-15 ENCOUNTER — TELEPHONE (OUTPATIENT)
Dept: PALLIATIVE MEDICINE | Facility: CLINIC | Age: 43
End: 2018-08-15

## 2018-08-15 ENCOUNTER — OFFICE VISIT (OUTPATIENT)
Dept: GASTROENTEROLOGY | Facility: CLINIC | Age: 43
End: 2018-08-15
Payer: COMMERCIAL

## 2018-08-15 VITALS
TEMPERATURE: 98.5 F | RESPIRATION RATE: 16 BRPM | DIASTOLIC BLOOD PRESSURE: 80 MMHG | SYSTOLIC BLOOD PRESSURE: 117 MMHG | BODY MASS INDEX: 21.58 KG/M2 | HEART RATE: 88 BPM | HEIGHT: 66 IN | OXYGEN SATURATION: 95 % | WEIGHT: 134.3 LBS

## 2018-08-15 DIAGNOSIS — K63.8219 SMALL INTESTINAL BACTERIAL OVERGROWTH: Primary | ICD-10-CM

## 2018-08-15 DIAGNOSIS — M76.892 ENTHESOPATHY OF HIP REGION ON BOTH SIDES: Primary | ICD-10-CM

## 2018-08-15 DIAGNOSIS — M76.891 ENTHESOPATHY OF HIP REGION ON BOTH SIDES: Primary | ICD-10-CM

## 2018-08-15 ASSESSMENT — PAIN SCALES - GENERAL: PAINLEVEL: NO PAIN (0)

## 2018-08-15 NOTE — TELEPHONE ENCOUNTER
Pt is requesting a hip injection.   Routing to care team for review.         Larissa NICHOLSON    [facility]

## 2018-08-15 NOTE — MR AVS SNAPSHOT
After Visit Summary   8/15/2018    Yovana Enriquez    MRN: [id number]           Patient Information     Date Of Birth          1975        Visit Information        Provider Department      8/15/2018 12:20 PM Inez Sawyer MD [facility]        Today's Diagnoses     Small intestinal bacterial overgrowth    -  1      Care Instructions    Plan  ---Please undergo biofeedback therapy  ---Obtain a hydrogen breath test to check for recurrent SIBO  ---If positive, will plan for an MR enterography   ---If you have at least 4 episodes of SIBO per year, will consider prophylactic antibiotics on a rotating basis   ---Rifaximin prescribed   ---Maximize Miralax to at least twice daily to achieve 1-2 BMs per day  ---Nutrition referral to Yadira Lopez regarding SIBO/FODMAP diet   ---Check CBC, B12, folate    Return for follow up in 3 months     Hilaria Pardo-FERNANDA care coordinator [phone]      Physical Therapists that specialize in pelvic floor therapy include:     -Rossana Dent PT [facility] [phone]   -Destini Burt [facility] [phone]   -Josselin Haji, DPT [facility] [phone]   -Milagros Figueroa, PT [facility] [phone]   -Trupti Jean Baptiste, PT [facility] [phone]   -[facility] PT [place] [phone]             Follow-ups after your visit        Your next 10 appointments already scheduled     Oct 01, 2018  6:00 PM CDT   LAB with BE LAB   [facility] [place] ([facility] [place])    [address] [facility]  [place] [id number] [phone]           Please do not eat 10-12 hours before your appointment if you are coming in fasting for labs on lipids, cholesterol, or glucose (sugar). This does not apply to pregnant women. Water, hot tea and black coffee (with nothing added) are okay. Do not drink other fluids, diet soda or chew gum.            Oct 08, 2018  2:00 PM CDT   Return Visit with Bradford Colvin MD   [facility]  [place] ([facility])    [address] [facility] [facility]  [place] [address]   [phone]            Oct 10, 2018  6:30 PM CDT   US PELVIC COMPLETE W TRANSVAGINAL with BEUS1   [facility] [place] ([facility])    [address] [facility] [address]  [place] [address]   [phone]           Please bring a list of your medicines (including vitamins, minerals and over-the-counter drugs). Also, tell your doctor about any allergies you may have. Wear comfortable clothes and leave your valuables at home.  Adults: Drink four 8-ounce glasses of fluid an hour before your exam. If you need to empty your bladder before your exam, try to release only a little urine. Then, drink another glass of fluid.  Children: Children who are potty trained up to 6 years old should drink at least 2 cups (16 oz) of water/non-carbonated beverage 30 minutes prior to the exam. Children who are 6-10 years should drink at least 3 cups (24 oz) of water/non-carbonated beverage 45 minutes prior to the exam. Children who are 10 years or older should drink at least 4 cups (32 oz) of water/non-carbonated beverage 45 minutes prior to the exam. If your child is very uncomfortable or has an urgent need to pee, please notify a technologist; they will try to find out how much longer the wait may be and provide instructions to help relieve the pressure.  Please call the Imaging Department at your exam site with any questions.              Future tests that were ordered for you today     Open Future Orders        Priority Expected Expires Ordered    Vitamin B12 Routine 8/15/2018 9/14/2018 8/15/2018    Folate Routine 8/15/2018 9/14/2018 8/15/2018    CBC with platelets differential Routine 8/15/2018 9/14/2018 8/15/2018            Who to contact     Please call your clinic at [phone] to:    Ask questions about your health    Make or cancel appointments    Discuss your medicines    Learn about your test results    Speak to your doctor  "           Additional Information About Your Visit        [employer] Information     [employer] gives you secure access to your electronic health record. If you see a primary care provider, you can also send messages to your care team and make appointments. If you have questions, please call your primary care clinic.  If you do not have a primary care provider, please call [phone] and they will assist you.      [employer]omer is an electronic gateway that provides easy, online access to your medical records. With [employer], you can request a clinic appointment, read your test results, renew a prescription or communicate with your care team.     To access your existing account, please contact your [facility] or call [phone] for assistance.        Care EveryWhere ID     This is your Care EveryWhere ID. This could be used by other organizations to access your [facility] medical records  [id number]        Your Vitals Were     Pulse Temperature Respirations Height Pulse Oximetry BMI (Body Mass Index)    88 98.5  F (36.9  C) (Oral) 16 5' 6.14\" 95% 21.58 kg/m2       Blood Pressure from Last 3 Encounters:   08/15/18 117/80   07/12/18 133/85   06/11/18 123/81    Weight from Last 3 Encounters:   08/15/18 134 lb 4.8 oz   07/12/18 133 lb 9.6 oz   06/11/18 134 lb                 Today's Medication Changes          These changes are accurate as of 8/15/18  1:37 PM.  If you have any questions, ask your nurse or doctor.               Start taking these medicines.        Dose/Directions    rifaximin 550 MG Tabs tablet   Commonly known as:  XIFAXAN   Used for:  Small intestinal bacterial overgrowth   Started by:  Inez Sawyer MD        Dose:  550 mg   Take 1 tablet (550 mg) by mouth 3 times daily for 10 days   Quantity:  30 tablet   Refills:  0            Where to get your medicines      These medications were [facility] to [id number] [place] - [address]  [address] [place] " [address]     Phone:  [phone]     rifaximin 550 MG Tabs tablet                Primary Care Provider Office Phone # Fax #    Bradford Mario PA-C [phone]       [address]  [place] [address]        Equal Access to Services     [facility] Center[facility]EMES[facility] : Hadii aad ku hadasho Soomaali, waaxda luqadaha, qaybta kaalmada adeegyada, waxay idiin hayaan adeeg kaitlin la'sylvestern . So [facility] [phone].    ATENCIÓN: Si habla español, tiene a dexter disposición servicios gratuitos de asistencia lingüística. [facility] [phone].    We comply with applicable federal civil rights laws and [place] laws. We do not discriminate on the basis of race, color, national origin, age, disability, sex, sexual orientation, or gender identity.            Thank you!     Thank you for choosing [facility]  for your care. Our goal is always to provide you with excellent care. Hearing back from our patients is one way we can continue to improve our services. Please take a few minutes to complete the written survey that you may receive in the mail after your visit with us. Thank you!             Your Updated Medication List - Protect others around you: Learn how to safely use, store and throw away your medicines at www.disposemymeds.org.          This list is accurate as of 8/15/18  1:37 PM.  Always use your most recent med list.                   Brand Name Dispense Instructions for use Diagnosis    albuterol 108 (90 Base) MCG/ACT inhaler    PROAIR HFA/PROVENTIL HFA/VENTOLIN HFA    1 Inhaler    Inhale 2 puffs into the lungs every 4 hours as needed for shortness of breath / dyspnea or wheezing Use with spacer    Wheezing       amLODIPine 5 MG tablet    NORVASC    90 tablet    Take 1 tablet (5 mg) by mouth daily    Raynaud's phenomenon without gangrene       azaTHIOprine 50 MG tablet    IMURAN    180 tablet    Take 2 tablets (100 mg) by mouth daily    Other systemic lupus erythematosus with other organ  involvement (H)       Belimumab 200 MG/ML Soaj     4 Syringe    Inject 200 mg Subcutaneous every 7 days . Hold for signs of infection and seek medical attention. Autoinjector    Other systemic lupus erythematosus with other organ involvement (H)       blood glucose monitoring test strip    no brand specified    1 Month    Used for testing glucose 2-3 times daily    History of gestational diabetes mellitus, not currently pregnant       buPROPion 75 MG tablet    WELLBUTRIN    180 tablet    TAKE 1 TABLET BY MOUTH 2 TIMES DAILY    Anxiety       Calcium + D3 600-200 MG-UNIT Tabs      1 tablet daily        cevimeline 30 MG capsule    EVOXAC    180 capsule    Take 1 capsule (30 mg) by mouth 2 times daily    Sjogren's syndrome with keratoconjunctivitis sicca (H)       diclofenac 1 % Gel topical gel    VOLTAREN    100 g    Apply 2-4 grams to knees or shoulders four times daily as needed using enclosed dosing card.    Fibromyalgia, SLE (systemic lupus erythematosus) (H)       fluticasone 50 MCG/ACT spray    FLONASE    1 Bottle    Spray 1-2 sprays into both nostrils daily    Chronic rhinitis       hydroxychloroquine 200 MG tablet    PLAQUENIL    135 tablet    Hydroxychloroquine 200mg in the morning and 100mg in the evening.    Other systemic lupus erythematosus with other organ involvement (H)       losartan 25 MG tablet    COZAAR    90 tablet    TAKE 1 TABLET BY MOUTH DAILY    Hypertension goal BP (blood pressure) < 140/90       MICROLET LANCETS Misc     100 each    1 each daily.    Other abnormal glucose       MULTIVITAMIN PO      Take  by mouth.        nortriptyline 10 MG capsule    PAMELOR    90 capsule    TAKE 3 CAPSULES BY MOUTH AT BEDTIME    Numbness       predniSONE 2.5 MG tablet    DELTASONE    180 tablet    Take 1-2 tablets (2.5-5 mg) by mouth daily    Other systemic lupus erythematosus with other organ involvement (H)       pregabalin 150 MG capsule    LYRICA    270 capsule    Take 1 capsule (150 mg) by mouth 3  times daily . 3 month supply.    Fibromyalgia       ranitidine 300 MG tablet    ZANTAC    180 tablet    TAKE 1 TABLET BY MOUTH 2 TIMES DAILY    Gastroesophageal reflux disease without esophagitis       rifaximin 550 MG Tabs tablet    XIFAXAN    30 tablet    Take 1 tablet (550 mg) by mouth 3 times daily for 10 days    Small intestinal bacterial overgrowth       spacer/aero-hold chamber mask Wendy     1 each    1 Adult size spacer to use with [facility] inhalers.    Wheezing

## 2018-08-15 NOTE — TELEPHONE ENCOUNTER
Patient has a history of SI Joint, Piriformis and Hip Bursa injections. Routing to Dr. Lockett to review request as she may need to be evaluated before order is placed. Last office visit was on 1/10/18 and she had bl hip bursa injections at that time.    Rosa Isela Hayes, AVNIN, RN  Care Coordinator  [facility] [place]

## 2018-08-15 NOTE — NURSING NOTE
"Chief Complaint   Patient presents with     Gastrointestinal Problem     Irritable Bowel Syndrome       Vitals:    08/15/18 1233   BP: 117/80   BP Location: Left arm   Patient Position: Sitting   Cuff Size: Adult Regular   Pulse: 88   Resp: 16   Temp: 98.5  F (36.9  C)   TempSrc: Oral   SpO2: 95%   Weight: 60.9 kg (134 lb 4.8 oz)   Height: 1.68 m (5' 6.14\")       Body mass index is 21.58 kg/(m^2).      Hyun Corley LPN                          "

## 2018-08-15 NOTE — PATIENT INSTRUCTIONS
Plan  ---Please undergo biofeedback therapy  ---Obtain a hydrogen breath test to check for recurrent SIBO  ---If positive, will plan for an MR enterography   ---If you have at least 4 episodes of SIBO per year, will consider prophylactic antibiotics on a rotating basis   ---Rifaximin prescribed   ---Maximize Miralax to at least twice daily to achieve 1-2 BMs per day  ---Nutrition referral to Yadira Lopez regarding SIBO/FODMAP diet   ---Check CBC, B12, folate    Return for follow up in 3 months     Hilaria Pardo-RN care coordinator [phone]      Physical Therapists that specialize in pelvic floor therapy include:     -[facility] [phone]   -Destini Burt [facility] [phone]   -EDWARD GuerraT [facility] [phone]   -Milagros Figueroa, PT [facility] [phone]   -Trupti Jean Baptiste, PT [facility] [phone]   -Fatmata PT [place] [phone]

## 2018-08-15 NOTE — LETTER
"8/15/2018     RE: Yovana Enriquez  [address] [facility] [id number]     Dear Colleague,    Thank you for referring your patient, Yovana Enriquez, to the [facility] GASTRO[facility] IBD [facility]INIC at [facility]. Please see a copy of my visit note below.    GI CLINIC VISIT    CC/REFERRING MD:  Bradford Mario  REASON FOR CONSULTATION:   Bradford Mario for   Chief Complaint   Patient presents with     Gastrointestinal Problem     Irritable Bowel Syndrome       ASSESSMENT/PLAN:  Ms. Enriqeuz is a 42 yo woman presenting for follow up. She has outlet obstruction causing constipation, with associated lower abdominal discomfort. She also has episodes of diarrhea and weakness in the setting of a positive HBT in the past.     She improved on rifaximin, which would be helpful for both IBS and SIBO.     # Alternating diarrhea and constipation -- IBS-M vs. recurrent/persistent SIBO   # History of SIBO  --Obtain HBT to check for e/o SIBO  ----------If positive, will consider MRE to check for structural causes in the small bowel (e.g. diverticulae, fistulas, etc)  ----------If more than 4 documented episodes per year, will consider prophylactic antibiotics  --Rifaximin prescribed   --Check CBC, vitamin b12, folate   --Nutrition referral for FODMAP/SIBO diet     # Constipation, outlet obstruction  ---Biofeedback therapy for pelvic floor dysfunction  ---Miralax, titrated to 1-2 well-formed BMs per day    RTC: Return in about 3 months (around 11/15/2018).     A total of 40 minutes, face to face, was spent with this patient, >50% of which was counseling regarding the above delineated issues.    Inez Sawyer MD   of Medicine  Division of Gastroenterology, Hepatology and Nutrition  [facility]    HPI  Ms. Enriquez is a 41 yo woman who is presenting to GI clinic for evaluation of \"IBS\", which was diagnosed about 2 years ago, per the patient.     At " baseline, she reports having constipation that was previously treated with Miralax. Over the past 2 years, has had worsening symptoms.  Reports central and lower abdominal pain, severe bloating, weakness, diarrhea.     Her bowel habits have a cyclical pattern as below:  Day #1: up to 10 loose (non-bloody) BMs times per day. Has severe central/lower abdominal pain and bloating.   Days #2-day #5 No BMs during this period. Second day: feels drained and weak. Around day 4/5, experiences abdominal pain and bloating.    Day #6-1 to 2 weeks: feels OK. Has 1 BM during entire period. Stool is hard with a sense of incomplete evacuation.     Added Benefiber 2 weeks ago, taking 2-3 times per day.  As a result, has had a BM once weekly (still come in spurts).    Endorses chills, fatigue; no fever.  Works as a day care worker.   Has been trying to follow FODMAP diet. Garlic, onion, gluten, dairy trigger episodes of abdominal pain/bloating.       Has tried Miralax, benefiber, dulcolax without regulation of stools.      No abdominal surgeries. Weight fluctuates. No personal distressers.Had 4 vaginal deliveries.     Interval history, 3/27/2018  Started Augmentin on Thursday for likely recurrent SIBO.   Cut out sugar from diet early last week.   Started to feel significantly better on Friday. Still has some weakness and occasional nausea.   Had a BM after Miralax in the setting of no stool x a few days.    Interval history, 8/2018   Reports she had a garlic dish while vacationing and had an attack of diarrhea. Attributes this to SIBO.   Takes Miralax once daily. Can go 3-4 days without a BM.   Endorses central/lower abdominal pain that worsens with defecation.   Did not pursue biofeedback due to anxiety. She is willing to reconsider.     ROS:    No fevers   No weight loss  No blurry vision, double vision or change in vision  No sore throat  No lymphadenopathy  No headache, paraesthesias, or weakness in a limb  No shortness of breath  or wheezing  No chest pain or pressure  No arthralgias or myalgias  No rashes or skin changes  No odynophagia or dysphagia  No BRBPR, hematochezia, melena  No dysuria, frequency or urgency  No hot/cold intolerance or polyria    PROBLEM LIST  Patient Active Problem List    Diagnosis Date Noted     Fibromyalgia 11/15/2013     Priority: High     High risk medication use 09/13/2013     Priority: High     Intramural and submucous leiomyoma of uterus 04/05/2018     Priority: Medium     Sjogren's syndrome (H) 04/10/2017     Priority: Medium     Raynaud's phenomenon without gangrene 12/19/2016     Priority: Medium     Non-celiac gluten sensitivity 02/08/2016     Priority: Medium     Hypertension goal BP (blood pressure) < 140/90 01/19/2015     Priority: Medium     Health Care Home 03/19/2014     Priority: Medium     **See Letters for [facility] Care Plan: Emergency Care Plan         Irritable bowel syndrome 03/11/2014     Priority: Medium     Patient states no history colonoscopy         Chronic constipation 12/16/2013     Priority: Medium     Anxiety 04/10/2013     Priority: Medium     Subclinical hyperthyroidism 01/17/2013     Priority: Medium     Hyperlipidemia LDL goal <130 10/18/2012     Priority: Medium     Intramural leiomyoma of uterus 10/05/2012     Priority: Medium     Menorrhagia 10/01/2012     Priority: Medium     History of ovarian cyst 10/01/2012     Priority: Medium     Dysmenorrhea 10/01/2012     Priority: Medium     History of gestational diabetes mellitus, not currently pregnant 10/01/2012     Priority: Medium     Systemic lupus erythematosus (H) 04/23/2012     Priority: Medium     Positive antinuclear antibody, photosensitivity and synovitis         PERTINENT PAST MEDICAL HISTORY:  Past Medical History:   Diagnosis Date     Chronic constipation 12/16/2013     Dysmenorrhea 10/1/2012     Fibromyalgia 11/15/2013     Health Care Home 3/19/2014    **See Letters for [facility] Care Plan: Emergency Care Plan       High risk  medication use 9/13/2013     History of gestational diabetes mellitus, not currently pregnant 10/1/2012     History of ovarian cyst 10/1/2012     Hyperlipidemia LDL goal <130 10/18/2012     Hypertension goal BP (blood pressure) < 140/90 1/19/2015     Intramural leiomyoma of uterus 10/5/2012     Irritable bowel syndrome 3/11/2014    Patient states no history colonoscopy       Menorrhagia 10/1/2012     Non-celiac gluten sensitivity 2/8/2016     PONV (postoperative nausea and vomiting)      Subclinical hyperthyroidism 1/17/2013     Systemic lupus erythematosus (H) 4/23/2012       PREVIOUS SURGERIES:  Past Surgical History:   Procedure Laterality Date     COLONOSCOPY N/A 2/20/2015    Procedure: COMBINED COLONOSCOPY, SINGLE OR MULTIPLE BIOPSY/POLYPECTOMY BY BIOPSY;  Surgeon: Fred Cullen MD;  Location: [facility] OR     COLONOSCOPY WITH CO2 INSUFFLATION N/A 2/20/2015    Procedure: COLONOSCOPY WITH CO2 INSUFFLATION;  Surgeon: Fred Cullen MD;  Location: [facility] OR     ENT SURGERY  10-24-14    Bottom lip biopsies     ESOPHAGOSCOPY, GASTROSCOPY, DUODENOSCOPY (EGD), COMBINED N/A 2/20/2015    Procedure: COMBINED ESOPHAGOSCOPY, GASTROSCOPY, DUODENOSCOPY (EGD), BIOPSY SINGLE OR MULTIPLE;  Surgeon: Fred Cullen MD;  Location: [facility] OR     HC BREATH HYDROGEN TEST  12/31/2013    Procedure: HYDROGEN BREATH TEST;  Surgeon: Camden Almazan MD;  Location: [facility]     HC HYSTEROSCOPY, SURGICAL; W/ ENDOMETRIAL ABLATION, ANY METHOD  10-19-12     SHOULDER SURGERY Right     R shoulder     TUBAL LIGATION  2004       PREVIOUS ENDOSCOPY:  cscope 2/2015 (indication diarrhea) to TI showed rectal erythema, possible cecal compression. Bx showed non-specific mild chronic inflammation.     CT a/p 2/2015 was normal without evidence of kristina-cecal lesions other than small bowel in RLQ. There was fecal debris distending the colon from the cecum to prox sigmoid.    ALLERGIES:   No Known Allergies    PERTINENT  MEDICATIONS:    Current Outpatient Prescriptions:      albuterol (PROAIR HFA/PROVENTIL HFA/VENTOLIN HFA) 108 (90 BASE) MCG/ACT Inhaler, Inhale 2 puffs into the lungs every 4 hours as needed for shortness of breath / dyspnea or wheezing Use with spacer, Disp: 1 Inhaler, Rfl: 0     amLODIPine (NORVASC) 5 MG tablet, Take 1 tablet (5 mg) by mouth daily, Disp: 90 tablet, Rfl: 1     azaTHIOprine (IMURAN) 50 MG tablet, Take 2 tablets (100 mg) by mouth daily, Disp: 180 tablet, Rfl: 1     Belimumab 200 MG/ML SOAJ, Inject 200 mg Subcutaneous every 7 days . Hold for signs of infection and seek medical attention. Autoinjector, Disp: 4 Syringe, Rfl: 6     blood glucose test strip, Used for testing glucose 2-3 times daily, Disp: 1 Month, Rfl: 5     buPROPion (WELLBUTRIN) 75 MG tablet, TAKE 1 TABLET BY MOUTH 2 TIMES DAILY, Disp: 180 tablet, Rfl: 1     Calcium Carb-Cholecalciferol (CALCIUM + D3) 600-200 MG-UNIT TABS, 1 tablet daily, Disp: , Rfl:      cevimeline (EVOXAC) 30 MG capsule, Take 1 capsule (30 mg) by mouth 2 times daily, Disp: 180 capsule, Rfl: 3     diclofenac (VOLTAREN) 1 % GEL, Apply 2-4 grams to knees or shoulders four times daily as needed using enclosed dosing card., Disp: 100 g, Rfl: 4     fluticasone (FLONASE) 50 MCG/ACT nasal spray, Spray 1-2 sprays into both nostrils daily, Disp: 1 Bottle, Rfl: 11     hydroxychloroquine (PLAQUENIL) 200 MG tablet, Hydroxychloroquine 200mg in the morning and 100mg in the evening., Disp: 135 tablet, Rfl: 1     losartan (COZAAR) 25 MG tablet, TAKE 1 TABLET BY MOUTH DAILY, Disp: 90 tablet, Rfl: 0     MICROLET LANCETS MISC, 1 each daily., Disp: 100 each, Rfl: 2     Multiple Vitamin (MULTIVITAMIN OR), Take  by mouth., Disp: , Rfl:      nortriptyline (PAMELOR) 10 MG capsule, TAKE 3 CAPSULES BY MOUTH AT BEDTIME, Disp: 90 capsule, Rfl: 1     pregabalin (LYRICA) 150 MG capsule, Take 1 capsule (150 mg) by mouth 3 times daily . 3 month supply., Disp: 270 capsule, Rfl: 1     ranitidine  "(ZANTAC) 300 MG tablet, TAKE 1 TABLET BY MOUTH 2 TIMES DAILY, Disp: 180 tablet, Rfl: 0     rifaximin (XIFAXAN) 550 MG TABS tablet, Take 1 tablet (550 mg) by mouth 3 times daily for 10 days, Disp: 30 tablet, Rfl: 0     Spacer/Aero-Holding Chambers (SPACER/AERO-HOLD CHAMBER MASK) EDITH, 1 Adult size spacer to use with MDI inhalers., Disp: 1 each, Rfl: 0     predniSONE (DELTASONE) 2.5 MG tablet, Take 1-2 tablets (2.5-5 mg) by mouth daily (Patient not taking: Reported on 8/15/2018), Disp: 180 tablet, Rfl: 1    SOCIAL HISTORY:  Social History     Social History     Marital status:      Spouse name: Brian     Number of children: 4     Years of education: 12     Occupational History      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     Social History Main Topics     Smoking status: Never Smoker     Smokeless tobacco: Never Used      Comment: Lives in smoke free household     Alcohol use No     Drug use: No     Sexual activity: Yes     Partners: Male     Birth control/ protection: Surgical      Comment: tubal     Other Topics Concern     Not on file     Social History Narrative       FAMILY HISTORY:  Family History   Problem Relation Age of Onset     Respiratory Maternal Grandfather      Cancer Maternal Grandfather      Asthma Son      Circulatory Maternal Grandmother      Brain anurysm     Hypertension Mother      Glaucoma Mother 50     drops     Hypertension Sister      Hypertension Sister      Diabetes No family hx of      Cerebrovascular Disease No family hx of      Thyroid Disease No family hx of      Macular Degeneration No family hx of        Past/family/social history reviewed and no changes    PHYSICAL EXAMINATION:  Constitutional: aaox3, cooperative, pleasant, not dyspneic/diaphoretic, no acute distress  Vitals reviewed: /80 (BP Location: Left arm, Patient Position: Sitting, Cuff Size: Adult Regular)  Pulse 88  Temp 98.5  F (36.9  C) (Oral)  Resp 16  Ht 1.68 m (5' 6.14\")  Wt 60.9 kg (134 lb 4.8 oz)  SpO2 95% "  BMI 21.58 kg/m2  Wt:   Wt Readings from Last 2 Encounters:   08/15/18 60.9 kg (134 lb 4.8 oz)   07/12/18 60.6 kg (133 lb 9.6 oz)      Eyes: Sclera anicteric/injected  Ears/nose/mouth/throat: Normal oropharynx without ulcers or exudate, mucus membranes moist, hearing intact  Neck: supple, thyroid normal size  CV: RRR, no murmurs. No edema  Respiratory: CTA bl. Unlabored breathing  Lymph: No axillary, submandibular, supraclavicular or inguinal lymphadenopathy  Abd: soft, nondistended, +bs, no hepatosplenomegaly; NTTP, no peritoneal signs  Skin: warm, perfused, no jaundice. Pressure type pain in the lower abdomen. No RT or IG.   Psych: Normal affect   MSK: Normal gait    PERTINENT STUDIES:    HBT on 12/31/2013 positive     Orders Only on 10/02/2017   Component Date Value Ref Range Status     WBC 10/02/2017 6.6  4.0 - 11.0 10e9/L Final     RBC Count 10/02/2017 3.72* 3.8 - 5.2 10e12/L Final     Hemoglobin 10/02/2017 13.0  11.7 - 15.7 g/dL Final     Hematocrit 10/02/2017 37.8  35.0 - 47.0 % Final     MCV 10/02/2017 102* 78 - 100 fl Final     MCH 10/02/2017 34.9* 26.5 - 33.0 pg Final     MCHC 10/02/2017 34.4  31.5 - 36.5 g/dL Final     RDW 10/02/2017 11.8  10.0 - 15.0 % Final     Platelet Count 10/02/2017 239  150 - 450 10e9/L Final     Diff Method 10/02/2017 Automated Method   Final     % Neutrophils 10/02/2017 78.5  % Final     % Lymphocytes 10/02/2017 13.4  % Final     % Monocytes 10/02/2017 7.3  % Final     % Eosinophils 10/02/2017 0.3  % Final     % Basophils 10/02/2017 0.5  % Final     Absolute Neutrophil 10/02/2017 5.2  1.6 - 8.3 10e9/L Final     Absolute Lymphocytes 10/02/2017 0.9  0.8 - 5.3 10e9/L Final     Absolute Monocytes 10/02/2017 0.5  0.0 - 1.3 10e9/L Final     Absolute Eosinophils 10/02/2017 0.0  0.0 - 0.7 10e9/L Final     Absolute Basophils 10/02/2017 0.0  0.0 - 0.2 10e9/L Final     CK Total 10/02/2017 134  30 - 225 U/L Final     Complement C3 10/02/2017 75* 76 - 169 mg/dL Final     DNA-ds 10/02/2017 <1   <10 IU/mL Final     CRP Inflammation 10/02/2017 <2.9  0.0 - 8.0 mg/L Final     Sodium 10/02/2017 138  133 - 144 mmol/L Final     Potassium 10/02/2017 3.7  3.4 - 5.3 mmol/L Final     Chloride 10/02/2017 103  94 - 109 mmol/L Final     Carbon Dioxide 10/02/2017 27  20 - 32 mmol/L Final     Anion Gap 10/02/2017 8  3 - 14 mmol/L Final     Glucose 10/02/2017 112* 70 - 99 mg/dL Final     Urea Nitrogen 10/02/2017 10  7 - 30 mg/dL Final     Creatinine 10/02/2017 0.88  0.52 - 1.04 mg/dL Final     GFR Estimate 10/02/2017 70  >60 mL/min/1.7m2 Final     GFR Estimate If Black 10/02/2017 84  >60 mL/min/1.7m2 Final     Calcium 10/02/2017 8.7  8.5 - 10.1 mg/dL Final     Bilirubin Total 10/02/2017 0.2  0.2 - 1.3 mg/dL Final     Albumin 10/02/2017 3.8  3.4 - 5.0 g/dL Final     Protein Total 10/02/2017 6.8  6.8 - 8.8 g/dL Final     Alkaline Phosphatase 10/02/2017 60  40 - 150 U/L Final     ALT 10/02/2017 21  0 - 50 U/L Final     AST 10/02/2017 22  0 - 45 U/L Final     Complement C4 10/02/2017 15  15 - 50 mg/dL Final     Sed Rate 10/02/2017 4  0 - 20 mm/h Final     Protein Random Urine 10/02/2017 0.07  g/L Final     Protein Total Urine g/gr Creatinine 10/02/2017 0.14  0 - 0.2 g/g Cr Final     Color Urine 10/02/2017 Yellow   Final     Appearance Urine 10/02/2017 Clear   Final     Glucose Urine 10/02/2017 Negative  NEG^Negative mg/dL Final     Bilirubin Urine 10/02/2017 Negative  NEG^Negative Final     Ketones Urine 10/02/2017 Negative  NEG^Negative mg/dL Final     Specific Gravity Urine 10/02/2017 1.010  1.003 - 1.035 Final     pH Urine 10/02/2017 6.0  5.0 - 7.0 pH Final     Protein Albumin Urine 10/02/2017 Negative  NEG^Negative mg/dL Final     Urobilinogen Urine 10/02/2017 0.2  0.2 - 1.0 EU/dL Final     Nitrite Urine 10/02/2017 Negative  NEG^Negative Final     Blood Urine 10/02/2017 Negative  NEG^Negative Final     Leukocyte Esterase Urine 10/02/2017 Negative  NEG^Negative Final     Source 10/02/2017 Midstream Urine   Final     WBC  Urine 10/02/2017 O - 2  OTO2^O - 2 /HPF Final     RBC Urine 10/02/2017 O - 2  OTO2^O - 2 /HPF Final     Squamous Epithelial /LPF Urine 10/02/2017 Few  FEW^Few /LPF Final     Vitamin D Deficiency screening 10/02/2017 37  20 - 75 ug/L Final     Creatinine Urine 10/02/2017 48  mg/dL Final     Again, thank you for allowing me to participate in the care of your patient.      Sincerely,    Inez Sawyer MD

## 2018-08-16 NOTE — TELEPHONE ENCOUNTER
I would ask her if she means the outer hip that we did in Jan.  If so, she can be double booked.  If this is buttock or groin pain, then please let me know.    Magdalena Lockett MD  [facility]

## 2018-08-16 NOTE — TELEPHONE ENCOUNTER
Pain is outer hips. Patient would like to repeat the BL hip bursa injections she had in January. Order pended and routing for review.    AVNI CulverN, RN  Care Coordinator  [facility] [place]

## 2018-08-17 ENCOUNTER — TELEPHONE (OUTPATIENT)
Dept: SURGERY | Facility: CLINIC | Age: 43
End: 2018-08-17

## 2018-08-17 DIAGNOSIS — K63.8219 SMALL INTESTINAL BACTERIAL OVERGROWTH: Primary | ICD-10-CM

## 2018-08-17 NOTE — TELEPHONE ENCOUNTER
Patient called and said that she tried to schedule her hydrogen breath test and was unable to do so. She was informed that I would call and schedule the test  For her and will call with an appointment time.     Patient order updated and patient informed that endoscopy will call her to schedule.

## 2018-08-17 NOTE — TELEPHONE ENCOUNTER
Patient scheduled 9/5. Offered earlier time, but patient had scheduling conflicts.      Josselin Martins    [facility]

## 2018-08-20 DIAGNOSIS — K63.8219 SMALL INTESTINAL BACTERIAL OVERGROWTH: ICD-10-CM

## 2018-08-20 LAB
BASOPHILS # BLD AUTO: 0.1 10E9/L (ref 0–0.2)
BASOPHILS NFR BLD AUTO: 0.8 %
DIFFERENTIAL METHOD BLD: ABNORMAL
EOSINOPHIL # BLD AUTO: 0.1 10E9/L (ref 0–0.7)
EOSINOPHIL NFR BLD AUTO: 0.9 %
ERYTHROCYTE [DISTWIDTH] IN BLOOD BY AUTOMATED COUNT: 11.2 % (ref 10–15)
HCT VFR BLD AUTO: 36.7 % (ref 35–47)
HGB BLD-MCNC: 13.1 G/DL (ref 11.7–15.7)
LYMPHOCYTES # BLD AUTO: 1 10E9/L (ref 0.8–5.3)
LYMPHOCYTES NFR BLD AUTO: 15.3 %
MCH RBC QN AUTO: 34.6 PG (ref 26.5–33)
MCHC RBC AUTO-ENTMCNC: 35.7 G/DL (ref 31.5–36.5)
MCV RBC AUTO: 97 FL (ref 78–100)
MONOCYTES # BLD AUTO: 0.4 10E9/L (ref 0–1.3)
MONOCYTES NFR BLD AUTO: 6.8 %
NEUTROPHILS # BLD AUTO: 4.9 10E9/L (ref 1.6–8.3)
NEUTROPHILS NFR BLD AUTO: 76.2 %
PLATELET # BLD AUTO: 258 10E9/L (ref 150–450)
RBC # BLD AUTO: 3.79 10E12/L (ref 3.8–5.2)
WBC # BLD AUTO: 6.5 10E9/L (ref 4–11)

## 2018-08-20 PROCEDURE — 85025 COMPLETE CBC W/AUTO DIFF WBC: CPT | Performed by: INTERNAL MEDICINE

## 2018-08-20 PROCEDURE — 82607 VITAMIN B-12: CPT | Performed by: INTERNAL MEDICINE

## 2018-08-20 PROCEDURE — 82746 ASSAY OF FOLIC ACID SERUM: CPT | Performed by: INTERNAL MEDICINE

## 2018-08-20 PROCEDURE — 36415 COLL VENOUS BLD VENIPUNCTURE: CPT | Performed by: INTERNAL MEDICINE

## 2018-08-21 LAB
FOLATE SERPL-MCNC: 37.9 NG/ML
VIT B12 SERPL-MCNC: 484 PG/ML (ref 193–986)

## 2018-09-07 ENCOUNTER — SURGERY (OUTPATIENT)
Age: 43
End: 2018-09-07

## 2018-09-07 ENCOUNTER — HOSPITAL ENCOUNTER (OUTPATIENT)
Facility: CLINIC | Age: 43
Discharge: HOME | End: 2018-09-07
Attending: INTERNAL MEDICINE | Admitting: INTERNAL MEDICINE
Payer: COMMERCIAL

## 2018-09-07 PROCEDURE — 91065 BREATH HYDROGEN/METHANE TEST: CPT | Performed by: INTERNAL MEDICINE

## 2018-09-07 NOTE — IP AVS SNAPSHOT
[facility], [place], Endoscopy    [address] [facility] [address] [address]    Phone:  [phone]                                       After Visit Summary   9/7/2018    Yovana Enriquez    MRN: [id number]           After Visit Summary Signature Page     I have received my discharge instructions, and my questions have been answered. I have discussed any challenges I see with this plan with the nurse or doctor.    ..........................................................................................................................................  Patient/Patient Representative Signature      ..........................................................................................................................................  Patient Representative Print Name and Relationship to Patient    ..................................................               ................................................  Date                                            Time    ..........................................................................................................................................  Reviewed by Signature/Title    ...................................................              ..............................................  Date                                                            Time          22EPIC Rev 08/18

## 2018-09-07 NOTE — IP AVS SNAPSHOT
MRN:[id number]                      After Visit Summary   9/7/2018    Yovana Enriquez    MRN: [id number]           Thank you!     Thank you for choosing [facility] for your care. Our goal is always to provide you with excellent care. Hearing back from our patients is one way we can continue to improve our services. Please take a few minutes to complete the written survey that you may receive in the mail after you visit with us. Thank you!        Patient Information     Date Of Birth          1975        About your hospital stay     You were admitted on:  September 7, 2018 You last received care in the:  [facility], Endoscopy    You were discharged on:  September 7, 2018       Who to Call     For medical emergencies, please call 911.  For non-urgent questions about your medical care, please call your primary care provider or clinic, [phone]  For questions related to your surgery, please call your surgery clinic        Attending Provider     Provider Katina Moreland MD Internal Medicine       Primary Care Provider Office Phone # Fax #    Bradford Mario PA-C [phone]      Your next 10 appointments already scheduled     Sep 07, 2018  1:50 PM CDT   (Arrive by 1:35 PM)   [facility] For Women Only with Rossana Dent, PT   [facility] ([facility] [place]  )    [address]  [place] [address]   [phone]            Sep 14, 2018  2:30 PM CDT   PROCEDURE with Koko Long MD   [facility] [place] ([facility] [place])    [address] [place] [address]   [phone]            Oct 01, 2018  6:00 PM CDT   LAB with BE LAB   [facility] [place] ([facility] [place])    [address] [place] [address]   [phone]           Please do not eat 10-12 hours before your appointment if you are coming in fasting for labs on lipids,  cholesterol, or glucose (sugar). This does not apply to pregnant women. Water, hot tea and black coffee (with nothing added) are okay. Do not drink other fluids, diet soda or chew gum.            Oct 08, 2018  2:00 PM CDT   Return Visit with Bradford Colvin MD   [facility]    [address]  [place] [address]   [phone]            Oct 10, 2018  6:30 PM CDT   US PELVIC COMPLETE W TRANSVAGINAL with BEUS1   [facility] ([facility])    [address]  [place] [address]   [phone]           Please bring a list of your medicines (including vitamins, minerals and over-the-counter drugs). Also, tell your doctor about any allergies you may have. Wear comfortable clothes and leave your valuables at home.  Adults: Drink four 8-ounce glasses of fluid an hour before your exam. If you need to empty your bladder before your exam, try to release only a little urine. Then, drink another glass of fluid.  Children: Children who are potty trained up to 6 years old should drink at least 2 cups (16 oz) of water/non-carbonated beverage 30 minutes prior to the exam. Children who are 6-10 years should drink at least 3 cups (24 oz) of water/non-carbonated beverage 45 minutes prior to the exam. Children who are 10 years or older should drink at least 4 cups (32 oz) of water/non-carbonated beverage 45 minutes prior to the exam. If your child is very uncomfortable or has an urgent need to pee, please notify a technologist; they will try to find out how much longer the wait may be and provide instructions to help relieve the pressure.  Please call the Imaging Department at your exam site with any questions.              Further instructions from your care team       Hydrogen Breath Test Discharge Instructions    1. You may experience diarrhea for the next 12-24 hours.  2. Resume a regular diet.  3. Follow-up with your referring doctor in  clinic.  4. If you have questions call [phone] from 7:00am-4:30pm     Radha Mckeon RN    Pending Results     No orders found from 9/5/2018 to 9/8/2018.            Admission Information     Date & Time Provider Department Dept. Phone    9/7/2018 Katina Rollins MD [facility] [place], Endoscopy [phone]      MyChart Information     [employer] gives you secure access to your electronic health record. If you see a primary care provider, you can also send messages to your care team and make appointments. If you have questions, please call your primary care clinic.  If you do not have a primary care provider, please call [phone] and they will assist you.        Care EveryWhere ID     This is your Care EveryWhere ID. This could be used by other organizations to access your [facility] medical records  [id number]        Equal Access to Services     BINTA HURD : Hadlaurel Villafana, rafal pimentel, soto cappsalmatiburcio mcdermott, rosy scott . So [facility] [phone].    ATENCIÓN: Si habla español, tiene a dexter disposición servicios gratuitos de asistencia lingüística. Llame al [phone].    We comply with applicable federal civil rights laws and [place] laws. We do not discriminate on the basis of race, color, national origin, age, disability, sex, sexual orientation, or gender identity.               Review of your medicines      UNREVIEWED medicines. Ask your doctor about these medicines        Dose / Directions    albuterol 108 (90 Base) MCG/ACT inhaler   Commonly known as:  PROAIR HFA/PROVENTIL HFA/VENTOLIN HFA   Used for:  Wheezing        Dose:  2 puff   Inhale 2 puffs into the lungs every 4 hours as needed for shortness of breath / dyspnea or wheezing Use with spacer   Quantity:  1 Inhaler   Refills:  0       amLODIPine 5 MG tablet   Commonly known as:  NORVASC   Used for:  Raynaud's phenomenon without gangrene        Dose:  5 mg   Take 1 tablet (5 mg) by mouth  daily   Quantity:  90 tablet   Refills:  1       azaTHIOprine 50 MG tablet   Commonly known as:  IMURAN   Used for:  Other systemic lupus erythematosus with other organ involvement (H)        Dose:  100 mg   Take 2 tablets (100 mg) by mouth daily   Quantity:  180 tablet   Refills:  1       Belimumab 200 MG/ML Soaj   Used for:  Other systemic lupus erythematosus with other organ involvement (H)        Dose:  200 mg   Inject 200 mg Subcutaneous every 7 days . Hold for signs of infection and seek medical attention. Autoinjector   Quantity:  4 Syringe   Refills:  6       buPROPion 75 MG tablet   Commonly known as:  WELLBUTRIN   Used for:  Anxiety        TAKE 1 TABLET BY MOUTH 2 TIMES DAILY   Quantity:  180 tablet   Refills:  1       Calcium + D3 600-200 MG-UNIT Tabs        Dose:  1 tablet   1 tablet daily   Refills:  0       cevimeline 30 MG capsule   Commonly known as:  EVOXAC   Used for:  Sjogren's syndrome with keratoconjunctivitis sicca (H)        Dose:  30 mg   Take 1 capsule (30 mg) by mouth 2 times daily   Quantity:  180 capsule   Refills:  3       diclofenac 1 % Gel topical gel   Commonly known as:  VOLTAREN   Used for:  Fibromyalgia, SLE (systemic lupus erythematosus) (H)        Apply 2-4 grams to knees or shoulders four times daily as needed using enclosed dosing card.   Quantity:  100 g   Refills:  4       fluticasone 50 MCG/ACT spray   Commonly known as:  FLONASE   Used for:  Chronic rhinitis        Dose:  1-2 spray   Spray 1-2 sprays into both nostrils daily   Quantity:  1 Bottle   Refills:  11       hydroxychloroquine 200 MG tablet   Commonly known as:  PLAQUENIL   Used for:  Other systemic lupus erythematosus with other organ involvement (H)        Hydroxychloroquine 200mg in the morning and 100mg in the evening.   Quantity:  135 tablet   Refills:  1       losartan 25 MG tablet   Commonly known as:  COZAAR   Used for:  Hypertension goal BP (blood pressure) < 140/90        TAKE 1 TABLET BY MOUTH DAILY    Quantity:  90 tablet   Refills:  0       MULTIVITAMIN PO        Take  by mouth.   Refills:  0       nortriptyline 10 MG capsule   Commonly known as:  PAMELOR   Used for:  Numbness        TAKE 3 CAPSULES BY MOUTH AT BEDTIME   Quantity:  90 capsule   Refills:  1       predniSONE 2.5 MG tablet   Commonly known as:  DELTASONE   Used for:  Other systemic lupus erythematosus with other organ involvement (H)        Dose:  2.5-5 mg   Take 1-2 tablets (2.5-5 mg) by mouth daily   Quantity:  180 tablet   Refills:  1       pregabalin 150 MG capsule   Commonly known as:  LYRICA   Used for:  Fibromyalgia        Dose:  150 mg   Take 1 capsule (150 mg) by mouth 3 times daily . 3 month supply.   Quantity:  270 capsule   Refills:  1       ranitidine 300 MG tablet   Commonly known as:  ZANTAC   Used for:  Gastroesophageal reflux disease without esophagitis        TAKE 1 TABLET BY MOUTH 2 TIMES DAILY   Quantity:  180 tablet   Refills:  0         CONTINUE these medicines which have NOT CHANGED        Dose / Directions    blood glucose monitoring test strip   Commonly known as:  no brand specified   Used for:  History of gestational diabetes mellitus, not currently pregnant        Used for testing glucose 2-3 times daily   Quantity:  1 Month   Refills:  5       MICROLET LANCETS Misc   Used for:  Other abnormal glucose        Dose:  1 each   1 each daily.   Quantity:  100 each   Refills:  2       spacer/aero-hold chamber mask Wendy   Used for:  Wheezing        1 Adult size spacer to use with MDI inhalers.   Quantity:  1 each   Refills:  0                Protect others around you: Learn how to safely use, store and throw away your medicines at www.disposemymeds.org.             Medication List: This is a list of all your medications and when to take them. Check marks below indicate your daily home schedule. Keep this list as a reference.      Medications           Morning Afternoon Evening Bedtime As Needed    albuterol 108 (90 Base)  MCG/ACT inhaler   Commonly known as:  PROAIR HFA/PROVENTIL HFA/VENTOLIN HFA   Inhale 2 puffs into the lungs every 4 hours as needed for shortness of breath / dyspnea or wheezing Use with spacer                                amLODIPine 5 MG tablet   Commonly known as:  NORVASC   Take 1 tablet (5 mg) by mouth daily                                azaTHIOprine 50 MG tablet   Commonly known as:  IMURAN   Take 2 tablets (100 mg) by mouth daily                                Belimumab 200 MG/ML Soaj   Inject 200 mg Subcutaneous every 7 days . Hold for signs of infection and seek medical attention. Autoinjector                                blood glucose monitoring test strip   Commonly known as:  no brand specified   Used for testing glucose 2-3 times daily                                buPROPion 75 MG tablet   Commonly known as:  WELLBUTRIN   TAKE 1 TABLET BY MOUTH 2 TIMES DAILY                                Calcium + D3 600-200 MG-UNIT Tabs   1 tablet daily                                cevimeline 30 MG capsule   Commonly known as:  EVOXAC   Take 1 capsule (30 mg) by mouth 2 times daily                                diclofenac 1 % Gel topical gel   Commonly known as:  VOLTAREN   Apply 2-4 grams to knees or shoulders four times daily as needed using enclosed dosing card.                                fluticasone 50 MCG/ACT spray   Commonly known as:  FLONASE   Spray 1-2 sprays into both nostrils daily                                hydroxychloroquine 200 MG tablet   Commonly known as:  PLAQUENIL   Hydroxychloroquine 200mg in the morning and 100mg in the evening.                                losartan 25 MG tablet   Commonly known as:  COZAAR   TAKE 1 TABLET BY MOUTH DAILY                                MICROLET LANCETS Misc   1 each daily.                                MULTIVITAMIN PO   Take  by mouth.                                nortriptyline 10 MG capsule   Commonly known as:  PAMELOR   TAKE 3 CAPSULES BY  MOUTH AT BEDTIME                                predniSONE 2.5 MG tablet   Commonly known as:  DELTASONE   Take 1-2 tablets (2.5-5 mg) by mouth daily                                pregabalin 150 MG capsule   Commonly known as:  LYRICA   Take 1 capsule (150 mg) by mouth 3 times daily . 3 month supply.                                ranitidine 300 MG tablet   Commonly known as:  ZANTAC   TAKE 1 TABLET BY MOUTH 2 TIMES DAILY                                spacer/aero-hold chamber mask Wendy   1 Adult size spacer to use with MDI inhalers.

## 2018-09-07 NOTE — OR NURSING
Pt here for HBT. Procedure explained and consent given. Baseline breath obtained prior to pt drinking 25 gms dxylose. Pt tolerated test with some bloating and gas. Hydrogen levels from 8 ppm at baseline, 29 at 60 min, 58 at 80 min, 56 at 120 min, 41 at 180 min. Methane levels from 0 ppm at baseline, 0 at 60 min, 2 at 80 min, 1 at 120 min, 0 at 180 min. DC instructions given. Pt will follow up with referring provider for test results.

## 2018-09-07 NOTE — DISCHARGE INSTRUCTIONS
Hydrogen Breath Test Discharge Instructions    1. You may experience diarrhea for the next 12-24 hours.  2. Resume a regular diet.  3. Follow-up with your referring doctor in clinic.  4. If you have questions call [phone] from 7:00am-4:30pm     Radha Mckeon RN

## 2018-09-09 ENCOUNTER — MYC MEDICAL ADVICE (OUTPATIENT)
Dept: GASTROENTEROLOGY | Facility: CLINIC | Age: 43
End: 2018-09-09

## 2018-09-13 NOTE — TELEPHONE ENCOUNTER
Spoke to patient over the phone. Informed her the HBT results have not returned. Patient will await results until next week. Patient also states that she is still experiencing skinny stools. It was suggested to the patient that she should add a fiber supplement and monitor her stools. IF stools do not thicken in the next couple of weeks a colonoscopy would be considered. Patient verbalizes understanding of plan and will contact the clinic with any concerns.

## 2018-09-14 ENCOUNTER — OFFICE VISIT (OUTPATIENT)
Dept: PALLIATIVE MEDICINE | Facility: CLINIC | Age: 43
End: 2018-09-14
Payer: COMMERCIAL

## 2018-09-14 VITALS
SYSTOLIC BLOOD PRESSURE: 124 MMHG | HEIGHT: 65 IN | DIASTOLIC BLOOD PRESSURE: 81 MMHG | WEIGHT: 132 LBS | HEART RATE: 79 BPM | BODY MASS INDEX: 21.99 KG/M2

## 2018-09-14 DIAGNOSIS — M70.60 GREATER TROCHANTERIC BURSITIS, UNSPECIFIED LATERALITY: Primary | ICD-10-CM

## 2018-09-14 PROCEDURE — 20610 DRAIN/INJ JOINT/BURSA W/O US: CPT | Mod: 50 | Performed by: PAIN MEDICINE

## 2018-09-14 ASSESSMENT — PAIN SCALES - GENERAL: PAINLEVEL: MILD PAIN (2)

## 2018-09-14 NOTE — NURSING NOTE
Discharge Information    IV Discontiued Time:  NA    Amount of Fluid Infused:  NA    Discharge Criteria = When patient returns to baseline or as per MD order    Consciousness:  Pt is fully awake    Circulation:  BP +/- 20% of pre-procedure level    Respiration:  Patient is able to breathe deeply    O2 Sat:  Patient is able to maintain O2 Sat >92% on room air    Activity:  Moves 4 extremities on command    Ambulation:  Patient is able to stand and walk or stand and pivot into wheelchair    Dressing:  Clean/dry or No Dressing    Notes:   Discharge instructions and AVS given to patient    Patient meets criteria for discharge?  YES    Admitted to PCU?  No    Responsible adult present to accompany patient home?  Yes    Signature/Title:    Zack Moran RN Care Coordinator  [facility]

## 2018-09-14 NOTE — PROGRESS NOTES
Pre procedure Diagnosis: bilateral hip pain, trochanteric bursitis   Post procedure Diagnosis: Same  Procedure performed: bilateral hip greater trochanteric bursa injection  Indication: Therapeutic for pain  Anesthesia: none  Complications: none  Operators: Koko Long MD      Indications:    Pt was sent by Dr. silva for treatment of chronic pain. The patient has a history of chronic bilateral hip pain. Exam shows tenderness to palpation at the bilateral greater trochanter bursa. Other conservative treatments prior to injection include physical therapy, medications, and prior injections.     Options/alternatives, benefits and risks were discussed with the patient including bleeding, infection, tissue trauma, exposure to radiation, reaction to medications including seizure, nerve injury, weakness, and numbness. Questions were answered to their satisfaction and they agreed to proceed. Voluntary informed consent was obtained and signed.      Vitals were reviewed: Yes  There were no vitals taken for this visit.  Allergies were reviewed: Yes   Medications were reviewed: Yes   Pre-procedure pain score: 2/10     Procedure:  After obtaining signed informed consent, the patient was positioned in a Right lateral decubitus position.  A Pause for the Cause was performed.      After identifying the point of maximal tenderness at the left greater trochanter, the area was prepped in the usual sterile fashion. Then, a 27 gauge 3.5 inch spinal needle was advanced to contact bone at the left greater trochanter with positive pain reproduction and withdrawn 1-2 mm. Aspiration was negative. Then, 5 ml of a combination of kenalog 20 mg and Bupivicaine 0.5% 4.5 ml was injected into the bursa and the needle was withdrawn. The injection site was cleaned and a bandaid was placed.     The patient was then positioned in a left lateral decubitus position. The point of maximal tenderness was palpated and marked at the right greater  trochanter. The area was prepped in the usual sterile fashion. Then, a 27 gauge 3.5 inch spinal needle was advanced to contact bone at the right greater trochanter with positive pain reproduction and withdrawn 1-2 mm. Aspiration was negative. Then, 5 ml of a combination of kenalog 20 mg and Bupivicaine 0.5% 4.5 ml was injected into the bursa and the needle was withdrawn. The injection site was cleaned and a bandaid was placed.        The patient tolerated the procedure well, and was taken to the recovery room.      Post-procedure pain score: 0/10  Follow-up includes:   -f/u phone call in one week  -f/u with PCP and in the pain clinic as scheduled     Koko Long MD  [facility]

## 2018-09-14 NOTE — PATIENT INSTRUCTIONS
[facility]   Post Procedure Instructions    Today you had: Bilateral greater trochanter injection       Medications used:  lidocaine   bupivicaine   kenolog           Go to the emergency room if you develop any shortness of breath    Monitor the injection sites for signs and symptoms of infection-fever, chills, redness, swelling, warmth, or drainage to areas.    You may have soreness at injection sites for up to 24 hours.    You may apply ice to the painful areas to help minimize the discomfort of the needle pokes.    Do not apply heat to sites for at least 12 hours.    You may use anti-inflammatory medications or Tylenol for pain control if necessary    Pain Clinic phone number during work hours Monday-Friday:  [phone]    After hours provider line: [phone]

## 2018-09-14 NOTE — MR AVS SNAPSHOT
After Visit Summary   9/14/2018    Yovana Enriquez    MRN: [id number]           Patient Information     Date Of Birth          1975        Visit Information        Provider Department      9/14/2018 2:30 PM Koko Long MD [facility] [place]        Care Instructions    [facility]   Post Procedure Instructions    Today you had: Bilateral greater trochanter injection       Medications used:  lidocaine   bupivicaine   kenolog           Go to the emergency room if you develop any shortness of breath    Monitor the injection sites for signs and symptoms of infection-fever, chills, redness, swelling, warmth, or drainage to areas.    You may have soreness at injection sites for up to 24 hours.    You may apply ice to the painful areas to help minimize the discomfort of the needle pokes.    Do not apply heat to sites for at least 12 hours.    You may use anti-inflammatory medications or Tylenol for pain control if necessary    Pain Clinic phone number during work hours Monday-Friday:  [phone]    After hours provider line: [phone]                Follow-ups after your visit        Your next 10 appointments already scheduled     Sep 24, 2018  2:30 PM CDT   (Arrive by 2:15 PM)   [facility] For Women Only with Kareen Stoddard PT   [facility] [place] [facility] ([facility] [place])    [address]  [address]  [place] [address]   [phone]            Oct 01, 2018  6:00 PM CDT   LAB with BE LAB   [place] [facility] ([facility] [place])    [address]  [place] [address]   [phone]           Please do not eat 10-12 hours before your appointment if you are coming in fasting for labs on lipids, cholesterol, or glucose (sugar). This does not apply to pregnant women. Water, hot tea and black coffee (with nothing added) are okay. Do not drink other fluids, diet soda or chew gum.            Oct 08, 2018  2:00 PM  CDT   Return Visit with Bradford Colvin MD   [facility] [place] ([facility])    [address]  [place] [address] [phone]            Oct 10, 2018  6:30 PM CDT   US PELVIC COMPLETE W TRANSVAGINAL with BEUS1   [place] [facility] ([facility])    [address] [place] [address]   [phone]           How do I prepare for my exam? (Food and drink instructions) Adults: Drink four 8-ounce glasses of fluid an hour before your exam. If you need to empty your bladder before your exam, try to release only a little urine. Then, drink another glass of fluid.  Children: * Children who are potty trained up to 6 years old should drink at least 2 cups (16 oz) of water/non-carbonated beverage 30 minutes prior to the exam. * Children who are 6-10 years should drink at least 3 cups (24 oz) of water/non-carbonated beverage 45 minutes prior to the exam. * Children who are 10 years or older should drink at least 4 cups (32 oz) of water/non-carbonated beverage 45 minutes prior to the exam.  If your child is very uncomfortable or has an urgent need to pee, please notify a technologist; they will try to find out how much longer the wait may be and provide instructions to help relieve the pressure.  What should I wear: Wear comfortable clothes.  How long does the exam take: Most ultrasounds take 30 to 60 minutes.  What should I bring: Bring a list of your medicines, including vitamins, minerals and over-the-counter drugs. It is safest to leave personal items at home.  Do I need a :  No  is needed.  What do I need to tell my doctor: Tell your doctor about any allergies you may have.  What should I do after the exam: No restrictions, You may resume normal activities.  What is this test: An ultrasound uses sound waves to make pictures of the body. Sound waves do not cause pain. The only discomfort may be the pressure of the wand against your skin or full  "bladder.  Who should I call with questions: If you have any questions, please call the Imaging Department where you will have your exam. Directions, parking instructions, and other information is available on our website, [email or web address].              Who to contact     If you have questions or need follow up information about today's clinic visit or your schedule please contact [facility] directly at [phone].  Normal or non-critical lab and imaging results will be communicated to you by Opencarehart, letter or phone within 4 business days after the clinic has received the results. If you do not hear from us within 7 days, please contact the clinic through [employer] Comfort[employer] or phone. If you have a critical or abnormal lab result, we will notify you by phone as soon as possible.  Submit refill requests through Yobongo or call your pharmacy and they will forward the refill request to us. Please allow 3 business days for your refill to be completed.          Additional Information About Your Visit        [employer] Information     Yobongo gives you secure access to your electronic health record. If you see a primary care provider, you can also send messages to your care team and make appointments. If you have questions, please call your primary care clinic.  If you do not have a primary care provider, please call [phone] and they will assist you.        Care EveryWhere ID     This is your Care EveryWhere ID. This could be used by other organizations to access your [facility] medical records  [id number]        Your Vitals Were     Pulse Height BMI (Body Mass Index)             79 1.651 m (5' 5\") 21.97 kg/m2          Blood Pressure from Last 3 Encounters:   09/14/18 124/81   08/15/18 117/80   07/12/18 133/85    Weight from Last 3 Encounters:   09/14/18 59.9 kg (132 lb)   08/15/18 60.9 kg (134 lb 4.8 oz)   07/12/18 60.6 kg (133 lb 9.6 oz)              Today, you had the following     No orders found for display "       Primary Care Provider Office Phone # Fax #    Bradford Mario PA-C [phone]       [address]  [facility] [address]        Equal Access to Services     BINTA HURD : Hadii aad ku hadasho Soomaali, waaxda luqadaha, qaybta kaalmada adeegyada, waxay idiin haysylvestern adeangle madrigal laDarintwin [place]. So [facility] [phone].    ATENCIÓN: Si habla español, tiene a dexter disposición servicios gratuitos de asistencia lingüística. [facility] [phone].    We comply with applicable federal civil rights laws and [place] laws. We do not discriminate on the basis of race, color, national origin, age, disability, sex, sexual orientation, or gender identity.            Thank you!     Thank you for choosing [facility]  for your care. Our goal is always to provide you with excellent care. Hearing back from our patients is one way we can continue to improve our services. Please take a few minutes to complete the written survey that you may receive in the mail after your visit with us. Thank you!             Your Updated Medication List - Protect others around you: Learn how to safely use, store and throw away your medicines at www.disposemymeds.org.          This list is accurate as of 9/14/18  2:45 PM.  Always use your most recent med list.                   Brand Name Dispense Instructions for use Diagnosis    albuterol 108 (90 Base) MCG/ACT inhaler    PROAIR HFA/PROVENTIL HFA/VENTOLIN HFA    1 Inhaler    Inhale 2 puffs into the lungs every 4 hours as needed for shortness of breath / dyspnea or wheezing Use with spacer    Wheezing       amLODIPine 5 MG tablet    NORVASC    90 tablet    Take 1 tablet (5 mg) by mouth daily    Raynaud's phenomenon without gangrene       azaTHIOprine 50 MG tablet    IMURAN    180 tablet    Take 2 tablets (100 mg) by mouth daily    Other systemic lupus erythematosus with other organ involvement (H)       Belimumab 200 MG/ML Soaj     4 Syringe    Inject 200 mg Subcutaneous  every 7 days . Hold for signs of infection and seek medical attention. Autoinjector    Other systemic lupus erythematosus with other organ involvement (H)       blood glucose monitoring test strip    no brand specified    1 Month    Used for testing glucose 2-3 times daily    History of gestational diabetes mellitus, not currently pregnant       buPROPion 75 MG tablet    WELLBUTRIN    180 tablet    TAKE 1 TABLET BY MOUTH 2 TIMES DAILY    Anxiety       Calcium + D3 600-200 MG-UNIT Tabs      1 tablet daily        cevimeline 30 MG capsule    EVOXAC    180 capsule    Take 1 capsule (30 mg) by mouth 2 times daily    Sjogren's syndrome with keratoconjunctivitis sicca (H)       diclofenac 1 % Gel topical gel    VOLTAREN    100 g    Apply 2-4 grams to knees or shoulders four times daily as needed using enclosed dosing card.    Fibromyalgia, SLE (systemic lupus erythematosus) (H)       fluticasone 50 MCG/ACT spray    FLONASE    1 Bottle    Spray 1-2 sprays into both nostrils daily    Chronic rhinitis       hydroxychloroquine 200 MG tablet    PLAQUENIL    135 tablet    Hydroxychloroquine 200mg in the morning and 100mg in the evening.    Other systemic lupus erythematosus with other organ involvement (H)       losartan 25 MG tablet    COZAAR    90 tablet    TAKE 1 TABLET BY MOUTH DAILY    Hypertension goal BP (blood pressure) < 140/90       MICROLET LANCETS Misc     100 each    1 each daily.    Other abnormal glucose       MULTIVITAMIN PO      Take  by mouth.        nortriptyline 10 MG capsule    PAMELOR    90 capsule    TAKE 3 CAPSULES BY MOUTH AT BEDTIME    Numbness       predniSONE 2.5 MG tablet    DELTASONE    180 tablet    Take 1-2 tablets (2.5-5 mg) by mouth daily    Other systemic lupus erythematosus with other organ involvement (H)       pregabalin 150 MG capsule    LYRICA    270 capsule    Take 1 capsule (150 mg) by mouth 3 times daily . 3 month supply.    Fibromyalgia       ranitidine 300 MG tablet    ZANTAC    180  tablet    TAKE 1 TABLET BY MOUTH 2 TIMES DAILY    Gastroesophageal reflux disease without esophagitis       spacer/aero-hold chamber mask Wendy     1 each    1 Adult size spacer to use with MDI inhalers.    Wheezing

## 2018-09-17 ENCOUNTER — MYC REFILL (OUTPATIENT)
Dept: FAMILY MEDICINE | Facility: CLINIC | Age: 43
End: 2018-09-17

## 2018-09-17 DIAGNOSIS — K21.9 GASTROESOPHAGEAL REFLUX DISEASE WITHOUT ESOPHAGITIS: ICD-10-CM

## 2018-09-18 NOTE — TELEPHONE ENCOUNTER
Message from [employer] Studio[employer]:  Original authorizing provider: HELLEN Vilchis would like a refill of the following medications:  ranitidine (ZANTAC) 300 MG tablet [Bradford Mario PA-C]    Preferred pharmacy: [facility] [address] [place], [place] - [address]    Comment:

## 2018-09-18 NOTE — TELEPHONE ENCOUNTER
Patient last saw Bradford on 2/28/18 for anxiety, no GERD concerns addressed. Last addressed with [facility] provider on 11/6/17 with Shannon Bowen.  Last addressed GERD on 11/6/17.   Routing refill request to provider for review/approval because:  Drug interaction warning: DOSE warning    Magdalena Neri RN on 9/18/2018 at 7:41 AM

## 2018-09-20 ENCOUNTER — TELEPHONE (OUTPATIENT)
Dept: GASTROENTEROLOGY | Facility: CLINIC | Age: 43
End: 2018-09-20

## 2018-09-20 DIAGNOSIS — K63.8219 SMALL INTESTINAL BACTERIAL OVERGROWTH: Primary | ICD-10-CM

## 2018-09-20 NOTE — TELEPHONE ENCOUNTER
After receiving plan of care from Dr. Sawyer I Talked with patient and spoke with her about her positive hydrogen breath test report. IT was recommended to patient that she start the Rifaximin and have an MRE. Patient agrees with plan of care and would like to schedule her MRE herself. Patient given then number to schedule the procedure.

## 2018-09-26 DIAGNOSIS — M32.19 OTHER SYSTEMIC LUPUS ERYTHEMATOSUS WITH OTHER ORGAN INVOLVEMENT (H): ICD-10-CM

## 2018-09-26 NOTE — TELEPHONE ENCOUNTER
Requested Prescriptions   Pending Prescriptions Disp Refills     Belimumab 200 MG/ML SOAJ 4 Syringe 6     Sig: Inject 200 mg Subcutaneous every 7 days . Hold for signs of infection and seek medical attention. Autoinjector    There is no refill protocol information for this order        Last Written Prescription Date:  6/11/2018  Last Fill Quantity: 4,  # refills: 6   Last office visit: 2/5/2018 with prescribing provider:  Fabiano   Future Office Visit:   Next 5 appointments (look out 90 days)     Oct 08, 2018  2:00 PM CDT   Return Visit with Bradford Colvin MD   [facility] [place] ([facility] [place])    [address]  [place] [address] [phone]

## 2018-09-27 ENCOUNTER — TELEPHONE (OUTPATIENT)
Dept: GASTROENTEROLOGY | Facility: CLINIC | Age: 43
End: 2018-09-27

## 2018-09-27 ENCOUNTER — RADIANT APPOINTMENT (OUTPATIENT)
Dept: MRI IMAGING | Facility: CLINIC | Age: 43
End: 2018-09-27
Attending: INTERNAL MEDICINE
Payer: COMMERCIAL

## 2018-09-27 DIAGNOSIS — K63.8219 SMALL INTESTINAL BACTERIAL OVERGROWTH: ICD-10-CM

## 2018-09-27 PROCEDURE — A9585 GADOBUTROL INJECTION: HCPCS | Performed by: INTERNAL MEDICINE

## 2018-09-27 PROCEDURE — 72197 MRI PELVIS W/O & W/DYE: CPT | Performed by: RADIOLOGY

## 2018-09-27 PROCEDURE — 74183 MRI ABD W/O CNTR FLWD CNTR: CPT | Performed by: RADIOLOGY

## 2018-09-27 RX ORDER — GADOBUTROL 604.72 MG/ML
7.5 INJECTION INTRAVENOUS ONCE
Status: COMPLETED | OUTPATIENT
Start: 2018-09-27 | End: 2018-09-27

## 2018-09-27 RX ADMIN — GADOBUTROL 6 ML: 604.72 INJECTION INTRAVENOUS at 08:50

## 2018-09-27 NOTE — PROGRESS NOTES
Yoavna was seen today for an MR enterography. A saline lock was initiated by [employer] tech. Glucagon 1 ml (NDC 2587-9652-71) was mixed in 10 ml 0.9% NaCl (NDC 6659-1199-70). 5 ml of mixture was administered IV at start of scan followed by 5 ml 15 minutes later. Patient tolerated with no complaints.

## 2018-09-27 NOTE — TELEPHONE ENCOUNTER
Rheumatology team: Please call to notify Ms. Enriquez that benlysta has been refilled.    Bradford Colvin MD  9/27/2018 4:42 AM

## 2018-09-27 NOTE — TELEPHONE ENCOUNTER
Prior Authorization Retail Medication Request     Medication/Dose: rifaximin (XIFAXAN) 550 MG TABS tablet  ICD code (if different than what is on RX):  Small intestinal bacterial overgrowth [K63.89]  Previously Tried and Failed:    Rationale:       Insurance Name:  [id number]  BIN: [id number]  Insurance ID:  [id number]  PCN: [id number]        Pharmacy Information (if different than what is on RX)  Name:  [facility] [id number]                 Phone:  [phone]

## 2018-09-27 NOTE — TELEPHONE ENCOUNTER
Attempted to contact Pt, l/m having Pt return call to clinic @ [phone].    Grace Pardo CMA  9/27/2018  8:19 AM

## 2018-09-28 ENCOUNTER — TELEPHONE (OUTPATIENT)
Dept: GASTROENTEROLOGY | Facility: CLINIC | Age: 43
End: 2018-09-28

## 2018-09-28 NOTE — TELEPHONE ENCOUNTER
I spoke to the patient. She is not feeling well. Endorses abdominal cramps and diarrhea, worse with eating. Having rare nocturnal BMs as well. Augmentin and rifaximin helped in the past.     PLAN  ---Take rifaximin (took first dose yesterday; 10 day script monthly approved by insurance) for likely SIBO   ---If ineffective, will check C diff (she previously had this)  ---Consider EGD/cscope given weight loss. Would take colon biopsies to rule out microscopic colitis    Ruma expressed understanding, agreement and gratitude.

## 2018-10-01 ENCOUNTER — THERAPY VISIT (OUTPATIENT)
Dept: PHYSICAL THERAPY | Facility: CLINIC | Age: 43
End: 2018-10-01
Payer: COMMERCIAL

## 2018-10-01 DIAGNOSIS — N39.46 MIXED INCONTINENCE: ICD-10-CM

## 2018-10-01 DIAGNOSIS — M32.19 OTHER SYSTEMIC LUPUS ERYTHEMATOSUS WITH OTHER ORGAN INVOLVEMENT (H): ICD-10-CM

## 2018-10-01 DIAGNOSIS — K56.41 OBSTRUCTIVE DEFECATION (H): Primary | ICD-10-CM

## 2018-10-01 LAB
ALBUMIN SERPL-MCNC: 3.7 G/DL (ref 3.4–5)
ALBUMIN UR-MCNC: NEGATIVE MG/DL
ALP SERPL-CCNC: 53 U/L (ref 40–150)
ALT SERPL W P-5'-P-CCNC: 33 U/L (ref 0–50)
ANION GAP SERPL CALCULATED.3IONS-SCNC: 6 MMOL/L (ref 3–14)
APPEARANCE UR: CLEAR
AST SERPL W P-5'-P-CCNC: 25 U/L (ref 0–45)
BACTERIA #/AREA URNS HPF: ABNORMAL /HPF
BASOPHILS # BLD AUTO: 0 10E9/L (ref 0–0.2)
BASOPHILS NFR BLD AUTO: 0.7 %
BILIRUB SERPL-MCNC: 0.3 MG/DL (ref 0.2–1.3)
BILIRUB UR QL STRIP: NEGATIVE
BUN SERPL-MCNC: 10 MG/DL (ref 7–30)
CALCIUM SERPL-MCNC: 8.5 MG/DL (ref 8.5–10.1)
CHLORIDE SERPL-SCNC: 104 MMOL/L (ref 94–109)
CK SERPL-CCNC: 96 U/L (ref 30–225)
CO2 SERPL-SCNC: 30 MMOL/L (ref 20–32)
COLOR UR AUTO: YELLOW
CREAT SERPL-MCNC: 0.73 MG/DL (ref 0.52–1.04)
CREAT UR-MCNC: 101 MG/DL
CRP SERPL-MCNC: <2.9 MG/L (ref 0–8)
DIFFERENTIAL METHOD BLD: ABNORMAL
EOSINOPHIL # BLD AUTO: 0.1 10E9/L (ref 0–0.7)
EOSINOPHIL NFR BLD AUTO: 1.6 %
ERYTHROCYTE [DISTWIDTH] IN BLOOD BY AUTOMATED COUNT: 11.5 % (ref 10–15)
ERYTHROCYTE [SEDIMENTATION RATE] IN BLOOD BY WESTERGREN METHOD: 4 MM/H (ref 0–20)
GFR SERPL CREATININE-BSD FRML MDRD: 86 ML/MIN/1.7M2
GLUCOSE SERPL-MCNC: 82 MG/DL (ref 70–99)
GLUCOSE UR STRIP-MCNC: NEGATIVE MG/DL
HCT VFR BLD AUTO: 39.8 % (ref 35–47)
HGB BLD-MCNC: 13.8 G/DL (ref 11.7–15.7)
HGB UR QL STRIP: ABNORMAL
KETONES UR STRIP-MCNC: NEGATIVE MG/DL
LEUKOCYTE ESTERASE UR QL STRIP: NEGATIVE
LYMPHOCYTES # BLD AUTO: 0.8 10E9/L (ref 0.8–5.3)
LYMPHOCYTES NFR BLD AUTO: 17.9 %
MCH RBC QN AUTO: 34.1 PG (ref 26.5–33)
MCHC RBC AUTO-ENTMCNC: 34.7 G/DL (ref 31.5–36.5)
MCV RBC AUTO: 98 FL (ref 78–100)
MONOCYTES # BLD AUTO: 0.3 10E9/L (ref 0–1.3)
MONOCYTES NFR BLD AUTO: 7.7 %
NEUTROPHILS # BLD AUTO: 3.1 10E9/L (ref 1.6–8.3)
NEUTROPHILS NFR BLD AUTO: 72.1 %
NITRATE UR QL: NEGATIVE
NON-SQ EPI CELLS #/AREA URNS LPF: ABNORMAL /LPF
PH UR STRIP: 7 PH (ref 5–7)
PLATELET # BLD AUTO: 220 10E9/L (ref 150–450)
POTASSIUM SERPL-SCNC: 3.6 MMOL/L (ref 3.4–5.3)
PROT SERPL-MCNC: 6.7 G/DL (ref 6.8–8.8)
PROT UR-MCNC: 0.24 G/L
PROT/CREAT 24H UR: 0.24 G/G CR (ref 0–0.2)
RBC # BLD AUTO: 4.05 10E12/L (ref 3.8–5.2)
RBC #/AREA URNS AUTO: ABNORMAL /HPF
SODIUM SERPL-SCNC: 140 MMOL/L (ref 133–144)
SOURCE: ABNORMAL
SP GR UR STRIP: >1.03 (ref 1–1.03)
UROBILINOGEN UR STRIP-ACNC: 1 EU/DL (ref 0.2–1)
WBC # BLD AUTO: 4.3 10E9/L (ref 4–11)
WBC #/AREA URNS AUTO: ABNORMAL /HPF

## 2018-10-01 PROCEDURE — 97112 NEUROMUSCULAR REEDUCATION: CPT | Mod: GP | Performed by: PHYSICAL THERAPIST

## 2018-10-01 PROCEDURE — 97162 PT EVAL MOD COMPLEX 30 MIN: CPT | Mod: GP | Performed by: PHYSICAL THERAPIST

## 2018-10-01 PROCEDURE — 86160 COMPLEMENT ANTIGEN: CPT | Performed by: INTERNAL MEDICINE

## 2018-10-01 PROCEDURE — 85652 RBC SED RATE AUTOMATED: CPT | Performed by: INTERNAL MEDICINE

## 2018-10-01 PROCEDURE — 85025 COMPLETE CBC W/AUTO DIFF WBC: CPT | Performed by: INTERNAL MEDICINE

## 2018-10-01 PROCEDURE — 80053 COMPREHEN METABOLIC PANEL: CPT | Performed by: INTERNAL MEDICINE

## 2018-10-01 PROCEDURE — 86140 C-REACTIVE PROTEIN: CPT | Performed by: INTERNAL MEDICINE

## 2018-10-01 PROCEDURE — 81001 URINALYSIS AUTO W/SCOPE: CPT | Performed by: INTERNAL MEDICINE

## 2018-10-01 PROCEDURE — 82550 ASSAY OF CK (CPK): CPT | Performed by: INTERNAL MEDICINE

## 2018-10-01 PROCEDURE — 84156 ASSAY OF PROTEIN URINE: CPT | Performed by: INTERNAL MEDICINE

## 2018-10-01 PROCEDURE — 97535 SELF CARE MNGMENT TRAINING: CPT | Mod: GP | Performed by: PHYSICAL THERAPIST

## 2018-10-01 PROCEDURE — 36415 COLL VENOUS BLD VENIPUNCTURE: CPT | Performed by: INTERNAL MEDICINE

## 2018-10-01 PROCEDURE — 86225 DNA ANTIBODY NATIVE: CPT | Performed by: INTERNAL MEDICINE

## 2018-10-01 NOTE — PROGRESS NOTES
"[facility] Initial Evaluation  Subjective:  Patient is a 43 year old female presenting with rehab pelvic hpi. The history is provided by the patient. No  was used.   Yovana Enriquez is a 43 year old female with a other, incontinence and pelvic dysfunction (constipation) condition.  Condition occurred with:  Insidious onset.  Condition occurred: for unknown reasons.  This is a chronic condition  Date of MD for this episode was 18, Ruma has been putting off the PT appt. States the bowel issues have been going on for many years, she has had at least 5 yrs of constipation. She also has a bacterial infection in the small intestine(SIBO), has had for 5 yrs. She states currently she has bouts of constipation and diarrhea. She is on her third round of antibiotics since April.   , vaginal(20, 19, 17 and 12 yo).   Ruma states she has always has some bladder issues with urgency and leakage. Does not wear a pad. Day urinary voids are every 2 hrs. She has a really strong urgency and must run to the bathroom, can leak prior to getting there and will need to change underwear about 1x/wk. Night voids are 2x, sometimes can be a strong urge. Never has a wet bed and can make it to the bathroom in time. States the stream is strong but does not always feel like she empties completely. Sometimes post void dribble. Fluid intake-2 cups coffee, 64 oz water, sometimes will add a crystal light packet.   Ruma states she has pain with intercourse \"sometimes\", she also has fibroids so has a lot of bleeding with bad cramps  Bowel-has a BM 1x/wk when constipated, if ill they are constant(diarrhea). Alternates about every other week. When constipated spends a lot of time pushing. No incontinence of bowel unless she is ill. Does get the urgency of stool.   Exercise-walks 1-2x/day about 20'. A lot of stairs at home. Does .   Was a bed wetter as a child, along with constipation.    Site " "of Pain: lower abdominal, internal vaginal.    Pain is described as aching and cramping (\"pain\") and is intermittent and reported as 5/10 and 6/10 (when has the pain).  Associated symptoms:  Fatigue. Pain is the same all the time (symptoms very sporadic).  Symptoms are exacerbated by coughing, sneezing, intercourse, stress and other (unsure for bowel) and relieved by nothing.  Since onset symptoms are gradually worsening.        General health as reported by patient is fair.  Pertinent medical history includes:  Other, anemia, fibromyalgia and high blood pressure (lupus, shogrens).  Medical allergies: none.  Other surgeries include:  Other and orthopedic surgery (tubal, R shoulder).  Current medications:  Anti-depressants, anti-inflammatory and high blood pressure medication.  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  Patient is working in normal job without restrictions.  Primary job tasks include:  Lifting and other (carrying).    Barriers include:  None as reported by the patient.    Red flags:  None as reported by the patient.                        Objective:  System                                 Pelvic Dysfunction Evaluation:        Flexibility:    Tightness present at:Adductors; Hamstrings and Piriformis  Tightness not present at:  Iliopsoas    Abdominal Wall:  Abdominal wall pelvic: thickened tissue over abdomen expecially L lower quarter.        Pelvic Clock Exam:    Ischiocavernosis pain:  -  Bulbocavernosis pain:  -  Transverse Perineal:  -  Levator ANI:  -  Perineal Body:  -  SI Provocation:  Si provocation pelvic: P4 neg B.  Positive for: Fabres and ASLR        External Assessment:    Skin Condition:  Irritation    Bearing Down/Coughing:  Normal  Tissue Symmetry:  Normal  Introitus:  Normal  Muscle Contraction/Perineal Mobility:  Substitution and slight lift, no urogential triangle descent  Internal Assessment:  Internal assessment pelvic: internally increased tension R LA/OI(not painful but " feels more pressure)  Sensory Exam:  Normal  Contraction/Grade:  Fair squeeze, definite lift (3)  Accessory Muscle use-Abdominals:  X  Accessory Muscle use-Gluteals:  X      SEMG Biofeedback:  Semg biofeedback pelvic: seated simulating defecation-50% of time did tighten pelvic floor, lacked good awareness.  Equipment:  Pathway    Suraface electrode placement--Perianal:  X  Baseline EMG PM:  1.1uV    Peak pelvic muscle contraction:  16  Sustained contraction:  Phasic ave 12uV, tonic ave 7.1uV  EMG interpretation to fatigue:  8-10 seconds  Position:  SupineAdditional History:  Delivery History:  Vaginal delivery  Number of Pregnancies: 5  Number of Live Births: 4  Caffeine Consumption:  2 cups coffee          Hip Evaluation    Hip Strength:      Extension:  Left: 3/5  Pain:Right: 3+/5    Pain:    Abduction:  Left: 4-/5     Pain:Right: 4/5    Pain:                                 General     ROS    Assessment/Plan:    Patient is a 43 year old female with pelvic complaints.    Patient has the following significant findings with corresponding treatment plan.                Diagnosis 1:  Constipation/obstructed defecation, mixed incontinence  Pain -  manual therapy, self management, education and home program  Decreased ROM/flexibility - manual therapy, therapeutic exercise and home program  Decreased strength - therapeutic exercise, therapeutic activities and home program  Decreased proprioception - neuro re-education, therapeutic activities and home program  Impaired muscle performance - biofeedback, neuro re-education and home program  Decreased function - therapeutic activities and home program    Therapy Evaluation Codes:   1) History comprised of:   Personal factors that impact the plan of care:      Anxiety and Time since onset of symptoms.    Comorbidity factors that impact the plan of care are:      Fibromyalgia and lupus, shogrens.     Medications impacting care: Anti-depressant, Anti-inflammatory, High blood  pressure and Pain.  2) Examination of Body Systems comprised of:   Body structures and functions that impact the plan of care:      Pelvis.   Activity limitations that impact the plan of care are:      Stress incontinence, Urgency, Urge incontinence and constipation.  3) Clinical presentation characteristics are:   Evolving/Changing.  4) Decision-Making    Moderate complexity using standardized patient assessment instrument and/or measureable assessment of functional outcome.  Cumulative Therapy Evaluation is: Moderate complexity.    Previous and current functional limitations:  (See Goal Flow Sheet for this information)    Short term and Long term goals: (See Goal Flow Sheet for this information)     Communication ability:  Patient appears to be able to clearly communicate and understand verbal and written communication and follow directions correctly.  Treatment Explanation - The following has been discussed with the patient:   RX ordered/plan of care  Anticipated outcomes  Possible risks and side effects  This patient would benefit from PT intervention to resume normal activities.   Rehab potential is good.    Frequency:  1 X week, once daily  Duration:  for 4-6 weeks tapering to 2 X a month over 3-4 months  Discharge Plan:  Achieve all LTG.  Independent in home treatment program.  Reach maximal therapeutic benefit.      Please refer to the daily flowsheet for treatment today, total treatment time and time spent performing 1:1 timed codes.

## 2018-10-01 NOTE — MR AVS SNAPSHOT
After Visit Summary   10/1/2018    Yovana Enriquez    MRN: [id number]           Patient Information     Date Of Birth          1975        Visit Information        Provider Department      10/1/2018 1:50 PM Kareen Stoddard PT [facility]        Today's Diagnoses     Obstructive defecation    -  1    Mixed incontinence           Follow-ups after your visit        Your next 10 appointments already scheduled     Oct 08, 2018  2:00 PM CDT   Return Visit with Bradford Colvin MD   [facility] ([facility]    [address]  [place] [address]   [phone]            Oct 10, 2018  6:30 PM CDT   US PELVIC COMPLETE W TRANSVAGINAL with BEUS1   [facility] ([facility])    [address]  [place] [address]   [phone]           How do I prepare for my exam? (Food and drink instructions) Adults: Drink four 8-ounce glasses of fluid an hour before your exam. If you need to empty your bladder before your exam, try to release only a little urine. Then, drink another glass of fluid.  Children: * Children who are potty trained up to 6 years old should drink at least 2 cups (16 oz) of water/non-carbonated beverage 30 minutes prior to the exam. * Children who are 6-10 years should drink at least 3 cups (24 oz) of water/non-carbonated beverage 45 minutes prior to the exam. * Children who are 10 years or older should drink at least 4 cups (32 oz) of water/non-carbonated beverage 45 minutes prior to the exam.  If your child is very uncomfortable or has an urgent need to pee, please notify a technologist; they will try to find out how much longer the wait may be and provide instructions to help relieve the pressure.  What should I wear: Wear comfortable clothes.  How long does the exam take: Most ultrasounds take 30 to 60 minutes.  What should I bring: Bring a list of your medicines, including vitamins,  minerals and over-the-counter drugs. It is safest to leave personal items at home.  Do I need a :  No  is needed.  What do I need to tell my doctor: Tell your doctor about any allergies you may have.  What should I do after the exam: No restrictions, You may resume normal activities.  What is this test: An ultrasound uses sound waves to make pictures of the body. Sound waves do not cause pain. The only discomfort may be the pressure of the wand against your skin or full bladder.  Who should I call with questions: If you have any questions, please call the Imaging Department where you will have your exam. Directions, parking instructions, and other information is available on our website, [email or web address].            Oct 16, 2018  1:50 PM CDT   [facility] For Women Only with Kareen Stoddard, PT   [facility] [facility] ([facility] [place])    [address]  [place] [address]   [phone]            Oct 30, 2018  1:50 PM CDT   [facility] For Women Only with Kareen Stoddard, PT   [facility] [place] [facility] ([facility] [place])    [address]  [address]  [place] [address]   [phone]            Nov 13, 2018  1:50 PM CST   [facility] For Women Only with Kareen Stoddard, PT   [facility] [facility] ([facility] [place])    [address]  [address]  [place] [address]   [phone]            Nov 27, 2018  1:50 PM CST   [facility] For Women Only with Kareen Stoddard, PT   [facility] [facility] ([facility] [place])    [address]  [address]  [place] [address]   [phone]            Dec 11, 2018  1:50 PM CST   [facility] For Women Only with Kareen Stoddard, PT   [facility] [facility] ([facility] [place])    [address]  [address]  [place] [address]   [phone]              Who to contact     If you have questions or need follow up information about today's clinic  visit or your schedule please contact [facility] [place] [facility] directly at [phone].  Normal or non-critical lab and imaging results will be communicated to you by MyChart, letter or phone within 4 business days after the clinic has received the results. If you do not hear from us within 7 days, please contact the clinic through Lifeline Ventureshart or phone. If you have a critical or abnormal lab result, we will notify you by phone as soon as possible.  Submit refill requests through [employer] Insight or call your pharmacy and they will forward the refill request to us. Please allow 3 business days for your refill to be completed.          Additional Information About Your Visit        [employer] Ventures[employer]Acunote Information     [employer]ight gives you secure access to your electronic health record. If you see a primary care provider, you can also send messages to your care team and make appointments. If you have questions, please call your primary care clinic.  If you do not have a primary care provider, please call [phone] and they will assist you.        Care EveryWhere ID     This is your Care EveryWhere ID. This could be used by other organizations to access your [facility] medical records  [id number]         Blood Pressure from Last 3 Encounters:   09/14/18 124/81   08/15/18 117/80   07/12/18 133/85    Weight from Last 3 Encounters:   09/14/18 59.9 kg (132 lb)   08/15/18 60.9 kg (134 lb 4.8 oz)   07/12/18 60.6 kg (133 lb 9.6 oz)              We Performed the Following     KELSEY Inital Eval Report     Neuromuscular Re-Education     PT Jenniferal, Moderate Complexity ([id number])     Self Care Management Training        Primary Care Provider Office Phone # Fax #    Bradford Mario PA-C [phone]       [address]  [place] [address]        Equal Access to Services     BINTA HURD : Hadii mare ayerso Sorenata, waaxda luqadaha, qaybta kaalmada yrisyatiburcio, rosy scott . So [facility]  [phone].    ATENCIÓN: Si payton morales, tiene a dexter disposición servicios gratuitos de asistencia lingüística. Cee galaviz [phone].    We comply with applicable federal civil rights laws and [place] laws. We do not discriminate on the basis of race, color, national origin, age, disability, sex, sexual orientation, or gender identity.            Thank you!     Thank you for choosing [facility] [facility]  for your care. Our goal is always to provide you with excellent care. Hearing back from our patients is one way we can continue to improve our services. Please take a few minutes to complete the written survey that you may receive in the mail after your visit with us. Thank you!             Your Updated Medication List - Protect others around you: Learn how to safely use, store and throw away your medicines at www.disposemymeds.org.          This list is accurate as of 10/1/18  7:20 PM.  Always use your most recent med list.                   Brand Name Dispense Instructions for use Diagnosis    albuterol 108 (90 Base) MCG/ACT inhaler    PROAIR HFA/PROVENTIL HFA/VENTOLIN HFA    1 Inhaler    Inhale 2 puffs into the lungs every 4 hours as needed for shortness of breath / dyspnea or wheezing Use with spacer    Wheezing       amLODIPine 5 MG tablet    NORVASC    90 tablet    Take 1 tablet (5 mg) by mouth daily    Raynaud's phenomenon without gangrene       azaTHIOprine 50 MG tablet    IMURAN    180 tablet    Take 2 tablets (100 mg) by mouth daily    Other systemic lupus erythematosus with other organ involvement (H)       Belimumab 200 MG/ML Soaj     4 mL    Inject 200 mg Subcutaneous every 7 days . Hold for signs of infection and seek medical attention. Autoinjector    Other systemic lupus erythematosus with other organ involvement (H)       blood glucose monitoring test strip    no brand specified    1 Month    Used for testing glucose 2-3 times daily    History of gestational diabetes  mellitus, not currently pregnant       buPROPion 75 MG tablet    WELLBUTRIN    180 tablet    TAKE 1 TABLET BY MOUTH 2 TIMES DAILY    Anxiety       Calcium + D3 600-200 MG-UNIT Tabs      1 tablet daily        cevimeline 30 MG capsule    EVOXAC    180 capsule    Take 1 capsule (30 mg) by mouth 2 times daily    Sjogren's syndrome with keratoconjunctivitis sicca (H)       diclofenac 1 % Gel topical gel    VOLTAREN    100 g    Apply 2-4 grams to knees or shoulders four times daily as needed using enclosed dosing card.    Fibromyalgia, SLE (systemic lupus erythematosus) (H)       fluticasone 50 MCG/ACT spray    FLONASE    1 Bottle    Spray 1-2 sprays into both nostrils daily    Chronic rhinitis       hydroxychloroquine 200 MG tablet    PLAQUENIL    135 tablet    Hydroxychloroquine 200mg in the morning and 100mg in the evening.    Other systemic lupus erythematosus with other organ involvement (H)       losartan 25 MG tablet    COZAAR    90 tablet    TAKE 1 TABLET BY MOUTH DAILY    Hypertension goal BP (blood pressure) < 140/90       MICROLET LANCETS Misc     100 each    1 each daily.    Other abnormal glucose       MULTIVITAMIN PO      Take  by mouth.        nortriptyline 10 MG capsule    PAMELOR    90 capsule    TAKE 3 CAPSULES BY MOUTH AT BEDTIME    Numbness       predniSONE 2.5 MG tablet    DELTASONE    180 tablet    Take 1-2 tablets (2.5-5 mg) by mouth daily    Other systemic lupus erythematosus with other organ involvement (H)       pregabalin 150 MG capsule    LYRICA    270 capsule    Take 1 capsule (150 mg) by mouth 3 times daily . 3 month supply.    Fibromyalgia       ranitidine 300 MG tablet    ZANTAC    180 tablet    Take 1 tablet (300 mg) by mouth 2 times daily TAKE 1 TABLET BY MOUTH 2 TIMES DAILY    Gastroesophageal reflux disease without esophagitis       rifaximin 550 MG Tabs tablet    XIFAXAN    42 tablet    Take 1 tablet (550 mg) by mouth 3 times daily for 14 days 3 times daily for 14 days    Small  intestinal bacterial overgrowth       spacer/aero-hold chamber mask Wendy     1 each    1 Adult size spacer to use with MDI inhalers.    Wheezing

## 2018-10-02 LAB
C3 SERPL-MCNC: 63 MG/DL (ref 76–169)
C4 SERPL-MCNC: 14 MG/DL (ref 15–50)
DSDNA AB SER-ACNC: <1 IU/ML

## 2018-10-08 ENCOUNTER — OFFICE VISIT (OUTPATIENT)
Dept: RHEUMATOLOGY | Facility: CLINIC | Age: 43
End: 2018-10-08
Payer: COMMERCIAL

## 2018-10-08 VITALS
OXYGEN SATURATION: 100 % | WEIGHT: 134 LBS | HEART RATE: 80 BPM | BODY MASS INDEX: 22.33 KG/M2 | HEIGHT: 65 IN | DIASTOLIC BLOOD PRESSURE: 82 MMHG | SYSTOLIC BLOOD PRESSURE: 119 MMHG

## 2018-10-08 DIAGNOSIS — Z79.899 HIGH RISK MEDICATIONS (NOT ANTICOAGULANTS) LONG-TERM USE: ICD-10-CM

## 2018-10-08 DIAGNOSIS — M32.19 OTHER SYSTEMIC LUPUS ERYTHEMATOSUS WITH OTHER ORGAN INVOLVEMENT (H): Primary | ICD-10-CM

## 2018-10-08 DIAGNOSIS — M35.01 SJOGREN'S SYNDROME WITH KERATOCONJUNCTIVITIS SICCA (H): ICD-10-CM

## 2018-10-08 DIAGNOSIS — I73.00 RAYNAUD'S PHENOMENON WITHOUT GANGRENE: ICD-10-CM

## 2018-10-08 PROCEDURE — 99213 OFFICE O/P EST LOW 20 MIN: CPT | Performed by: INTERNAL MEDICINE

## 2018-10-08 RX ORDER — HYDROXYCHLOROQUINE SULFATE 200 MG/1
TABLET, FILM COATED ORAL
Qty: 135 TABLET | Refills: 1 | Status: SHIPPED | OUTPATIENT
Start: 2018-10-08 | End: 2019-05-23

## 2018-10-08 RX ORDER — AZATHIOPRINE 50 MG/1
100 TABLET ORAL DAILY
Qty: 180 TABLET | Refills: 1 | Status: SHIPPED | OUTPATIENT
Start: 2018-10-08 | End: 2019-05-23

## 2018-10-08 RX ORDER — CEVIMELINE HYDROCHLORIDE 30 MG/1
30 CAPSULE ORAL 2 TIMES DAILY
Qty: 180 CAPSULE | Refills: 3 | Status: SHIPPED | OUTPATIENT
Start: 2018-10-08 | End: 2019-05-23

## 2018-10-08 RX ORDER — AMLODIPINE BESYLATE 5 MG/1
5 TABLET ORAL DAILY
Qty: 90 TABLET | Refills: 3 | Status: SHIPPED | OUTPATIENT
Start: 2018-10-08 | End: 2019-05-23

## 2018-10-08 NOTE — PROGRESS NOTES
"Chief Complaint   Patient presents with     Follow Up For     Other systemic lupus erythematosus with other organ involvement        Initial /82  Pulse 80  Ht 1.651 m (5' 5\")  Wt 60.8 kg (134 lb)  SpO2 100%  BMI 22.3 kg/m2 Estimated body mass index is 22.3 kg/(m^2) as calculated from the following:    Height as of this encounter: 1.651 m (5' 5\").    Weight as of this encounter: 60.8 kg (134 lb).  BP completed using cuff size: regular         RAPID3 (0-30) Cumulative Score  9.7          RAPID3 Weighted Score (divide #4 by 3 and that is the weighted score)  3.23         "

## 2018-10-08 NOTE — MR AVS SNAPSHOT
After Visit Summary   10/8/2018    Yovana Enriquez    MRN: [id number]           Patient Information     Date Of Birth          1975        Visit Information        Provider Department      10/8/2018 2:00 PM Bradford Colvin MD [facility]        Today's Diagnoses     Other systemic lupus erythematosus with other organ involvement (H)    -  1    Raynaud's phenomenon without gangrene        Sjogren's syndrome with keratoconjunctivitis sicca (H)           Follow-ups after your visit        Your next 10 appointments already scheduled     Oct 10, 2018  6:30 PM CDT   US PELVIC COMPLETE W TRANSVAGINAL with BEUS1   [facility] [place] ([facility])    [address] [place] [address] [phone]           How do I prepare for my exam? (Food and drink instructions) Adults: Drink four 8-ounce glasses of fluid an hour before your exam. If you need to empty your bladder before your exam, try to release only a little urine. Then, drink another glass of fluid.  Children: * Children who are potty trained up to 6 years old should drink at least 2 cups (16 oz) of water/non-carbonated beverage 30 minutes prior to the exam. * Children who are 6-10 years should drink at least 3 cups (24 oz) of water/non-carbonated beverage 45 minutes prior to the exam. * Children who are 10 years or older should drink at least 4 cups (32 oz) of water/non-carbonated beverage 45 minutes prior to the exam.  If your child is very uncomfortable or has an urgent need to pee, please notify a technologist; they will try to find out how much longer the wait may be and provide instructions to help relieve the pressure.  What should I wear: Wear comfortable clothes.  How long does the exam take: Most ultrasounds take 30 to 60 minutes.  What should I bring: Bring a list of your medicines, including vitamins, minerals and over-the-counter drugs. It is safest to leave personal items at home.  Do I  need a :  No  is needed.  What do I need to tell my doctor: Tell your doctor about any allergies you may have.  What should I do after the exam: No restrictions, You may resume normal activities.  What is this test: An ultrasound uses sound waves to make pictures of the body. Sound waves do not cause pain. The only discomfort may be the pressure of the wand against your skin or full bladder.  Who should I call with questions: If you have any questions, please call the Imaging Department where you will have your exam. Directions, parking instructions, and other information is available on our website, [email or web address].            Oct 16, 2018  1:50 PM CDT   [facility]EL[facility]Y For Women Only with Kareen Richi, PT   [facility] [place] [facility] ([facility] [place])    [address]  [place] [address]   [phone]            Oct 30, 2018  1:50 PM CDT   [facility] For Women Only with Kareen Richi, PT   [facility] [place] [facility] ([facility] [place])    [address]  [place] [address]   [phone]            Nov 13, 2018  1:50 PM CST   [facility] For Women Only with Kareen Richi, PT   [facility] [facility] ([facility] [place])    [address]  [place] [address]   [phone]            Nov 27, 2018  1:50 PM CST   [facility] For Women Only with Kareen Richi, PT   [facility] [place] [facility] ([facility] [place]    [address]  [place] [address]   [phone]            Dec 11, 2018  1:50 PM CST   [facility] For Women Only with Kareen Richi, PT   [facility] [place] [facility] ([facility] [place])    [address]  [place] [address]   [phone]            Jan 16, 2019  6:00 PM CST   LAB with BE LAB   [facility] [place])    [address]  [place] [address]   [phone]           Please do not  eat 10-12 hours before your appointment if you are coming in fasting for labs on lipids, cholesterol, or glucose (sugar). This does not apply to pregnant women. Water, hot tea and black coffee (with nothing added) are okay. Do not drink other fluids, diet soda or chew gum.            Jan 23, 2019  2:20 PM CST   Return Visit with Bradford Colvin MD   [facility] ([facility])    [address] [place] [address]  [place] [address]   [phone]              Future tests that were ordered for you today     Open Future Orders        Priority Expected Expires Ordered    CBC with platelets differential Routine 1/7/2019 2/8/2019 10/8/2018    Complement C3 Routine 1/7/2019 2/8/2019 10/8/2018    Complement C4 Routine 1/7/2019 2/8/2019 10/8/2018    Comprehensive metabolic panel Routine 1/7/2019 2/8/2019 10/8/2018    CRP inflammation Routine 1/7/2019 2/8/2019 10/8/2018    UA reflex to Microscopic and Culture Routine 1/7/2019 2/8/2019 10/8/2018    Erythrocyte sedimentation rate auto Routine 1/7/2019 2/8/2019 10/8/2018    Protein  random urine with Creat Ratio Routine 1/7/2019 2/8/2019 10/8/2018            Who to contact     If you have questions or need follow up information about today's clinic visit or your schedule please contact [facility] [place] directly at [phone].  Normal or non-critical lab and imaging results will be communicated to you by Snapd Apphart, letter or phone within 4 business days after the clinic has received the results. If you do not hear from us within 7 days, please contact the clinic through Snap[employer] Apphart or phone. If you have a critical or abnormal lab result, we will notify you by phone as soon as possible.  Submit refill requests through [employer] or call your pharmacy and they will forward the refill request to us. Please allow 3 business days for your refill to be completed.          Additional Information About Your Visit        [employer] Information     [employer] gives you secure  "access to your electronic health record. If you see a primary care provider, you can also send messages to your care team and make appointments. If you have questions, please call your primary care clinic.  If you do not have a primary care provider, please call [phone] and they will assist you.        Care EveryWhere ID     This is your Care EveryWhere ID. This could be used by other organizations to access your [facility] medical records  [id number]        Your Vitals Were     Pulse Height Pulse Oximetry BMI (Body Mass Index)          80 1.651 m (5' 5\") 100% 22.3 kg/m2         Blood Pressure from Last 3 Encounters:   10/08/18 119/82   09/14/18 124/81   08/15/18 117/80    Weight from Last 3 Encounters:   10/08/18 60.8 kg (134 lb)   09/14/18 59.9 kg (132 lb)   08/15/18 60.9 kg (134 lb 4.8 oz)                 Today's Medication Changes          These changes are accurate as of 10/8/18  2:31 PM.  If you have any questions, ask your nurse or doctor.               These medicines have changed or have updated prescriptions.        Dose/Directions    hydroxychloroquine 200 MG tablet   Commonly known as:  PLAQUENIL   This may have changed:  additional instructions   Used for:  Other systemic lupus erythematosus with other organ involvement (H)   Changed by:  Bradford Colvin MD        Hydroxychloroquine 200mg daily; and an additional 200mg every other day.   Quantity:  135 tablet   Refills:  1         Stop taking these medicines if you haven't already. Please contact your care team if you have questions.     predniSONE 2.5 MG tablet   Commonly known as:  DELTASONE   Stopped by:  Bradford Colvin MD                Where to get your medicines      These [id number] [place] [address]  [address] [place] [address]     Phone:  [phone]     amLODIPine 5 MG tablet    azaTHIOprine 50 MG tablet    cevimeline 30 MG capsule    hydroxychloroquine 200 MG tablet         Call " your pharmacy to confirm that your medication is ready for pickup. It may take up to 24 hours for them to receive the prescription. If the prescription is not ready within 3 business days, please contact your clinic or your provider.     We will let you know when these medications are ready. If you don't hear back within 3 business days, please contact us.     Belimumab 200 MG/ML Soaj                Primary Care Provider Office Phone # Fax #    Bradfordtaylor Mario PA-C [phone]       [address]  [place] [address]        Equal Access to Services     [facility]: Hadii aad ku hadasho Soomaali, waaxda luqadaha, qaybta kaalmada adeegyada, waxay odalysin hayaan yris scott . So [facility] [phone].    ATENCIÓN: Si habla español, tiene a dexter disposición servicios gratuitos de asistencia lingüística. [facility] [phone].    We comply with applicable federal civil rights laws and [place] laws. We do not discriminate on the basis of race, color, national origin, age, disability, sex, sexual orientation, or gender identity.            Thank you!     Thank you for choosing [facility]  for your care. Our goal is always to provide you with excellent care. Hearing back from our patients is one way we can continue to improve our services. Please take a few minutes to complete the written survey that you may receive in the mail after your visit with us. Thank you!             Your Updated Medication List - Protect others around you: Learn how to safely use, store and throw away your medicines at www.disposemymeds.org.          This list is accurate as of 10/8/18  2:31 PM.  Always use your most recent med list.                   Brand Name Dispense Instructions for use Diagnosis    albuterol 108 (90 Base) MCG/ACT inhaler    PROAIR HFA/PROVENTIL HFA/VENTOLIN HFA    1 Inhaler    Inhale 2 puffs into the lungs every 4 hours as needed for shortness of breath / dyspnea or wheezing Use with  spacer    Wheezing       amLODIPine 5 MG tablet    NORVASC    90 tablet    Take 1 tablet (5 mg) by mouth daily    Raynaud's phenomenon without gangrene       azaTHIOprine 50 MG tablet    IMURAN    180 tablet    Take 2 tablets (100 mg) by mouth daily    Other systemic lupus erythematosus with other organ involvement (H)       Belimumab 200 MG/ML Soaj     4 mL    Inject 200 mg Subcutaneous every 7 days . Hold for signs of infection and seek medical attention. Autoinjector    Other systemic lupus erythematosus with other organ involvement (H)       blood glucose monitoring test strip    no brand specified    1 Month    Used for testing glucose 2-3 times daily    History of gestational diabetes mellitus, not currently pregnant       buPROPion 75 MG tablet    WELLBUTRIN    180 tablet    TAKE 1 TABLET BY MOUTH 2 TIMES DAILY    Anxiety       Calcium + D3 600-200 MG-UNIT Tabs      1 tablet daily        cevimeline 30 MG capsule    EVOXAC    180 capsule    Take 1 capsule (30 mg) by mouth 2 times daily    Sjogren's syndrome with keratoconjunctivitis sicca (H)       diclofenac 1 % Gel topical gel    VOLTAREN    100 g    Apply 2-4 grams to knees or shoulders four times daily as needed using enclosed dosing card.    Fibromyalgia, SLE (systemic lupus erythematosus) (H)       fluticasone 50 MCG/ACT spray    FLONASE    1 Bottle    Spray 1-2 sprays into both nostrils daily    Chronic rhinitis       hydroxychloroquine 200 MG tablet    PLAQUENIL    135 tablet    Hydroxychloroquine 200mg daily; and an additional 200mg every other day.    Other systemic lupus erythematosus with other organ involvement (H)       losartan 25 MG tablet    COZAAR    90 tablet    TAKE 1 TABLET BY MOUTH DAILY    Hypertension goal BP (blood pressure) < 140/90       MICROLET LANCETS Misc     100 each    1 each daily.    Other abnormal glucose       MULTIVITAMIN PO      Take  by mouth.        nortriptyline 10 MG capsule    PAMELOR    90 capsule    TAKE 3 CAPSULES  BY MOUTH AT BEDTIME    Numbness       pregabalin 150 MG capsule    LYRICA    270 capsule    Take 1 capsule (150 mg) by mouth 3 times daily . 3 month supply.    Fibromyalgia       ranitidine 300 MG tablet    ZANTAC    180 tablet    Take 1 tablet (300 mg) by mouth 2 times daily TAKE 1 TABLET BY MOUTH 2 TIMES DAILY    Gastroesophageal reflux disease without esophagitis       spacer/aero-hold chamber mask Wendy     1 each    1 Adult size spacer to use with MDI inhalers.    Wheezing

## 2018-10-08 NOTE — PROGRESS NOTES
Rheumatology Clinic Visit      Yovana Enriquez MRN# [id number]   YOB: 1975 Age: 43 year old      Date of visit: 10/08/18   PCP: Bradford Mario    Chief Complaint   Patient presents with:  Follow Up For: Other systemic lupus erythematosus with other organ involvement       Assessment and Plan     1. Systemic lupus erythematosus (YANELIS by EIA positive in the past, leukopenia/lymphocytopenia, hypocomplementemia, polyarthralgias, oral sores, history of malar rash, photophobia, photosensitivity, Raynaud's Phenomenon): Previously on methotrexate that was discontinued for unclear reasons but the patient reports that she tolerated it well. Currently on azathioprine 100 mg, hydroxychloroquine 300 mg daily [avg], and Benlysta (worsening symptoms when stopped in the past). No longer taking prednisone. Currently doing well.  If still doing well in the future may consider further AZA dose reduction.   - Continue azathioprine 100 mg daily  - Continue Evoxac 30 mg twice a day  - Continue hydroxychloroquine 300mg daily on average: 200mg daily with additional 200mg every other day (toxicity monitoring eye exam last done on 11/28/2016; advised her to update the eye exam and she says that she will call today to schedule)  - Remove prednisone 2.5-5mg daily from the medication list  - Continue Benlysta SQ  - Labs 1 week prior to the next rheumatology clinic visit: CBC, CMP, ESR, CRP, CK, C3, C4, UA, Uprotein:creatinine    2. Secondary Sjogren syndrome: Doing well with Evoxac and frequent sips of water. Dry eyes are not much of an issue currently. See #1.    3. Raynaud's Phenomenon: Amlodipine 5mg daily is effective.  - Continue amlodipine 5mg daily     4. Bilateral trochanteric bursitis: She receives steroid injections from the pain clinic.    5. Fibromyalgia: Managed by the pain clinic and PCP.    6. Vaccinations: Vaccinations reviewed with Ms. Enriquez.  Risks and benefits of vaccinations were discussed.  - Influenza:  "encouraged yearly vaccination  - Shingrix: she is going to check on insurance coverage and possible receive at a pharmacy      Ms. Enriquez verbalized agreement with and understanding of the rational for the diagnosis and treatment plan.  All questions were answered to best of my ability and the patient's satisfaction. Ms. Enriquez was advised to contact the clinic with any questions that may arise after the clinic visit.      Thank you for involving me in the care of the patient    Return to clinic: 3 months      HPI   Yovana Enriquez is a 43 year old female with medical history significant for subclinical hyperthyroidism, hypertension, irritable bowel syndrome, fibromyalgia, hypertension, non-celiac gluten sensitivity (reportedly with bowel biopsies and laboratory workup that did not show celiac disease), anxiety, and systemic lupus erythematosus who presents for follow-up of SLE.    Today, Ms. Enriquez reports intestinal bacterial overgrowth being tx'd by GI; round of abx x1week of each month.  Sleep is \"horrible\"; insomnia; wakes up tired; wants to nap during the day. Morning stiffness for about 30 minutes.  Joints hurt \"a little bit\"; fingers, knees, and hips.  Photosensitive; has noticed it more in the summer; fatigue and facial erythema; covers up and \"hates summer\".  Uterine fibroids with plans to discuss possible surgery with another provider.  Overall says that her lupus is doing well.  No longer taking prednisone.  Raynaud's is stable.    Denies fevers, chills, nausea, vomiting. No abdominal pain.  No chest pain/pressure, palpitations, or shortness of breath. No neck pain. No rash currently. No LE swelling.  She has photosensitivity and photophobia.   Sicca symptoms are well-controlled with Evoxac and frequent sips of water. No eye pain or redness. No history of serositis. No history of DVT, pulmonary embolism, or miscarriage.    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  PULM: No SOB, wheeze, cough.  GI:  See history of present illness. No blood in stool. No abdominal pain, nausea, vomiting.  : No blood in urine.  MSK: See HPI.  NEURO: See history of present illness  EXT: No LE swelling    Active Problem List     Patient Active Problem List   Diagnosis     Systemic lupus erythematosus (H)     Menorrhagia     History of ovarian cyst     Dysmenorrhea     History of gestational diabetes mellitus, not currently pregnant     Intramural leiomyoma of uterus     Hyperlipidemia LDL goal <130     Subclinical hyperthyroidism     Anxiety     High risk medication use     Fibromyalgia     Chronic constipation     Irritable bowel syndrome     Health Care Home     Hypertension goal BP (blood pressure) < 140/90     Non-celiac gluten sensitivity     Raynaud's phenomenon without gangrene     Sjogren's syndrome (H)     Intramural and submucous leiomyoma of uterus     Obstructive defecation     Mixed incontinence     Past Medical History     Past Medical History:   Diagnosis Date     Chronic constipation 12/16/2013     Dysmenorrhea 10/1/2012     Fibromyalgia 11/15/2013     Health Care Home 3/19/2014    **See Letters for [facility] Care Plan: Emergency Care Plan       High risk medication use 9/13/2013     History of gestational diabetes mellitus, not currently pregnant 10/1/2012     History of ovarian cyst 10/1/2012     Hyperlipidemia LDL goal <130 10/18/2012     Hypertension goal BP (blood pressure) < 140/90 1/19/2015     Intramural leiomyoma of uterus 10/5/2012     Irritable bowel syndrome 3/11/2014    Patient states no history colonoscopy       Menorrhagia 10/1/2012     Non-celiac gluten sensitivity 2/8/2016     PONV (postoperative nausea and vomiting)      Subclinical hyperthyroidism 1/17/2013      Systemic lupus erythematosus (H) 4/23/2012     Past Surgical History     Past Surgical History:   Procedure Laterality Date     COLONOSCOPY N/A 2/20/2015    Procedure: COMBINED COLONOSCOPY, SINGLE OR MULTIPLE BIOPSY/POLYPECTOMY BY BIOPSY;  Surgeon: Fred Cullen MD;  Location: [facility]     COLONOSCOPY WITH CO2 INSUFFLATION N/A 2/20/2015    Procedure: COLONOSCOPY WITH CO2 INSUFFLATION;  Surgeon: Fred Cullen MD;  Location: [facility]     ENT SURGERY  10-24-14    Bottom lip biopsies     ESOPHAGOSCOPY, GASTROSCOPY, DUODENOSCOPY (EGD), COMBINED N/A 2/20/2015    Procedure: COMBINED ESOPHAGOSCOPY, GASTROSCOPY, DUODENOSCOPY (EGD), BIOPSY SINGLE OR MULTIPLE;  Surgeon: Fred Cullen MD;  Location: [facility] OR     HC BREATH HYDROGEN TEST  12/31/2013    Procedure: HYDROGEN BREATH TEST;  Surgeon: Camden Almazan MD;  Location: [facility]     HC HYSTEROSCOPY, SURGICAL; W/ ENDOMETRIAL ABLATION, ANY METHOD  10-19-12     SHOULDER SURGERY Right     R shoulder     TUBAL LIGATION  2004     Allergy   No Known Allergies  Current Medication List     Current Outpatient Prescriptions   Medication Sig     albuterol (PROAIR HFA/PROVENTIL HFA/VENTOLIN HFA) 108 (90 BASE) MCG/ACT Inhaler Inhale 2 puffs into the lungs every 4 hours as needed for shortness of breath / dyspnea or wheezing Use with spacer     amLODIPine (NORVASC) 5 MG tablet Take 1 tablet (5 mg) by mouth daily     azaTHIOprine (IMURAN) 50 MG tablet Take 2 tablets (100 mg) by mouth daily     Belimumab 200 MG/ML SOAJ Inject 200 mg Subcutaneous every 7 days . Hold for signs of infection and seek medical attention. Autoinjector     blood glucose test strip Used for testing glucose 2-3 times daily     buPROPion (WELLBUTRIN) 75 MG tablet TAKE 1 TABLET BY MOUTH 2 TIMES DAILY     Calcium Carb-Cholecalciferol (CALCIUM + D3) 600-200 MG-UNIT TABS 1 tablet daily     cevimeline (EVOXAC) 30 MG capsule Take 1 capsule (30 mg) by mouth 2 times daily     diclofenac  (VOLTAREN) 1 % GEL Apply 2-4 grams to knees or shoulders four times daily as needed using enclosed dosing card.     fluticasone (FLONASE) 50 MCG/ACT nasal spray Spray 1-2 sprays into both nostrils daily     hydroxychloroquine (PLAQUENIL) 200 MG tablet Hydroxychloroquine 200mg daily; and an additional 200mg every other day.     losartan (COZAAR) 25 MG tablet TAKE 1 TABLET BY MOUTH DAILY     MICROLET LANCETS MISC 1 each daily.     Multiple Vitamin (MULTIVITAMIN OR) Take  by mouth.     nortriptyline (PAMELOR) 10 MG capsule TAKE 3 CAPSULES BY MOUTH AT BEDTIME     pregabalin (LYRICA) 150 MG capsule Take 1 capsule (150 mg) by mouth 3 times daily . 3 month supply.     ranitidine (ZANTAC) 300 MG tablet Take 1 tablet (300 mg) by mouth 2 times daily TAKE 1 TABLET BY MOUTH 2 TIMES DAILY     Spacer/Aero-Holding Chambers (SPACER/AERO-HOLD CHAMBER MASK) EDITH 1 Adult size spacer to use with [facility] inhalers.     [DISCONTINUED] amLODIPine (NORVASC) 5 MG tablet Take 1 tablet (5 mg) by mouth daily     [DISCONTINUED] azaTHIOprine (IMURAN) 50 MG tablet Take 2 tablets (100 mg) by mouth daily     [DISCONTINUED] Belimumab 200 MG/ML SOAJ Inject 200 mg Subcutaneous every 7 days . Hold for signs of infection and seek medical attention. Autoinjector     No current facility-administered medications for this visit.          Social History   See HPI    Family History     Family History   Problem Relation Age of Onset     Respiratory Maternal Grandfather      Cancer Maternal Grandfather      Asthma Son      Circulatory Maternal Grandmother      Brain anurysm     Hypertension Mother      Glaucoma Mother 50     drops     Hypertension Sister      Hypertension Sister      Diabetes No family hx of      Cerebrovascular Disease No family hx of      Thyroid Disease No family hx of      Macular Degeneration No family hx of        Physical Exam     Temp Readings from Last 3 Encounters:   08/15/18 98.5  F (36.9  C) (Oral)   04/05/18 98.1  F (36.7  C) (Oral)  "  02/02/18 97.4  F (36.3  C) (Oral)     BP Readings from Last 5 Encounters:   10/08/18 119/82   09/14/18 124/81   08/15/18 117/80   07/12/18 133/85   06/11/18 123/81     Pulse Readings from Last 1 Encounters:   10/08/18 80     Resp Readings from Last 1 Encounters:   08/15/18 16     Estimated body mass index is 22.3 kg/(m^2) as calculated from the following:    Height as of this encounter: 1.651 m (5' 5\").    Weight as of this encounter: 60.8 kg (134 lb).    GEN: NAD  HEENT: MMM.  No oral lesion.  Anicteric, noninjected sclera  CV: S1, S2. RRR. No m/r/g.  PULM: CTA bilaterally. No w/c.  MSK:  Hands, wrists, elbows, shoulders, knees, ankles, and MTPs without swelling or tenderness to palpation. Bilateral hips tender to direct palpation.    NEURO: UE and LE strengths 5/5 and equal bilaterally.   SKIN: No rash. No evidence of current or past ischemic insults to the distal fingertips by visual inspection.   EXT: No LE edema  PSYCH: Alert. Appropriate.    Labs / Imaging (select studies)   RF/CCP  Recent Labs   Lab Test  09/13/13   1504  02/13/12   1404   CCPABY  <20 Interpretation:  Negative  <20 Interpretation:  Negative   RHF  12   --      YANELIS  Recent Labs   Lab Test  07/25/16   1433   ANAIGG  <1:40  Reference range: <1:40  (Note)  <1:40  INTERPRETIVE INFORMATION: YANELIS by IFA, IgG  Anti-nuclear antibodies (YANELIS) are seen in a variety of  systemic rheumatic diseases and are determined by indirect  fluorescence assay (IFA) using HEp-2 substrate with an  IgG-specific conjugate. YANELIS titers less than or equal to  1:80 have variable relevance while titers greater than or  equal to 1:160 are considered clinically significant. These  antibodies may precede clinical disease onset; however,  healthy individuals and those with advanced age have been  reported to be positive for YANELIS. When observed, one of the  five basic patterns is reported: homogeneous,  peripheral/rim, speckled, centromere, or nucleolar. If  cytoplasmic " fluorescence is observed, it is noted. IFA  methodology is subjective and has occasionally been shown  to lack sensitivity for anti-SSA/Ro antibodies.  Negative results do not necessarily rule out the presence  of SSc. If clinical suspicion remains, consider further  te sting for U3-RNP, PM/Scl, or Th/To antibodies associated  with SSc.  Performed by [facility],  [address] [place] [id number] [phone]  [email or web address], Alcon Krishna MD, Lab. Director       RNP/Sm/SSA/SSB  Recent Labs   Lab Test  03/23/16 1759 01/04/16   1806  07/01/14   1211  09/13/13   1504  02/13/12   1404   RNPIGG   --    --   <0.2  Negative     --    --    SMIGG  <0.2  Negative   Antibody index (AI) values reflect qualitative changes in antibody   concentration that cannot be directly associated with clinical condition or   disease state.    <0.2  Negative   Antibody index (AI) values reflect qualitative changes in antibody   concentration that cannot be directly associated with clinical condition or   disease state.    <0.2  Negative     --    --    ENASM   --    --    --   0  0   SSAIGG   --    --   <0.2  Negative     --    --    ENASSA   --    --    --   3  12   SSBIGG   --    --   <0.2  Negative     --    --    ENASSB   --    --    --   0  0   ENARMP   --    --    --   0  0   SCLIGG   --    --   <0.2  Negative     --    --      dsDNA  Recent Labs   Lab Test  10/01/18   1313  06/04/18   1758  01/30/18   1752  10/02/17   1814  07/03/17   1447  04/05/17   1820 12/12/16   1827 09/20/16   1518  07/25/16   1433   DNA  <1  1  <1  <1  <1  Negative    <1  Negative    <1  Negative    <1  Negative    <1  Negative       C3/C4  Recent Labs   Lab Test  10/01/18   1313  06/04/18   1758  01/30/18   1752  10/02/17   1814  07/03/17   1447  04/05/17   1820  12/12/16   1827 09/20/16   1518 07/25/16   1433   D4SICXD  63*  60*  86  75*  68*  58*  69*  84  80   W9TIPTW  14*  13*  21  15  15  10*  12*  14*  12*     Histone Antibody  Recent  Labs   Lab Test  02/13/12   1404   HSTO  0.4     Antiphospholipid Antibodies  Recent Labs   Lab Test  09/13/13   1504  02/13/12   1404   CARG  <15.0 Interpretation:  Negative  <15.0 Interpretation:  Negative   SANCHO  <12.5 Interpretation:  Negative  <12.5 Interpretation:  Negative   LUPINT   --   Negative (Note) COMMENTS: INR is normal. APTT is normal.  1:2 Mix is not indicated. DRVVT Screen is normal. Thrombin time is normal. NEGATIVE TEST; A LUPUS ANTICOAGULANT WAS NOT DETECTED IN THIS SPECIMEN WITHIN THE LIMITS OF THE TESTING REPERTOIRE. If the clinical picture is strongly suggestive of an antiphospholipid syndrome, recommend anticardiolipin and beta-2-glycoprotein (IgG and IgM) antibody tests. Hyun Denis M.D.  [phone] 2-.  NINR =  1.01 N = 0.86-1.14  (No additional charge) NTT = 16.0 N = 13.0-19.0 sec  (No additional charge)  APTT'S:    Seconds Reagent =  Stago LS Normal  =  36 Patient  =  41 1:2 Mix  =  N/A Reference =  31-43   DILUTE VANNESSA VIPER VENOM TEST: DRVVT Screen Ratio = 0.80 Normal = <1.28      IgG  Recent Labs   Lab Test  01/30/15   1156  04/02/14   1539   IGG   --   897   IGA  109  107   IGM   --   361*     CBC  Recent Labs   Lab Test  10/01/18   1313  08/20/18   1801  06/04/18   1758   WBC  4.3  6.5  8.6   RBC  4.05  3.79*  3.26*   HGB  13.8  13.1  11.6*   HCT  39.8  36.7  33.5*   MCV  98  97  103*   RDW  11.5  11.2  12.8   PLT  220  258  252   MCH  34.1*  34.6*  35.6*   MCHC  34.7  35.7  34.6   NEUTROPHIL  72.1  76.2  88.5   LYMPH  17.9  15.3  5.8   MONOCYTE  7.7  6.8  5.3   EOSINOPHIL  1.6  0.9  0.2   BASOPHIL  0.7  0.8  0.2   ANEU  3.1  4.9  7.6   ALYM  0.8  1.0  0.5*   ZOË  0.3  0.4  0.5   AEOS  0.1  0.1  0.0   ABAS  0.0  0.1  0.0     CMP  Recent Labs   Lab Test  10/01/18   1313  06/04/18   1758  01/30/18   1752  10/02/17   1814  07/03/17   1447  04/05/17   1820   NA  140  139  138  138  141  140   POTASSIUM  3.6  4.2  3.5  3.7  4.0  3.9   CHLORIDE  104  105  100  103   108  106   CO2  30  28  30  27  26  27   ANIONGAP  6  6  8  8  7  7   GLC  82  111*  91  112*  106*  94   BUN  10  14  9  10  8  9   CR  0.73  0.72  0.70  0.88  0.85  0.81   GFRESTIMATED  86  87  >90  70  73  77   GFRESTBLACK  >90  >90  >90  84  88  >90   GFR Calc     MELANIA  8.5  8.1*  8.7  8.7  8.3*  8.3*   BILITOTAL  0.3  0.3  0.4  0.2  0.3  0.2   ALBUMIN  3.7  3.5  3.8  3.8  3.6  3.7   PROTTOTAL  6.7*  6.0*  7.0  6.8  6.5*  6.3*   ALKPHOS  53  48  67  60  53  49   AST  25  22  26  22  20  20   ALT  33  21  25  21  26  25     HgA1c  Recent Labs   Lab Test  10/05/12   0824   A1C  5.3     Calcium/VitaminD  Recent Labs   Lab Test  10/01/18   1313  06/04/18   1758  01/30/18   1752  10/02/17   1814   01/17/13   1039   MELANIA  8.5  8.1*  8.7  8.7   < >  9.4   VITDT   --    --    --   37   --   55    < > = values in this interval not displayed.     ESR/CRP  Recent Labs   Lab Test  10/01/18   1313  06/04/18   1758  01/30/18   1752   SED  4  7  9   CRP  <2.9  <2.9  <2.9     CK/Aldolase  Recent Labs   Lab Test  10/01/18   1313  06/04/18   1758  01/30/18   1752   CKT  96  91  168     TSH/T4  Recent Labs   Lab Test  10/18/17   1445  10/07/16   0714  02/27/15   1058  01/30/15   1156   TSH  0.33*  0.37*   --   0.45   T4  1.24  1.16  1.01   --      Hepatitis B  Recent Labs   Lab Test  03/29/16   1417  02/13/14   1835  05/22/12   1633   HEPBANG  Nonreactive  Negative  Negative     Hepatitis C  Recent Labs   Lab Test  03/29/16   1417  02/13/14   1835  05/22/12   1633   HCVAB  Nonreactive   Assay performance characteristics have not been established for newborns,   infants, and children    Negative  Negative     Lyme ab screening  Recent Labs   Lab Test  05/30/15   1355   LYMEGM  0.55     Tuberculosis Screening  Recent Labs   Lab Test  07/03/17   1447  03/29/16   1417  02/13/14   1835   TBRSLT  Negative  Negative  Negative   TBAGN  0.00  0.00  0.00     UA  Recent Labs   Lab Test  10/01/18   1324  06/04/18   1805   01/30/18   1753  01/03/18   1816   04/05/17   1825   09/20/16   1524 03/23/16   1807   COLOR  Yellow  Yellow  Yellow  Yellow   < >  Yellow   < >  Yellow   < >  Yellow   APPEARANCE  Clear  Clear  Clear  Slightly Cloudy   < >  Clear   < >  Clear   < >  Clear   URINEGLC  Negative  Negative  Negative  Negative   < >  Negative   < >  Negative   < >  Negative   URINEBILI  Negative  Negative  Negative  Negative   < >  Negative   < >  Negative   < >  Negative   SG  >1.030  >1.030  1.015  1.020   < >  1.025   < >  >1.030   < >  >1.030   URINEPH  7.0  7.0  7.0  7.0   < >  7.0   < >  6.0   < >  6.0   PROTEIN  Negative  30*  Negative  Negative   < >  Negative   < >  Negative   < >  Negative   UROBILINOGEN  1.0  1.0  0.2  1.0   < >  1.0   < >  0.2   < >  0.2   NITRITE  Negative  Negative  Negative  Negative   < >  Negative   < >  Negative   < >  Negative   UBLD  Large*  Negative  Negative  Small*   < >  Negative   < >  Negative   < >  Negative   LEUKEST  Negative  Negative  Negative  Small*   < >  Negative   < >  Negative   < >  Negative   WBCU  0 - 5  0 - 5  O - 2  25-50*   < >  O - 2   < >  O - 2   < >  O - 2   RBCU  5-10*  O - 2  O - 2  5-10*   < >  O - 2   < >  O - 2   < >  O - 2   SQUAMOUSEPI  Moderate*  Few   --   Few   < >  Few   < >   --    --   Few   BACTERIA  Few*  Few*   --   Moderate*   < >   --    < >   --    --    --    MUCOUS   --    --    --    --    --   Present*   --   Present*   --   Present*    < > = values in this interval not displayed.     Urine Microscopic  Recent Labs   Lab Test  10/01/18   1324  06/04/18 1805 01/30/18 1753 01/03/18 1816 04/05/17 1825 09/20/16   1524 03/23/16   1807   WBCU  0 - 5  0 - 5  O - 2  25-50*   < >  O - 2   < >  O - 2   < >  O - 2   RBCU  5-10*  O - 2  O - 2  5-10*   < >  O - 2   < >  O - 2   < >  O - 2   SQUAMOUSEPI  Moderate*  Few   --   Few   < >  Few   < >   --    --   Few   BACTERIA  Few*  Few*   --   Moderate*   < >   --    < >   --    --    --    MUCOUS    --    --    --    --    --   Present*   --   Present*   --   Present*    < > = values in this interval not displayed.     Urine Protein  Recent Labs   Lab Test  10/01/18   1324  06/04/18   1805  01/30/18   1753   UTP  0.24  0.16  0.06   UTPG  0.24*  0.10  0.13   UCRR  101  155  44     Immunization History     Immunization History   Administered Date(s) Administered     Influenza (IIV3) PF 10/01/2012, 09/23/2013     Influenza Vaccine IM 3yrs+ 4 Valent IIV4 09/20/2016, 10/30/2017     Influenza Vaccine, 3 YRS +, IM (QUADRIVALENT W/PRESERVATIVES) 08/11/2018     Pneumo Conj 13-V (2010&after) 04/25/2016     Pneumococcal 23 valent 10/01/2012, 11/06/2017     Tdap (Adacel,Boostrix) 12/17/2010          Chart documentation done in part with Dragon Voice recognition Software. Although reviewed after completion, some word and grammatical error may remain.    Bradford Colvin MD

## 2018-10-10 ENCOUNTER — RADIANT APPOINTMENT (OUTPATIENT)
Dept: ULTRASOUND IMAGING | Facility: CLINIC | Age: 43
End: 2018-10-10
Attending: OBSTETRICS & GYNECOLOGY
Payer: COMMERCIAL

## 2018-10-10 DIAGNOSIS — N83.202 CYST OF LEFT OVARY: ICD-10-CM

## 2018-10-10 DIAGNOSIS — D25.1 INTRAMURAL LEIOMYOMA OF UTERUS: ICD-10-CM

## 2018-10-10 PROCEDURE — 76830 TRANSVAGINAL US NON-OB: CPT

## 2018-10-10 PROCEDURE — 76856 US EXAM PELVIC COMPLETE: CPT

## 2018-10-11 ENCOUNTER — TELEPHONE (OUTPATIENT)
Dept: GASTROENTEROLOGY | Facility: CLINIC | Age: 43
End: 2018-10-11

## 2018-10-11 DIAGNOSIS — K63.8219 SMALL INTESTINAL BACTERIAL OVERGROWTH: Primary | ICD-10-CM

## 2018-10-11 DIAGNOSIS — N83.201 RIGHT OVARIAN CYST: Primary | ICD-10-CM

## 2018-10-15 DIAGNOSIS — K63.8219 SMALL INTESTINAL BACTERIAL OVERGROWTH: ICD-10-CM

## 2018-10-17 DIAGNOSIS — K63.89 INTESTINAL BACTERIAL OVERGROWTH: Primary | ICD-10-CM

## 2018-10-17 DIAGNOSIS — K92.1 HEMATOCHEZIA: ICD-10-CM

## 2018-10-17 RX ORDER — NEOMYCIN SULFATE 500 MG/1
500 TABLET ORAL 4 TIMES DAILY
Qty: 20 TABLET | Refills: 0 | Status: SHIPPED | OUTPATIENT
Start: 2018-10-17 | End: 2018-11-05

## 2018-10-17 NOTE — PROGRESS NOTES
I spoke to Ruma and she reports a recurrence in abdominal bloating and diarrhea. She also reports occasional hematochezia. Of note, she had nonspecific chronic active proctitis on her cscope in 2015.  Duodenal bx neg for celiac disease. Will plan for the following:    ---Referral for ileocolonoscopy to check for IBD and microscopic colitis. To be scheduled for 10/29 with myself.   ---Add neomycin to rifaximin therapy

## 2018-10-17 NOTE — TELEPHONE ENCOUNTER
Xifaxan 550 mg tablet       Not on medication list    Last Office Visit : 9-7-18  Future Office visit:  none    Routing refill request to provider for review/approval because:  Not on Protocol and  NOT on medication list.      Kathleen M Doege RN

## 2018-10-19 ENCOUNTER — TELEPHONE (OUTPATIENT)
Dept: GASTROENTEROLOGY | Facility: CLINIC | Age: 43
End: 2018-10-19

## 2018-10-19 DIAGNOSIS — K92.1 HEMATOCHEZIA: Primary | ICD-10-CM

## 2018-10-19 NOTE — TELEPHONE ENCOUNTER
Patient scheduled for Colonoscopy    Indication for procedure. Hematochezia [K92.1]    Referring Provider. Self    ? n/a    Arrival time verified?  8:30 AM    Facility location verified? [address] [place] [address]    Instructions given regarding prep and procedure Instructions reviewed    Prep Type Golytely    Are you taking any anticoagulants or blood thinners? No     Instructions given? n/a    Electronic implanted devices? Denies     Pre procedure teaching completed? Yes    Transportation from procedure?  policy reviewed    H&P / Pre op physical completed? n/a

## 2018-10-29 ENCOUNTER — SURGERY (OUTPATIENT)
Age: 43
End: 2018-10-29

## 2018-10-29 ENCOUNTER — HOSPITAL ENCOUNTER (OUTPATIENT)
Facility: AMBULATORY SURGERY CENTER | Age: 43
End: 2018-10-29
Attending: INTERNAL MEDICINE
Payer: COMMERCIAL

## 2018-10-29 VITALS
WEIGHT: 134 LBS | HEIGHT: 65 IN | OXYGEN SATURATION: 100 % | BODY MASS INDEX: 22.33 KG/M2 | RESPIRATION RATE: 12 BRPM | DIASTOLIC BLOOD PRESSURE: 82 MMHG | TEMPERATURE: 98.9 F | SYSTOLIC BLOOD PRESSURE: 124 MMHG | HEART RATE: 83 BPM

## 2018-10-29 LAB
COLONOSCOPY: NORMAL
HCG UR QL: NEGATIVE
INTERNAL QC OK POCT: YES

## 2018-10-29 RX ORDER — NALOXONE HYDROCHLORIDE 0.4 MG/ML
.1-.4 INJECTION, SOLUTION INTRAMUSCULAR; INTRAVENOUS; SUBCUTANEOUS
Status: DISCONTINUED | OUTPATIENT
Start: 2018-10-29 | End: 2018-10-30 | Stop reason: HOSPADM

## 2018-10-29 RX ORDER — ONDANSETRON 4 MG/1
4 TABLET, ORALLY DISINTEGRATING ORAL EVERY 6 HOURS PRN
Status: DISCONTINUED | OUTPATIENT
Start: 2018-10-29 | End: 2018-10-30 | Stop reason: HOSPADM

## 2018-10-29 RX ORDER — SIMETHICONE
LIQUID (ML) MISCELLANEOUS PRN
Status: DISCONTINUED | OUTPATIENT
Start: 2018-10-29 | End: 2018-10-29 | Stop reason: HOSPADM

## 2018-10-29 RX ORDER — FENTANYL CITRATE 50 UG/ML
INJECTION, SOLUTION INTRAMUSCULAR; INTRAVENOUS PRN
Status: DISCONTINUED | OUTPATIENT
Start: 2018-10-29 | End: 2018-10-29 | Stop reason: HOSPADM

## 2018-10-29 RX ORDER — ONDANSETRON 2 MG/ML
4 INJECTION INTRAMUSCULAR; INTRAVENOUS
Status: COMPLETED | OUTPATIENT
Start: 2018-10-29 | End: 2018-10-29

## 2018-10-29 RX ORDER — LIDOCAINE 40 MG/G
CREAM TOPICAL
Status: DISCONTINUED | OUTPATIENT
Start: 2018-10-29 | End: 2018-10-29 | Stop reason: HOSPADM

## 2018-10-29 RX ORDER — ONDANSETRON 2 MG/ML
4 INJECTION INTRAMUSCULAR; INTRAVENOUS
Status: DISCONTINUED | OUTPATIENT
Start: 2018-10-29 | End: 2018-10-29 | Stop reason: HOSPADM

## 2018-10-29 RX ORDER — ONDANSETRON 2 MG/ML
4 INJECTION INTRAMUSCULAR; INTRAVENOUS EVERY 6 HOURS PRN
Status: DISCONTINUED | OUTPATIENT
Start: 2018-10-29 | End: 2018-10-30 | Stop reason: HOSPADM

## 2018-10-29 RX ORDER — FLUMAZENIL 0.1 MG/ML
0.2 INJECTION, SOLUTION INTRAVENOUS
Status: ACTIVE | OUTPATIENT
Start: 2018-10-29 | End: 2018-10-29

## 2018-10-29 RX ADMIN — FENTANYL CITRATE 25 MCG: 50 INJECTION, SOLUTION INTRAMUSCULAR; INTRAVENOUS at 09:50

## 2018-10-29 RX ADMIN — FENTANYL CITRATE 50 MCG: 50 INJECTION, SOLUTION INTRAMUSCULAR; INTRAVENOUS at 09:42

## 2018-10-29 RX ADMIN — ONDANSETRON 4 MG: 2 INJECTION INTRAMUSCULAR; INTRAVENOUS at 09:39

## 2018-10-29 RX ADMIN — Medication 2 ML: at 09:37

## 2018-11-01 DIAGNOSIS — K63.89 INTESTINAL BACTERIAL OVERGROWTH: ICD-10-CM

## 2018-11-01 LAB — COPATH REPORT: NORMAL

## 2018-11-05 ENCOUNTER — TRANSFERRED RECORDS (OUTPATIENT)
Dept: HEALTH INFORMATION MANAGEMENT | Facility: CLINIC | Age: 43
End: 2018-11-05

## 2018-11-05 ENCOUNTER — MYC REFILL (OUTPATIENT)
Dept: GASTROENTEROLOGY | Facility: CLINIC | Age: 43
End: 2018-11-05

## 2018-11-05 DIAGNOSIS — K63.8219 SMALL INTESTINAL BACTERIAL OVERGROWTH: ICD-10-CM

## 2018-11-05 DIAGNOSIS — K63.89 INTESTINAL BACTERIAL OVERGROWTH: ICD-10-CM

## 2018-11-06 ENCOUNTER — TELEPHONE (OUTPATIENT)
Dept: GASTROENTEROLOGY | Facility: CLINIC | Age: 43
End: 2018-11-06

## 2018-11-07 NOTE — TELEPHONE ENCOUNTER
She can just stop her nortriptyline . She'll have to let me know if she needs something for insomnia.

## 2018-11-07 NOTE — TELEPHONE ENCOUNTER
I talked to Ruma and over the phone. Last week, she was sick again with diarrhea and vomiting.  Per medication review, she is on nortriptyline and bupropion, which can lead to increased levels of nortriptyline and increase risk for serotonin syndrome.      Ruma will contact her PCP regarding potentially stopping/tapering off nortriptyline.

## 2018-11-08 RX ORDER — NEOMYCIN SULFATE 500 MG/1
500 TABLET ORAL 4 TIMES DAILY
Qty: 20 TABLET | Refills: 0 | Status: SHIPPED | OUTPATIENT
Start: 2018-11-08 | End: 2019-04-10

## 2018-11-08 NOTE — TELEPHONE ENCOUNTER
Message from MyChart:  Original authorizing provider: MD Luiza Garcíahy LYN Enriquez would like a refill of the following medications:  rifaximin (XIFAXAN) 550 MG TABS tablet [Inez Sawyer MD]  neomycin (MYCIFRADIN) 500 MG tablet [Inez Sawyer MD]    Preferred pharmacy: [address]    Comment:

## 2018-11-09 ENCOUNTER — TRANSFERRED RECORDS (OUTPATIENT)
Dept: HEALTH INFORMATION MANAGEMENT | Facility: CLINIC | Age: 43
End: 2018-11-09

## 2018-11-15 RX ORDER — NEOMYCIN SULFATE 500 MG/1
500 TABLET ORAL 4 TIMES DAILY
Qty: 20 TABLET | Refills: 0 | Status: SHIPPED | OUTPATIENT
Start: 2018-11-15 | End: 2019-04-10

## 2018-11-16 DIAGNOSIS — R20.0 NUMBNESS: ICD-10-CM

## 2018-11-16 NOTE — TELEPHONE ENCOUNTER
Routing refill request to provider for review/approval because:  Medication is discontinued off of patients current med list.   Please advise on refill.    Radhika Katz RN, BSN, PHN

## 2018-11-16 NOTE — TELEPHONE ENCOUNTER
"Requested Prescriptions   Pending Prescriptions Disp Refills     nortriptyline (PAMELOR) 10 MG capsule [Pharmacy Med Name: NORTRIPTYLINE HCL 10 MG CAP] 90 capsule 1    Last Written Prescription Date:  10/19/18  Last Fill Quantity: 90,  # refills: 1   Last office visit: 2/5/2018 with prescribing provider:  10/8/18 TED Colvin   Future Office Visit:   Next 5 appointments (look out 90 days)     Jan 23, 2019  2:20 PM CST   Return Visit with Bradford Colvin MD   [facility]    [address] [place] [address]   [phone]                Sig: TAKE 3 CAPSULES BY MOUTH AT BEDTIME    Tricyclic Agents ( Annual appt and no PHQ9) Passed    11/16/2018  1:47 AM       Passed - Blood Pressure under 140/90 in past 12 mos    BP Readings from Last 3 Encounters:   10/29/18 (P) 128/81   10/08/18 119/82   09/14/18 124/81                Passed - Recent (12 mo) or future (30 days) visit within authorizing provider's specialty    Patient had office visit in the last 12 months or has a visit in the next 30 days with authorizing provider or within the authorizing provider's specialty.  See \"Patient Info\" tab in inbasket, or \"Choose Columns\" in Meds & Orders section of the refill encounter.             Passed - Patient is age 18 or older       Passed - Patient is not pregnant       Passed - No positive pregnancy test on record in past 12 mos        "

## 2018-11-18 RX ORDER — NORTRIPTYLINE HCL 10 MG
CAPSULE ORAL
Qty: 90 CAPSULE | Refills: 1 | Status: SHIPPED | OUTPATIENT
Start: 2018-11-18 | End: 2019-04-10

## 2018-11-30 ENCOUNTER — MYC REFILL (OUTPATIENT)
Dept: FAMILY MEDICINE | Facility: CLINIC | Age: 43
End: 2018-11-30

## 2018-11-30 DIAGNOSIS — I10 HYPERTENSION GOAL BP (BLOOD PRESSURE) < 140/90: ICD-10-CM

## 2018-11-30 RX ORDER — LOSARTAN POTASSIUM 25 MG/1
25 TABLET ORAL DAILY
Qty: 90 TABLET | Refills: 0 | Status: SHIPPED | OUTPATIENT
Start: 2018-11-30 | End: 2019-04-10

## 2018-11-30 NOTE — TELEPHONE ENCOUNTER
Message from Sara Campbellhart:  Original authorizing provider: HELLEN Vilchis would like a refill of the following medications:  losartan (COZAAR) 25 MG tablet [Bradford Mario PA-C]    Preferred pharmacy: [facility] [address] [facility], [place] - [address]    Comment:

## 2018-12-03 ENCOUNTER — MYC MEDICAL ADVICE (OUTPATIENT)
Dept: RHEUMATOLOGY | Facility: CLINIC | Age: 43
End: 2018-12-03

## 2018-12-03 DIAGNOSIS — L93.0 LUPUS ERYTHEMATOSUS, UNSPECIFIED FORM: Primary | ICD-10-CM

## 2018-12-07 RX ORDER — PREDNISONE 5 MG/1
TABLET ORAL
Qty: 50 TABLET | Refills: 0 | Status: SHIPPED | OUTPATIENT
Start: 2018-12-07 | End: 2019-01-31

## 2018-12-07 NOTE — TELEPHONE ENCOUNTER
1. Lupus erythematosus, unspecified form  - predniSONE (DELTASONE) 5 MG tablet; Prednisone 20mg daily x5days, then 15mg daily x5days, then 10mg daily x5days, then 5mg daily x5days, then stop..  Dispense: 50 tablet; Refill: 0

## 2018-12-17 ENCOUNTER — TELEPHONE (OUTPATIENT)
Dept: FAMILY MEDICINE | Facility: CLINIC | Age: 43
End: 2018-12-17

## 2018-12-17 DIAGNOSIS — Z11.4 ENCOUNTER FOR SCREENING FOR HIV: Primary | ICD-10-CM

## 2018-12-17 DIAGNOSIS — E05.90 SUBCLINICAL HYPERTHYROIDISM: ICD-10-CM

## 2018-12-17 NOTE — TELEPHONE ENCOUNTER
Patient stated that in my chart she is over due for labs would like these added for her 12/28/2018 appointment     Thank you

## 2018-12-20 NOTE — TELEPHONE ENCOUNTER
Routed to Bradford Mario in regards to lab work requested by pt on HM (tsh w/ free t4 and HIV screen).  Orders pended for provider sign.  Rosangela Rey MA

## 2019-01-15 DIAGNOSIS — M32.19 OTHER SYSTEMIC LUPUS ERYTHEMATOSUS WITH OTHER ORGAN INVOLVEMENT (H): ICD-10-CM

## 2019-01-15 LAB
ALBUMIN UR-MCNC: NEGATIVE MG/DL
APPEARANCE UR: CLEAR
BASOPHILS # BLD AUTO: 0 10E9/L (ref 0–0.2)
BASOPHILS NFR BLD AUTO: 0.6 %
BILIRUB UR QL STRIP: NEGATIVE
COLOR UR AUTO: YELLOW
DIFFERENTIAL METHOD BLD: ABNORMAL
EOSINOPHIL # BLD AUTO: 0.1 10E9/L (ref 0–0.7)
EOSINOPHIL NFR BLD AUTO: 1.4 %
ERYTHROCYTE [DISTWIDTH] IN BLOOD BY AUTOMATED COUNT: 11.5 % (ref 10–15)
ERYTHROCYTE [SEDIMENTATION RATE] IN BLOOD BY WESTERGREN METHOD: 5 MM/H (ref 0–20)
GLUCOSE UR STRIP-MCNC: NEGATIVE MG/DL
HCT VFR BLD AUTO: 35.7 % (ref 35–47)
HGB BLD-MCNC: 12.1 G/DL (ref 11.7–15.7)
HGB UR QL STRIP: NEGATIVE
KETONES UR STRIP-MCNC: NEGATIVE MG/DL
LEUKOCYTE ESTERASE UR QL STRIP: NEGATIVE
LYMPHOCYTES # BLD AUTO: 1.3 10E9/L (ref 0.8–5.3)
LYMPHOCYTES NFR BLD AUTO: 26.7 %
MCH RBC QN AUTO: 33.2 PG (ref 26.5–33)
MCHC RBC AUTO-ENTMCNC: 33.9 G/DL (ref 31.5–36.5)
MCV RBC AUTO: 98 FL (ref 78–100)
MONOCYTES # BLD AUTO: 0.4 10E9/L (ref 0–1.3)
MONOCYTES NFR BLD AUTO: 7.7 %
NEUTROPHILS # BLD AUTO: 3.1 10E9/L (ref 1.6–8.3)
NEUTROPHILS NFR BLD AUTO: 63.6 %
NITRATE UR QL: NEGATIVE
PH UR STRIP: 7 PH (ref 5–7)
PLATELET # BLD AUTO: 276 10E9/L (ref 150–450)
RBC # BLD AUTO: 3.64 10E12/L (ref 3.8–5.2)
SOURCE: NORMAL
SP GR UR STRIP: 1.02 (ref 1–1.03)
UROBILINOGEN UR STRIP-ACNC: 0.2 EU/DL (ref 0.2–1)
WBC # BLD AUTO: 4.8 10E9/L (ref 4–11)

## 2019-01-15 PROCEDURE — 86160 COMPLEMENT ANTIGEN: CPT | Performed by: INTERNAL MEDICINE

## 2019-01-15 PROCEDURE — 36415 COLL VENOUS BLD VENIPUNCTURE: CPT | Performed by: INTERNAL MEDICINE

## 2019-01-15 PROCEDURE — 84156 ASSAY OF PROTEIN URINE: CPT | Performed by: INTERNAL MEDICINE

## 2019-01-15 PROCEDURE — 86140 C-REACTIVE PROTEIN: CPT | Performed by: INTERNAL MEDICINE

## 2019-01-15 PROCEDURE — 85025 COMPLETE CBC W/AUTO DIFF WBC: CPT | Performed by: INTERNAL MEDICINE

## 2019-01-15 PROCEDURE — 85652 RBC SED RATE AUTOMATED: CPT | Performed by: INTERNAL MEDICINE

## 2019-01-15 PROCEDURE — 81003 URINALYSIS AUTO W/O SCOPE: CPT | Performed by: INTERNAL MEDICINE

## 2019-01-15 PROCEDURE — 80053 COMPREHEN METABOLIC PANEL: CPT | Performed by: INTERNAL MEDICINE

## 2019-01-16 ENCOUNTER — ANCILLARY PROCEDURE (OUTPATIENT)
Dept: ULTRASOUND IMAGING | Facility: CLINIC | Age: 44
End: 2019-01-16
Payer: COMMERCIAL

## 2019-01-16 DIAGNOSIS — N83.201 RIGHT OVARIAN CYST: ICD-10-CM

## 2019-01-16 LAB
ALBUMIN SERPL-MCNC: 3.6 G/DL (ref 3.4–5)
ALP SERPL-CCNC: 54 U/L (ref 40–150)
ALT SERPL W P-5'-P-CCNC: 36 U/L (ref 0–50)
ANION GAP SERPL CALCULATED.3IONS-SCNC: 6 MMOL/L (ref 3–14)
AST SERPL W P-5'-P-CCNC: 33 U/L (ref 0–45)
BILIRUB SERPL-MCNC: 0.2 MG/DL (ref 0.2–1.3)
BUN SERPL-MCNC: 16 MG/DL (ref 7–30)
C3 SERPL-MCNC: 68 MG/DL (ref 76–169)
C4 SERPL-MCNC: 14 MG/DL (ref 15–50)
CALCIUM SERPL-MCNC: 8.2 MG/DL (ref 8.5–10.1)
CHLORIDE SERPL-SCNC: 104 MMOL/L (ref 94–109)
CO2 SERPL-SCNC: 29 MMOL/L (ref 20–32)
CREAT SERPL-MCNC: 0.78 MG/DL (ref 0.52–1.04)
CREAT UR-MCNC: 127 MG/DL
CRP SERPL-MCNC: <2.9 MG/L (ref 0–8)
GFR SERPL CREATININE-BSD FRML MDRD: >90 ML/MIN/{1.73_M2}
GLUCOSE SERPL-MCNC: 140 MG/DL (ref 70–99)
POTASSIUM SERPL-SCNC: 3.9 MMOL/L (ref 3.4–5.3)
PROT SERPL-MCNC: 6.3 G/DL (ref 6.8–8.8)
PROT UR-MCNC: 0.13 G/L
PROT/CREAT 24H UR: 0.11 G/G CR (ref 0–0.2)
SODIUM SERPL-SCNC: 139 MMOL/L (ref 133–144)

## 2019-01-16 PROCEDURE — 76830 TRANSVAGINAL US NON-OB: CPT

## 2019-01-16 PROCEDURE — 76856 US EXAM PELVIC COMPLETE: CPT | Mod: 59

## 2019-01-26 DIAGNOSIS — E78.5 HYPERLIPIDEMIA LDL GOAL <130: ICD-10-CM

## 2019-01-26 PROCEDURE — 36415 COLL VENOUS BLD VENIPUNCTURE: CPT | Performed by: PHYSICIAN ASSISTANT

## 2019-01-26 PROCEDURE — 80061 LIPID PANEL: CPT | Performed by: PHYSICIAN ASSISTANT

## 2019-01-28 LAB
CHOLEST SERPL-MCNC: 133 MG/DL
HDLC SERPL-MCNC: 68 MG/DL
LDLC SERPL CALC-MCNC: 52 MG/DL
NONHDLC SERPL-MCNC: 65 MG/DL
TRIGL SERPL-MCNC: 64 MG/DL

## 2019-01-29 DIAGNOSIS — Z79.899 HIGH RISK MEDICATION USE: ICD-10-CM

## 2019-01-29 DIAGNOSIS — E83.51 HYPOCALCEMIA: Primary | ICD-10-CM

## 2019-01-31 DIAGNOSIS — L93.0 LUPUS ERYTHEMATOSUS, UNSPECIFIED FORM: ICD-10-CM

## 2019-01-31 RX ORDER — PREDNISONE 5 MG/1
5 TABLET ORAL DAILY
Qty: 90 TABLET | Refills: 0 | Status: SHIPPED | OUTPATIENT
Start: 2019-01-31 | End: 2019-05-23

## 2019-01-31 NOTE — TELEPHONE ENCOUNTER
Rheumatology team: Please call to notify Ms. Enriquez that prednisone has been refilled.  Bradford Colvin MD  1/31/2019 12:22 PM

## 2019-03-04 ENCOUNTER — TELEPHONE (OUTPATIENT)
Dept: RHEUMATOLOGY | Facility: CLINIC | Age: 44
End: 2019-03-04

## 2019-03-04 NOTE — TELEPHONE ENCOUNTER
[facility] Prior Authorization Team Request    Medication: Benlysta 200mg/ml  Dosinml every 7 days  Qty: 4mls  Day Supply: 28  NDC (required for Medicaid members): [address]-68     Insurance   BIN: [id number]  PCN: [id number]  Grp: [id number]  ID: [id number]    CoverMyMeds Key (if applicable):     Additional documentation:       Filling Pharmacy: [facility]  Phone Number: [phone]  Contact:    Pharmacy NPI (required for Medicaid members): [id number]

## 2019-03-07 NOTE — TELEPHONE ENCOUNTER
PA Initiation    Medication: Benlysta 200mg/ml  Insurance Company: [employer] - Phone [phone] Fax [phone]  Pharmacy Filling the Rx:    Filling Pharmacy Phone:    Filling Pharmacy Fax:    Start Date: 3/7/2019    [facility] Authorization Team   Phone: [phone]  Fax: [phone]

## 2019-03-11 NOTE — TELEPHONE ENCOUNTER
Prior Authorization Approval    Authorization Effective Date: 2/6/2019  Authorization Expiration Date: 3/7/2020  Medication: Benlysta 200mg/ml  Approved Dose/Quantity: 4  Reference #: (Key: [id number])   Insurance Company: [employer] - Phone [phone] Fax [phone]  Expected CoPay:       CoPay Card Available:      Foundation Assistance Needed:    Which Pharmacy is filling the prescription (Not needed for infusion/clinic administered):    Pharmacy Notified: No  Patient Notified: [facility] Prior Authorization Team   Phone: [phone]  Fax: [phone]

## 2019-03-27 DIAGNOSIS — K21.9 GASTROESOPHAGEAL REFLUX DISEASE WITHOUT ESOPHAGITIS: ICD-10-CM

## 2019-03-27 NOTE — TELEPHONE ENCOUNTER
Routing refill request to provider for review/approval because:  Kiesha given x1 and patient did not follow up, please advise    It has been more than 1 year since last office visit.    Last OV with Bradford: 2/5/18    Called patient to schedule appointment.  Pt would like to see Dr. Holder from now on. Scheduled patient for appt to see Dr. Holder.     Magdalena Neri, RN, BSN

## 2019-03-27 NOTE — TELEPHONE ENCOUNTER
"Requested Prescriptions   Pending Prescriptions Disp Refills     ranitidine (ZANTAC) 300 MG tablet [Pharmacy Med Name: RANITIDINE 300 MG TABLET] 180 tablet 0    Last Written Prescription Date:  12-27-18  Last Fill Quantity: 180,  # refills: 0   Last office visit: 2/5/2018 with prescribing provider:  2-5-18   Future Office Visit:   Next 5 appointments (look out 90 days)    Apr 16, 2019  6:00 PM CDT  Lab visit with [facility]  [facility] [address] [address]  [phone]   May 23, 2019  1:20 PM CDT  Return Visit with Bradford Colvin MD  [facility] ([facility]) [address]  [place] [address]  [phone]        Sig: TAKE 1 TABLET (300 MG) BY MOUTH 2 TIMES DAILY.    H2 Blockers Protocol Passed - 3/27/2019  1:31 AM       Passed - Patient is age 12 or older       Passed - Recent (12 mo) or future (30 days) visit within the authorizing provider's specialty    Patient had office visit in the last 12 months or has a visit in the next 30 days with authorizing provider or within the authorizing provider's specialty.  See \"Patient Info\" tab in inbasket, or \"Choose Columns\" in Meds & Orders section of the refill encounter.             Passed - Medication is active on med list        "

## 2019-03-29 NOTE — TELEPHONE ENCOUNTER
catherine is due for a recheck. Have her make an appt to see me.for a recheck of her blood pressure

## 2019-04-10 ENCOUNTER — OFFICE VISIT (OUTPATIENT)
Dept: INTERNAL MEDICINE | Facility: CLINIC | Age: 44
End: 2019-04-10
Payer: COMMERCIAL

## 2019-04-10 VITALS
TEMPERATURE: 98.7 F | HEART RATE: 84 BPM | HEIGHT: 66 IN | WEIGHT: 138 LBS | SYSTOLIC BLOOD PRESSURE: 128 MMHG | DIASTOLIC BLOOD PRESSURE: 80 MMHG | BODY MASS INDEX: 22.18 KG/M2

## 2019-04-10 DIAGNOSIS — M35.01 SJOGREN'S SYNDROME WITH KERATOCONJUNCTIVITIS SICCA (H): ICD-10-CM

## 2019-04-10 DIAGNOSIS — Z00.00 ROUTINE GENERAL MEDICAL EXAMINATION AT A HEALTH CARE FACILITY: Primary | ICD-10-CM

## 2019-04-10 DIAGNOSIS — F41.9 ANXIETY: ICD-10-CM

## 2019-04-10 DIAGNOSIS — Z11.4 SCREENING FOR HIV (HUMAN IMMUNODEFICIENCY VIRUS): ICD-10-CM

## 2019-04-10 DIAGNOSIS — I10 HYPERTENSION GOAL BP (BLOOD PRESSURE) < 140/90: ICD-10-CM

## 2019-04-10 PROCEDURE — 99213 OFFICE O/P EST LOW 20 MIN: CPT | Mod: 25 | Performed by: INTERNAL MEDICINE

## 2019-04-10 PROCEDURE — 84443 ASSAY THYROID STIM HORMONE: CPT | Performed by: INTERNAL MEDICINE

## 2019-04-10 PROCEDURE — 99396 PREV VISIT EST AGE 40-64: CPT | Performed by: INTERNAL MEDICINE

## 2019-04-10 PROCEDURE — 36415 COLL VENOUS BLD VENIPUNCTURE: CPT | Performed by: INTERNAL MEDICINE

## 2019-04-10 PROCEDURE — 87389 HIV-1 AG W/HIV-1&-2 AB AG IA: CPT | Performed by: INTERNAL MEDICINE

## 2019-04-10 RX ORDER — LOSARTAN POTASSIUM 25 MG/1
25 TABLET ORAL DAILY
Qty: 90 TABLET | Refills: 3 | Status: SHIPPED | OUTPATIENT
Start: 2019-04-10 | End: 2020-06-09

## 2019-04-10 RX ORDER — BUSPIRONE HYDROCHLORIDE 5 MG/1
5 TABLET ORAL 2 TIMES DAILY
Qty: 60 TABLET | Refills: 1 | Status: SHIPPED | OUTPATIENT
Start: 2019-04-10 | End: 2019-06-08

## 2019-04-10 ASSESSMENT — ANXIETY QUESTIONNAIRES
IF YOU CHECKED OFF ANY PROBLEMS ON THIS QUESTIONNAIRE, HOW DIFFICULT HAVE THESE PROBLEMS MADE IT FOR YOU TO DO YOUR WORK, TAKE CARE OF THINGS AT HOME, OR GET ALONG WITH OTHER PEOPLE: SOMEWHAT DIFFICULT
6. BECOMING EASILY ANNOYED OR IRRITABLE: SEVERAL DAYS
2. NOT BEING ABLE TO STOP OR CONTROL WORRYING: SEVERAL DAYS
GAD7 TOTAL SCORE: 7
3. WORRYING TOO MUCH ABOUT DIFFERENT THINGS: SEVERAL DAYS
7. FEELING AFRAID AS IF SOMETHING AWFUL MIGHT HAPPEN: SEVERAL DAYS
5. BEING SO RESTLESS THAT IT IS HARD TO SIT STILL: SEVERAL DAYS
1. FEELING NERVOUS, ANXIOUS, OR ON EDGE: SEVERAL DAYS

## 2019-04-10 ASSESSMENT — PATIENT HEALTH QUESTIONNAIRE - PHQ9
SUM OF ALL RESPONSES TO PHQ QUESTIONS 1-9: 7
5. POOR APPETITE OR OVEREATING: SEVERAL DAYS

## 2019-04-10 ASSESSMENT — MIFFLIN-ST. JEOR: SCORE: 1287.71

## 2019-04-10 NOTE — PROGRESS NOTES
SUBJECTIVE:   CC: Yovana Enriquez is an 43 year old woman who presents for preventive health visit.     Healthy Habits:    Do you get at least three servings of calcium containing foods daily (dairy, green leafy vegetables, etc.)? yes    Amount of exercise or daily activities, outside of work: 6-7 day(s) per week walking    Problems taking medications regularly No    Medication side effects: No    Have you had an eye exam in the past two years? No, over due    Do you see a dentist twice per year? yes    Do you have sleep apnea, excessive snoring or daytime drowsiness?no    Hypertension -- taking and tolerating the amlodipine and losartan well  Active physically with  job    Anxiety Follow-Up   Been over a year since her last anxiety check with Antony.    Status since last visit: Worsened due to not taking any medication for mood    Other associated symptoms:extremly tired, headaches, neck pain, not sleeping good    Complicating factors:   Significant life event: No   Current substance abuse: None  Depression symptoms: Yes-  Little. Very emotional.  RICHARD-7 SCORE 4/10/2013 5/18/2015 2/5/2018   Total Score 8 4 -   Total Score - - 1       RICHARD-7    Today's PHQ-2 Score:   PHQ-2 ( 1999 [employer]) 7/17/2017 2/8/2016   Q1: Little interest or pleasure in doing things 0 0   Q2: Feeling down, depressed or hopeless 0 0   PHQ-2 Score 0 0       Abuse: Current or Past(Physical, Sexual or Emotional)- No  Do you feel safe in your environment? Yes    Social History     Tobacco Use     Smoking status: Never Smoker     Smokeless tobacco: Never Used     Tobacco comment: Lives in smoke free household   Substance Use Topics     Alcohol use: No     Alcohol/week: 0.0 oz     If you drink alcohol do you typically have >3 drinks per day or >7 drinks per week? Not Applicable                     Reviewed orders with patient.  Reviewed health maintenance and updated orders accordingly - Yes    Annual mammography    Pertinent mammograms  "are reviewed under the imaging tab.  History of abnormal Pap smear: NO - age 30-65 PAP every 5 years with negative HPV co-testing recommended  PAP / HPV Latest Ref Rng & Units 9/20/2016 8/28/2013   PAP - NIL NIL   HPV 16 DNA NEG Negative -   HPV 18 DNA NEG Negative -   OTHER HR HPV NEG Negative -     Reviewed and updated as needed this visit by clinical staff  Reviewed and updated as needed this visit by Provider        ROS:  CONSTITUTIONAL: NEGATIVE for fever, chills, change in weight  INTEGUMENTARU/SKIN: NEGATIVE for worrisome rashes, moles or lesions  EYES: NEGATIVE for vision changes or irritation  ENT: NEGATIVE for ear, mouth and throat problems  RESP: NEGATIVE for significant cough or SOB  BREAST: NEGATIVE for masses, tenderness or discharge  CV: NEGATIVE for chest pain, palpitations or peripheral edema  GI: NEGATIVE for nausea, abdominal pain, heartburn, or change in bowel habits  : NEGATIVE for unusual urinary or vaginal symptoms. Periods are regular.  MUSCULOSKELETAL: NEGATIVE for significant arthralgias or myalgia  NEURO: NEGATIVE for weakness, dizziness or paresthesias  PSYCHIATRIC: NEGATIVE for changes in mood or affect    OBJECTIVE:   /80 (BP Location: Left arm, Patient Position: Sitting)   Pulse 84   Temp 98.7  F (37.1  C) (Oral)   Ht 1.676 m (5' 6\")   Wt 62.6 kg (138 lb)   BMI 22.27 kg/m    EXAM:  GENERAL: healthy, alert and no distress  EYES: Eyes grossly normal to inspection, PERRL and conjunctivae and sclerae normal  HENT: ear canals and TM's normal, nose and mouth without ulcers or lesions  NECK: no adenopathy, no asymmetry, masses, or scars and thyroid normal to palpation  RESP: lungs clear to auscultation - no rales, rhonchi or wheezes  BREAST: deferred  : deferred  CV: regular rate and rhythm, normal S1 S2, no S3 or S4, no murmur, click or rub, no peripheral edema and peripheral pulses strong  ABDOMEN: soft, nontender, no hepatosplenomegaly, no masses and bowel sounds normal  MS: " "no gross musculoskeletal defects noted, no edema  SKIN: no suspicious lesions or rashes  NEURO: Normal strength and tone, mentation intact and speech normal  PSYCH: mentation appears normal, affect normal/bright    Diagnostic Test Results:  none     ASSESSMENT/PLAN:   1. Routine general medical examination at a health care facility    2. Screening for HIV (human immunodeficiency virus)    3. Sjogren's syndrome with keratoconjunctivitis sicca (H)    4. Anxiety  Buspar start 5 mg twice daily - follow-up 6 weeks.  OK by e-Visit unless may need medication change.  OK for check in in 2-4 weeks and may need dose increase to 7.5 or 10 mg.    5. Hypertension goal BP (blood pressure) < 140/90  Amlodipine for Raynaud's and continue losartan - good control.      COUNSELING:   Reviewed preventive health counseling, as reflected in patient instructions       Regular exercise       Healthy diet/nutrition       HIV screeninx in teen years, 1x in adult years, and at intervals if high risk    BP Readings from Last 1 Encounters:   10/29/18 (P) 128/81     Estimated body mass index is 22.3 kg/m  as calculated from the following:    Height as of 10/29/18: 1.651 m (5' 5\").    Weight as of 10/29/18: 60.8 kg (134 lb).           reports that she has never smoked. She has never used smokeless tobacco.      Counseling Resources:  ATP IV Guidelines  Pooled Cohorts Equation Calculator  Breast Cancer Risk Calculator  FRAX Risk Assessment  ICSI Preventive Guidelines  Dietary Guidelines for Americans,   USDA's MyPlate  ASA Prophylaxis  Lung CA Screening    Morris Holder MD  [facility]  "

## 2019-04-11 LAB
HIV 1+2 AB+HIV1 P24 AG SERPL QL IA: NONREACTIVE
TSH SERPL DL<=0.005 MIU/L-ACNC: 0.44 MU/L (ref 0.4–4)

## 2019-04-11 ASSESSMENT — ANXIETY QUESTIONNAIRES: GAD7 TOTAL SCORE: 7

## 2019-04-16 DIAGNOSIS — Z79.899 HIGH RISK MEDICATION USE: ICD-10-CM

## 2019-04-16 DIAGNOSIS — E83.51 HYPOCALCEMIA: ICD-10-CM

## 2019-04-16 LAB
ALBUMIN UR-MCNC: NEGATIVE MG/DL
APPEARANCE UR: CLEAR
BASOPHILS # BLD AUTO: 0 10E9/L (ref 0–0.2)
BASOPHILS NFR BLD AUTO: 0.5 %
BILIRUB UR QL STRIP: NEGATIVE
COLOR UR AUTO: YELLOW
DIFFERENTIAL METHOD BLD: ABNORMAL
EOSINOPHIL # BLD AUTO: 0 10E9/L (ref 0–0.7)
EOSINOPHIL NFR BLD AUTO: 0.6 %
ERYTHROCYTE [DISTWIDTH] IN BLOOD BY AUTOMATED COUNT: 11.4 % (ref 10–15)
GLUCOSE UR STRIP-MCNC: NEGATIVE MG/DL
HCT VFR BLD AUTO: 36.7 % (ref 35–47)
HGB BLD-MCNC: 12.5 G/DL (ref 11.7–15.7)
HGB UR QL STRIP: NEGATIVE
KETONES UR STRIP-MCNC: NEGATIVE MG/DL
LEUKOCYTE ESTERASE UR QL STRIP: NEGATIVE
LYMPHOCYTES # BLD AUTO: 0.7 10E9/L (ref 0.8–5.3)
LYMPHOCYTES NFR BLD AUTO: 10.4 %
MCH RBC QN AUTO: 33 PG (ref 26.5–33)
MCHC RBC AUTO-ENTMCNC: 34.1 G/DL (ref 31.5–36.5)
MCV RBC AUTO: 97 FL (ref 78–100)
MONOCYTES # BLD AUTO: 0.3 10E9/L (ref 0–1.3)
MONOCYTES NFR BLD AUTO: 4.1 %
NEUTROPHILS # BLD AUTO: 5.3 10E9/L (ref 1.6–8.3)
NEUTROPHILS NFR BLD AUTO: 84.4 %
NITRATE UR QL: NEGATIVE
PH UR STRIP: 7 PH (ref 5–7)
PLATELET # BLD AUTO: 265 10E9/L (ref 150–450)
RBC # BLD AUTO: 3.79 10E12/L (ref 3.8–5.2)
SOURCE: NORMAL
SP GR UR STRIP: 1.02 (ref 1–1.03)
UROBILINOGEN UR STRIP-ACNC: 0.2 EU/DL (ref 0.2–1)
WBC # BLD AUTO: 6.3 10E9/L (ref 4–11)

## 2019-04-16 PROCEDURE — 85025 COMPLETE CBC W/AUTO DIFF WBC: CPT | Performed by: INTERNAL MEDICINE

## 2019-04-16 PROCEDURE — 81003 URINALYSIS AUTO W/O SCOPE: CPT | Performed by: INTERNAL MEDICINE

## 2019-04-16 PROCEDURE — 80053 COMPREHEN METABOLIC PANEL: CPT | Performed by: INTERNAL MEDICINE

## 2019-04-16 PROCEDURE — 82306 VITAMIN D 25 HYDROXY: CPT | Performed by: INTERNAL MEDICINE

## 2019-04-16 PROCEDURE — 84156 ASSAY OF PROTEIN URINE: CPT | Performed by: INTERNAL MEDICINE

## 2019-04-16 PROCEDURE — 36415 COLL VENOUS BLD VENIPUNCTURE: CPT | Performed by: INTERNAL MEDICINE

## 2019-04-17 LAB
ALBUMIN SERPL-MCNC: 3.8 G/DL (ref 3.4–5)
ALP SERPL-CCNC: 56 U/L (ref 40–150)
ALT SERPL W P-5'-P-CCNC: 22 U/L (ref 0–50)
ANION GAP SERPL CALCULATED.3IONS-SCNC: 7 MMOL/L (ref 3–14)
AST SERPL W P-5'-P-CCNC: 21 U/L (ref 0–45)
BILIRUB SERPL-MCNC: 0.2 MG/DL (ref 0.2–1.3)
BUN SERPL-MCNC: 15 MG/DL (ref 7–30)
CALCIUM SERPL-MCNC: 8.4 MG/DL (ref 8.5–10.1)
CHLORIDE SERPL-SCNC: 107 MMOL/L (ref 94–109)
CO2 SERPL-SCNC: 26 MMOL/L (ref 20–32)
CREAT SERPL-MCNC: 0.7 MG/DL (ref 0.52–1.04)
CREAT UR-MCNC: 87 MG/DL
DEPRECATED CALCIDIOL+CALCIFEROL SERPL-MC: 40 UG/L (ref 20–75)
GFR SERPL CREATININE-BSD FRML MDRD: >90 ML/MIN/{1.73_M2}
GLUCOSE SERPL-MCNC: 143 MG/DL (ref 70–99)
POTASSIUM SERPL-SCNC: 3.9 MMOL/L (ref 3.4–5.3)
PROT SERPL-MCNC: 6.5 G/DL (ref 6.8–8.8)
PROT UR-MCNC: 0.16 G/L
PROT/CREAT 24H UR: 0.19 G/G CR (ref 0–0.2)
SODIUM SERPL-SCNC: 140 MMOL/L (ref 133–144)

## 2019-04-19 NOTE — RESULT ENCOUNTER NOTE
"MyCPathbritet message sent:  \"Ms. Enriquez,    Labs showed an elevated glucose that is concerning for pre-diabetes, so diet and exercise are important; this may also be discussed more with your primary care provider.  The lymphocyte (type of white blood cell) count is just below the normal range.  Other labs are fairly stable.     Sincerely,  Bradford Colvin MD  4/18/2019 11:27 PM\""

## 2019-04-25 ENCOUNTER — TELEPHONE (OUTPATIENT)
Dept: PALLIATIVE MEDICINE | Facility: CLINIC | Age: 44
End: 2019-04-25

## 2019-04-25 DIAGNOSIS — M76.891 ENTHESOPATHY OF HIP REGION ON BOTH SIDES: Primary | ICD-10-CM

## 2019-04-25 DIAGNOSIS — M76.892 ENTHESOPATHY OF HIP REGION ON BOTH SIDES: Primary | ICD-10-CM

## 2019-04-25 DIAGNOSIS — M76.899 ENTHESOPATHY OF HIP REGION: ICD-10-CM

## 2019-04-25 NOTE — TELEPHONE ENCOUNTER
Bilateral hip greater trochanteric bursa injectio order is pended for review. Last done on 9/14/18.    AVNI CulverN, RN  Care Coordinator  [facility] [place]

## 2019-04-25 NOTE — TELEPHONE ENCOUNTER
Received call from patient who is requesting repeat Bursa injections. Please review and place order if appropriate.           Josselin Martins    [facility]

## 2019-05-03 DIAGNOSIS — L93.0 LUPUS ERYTHEMATOSUS, UNSPECIFIED FORM: ICD-10-CM

## 2019-05-03 NOTE — TELEPHONE ENCOUNTER
Routing refill request to provider for review/approval because:  Drug not on the [facility] refill protocol     Requested Prescriptions   Pending Prescriptions Disp Refills     predniSONE (DELTASONE) 5 MG tablet 90 tablet 0     Sig: Take 5 mg by mouth daily.       There is no refill protocol information for this order        Nadine Vasquez RN

## 2019-05-07 RX ORDER — PREDNISONE 5 MG/1
5 TABLET ORAL DAILY
Qty: 90 TABLET | Refills: 0 | Status: CANCELLED | OUTPATIENT
Start: 2019-05-07

## 2019-05-07 NOTE — TELEPHONE ENCOUNTER
Specialty triage: It is my understanding that prednisone was stopped.  Is Ms. Enriquez requesting prednisone or just her pharmacy (as an automated refill request)?  If Ms. Enriquez is requesting it then please refill a 30 day supply.  Please call her.    Bradford Colvin MD  5/7/2019 7:38 AM

## 2019-05-07 NOTE — TELEPHONE ENCOUNTER
Spoke with patient who reports she is taking 5 mg prednisone daily and requested a refill. She the stated that she has been feeling well this past week and is going to try to wean down to 2.5 mg. She has an apt on 5/23/19 and states she will have enough to last her up until then. If she becomes symptomatic between now and then, she will inform us. Patient asked us to disregard this refill request for now.    Jasen Pennington RN....5/7/2019 8:36 AM

## 2019-05-08 ENCOUNTER — OFFICE VISIT (OUTPATIENT)
Dept: PALLIATIVE MEDICINE | Facility: CLINIC | Age: 44
End: 2019-05-08
Attending: PSYCHIATRY & NEUROLOGY
Payer: COMMERCIAL

## 2019-05-08 VITALS
DIASTOLIC BLOOD PRESSURE: 77 MMHG | HEART RATE: 74 BPM | BODY MASS INDEX: 23.24 KG/M2 | SYSTOLIC BLOOD PRESSURE: 112 MMHG | WEIGHT: 144 LBS

## 2019-05-08 DIAGNOSIS — M70.61 TROCHANTERIC BURSITIS OF BOTH HIPS: Primary | ICD-10-CM

## 2019-05-08 DIAGNOSIS — M70.62 TROCHANTERIC BURSITIS OF BOTH HIPS: Primary | ICD-10-CM

## 2019-05-08 PROCEDURE — 20610 DRAIN/INJ JOINT/BURSA W/O US: CPT | Mod: 50 | Performed by: PSYCHIATRY & NEUROLOGY

## 2019-05-08 ASSESSMENT — PAIN SCALES - GENERAL: PAINLEVEL: MILD PAIN (3)

## 2019-05-08 NOTE — PROGRESS NOTES
Pre procedure Diagnosis: bilateral trochanteric bursitis   Post procedure Diagnosis: Same  Procedure performed: bilateral trochanteric bursa injections   Anesthesia: none  Complications: none  Operators: Magdalena Lockett MD    Indications:   Yovana Enriquez is a 41 year old female seen by me in clinic.  She has bilateral hip pain, and previous bursa injections have provided ~90% relief for  4-5 months. Exam shows bilateral trochanteric bursa tenderness and they have tried conservative treatment including medications.    Options/alternatives, benefits and risks were discussed with the patient including bleeding, infection, tissue trauma including tendons and muscles, and weakness.  Questions were answered to her satisfaction and she agrees to proceed. Voluntary informed consent was obtained and signed.     Vitals were reviewed: Yes  Allergies were reviewed:  Yes   Medications were reviewed:  Yes   Pre-procedure pain score: 3/10    Procedure:  After getting informed consent, a Pause for the Cause was performed.  Patient was prepped  in sterile fashion.     The area over the right trochanter was palpated, and the tender area was identified, corresponding to the area of the trochanteric bursa.  The area was cleaned with Chloroprep.   A 25 G 3.5 inch needle was inserted, aiming towards the trochanter.  When bone was encountered, the needle was slightly drawn.  A solution containing local anesthesic and steroid was injected.  The needle was removed.  Hemostasis was achieved. Bandaids were placed as appropriate.    The procedure was repeated on the opposite side.    In total, 3ml of 0.5% bupivacaine, 3ml of 1% lidocaine, and 40mg of kenalog was injected, divided equally between the two sides.    Hemostasis was achieved, the area was cleaned, and bandaids were placed when appropriate.  The patient tolerated the procedure well.    Post-procedure pain score: 0/10  Follow-up includes:   -f/u phone call in one week  -f/u with  referring provider    Magdalena Lockett MD  [facility]

## 2019-05-08 NOTE — PATIENT INSTRUCTIONS
[facility]   Post Procedure Instructions    Today you had:    bursa injection      Medications used:  lidocaine   bupivicaine   kenolog           Go to the emergency room if you develop any shortness of breath    Monitor the injection sites for signs and symptoms of infection-fever, chills, redness, swelling, warmth, or drainage to areas.    You may have soreness at injection sites for up to 24 hours.    You may apply ice to the painful areas to help minimize the discomfort of the needle pokes.    Do not apply heat to sites for at least 12 hours.    You may use anti-inflammatory medications or Tylenol for pain control if necessary    Pain Clinic phone number during work hours Monday-Friday:  [phone]    After hours provider line: [phone]

## 2019-05-20 ENCOUNTER — OFFICE VISIT (OUTPATIENT)
Dept: OPHTHALMOLOGY | Facility: CLINIC | Age: 44
End: 2019-05-20
Payer: COMMERCIAL

## 2019-05-20 DIAGNOSIS — M35.00 SJOGREN'S SYNDROME, WITH UNSPECIFIED ORGAN INVOLVEMENT (H): ICD-10-CM

## 2019-05-20 DIAGNOSIS — Z79.899 HIGH RISK MEDICATION USE: Primary | ICD-10-CM

## 2019-05-20 PROCEDURE — 92012 INTRM OPH EXAM EST PATIENT: CPT | Performed by: STUDENT IN AN ORGANIZED HEALTH CARE EDUCATION/TRAINING PROGRAM

## 2019-05-20 PROCEDURE — 92083 EXTENDED VISUAL FIELD XM: CPT | Performed by: STUDENT IN AN ORGANIZED HEALTH CARE EDUCATION/TRAINING PROGRAM

## 2019-05-20 PROCEDURE — 92134 CPTRZ OPH DX IMG PST SGM RTA: CPT | Performed by: STUDENT IN AN ORGANIZED HEALTH CARE EDUCATION/TRAINING PROGRAM

## 2019-05-20 ASSESSMENT — VISUAL ACUITY
METHOD: SNELLEN - LINEAR
OS_SC: 20/30
OD_SC: 20/30
OD_SC+: -1

## 2019-05-20 ASSESSMENT — EXTERNAL EXAM - LEFT EYE: OS_EXAM: NORMAL

## 2019-05-20 ASSESSMENT — CUP TO DISC RATIO
OS_RATIO: 0.4 UD
OD_RATIO: 0.4 UD

## 2019-05-20 ASSESSMENT — SLIT LAMP EXAM - LIDS
COMMENTS: NORMAL
COMMENTS: NORMAL

## 2019-05-20 ASSESSMENT — EXTERNAL EXAM - RIGHT EYE: OD_EXAM: NORMAL

## 2019-05-20 NOTE — PROGRESS NOTES
Current Eye Medications:  None     Subjective: here for OCT/HVF for plaquenil use for SLE. Dx in 2012.  200 mg daily plus additional 200 mg every other day. Has been on for about seven years now. Uses glasses usually only in darker situations, movies and driving at night. Has been having some throbbing in the back of the eyes. Headaches.     Last eye exam with Dr. Odell in 11/2016.      Objective:  See Ophthalmology Exam.       Assessment:  Yovana Enriquez is a 43 year old female who presents with:   Encounter Diagnoses   Name Primary?     High risk medication use Macular SD-OCT within normal limits both eyes.    Grubbs visual field (HVF) 10-2 within normal limits both eyes.    No signs of Plaquenil retinal toxicity at present. Plaquenil x 7 years.       Sjogren's syndrome, with unspecified organ involvement (H)        Plan:  Normal Plaquenil eye tests today.    Azael Queen MD  [phone]

## 2019-05-20 NOTE — LETTER
5/20/2019         RE: Yovana Enriquez  [address] [address]      Dear Dr. Colvin,    Thank you for referring your patient, Yovana Enriquez, to the [facility].     Her Plaquenil eye tests were normal today. Plan to repeat tests in 1 year.  Please see a copy of my visit note below.     Current Eye Medications:  None     Subjective: here for OCT/HVF for plaquenil use for SLE. Dx in 2012.  200 mg daily plus additional 200 mg every other day. Has been on for about seven years now. Uses glasses usually only in darker situations, movies and driving at night. Has been having some throbbing in the back of the eyes. Headaches.     Last eye exam with Dr. Odell in 11/2016.      Objective:  See Ophthalmology Exam.       Assessment:  Yovana Enriquez is a 43 year old female who presents with:   Encounter Diagnoses   Name Primary?     High risk medication use Macular SD-OCT within normal limits both eyes.    Grubbs visual field (HVF) 10-2 within normal limits both eyes.    No signs of Plaquenil retinal toxicity at present. Plaquenil x 7 years.       Sjogren's syndrome, with unspecified organ involvement (H)        Plan:  Normal Plaquenil eye tests today.    Azael Queen MD  [phone]          Again, thank you for allowing me to participate in the care of your patient.        Sincerely,        Azael Queen MD

## 2019-05-21 ENCOUNTER — TELEPHONE (OUTPATIENT)
Dept: SCHEDULING | Facility: CLINIC | Age: 44
End: 2019-05-21

## 2019-05-21 NOTE — TELEPHONE ENCOUNTER
The Patient declined Preventive Health Screens for: mammogram  Please review chart and follow-up with patient if needed.    Thank you

## 2019-05-23 ENCOUNTER — OFFICE VISIT (OUTPATIENT)
Dept: RHEUMATOLOGY | Facility: CLINIC | Age: 44
End: 2019-05-23
Payer: COMMERCIAL

## 2019-05-23 VITALS
HEART RATE: 78 BPM | DIASTOLIC BLOOD PRESSURE: 84 MMHG | SYSTOLIC BLOOD PRESSURE: 123 MMHG | HEIGHT: 66 IN | BODY MASS INDEX: 22.73 KG/M2 | OXYGEN SATURATION: 98 % | WEIGHT: 141.4 LBS

## 2019-05-23 DIAGNOSIS — M35.01 SJOGREN'S SYNDROME WITH KERATOCONJUNCTIVITIS SICCA (H): ICD-10-CM

## 2019-05-23 DIAGNOSIS — R35.1 NOCTURIA: ICD-10-CM

## 2019-05-23 DIAGNOSIS — R73.9 HYPERGLYCEMIA: ICD-10-CM

## 2019-05-23 DIAGNOSIS — M32.19 OTHER SYSTEMIC LUPUS ERYTHEMATOSUS WITH OTHER ORGAN INVOLVEMENT (H): Primary | ICD-10-CM

## 2019-05-23 DIAGNOSIS — I73.00 RAYNAUD'S PHENOMENON WITHOUT GANGRENE: ICD-10-CM

## 2019-05-23 PROCEDURE — 99214 OFFICE O/P EST MOD 30 MIN: CPT | Performed by: INTERNAL MEDICINE

## 2019-05-23 RX ORDER — HYDROXYCHLOROQUINE SULFATE 200 MG/1
TABLET, FILM COATED ORAL
Qty: 135 TABLET | Refills: 1 | Status: SHIPPED | OUTPATIENT
Start: 2019-05-23 | End: 2019-10-14

## 2019-05-23 RX ORDER — AZATHIOPRINE 50 MG/1
100 TABLET ORAL DAILY
Qty: 180 TABLET | Refills: 1 | Status: SHIPPED | OUTPATIENT
Start: 2019-05-23 | End: 2019-05-23

## 2019-05-23 RX ORDER — AMLODIPINE BESYLATE 5 MG/1
5 TABLET ORAL DAILY
Qty: 90 TABLET | Refills: 3 | Status: SHIPPED | OUTPATIENT
Start: 2019-05-23 | End: 2020-02-05

## 2019-05-23 RX ORDER — CEVIMELINE HYDROCHLORIDE 30 MG/1
30 CAPSULE ORAL 2 TIMES DAILY
Qty: 180 CAPSULE | Refills: 3 | Status: SHIPPED | OUTPATIENT
Start: 2019-05-23 | End: 2020-06-09

## 2019-05-23 RX ORDER — AZATHIOPRINE 50 MG/1
50 TABLET ORAL DAILY
Qty: 90 TABLET | Refills: 1 | Status: SHIPPED | OUTPATIENT
Start: 2019-05-23 | End: 2019-10-14

## 2019-05-23 ASSESSMENT — MIFFLIN-ST. JEOR: SCORE: 1303.14

## 2019-05-23 NOTE — NURSING NOTE
RAPID3 (0-30) Cumulative Score  4.3          RAPID3 Weighted Score (divide #4 by 3 and that is the weighted score)  1.4       Qing Zapata [facility]  5/23/2019 1:38 PM

## 2019-05-23 NOTE — Clinical Note
Dr. Ortega,MsHans [place]walk asked for a new PCP so I recommended you.  Issue she will be seeing you on 6/4/2019 for is fatigue.  I suspect nocturia is playing a role - combined with hyperglycemia I am checking a HgA1c and fasting glucose prior to her appointment with you.  If not diabetes related for the nocturia then maybe see urology?  Sleep psychology?  Thanks for your help!Bradford

## 2019-05-23 NOTE — PROGRESS NOTES
Rheumatology Clinic Visit      Yovana Enriquez MRN# [id number]   YOB: 1975 Age: 43 year old      Date of visit: 5/23/19   PCP: Plans to establish with Dr. Ortega in a couple weeks    Chief Complaint   Patient presents with:  Systemic Lupus Erythematous: the hardest thing to deal with is the fatigue      Assessment and Plan     1. Systemic lupus erythematosus (YANELIS by EIA positive in the past, leukopenia/lymphocytopenia, hypocomplementemia, polyarthralgias, oral sores, history of malar rash, photophobia, photosensitivity, Raynaud's Phenomenon): Previously on methotrexate that was discontinued for unclear reasons but the patient reports that she tolerated it well. Currently on azathioprine 50 mg (dose reduced on 11/8/2018 from 100mg daily without worsening symptoms), hydroxychloroquine 300 mg daily [avg], and Benlysta (worsening symptoms when stopped in the past). No longer taking prednisone. Currently doing well.     - Continue azathioprine 50 mg daily  - Continue Evoxac 30 mg twice a day  - Continue hydroxychloroquine 300mg daily on average: 200mg daily with additional 200mg every other day (toxicity monitoring eye exam last done on 5/20/2019)  - Continue Benlysta SQ  - Labs in 3 months: CBC, CMP, ESR, CRP, CK, dsDNA, C3, C4, UA, Uprotein:creatinine    2. Secondary Sjogren syndrome: Doing well with Evoxac and frequent sips of water. Dry eyes are not much of an issue currently. See #1.    3. Raynaud's Phenomenon: Amlodipine 5mg daily is effective.  - Continue amlodipine 5mg daily     4. Bilateral trochanteric bursitis: She receives steroid injections from the pain clinic.    5. Fibromyalgia: Managed by the pain clinic and PCP.    6. Nocturia / Hyperglycemia: check HgA1c and fasting glucose prior to appointment with Dr. Ortega on 6/4/2019    7. Fatigue: no symptoms of obstructive sleep apnea.  May benefit from sleep psychology eval.  Suspect that nocturia is playing a big role.  Hyperglycemia and  urinating 3 times at night: I advised that she discuss with her PCP and she is going to see Dr. Ortega on 6/4/2019.  Check labs as noted #6.    8. Vaccinations: Vaccinations reviewed with Ms. Enriquez.  Risks and benefits of vaccinations were discussed.  - Influenza: encouraged yearly vaccination  - Shingrix: she is going to check on insurance coverage and possible receive at a pharmacy      Ms. Enriquez verbalized agreement with and understanding of the rational for the diagnosis and treatment plan.  All questions were answered to best of my ability and the patient's satisfaction. Ms. Enriquez was advised to contact the clinic with any questions that may arise after the clinic visit.      Thank you for involving me in the care of the patient    Return to clinic: 3-4 months      HPI   Yovana Enriquez is a 43 year old female with medical history significant for subclinical hyperthyroidism, hypertension, irritable bowel syndrome, fibromyalgia, hypertension, non-celiac gluten sensitivity (reportedly with bowel biopsies and laboratory workup that did not show celiac disease), anxiety, and systemic lupus erythematosus who presents for follow-up of SLE.    Today, Ms. Enriquez reports that she is doing great with regard to lupus.  Biggest issue right now is fatigue.  Sleep is horrible; insomnia; wakes up tired; wants to nap during the day but cannot because of her job.  Trouble falling asleep after she wakes up.  Wakes up 3 times at night to urinate.  Continues to have photosensitivity that is worse in the summer so she covers up and uses sunscreen.  No joint pain.  Morning stiffness for less than 30 minutes.     Denies fevers, chills, nausea, vomiting. No abdominal pain.  No chest pain/pressure, palpitations, or shortness of breath. No neck pain. No rash currently. No LE swelling.  She has photosensitivity and photophobia.   Sicca symptoms are well-controlled with Evoxac and frequent sips of water. No eye pain or  redness. No history of serositis. No history of DVT, pulmonary embolism, or miscarriage.    Raynaud's is controlled per patient.     Does not wake up gasping for air.    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  PULM: No SOB, wheeze, cough.  GI:  See history of present illness. No blood in stool. No abdominal pain, nausea, vomiting.  : No blood in urine.  MSK: See HPI.  NEURO: See history of present illness  EXT: No LE swelling  PSYCH: Negative    Active Problem List     Patient Active Problem List   Diagnosis     Systemic lupus erythematosus (H)     Menorrhagia     History of ovarian cyst     Dysmenorrhea     History of gestational diabetes mellitus, not currently pregnant     Intramural leiomyoma of uterus     Hyperlipidemia LDL goal <130     Subclinical hyperthyroidism     Anxiety     High risk medication use     Fibromyalgia     Chronic constipation     Irritable bowel syndrome     Health Care Home     Hypertension goal BP (blood pressure) < 140/90     Non-celiac gluten sensitivity     Raynaud's phenomenon without gangrene     Sjogren's syndrome (H)     Intramural and submucous leiomyoma of uterus     Obstructive defecation     Mixed incontinence     Past Medical History     Past Medical History:   Diagnosis Date     Arthritis      Chronic constipation 12/16/2013     Dysmenorrhea 10/1/2012     Fibromyalgia 11/15/2013     Health Care Home 3/19/2014    **See Letters for [facility] Care Plan: Emergency Care Plan       High risk medication use 9/13/2013     History of gestational diabetes mellitus, not currently pregnant 10/1/2012     History of ovarian cyst 10/1/2012     Hyperlipidemia LDL goal <130 10/18/2012     Hypertension goal BP (blood pressure) < 140/90 1/19/2015      Intramural leiomyoma of uterus 10/5/2012     Irritable bowel syndrome 3/11/2014    Patient states no history colonoscopy       Menorrhagia 10/1/2012     Non-celiac gluten sensitivity 2/8/2016     PONV (postoperative nausea and vomiting)      Subclinical hyperthyroidism 1/17/2013     Systemic lupus erythematosus (H) 4/23/2012     Past Surgical History     Past Surgical History:   Procedure Laterality Date     COLONOSCOPY N/A 2/20/2015    Procedure: COMBINED COLONOSCOPY, SINGLE OR MULTIPLE BIOPSY/POLYPECTOMY BY BIOPSY;  Surgeon: Fred Cullen MD;  Location: [facility] OR     COLONOSCOPY N/A 10/29/2018    Procedure: COMBINED COLONOSCOPY, SINGLE OR MULTIPLE BIOPSY/POLYPECTOMY BY BIOPSY;  Surgeon: Inez Sawyer MD;  Location: [facility]     COLONOSCOPY WITH CO2 INSUFFLATION N/A 2/20/2015    Procedure: COLONOSCOPY WITH CO2 INSUFFLATION;  Surgeon: Fred Cullen MD;  Location: [facility] OR     ENT SURGERY  10-24-14    Bottom lip biopsies     ESOPHAGOSCOPY, GASTROSCOPY, DUODENOSCOPY (EGD), COMBINED N/A 2/20/2015    Procedure: COMBINED ESOPHAGOSCOPY, GASTROSCOPY, DUODENOSCOPY (EGD), BIOPSY SINGLE OR MULTIPLE;  Surgeon: Fred Cullen MD;  Location: [facility] OR     HC BREATH HYDROGEN TEST  12/31/2013    Procedure: HYDROGEN BREATH TEST;  Surgeon: Camden Almazan MD;  Location: [facility]     HC HYSTEROSCOPY, SURGICAL; W/ ENDOMETRIAL ABLATION, ANY METHOD  10-19-12     SHOULDER SURGERY Right     R shoulder     TUBAL LIGATION  2004     Allergy   No Known Allergies  Current Medication List     Current Outpatient Medications   Medication Sig     amLODIPine (NORVASC) 5 MG tablet Take 1 tablet (5 mg) by mouth daily     azaTHIOprine (IMURAN) 50 MG tablet Take 2 tablets (100 mg) by mouth daily     Belimumab 200 MG/ML SOAJ Inject 200 mg Subcutaneous every 7 days . Hold for signs of infection and seek medical attention. Autoinjector     busPIRone (BUSPAR) 5 MG tablet Take 1 tablet (5 mg) by mouth 2 times daily     Calcium  Carb-Cholecalciferol (CALCIUM + D3) 600-200 MG-UNIT TABS 1 tablet daily     cevimeline (EVOXAC) 30 MG capsule Take 1 capsule (30 mg) by mouth 2 times daily     fluticasone (FLONASE) 50 MCG/ACT nasal spray Spray 1-2 sprays into both nostrils daily     hydroxychloroquine (PLAQUENIL) 200 MG tablet Hydroxychloroquine 200mg daily; and an additional 200mg every other day.     losartan (COZAAR) 25 MG tablet Take 1 tablet (25 mg) by mouth daily Due for follow up appointment with Bradford Mario in February 2019 for further refills.     Multiple Vitamin (MULTIVITAMIN OR) Take  by mouth.     ranitidine (ZANTAC) 300 MG tablet TAKE 1 TABLET (300 MG) BY MOUTH 2 TIMES DAILY.     albuterol (PROAIR HFA/PROVENTIL HFA/VENTOLIN HFA) 108 (90 BASE) MCG/ACT Inhaler Inhale 2 puffs into the lungs every 4 hours as needed for shortness of breath / dyspnea or wheezing Use with spacer     blood glucose test strip Used for testing glucose 2-3 times daily     diclofenac (VOLTAREN) 1 % GEL Apply 2-4 grams to knees or shoulders four times daily as needed using enclosed dosing card.     MICROLET LANCETS MISC 1 each daily.     predniSONE (DELTASONE) 5 MG tablet Take 5 mg by mouth daily. (Patient not taking: Reported on 5/23/2019)     Spacer/Aero-Holding Chambers (SPACER/AERO-HOLD CHAMBER MASK) EDITH 1 Adult size spacer to use with [facility] inhalers.     No current facility-administered medications for this visit.          Social History   See HPI    Family History     Family History   Problem Relation Age of Onset     Respiratory Maternal Grandfather      Cancer Maternal Grandfather      Asthma Son      Circulatory Maternal Grandmother         Brain anurysm     Hypertension Mother      Glaucoma Mother 50        drops     Hypertension Sister      Hypertension Sister      Diabetes No family hx of      Cerebrovascular Disease No family hx of      Thyroid Disease No family hx of      Macular Degeneration No family hx of        Physical Exam     Temp Readings from  "Last 3 Encounters:   04/10/19 98.7  F (37.1  C) (Oral)   10/29/18 98.9  F (37.2  C) (Temporal)   08/15/18 98.5  F (36.9  C) (Oral)     BP Readings from Last 5 Encounters:   05/23/19 123/84   05/08/19 112/77   04/10/19 128/80   10/29/18 (P) 128/81   10/08/18 119/82     Pulse Readings from Last 1 Encounters:   05/23/19 78     Resp Readings from Last 1 Encounters:   10/29/18 (P) 16     Estimated body mass index is 22.82 kg/m  as calculated from the following:    Height as of this encounter: 1.676 m (5' 6\").    Weight as of this encounter: 64.1 kg (141 lb 6.4 oz).    GEN: NAD  HEENT: MMM.  No oral lesion.  Anicteric, noninjected sclera  CV: S1, S2. RRR. No m/r/g.  PULM: CTA bilaterally. No w/c.  MSK:  Hands, wrists, elbows, shoulders, knees, ankles, and MTPs without swelling or tenderness to palpation. Bilateral hips tender to direct palpation.    NEURO: UE and LE strengths 5/5 and equal bilaterally.   SKIN: No rash. No evidence of current or past ischemic insults to the distal fingertips by visual inspection.   EXT: No LE edema  PSYCH: Alert. Appropriate.    Labs / Imaging (select studies)   RF/CCP  Recent Labs   Lab Test 09/13/13  1504 02/13/12  1404   CCPABY <20 Interpretation:  Negative <20 Interpretation:  Negative   RHF 12  --      YANELIS  Recent Labs   Lab Test 07/25/16  1433   ANAIGG <1:40  Reference range: <1:40  (Note)  <1:40  INTERPRETIVE INFORMATION: YANELIS by IFA, IgG  Anti-nuclear antibodies (YANELIS) are seen in a variety of  systemic rheumatic diseases and are determined by indirect  fluorescence assay (IFA) using HEp-2 substrate with an  IgG-specific conjugate. YANELIS titers less than or equal to  1:80 have variable relevance while titers greater than or  equal to 1:160 are considered clinically significant. These  antibodies may precede clinical disease onset; however,  healthy individuals and those with advanced age have been  reported to be positive for YANELIS. When observed, one of the  five basic patterns is " reported: homogeneous,  peripheral/rim, speckled, centromere, or nucleolar. If  cytoplasmic fluorescence is observed, it is noted. IFA  methodology is subjective and has occasionally been shown  to lack sensitivity for anti-SSA/Ro antibodies.  Negative results do not necessarily rule out the presence  of SSc. If clinical suspicion remains, consider further  te sting for U3-RNP, PM/Scl, or Th/To antibodies associated  with SSc.  Performed by [facility],  [address] [place] [address],[place] [id number] [phone]  [email or web address] Alcon Krishna MD, Lab. Director       RNP/Sm/SSA/SSB  Recent Labs   Lab Test 03/23/16  1759 01/04/16  1806 07/01/14  1211 09/13/13  1504 02/13/12  1404   RNPIGG  --   --  <0.2  Negative    --   --    SMIGG <0.2  Negative   Antibody index (AI) values reflect qualitative changes in antibody   concentration that cannot be directly associated with clinical condition or   disease state.   <0.2  Negative   Antibody index (AI) values reflect qualitative changes in antibody   concentration that cannot be directly associated with clinical condition or   disease state.   <0.2  Negative    --   --    ENASM  --   --   --  0 0   SSAIGG  --   --  <0.2  Negative    --   --    ENASSA  --   --   --  3 12   SSBIGG  --   --  <0.2  Negative    --   --    ENASSB  --   --   --  0 0   ENARMP  --   --   --  0 0   SCLIGG  --   --  <0.2  Negative    --   --      dsDNA  Recent Labs   Lab Test 10/01/18  1313 06/04/18  1758 01/30/18  1752 10/02/17  1814 07/03/17  1447 04/05/17  1820 12/12/16  1827 09/20/16  1518 07/25/16  1433   DNA <1 1 <1 <1 <1  Negative   <1  Negative   <1  Negative   <1  Negative   <1  Negative       C3/C4  Recent Labs   Lab Test 01/15/19  1800 10/01/18  1313 06/04/18  1758 01/30/18  1752 10/02/17  1814 07/03/17  1447 04/05/17  1820 12/12/16  1827 09/20/16  1518   G3HOEMI 68* 63* 60* 86 75* 68* 58* 69* 84   [id number] 14* 14* 13* 21 15 15 10* 12* 14*     Histone Antibody  Recent Labs   Lab Test  02/13/12  1404   HSTO 0.4     Antiphospholipid Antibodies  Recent Labs   Lab Test 09/13/13  1504 02/13/12  1404   CARG <15.0 Interpretation:  Negative <15.0 Interpretation:  Negative   SANCHO <12.5 Interpretation:  Negative <12.5 Interpretation:  Negative   LUPINT  --  Negative (Note) COMMENTS: INR is normal. APTT is normal.  1:2 Mix is not indicated. DRVVT Screen is normal. Thrombin time is normal. NEGATIVE TEST; A LUPUS ANTICOAGULANT WAS NOT DETECTED IN THIS SPECIMEN WITHIN THE LIMITS OF THE TESTING REPERTOIRE. If the clinical picture is strongly suggestive of an antiphospholipid syndrome, recommend anticardiolipin and beta-2-glycoprotein (IgG and IgM) antibody tests. Hyun Denis M.D.  [phone] 2-.  NINR =  1.01 N = 0.86-1.14  (No additional charge) NTT = 16.0 N = 13.0-19.0 sec  (No additional charge)  APTT'S:    Seconds Reagent =  Stago LS Normal  =  36 Patient  =  41 1:2 Mix  =  N/A Reference =  31-43   DILUTE VANNESSA VIPER VENOM TEST: DRVVT Screen Ratio = 0.80 Normal = <1.28      IgG  Recent Labs   Lab Test 01/30/15  1156 04/02/14  1539   IGG  --  897    107   IGM  --  361*     CBC  Recent Labs   Lab Test 04/16/19  1759 01/15/19  1800 10/01/18  1313   WBC 6.3 4.8 4.3   RBC 3.79* 3.64* 4.05   HGB 12.5 12.1 13.8   HCT 36.7 35.7 39.8   MCV 97 98 98   RDW 11.4 11.5 11.5    276 220   MCH 33.0 33.2* 34.1*   MCHC 34.1 33.9 34.7   NEUTROPHIL 84.4 63.6 72.1   LYMPH 10.4 26.7 17.9   MONOCYTE 4.1 7.7 7.7   EOSINOPHIL 0.6 1.4 1.6   BASOPHIL 0.5 0.6 0.7   ANEU 5.3 3.1 3.1   ALYM 0.7* 1.3 0.8   ZOË 0.3 0.4 0.3   AEOS 0.0 0.1 0.1   ABAS 0.0 0.0 0.0     CMP  Recent Labs   Lab Test 04/16/19  1759 01/15/19  1800 10/01/18  1313 06/04/18  1758 01/30/18  1752 10/02/17  1814    139 140 139 138 138   POTASSIUM 3.9 3.9 3.6 4.2 3.5 3.7   CHLORIDE 107 104 104 105 100 103   CO2 26 29 30 28 30 27   ANIONGAP 7 6 6 6 8 8   * 140* 82 111* 91 112*   BUN 15 16 10 14 9 10   CR 0.70 0.78 0.73 0.72  0.70 0.88   GFRESTIMATED >90 >90 86 87 >90 70   GFRESTBLACK >90 >90 >90 >90 >90 84   MELANIA 8.4* 8.2* 8.5 8.1* 8.7 8.7   BILITOTAL 0.2 0.2 0.3 0.3 0.4 0.2   ALBUMIN 3.8 3.6 3.7 3.5 3.8 3.8   PROTTOTAL 6.5* 6.3* 6.7* 6.0* 7.0 6.8   ALKPHOS 56 54 53 48 67 60   AST 21 33 25 22 26 22   ALT 22 36 33 21 25 21     HgA1c  Recent Labs   Lab Test 10/05/12  0824   A1C 5.3     Calcium/VitaminD  Recent Labs   Lab Test 04/16/19  1759 01/15/19  1800 10/01/18  1313  10/02/17  1814  01/17/13  1039   MELANIA 8.4* 8.2* 8.5   < > 8.7   < > 9.4   VITDT 40  --   --   --  37  --  55    < > = values in this interval not displayed.     ESR/CRP  Recent Labs   Lab Test 01/15/19  1800 10/01/18  1313 06/04/18  1758   SED 5 4 7   CRP <2.9 <2.9 <2.9     CK/Aldolase  Recent Labs   Lab Test 10/01/18  1313 06/04/18  1758 01/30/18  1752   CKT 96 91 168     TSH/T4  Recent Labs   Lab Test 04/10/19  1808 10/18/17  1445 10/07/16  0714 02/27/15  1058   TSH 0.44 0.33* 0.37*  --    T4  --  1.24 1.16 1.01     Lipid Panel  Recent Labs   Lab Test 01/26/19  0916 10/07/16  0714 12/18/12  1112   CHOL 133 137 135   TRIG 64 48 108   HDL 68 75 63   LDL 52 52 51   VLDL  --   --  22   CHOLHDLRATIO  --   --  2.1   NHDL 65 62  --      Hepatitis B  Recent Labs   Lab Test 03/29/16  1417 02/13/14  1835 05/22/12  1633   HEPBANG Nonreactive Negative Negative     Hepatitis C  Recent Labs   Lab Test 03/29/16  1417 02/13/14  1835 05/22/12  1633   HCVAB Nonreactive   Assay performance characteristics have not been established for newborns,   infants, and children   Negative Negative     Lyme ab screening  Recent Labs   Lab Test 05/30/15  1355   LYMEGM 0.55     Tuberculosis Screening  Recent Labs   Lab Test 07/03/17  1447 03/29/16  1417 02/13/14  1835   TBRSLT Negative Negative Negative   TBAGN 0.00 0.00 0.00     HIV Screening  Recent Labs   Lab Test 04/10/19  1808   HIAGAB Nonreactive     UA  Recent Labs   Lab Test 04/16/19  1806 01/15/19  1810 10/01/18  1324 06/04/18  1805  01/30/18 1753 01/03/18 1816 04/05/17 1825 09/20/16 1524 03/23/16  1807   COLOR Yellow Yellow Yellow Yellow Yellow Yellow   < > Yellow   < > Yellow   < > Yellow   APPEARANCE Clear Clear Clear Clear Clear Slightly Cloudy   < > Clear   < > Clear   < > Clear   URINEGLC Negative Negative Negative Negative Negative Negative   < > Negative   < > Negative   < > Negative   URINEBILI Negative Negative Negative Negative Negative Negative   < > Negative   < > Negative   < > Negative   SG 1.025 1.025 >1.030 >1.030 1.015 1.020   < > 1.025   < > >1.030   < > >1.030   URINEPH 7.0 7.0 7.0 7.0 7.0 7.0   < > 7.0   < > 6.0   < > 6.0   PROTEIN Negative Negative Negative 30* Negative Negative   < > Negative   < > Negative   < > Negative   UROBILINOGEN 0.2 0.2 1.0 1.0 0.2 1.0   < > 1.0   < > 0.2   < > 0.2   NITRITE Negative Negative Negative Negative Negative Negative   < > Negative   < > Negative   < > Negative   UBLD Negative Negative Large* Negative Negative Small*   < > Negative   < > Negative   < > Negative   LEUKEST Negative Negative Negative Negative Negative Small*   < > Negative   < > Negative   < > Negative   WBCU  --   --  0 - 5 0 - 5 O - 2 25-50*   < > O - 2   < > O - 2   < > O - 2   RBCU  --   --  5-10* O - 2 O - 2 5-10*   < > O - 2   < > O - 2   < > O - 2   SQUAMOUSEPI  --   --  Moderate* Few  --  Few   < > Few   < >  --   --  Few   BACTERIA  --   --  Few* Few*  --  Moderate*   < >  --    < >  --   --   --    MUCOUS  --   --   --   --   --   --   --  Present*  --  Present*  --  Present*    < > = values in this interval not displayed.     Urine Microscopic  Recent Labs   Lab Test 10/01/18  1324 06/04/18 1805 01/30/18 1753 01/03/18 1816 04/05/17 1825 09/20/16 1524 03/23/16  1807   WBCU 0 - 5 0 - 5 O - 2 25-50*   < > O - 2   < > O - 2   < > O - 2   RBCU 5-10* O - 2 O - 2 5-10*   < > O - 2   < > O - 2   < > O - 2   SQUAMOUSEPI Moderate* Few  --  Few   < > Few   < >  --   --  Few   BACTERIA Few* Few*  --  Moderate*    < >  --    < >  --   --   --    MUCOUS  --   --   --   --   --  Present*  --  Present*  --  Present*    < > = values in this interval not displayed.     Urine Protein  Recent Labs   Lab Test 04/16/19  1806 01/15/19  1810 10/01/18  1324   UTP 0.16 0.13 0.24   UTPG 0.19 0.11 0.24*   UCRR 87 127 101     Immunization History     Immunization History   Administered Date(s) Administered     Influenza (IIV3) PF 10/01/2012, 09/23/2013     Influenza Vaccine IM 3yrs+ 4 Valent IIV4 09/20/2016, 10/30/2017     Influenza Vaccine, 3 YRS +, IM (QUADRIVALENT W/PRESERVATIVES) 08/11/2018     Pneumo Conj 13-V (2010&after) 04/25/2016     Pneumococcal 23 valent 10/01/2012, 11/06/2017     Tdap (Adacel,Boostrix) 12/17/2010          Chart documentation done in part with Dragon Voice recognition Software. Although reviewed after completion, some word and grammatical error may remain.    Bradford Colvin MD

## 2019-05-30 DIAGNOSIS — R35.1 NOCTURIA: ICD-10-CM

## 2019-05-30 DIAGNOSIS — R73.9 HYPERGLYCEMIA: ICD-10-CM

## 2019-05-30 LAB
GLUCOSE SERPL-MCNC: 84 MG/DL (ref 70–99)
HBA1C MFR BLD: 5.2 % (ref 0–5.6)

## 2019-05-30 PROCEDURE — 36415 COLL VENOUS BLD VENIPUNCTURE: CPT | Performed by: INTERNAL MEDICINE

## 2019-05-30 PROCEDURE — 82947 ASSAY GLUCOSE BLOOD QUANT: CPT | Performed by: INTERNAL MEDICINE

## 2019-05-30 PROCEDURE — 83036 HEMOGLOBIN GLYCOSYLATED A1C: CPT | Performed by: INTERNAL MEDICINE

## 2019-06-06 DIAGNOSIS — F41.9 ANXIETY: ICD-10-CM

## 2019-06-06 NOTE — TELEPHONE ENCOUNTER
Requested Prescriptions   Pending Prescriptions Disp Refills     busPIRone (BUSPAR) 5 MG tablet 60 tablet 1     Sig: Take 1 tablet (5 mg) by mouth 2 times daily       There is no refill protocol information for this order      Last Written Prescription Date:  5-6-19  Last Fill Quantity: 60,  # refills: 1   Last office visit: 4/10/2019 with prescribing provider:  4-10-19   Future Office Visit:

## 2019-06-08 RX ORDER — BUSPIRONE HYDROCHLORIDE 5 MG/1
5 TABLET ORAL 2 TIMES DAILY
Qty: 60 TABLET | Refills: 1 | Status: SHIPPED | OUTPATIENT
Start: 2019-06-08 | End: 2019-07-19

## 2019-06-17 DIAGNOSIS — K90.41 NON-CELIAC GLUTEN SENSITIVITY: ICD-10-CM

## 2019-06-17 PROCEDURE — 83516 IMMUNOASSAY NONANTIBODY: CPT | Mod: 59 | Performed by: INTERNAL MEDICINE

## 2019-06-17 PROCEDURE — 36415 COLL VENOUS BLD VENIPUNCTURE: CPT | Performed by: INTERNAL MEDICINE

## 2019-06-17 PROCEDURE — 83516 IMMUNOASSAY NONANTIBODY: CPT | Performed by: INTERNAL MEDICINE

## 2019-06-17 PROCEDURE — 82784 ASSAY IGA/IGD/IGG/IGM EACH: CPT | Performed by: INTERNAL MEDICINE

## 2019-06-19 LAB
IGA SERPL-MCNC: 82 MG/DL (ref 70–380)
TTG IGA SER-ACNC: 3 U/ML
TTG IGG SER-ACNC: <1 U/ML

## 2019-06-29 PROBLEM — N39.46 MIXED INCONTINENCE: Status: RESOLVED | Noted: 2018-10-01 | Resolved: 2019-06-29

## 2019-06-29 PROBLEM — K56.41 OBSTRUCTIVE DEFECATION (H): Status: RESOLVED | Noted: 2018-10-01 | Resolved: 2019-06-29

## 2019-07-02 ENCOUNTER — MYC MEDICAL ADVICE (OUTPATIENT)
Dept: RHEUMATOLOGY | Facility: CLINIC | Age: 44
End: 2019-07-02

## 2019-07-02 DIAGNOSIS — M32.9 SYSTEMIC LUPUS ERYTHEMATOSUS, UNSPECIFIED SLE TYPE, UNSPECIFIED ORGAN INVOLVEMENT STATUS (H): Primary | ICD-10-CM

## 2019-07-06 DIAGNOSIS — K21.9 GASTROESOPHAGEAL REFLUX DISEASE WITHOUT ESOPHAGITIS: ICD-10-CM

## 2019-07-06 NOTE — TELEPHONE ENCOUNTER
"Requested Prescriptions   Pending Prescriptions Disp Refills     ranitidine (ZANTAC) 300 MG tablet [Pharmacy Med Name: RANITIDINE 300 MG TABLET]  Last Written Prescription Date:  03/29/19  Last Fill Quantity: 180,  # refills: 0   Last office visit: 04/10/19 with prescribing provider:  MARIAH Holder   Future Office Visit:     180 tablet 0     Sig: TAKE 1 TABLET (300 MG) BY MOUTH 2 TIMES DAILY.       H2 Blockers Protocol Passed - 7/6/2019 12:21 AM        Passed - Patient is age 12 or older        Passed - Recent (12 mo) or future (30 days) visit within the authorizing provider's specialty     Patient had office visit in the last 12 months or has a visit in the next 30 days with authorizing provider or within the authorizing provider's specialty.  See \"Patient Info\" tab in inbasket, or \"Choose Columns\" in Meds & Orders section of the refill encounter.              Passed - Medication is active on med list          "

## 2019-07-07 NOTE — TELEPHONE ENCOUNTER
Prescription approved per [facility] [place].  Elizabeth Bergeron RN

## 2019-07-12 RX ORDER — PREDNISONE 5 MG/1
TABLET ORAL
Qty: 66 TABLET | Refills: 0 | Status: SHIPPED | OUTPATIENT
Start: 2019-07-12 | End: 2019-07-30

## 2019-07-12 NOTE — TELEPHONE ENCOUNTER
Rheumatology Telephone Note:    I called and spoke with Ms. Enriquez.  Symptoms are consistent with lupus flare that has responded to prednisone in the past.  She is not currently on prednisone.  Taper prescribed as noted below.    1. Systemic lupus erythematosus, unspecified SLE type, unspecified organ involvement status (H)  - predniSONE (DELTASONE) 5 MG tablet; Prednisone 40mg daily x2days, then 20mg daily x5days, then 15mg daily x5days, then 10mg daily x5days, then 5mg x5days, then stop.  Dispense: 66 tablet; Refill: 0    All questions were answered and she thanked me for the call.     Bradford Colvin MD  7/12/2019 2:04 PM

## 2019-07-17 DIAGNOSIS — M32.19 OTHER SYSTEMIC LUPUS ERYTHEMATOSUS WITH OTHER ORGAN INVOLVEMENT (H): ICD-10-CM

## 2019-07-17 LAB
ALBUMIN UR-MCNC: NEGATIVE MG/DL
APPEARANCE UR: CLEAR
BASOPHILS # BLD AUTO: 0 10E9/L (ref 0–0.2)
BASOPHILS NFR BLD AUTO: 0.5 %
BILIRUB UR QL STRIP: NEGATIVE
COLOR UR AUTO: YELLOW
DIFFERENTIAL METHOD BLD: ABNORMAL
EOSINOPHIL # BLD AUTO: 0 10E9/L (ref 0–0.7)
EOSINOPHIL NFR BLD AUTO: 0 %
ERYTHROCYTE [DISTWIDTH] IN BLOOD BY AUTOMATED COUNT: 11.3 % (ref 10–15)
ERYTHROCYTE [SEDIMENTATION RATE] IN BLOOD BY WESTERGREN METHOD: 6 MM/H (ref 0–20)
GLUCOSE UR STRIP-MCNC: NEGATIVE MG/DL
HCT VFR BLD AUTO: 37 % (ref 35–47)
HGB BLD-MCNC: 12.7 G/DL (ref 11.7–15.7)
HGB UR QL STRIP: ABNORMAL
KETONES UR STRIP-MCNC: NEGATIVE MG/DL
LEUKOCYTE ESTERASE UR QL STRIP: NEGATIVE
LYMPHOCYTES # BLD AUTO: 0.5 10E9/L (ref 0.8–5.3)
LYMPHOCYTES NFR BLD AUTO: 8.4 %
MCH RBC QN AUTO: 33.2 PG (ref 26.5–33)
MCHC RBC AUTO-ENTMCNC: 34.3 G/DL (ref 31.5–36.5)
MCV RBC AUTO: 97 FL (ref 78–100)
MONOCYTES # BLD AUTO: 0.3 10E9/L (ref 0–1.3)
MONOCYTES NFR BLD AUTO: 5 %
NEUTROPHILS # BLD AUTO: 5.1 10E9/L (ref 1.6–8.3)
NEUTROPHILS NFR BLD AUTO: 86.1 %
NITRATE UR QL: NEGATIVE
NON-SQ EPI CELLS #/AREA URNS LPF: NORMAL /LPF
PH UR STRIP: 7 PH (ref 5–7)
PLATELET # BLD AUTO: 313 10E9/L (ref 150–450)
RBC # BLD AUTO: 3.83 10E12/L (ref 3.8–5.2)
RBC #/AREA URNS AUTO: NORMAL /HPF
SOURCE: ABNORMAL
SP GR UR STRIP: 1.02 (ref 1–1.03)
UROBILINOGEN UR STRIP-ACNC: 0.2 EU/DL (ref 0.2–1)
WBC # BLD AUTO: 6 10E9/L (ref 4–11)
WBC #/AREA URNS AUTO: NORMAL /HPF

## 2019-07-17 PROCEDURE — 86160 COMPLEMENT ANTIGEN: CPT | Mod: 59 | Performed by: INTERNAL MEDICINE

## 2019-07-17 PROCEDURE — 86225 DNA ANTIBODY NATIVE: CPT | Performed by: INTERNAL MEDICINE

## 2019-07-17 PROCEDURE — 81001 URINALYSIS AUTO W/SCOPE: CPT | Performed by: INTERNAL MEDICINE

## 2019-07-17 PROCEDURE — 85025 COMPLETE CBC W/AUTO DIFF WBC: CPT | Performed by: INTERNAL MEDICINE

## 2019-07-17 PROCEDURE — 80053 COMPREHEN METABOLIC PANEL: CPT | Performed by: INTERNAL MEDICINE

## 2019-07-17 PROCEDURE — 36415 COLL VENOUS BLD VENIPUNCTURE: CPT | Performed by: INTERNAL MEDICINE

## 2019-07-17 PROCEDURE — 84156 ASSAY OF PROTEIN URINE: CPT | Performed by: INTERNAL MEDICINE

## 2019-07-17 PROCEDURE — 82550 ASSAY OF CK (CPK): CPT | Performed by: INTERNAL MEDICINE

## 2019-07-17 PROCEDURE — 86140 C-REACTIVE PROTEIN: CPT | Performed by: INTERNAL MEDICINE

## 2019-07-17 PROCEDURE — 86160 COMPLEMENT ANTIGEN: CPT | Performed by: INTERNAL MEDICINE

## 2019-07-17 PROCEDURE — 85652 RBC SED RATE AUTOMATED: CPT | Performed by: INTERNAL MEDICINE

## 2019-07-17 NOTE — PROGRESS NOTES
SUBJECTIVE/OBJECTIVE:                           Yovana Enriquez is a 44 year old female called for an initial visit for Medication Therapy Management.  She was referred to me from  insurance plan.    Chief Complaint:  Bupropion and nortriptyline - caused her to loose appetite, diarrhea, weakness, insomnia, weight loss. No longer on these, is now doing much better.     Allergies/ADRs: Reviewed in Epic  Tobacco: No tobacco use  Alcohol: not currently using  Caffeine: 2 cups/day of coffee  Activity: Up and down stairs,  at home, walks, mini exercise   PMH: Reviewed in Epic    Medication Adherence/Access:  Patient uses pill box(es).  Patient takes medications 2 time(s) per day. Breakfast and Supper  Per patient, misses medication 0 times per week. Last few months very steady - missed a lot when she was sick    Lupus:  Current medications include: azathioprine 50 mg daily, hydroxychloroquine 200 mg every other day alternating with 400 mg every other day, Benlysta 200 mg subcutaneously weekly, and  Prednisone taper d/t flare, currently at 20 mg daily (10 mg next week).    From Rheumatology note 5/23/19:  Previously on methotrexate that was discontinued for unclear reasons but the patient reports that she tolerated it well. Currently on azathioprine 50 mg (dose reduced on 11/8/2018 from 100mg daily without worsening symptoms), hydroxychloroquine 300 mg daily [avg], and Benlysta (worsening symptoms when stopped in the past). No longer taking prednisone. Currently doing well.     - Continue azathioprine 50 mg daily  - Continue Evoxac 30 mg twice a day  - Continue hydroxychloroquine 300mg daily on average: 200mg daily with additional 200mg every other day (toxicity monitoring eye exam last done on 5/20/2019)  - Continue Benlysta SQ  - Labs in 3 months: CBC, CMP, ESR, CRP, CK, dsDNA, C3, C4, UA, Uprotein:creatinine  Denies side effects.     Sjogren's Syndrome:  Current medications include: Evoxac.  Cevimeline 30  mg twice daily (used to be on TID, can't take at bedtime, causes too much phlegm causes choking). Current dose is effective, denies side effects.     Chronic pain/fibromyalgia:  Current medications include: Volatren gel PRN (not taking, but somewhat helpful when she uses), stopped ibuprofen as this messed with her stomach, takes acetaminophen 650 mg PRN for headaches.  Patient reports the following side effects:  Denies Side Effects.    Hypertension: Current medications include losartan 25 mg daily, (see amlodipine below).  Patient does not self-monitor BP.  Patient reports no current medication side effects.    BP Readings from Last 3 Encounters:   05/23/19 123/84   05/08/19 112/77   04/10/19 128/80     Raynaud's:  Current medications include: amlodipine 5 mg daily.  Denies side effects. States this is effective, used to use PRN in the winter when symptoms are worse, however d/t htn, she has been instructed to take daily.      Sub-clinical Hypothyroidism: did not address    Hx Gestational Diabetes/Pre-Diabetes:  Pt currently taking nothing, last A1C was < 5.5.   Lab Results   Component Value Date    A1C 5.2 05/30/2019    A1C 5.3 10/05/2012     Anxiety:  Current medications include: CBD oil on tongue - started on own the last couple of days, wonders if this would be helpful/safe.  Seems to have helped slightly.  She had started buspirone, however didn't like how it made her feel so she discontinued this.  PHQ-9 SCORE 5/18/2015 2/5/2018 4/10/2019   PHQ-9 Total Score 3 - -   PHQ-9 Total Score - 4 7     RICHARD-7 SCORE 5/18/2015 2/5/2018 4/10/2019   Total Score 4 - -   Total Score - 1 7       Supplements: Currently taking vitmain D 4000 international unit(s) daily, probiotic powder in liquid daily (Nutridyn, chiropractor) has been on ~1 month, has helped with gaging seems to be helping calcium daily, Vitamin E 400 international unit(s) daily hair, nails, skin has been on x 1 month, has helped with nails, and is thinking  about starting a Vitamin B supplement. Denies issues at this time.   Vitamin D Deficiency Screening Results:  Lab Results   Component Value Date    VITDT 40 04/16/2019    VITDT 37 10/02/2017    VITDT 55 01/17/2013     Allergic rhinitis: Current medications include fluticasone nasal spray 1-2 spray(s) once daily PRN. Primary symptoms are nasal congestion and post-nasal drip. Pt feels that current therapy is effective.     GERD: Current medications include: Zantac (ranitidine) 300 twice daily. Pt c/o heartburn.   Had previously been on PPI therapy. Patient feels that current regimen is effective.    Today's Vitals: There were no vitals taken for this visit.-phone visit      ASSESSMENT:                             Current medications were reviewed today.     Medication Adherence: good, no issues identified    Lupus:  Improving, plan in place.    Sjogren's Syndrome:  Stable.    Chronic pain/fibromyalgia:  Stable.    Hypertension: Stable.    Raynaud's:  Stable.    Sub-clinical Hypothyroidism: did not address    Hx Gestational Diabetes/Pre-Diabetes:  Stable. A1C < 5.5.    Anxiety:  Needs improvement. Discussed medical marijuana as a preferred option over CBD oil.    Supplements: Needs improvement. Recommend stopping Vitamin E as there is concern that non-healthy people who take vitamin E in doses of 400 IU/day or more might have an increased risk of adverse outcomes and increased mortality from all causes    Allergic rhinitis: Stable.    GERD: Stable.    PLAN:                              1. [place] [facility] Locations:  [email or web address]    I am most familiar with [email or web address] and have heard good things:   [email or web address]  General inquiries, please email [email or web address].    General info about medical marijuana:  [email or web address]    2. Consider getting a Vitamin B  level checked to see if you need any supplementation.     3. I would recommend stopping Vitamin E.    I spent 45 minutes with this patient today. A copy of the visit note was provided to the patient's primary care provider.    Will follow up in 1 year or sooner if needed.    The patient was sent via [employer] a summary of these recommendations as an after visit summary.     Marilyn Benito, PharmD  Medication Therapy Management Pharmacist  [phone]

## 2019-07-18 LAB
ALBUMIN SERPL-MCNC: 4.3 G/DL (ref 3.4–5)
ALP SERPL-CCNC: 45 U/L (ref 40–150)
ALT SERPL W P-5'-P-CCNC: 23 U/L (ref 0–50)
ANION GAP SERPL CALCULATED.3IONS-SCNC: 5 MMOL/L (ref 3–14)
AST SERPL W P-5'-P-CCNC: 25 U/L (ref 0–45)
BILIRUB SERPL-MCNC: 0.3 MG/DL (ref 0.2–1.3)
BUN SERPL-MCNC: 12 MG/DL (ref 7–30)
CALCIUM SERPL-MCNC: 8.5 MG/DL (ref 8.5–10.1)
CHLORIDE SERPL-SCNC: 105 MMOL/L (ref 94–109)
CK SERPL-CCNC: 150 U/L (ref 30–225)
CO2 SERPL-SCNC: 28 MMOL/L (ref 20–32)
CREAT SERPL-MCNC: 0.66 MG/DL (ref 0.52–1.04)
CREAT UR-MCNC: 53 MG/DL
CRP SERPL-MCNC: <2.9 MG/L (ref 0–8)
GFR SERPL CREATININE-BSD FRML MDRD: >90 ML/MIN/{1.73_M2}
GLUCOSE SERPL-MCNC: 84 MG/DL (ref 70–99)
POTASSIUM SERPL-SCNC: 3.8 MMOL/L (ref 3.4–5.3)
PROT SERPL-MCNC: 7.1 G/DL (ref 6.8–8.8)
PROT UR-MCNC: 0.09 G/L
PROT/CREAT 24H UR: 0.17 G/G CR (ref 0–0.2)
SODIUM SERPL-SCNC: 138 MMOL/L (ref 133–144)

## 2019-07-19 ENCOUNTER — ALLIED HEALTH/NURSE VISIT (OUTPATIENT)
Dept: PHARMACY | Facility: CLINIC | Age: 44
End: 2019-07-19
Payer: COMMERCIAL

## 2019-07-19 DIAGNOSIS — J30.2 SEASONAL ALLERGIC RHINITIS: ICD-10-CM

## 2019-07-19 DIAGNOSIS — I73.00 RAYNAUD'S PHENOMENON WITHOUT GANGRENE: ICD-10-CM

## 2019-07-19 DIAGNOSIS — M32.9 SYSTEMIC LUPUS ERYTHEMATOSUS, UNSPECIFIED SLE TYPE, UNSPECIFIED ORGAN INVOLVEMENT STATUS (H): Primary | ICD-10-CM

## 2019-07-19 DIAGNOSIS — Z86.32 HISTORY OF GESTATIONAL DIABETES MELLITUS, NOT CURRENTLY PREGNANT: ICD-10-CM

## 2019-07-19 DIAGNOSIS — R12 HEARTBURN: ICD-10-CM

## 2019-07-19 DIAGNOSIS — M79.7 FIBROMYALGIA: ICD-10-CM

## 2019-07-19 DIAGNOSIS — Z78.9 TAKES DIETARY SUPPLEMENTS: ICD-10-CM

## 2019-07-19 DIAGNOSIS — F41.9 ANXIETY: ICD-10-CM

## 2019-07-19 DIAGNOSIS — I10 HYPERTENSION GOAL BP (BLOOD PRESSURE) < 140/90: ICD-10-CM

## 2019-07-19 DIAGNOSIS — M35.00 SJOGREN'S SYNDROME, WITH UNSPECIFIED ORGAN INVOLVEMENT (H): ICD-10-CM

## 2019-07-19 LAB
C3 SERPL-MCNC: 67 MG/DL (ref 76–169)
C4 SERPL-MCNC: 13 MG/DL (ref 15–50)
DSDNA AB SER-ACNC: <1 IU/ML

## 2019-07-19 PROCEDURE — 99607 MTMS BY PHARM ADDL 15 MIN: CPT | Performed by: PHARMACIST

## 2019-07-19 PROCEDURE — 99605 MTMS BY PHARM NP 15 MIN: CPT | Performed by: PHARMACIST

## 2019-07-19 RX ORDER — ACETAMINOPHEN 325 MG/1
325-650 TABLET ORAL EVERY 6 HOURS PRN
COMMUNITY
End: 2023-08-07

## 2019-07-19 RX ORDER — FAMOTIDINE 20 MG
4000 TABLET ORAL DAILY
COMMUNITY

## 2019-07-19 NOTE — PATIENT INSTRUCTIONS
Recommendations from today's MTM visit:                                                    MTM (medication therapy management) is a service provided by a clinical pharmacist designed to help you get the most of out of your medicines.   Today we reviewed what your medicines are for, how to know if they are working, that your medicines are safe and how to make your medicine regimen as easy as possible.       1. [place] [facility] Locations:  [email or web address]    I am most familiar with [facility] and have heard good things:   Https://[email or web address]/  General inquiries, please email [email or web address].    General info about medical marijuana:  [email or web address]    2. Consider getting a Vitamin B level checked to see if you need any supplementation.     3. I would recommend stopping Vitamin E.    Next MTM visit: 1 year or sooner if needed    To schedule another MTM appointment, please call the clinic directly or you may call the MTM scheduling line at [phone] or toll-free at [phone].     My Clinical Pharmacist's contact information:                                                      It was a pleasure talking with you today!  Please feel free to contact me with any questions or concerns you have.      Marilyn Benito, PharmD  Medication Therapy Management Pharmacist  [phone]    You may receive a survey about the MTM services you received by email and/or US Mail.  I would appreciate your feedback to help me serve you better in the future. Your comments will be anonymous.

## 2019-08-28 DIAGNOSIS — M32.9 SYSTEMIC LUPUS ERYTHEMATOSUS, UNSPECIFIED SLE TYPE, UNSPECIFIED ORGAN INVOLVEMENT STATUS (H): ICD-10-CM

## 2019-08-29 NOTE — TELEPHONE ENCOUNTER
Rheumatology team: Please call Ms. Enriquez.  She is on prednisone 5mg daily??    Bradford Colvin MD  8/29/2019 4:29 PM

## 2019-09-03 NOTE — TELEPHONE ENCOUNTER
Spoke with patient, she is wondering if she could stay on prednisone a little longer as she is still having some pain.  Qing Zapata [facility]  9/3/2019 9:45 AM

## 2019-09-04 RX ORDER — PREDNISONE 2.5 MG/1
2.5-5 TABLET ORAL DAILY
Qty: 60 TABLET | Refills: 1 | Status: SHIPPED | OUTPATIENT
Start: 2019-09-04 | End: 2020-04-10

## 2019-09-04 NOTE — TELEPHONE ENCOUNTER
Rheumatology team: Please call to notify Ms. Enriquez that prednisone was refilled for 2.5-5mg daily so that she can try to taper off.  Bradford Colvin MD  9/4/2019 4:25 PM

## 2019-09-05 NOTE — TELEPHONE ENCOUNTER
Contacted Pt, notified Ms. Enriquez that prednisone was refilled for 2.5-5mg daily so that she can try to taper off.  Pt had no questions or concerns, agrees and understands.    Grace Pardo CMA  9/5/2019  8:31 AM

## 2019-09-29 ENCOUNTER — HEALTH MAINTENANCE LETTER (OUTPATIENT)
Age: 44
End: 2019-09-29

## 2019-10-02 NOTE — PROGRESS NOTES
Rheumatology Clinic Visit      Yovana Enriquez MRN# [id number]   YOB: 1975 Age: 42 year old      Date of visit: 6/11/18   PCP: Bradford Mario    Chief Complaint   Patient presents with:  Systemic Lupus Erythematous: patient has been more tired and has pain and stiffness.      Assessment and Plan     1. Systemic lupus erythematosus (YANELIS by EIA positive in the past, leukopenia/lymphocytopenia, hypocomplementemia, polyarthralgias, oral sores, history of malar rash, photophobia, photosensitivity, Raynaud's Phenomenon): Previously on methotrexate that was discontinued for unclear reasons but the patient reports that she tolerated it well. Currently on azathioprine 150 mg (2.40mg/kg; calculated 11/6/2017), hydroxychloroquine 300 mg daily, prednisone 2.5-5 mg daily, and Benlysta (worsening symptoms when stopped in the past).  Currently doing well except for some fatigue; labs showed lower complement, lymphocytes, and hgb on recent labs.  Reduce AZA today.   - Reduce azathioprine  To 100 mg daily  - Continue Evoxac 30 mg twice a day  - Continue hydroxychloroquine 300mg daily (toxicity monitoring eye exam last done on 11/28/2016; advised her to update the eye exam)  - Prednisone 2.5-5mg daily (currently she is alternating between 5mg and 2.5mg)  - Continue Benlysta SQ  - Labs 1 week prior to the next rheumatology clinic visit: CBC, CMP, ESR, CRP, CK, C3, C4, dsDNA, UA, Uprotein:creatinine    2. Secondary Sjogren syndrome: Doing well with Evoxac. Dry eyes are not much of an issue currently. See #1.    3. Raynaud's Phenomenon: Amlodipine 5mg daily is effective.  - Continue amlodipine 5mg daily     4. Bilateral trochanteric bursitis: She receives steroid injections from the pain clinic.    5. Fibromyalgia: Managed by the pain clinic and PCP.    Ms. Enriquez verbalized agreement with and understanding of the rational for the diagnosis and treatment plan.  All questions were answered to best of my ability and the  patient's satisfaction. Ms. Enriquez was advised to contact the clinic with any questions that may arise after the clinic visit.      Thank you for involving me in the care of the patient    Return to clinic: 3 months      HPI   Yovana Enriquez is a 42 year old female with medical history significant for subclinical hyperthyroidism, hypertension, irritable bowel syndrome, fibromyalgia, hypertension, non-celiac gluten sensitivity (reportedly with bowel biopsies and laboratory workup that did not show celiac disease), anxiety, and systemic lupus erythematosus who presents for follow-up of SLE.    Today, Ms. Enriquez reports that she is doing okay except for some joint pains in her elbows, shoulders, knees, and feet.  Pain is worse and better with activity.  Morning stiffness for no more than 20 minutes.  The node phenomenon is doing well so she was going to stop using amlodipine during the warmer months but her blood pressure was elevated so her PCP advised that she continue it.  Tolerating other medications.  Fatigue; wakes up sometimes refreshed; would like to nap during the day.  No oral or nasal sore currently.  Photosensitive; has noticed it more this summer.    Denies fevers, chills, nausea, vomiting. No abdominal pain.  No chest pain/pressure, palpitations, or shortness of breath. No neck pain. No rash currently. No LE swelling.  She has photosensitivity and photophobia.   Sicca symptoms are well-controlled with Evoxac and frequent sips of water. No eye pain or redness. No history of serositis. No history of DVT, pulmonary embolism, or miscarriage.    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  PULM: No SOB, wheeze, cough.  GI:  See history of present illness. No blood in  stool. No abdominal pain, nausea, vomiting.  : No blood in urine.  MSK: See HPI.  NEURO: See history of present illness  EXT: No LE swelling    Active Problem List     Patient Active Problem List   Diagnosis     Systemic lupus erythematosus (H)     Menorrhagia     History of ovarian cyst     Dysmenorrhea     History of gestational diabetes mellitus, not currently pregnant     Intramural leiomyoma of uterus     Hyperlipidemia LDL goal <130     Subclinical hyperthyroidism     Anxiety     High risk medication use     Fibromyalgia     Chronic constipation     Irritable bowel syndrome     Health Care Home     Hypertension goal BP (blood pressure) < 140/90     Non-celiac gluten sensitivity     Raynaud's phenomenon without gangrene     Sjogren's syndrome (H)     Intramural and submucous leiomyoma of uterus     Past Medical History     Past Medical History:   Diagnosis Date     Chronic constipation 12/16/2013     Dysmenorrhea 10/1/2012     Fibromyalgia 11/15/2013     Health Care Home 3/19/2014    **See Letters for [facility] Care Plan: Emergency Care Plan       High risk medication use 9/13/2013     History of gestational diabetes mellitus, not currently pregnant 10/1/2012     History of ovarian cyst 10/1/2012     Hyperlipidemia LDL goal <130 10/18/2012     Hypertension goal BP (blood pressure) < 140/90 1/19/2015     Intramural leiomyoma of uterus 10/5/2012     Irritable bowel syndrome 3/11/2014    Patient states no history colonoscopy       Menorrhagia 10/1/2012     Non-celiac gluten sensitivity 2/8/2016     PONV (postoperative nausea and vomiting)      Subclinical hyperthyroidism 1/17/2013     Systemic lupus erythematosus (H) 4/23/2012     Past Surgical History     Past Surgical History:   Procedure Laterality Date     COLONOSCOPY N/A 2/20/2015    Procedure: COMBINED COLONOSCOPY, SINGLE OR MULTIPLE BIOPSY/POLYPECTOMY BY BIOPSY;  Surgeon: Fred Cullen MD;  Location: [facility] OR     COLONOSCOPY WITH CO2 INSUFFLATION N/A  2/20/2015    Procedure: COLONOSCOPY WITH CO2 INSUFFLATION;  Surgeon: Fred Cullen MD;  Location: [facility] OR     ENT SURGERY  10-24-14    Bottom lip biopsies     ESOPHAGOSCOPY, GASTROSCOPY, DUODENOSCOPY (EGD), COMBINED N/A 2/20/2015    Procedure: COMBINED ESOPHAGOSCOPY, GASTROSCOPY, DUODENOSCOPY (EGD), BIOPSY SINGLE OR MULTIPLE;  Surgeon: Fred Cullen MD;  Location: [facility] OR     HC BREATH HYDROGEN TEST  12/31/2013    Procedure: HYDROGEN BREATH TEST;  Surgeon: Camden Almazan MD;  Location: [facility]     HC HYSTEROSCOPY, SURGICAL; W/ ENDOMETRIAL ABLATION, ANY METHOD  10-19-12     SHOULDER SURGERY Right     R shoulder     TUBAL LIGATION  2004     Allergy   No Known Allergies  Current Medication List     Current Outpatient Prescriptions   Medication Sig     albuterol (PROAIR HFA/PROVENTIL HFA/VENTOLIN HFA) 108 (90 BASE) MCG/ACT Inhaler Inhale 2 puffs into the lungs every 6 hours as needed for shortness of breath / dyspnea or wheezing     amLODIPine (NORVASC) 5 MG tablet Take 2 tablets (10 mg) by mouth daily     azaTHIOprine (IMURAN) 50 MG tablet Take 3 tablets (150 mg) by mouth daily     Belimumab 200 MG/ML SOAJ Inject 200 mg Subcutaneous every 7 days . Hold for signs of infection and seek medical attention. Autoinjector     buPROPion (WELLBUTRIN) 75 MG tablet TAKE 1 TABLET BY MOUTH 2 TIMES DAILY     Calcium Carb-Cholecalciferol (CALCIUM + D3) 600-200 MG-UNIT TABS 1 tablet daily     cevimeline (EVOXAC) 30 MG capsule Take 1 capsule (30 mg) by mouth 2 times daily     diclofenac (VOLTAREN) 1 % GEL Apply 2-4 grams to knees or shoulders four times daily as needed using enclosed dosing card.     fluticasone (FLONASE) 50 MCG/ACT nasal spray Spray 1-2 sprays into both nostrils daily     hydroxychloroquine (PLAQUENIL) 200 MG tablet Hydroxychloroquine 200mg in the morning and 100mg in the evening.     losartan (COZAAR) 25 MG tablet TAKE 1 TABLET BY MOUTH DAILY     Multiple Vitamin (MULTIVITAMIN OR) Take  by  "mouth.     nortriptyline (PAMELOR) 10 MG capsule TAKE 3 CAPSULES BY MOUTH AT BEDTIME     predniSONE (DELTASONE) 2.5 MG tablet Take 1-2 tablets (2.5-5 mg) by mouth daily     pregabalin (LYRICA) 150 MG capsule Take 1 capsule (150 mg) by mouth 3 times daily . 3 month supply.     ranitidine (ZANTAC) 300 MG tablet Take 1 tablet (300 mg) by mouth 2 times daily     albuterol (PROAIR HFA/PROVENTIL HFA/VENTOLIN HFA) 108 (90 BASE) MCG/ACT Inhaler Inhale 2 puffs into the lungs every 4 hours as needed for shortness of breath / dyspnea or wheezing Use with spacer (Patient not taking: Reported on 6/11/2018)     blood glucose test strip Used for testing glucose 2-3 times daily     MICROLET LANCETS MISC 1 each daily.     Spacer/Aero-Holding Chambers (SPACER/AERO-HOLD CHAMBER MASK) EDITH 1 Adult size spacer to use with [facility] inhalers.     No current facility-administered medications for this visit.          Social History   See HPI    Family History     Family History   Problem Relation Age of Onset     Respiratory Maternal Grandfather      CANCER Maternal Grandfather      Asthma Son      Circulatory Maternal Grandmother      Brain anurysm     Hypertension Mother      Glaucoma Mother 50     drops     Hypertension Sister      Hypertension Sister      DIABETES No family hx of      CEREBROVASCULAR DISEASE No family hx of      Thyroid Disease No family hx of      Macular Degeneration No family hx of        Physical Exam     Temp Readings from Last 3 Encounters:   04/05/18 98.1  F (36.7  C) (Oral)   02/02/18 97.4  F (36.3  C) (Oral)   01/28/18 97.8  F (36.6  C) (Tympanic)     BP Readings from Last 5 Encounters:   06/11/18 123/81   04/05/18 127/82   02/05/18 124/84   02/05/18 140/80   02/02/18 113/79     Pulse Readings from Last 1 Encounters:   06/11/18 75     Resp Readings from Last 1 Encounters:   06/11/18 14     Estimated body mass index is 22.3 kg/(m^2) as calculated from the following:    Height as of this encounter: 1.651 m (5' 5\").    " Weight as of this encounter: 60.8 kg (134 lb).    GEN: NAD  HEENT: MMM.  No oral lesion.  Anicteric, noninjected sclera  CV: S1, S2. RRR. No m/r/g.  PULM: CTA bilaterally. No w/c.  MSK:  Hands, wrists, elbows, shoulders, knees, ankles, and MTPs without swelling or tenderness to palpation. Bilateral hips tender to direct palpation.    NEURO: UE and LE strengths 5/5 and equal bilaterally.   SKIN: No rash. No evidence of current or past ischemic insults to the distal fingertips by visual inspection.   EXT: No LE edema  PSYCH: Alert. Appropriate.    Labs / Imaging (select studies)   RF/CCP  Recent Labs   Lab Test  09/13/13   1504  02/13/12   1404   CCPABY  <20 Interpretation:  Negative  <20 Interpretation:  Negative   RHF  12   --      YANELIS  Recent Labs   Lab Test  07/25/16   1433   ANAIGG  <1:40  Reference range: <1:40  (Note)  <1:40  INTERPRETIVE INFORMATION: YANELIS by IFA, IgG  Anti-nuclear antibodies (YANELIS) are seen in a variety of  systemic rheumatic diseases and are determined by indirect  fluorescence assay (IFA) using HEp-2 substrate with an  IgG-specific conjugate. YANELIS titers less than or equal to  1:80 have variable relevance while titers greater than or  equal to 1:160 are considered clinically significant. These  antibodies may precede clinical disease onset; however,  healthy individuals and those with advanced age have been  reported to be positive for YANELIS. When observed, one of the  five basic patterns is reported: homogeneous,  peripheral/rim, speckled, centromere, or nucleolar. If  cytoplasmic fluorescence is observed, it is noted. IFA  methodology is subjective and has occasionally been shown  to lack sensitivity for anti-SSA/Ro antibodies.  Negative results do not necessarily rule out the presence  of SSc. If clinical suspicion remains, consider further  te sting for U3-RNP, PM/Scl, or Th/To antibodies associated  with SSc.  Performed by [facility]  [address] [place] [id number]  [phone]  [email or web address], Alcon Krishna MD, Lab. Director       RNP/Sm/SSA/SSB  Recent Labs   Lab Test  03/23/16 1759 01/04/16   1806  07/01/14   1211  09/13/13   1504  02/13/12   1404   RNPIGG   --    --   <0.2  Negative     --    --    SMIGG  <0.2  Negative   Antibody index (AI) values reflect qualitative changes in antibody   concentration that cannot be directly associated with clinical condition or   disease state.    <0.2  Negative   Antibody index (AI) values reflect qualitative changes in antibody   concentration that cannot be directly associated with clinical condition or   disease state.    <0.2  Negative     --    --    ENASM   --    --    --   0  0   SSAIGG   --    --   <0.2  Negative     --    --    ENASSA   --    --    --   3  12   SSBIGG   --    --   <0.2  Negative     --    --    ENASSB   --    --    --   0  0   ENARMP   --    --    --   0  0   SCLIGG   --    --   <0.2  Negative     --    --      dsDNA  Recent Labs   Lab Test  06/04/18 1758 01/30/18   1752  10/02/17   1814  07/03/17   1447  04/05/17   1820  12/12/16   1827 09/20/16   1518  07/25/16   1433  03/23/16   1759   DNA  1  <1  <1  <1  Negative    <1  Negative    <1  Negative    <1  Negative    <1  Negative    <1  Negative       C3/C4  Recent Labs   Lab Test  06/04/18 1758 01/30/18   1752  10/02/17   1814  07/03/17   1447  04/05/17   1820  12/12/16   1827 09/20/16   1518  07/25/16   1433  03/23/16   1759   [id number]  60*  86  75*  68*  58*  69*  84  80  82   [id number]  13*  21  15  15  10*  12*  14*  12*  13*     Histone Antibody  Recent Labs   Lab Test  02/13/12   1404   HSTO  0.4     Antiphospholipid Antibodies  Recent Labs   Lab Test  09/13/13   1504  02/13/12   1404   CARG  <15.0 Interpretation:  Negative  <15.0 Interpretation:  Negative   SANCHO  <12.5 Interpretation:  Negative  <12.5 Interpretation:  Negative   LUPINT   --   Negative (Note) COMMENTS: INR is normal. APTT is normal.  1:2 Mix is not indicated. DRVVT Screen  is normal. Thrombin time is normal. NEGATIVE TEST; A LUPUS ANTICOAGULANT WAS NOT DETECTED IN THIS SPECIMEN WITHIN THE LIMITS OF THE TESTING REPERTOIRE. If the clinical picture is strongly suggestive of an antiphospholipid syndrome, recommend anticardiolipin and beta-2-glycoprotein (IgG and IgM) antibody tests. Hyun Denis M.D.  [phone] 2-.  NINR =  1.01 N = 0.86-1.14  (No additional charge) NTT = 16.0 N = 13.0-19.0 sec  (No additional charge)  APTT'S:    Seconds Reagent =  Stago LS Normal  =  36 Patient  =  41 1:2 Mix  =  N/A Reference =  31-43   DILUTE VANNESSA VIPER VENOM TEST: DRVVT Screen Ratio = 0.80 Normal = <1.28      IgG  Recent Labs   Lab Test  01/30/15   1156  04/02/14   1539   IGG   --   897   IGA  109  107   IGM   --   361*     CBC  Recent Labs   Lab Test  06/04/18   1758  01/30/18   1752  10/02/17   1814   WBC  8.6  5.7  6.6   RBC  3.26*  3.73*  3.72*   HGB  11.6*  13.0  13.0   HCT  33.5*  37.9  37.8   MCV  103*  102*  102*   RDW  12.8  12.1  11.8   PLT  252  281  239   MCH  35.6*  34.9*  34.9*   MCHC  34.6  34.3  34.4   NEUTROPHIL  88.5  80.2  78.5   LYMPH  5.8  13.0  13.4   MONOCYTE  5.3  5.4  7.3   EOSINOPHIL  0.2  0.9  0.3   BASOPHIL  0.2  0.5  0.5   ANEU  7.6  4.6  5.2   ALYM  0.5*  0.7*  0.9   ZOË  0.5  0.3  0.5   AEOS  0.0  0.1  0.0   ABAS  0.0  0.0  0.0     CMP  Recent Labs   Lab Test  06/04/18   1758  01/30/18   1752  10/02/17   1814  07/03/17   1447  04/05/17   1820  12/12/16   1827   NA  139  138  138  141  140  140   POTASSIUM  4.2  3.5  3.7  4.0  3.9  3.8   CHLORIDE  105  100  103  108  106  104   CO2  28  30  27  26  27  31   ANIONGAP  6  8  8  7  7  5   GLC  111*  91  112*  106*  94  98   BUN  14  9  10  8  9  12   CR  0.72  0.70  0.88  0.85  0.81  0.81   GFRESTIMATED  87  >90  70  73  77  77   GFRESTBLACK  >90  >90  84  88  >90   GFR Calc    >90   GFR Calc     MELANIA  8.1*  8.7  8.7  8.3*  8.3*  8.7   BILITOTAL  0.3  0.4  0.2  0.3  0.2   0.4   ALBUMIN  3.5  3.8  3.8  3.6  3.7  3.8   PROTTOTAL  6.0*  7.0  6.8  6.5*  6.3*  6.5*   ALKPHOS  48  67  60  53  49  61   AST  22  26  22  20  20  18   ALT  21  25  21  26  25  21     HgA1c  Recent Labs   Lab Test  10/05/12   0824   A1C  5.3     Calcium/VitaminD  Recent Labs   Lab Test  06/04/18   1758  01/30/18   1752  10/02/17   1814 01/17/13   1039   MELANIA  8.1*  8.7  8.7   < >  9.4   VITDT   --    --   37   --   55    < > = values in this interval not displayed.     ESR/CRP  Recent Labs   Lab Test  06/04/18   1758  01/30/18   1752  10/02/17   1814   SED  7  9  4   CRP  <2.9  <2.9  <2.9     CK/Aldolase  Recent Labs   Lab Test  06/04/18   1758  01/30/18   1752  10/02/17   1814   CKT  91  168  134     TSH/T4  Recent Labs   Lab Test  10/18/17   1445  10/07/16   0714  02/27/15   1058  01/30/15   1156   TSH  0.33*  0.37*   --   0.45   T4  1.24  1.16  1.01   --      Hepatitis B  Recent Labs   Lab Test  03/29/16   1417  02/13/14   1835  05/22/12   1633   HEPBANG  Nonreactive  Negative  Negative     Hepatitis C  Recent Labs   Lab Test  03/29/16   1417  02/13/14   1835  05/22/12   1633   HCVAB  Nonreactive   Assay performance characteristics have not been established for newborns,   infants, and children    Negative  Negative     Lyme ab screening  Recent Labs   Lab Test  05/30/15   1355   LYMEGM  0.55     Tuberculosis Screening  Recent Labs   Lab Test  07/03/17   1447  03/29/16   1417  02/13/14   1835   TBRSLT  Negative  Negative  Negative   TBAGN  0.00  0.00  0.00     UA  Recent Labs   Lab Test  06/04/18   1805  01/30/18   1753  01/03/18   1816  11/03/17   1115  10/02/17   1817   04/05/17   1825   09/20/16   1524   03/23/16   1807   COLOR  Yellow  Yellow  Yellow  Yellow  Yellow   < >  Yellow   < >  Yellow   < >  Yellow   APPEARANCE  Clear  Clear  Slightly Cloudy  Clear  Clear   < >  Clear   < >  Clear   < >  Clear   URINEGLC  Negative  Negative  Negative  Negative  Negative   < >  Negative   < >  Negative   < >   Negative   URINEBILI  Negative  Negative  Negative  Negative  Negative   < >  Negative   < >  Negative   < >  Negative   SG  >1.030  1.015  1.020  1.010  1.010   < >  1.025   < >  >1.030   < >  >1.030   URINEPH  7.0  7.0  7.0  7.5*  6.0   < >  7.0   < >  6.0   < >  6.0   PROTEIN  30*  Negative  Negative  Negative  Negative   < >  Negative   < >  Negative   < >  Negative   UROBILINOGEN  1.0  0.2  1.0  0.2  0.2   < >  1.0   < >  0.2   < >  0.2   NITRITE  Negative  Negative  Negative  Negative  Negative   < >  Negative   < >  Negative   < >  Negative   UBLD  Negative  Negative  Small*  Small*  Negative   < >  Negative   < >  Negative   < >  Negative   LEUKEST  Negative  Negative  Small*  Moderate*  Negative   < >  Negative   < >  Negative   < >  Negative   WBCU  0 - 5  O - 2  25-50*  5-10*  O - 2   < >  O - 2   < >  O - 2   < >  O - 2   RBCU  O - 2  O - 2  5-10*  O - 2  O - 2   < >  O - 2   < >  O - 2   < >  O - 2   SQUAMOUSEPI  Few   --   Few   --   Few   < >  Few   < >   --    --   Few   BACTERIA  Few*   --   Moderate*  Few*   --    --    --    < >   --    --    --    MUCOUS   --    --    --    --    --    --   Present*   --   Present*   --   Present*    < > = values in this interval not displayed.     Urine Microscopic  Recent Labs   Lab Test  06/04/18   1805  01/30/18   1753  01/03/18   1816  11/03/17   1115  10/02/17   1817   04/05/17   1825   09/20/16   1524   03/23/16   1807   WBCU  0 - 5  O - 2  25-50*  5-10*  O - 2   < >  O - 2   < >  O - 2   < >  O - 2   RBCU  O - 2  O - 2  5-10*  O - 2  O - 2   < >  O - 2   < >  O - 2   < >  O - 2   SQUAMOUSEPI  Few   --   Few   --   Few   < >  Few   < >   --    --   Few   BACTERIA  Few*   --   Moderate*  Few*   --    --    --    < >   --    --    --    MUCOUS   --    --    --    --    --    --   Present*   --   Present*   --   Present*    < > = values in this interval not displayed.     Urine Protein  Recent Labs   Lab Test  06/04/18   1805  01/30/18   1753  10/02/17   181    UTP  0.16  0.06  0.07   UTPG  0.10  0.13  0.14   UCRR  155  44  48     Immunization History     Immunization History   Administered Date(s) Administered     Influenza (IIV3) PF 10/01/2012, 09/23/2013     Influenza Vaccine IM 3yrs+ 4 Valent IIV4 09/20/2016, 10/30/2017     Pneumo Conj 13-V (2010&after) 04/25/2016     Pneumococcal 23 valent 10/01/2012, 11/06/2017     Tdap (Adacel,Boostrix) 12/17/2010          Chart documentation done in part with Dragon Voice recognition Software. Although reviewed after completion, some word and grammatical error may remain.    Bradford Colvin MD   75

## 2019-10-08 ENCOUNTER — MYC MEDICAL ADVICE (OUTPATIENT)
Dept: RHEUMATOLOGY | Facility: CLINIC | Age: 44
End: 2019-10-08

## 2019-10-09 DIAGNOSIS — M32.9 SYSTEMIC LUPUS ERYTHEMATOSUS, UNSPECIFIED SLE TYPE, UNSPECIFIED ORGAN INVOLVEMENT STATUS (H): Primary | ICD-10-CM

## 2019-10-09 NOTE — TELEPHONE ENCOUNTER
[employer] message sent to patient that we would like her to get her labs done prior to follow up appointment.  Qing Zapata CMA Rheumatology  10/9/2019 1:00 PM

## 2019-10-14 ENCOUNTER — OFFICE VISIT (OUTPATIENT)
Dept: RHEUMATOLOGY | Facility: CLINIC | Age: 44
End: 2019-10-14
Payer: COMMERCIAL

## 2019-10-14 VITALS
HEART RATE: 77 BPM | HEIGHT: 66 IN | WEIGHT: 148.2 LBS | OXYGEN SATURATION: 98 % | SYSTOLIC BLOOD PRESSURE: 124 MMHG | BODY MASS INDEX: 23.82 KG/M2 | DIASTOLIC BLOOD PRESSURE: 82 MMHG

## 2019-10-14 DIAGNOSIS — R53.83 FATIGUE, UNSPECIFIED TYPE: Primary | ICD-10-CM

## 2019-10-14 DIAGNOSIS — M32.19 OTHER SYSTEMIC LUPUS ERYTHEMATOSUS WITH OTHER ORGAN INVOLVEMENT (H): ICD-10-CM

## 2019-10-14 DIAGNOSIS — Z23 NEED FOR PROPHYLACTIC VACCINATION AND INOCULATION AGAINST INFLUENZA: ICD-10-CM

## 2019-10-14 PROCEDURE — 90686 IIV4 VACC NO PRSV 0.5 ML IM: CPT | Performed by: INTERNAL MEDICINE

## 2019-10-14 PROCEDURE — 99214 OFFICE O/P EST MOD 30 MIN: CPT | Mod: 25 | Performed by: INTERNAL MEDICINE

## 2019-10-14 PROCEDURE — 90471 IMMUNIZATION ADMIN: CPT | Performed by: INTERNAL MEDICINE

## 2019-10-14 RX ORDER — AZATHIOPRINE 75 MG/1
75 TABLET ORAL DAILY
Qty: 90 TABLET | Refills: 1 | Status: SHIPPED | OUTPATIENT
Start: 2019-10-14 | End: 2019-10-23

## 2019-10-14 RX ORDER — HYDROXYCHLOROQUINE SULFATE 200 MG/1
TABLET, FILM COATED ORAL
Qty: 135 TABLET | Refills: 1 | Status: SHIPPED | OUTPATIENT
Start: 2019-10-14 | End: 2020-04-02

## 2019-10-14 ASSESSMENT — MIFFLIN-ST. JEOR: SCORE: 1328.98

## 2019-10-14 NOTE — PATIENT INSTRUCTIONS
Rheumatology    Dr. Bradford Colvin         [facility]   (Monday)  [address]  [place] [address]       [facility]   (Tuesday)  [address] [place] [address]    [facility]   (Wed., Thurs., and Friday)  [address] [place] [address]    Phone number: [phone]  Thank you for choosing [facility].  MILY Todd RMA

## 2019-10-14 NOTE — PROGRESS NOTES
Rheumatology Clinic Visit      Yovana Enriquez MRN# [id number]   YOB: 1975 Age: 44 year old      Date of visit: 10/14/19   PCP: Plans to establish with Dr. Ortega in a couple weeks    Chief Complaint   Patient presents with:  Systemic Lupus Erythematous: been off prednisone for 1 week, has been very tired.    Assessment and Plan     1. Systemic lupus erythematosus (YANELIS by EIA positive in the past, leukopenia/lymphocytopenia, hypocomplementemia, polyarthralgias, oral sores, history of malar rash, photophobia, photosensitivity, Raynaud's Phenomenon): Previously on methotrexate that was discontinued for unclear reasons but the patient reports that she tolerated it well. Currently on azathioprine 50 mg (dose reduced on 11/8/2018 from 100mg daily without worsening symptoms at that time), hydroxychloroquine 300 mg daily [avg], and Benlysta (worsening symptoms when stopped in the past).  Recently required a taper of prednisone that was effective for her arthritis; given that she is requiring prednisone will increase azathioprine from 50 mg daily to 75 mg daily.     - Increase azathioprine from 50 mg daily to 75 mg daily   - Continue Evoxac 30 mg twice a day  - Continue hydroxychloroquine 300mg daily on average: 200mg daily with additional 200mg every other day (toxicity monitoring eye exam last done on 5/20/2019)  - Continue Benlysta 200 mg SQ every 7 days  - Labs within the next weeks: CBC, CMP, ESR, CRP, dsDNA, C3, C4, UA, Uprotein:creatinine  - Labs in 3 months: CBC, CMP, ESR, CRP, CK, dsDNA, C3, C4, UA, Uprotein:creatinine    2. Secondary Sjogren syndrome: Doing well with Evoxac and frequent sips of water. Dry eyes are not much of an issue currently. See #1.    3. Raynaud's Phenomenon: Amlodipine 5mg daily is effective.  - Continue amlodipine 5mg daily     4. Bilateral trochanteric bursitis: She receives steroid injections from the pain clinic.    5. Fibromyalgia: Managed by the pain clinic and  PCP.    6. Fatigue: no symptoms of obstructive sleep apnea.  May benefit from sleep psychology eval.  Suspect that nocturia is playing a big role, urinating 3 times at night.  She is to follow with her PCP for this issue.  Also note history of prednisone use so we will check a random cortisol level.  - 8 AM lab within the next 1 week: Cortisol    7.  Vaccinations: Vaccinations reviewed with Ms. Enriquez.  Risks and benefits of vaccinations were discussed.   CDC stance on shingrix when on moderate to high immunosuppression was reviewed.   I explained that Shingrix is used off label when under 50 years old and that the safety and efficacy of the vaccine has not been tested in people younger than 50 years old.   - Influenza: will receive today  - [id number]: up to date  - [id number]: up to date  - Shingrix: Advised receiving; patient is going to check on insurance coverage before determining if it is going to be received    8.  Neck pain: Worse in the last 2 weeks.  She attributes this to her pillow and potentially how she is sleeping at night.  Cervical spine is nontender to palpation.  Range of motion intact.  Advised physical therapy; she is going to discuss with her pain management provider next Wednesday when she has an appointment.    Ms. Enriquez verbalized agreement with and understanding of the rational for the diagnosis and treatment plan.  All questions were answered to best of my ability and the patient's satisfaction. Ms. Enriquez was advised to contact the clinic with any questions that may arise after the clinic visit.      Thank you for involving me in the care of the patient    Return to clinic: 3-4 months      HPI   Yovana Enriquez is a 44 year old female with medical history significant for subclinical hyperthyroidism, hypertension, irritable bowel syndrome, fibromyalgia, hypertension, non-celiac gluten sensitivity (reportedly with bowel biopsies and laboratory workup that did not show celiac  disease), anxiety, and systemic lupus erythematosus who presents for follow-up of SLE.    Today, Ms. Enriquez reports she is doing okay.  In July she required a prednisone taper; after stopping prednisone her joints are doing fine but she felt more tired.  She has been off of prednisone for approximately 1 week.  Still waking up several times at night to urinate; she also reports that her  has CPAP that likely is also keeping her up.  Continues to have photosensitivity is worse in the summer so she covers up and uses sunscreen.  No joint pain at this time.  Morning stiffness for no more than 30 minutes.  Following in the pain clinic.  Mild neck pain that does not radiate to her arms.    Denies fevers, chills, nausea, vomiting. No abdominal pain.  No chest pain/pressure, palpitations, or shortness of breath.  No rash currently. No LE swelling.  She has photosensitivity and photophobia.   Sicca symptoms are well-controlled with Evoxac and frequent sips of water. No eye pain or redness. No history of serositis. No history of DVT, pulmonary embolism, or miscarriage.    Raynaud's is controlled per patient.     Does not wake up gasping for air.    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  PULM: No SOB, wheeze, cough.  GI:  See history of present illness. No blood in stool. No abdominal pain, nausea, vomiting.  : No blood in urine.  MSK: See HPI.  NEURO: See history of present illness  EXT: No LE swelling  PSYCH: Negative    Active Problem List     Patient Active Problem List   Diagnosis     Systemic lupus erythematosus (H)     Menorrhagia     History of ovarian cyst     Dysmenorrhea     History of gestational diabetes mellitus, not currently pregnant     Intramural leiomyoma  of uterus     Hyperlipidemia LDL goal <130     Subclinical hyperthyroidism     Anxiety     High risk medication use     Fibromyalgia     Chronic constipation     Irritable bowel syndrome     Health Care Home     Hypertension goal BP (blood pressure) < 140/90     Non-celiac gluten sensitivity     Raynaud's phenomenon without gangrene     Sjogren's syndrome (H)     Intramural and submucous leiomyoma of uterus     Past Medical History     Past Medical History:   Diagnosis Date     Arthritis      Chronic constipation 12/16/2013     Dysmenorrhea 10/1/2012     Fibromyalgia 11/15/2013     Health Care Home 3/19/2014    **See Letters for [facility] Care Plan: Emergency Care Plan       High risk medication use 9/13/2013     History of gestational diabetes mellitus, not currently pregnant 10/1/2012     History of ovarian cyst 10/1/2012     Hyperlipidemia LDL goal <130 10/18/2012     Hypertension goal BP (blood pressure) < 140/90 1/19/2015     Intramural leiomyoma of uterus 10/5/2012     Irritable bowel syndrome 3/11/2014    Patient states no history colonoscopy       Menorrhagia 10/1/2012     Non-celiac gluten sensitivity 2/8/2016     PONV (postoperative nausea and vomiting)      Subclinical hyperthyroidism 1/17/2013     Systemic lupus erythematosus (H) 4/23/2012     Past Surgical History     Past Surgical History:   Procedure Laterality Date     COLONOSCOPY N/A 2/20/2015    Procedure: COMBINED COLONOSCOPY, SINGLE OR MULTIPLE BIOPSY/POLYPECTOMY BY BIOPSY;  Surgeon: Fred Cullen MD;  Location: [facility] OR     COLONOSCOPY N/A 10/29/2018    Procedure: COMBINED COLONOSCOPY, SINGLE OR MULTIPLE BIOPSY/POLYPECTOMY BY BIOPSY;  Surgeon: Inez Sawyer MD;  Location: [facility]     COLONOSCOPY WITH CO2 INSUFFLATION N/A 2/20/2015    Procedure: COLONOSCOPY WITH CO2 INSUFFLATION;  Surgeon: Fred Cullen MD;  Location: [facility] OR     ENT SURGERY  10-24-14    Bottom lip biopsies     ESOPHAGOSCOPY, GASTROSCOPY, DUODENOSCOPY (EGD),  COMBINED N/A 2/20/2015    Procedure: COMBINED ESOPHAGOSCOPY, GASTROSCOPY, DUODENOSCOPY (EGD), BIOPSY SINGLE OR MULTIPLE;  Surgeon: Fred Cullen MD;  Location: [facility] OR     HC BREATH HYDROGEN TEST  12/31/2013    Procedure: HYDROGEN BREATH TEST;  Surgeon: Camden Almazan MD;  Location: [facility]     HC HYSTEROSCOPY, SURGICAL; W/ ENDOMETRIAL ABLATION, ANY METHOD  10-19-12     SHOULDER SURGERY Right     R shoulder     TUBAL LIGATION  2004     Allergy   No Known Allergies  Current Medication List     Current Outpatient Medications   Medication Sig     acetaminophen (TYLENOL) 325 MG tablet Take 325-650 mg by mouth every 6 hours as needed for mild pain or headaches Maximum daily dose of acetaminophen is 3000 mg in 24 hours.     amLODIPine (NORVASC) 5 MG tablet Take 1 tablet (5 mg) by mouth daily     azaTHIOprine (IMURAN) 50 MG tablet Take 1 tablet (50 mg) by mouth daily     Belimumab 200 MG/ML SOAJ Inject 200 mg Subcutaneous every 7 days . Hold for signs of infection and seek medical attention. Autoinjector     Calcium Carb-Cholecalciferol (CALCIUM + D3) 600-200 MG-UNIT TABS Take 1 tablet by mouth daily      cevimeline (EVOXAC) 30 MG capsule Take 1 capsule (30 mg) by mouth 2 times daily     hydroxychloroquine (PLAQUENIL) 200 MG tablet Hydroxychloroquine 200mg daily; and an additional 200mg every other day.     losartan (COZAAR) 25 MG tablet Take 1 tablet (25 mg) by mouth daily Due for follow up appointment with Bradford Mario in February 2019 for further refills.     ranitidine (ZANTAC) 300 MG tablet TAKE 1 TABLET (300 MG) BY MOUTH 2 TIMES DAILY.     Vitamin D, Cholecalciferol, 1000 units CAPS Take 4,000 Int'l Units by mouth daily     albuterol (PROAIR HFA/PROVENTIL HFA/VENTOLIN HFA) 108 (90 BASE) MCG/ACT Inhaler Inhale 2 puffs into the lungs every 4 hours as needed for shortness of breath / dyspnea or wheezing Use with spacer (Patient not taking: Reported on 7/19/2019)     blood glucose test strip Used for testing  "glucose 2-3 times daily (Patient not taking: Reported on 7/19/2019)     diclofenac (VOLTAREN) 1 % GEL Apply 2-4 grams to knees or shoulders four times daily as needed using enclosed dosing card. (Patient not taking: Reported on 7/19/2019)     fluticasone (FLONASE) 50 MCG/ACT nasal spray Spray 1-2 sprays into both nostrils daily (Patient not taking: Reported on 10/14/2019)     MICROLET LANCETS MISC 1 each daily. (Patient not taking: Reported on 7/19/2019)     predniSONE (DELTASONE) 2.5 MG tablet Take 1-2 tablets (2.5-5 mg) by mouth daily (Patient not taking: Reported on 10/14/2019)     Spacer/Aero-Holding Chambers (SPACER/AERO-HOLD CHAMBER MASK) EDITH 1 Adult size spacer to use with [facility]I inhalers. (Patient not taking: Reported on 7/19/2019)     No current facility-administered medications for this visit.          Social History   See HPI    Family History     Family History   Problem Relation Age of Onset     Respiratory Maternal Grandfather      Cancer Maternal Grandfather      Asthma Son      Circulatory Maternal Grandmother         Brain anurysm     Hypertension Mother      Glaucoma Mother 50        drops     Hypertension Sister      Hypertension Sister      Diabetes No family hx of      Cerebrovascular Disease No family hx of      Thyroid Disease No family hx of      Macular Degeneration No family hx of        Physical Exam     Temp Readings from Last 3 Encounters:   04/10/19 98.7  F (37.1  C) (Oral)   10/29/18 98.9  F (37.2  C) (Temporal)   08/15/18 98.5  F (36.9  C) (Oral)     BP Readings from Last 5 Encounters:   10/14/19 124/82   05/23/19 123/84   05/08/19 112/77   04/10/19 128/80   10/29/18 (P) 128/81     Pulse Readings from Last 1 Encounters:   10/14/19 77     Resp Readings from Last 1 Encounters:   10/29/18 (P) 16     Estimated body mass index is 23.92 kg/m  as calculated from the following:    Height as of this encounter: 1.676 m (5' 6\").    Weight as of this encounter: 67.2 kg (148 lb 3.2 oz).    GEN: " NAD  HEENT: MMM.  No oral lesion.  Anicteric, noninjected sclera  CV: S1, S2. RRR. No m/r/g.  PULM: CTA bilaterally. No w/c.  MSK: MCPs, PIPs, DIPs, wrists, elbows, shoulders, knees, ankles, and MTPs without swelling or tenderness to palpation.  Both hips tender to direct palpation.    NEURO: UE and LE strengths 5/5 and equal bilaterally.   SKIN: No rash. No evidence of current or past ischemic insults to the fingers by visual inspection.   EXT: No LE edema  PSYCH: Alert. Appropriate.    Labs / Imaging (select studies)   RF/CCP  Recent Labs   Lab Test 09/13/13  1504 02/13/12  1404   CCPABY <20 Interpretation:  Negative <20 Interpretation:  Negative   RHF 12  --      YANELIS  Recent Labs   Lab Test 07/25/16  1433   ANAIGG <1:40  Reference range: <1:40  (Note)  <1:40  INTERPRETIVE INFORMATION: YANELIS by IFA, IgG  Anti-nuclear antibodies (YANELIS) are seen in a variety of  systemic rheumatic diseases and are determined by indirect  fluorescence assay (IFA) using HEp-2 substrate with an  IgG-specific conjugate. YANELIS titers less than or equal to  1:80 have variable relevance while titers greater than or  equal to 1:160 are considered clinically significant. These  antibodies may precede clinical disease onset; however,  healthy individuals and those with advanced age have been  reported to be positive for YANELIS. When observed, one of the  five basic patterns is reported: homogeneous,  peripheral/rim, speckled, centromere, or nucleolar. If  cytoplasmic fluorescence is observed, it is noted. IFA  methodology is subjective and has occasionally been shown  to lack sensitivity for anti-SSA/Ro antibodies.  Negative results do not necessarily rule out the presence  of SSc. If clinical suspicion remains, consider further  te sting for U3-RNP, PM/Scl, or Th/To antibodies associated  with SSc.  Performed by [facility],  [address] [place] [id number] [phone]  [email or web address], Alcon Krishna MD, Lab. Director        RNP/Sm/SSA/SSB  Recent Labs   Lab Test 03/23/16  1759 01/04/16  1806 07/01/14  1211 09/13/13  1504 02/13/12  1404   RNPIGG  --   --  <0.2  Negative    --   --    SMIGG <0.2  Negative   Antibody index (AI) values reflect qualitative changes in antibody   concentration that cannot be directly associated with clinical condition or   disease state.   <0.2  Negative   Antibody index (AI) values reflect qualitative changes in antibody   concentration that cannot be directly associated with clinical condition or   disease state.   <0.2  Negative    --   --    ENASM  --   --   --  0 0   SSAIGG  --   --  <0.2  Negative    --   --    ENASSA  --   --   --  3 12   SSBIGG  --   --  <0.2  Negative    --   --    ENASSB  --   --   --  0 0   ENARMP  --   --   --  0 0   SCLIGG  --   --  <0.2  Negative    --   --      dsDNA  Recent Labs   Lab Test 07/17/19  1756 10/01/18  1313 06/04/18  1758 01/30/18  1752 10/02/17  1814 07/03/17  1447 04/05/17  1820 12/12/16  1827 09/20/16  1518   DNA <1 <1 1 <1 <1 <1  Negative   <1  Negative   <1  Negative   <1  Negative       C3/C4  Recent Labs   Lab Test 07/17/19  1756 01/15/19  1800 10/01/18  1313 06/04/18  1758 01/30/18  1752 10/02/17  1814 07/03/17  1447 04/05/17  1820 12/12/16  1827   [id number] 67* 68* 63* 60* 86 75* 68* 58* 69*   [id number] 13* 14* 14* 13* 21 15 15 10* 12*     RNA Polymerase III Antibody  No results for input(s): RNAPG in the last 36181 hours.  Histone Antibody  Recent Labs   Lab Test 02/13/12  1404   HSTO 0.4     Antiphospholipid Antibodies  Recent Labs   Lab Test 09/13/13  1504 02/13/12  1404   CARG <15.0 Interpretation:  Negative <15.0 Interpretation:  Negative   SANCHO <12.5 Interpretation:  Negative <12.5 Interpretation:  Negative   LUPINT  --  Negative (Note) COMMENTS: INR is normal. APTT is normal.  1:2 Mix is not indicated. DRVVT Screen is normal. Thrombin time is normal. NEGATIVE TEST; A LUPUS ANTICOAGULANT WAS NOT DETECTED IN THIS SPECIMEN WITHIN THE LIMITS OF THE  TESTING REPERTOIRE. If the clinical picture is strongly suggestive of an antiphospholipid syndrome, recommend anticardiolipin and beta-2-glycoprotein (IgG and IgM) antibody tests. Hyun Denis M.D.  [phone] 2-.  NINR =  1.01 N = 0.86-1.14  (No additional charge) NTT = 16.0 N = 13.0-19.0 sec  (No additional charge)  APTT'S:    Seconds Reagent =  Stago LS Normal  =  36 Patient  =  41 1:2 Mix  =  N/A Reference =  31-43   DILUTE VANNESSA VIPER VENOM TEST: DRVVT Screen Ratio = 0.80 Normal = <1.28      IgG  Recent Labs   Lab Test 06/17/19  1813 01/30/15  1156 04/02/14  1539   IGG  --   --  897   IGA 82 109 107   IGM  --   --  361*     CBC  Recent Labs   Lab Test 07/17/19 1756 04/16/19  1759 01/15/19  1800   WBC 6.0 6.3 4.8   RBC 3.83 3.79* 3.64*   HGB 12.7 12.5 12.1   HCT 37.0 36.7 35.7   MCV 97 97 98   RDW 11.3 11.4 11.5    265 276   MCH 33.2* 33.0 33.2*   MCHC 34.3 34.1 33.9   NEUTROPHIL 86.1 84.4 63.6   LYMPH 8.4 10.4 26.7   MONOCYTE 5.0 4.1 7.7   EOSINOPHIL 0.0 0.6 1.4   BASOPHIL 0.5 0.5 0.6   ANEU 5.1 5.3 3.1   ALYM 0.5* 0.7* 1.3   ZOË 0.3 0.3 0.4   AEOS 0.0 0.0 0.1   ABAS 0.0 0.0 0.0     CMP  Recent Labs   Lab Test 07/17/19 1756 05/30/19  0859 04/16/19  1759 01/15/19  1800 10/01/18  1313 06/04/18  1758     --  140 139 140 139   POTASSIUM 3.8  --  3.9 3.9 3.6 4.2   CHLORIDE 105  --  107 104 104 105   CO2 28  --  26 29 30 28   ANIONGAP 5  --  7 6 6 6   GLC 84 84 143* 140* 82 111*   BUN 12  --  15 16 10 14   CR 0.66  --  0.70 0.78 0.73 0.72   GFRESTIMATED >90  --  >90 >90 86 87   GFRESTBLACK >90  --  >90 >90 >90 >90   MELANIA 8.5  --  8.4* 8.2* 8.5 8.1*   BILITOTAL 0.3  --  0.2 0.2 0.3 0.3   ALBUMIN 4.3  --  3.8 3.6 3.7 3.5   PROTTOTAL 7.1  --  6.5* 6.3* 6.7* 6.0*   ALKPHOS 45  --  56 54 53 48   AST 25  --  21 33 25 22   ALT 23  --  22 36 33 21     HgA1c  Recent Labs   Lab Test 05/30/19  0859 10/05/12  0824   A1C 5.2 5.3     Calcium/VitaminD  Recent Labs   Lab Test 07/17/19  6880  04/16/19  1759 01/15/19  1800  10/02/17  1814  01/17/13  1039   MELANIA 8.5 8.4* 8.2*   < > 8.7   < > 9.4   VITDT  --  40  --   --  37  --  55    < > = values in this interval not displayed.     ESR/CRP  Recent Labs   Lab Test 07/17/19  1756 01/15/19  1800 10/01/18  1313   SED 6 5 4   CRP <2.9 <2.9 <2.9     CK/Aldolase  Recent Labs   Lab Test 07/17/19  1756 10/01/18  1313 06/04/18  1758    96 91     TSH/T4  Recent Labs   Lab Test 04/10/19  1808 10/18/17  1445 10/07/16  0714 02/27/15  1058   TSH 0.44 0.33* 0.37*  --    T4  --  1.24 1.16 1.01     Hepatitis B  Recent Labs   Lab Test 03/29/16  1417 02/13/14  1835 05/22/12  1633   HEPBANG Nonreactive Negative Negative     Hepatitis C  Recent Labs   Lab Test 03/29/16  1417 02/13/14  1835 05/22/12  1633   HCVAB Nonreactive   Assay performance characteristics have not been established for newborns,   infants, and children   Negative Negative     Lyme ab screening  Recent Labs   Lab Test 05/30/15  1355   LYMEGM 0.55     Tuberculosis Screening  Recent Labs   Lab Test 07/03/17  1447 03/29/16  1417 02/13/14  1835   TBRSLT Negative Negative Negative   TBAGN 0.00 0.00 0.00     HIV Screening  Recent Labs   Lab Test 04/10/19  1808   HIAGAB Nonreactive     UA  Recent Labs   Lab Test 07/17/19  1805 04/16/19  1806 01/15/19  1810 10/01/18  1324 06/04/18  1805  01/03/18  1816  04/05/17  1825  09/20/16  1524  03/23/16  1807   COLOR Yellow Yellow Yellow Yellow Yellow   < > Yellow   < > Yellow   < > Yellow   < > Yellow   APPEARANCE Clear Clear Clear Clear Clear   < > Slightly Cloudy   < > Clear   < > Clear   < > Clear   URINEGLC Negative Negative Negative Negative Negative   < > Negative   < > Negative   < > Negative   < > Negative   URINEBILI Negative Negative Negative Negative Negative   < > Negative   < > Negative   < > Negative   < > Negative   SG 1.020 1.025 1.025 >1.030 >1.030   < > 1.020   < > 1.025   < > >1.030   < > >1.030   URINEPH 7.0 7.0 7.0 7.0 7.0   < > 7.0   < > 7.0   < >  6.0   < > 6.0   PROTEIN Negative Negative Negative Negative 30*   < > Negative   < > Negative   < > Negative   < > Negative   UROBILINOGEN 0.2 0.2 0.2 1.0 1.0   < > 1.0   < > 1.0   < > 0.2   < > 0.2   NITRITE Negative Negative Negative Negative Negative   < > Negative   < > Negative   < > Negative   < > Negative   UBLD Trace* Negative Negative Large* Negative   < > Small*   < > Negative   < > Negative   < > Negative   LEUKEST Negative Negative Negative Negative Negative   < > Small*   < > Negative   < > Negative   < > Negative   WBCU 0 - 5  --   --  0 - 5 0 - 5   < > 25-50*   < > O - 2   < > O - 2   < > O - 2   RBCU O - 2  --   --  5-10* O - 2   < > 5-10*   < > O - 2   < > O - 2   < > O - 2   SQUAMOUSEPI Few  --   --  Moderate* Few  --  Few   < > Few   < >  --   --  Few   BACTERIA  --   --   --  Few* Few*  --  Moderate*   < >  --    < >  --   --   --    MUCOUS  --   --   --   --   --   --   --   --  Present*  --  Present*  --  Present*    < > = values in this interval not displayed.     Urine Microscopic  Recent Labs   Lab Test 07/17/19  1805 10/01/18  1324 06/04/18  1805 01/03/18  1816  04/05/17  1825  09/20/16  1524  03/23/16  1807   WBCU 0 - 5 0 - 5 0 - 5   < > 25-50*   < > O - 2   < > O - 2   < > O - 2   RBCU O - 2 5-10* O - 2   < > 5-10*   < > O - 2   < > O - 2   < > O - 2   SQUAMOUSEPI Few Moderate* Few  --  Few   < > Few   < >  --   --  Few   BACTERIA  --  Few* Few*  --  Moderate*   < >  --    < >  --   --   --    MUCOUS  --   --   --   --   --   --  Present*  --  Present*  --  Present*    < > = values in this interval not displayed.     Urine Protein  Recent Labs   Lab Test 07/17/19  1805 04/16/19  1806 01/15/19  1810   UTP 0.09 0.16 0.13   UTPG 0.17 0.19 0.11   UCRR 53 87 127     Immunization History     Immunization History   Administered Date(s) Administered     Influenza (IIV3) PF 10/01/2012, 09/23/2013     Influenza Vaccine IM > 6 months Valent IIV4 09/20/2016, 10/30/2017     Influenza Vaccine, 3 YRS  +, IM (QUADRIVALENT W/PRESERVATIVES) 08/11/2018     Pneumo Conj 13-V (2010&after) 04/25/2016     Pneumococcal 23 valent 10/01/2012, 11/06/2017     Tdap (Adacel,Boostrix) 12/17/2010          Chart documentation done in part with Dragon Voice recognition Software. Although reviewed after completion, some word and grammatical error may remain.    Bradford Colvin MD

## 2019-10-16 ENCOUNTER — OFFICE VISIT (OUTPATIENT)
Dept: PALLIATIVE MEDICINE | Facility: CLINIC | Age: 44
End: 2019-10-16
Payer: COMMERCIAL

## 2019-10-16 VITALS
WEIGHT: 147 LBS | BODY MASS INDEX: 23.73 KG/M2 | HEART RATE: 73 BPM | DIASTOLIC BLOOD PRESSURE: 82 MMHG | SYSTOLIC BLOOD PRESSURE: 114 MMHG

## 2019-10-16 DIAGNOSIS — M54.2 CERVICALGIA: ICD-10-CM

## 2019-10-16 DIAGNOSIS — M47.819 FACET ARTHROPATHY: ICD-10-CM

## 2019-10-16 DIAGNOSIS — F40.240 CLAUSTROPHOBIA: ICD-10-CM

## 2019-10-16 DIAGNOSIS — M70.62 TROCHANTERIC BURSITIS OF BOTH HIPS: Primary | ICD-10-CM

## 2019-10-16 DIAGNOSIS — M70.61 TROCHANTERIC BURSITIS OF BOTH HIPS: Primary | ICD-10-CM

## 2019-10-16 PROCEDURE — 20610 DRAIN/INJ JOINT/BURSA W/O US: CPT | Mod: 50 | Performed by: PSYCHIATRY & NEUROLOGY

## 2019-10-16 RX ORDER — LORAZEPAM 0.5 MG/1
0.5 TABLET ORAL
Qty: 2 TABLET | Refills: 0 | Status: SHIPPED | OUTPATIENT
Start: 2019-10-16 | End: 2020-06-30

## 2019-10-16 RX ORDER — TRIAMCINOLONE ACETONIDE 40 MG/ML
40 INJECTION, SUSPENSION INTRA-ARTICULAR; INTRAMUSCULAR ONCE
Status: COMPLETED | OUTPATIENT
Start: 2019-10-16 | End: 2019-10-16

## 2019-10-16 RX ORDER — BUPIVACAINE HYDROCHLORIDE 5 MG/ML
4 INJECTION, SOLUTION EPIDURAL; INTRACAUDAL ONCE
Status: COMPLETED | OUTPATIENT
Start: 2019-10-16 | End: 2019-10-16

## 2019-10-16 RX ADMIN — BUPIVACAINE HYDROCHLORIDE 20 MG: 5 INJECTION, SOLUTION EPIDURAL; INTRACAUDAL at 15:37

## 2019-10-16 RX ADMIN — TRIAMCINOLONE ACETONIDE 40 MG: 40 INJECTION, SUSPENSION INTRA-ARTICULAR; INTRAMUSCULAR at 15:40

## 2019-10-16 ASSESSMENT — PAIN SCALES - GENERAL: PAINLEVEL: MILD PAIN (2)

## 2019-10-16 NOTE — PROGRESS NOTES
Pre procedure Diagnosis: trochanteric bursitis  Post procedure Diagnosis: Same  Procedure performed: bilateral trochanteric bursa injection  Anesthesia: none  Complications: none  Operators: Magdalena Lockett MD    Indications:   Yovana nEriquez is a 44 year old female was sent by me for trochanteric bursa injection.  They have a history of bilateral hip pain.  Exam shows bilateral trochanteric bursa tenderness and they have tried conservative treatment including medications and PT.      Options/alternatives, benefits and risks were discussed with the patient including bleeding, infection, tissue trauma including tendons and muscles, and weakness.  Questions were answered to her satisfaction and she agrees to proceed. Voluntary informed consent was obtained and signed.     Vitals were reviewed: Yes  Allergies were reviewed:  Yes  Medications were reviewed:  Yes  Pre-procedure pain score: 2/10    Procedure:  After getting informed consent, patient was placed in a prone position on the procedure table.   A Pause for the Cause was performed.  Patient was prepped in sterile fashion.     The area over the right trochanter was palpated, and the tender area was identified, corresponding to the area of the trochanteric bursa.  The area was cleaned with Chloroprep. A 25G 3.5 inch needle was inserted, aiming towards the trochanter.  When bone was encountered, the needle was slightly drawn.  A solution containing local anesthesic and steroid was injected.  The needle was removed.  Hemostasis was achieved. Bandaids were placed as appropriate.    The procedure was repeated on the opposite side.    In total, 4ml of 0.5% bupivacaine, 4ml of 1% lidocaine, and 40mg of kenalog was injected.    Hemostasis was achieved, the area was cleaned, and bandaids were placed when appropriate.  The patient tolerated the procedure well.      Patient notes neck pain. She tried adjusting her pillows, ice and heat. Turning head hurts but relates  that it is only along her back. She gets headaches frequently.    Physical Exam:  No reflexes of upper extremities  Normal strength upper extremities  Pain with rotation and extension/rotation with the neck    Likely facet arthropathy in the neck.   Plan:  PT referral  MRI cervical spine      Post-procedure pain score: 4/10  Follow-up includes:   -f/u with referring provider    The information in this document, created by the medical scribe for me, accurately reflects the services I personally performed and the decisions made by me. I have reviewed and approved this document for accuracy prior to leaving the patient care area.  October 16, 2019 3:32 PM    aMgdalena Lockett MD  [facility]

## 2019-10-16 NOTE — PATIENT INSTRUCTIONS
1. Schedule PT with Wilmer  [phone]  2. Schedule [facility]:   Please call to schedule:  [phone]     [facility]   Post Procedure Instructions    Today you had:   bursa injection      Medications used:  lidocaine   bupivicaine   kenolog        Go to the emergency room if you develop any shortness of breath    Monitor the injection sites for signs and symptoms of infection-fever, chills, redness, swelling, warmth, or drainage to areas.    You may have soreness at injection sites for up to 24 hours.    You may apply ice to the painful areas to help minimize the discomfort of the needle pokes.    Do not apply heat to sites for at least 12 hours.    You may use anti-inflammatory medications or Tylenol for pain control if necessary    Pain Clinic phone number during work hours Monday-Friday:  [phone]    After hours provider line: [phone]

## 2019-10-21 DIAGNOSIS — R53.83 FATIGUE, UNSPECIFIED TYPE: ICD-10-CM

## 2019-10-21 DIAGNOSIS — M32.19 OTHER SYSTEMIC LUPUS ERYTHEMATOSUS WITH OTHER ORGAN INVOLVEMENT (H): ICD-10-CM

## 2019-10-21 LAB
ALBUMIN SERPL-MCNC: 4.1 G/DL (ref 3.4–5)
ALBUMIN UR-MCNC: NEGATIVE MG/DL
ALP SERPL-CCNC: 48 U/L (ref 40–150)
ALT SERPL W P-5'-P-CCNC: 19 U/L (ref 0–50)
ANION GAP SERPL CALCULATED.3IONS-SCNC: 3 MMOL/L (ref 3–14)
APPEARANCE UR: CLEAR
AST SERPL W P-5'-P-CCNC: 15 U/L (ref 0–45)
BACTERIA #/AREA URNS HPF: ABNORMAL /HPF
BASOPHILS # BLD AUTO: 0 10E9/L (ref 0–0.2)
BASOPHILS NFR BLD AUTO: 0.4 %
BILIRUB SERPL-MCNC: 0.3 MG/DL (ref 0.2–1.3)
BILIRUB UR QL STRIP: NEGATIVE
BUN SERPL-MCNC: 15 MG/DL (ref 7–30)
CALCIUM SERPL-MCNC: 9.3 MG/DL (ref 8.5–10.1)
CHLORIDE SERPL-SCNC: 104 MMOL/L (ref 94–109)
CO2 SERPL-SCNC: 30 MMOL/L (ref 20–32)
COLOR UR AUTO: YELLOW
CORTIS SERPL-MCNC: <0.5 UG/DL (ref 4–22)
CREAT SERPL-MCNC: 0.66 MG/DL (ref 0.52–1.04)
CREAT UR-MCNC: 83 MG/DL
CRP SERPL-MCNC: <2.9 MG/L (ref 0–8)
DIFFERENTIAL METHOD BLD: ABNORMAL
EOSINOPHIL # BLD AUTO: 0.1 10E9/L (ref 0–0.7)
EOSINOPHIL NFR BLD AUTO: 0.7 %
ERYTHROCYTE [DISTWIDTH] IN BLOOD BY AUTOMATED COUNT: 11.7 % (ref 10–15)
ERYTHROCYTE [SEDIMENTATION RATE] IN BLOOD BY WESTERGREN METHOD: 6 MM/H (ref 0–20)
GFR SERPL CREATININE-BSD FRML MDRD: >90 ML/MIN/{1.73_M2}
GLUCOSE SERPL-MCNC: 101 MG/DL (ref 70–99)
GLUCOSE UR STRIP-MCNC: NEGATIVE MG/DL
HCT VFR BLD AUTO: 39 % (ref 35–47)
HGB BLD-MCNC: 13.5 G/DL (ref 11.7–15.7)
HGB UR QL STRIP: NEGATIVE
KETONES UR STRIP-MCNC: NEGATIVE MG/DL
LEUKOCYTE ESTERASE UR QL STRIP: ABNORMAL
LYMPHOCYTES # BLD AUTO: 0.9 10E9/L (ref 0.8–5.3)
LYMPHOCYTES NFR BLD AUTO: 11.7 %
MCH RBC QN AUTO: 33.1 PG (ref 26.5–33)
MCHC RBC AUTO-ENTMCNC: 34.6 G/DL (ref 31.5–36.5)
MCV RBC AUTO: 96 FL (ref 78–100)
MONOCYTES # BLD AUTO: 0.5 10E9/L (ref 0–1.3)
MONOCYTES NFR BLD AUTO: 6.5 %
NEUTROPHILS # BLD AUTO: 5.9 10E9/L (ref 1.6–8.3)
NEUTROPHILS NFR BLD AUTO: 80.7 %
NITRATE UR QL: NEGATIVE
NON-SQ EPI CELLS #/AREA URNS LPF: ABNORMAL /LPF
PH UR STRIP: 7.5 PH (ref 5–7)
PLATELET # BLD AUTO: 331 10E9/L (ref 150–450)
POTASSIUM SERPL-SCNC: 3.7 MMOL/L (ref 3.4–5.3)
PROT SERPL-MCNC: 7.1 G/DL (ref 6.8–8.8)
PROT UR-MCNC: 0.13 G/L
PROT/CREAT 24H UR: 0.16 G/G CR (ref 0–0.2)
RBC # BLD AUTO: 4.08 10E12/L (ref 3.8–5.2)
RBC #/AREA URNS AUTO: ABNORMAL /HPF
SODIUM SERPL-SCNC: 137 MMOL/L (ref 133–144)
SOURCE: ABNORMAL
SP GR UR STRIP: 1.02 (ref 1–1.03)
UROBILINOGEN UR STRIP-ACNC: 0.2 EU/DL (ref 0.2–1)
WBC # BLD AUTO: 7.4 10E9/L (ref 4–11)
WBC #/AREA URNS AUTO: ABNORMAL /HPF

## 2019-10-21 PROCEDURE — 86140 C-REACTIVE PROTEIN: CPT | Performed by: INTERNAL MEDICINE

## 2019-10-21 PROCEDURE — 86225 DNA ANTIBODY NATIVE: CPT | Performed by: INTERNAL MEDICINE

## 2019-10-21 PROCEDURE — 81001 URINALYSIS AUTO W/SCOPE: CPT | Performed by: INTERNAL MEDICINE

## 2019-10-21 PROCEDURE — 85652 RBC SED RATE AUTOMATED: CPT | Performed by: INTERNAL MEDICINE

## 2019-10-21 PROCEDURE — 80053 COMPREHEN METABOLIC PANEL: CPT | Performed by: INTERNAL MEDICINE

## 2019-10-21 PROCEDURE — 86160 COMPLEMENT ANTIGEN: CPT | Mod: 59 | Performed by: INTERNAL MEDICINE

## 2019-10-21 PROCEDURE — 85025 COMPLETE CBC W/AUTO DIFF WBC: CPT | Performed by: INTERNAL MEDICINE

## 2019-10-21 PROCEDURE — 82533 TOTAL CORTISOL: CPT | Performed by: INTERNAL MEDICINE

## 2019-10-21 PROCEDURE — 84156 ASSAY OF PROTEIN URINE: CPT | Performed by: INTERNAL MEDICINE

## 2019-10-21 PROCEDURE — 36415 COLL VENOUS BLD VENIPUNCTURE: CPT | Performed by: INTERNAL MEDICINE

## 2019-10-21 NOTE — TELEPHONE ENCOUNTER
Message from Pharmacy: Patient requesting a new RX for AZASAN. AZASAN is too expensive, patient requesting a cheaper alternative.    Tamar Gonsalez MA

## 2019-10-22 LAB
C3 SERPL-MCNC: 77 MG/DL (ref 76–169)
C4 SERPL-MCNC: 13 MG/DL (ref 15–50)
DSDNA AB SER-ACNC: 1 IU/ML

## 2019-10-22 NOTE — TELEPHONE ENCOUNTER
Jasen Pennington, RN: Please call Ms. Zoe to get more information about the pharmacy request for an alternative medication to azathioprine.  Azathioprine was a medication that she has been on and the dose was increased at her last clinic visit but the message that was sent was asking for a different medication because of associated cost.  To the cost go up dramatically?  Was it due to the dose of the tablet (75 mg tablet versus 50 mg tablet)?    Bradford Colvin MD  10/22/2019 5:49 PM

## 2019-10-23 NOTE — TELEPHONE ENCOUNTER
Spoke with patient who states the increased dose of 75 mg azathioprine is more expensive than the 50 mg tablets. Out of pocket, it is over $200. Patient is requesting a refill 50 mg tablets. She will take 1.5 tablets daily.     Jasen Pennington RN....10/23/2019 8:28 AM

## 2019-10-24 RX ORDER — AZATHIOPRINE 50 MG/1
75 TABLET ORAL DAILY
Qty: 45 TABLET | Refills: 3 | Status: SHIPPED | OUTPATIENT
Start: 2019-10-24 | End: 2020-04-02

## 2019-10-24 NOTE — TELEPHONE ENCOUNTER
Rheumatology team: Please call to notify Ms. Enriquez that azathioprine has been refilled for the 50mg tablets.   Bradford Colvin MD  10/24/2019 12:25 AM

## 2019-10-24 NOTE — TELEPHONE ENCOUNTER
Spoke with patient about refill, patient was happy.  Qing Zapata CMA Rheumatology  10/24/2019 9:37 AM

## 2019-11-01 DIAGNOSIS — K21.9 GASTROESOPHAGEAL REFLUX DISEASE WITHOUT ESOPHAGITIS: ICD-10-CM

## 2019-11-01 NOTE — TELEPHONE ENCOUNTER
Routing refill request to provider for review/approval because:  Patient needs to be seen because:  Last saw aracely on 4/10/19, was due to follow up 5/22/19.    Med pended for 30 day supply with reminder

## 2019-11-01 NOTE — TELEPHONE ENCOUNTER
"Requested Prescriptions   Pending Prescriptions Disp Refills     ranitidine (ZANTAC) 300 MG tablet [Pharmacy Med Name: RANITIDINE 300 MG TABLET] 180 tablet 0     Sig: TAKE 1 TABLET (300 MG) BY MOUTH 2 TIMES DAILY.       H2 Blockers Protocol Failed - 11/1/2019  1:56 AM        Failed - Recent (12 mo) or future (30 days) visit within the authorizing provider's specialty     Patient has had an office visit with the authorizing provider or a provider within the authorizing providers department within the previous 12 mos or has a future within next 30 days. See \"Patient Info\" tab in inbasket, or \"Choose Columns\" in Meds & Orders section of the refill encounter.              Passed - Patient is age 12 or older        Passed - Medication is active on med list        Last Written Prescription Date:  7/7/19  Last Fill Quantity: 180,  # refills: 0   Last office visit: 2/5/2018 with prescribing provider:  Bradford Mario     Future Office Visit:      "

## 2019-11-19 ENCOUNTER — OFFICE VISIT (OUTPATIENT)
Dept: PALLIATIVE MEDICINE | Facility: CLINIC | Age: 44
End: 2019-11-19
Payer: COMMERCIAL

## 2019-11-19 DIAGNOSIS — M79.7 FIBROMYALGIA: ICD-10-CM

## 2019-11-19 DIAGNOSIS — M47.819 FACET ARTHROPATHY: Primary | ICD-10-CM

## 2019-11-19 DIAGNOSIS — M54.2 CERVICALGIA: ICD-10-CM

## 2019-11-19 PROCEDURE — 97161 PT EVAL LOW COMPLEX 20 MIN: CPT | Mod: GP | Performed by: PHYSICAL THERAPIST

## 2019-11-19 PROCEDURE — 97112 NEUROMUSCULAR REEDUCATION: CPT | Mod: GP | Performed by: PHYSICAL THERAPIST

## 2019-11-19 NOTE — PROGRESS NOTES
"PHYSICAL THERAPY INITIAL EVALUATION and PLAN OF CARE    Patient Name:  Yovana Enriquez \"Ruma\"  :  1975  MRN:[id number]    SUBJECTIVE:  PRESENTATION AND ETIOLOGY  Chief Complaint:   Neck pain limiting the ability to perform activities of daily living and perform required work activities.  Also has fibromyalgia, Lupus.    Onset / Etiology: longstanding    Pattern Since Onset: Worsened    Pertinent Medical  / Surgical History: Epic Snapshot Reviewed:  Contributing factors to the severity / complexity of the patient's chief complaint include: see providers notes    Pain:  Frequency: Constant or Activity Dependent     LEVEL OF FUNCTION AT START OF CARE  Current Aggrevating Activities / Functional Limitations: walk 20', sit 30', stand 15-20', sleep is disrupted    Patient's selected goals for physical therapy:  Long term goal of return to running, tolerate work duties better, tolerate daily routine better    CURRENT / PREVIOUS INTERVENTION(S):     Relieving Activities / Self Care / Exercise: some stretches, hot bath    Previous / Current therapies for current chief complaint: PT in past    DEMOGRAPHICS  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    GAIT, LOCOMOTION, and BALANCE:  Gait and Locomotion: Antalgic when cued to walk slowly.  Generally guarded posture with gait pattern.     RANGE OF MOTION:   Active: neck flex 75%, ext 10%, rotation 75% with ERP, lumbar flex 75%, ext 10%    MUSCLE PERFORMANCE:   Strength: SLS 3\" but does lock knees, able to heel walk, toe walk      ===============================================================  Today's Treatment:  Initial evaluation  Neuromuscular Reeducation:   Posture  and Kinesthetic Body Awareness - ed on neutral spine  Ed on chronic pain PT POC, walking " program  ===============================================================  ASSESSMENT:  Physical Therapy Diagnosis: Musculoskeletal Patterns    Patient requires PT intervention for the following impairments: Limited knowledge of condition and / or self care - inability to control symptoms, Impaired gait / weight bearing tolerance, Impaired posture / muscle imbalance, Impaired static and / or dynamic balance, Joint hypomobility, Muscle strength and endurance limitations, Pain and Deconditioning    Anticipated Goals and Expected Outcomes:EDUCATION AND SELF CARE  Independent and safe with home exercise / self care program in 6 week(s).  Good working knowledge of posture principles / joint protection in 6 week(s).  FUNCTIONAL MOBILITY  Ambulation without increased pain or symptoms for community distances on all surfaces in 12 week(s).  WORK  All required job duties without increased pain or symptoms in 12 week(s).  ADL'S  Standing tolerance 45 minutes without increased pain or symptoms in 12 week(s).  Homemaking / Yardwork without increased pain or symptoms in 12 week(s).    Rehab potential for achieving goals: fair.    ===============================================================  PLAN:   Patient will benefit from skilled physical therapy consisting of: neuromuscular reeducation of: movement, balance, coordination, kinesthetic sense, posture and proprioception for sitting and / or standing activities, education in self care / home management training to include instruction in: joint protection principles and symptom control techniques, therapeutic activities to achieve improved functional performance in: daily actvities and work activities and therapeutic exercise to develop: strength and endurance, joint mobility and joint stability    Assessment will be ongoing with changes in treatment as indicated.  Benefits/risks/alternatives to treatment have been reviewed and the patient has been instructed to contact this  office if there are any questions or concerns.  This plan of care has been discussed with the patient and the patient is in agreement.     Frequency / Duration:  Patient will be seen for a total of 8-12 visits; at a frequency of every 1-3 weeks.    Total Visit Time: 45  minutes            Wilmer Narvaez          PT                                Date:  11/19/2019    =====================================================  **  Referring Provider Certification: Referring Provider reviewing certifies that the above treatment / plan of care is required and authorized, and that the patient's plan will be reviewed every thirty (30) days **.   ======================================================     PRESENT: NA    MULTIDISCIPLINARY PATIENT / FAMILY EDUCATION RECORD  Department:  Physical Therapy    Readiness to Learn: Ability to understand verbal instructions,Ability to understand written instructions,Knowledge of educational needs / treatment plan  Specific Barriers to Learning: None  Referrals: None  Learning Needs: Rehabilitation techniques to improve functional independence  Who: Patient  How: Demonstration,Verbal instructions,Written instructions  Response: Appropriate verbal response,Asked questions,Demonstrated ability,Verbalized recall / understanding

## 2019-11-26 ENCOUNTER — MYC MEDICAL ADVICE (OUTPATIENT)
Dept: RHEUMATOLOGY | Facility: CLINIC | Age: 44
End: 2019-11-26

## 2019-11-27 ENCOUNTER — OFFICE VISIT (OUTPATIENT)
Dept: URGENT CARE | Facility: URGENT CARE | Age: 44
End: 2019-11-27
Payer: COMMERCIAL

## 2019-11-27 VITALS
TEMPERATURE: 98.3 F | DIASTOLIC BLOOD PRESSURE: 88 MMHG | OXYGEN SATURATION: 98 % | RESPIRATION RATE: 18 BRPM | WEIGHT: 141.4 LBS | BODY MASS INDEX: 22.73 KG/M2 | SYSTOLIC BLOOD PRESSURE: 142 MMHG | HEIGHT: 66 IN | HEART RATE: 86 BPM

## 2019-11-27 DIAGNOSIS — Z79.899 HIGH RISK MEDICATION USE: ICD-10-CM

## 2019-11-27 DIAGNOSIS — J20.9 ACUTE BRONCHITIS WITH SYMPTOMS > 10 DAYS: Primary | ICD-10-CM

## 2019-11-27 PROCEDURE — 99214 OFFICE O/P EST MOD 30 MIN: CPT | Performed by: FAMILY MEDICINE

## 2019-11-27 RX ORDER — ALBUTEROL SULFATE 90 UG/1
2 AEROSOL, METERED RESPIRATORY (INHALATION) EVERY 4 HOURS PRN
Qty: 1 INHALER | Refills: 0 | Status: SHIPPED | OUTPATIENT
Start: 2019-11-27 | End: 2021-02-26

## 2019-11-27 RX ORDER — AZITHROMYCIN 250 MG/1
TABLET, FILM COATED ORAL
Qty: 6 TABLET | Refills: 0 | Status: SHIPPED | OUTPATIENT
Start: 2019-11-27 | End: 2020-07-03

## 2019-11-27 ASSESSMENT — MIFFLIN-ST. JEOR: SCORE: 1290.2

## 2019-11-27 ASSESSMENT — PAIN SCALES - GENERAL: PAINLEVEL: NO PAIN (0)

## 2019-11-27 NOTE — TELEPHONE ENCOUNTER
See first "Shahab P. Tabatabai, Broker" message sent 11/26/2019 for full conversation. Ginger Garcia RN on 11/27/2019 at 7:18 AM

## 2019-11-27 NOTE — TELEPHONE ENCOUNTER
See MyChart encounter dated 11/26/2019 with full message conversation. Ginger Garcia RN on 11/27/2019 at 7:13 AM

## 2019-11-28 NOTE — PROGRESS NOTES
"Chief complaint: cough    Denies any possibility of pregnancy declined a pregnancy test    2 weeks now with a cough   Feels it in her chest   She's been having coughing fits  Also has had some sinus congestion as well    BP elevated today but asymptomatic. No headache no blurring of vision no chest pain no shortness of breath no dizziness no unsteadiness no numbness no weakness      PERC Rule     Age <50 years   Heart rate <100 beats/minute   Oxyhemoglobin saturation ?95 percent   No hemoptysis   No estrogen use   No prior DVT or PE   No unilateral leg swelling   No surgery/trauma requiring hospitalization within the prior four weeks    Fever No  Sinus congestion/sinus pain Yes  Wheezing: No  Chest pain or exertional shortness of breath: NO   Exposure to pertussis or pertussis like symptoms: No  Orthopnea, worsening edema, pnd: NO  Rash: NO  Tried OTC medications without relief  No hemoptysis.  Worsening symptoms hence patient came in to be seen     Problem list and histories reviewed & adjusted, as indicated.  Additional history: as documented    Problem list, Medication list, Allergies, and Medical/Social/Surgical histories reviewed in EPIC and updated as appropriate.    ROS:  Constitutional, HEENT, cardiovascular, pulmonary, gi and gu systems are negative, except as otherwise noted.    OBJECTIVE:                                                    BP (!) 142/88   Pulse 86   Temp 98.3  F (36.8  C) (Tympanic)   Resp 18   Ht 1.664 m (5' 5.5\")   Wt 64.1 kg (141 lb 6.4 oz)   SpO2 98%   BMI 23.17 kg/m    Body mass index is 23.17 kg/m .   BP (!) 142/88   Pulse 86   Temp 98.3  F (36.8  C) (Tympanic)   Resp 18   Ht 1.664 m (5' 5.5\")   Wt 64.1 kg (141 lb 6.4 oz)   SpO2 98%   BMI 23.17 kg/m      GENERAL: healthy, alert and no distress  EYES: pink palpebral conjunctiva, anicteric sclera  ENT: midline nasal septum normal ear exam. congested sinuses.   Mouth: moist buccal mucosa nonhyperemic posterior pharyngeal " wall. No tonsillar enlargement or cellulitis  NECK: no adenopathy, no asymmetry, masses, or scars and thyroid normal to palpation  RESP: lungs clear to auscultation - No  rales, rhonchi or wheezes    CV: regular rate and rhythm, normal S1 S2, no S3 or S4,  No murmurs, click or rub  SKIN: no visible rashes noted  Pscyh: Appropriate mood and affect  MS: no gross musculoskeletal defects noted    Diagnostic Test Results:  none      ASSESSMENT/PLAN:                                                        ICD-10-CM    1. Acute bronchitis with symptoms > 10 days J20.9 azithromycin (ZITHROMAX) 250 MG tablet     albuterol (PROAIR HFA/PROVENTIL HFA/VENTOLIN HFA) 108 (90 Base) MCG/ACT inhaler     Spacer/Aero-Holding Chambers (SPACER/AERO-HOLD CHAMBER MASK) EDITH   2. High risk medication use Z79.899      Patient on immunomodulators  Patient given a prescription for zithromax. Side effects of antibiotic discussed. Aware of small but statistically significant increase cardiovascular risk with antibiotic.  Prescribed with albuterol as needed wheezing - patient reports occasional wheezing at night and she's needed it in the previous bronchitis episodes.  Alarm signs or symptoms discussed, if present recommend go to ER     Advised that prolonged cough > 3 weeks recommend chest xray, offered today, declined.   Adverse reactions of medications discussed.  Over the counter medications discussed.   Aware to come back in if with worsening symptoms or if no relief despite treatment plan  Patient voiced understanding and had no further questions.     Your blood pressure reading was elevated today.  Recommend that you have it re-checked either at home or at our clinic within a week  Avoid cold medicines that have pseudoephedrin in it, or Sudafed. You may take regular or plain Robitussin or Mucinex  If you are unsure, please ask the pharmacist    If your blood pressure top number (systolic) 180 and above, or the bottom number (diastolic) 120  and above, you need to be seen immediately.  If persistently elevated top number 140 and above, or the bottom number 90 and above, please schedule an appointment to see a provider in clinic within a week.  If you have very elevated blood pressures, accompanied by headache, chest pain, numbness, weakness, slurring of speech, confusion, difficulty walking, call 911 and go to the ER      MD Loren Moe MD  [facility]

## 2019-11-28 NOTE — PATIENT INSTRUCTIONS
Patient Education     Discharge Instructions: Using an Inhaler  Your healthcare provider prescribed medicine that you will breathe in (inhale) by using an inhaler or metered-dose inhaler (MDI). An inhaler is a pressurized sprayer that gives you a measured amount of medicine. A spacer (holding chamber) may also be used. Spacers make it easier to inhale medicine correctly. Using the inhaler the right way is important so that the medicine gets to your lungs to help you breathe better. MDI s are a common type of inhaler, and these instructions are for this type of inhaler. Other types of inhalers are also available. These include dry powder inhalers (DPIs). Check with your healthcare provider if you aren't sure which type of inhaler you are to use when you get home.  Follow-up with your healthcare provider  As soon as you can, make all of your follow-up appointments as directed.  Call 911  Call 911 right away if you have:    Shortness of breath that does not get better after taking your quick-relief medicine    Trouble walking and talking because of shortness of breath    Blue lips or fingernails    Severe wheezing    Peak flow readings less than 50% of your personal best  Home care  MDI without spacer    Remove the cap and shake the inhaler.    Take a deep breath and breathe out (exhale) all the way.    Place the inhaler in your mouth. Close your lips around it.    As you breathe in deeply, press down on the inhaler to release the medicine. Hold your breath for a count of 10, or as long as you can comfortably. Then slowly breathe out.    MDI with spacer    Remove the caps from the inhaler and spacer and shake the inhaler.    Take a deep breath and breathe out (exhale) all the way. Put the spacer between your teeth and close your lips tightly around the spacer.    Spray 1 puff into the spacer by pressing down on the inhaler. Then slowly breathe in as deeply as you can. If you breathe in too quickly, you may hear a  whistling sound in the spacer.    Take the spacer out of your mouth. Hold your breath for a count of 10, or as long as you can comfortably. Then slowly breathe out.    Date Last Reviewed: 10/1/2018    [id number] [employer]. [address] [place] [address], [place] [address]. All rights reserved. This information is not intended as a substitute for professional medical care. Always follow your healthcare professional's instructions.

## 2019-12-19 ENCOUNTER — E-VISIT (OUTPATIENT)
Dept: OBGYN | Facility: CLINIC | Age: 44
End: 2019-12-19
Payer: COMMERCIAL

## 2019-12-19 DIAGNOSIS — R30.0 DYSURIA: Primary | ICD-10-CM

## 2019-12-21 DIAGNOSIS — R30.0 DYSURIA: ICD-10-CM

## 2019-12-21 DIAGNOSIS — R82.90 NONSPECIFIC FINDING ON EXAMINATION OF URINE: Primary | ICD-10-CM

## 2019-12-21 LAB
ALBUMIN UR-MCNC: NEGATIVE MG/DL
APPEARANCE UR: ABNORMAL
BACTERIA #/AREA URNS HPF: ABNORMAL /HPF
BILIRUB UR QL STRIP: NEGATIVE
COLOR UR AUTO: YELLOW
GLUCOSE UR STRIP-MCNC: NEGATIVE MG/DL
HGB UR QL STRIP: ABNORMAL
KETONES UR STRIP-MCNC: NEGATIVE MG/DL
LEUKOCYTE ESTERASE UR QL STRIP: ABNORMAL
MUCOUS THREADS #/AREA URNS LPF: PRESENT /LPF
NITRATE UR QL: NEGATIVE
NON-SQ EPI CELLS #/AREA URNS LPF: ABNORMAL /LPF
PH UR STRIP: 7 PH (ref 5–7)
RBC #/AREA URNS AUTO: ABNORMAL /HPF
SOURCE: ABNORMAL
SP GR UR STRIP: 1.01 (ref 1–1.03)
UROBILINOGEN UR STRIP-ACNC: 0.2 EU/DL (ref 0.2–1)
WBC #/AREA URNS AUTO: ABNORMAL /HPF

## 2019-12-21 PROCEDURE — 87086 URINE CULTURE/COLONY COUNT: CPT | Performed by: OBSTETRICS & GYNECOLOGY

## 2019-12-21 PROCEDURE — 87186 SC STD MICRODIL/AGAR DIL: CPT | Performed by: OBSTETRICS & GYNECOLOGY

## 2019-12-21 PROCEDURE — 81001 URINALYSIS AUTO W/SCOPE: CPT | Performed by: OBSTETRICS & GYNECOLOGY

## 2019-12-22 DIAGNOSIS — R30.0 DYSURIA: Primary | ICD-10-CM

## 2019-12-22 RX ORDER — NITROFURANTOIN 25; 75 MG/1; MG/1
100 CAPSULE ORAL 2 TIMES DAILY
Qty: 14 CAPSULE | Refills: 0 | Status: SHIPPED | OUTPATIENT
Start: 2019-12-22 | End: 2020-07-03

## 2019-12-22 NOTE — TELEPHONE ENCOUNTER
Notes recorded by Agbeh, Cephas Mawuena, MD on 12/22/2019 at 12:38 PM CST  Ms. Enriquez,    Your urinalysis appears consistent with a UTI. The urine culture is pending.  I have however sent a prescription for antibiotics to your [facility] pharmacy.      Please contact the clinic if you have additional questions.  Thank you.    Sincerely,    Cephas Mawuena Agbeh, MD

## 2019-12-24 LAB
BACTERIA SPEC CULT: ABNORMAL
BACTERIA SPEC CULT: ABNORMAL
SPECIMEN SOURCE: ABNORMAL

## 2020-01-13 DIAGNOSIS — M32.19 OTHER SYSTEMIC LUPUS ERYTHEMATOSUS WITH OTHER ORGAN INVOLVEMENT (H): ICD-10-CM

## 2020-01-13 LAB
ALBUMIN UR-MCNC: NEGATIVE MG/DL
AMORPH CRY #/AREA URNS HPF: ABNORMAL /HPF
APPEARANCE UR: NORMAL
BASOPHILS # BLD AUTO: 0 10E9/L (ref 0–0.2)
BASOPHILS NFR BLD AUTO: 0.4 %
BILIRUB UR QL STRIP: NEGATIVE
COLOR UR AUTO: YELLOW
DIFFERENTIAL METHOD BLD: ABNORMAL
EOSINOPHIL # BLD AUTO: 0.1 10E9/L (ref 0–0.7)
EOSINOPHIL NFR BLD AUTO: 1.1 %
ERYTHROCYTE [DISTWIDTH] IN BLOOD BY AUTOMATED COUNT: 11.5 % (ref 10–15)
ERYTHROCYTE [SEDIMENTATION RATE] IN BLOOD BY WESTERGREN METHOD: 5 MM/H (ref 0–20)
GLUCOSE UR STRIP-MCNC: NEGATIVE MG/DL
HCT VFR BLD AUTO: 37 % (ref 35–47)
HGB BLD-MCNC: 12.9 G/DL (ref 11.7–15.7)
HGB UR QL STRIP: NEGATIVE
KETONES UR STRIP-MCNC: NEGATIVE MG/DL
LEUKOCYTE ESTERASE UR QL STRIP: NEGATIVE
LYMPHOCYTES # BLD AUTO: 1.1 10E9/L (ref 0.8–5.3)
LYMPHOCYTES NFR BLD AUTO: 14.7 %
MCH RBC QN AUTO: 33.7 PG (ref 26.5–33)
MCHC RBC AUTO-ENTMCNC: 34.9 G/DL (ref 31.5–36.5)
MCV RBC AUTO: 97 FL (ref 78–100)
MONOCYTES # BLD AUTO: 0.5 10E9/L (ref 0–1.3)
MONOCYTES NFR BLD AUTO: 7.1 %
MUCOUS THREADS #/AREA URNS LPF: PRESENT /LPF
NEUTROPHILS # BLD AUTO: 5.6 10E9/L (ref 1.6–8.3)
NEUTROPHILS NFR BLD AUTO: 76.7 %
NITRATE UR QL: NEGATIVE
NON-SQ EPI CELLS #/AREA URNS LPF: ABNORMAL /LPF
PH UR STRIP: 7 PH (ref 5–7)
PLATELET # BLD AUTO: 271 10E9/L (ref 150–450)
RBC # BLD AUTO: 3.83 10E12/L (ref 3.8–5.2)
RBC #/AREA URNS AUTO: ABNORMAL /HPF
SOURCE: NORMAL
SP GR UR STRIP: >1.03 (ref 1–1.03)
UROBILINOGEN UR STRIP-ACNC: 0.2 EU/DL (ref 0.2–1)
WBC # BLD AUTO: 7.2 10E9/L (ref 4–11)
WBC #/AREA URNS AUTO: ABNORMAL /HPF

## 2020-01-13 PROCEDURE — 80053 COMPREHEN METABOLIC PANEL: CPT | Performed by: INTERNAL MEDICINE

## 2020-01-13 PROCEDURE — 85652 RBC SED RATE AUTOMATED: CPT | Performed by: INTERNAL MEDICINE

## 2020-01-13 PROCEDURE — 86225 DNA ANTIBODY NATIVE: CPT | Performed by: INTERNAL MEDICINE

## 2020-01-13 PROCEDURE — 36415 COLL VENOUS BLD VENIPUNCTURE: CPT | Performed by: INTERNAL MEDICINE

## 2020-01-13 PROCEDURE — 84156 ASSAY OF PROTEIN URINE: CPT | Performed by: INTERNAL MEDICINE

## 2020-01-13 PROCEDURE — 82550 ASSAY OF CK (CPK): CPT | Performed by: INTERNAL MEDICINE

## 2020-01-13 PROCEDURE — 86160 COMPLEMENT ANTIGEN: CPT | Performed by: INTERNAL MEDICINE

## 2020-01-13 PROCEDURE — 85025 COMPLETE CBC W/AUTO DIFF WBC: CPT | Performed by: INTERNAL MEDICINE

## 2020-01-13 PROCEDURE — 86140 C-REACTIVE PROTEIN: CPT | Performed by: INTERNAL MEDICINE

## 2020-01-13 PROCEDURE — 81001 URINALYSIS AUTO W/SCOPE: CPT | Performed by: INTERNAL MEDICINE

## 2020-01-14 LAB
ALBUMIN SERPL-MCNC: 3.7 G/DL (ref 3.4–5)
ALP SERPL-CCNC: 54 U/L (ref 40–150)
ALT SERPL W P-5'-P-CCNC: 22 U/L (ref 0–50)
ANION GAP SERPL CALCULATED.3IONS-SCNC: 3 MMOL/L (ref 3–14)
AST SERPL W P-5'-P-CCNC: 21 U/L (ref 0–45)
BILIRUB SERPL-MCNC: 0.3 MG/DL (ref 0.2–1.3)
BUN SERPL-MCNC: 14 MG/DL (ref 7–30)
CALCIUM SERPL-MCNC: 8.9 MG/DL (ref 8.5–10.1)
CHLORIDE SERPL-SCNC: 106 MMOL/L (ref 94–109)
CK SERPL-CCNC: 130 U/L (ref 30–225)
CO2 SERPL-SCNC: 31 MMOL/L (ref 20–32)
CREAT SERPL-MCNC: 0.73 MG/DL (ref 0.52–1.04)
CRP SERPL-MCNC: <2.9 MG/L (ref 0–8)
GFR SERPL CREATININE-BSD FRML MDRD: >90 ML/MIN/{1.73_M2}
GLUCOSE SERPL-MCNC: 106 MG/DL (ref 70–99)
POTASSIUM SERPL-SCNC: 3.9 MMOL/L (ref 3.4–5.3)
PROT SERPL-MCNC: 6.5 G/DL (ref 6.8–8.8)
SODIUM SERPL-SCNC: 140 MMOL/L (ref 133–144)

## 2020-01-15 LAB
C3 SERPL-MCNC: 85 MG/DL (ref 81–157)
C4 SERPL-MCNC: 21 MG/DL (ref 13–39)
CREAT UR-MCNC: 117 MG/DL
DSDNA AB SER-ACNC: <1 IU/ML
PROT UR-MCNC: 0.21 G/L
PROT/CREAT 24H UR: 0.18 G/G CR (ref 0–0.2)

## 2020-01-15 NOTE — RESULT ENCOUNTER NOTE
"[email or web address] message sent:  \"Ms. Enriquez,    Labs show a mildly elevated glucose but are otherwise okay.    Sincerely,  Bradford Colvin MD  1/15/2020 2:56 PM\""

## 2020-01-20 ENCOUNTER — OFFICE VISIT (OUTPATIENT)
Dept: FAMILY MEDICINE | Facility: CLINIC | Age: 45
End: 2020-01-20
Payer: COMMERCIAL

## 2020-01-20 VITALS
DIASTOLIC BLOOD PRESSURE: 86 MMHG | BODY MASS INDEX: 22.85 KG/M2 | HEART RATE: 86 BPM | SYSTOLIC BLOOD PRESSURE: 124 MMHG | HEIGHT: 66 IN | RESPIRATION RATE: 16 BRPM | TEMPERATURE: 97.8 F | WEIGHT: 142.2 LBS | OXYGEN SATURATION: 100 %

## 2020-01-20 DIAGNOSIS — J20.9 ACUTE BRONCHITIS, UNSPECIFIED ORGANISM: ICD-10-CM

## 2020-01-20 DIAGNOSIS — M32.19 OTHER SYSTEMIC LUPUS ERYTHEMATOSUS WITH OTHER ORGAN INVOLVEMENT (H): ICD-10-CM

## 2020-01-20 DIAGNOSIS — R09.82 POST-NASAL DRIP: ICD-10-CM

## 2020-01-20 DIAGNOSIS — J01.00 ACUTE NON-RECURRENT MAXILLARY SINUSITIS: Primary | ICD-10-CM

## 2020-01-20 PROCEDURE — 99214 OFFICE O/P EST MOD 30 MIN: CPT | Performed by: FAMILY MEDICINE

## 2020-01-20 RX ORDER — FLUTICASONE PROPIONATE 50 MCG
1 SPRAY, SUSPENSION (ML) NASAL DAILY
Qty: 16 G | Refills: 1 | Status: SHIPPED | OUTPATIENT
Start: 2020-01-20 | End: 2020-08-01

## 2020-01-20 RX ORDER — AZELASTINE 1 MG/ML
1 SPRAY, METERED NASAL 2 TIMES DAILY
Qty: 1 BOTTLE | Refills: 0 | Status: SHIPPED | OUTPATIENT
Start: 2020-01-20 | End: 2021-02-26

## 2020-01-20 ASSESSMENT — MIFFLIN-ST. JEOR: SCORE: 1293.82

## 2020-01-20 NOTE — PATIENT INSTRUCTIONS
Patient Education     Bronchitis, Antibiotic Treatment (Adult)    Bronchitis is an infection of the air passages (bronchial tubes) in your lungs. It often occurs when you have a cold. This illness is contagious during the first few days and is spread through the air by coughing and sneezing, or by direct contact (touching the sick person and then touching your own eyes, nose, or mouth).  Symptoms of bronchitis include cough with mucus (phlegm) and low-grade fever. Bronchitis usually lasts 7 to 14 days. Mild cases can be treated with simple home remedies. More severe infection is treated with an antibiotic.  Home care  Follow these guidelines when caring for yourself at home:    If your symptoms are severe, rest at home for the first 2 to 3 days. When you go back to your usual activities, don't let yourself get too tired.    Don't smoke. Also stay away from secondhand smoke.    You may use over-the-counter medicines to control fever or pain, unless another medicine was prescribed. If you have chronic liver or kidney disease or have ever had a stomach ulcer or gastrointestinal bleeding, talk with your healthcare provider before using these medicines. Also talk to your provider if you are taking medicine to prevent blood clots. Aspirin should never be given to anyone younger than 18 who is ill with a viral infection or fever. It may cause severe liver or brain damage.    Your appetite may be low, so a light diet is fine. Stay well hydrated by drinking 6 to 8 glasses of fluids per day. This includes water, soft drinks, sports drinks, juices, tea, or soup. Extra fluids will help loosen mucus in your nose and lungs.    Over-the-counter cough, cold, and sore-throat medicines will not shorten the length of the illness, but they may be helpful to reduce your symptoms. Don't use decongestants if you have high blood pressure.    Finish all antibiotic medicine. Do this even if you are feeling better after only a few  days.  Follow-up care  Follow up with your healthcare provider, or as advised. If you had an X-ray or ECG (electrocardiogram), a specialist will review it. You will be told of any new test results that may affect your care.  If you are age 65 or older, if you smoke, or if you have a chronic lung disease or condition that affects your immune system, ask your healthcare provider about getting a pneumococcal vaccine and a yearly flu shot (influenza vaccine).  When to seek medical advice  Call your healthcare provider right away if any of these occur:    Fever of 100.4 F (38 C) or higher, or as directed by your healthcare provider    Coughing up more sputum    Weakness, drowsiness, headache, facial pain, ear pain, or a stiff neck  Call 911  Call 911 if any of these occur.    Coughing up blood    Weakness, drowsiness, headache, or stiff neck that get worse    Trouble breathing, wheezing, or pain with breathing  Date Last Reviewed: 6/1/2018 2000-2019 [employer]. [address] [place] [address]. All rights reserved. This information is not intended as a substitute for professional medical care. Always follow your healthcare professional's instructions.           Patient Education     Sinusitis (Antibiotic Treatment)    The sinuses are air-filled spaces within the bones of the face. They connect to the inside of the nose. Sinusitis is an inflammation of the tissue that lines the sinuses. Sinusitis can occur during a cold. It can also happen due to allergies to pollens and other particles in the air. Sinusitis can cause symptoms of sinus congestion and a feeling of fullness. A sinus infection causes fever, headache, and facial pain. There is often green or yellow fluid draining from the nose or into the back of the throat (post-nasal drip). You have been given antibiotics to treat this condition.  Home care    Take the full course of antibiotics as instructed. Do not stop taking them, even when  you feel better.    Drink plenty of water, hot tea, and other liquids. This may help thin nasal mucus. It also may help your sinuses drain fluids.    Heat may help soothe painful areas of your face. Use a towel soaked in hot water. Or,  the shower and direct the warm spray onto your face. Using a vaporizer along with a menthol rub at night may also help soothe symptoms.     An expectorant with guaifenesin may help thin nasal mucus and help your sinuses drain fluids.    You can use an over-the-counter decongestant, unless a similar medicine was prescribed to you. Nasal sprays work the fastest. Use one that contains phenylephrine or oxymetazoline. First blow your nose gently. Then use the spray. Do not use these medicines more often than directed on the label. If you do, your symptoms may get worse. You may also take pills that contain pseudoephedrine. Don t use products that combine multiple medicines. This is because side effects may be increased. Read labels. You can also ask the pharmacist for help. (People with high blood pressure should not use decongestants. They can raise blood pressure.)    Over-the-counter antihistamines may help if allergies contributed to your sinusitis.      Do not use nasal rinses or irrigation during an acute sinus infection, unless your healthcare provider tells you to. Rinsing may spread the infection to other areas in your sinuses.    Use acetaminophen or ibuprofen to control pain, unless another pain medicine was prescribed to you. If you have chronic liver or kidney disease or ever had a stomach ulcer, talk with your healthcare provider before using these medicines. (Aspirin should never be taken by anyone under age 18 who is ill with a fever. It may cause severe liver damage.)    Don't smoke. This can make symptoms worse.  Follow-up care  Follow up with your healthcare provider or our staff if you are not better in 1 week.  When to seek medical advice  Call your healthcare  provider if any of these occur:    Facial pain or headache that gets worse    Stiff neck    Unusual drowsiness or confusion    Swelling of your forehead or eyelids    Vision problems, such as blurred or double vision    Fever of 100.4 F (38 C) or higher, or as directed by your healthcare provider    Seizure    Breathing problems    Symptoms don't go away in 10 days  Prevention  Here are steps you can take to help prevent an infection:    Keep good hand washing habits.    Don t have close contact with people who have sore throats, colds, or other upper respiratory infections.    Don t smoke, and stay away from secondhand smoke.    Stay up to date with of your vaccines.  Date Last Reviewed: 11/1/2017 2000-2019 [employer]. [address] [place] [address], [place] [address]. All rights reserved. This information is not intended as a substitute for professional medical care. Always follow your healthcare professional's instructions.

## 2020-01-20 NOTE — PROGRESS NOTES
"Subjective     Yovana Enriquez is a 44 year old female who presents to clinic today for the following health issues:    HPI   RESPIRATORY SYMPTOMS      Duration: 2 weeks    Description  nasal congestion, rhinorrhea, sore throat, facial pain/pressure, cough, chills, headache, fatigue/malaise, hoarse voice, myalgias, conjunctival irritation, nausea, stomach ache and diarrhea    Severity: severe    Accompanying signs and symptoms: None    History (predisposing factors):  none    Precipitating or alleviating factors: None    Therapies tried and outcome:  rest and fluids oral decongestant OTC NSAID and cough drops      Yovana presents with URI symptoms.  Patient has had a productive cough for 14 days, along with sore throat, nasal congestion, fatigue, sinus pressure.  Cough is worse at night.  Patient denies shortness of breath, wheezing, ear pain/fullness, muscle aches, headache, fever.  Patient has been taking OTC cold medications and is trying to stay hydrated.   Patient has a history of SLE            BP Readings from Last 3 Encounters:   01/20/20 124/86   11/27/19 (!) 142/88   10/16/19 114/82    Wt Readings from Last 3 Encounters:   01/20/20 64.5 kg (142 lb 3.2 oz)   11/27/19 64.1 kg (141 lb 6.4 oz)   10/16/19 66.7 kg (147 lb)                      Reviewed and updated as needed this visit by Provider  Tobacco  Allergies  Meds  Problems  Med Hx  Surg Hx  Fam Hx         Review of Systems   ROS COMP: Constitutional, HEENT, cardiovascular, pulmonary, gi and gu systems are negative, except as otherwise noted.      Objective    /86   Pulse 86   Temp 97.8  F (36.6  C) (Oral)   Resp 16   Ht 1.664 m (5' 5.5\")   Wt 64.5 kg (142 lb 3.2 oz)   SpO2 100%   BMI 23.30 kg/m    Body mass index is 23.3 kg/m .  Physical Exam   GENERAL: healthy, alert and no distress  EYES: Eyes grossly normal to inspection, PERRL and conjunctivae and sclerae normal  HENT: ear canals and TM's normal, nose and mouth without " ulcers or lesions, (+)sinus tenderness  NECK: no adenopathy, no asymmetry, masses, or scars and thyroid normal to palpation  RESP: lungs clear to auscultation - no rales, rhonchi or wheezes  CV: regular rate and rhythm, normal S1 S2, no S3 or S4, no murmur, click or rub, no peripheral edema and peripheral pulses strong  SKIN: no suspicious lesions or rashes  PSYCH: mentation appears normal, affect normal/bright          Assessment & Plan       ICD-10-CM    1. Acute non-recurrent maxillary sinusitis J01.00 amoxicillin-clavulanate (AUGMENTIN) 875-125 MG tablet     azelastine (ASTELIN) 0.1 % nasal spray     fluticasone (FLONASE) 50 MCG/ACT nasal spray     ipratropium (ATROVENT HFA) 17 MCG/ACT inhaler   2. Post-nasal drip R09.82 amoxicillin-clavulanate (AUGMENTIN) 875-125 MG tablet     azelastine (ASTELIN) 0.1 % nasal spray     fluticasone (FLONASE) 50 MCG/ACT nasal spray     ipratropium (ATROVENT HFA) 17 MCG/ACT inhaler   3. Acute bronchitis, unspecified organism J20.9 amoxicillin-clavulanate (AUGMENTIN) 875-125 MG tablet     azelastine (ASTELIN) 0.1 % nasal spray     fluticasone (FLONASE) 50 MCG/ACT nasal spray     ipratropium (ATROVENT HFA) 17 MCG/ACT inhaler   4. Other systemic lupus erythematosus with other organ involvement (H) M32.19           Follow up if symptoms worsen or fail to improve.   Lewis Zuniga MD  [facility]

## 2020-02-05 DIAGNOSIS — I73.00 RAYNAUD'S PHENOMENON WITHOUT GANGRENE: ICD-10-CM

## 2020-02-05 RX ORDER — AMLODIPINE BESYLATE 5 MG/1
5 TABLET ORAL DAILY
Qty: 90 TABLET | Refills: 3 | Status: SHIPPED | OUTPATIENT
Start: 2020-02-05 | End: 2020-04-02

## 2020-02-24 DIAGNOSIS — K21.9 GASTROESOPHAGEAL REFLUX DISEASE WITHOUT ESOPHAGITIS: ICD-10-CM

## 2020-02-24 NOTE — TELEPHONE ENCOUNTER
Requested Prescriptions   Pending Prescriptions Disp Refills     ranitidine (ZANTAC) 300 MG tablet 60 tablet 0   Last Written Prescription Date:  11-2-19  Last Fill Quantity: 60,  # refills: 0   Last office visit: 1/20/2020 with prescribing provider:  1-20-20   Future Office Visit:      There is no refill protocol information for this order

## 2020-02-25 NOTE — TELEPHONE ENCOUNTER
Routing refill request to provider for review/approval because:  Kiesha given x1 and patient did not follow up, please advise  A break in medication  Patient needs to be seen because it has been more than 1 year since last office visit.  Patient also has pepcid on med list.  Alicia Root RN

## 2020-03-02 ENCOUNTER — MYC MEDICAL ADVICE (OUTPATIENT)
Dept: PALLIATIVE MEDICINE | Facility: CLINIC | Age: 45
End: 2020-03-02

## 2020-04-01 ENCOUNTER — MYC MEDICAL ADVICE (OUTPATIENT)
Dept: RHEUMATOLOGY | Facility: CLINIC | Age: 45
End: 2020-04-01

## 2020-04-01 NOTE — TELEPHONE ENCOUNTER
Called patient who confirmed the video apt for tomorrow.    Jasen Pennington RN....4/1/2020 4:42 PM

## 2020-04-02 ENCOUNTER — VIRTUAL VISIT (OUTPATIENT)
Dept: RHEUMATOLOGY | Facility: CLINIC | Age: 45
End: 2020-04-02
Payer: COMMERCIAL

## 2020-04-02 DIAGNOSIS — Z79.899 HIGH RISK MEDICATION USE: ICD-10-CM

## 2020-04-02 DIAGNOSIS — I73.00 RAYNAUD'S PHENOMENON WITHOUT GANGRENE: ICD-10-CM

## 2020-04-02 DIAGNOSIS — M32.19 OTHER SYSTEMIC LUPUS ERYTHEMATOSUS WITH OTHER ORGAN INVOLVEMENT (H): Primary | ICD-10-CM

## 2020-04-02 DIAGNOSIS — R53.83 FATIGUE, UNSPECIFIED TYPE: ICD-10-CM

## 2020-04-02 DIAGNOSIS — M35.00 SJOGREN'S SYNDROME, WITH UNSPECIFIED ORGAN INVOLVEMENT (H): ICD-10-CM

## 2020-04-02 DIAGNOSIS — E27.40 ADRENAL INSUFFICIENCY (H): ICD-10-CM

## 2020-04-02 PROCEDURE — 99214 OFFICE O/P EST MOD 30 MIN: CPT | Mod: GT | Performed by: INTERNAL MEDICINE

## 2020-04-02 RX ORDER — AMLODIPINE BESYLATE 5 MG/1
5 TABLET ORAL DAILY
Qty: 90 TABLET | Refills: 3 | Status: SHIPPED | OUTPATIENT
Start: 2020-04-02 | End: 2021-01-18

## 2020-04-02 RX ORDER — PREDNISONE 5 MG/1
TABLET ORAL
Qty: 31 TABLET | Refills: 0 | Status: SHIPPED | OUTPATIENT
Start: 2020-04-02 | End: 2020-07-03

## 2020-04-02 RX ORDER — PREDNISONE 2.5 MG/1
2.5 TABLET ORAL DAILY
Qty: 90 TABLET | Refills: 0 | Status: SHIPPED | OUTPATIENT
Start: 2020-04-02 | End: 2020-06-09

## 2020-04-02 RX ORDER — AZATHIOPRINE 50 MG/1
75 TABLET ORAL DAILY
Qty: 135 TABLET | Refills: 1 | Status: SHIPPED | OUTPATIENT
Start: 2020-04-02 | End: 2020-06-09

## 2020-04-02 RX ORDER — HYDROXYCHLOROQUINE SULFATE 200 MG/1
TABLET, FILM COATED ORAL
Qty: 135 TABLET | Refills: 3 | Status: SHIPPED | OUTPATIENT
Start: 2020-04-02 | End: 2021-04-26

## 2020-04-02 NOTE — PROGRESS NOTES
"Yovana Enriquez is a 44 year old female who is being evaluated via a billable video visit.      The patient has been notified of following:     \"This video visit will be conducted via a call between you and your physician/provider. We have found that certain health care needs can be provided without the need for an in-person physical exam.  This service lets us provide the care you need with a video conversation.  If a prescription is necessary we can send it directly to your pharmacy.  If lab work is needed we can place an order for that and you can then stop by our lab to have the test done at a later time.    If during the course of the call the physician/provider feels a video visit is not appropriate, you will not be charged for this service.\"     Patient has given verbal consent for Video visit? Yes    Patient would like the video invitation sent by: [phone]        Yovana Enriquez complains of    Chief Complaint   Patient presents with     Systemic Lupus Erythematous       Rheumatology Video Visit      Yovana Enriquez MRN# [id number]   YOB: 1975 Age: 44 year old      Date of visit: 4/02/20   PCP: Bradford Mario    Chief Complaint   Patient presents with:  Systemic Lupus Erythematous    Assessment and Plan     1. Systemic lupus erythematosus (YANELIS by EIA positive in the past, leukopenia/lymphocytopenia, hypocomplementemia, polyarthralgias, oral sores, history of malar rash, photophobia, photosensitivity, Raynaud's Phenomenon): Previously on methotrexate that was discontinued for unclear reasons but the patient reports that she tolerated it well. Currently on azathioprine 75 mg daily (dose reduced on 11/8/2018 from 100mg daily without worsening symptoms at that time, eventually reduced to 50 mg daily and did well for a period of time; now on 75 mg daily), hydroxychloroquine 300 mg daily [avg], and Benlysta (worsening symptoms when stopped in the past).  Diffuse body " pain that is more likely secondary to fibromyalgia and possible adrenal insufficiency, also with fatigue; cannot rule out lupus as a contributing factor and therefore will give a short course of steroids and with a daily low-dose     - Continue azathioprine 75 mg daily   - Continue Evoxac 30 mg twice a day  - Continue hydroxychloroquine 300mg daily on average: 200mg daily with additional 200mg every other day (toxicity monitoring eye exam last done on 5/20/2019); note that because of the coronavirus pandemic hydroxychloroquine has become a potential therapeutic option for coronavirus and is therefore in short supply.  Recommend she try taking hydroxychloroquine only once daily to conserve her supply as it is important that she remain on it, but possibly do just as well from a lower dose   - Continue Benlysta 200 mg SQ every 7 days  - Prednisone 40mg daily x1day, then 20mg daily x2days, then 10mg daily x5days, then 5mg daily x5days, then 2.5mg daily thereafter  - Labs within the next 1 week: CK, CBC, CMP, ESR, CRP, dsDNA, C3, C4, UA, Uprotein:creatinine, TSH, Vitamin D  - Labs in 3 months: CBC, CMP, ESR, CRP, CK, dsDNA, C3, C4, UA, Uprotein:creatinine    # Prednisone Risks and Benefits: The risks and benefits of prednisone were discussed in detail and the patient verbalized understanding.  The risks discussed include, but are not limited to, weight gain, fluid retention, impaired wound healing, hyperglycemia, adrenal suppression, GI upset, peptic ulcer, hepatotoxicity, aseptic necrosis of the femoral and humeral heads, osteoporosis, myopathy, tendon rupture (particularly Achilles tendon), ocular changes including an increased intraocular pressure.  I encouraged reviewing the package insert and asking any questions about the medication.      2. Secondary Sjogren syndrome: Doing well with Evoxac and frequent sips of water. Dry eyes are not much of an issue currently. See #1.    3. Raynaud's Phenomenon: Amlodipine 5mg  daily is effective.  - Continue amlodipine 5mg daily     4. Bilateral trochanteric bursitis: She receives steroid injections from the pain clinic.    5. Fibromyalgia: Managed by the pain clinic and PCP.    6. Fatigue; nocturia; adrenal insufficiency: no symptoms of obstructive sleep apnea.  May benefit from sleep psychology eval.  Suspect that nocturia is playing a big role, urinating 3 times at night.  She is to follow with her PCP for this issue.  Also noted a low random cortisol level and undetected cortisol level at 8 AM cortisol lab.  Suspect that she may need low-dose steroid a longer period time.  If unable to completely taper off of prednisone then may consider endocrinology evaluation.  We discussed the option of seeing an endocrinologist but especially during this time of the coronavirus pandemic she would like to avoid additional evaluations and I agree.  -Prednisone as noted in #1    Ms. Enriquez verbalized agreement with and understanding of the rational for the diagnosis and treatment plan.  All questions were answered to best of my ability and the patient's satisfaction. Ms. Enriquez was advised to contact the clinic with any questions that may arise after the clinic visit.      Thank you for involving me in the care of the patient    Return to clinic: 3 months      HPI   Yovana Enriquez is a 44 year old female with medical history significant for subclinical hyperthyroidism, hypertension, irritable bowel syndrome, fibromyalgia, hypertension, non-celiac gluten sensitivity (reportedly with bowel biopsies and laboratory workup that did not show celiac disease), anxiety, and systemic lupus erythematosus who presents for follow-up of SLE.    Today, Ms. Enriquez reports diffuse body pain, worse in the PM after a long day.   For fatigue in general.  Nocturia, wakes up 3 times at night.  No joint swelling.  Morning stiffness for approximately 30 minutes.  Adjusting to her new lifestyle during the  coronavirus pandemic where she cannot do her normal routine .    Denies fevers, chills, nausea, vomiting. No abdominal pain.  No chest pain/pressure, palpitations, or shortness of breath.  No rash currently. No LE swelling.  She has photosensitivity and photophobia.   Sicca symptoms are well-controlled with Evoxac and frequent sips of water. No eye pain or redness. No history of serositis. No history of DVT, pulmonary embolism, or miscarriage.    Raynaud's is controlled per patient.  Tolerating amlodipine    Does not wake up gasping for air.    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  PULM: No SOB, wheeze, cough.  GI:  See history of present illness. No blood in stool. No abdominal pain, nausea, vomiting.  : No blood in urine.  MSK: See HPI.  NEURO: See history of present illness  EXT: No LE swelling  PSYCH: Negative    Active Problem List     Patient Active Problem List   Diagnosis     Systemic lupus erythematosus (H)     Menorrhagia     History of ovarian cyst     Dysmenorrhea     History of gestational diabetes mellitus, not currently pregnant     Intramural leiomyoma of uterus     Hyperlipidemia LDL goal <130     Subclinical hyperthyroidism     Anxiety     High risk medication use     Fibromyalgia     Chronic constipation     Irritable bowel syndrome     Health Care Home     Hypertension goal BP (blood pressure) < 140/90     Non-celiac gluten sensitivity     Raynaud's phenomenon without gangrene     Sjogren's syndrome (H)     Intramural and submucous leiomyoma of uterus     Past Medical History     Past Medical History:   Diagnosis Date     Arthritis      Chronic constipation 12/16/2013     Dysmenorrhea 10/1/2012     Fibromyalgia 11/15/2013     Health Care Home 3/19/2014     **See Letters for [facility] Care Plan: Emergency Care Plan       High risk medication use 9/13/2013     History of gestational diabetes mellitus, not currently pregnant 10/1/2012     History of ovarian cyst 10/1/2012     Hyperlipidemia LDL goal <130 10/18/2012     Hypertension goal BP (blood pressure) < 140/90 1/19/2015     Intramural leiomyoma of uterus 10/5/2012     Irritable bowel syndrome 3/11/2014    Patient states no history colonoscopy       Menorrhagia 10/1/2012     Non-celiac gluten sensitivity 2/8/2016     PONV (postoperative nausea and vomiting)      Subclinical hyperthyroidism 1/17/2013     Systemic lupus erythematosus (H) 4/23/2012     Past Surgical History     Past Surgical History:   Procedure Laterality Date     COLONOSCOPY N/A 2/20/2015    Procedure: COMBINED COLONOSCOPY, SINGLE OR MULTIPLE BIOPSY/POLYPECTOMY BY BIOPSY;  Surgeon: Fred Cullen MD;  Location: [facility] OR     COLONOSCOPY N/A 10/29/2018    Procedure: COMBINED COLONOSCOPY, SINGLE OR MULTIPLE BIOPSY/POLYPECTOMY BY BIOPSY;  Surgeon: Inez Sawyer MD;  Location: [facility]     COLONOSCOPY WITH CO2 INSUFFLATION N/A 2/20/2015    Procedure: COLONOSCOPY WITH CO2 INSUFFLATION;  Surgeon: Fred Cullen MD;  Location: [facility] OR     ENT SURGERY  10-24-14    Bottom lip biopsies     ESOPHAGOSCOPY, GASTROSCOPY, DUODENOSCOPY (EGD), COMBINED N/A 2/20/2015    Procedure: COMBINED ESOPHAGOSCOPY, GASTROSCOPY, DUODENOSCOPY (EGD), BIOPSY SINGLE OR MULTIPLE;  Surgeon: Fred Cullen MD;  Location: [facility] OR     HC BREATH HYDROGEN TEST  12/31/2013    Procedure: HYDROGEN BREATH TEST;  Surgeon: Camden Almazan MD;  Location: [facility]     HC HYSTEROSCOPY, SURGICAL; W/ ENDOMETRIAL ABLATION, ANY METHOD  10-19-12     SHOULDER SURGERY Right     R shoulder     TUBAL LIGATION  2004     Allergy   No Known Allergies  Current Medication List     Current Outpatient Medications   Medication Sig     acetaminophen (TYLENOL) 325 MG tablet Take 325-650 mg by  mouth every 6 hours as needed for mild pain or headaches Maximum daily dose of acetaminophen is 3000 mg in 24 hours.     albuterol (PROAIR HFA/PROVENTIL HFA/VENTOLIN HFA) 108 (90 Base) MCG/ACT inhaler Inhale 2 puffs into the lungs every 4 hours as needed for shortness of breath / dyspnea or wheezing     albuterol (PROAIR HFA/PROVENTIL HFA/VENTOLIN HFA) 108 (90 BASE) MCG/ACT Inhaler Inhale 2 puffs into the lungs every 4 hours as needed for shortness of breath / dyspnea or wheezing Use with spacer (Patient not taking: Reported on 7/19/2019)     amLODIPine (NORVASC) 5 MG tablet Take 1 tablet (5 mg) by mouth daily     azaTHIOprine (IMURAN) 50 MG tablet Take 1.5 tablets (75 mg) by mouth daily     azelastine (ASTELIN) 0.1 % nasal spray Spray 1 spray into both nostrils 2 times daily     azithromycin (ZITHROMAX) 250 MG tablet 2 tablets the first day, then 1 tablet daily for the next 4 days     Belimumab 200 MG/ML SOAJ Inject 200 mg Subcutaneous every 7 days . Hold for signs of infection and seek medical attention. Autoinjector     blood glucose test strip Used for testing glucose 2-3 times daily (Patient not taking: Reported on 7/19/2019)     Calcium Carb-Cholecalciferol (CALCIUM + D3) 600-200 MG-UNIT TABS Take 1 tablet by mouth daily      cevimeline (EVOXAC) 30 MG capsule Take 1 capsule (30 mg) by mouth 2 times daily     diclofenac (VOLTAREN) 1 % GEL Apply 2-4 grams to knees or shoulders four times daily as needed using enclosed dosing card.     famotidine (PEPCID) 40 MG tablet Take 1 tablet (40 mg) by mouth nightly as needed for heartburn     fluticasone (FLONASE) 50 MCG/ACT nasal spray Spray 1 spray into both nostrils daily     fluticasone (FLONASE) 50 MCG/ACT nasal spray Spray 1-2 sprays into both nostrils daily     hydroxychloroquine (PLAQUENIL) 200 MG tablet Hydroxychloroquine 200mg daily; and an additional 200mg every other day.     ipratropium (ATROVENT HFA) 17 MCG/ACT inhaler Inhale 2 puffs into the lungs every 6  hours as needed     LORazepam (ATIVAN) 0.5 MG tablet Take 1 tablet (0.5 mg) by mouth once as needed for anxiety . Take 30min prior to MRI.  Bring 2nd tab to appt and take if needed     losartan (COZAAR) 25 MG tablet Take 1 tablet (25 mg) by mouth daily Due for follow up appointment with Bradford Mario in February 2019 for further refills.     MICROLET LANCETS MISC 1 each daily.     nitroFURantoin macrocrystal-monohydrate (MACROBID) 100 MG capsule Take 1 capsule (100 mg) by mouth 2 times daily (Patient not taking: Reported on 1/20/2020)     predniSONE (DELTASONE) 2.5 MG tablet Take 1-2 tablets (2.5-5 mg) by mouth daily (Patient not taking: Reported on 1/20/2020)     predniSONE (DELTASONE) 5 MG tablet Prednisone 40mg daily x2days, then 20mg daily x5days, then 15mg daily x5days, then 10mg daily x5days, then 5mg x5days, then stop. (Patient not taking: Reported on 1/20/2020)     ranitidine (ZANTAC) 300 MG tablet TAKE 1 TABLET (300 MG) BY MOUTH 2 TIMES DAILY.     Spacer/Aero-Holding Chambers (SPACER/AERO-HOLD CHAMBER MASK) EDITH 1 Adult size spacer to use with [facility]I inhalers.     Spacer/Aero-Holding Chambers (SPACER/AERO-HOLD CHAMBER MASK) EDITH 1 Adult size spacer to use with [facility]I inhalers.     Vitamin D, Cholecalciferol, 1000 units CAPS Take 4,000 Int'l Units by mouth daily     No current facility-administered medications for this visit.          Social History   See HPI    Family History     Family History   Problem Relation Age of Onset     Respiratory Maternal Grandfather      Cancer Maternal Grandfather      Asthma Son      Circulatory Maternal Grandmother         Brain anurysm     Hypertension Mother      Glaucoma Mother 50        drops     Hypertension Sister      Hypertension Sister      Diabetes No family hx of      Cerebrovascular Disease No family hx of      Thyroid Disease No family hx of      Macular Degeneration No family hx of        Physical Exam     GEN: NAD  HEENT: MMM.  Anicteric, noninjected sclera  PULM: No  increased work of breathing  MSK:  hands without swelling.   PSYCH: Alert. Appropriate.     Labs / Imaging (select studies)   RF/CCP  Recent Labs   Lab Test 09/13/13  1504 02/13/12  1404   CCPABY <20 Interpretation:  Negative <20 Interpretation:  Negative   RHF 12  --      YANELIS  Recent Labs   Lab Test 07/25/16  1433   ANAIGG <1:40  Reference range: <1:40  (Note)  <1:40  INTERPRETIVE INFORMATION: YANELIS by IFA, IgG  Anti-nuclear antibodies (YANELIS) are seen in a variety of  systemic rheumatic diseases and are determined by indirect  fluorescence assay (IFA) using HEp-2 substrate with an  IgG-specific conjugate. YANELIS titers less than or equal to  1:80 have variable relevance while titers greater than or  equal to 1:160 are considered clinically significant. These  antibodies may precede clinical disease onset; however,  healthy individuals and those with advanced age have been  reported to be positive for YANELIS. When observed, one of the  five basic patterns is reported: homogeneous,  peripheral/rim, speckled, centromere, or nucleolar. If  cytoplasmic fluorescence is observed, it is noted. IFA  methodology is subjective and has occasionally been shown  to lack sensitivity for anti-SSA/Ro antibodies.  Negative results do not necessarily rule out the presence  of SSc. If clinical suspicion remains, consider further  te sting for U3-RNP, PM/Scl, or Th/To antibodies associated  with SSc.  Performed by [facility],  [address] [place] [id number] [phone]  [email or web address], Alcon Krishna MD, Lab. Director       RNP/Sm/SSA/SSB  Recent Labs   Lab Test 03/23/16  1759 01/04/16  1806 07/01/14  1211 09/13/13  1504 02/13/12  1404   RNPIGG  --   --  <0.2  Negative    --   --    SMIGG <0.2  Negative   Antibody index (AI) values reflect qualitative changes in antibody   concentration that cannot be directly associated with clinical condition or   disease state.   <0.2  Negative   Antibody index (AI) values reflect qualitative  changes in antibody   concentration that cannot be directly associated with clinical condition or   disease state.   <0.2  Negative    --   --    ENASM  --   --   --  0 0   SSAIGG  --   --  <0.2  Negative    --   --    ENASSA  --   --   --  3 12   SSBIGG  --   --  <0.2  Negative    --   --    ENASSB  --   --   --  0 0   ENARMP  --   --   --  0 0   SCLIGG  --   --  <0.2  Negative    --   --      dsDNA  Recent Labs   Lab Test 01/13/20  1804 10/21/19  1552 07/17/19  1756 10/01/18  1313 06/04/18  1758 01/30/18  1752 10/02/17  1814 07/03/17  1447 04/05/17  1820   DNA <1 1 <1 <1 1 <1 <1 <1  Negative   <1  Negative       C3/C4  Recent Labs   Lab Test 01/13/20  1804 10/21/19  1552 07/17/19  1756 01/15/19  1800 10/01/18  1313 06/04/18  1758 01/30/18  1752 10/02/17  1814 07/03/17  1447   O8UDQTN 85 77 67* 68* 63* 60* 86 75* 68*   [id number] 21 13* 13* 14* 14* 13* 21 15 15     Histone Antibody  Recent Labs   Lab Test 02/13/12  1404   HSTO 0.4     Antiphospholipid Antibodies  Recent Labs   Lab Test 09/13/13  1504 02/13/12  1404   CARG <15.0 Interpretation:  Negative <15.0 Interpretation:  Negative   SANCHO <12.5 Interpretation:  Negative <12.5 Interpretation:  Negative   LUPINT  --  Negative (Note) COMMENTS: INR is normal. APTT is normal.  1:2 Mix is not indicated. DRVVT Screen is normal. Thrombin time is normal. NEGATIVE TEST; A LUPUS ANTICOAGULANT WAS NOT DETECTED IN THIS SPECIMEN WITHIN THE LIMITS OF THE TESTING REPERTOIRE. If the clinical picture is strongly suggestive of an antiphospholipid syndrome, recommend anticardiolipin and beta-2-glycoprotein (IgG and IgM) antibody tests. Hyun Denis M.D.  [phone] 2-.  NINR =  1.01 N = 0.86-1.14  (No additional charge) NTT = 16.0 N = 13.0-19.0 sec  (No additional charge)  APTT'S:    Seconds Reagent =  Stago LS Normal  =  36 Patient  =  41 1:2 Mix  =  N/A Reference =  31-43   DILUTE VANNESSA VIPER VENOM TEST: DRVVT Screen Ratio = 0.80 Normal = <1.28      IgG  Recent  Labs   Lab Test 06/17/19  1813 01/30/15  1156 04/02/14  1539   IGG  --   --  897   IGA 82 109 107   IGM  --   --  361*     CBC  Recent Labs   Lab Test 01/13/20  1804 10/21/19  1552 07/17/19  1756   WBC 7.2 7.4 6.0   RBC 3.83 4.08 3.83   HGB 12.9 13.5 12.7   HCT 37.0 39.0 37.0   MCV 97 96 97   RDW 11.5 11.7 11.3    331 313   MCH 33.7* 33.1* 33.2*   MCHC 34.9 34.6 34.3   NEUTROPHIL 76.7 80.7 86.1   LYMPH 14.7 11.7 8.4   MONOCYTE 7.1 6.5 5.0   EOSINOPHIL 1.1 0.7 0.0   BASOPHIL 0.4 0.4 0.5   ANEU 5.6 5.9 5.1   ALYM 1.1 0.9 0.5*   ZOË 0.5 0.5 0.3   AEOS 0.1 0.1 0.0   ABAS 0.0 0.0 0.0     CMP  Recent Labs   Lab Test 01/13/20  1804 10/21/19  1552 07/17/19  1756 05/30/19  0859 04/16/19  1759 01/15/19  1800    137 138  --  140 139   POTASSIUM 3.9 3.7 3.8  --  3.9 3.9   CHLORIDE 106 104 105  --  107 104   CO2 31 30 28  --  26 29   ANIONGAP 3 3 5  --  7 6   * 101* 84 84 143* 140*   BUN 14 15 12  --  15 16   CR 0.73 0.66 0.66  --  0.70 0.78   GFRESTIMATED >90 >90 >90  --  >90 >90   GFRESTBLACK >90 >90 >90  --  >90 >90   MELANIA 8.9 9.3 8.5  --  8.4* 8.2*   BILITOTAL 0.3 0.3 0.3  --  0.2 0.2   ALBUMIN 3.7 4.1 4.3  --  3.8 3.6   PROTTOTAL 6.5* 7.1 7.1  --  6.5* 6.3*   ALKPHOS 54 48 45  --  56 54   AST 21 15 25  --  21 33   ALT 22 19 23  --  22 36     HgA1c  Recent Labs   Lab Test 05/30/19  0859 10/05/12  0824   A1C 5.2 5.3     Calcium/VitaminD  Recent Labs   Lab Test 01/13/20  1804 10/21/19  1552 07/17/19  1756 04/16/19  1759  10/02/17  1814  01/17/13  1039   MELANIA 8.9 9.3 8.5 8.4*   < > 8.7   < > 9.4   VITDT  --   --   --  40  --  37  --  55    < > = values in this interval not displayed.     ESR/CRP  Recent Labs   Lab Test 01/13/20  1804 10/21/19  1552 07/17/19  1756   SED 5 6 6   CRP <2.9 <2.9 <2.9     CK/Aldolase  Recent Labs   Lab Test 01/13/20  1804 07/17/19  1756 10/01/18  1313    150 96     TSH/T4  Recent Labs   Lab Test 04/10/19  1808 10/18/17  1445 10/07/16  0714 02/27/15  1058   TSH 0.44 0.33*  0.37*  --    T4  --  1.24 1.16 1.01     Lipid Panel  Recent Labs   Lab Test 01/26/19  0916 10/07/16  0714 12/18/12  1112   CHOL 133 137 135   TRIG 64 48 108   HDL 68 75 63   LDL 52 52 51   VLDL  --   --  22   CHOLHDLRATIO  --   --  2.1   NHDL 65 62  --      Hepatitis B  Recent Labs   Lab Test 03/29/16  1417 02/13/14  1835 05/22/12  1633   HEPBANG Nonreactive Negative Negative     Hepatitis C  Recent Labs   Lab Test 03/29/16  1417 02/13/14  1835 05/22/12  1633   HCVAB Nonreactive   Assay performance characteristics have not been established for newborns,   infants, and children   Negative Negative     Lyme ab screening  Recent Labs   Lab Test 05/30/15  1355   LYMEGM 0.55     Tuberculosis Screening  Recent Labs   Lab Test 07/03/17  1447 03/29/16  1417 02/13/14  1835   TBRSLT Negative Negative Negative   TBAGN 0.00 0.00 0.00     HIV Screening  Recent Labs   Lab Test 04/10/19  1808   HIAGAB Nonreactive     UA  Recent Labs   Lab Test 01/13/20  1811 12/21/19  1032 10/21/19  1600  10/01/18  1324  04/05/17  1825   COLOR Yellow Yellow Yellow   < > Yellow   < > Yellow   APPEARANCE Slightly Cloudy Slightly Cloudy Clear   < > Clear   < > Clear   URINEGLC Negative Negative Negative   < > Negative   < > Negative   URINEBILI Negative Negative Negative   < > Negative   < > Negative   SG >1.030 1.010 1.020   < > >1.030   < > 1.025   URINEPH 7.0 7.0 7.5*   < > 7.0   < > 7.0   PROTEIN Negative Negative Negative   < > Negative   < > Negative   UROBILINOGEN 0.2 0.2 0.2   < > 1.0   < > 1.0   NITRITE Negative Negative Negative   < > Negative   < > Negative   UBLD Negative Small* Negative   < > Large*   < > Negative   LEUKEST Negative Moderate* Trace*   < > Negative   < > Negative   WBCU 0 - 5 10-25* 0 - 5   < > 0 - 5   < > O - 2   RBCU O - 2 2-5* O - 2   < > 5-10*   < > O - 2   SQUAMOUSEPI Few Few Few   < > Moderate*   < > Few   BACTERIA  --  Moderate* Few*  --  Few*   < >  --    MUCOUS Present* Present*  --   --   --   --  Present*    <  > = values in this interval not displayed.     Urine Microscopic  Recent Labs   Lab Test 01/13/20  1811 12/21/19  1032 10/21/19  1600  10/01/18  1324  04/05/17  1825   WBCU 0 - 5 10-25* 0 - 5   < > 0 - 5   < > O - 2   RBCU O - 2 2-5* O - 2   < > 5-10*   < > O - 2   SQUAMOUSEPI Few Few Few   < > Moderate*   < > Few   BACTERIA  --  Moderate* Few*  --  Few*   < >  --    MUCOUS Present* Present*  --   --   --   --  Present*    < > = values in this interval not displayed.     Urine Protein  Recent Labs   Lab Test 01/13/20  1811 10/21/19  1600 07/17/19  1805   UTP 0.21 0.13 0.09   UTPG 0.18 0.16 0.17   UCRR 117 83 53       Immunization History     Immunization History   Administered Date(s) Administered     Influenza (IIV3) PF 10/01/2012, 09/23/2013     Influenza Vaccine IM > 6 months Valent IIV4 09/20/2016, 10/30/2017, 10/14/2019     Influenza Vaccine, 6+MO IM (QUADRIVALENT W/PRESERVATIVES) 08/11/2018     Pneumo Conj 13-V (2010&after) 04/25/2016     Pneumococcal 23 valent 10/01/2012, 11/06/2017     Tdap (Adacel,Boostrix) 12/17/2010          Chart documentation done in part with Dragon Voice recognition Software. Although reviewed after completion, some word and grammatical error may remain.       Video-Visit Details    Type of service:  Video Visit    Video Start Time: 8:05 AM    Video End Time (time video stopped): 8:20 AM    Originating Location (pt. Location): Home    Distant Location (provider location):  Home    Mode of Communication:  Video Conference via [facility]      Bradford Colvin MD

## 2020-04-07 ENCOUNTER — TELEPHONE (OUTPATIENT)
Dept: RHEUMATOLOGY | Facility: CLINIC | Age: 45
End: 2020-04-07

## 2020-04-07 ENCOUNTER — TRANSFERRED RECORDS (OUTPATIENT)
Dept: HEALTH INFORMATION MANAGEMENT | Facility: CLINIC | Age: 45
End: 2020-04-07

## 2020-04-07 DIAGNOSIS — R53.83 FATIGUE, UNSPECIFIED TYPE: ICD-10-CM

## 2020-04-07 DIAGNOSIS — M32.19 OTHER SYSTEMIC LUPUS ERYTHEMATOSUS WITH OTHER ORGAN INVOLVEMENT (H): ICD-10-CM

## 2020-04-07 LAB
ALBUMIN SERPL-MCNC: 4.3 G/DL (ref 3.4–5)
ALBUMIN UR-MCNC: NEGATIVE MG/DL
ALP SERPL-CCNC: 54 U/L (ref 40–150)
ALT SERPL W P-5'-P-CCNC: 24 U/L (ref 0–50)
ANION GAP SERPL CALCULATED.3IONS-SCNC: 3 MMOL/L (ref 3–14)
APPEARANCE UR: CLEAR
AST SERPL W P-5'-P-CCNC: 13 U/L (ref 0–45)
BACTERIA #/AREA URNS HPF: ABNORMAL /HPF
BASOPHILS # BLD AUTO: 0.1 10E9/L (ref 0–0.2)
BASOPHILS NFR BLD AUTO: 0.8 %
BILIRUB SERPL-MCNC: 0.3 MG/DL (ref 0.2–1.3)
BILIRUB UR QL STRIP: NEGATIVE
BUN SERPL-MCNC: 16 MG/DL (ref 7–30)
CALCIUM SERPL-MCNC: 9.1 MG/DL (ref 8.5–10.1)
CHLORIDE SERPL-SCNC: 106 MMOL/L (ref 94–109)
CK SERPL-CCNC: 82 U/L (ref 30–225)
CO2 SERPL-SCNC: 31 MMOL/L (ref 20–32)
COLOR UR AUTO: YELLOW
CREAT SERPL-MCNC: 0.71 MG/DL (ref 0.52–1.04)
CREAT UR-MCNC: 186 MG/DL
CRP SERPL-MCNC: <2.9 MG/L (ref 0–8)
DIFFERENTIAL METHOD BLD: NORMAL
EOSINOPHIL # BLD AUTO: 0.1 10E9/L (ref 0–0.7)
EOSINOPHIL NFR BLD AUTO: 0.8 %
ERYTHROCYTE [DISTWIDTH] IN BLOOD BY AUTOMATED COUNT: 11.2 % (ref 10–15)
ERYTHROCYTE [SEDIMENTATION RATE] IN BLOOD BY WESTERGREN METHOD: 4 MM/H (ref 0–20)
GFR SERPL CREATININE-BSD FRML MDRD: >90 ML/MIN/{1.73_M2}
GLUCOSE SERPL-MCNC: 104 MG/DL (ref 70–99)
GLUCOSE UR STRIP-MCNC: NEGATIVE MG/DL
HCT VFR BLD AUTO: 40.9 % (ref 35–47)
HGB BLD-MCNC: 14 G/DL (ref 11.7–15.7)
HGB UR QL STRIP: NEGATIVE
KETONES UR STRIP-MCNC: NEGATIVE MG/DL
LEUKOCYTE ESTERASE UR QL STRIP: ABNORMAL
LYMPHOCYTES # BLD AUTO: 0.8 10E9/L (ref 0.8–5.3)
LYMPHOCYTES NFR BLD AUTO: 12.4 %
MCH RBC QN AUTO: 32.3 PG (ref 26.5–33)
MCHC RBC AUTO-ENTMCNC: 34.2 G/DL (ref 31.5–36.5)
MCV RBC AUTO: 94 FL (ref 78–100)
MONOCYTES # BLD AUTO: 0.3 10E9/L (ref 0–1.3)
MONOCYTES NFR BLD AUTO: 4.4 %
MUCOUS THREADS #/AREA URNS LPF: PRESENT /LPF
NEUTROPHILS # BLD AUTO: 5.3 10E9/L (ref 1.6–8.3)
NEUTROPHILS NFR BLD AUTO: 81.6 %
NITRATE UR QL: NEGATIVE
NON-SQ EPI CELLS #/AREA URNS LPF: ABNORMAL /LPF
PH UR STRIP: 6.5 PH (ref 5–7)
PLATELET # BLD AUTO: 306 10E9/L (ref 150–450)
POTASSIUM SERPL-SCNC: 3.6 MMOL/L (ref 3.4–5.3)
PROT SERPL-MCNC: 7.1 G/DL (ref 6.8–8.8)
PROT UR-MCNC: 0.3 G/L
PROT/CREAT 24H UR: 0.16 G/G CR (ref 0–0.2)
RBC # BLD AUTO: 4.34 10E12/L (ref 3.8–5.2)
RBC #/AREA URNS AUTO: ABNORMAL /HPF
SODIUM SERPL-SCNC: 140 MMOL/L (ref 133–144)
SOURCE: ABNORMAL
SP GR UR STRIP: >1.03 (ref 1–1.03)
TSH SERPL DL<=0.005 MIU/L-ACNC: 0.44 MU/L (ref 0.4–4)
UROBILINOGEN UR STRIP-ACNC: 0.2 EU/DL (ref 0.2–1)
WBC # BLD AUTO: 6.4 10E9/L (ref 4–11)
WBC #/AREA URNS AUTO: ABNORMAL /HPF

## 2020-04-07 PROCEDURE — 81001 URINALYSIS AUTO W/SCOPE: CPT | Performed by: INTERNAL MEDICINE

## 2020-04-07 PROCEDURE — 86140 C-REACTIVE PROTEIN: CPT | Performed by: INTERNAL MEDICINE

## 2020-04-07 PROCEDURE — 36415 COLL VENOUS BLD VENIPUNCTURE: CPT | Performed by: INTERNAL MEDICINE

## 2020-04-07 PROCEDURE — 84156 ASSAY OF PROTEIN URINE: CPT | Performed by: INTERNAL MEDICINE

## 2020-04-07 PROCEDURE — 80050 GENERAL HEALTH PANEL: CPT | Performed by: INTERNAL MEDICINE

## 2020-04-07 PROCEDURE — 85652 RBC SED RATE AUTOMATED: CPT | Performed by: INTERNAL MEDICINE

## 2020-04-07 PROCEDURE — 82550 ASSAY OF CK (CPK): CPT | Performed by: INTERNAL MEDICINE

## 2020-04-07 PROCEDURE — 86160 COMPLEMENT ANTIGEN: CPT | Performed by: INTERNAL MEDICINE

## 2020-04-07 PROCEDURE — 82306 VITAMIN D 25 HYDROXY: CPT | Performed by: INTERNAL MEDICINE

## 2020-04-07 PROCEDURE — 86225 DNA ANTIBODY NATIVE: CPT | Performed by: INTERNAL MEDICINE

## 2020-04-07 PROCEDURE — 86160 COMPLEMENT ANTIGEN: CPT | Mod: 59 | Performed by: INTERNAL MEDICINE

## 2020-04-07 NOTE — ORAL ONC MGMT
[facility] Prior Authorization Team Request    Medication: Benlysta 200MG/ML SOAJ  Dosing:   NDC (required for Medicaid members):     Insurance   BIN: [id number]   PCN: [id number]  Grp: [id number]  ID: [id number]     CoverMyMeds Key (if applicable):     Additional documentation:     Filling Pharmacy:  [facility]  Phone Number: [phone]  Contact: NGUYỄN PHARM BETHEL PA ([id number]) please send all responses to this pool.  Pharmacy NPI (required for Medicaid members):

## 2020-04-08 LAB
C3 SERPL-MCNC: 100 MG/DL (ref 81–157)
C4 SERPL-MCNC: 16 MG/DL (ref 13–39)
DEPRECATED CALCIDIOL+CALCIFEROL SERPL-MC: 59 UG/L (ref 20–75)
DSDNA AB SER-ACNC: 1 IU/ML

## 2020-04-09 DIAGNOSIS — M32.19 OTHER SYSTEMIC LUPUS ERYTHEMATOSUS WITH OTHER ORGAN INVOLVEMENT (H): ICD-10-CM

## 2020-04-09 NOTE — TELEPHONE ENCOUNTER
Received fax request from [facility] pharmacy requesting refill(s) for pregabalin (LYRICA) 150 MG capsule    Last refilled on 7/10/17    Pt last seen on 4/12/17  Next appt scheduled for none    Fátima Pruett MA  [facility]      Will facilitate refill.     strength grossly at least 3/5 throughout

## 2020-04-09 NOTE — TELEPHONE ENCOUNTER
Routing refill request to provider for review/approval because:  Drug not on the [facility] refill protocol     Mira Yu RN

## 2020-04-10 ENCOUNTER — DOCUMENTATION ONLY (OUTPATIENT)
Dept: RHEUMATOLOGY | Facility: CLINIC | Age: 45
End: 2020-04-10

## 2020-04-10 RX ORDER — PREDNISONE 2.5 MG/1
2.5 TABLET ORAL DAILY
Qty: 90 TABLET | Refills: 0 | Status: CANCELLED | OUTPATIENT
Start: 2020-04-10

## 2020-04-10 NOTE — TELEPHONE ENCOUNTER
Rheumatology team: Please call Ms. Eugeneberg and her pharmacy to check on the refill request for prednisone 2.5mg daily.  Based on last refill date and quantity a refill should not yet be needed.  If for whatever reason they don't have it the okay to send a 90 day supply of prednisone 2.5mg daily.       Bradford Colvin MD  4/10/2020 3:33 PM

## 2020-04-12 NOTE — TELEPHONE ENCOUNTER
Prior Authorization Approval    Authorization Effective Date: 3/12/2020  Authorization Expiration Date: 4/11/2021  Medication: Benlysta 200MG/ML SOAJ  Approved Dose/Quantity: 4/28ds   Reference #: [id number]   Insurance Company: [employer] - Phone [phone] Fax [phone]  Expected CoPay: $175     CoPay Card Available: No    Foundation Assistance Needed: indigo  Which Pharmacy is filling the prescription (Not needed for infusion/clinic administered): [facility] - [place], [place]  [facility] [address]  Pharmacy Notified: Yes  Patient Notified: Yes

## 2020-04-13 NOTE — TELEPHONE ENCOUNTER
Called patient who stated she did not request this refill and asked that I disregard it as she has an adequate supply of prednisone.  Closing this encounter.    Jasen Pennington RN....4/13/2020 3:24 PM

## 2020-05-11 DIAGNOSIS — K21.9 GASTROESOPHAGEAL REFLUX DISEASE WITHOUT ESOPHAGITIS: ICD-10-CM

## 2020-05-11 NOTE — TELEPHONE ENCOUNTER
"Requested Prescriptions   Pending Prescriptions Disp Refills     famotidine (PEPCID) 40 MG tablet 90 tablet 1     Sig: Take 1 tablet (40 mg) by mouth nightly as needed for heartburn   Last Written Prescription Date:  02/23/20  Last Fill Quantity: 90,  # refills: 1   Last office visit: 6/4/2019 with prescribing provider:     Future Office Visit:   Next 5 appointments (look out 90 days)    Jun 08, 2020  8:00 AM CDT  Return Visit with Bradford Colvin MD  [facility] ([facility]) [address] [facility] [address]  [phone]                   H2 Blockers Protocol Failed - 5/11/2020  1:51 PM        Failed - Recent (12 mo) or future (30 days) visit within the authorizing provider's specialty     Patient has had an office visit with the authorizing provider or a provider within the authorizing providers department within the previous 12 mos or has a future within next 30 days. See \"Patient Info\" tab in inbasket, or \"Choose Columns\" in Meds & Orders section of the refill encounter.              Passed - Patient is age 12 or older        Passed - Medication is active on med list             "

## 2020-05-14 RX ORDER — FAMOTIDINE 40 MG/1
40 TABLET, FILM COATED ORAL
Qty: 30 TABLET | Refills: 0 | Status: SHIPPED | OUTPATIENT
Start: 2020-05-14 | End: 2020-06-13

## 2020-05-14 NOTE — TELEPHONE ENCOUNTER
Routing refill request to provider for review/approval because:  Patient needs to be seen because it has been more than 1 year since last office visit.  4/10/19

## 2020-06-08 ENCOUNTER — VIRTUAL VISIT (OUTPATIENT)
Dept: RHEUMATOLOGY | Facility: CLINIC | Age: 45
End: 2020-06-08
Payer: COMMERCIAL

## 2020-06-08 DIAGNOSIS — M35.01 SJOGREN'S SYNDROME WITH KERATOCONJUNCTIVITIS SICCA (H): ICD-10-CM

## 2020-06-08 DIAGNOSIS — Z79.899 HIGH RISK MEDICATION USE: Primary | ICD-10-CM

## 2020-06-08 DIAGNOSIS — I73.00 RAYNAUD'S PHENOMENON WITHOUT GANGRENE: ICD-10-CM

## 2020-06-08 DIAGNOSIS — M32.19 OTHER SYSTEMIC LUPUS ERYTHEMATOSUS WITH OTHER ORGAN INVOLVEMENT (H): ICD-10-CM

## 2020-06-08 PROCEDURE — 99214 OFFICE O/P EST MOD 30 MIN: CPT | Mod: GT | Performed by: INTERNAL MEDICINE

## 2020-06-08 NOTE — PROGRESS NOTES
"Yovana Enriquez is a 44 year old female who is being evaluated via a billable video visit.      The patient has been notified of following:     \"This video visit will be conducted via a call between you and your physician/provider. We have found that certain health care needs can be provided without the need for an in-person physical exam.  This service lets us provide the care you need with a video conversation.  If a prescription is necessary we can send it directly to your pharmacy.  If lab work is needed we can place an order for that and you can then stop by our lab to have the test done at a later time.    Video visits are billed at different rates depending on your insurance coverage.  Please reach out to your insurance provider with any questions.    If during the course of the call the physician/provider feels a video visit is not appropriate, you will not be charged for this service.\"    Patient has given verbal consent for Video visit? Yes    How would you like to obtain your AVS? [facility]    Patient would like the video invitation sent by: Text to cell phone: [phone]    Will anyone else be joining your video visit? No    Rheumatology Video Visit      Yovana Enriquez MRN# [id number]   YOB: 1975 Age: 44 year old      Date of visit: 6/08/20   PCP: Bradford Mario    Chief Complaint   Patient presents with:  Systemic Lupus Erythematous: doing ok.    Assessment and Plan     1. Systemic lupus erythematosus (YANELIS by EIA positive in the past, leukopenia/lymphocytopenia, hypocomplementemia, polyarthralgias, oral sores, history of malar rash, photophobia, photosensitivity, Raynaud's Phenomenon): Previously on methotrexate that was discontinued for unclear reasons but the patient reports that she tolerated it well. Currently on azathioprine 75 mg daily (dose reduced on 11/8/2018 from 100mg daily without worsening symptoms at that time, eventually reduced to 50 mg daily and did well for a period " of time; now on 75 mg daily), hydroxychloroquine 300 mg daily [avg], and Benlysta (worsening symptoms when stopped in the past).  Diffuse body pain that is more likely secondary to fibromyalgia and possible adrenal insufficiency, also with fatigue responded also to the.  Note that she had a lower cortisol level in the past.  Maintain on her current dose of prednisone 2.5 mg daily.  - Continue azathioprine 75 mg daily   - Continue Evoxac 30 mg twice a day  - Continue hydroxychloroquine 300mg daily on average: 200mg daily with additional 200mg every other day (toxicity monitoring eye exam last done on 5/20/2019); note that because of the coronavirus pandemic hydroxychloroquine has become a potential therapeutic option for coronavirus and is therefore in short supply.  Recommend she try taking hydroxychloroquine only once daily to conserve her supply as it is important that she remain on it, but possibly do just as well from a lower dose   - Continue Benlysta 200 mg SQ every 7 days  - Continue Prednisone 2.5mg daily thereafter  - Labs in early July and early October: CBC, CMP, ESR, CRP, CK, dsDNA, C3, C4, UA, Uprotein:creatinine    2. Secondary Sjogren syndrome: Doing well with Evoxac and frequent sips of water. Dry eyes are not much of an issue currently. See #1.    3. Raynaud's Phenomenon: Amlodipine 5mg daily is effective.  - Continue amlodipine 5mg daily     4. Bilateral trochanteric bursitis: She receives steroid injections from the pain clinic.    5. Fibromyalgia: Managed by the pain clinic and PCP.    # Relevant labs and imaging were reviewed with the patient    # High risk medication toxicity monitoring: discussion and labs reviewed; appropriate labs ordered. See above.  Instructed that if confirmed to have COVID-19 or exposure to someone with confirmed COVID-19 to call this clinic for directions on DMARD management.    # Note that this is a virtual visit to reduce the risk of COVID-19 exposure during this  current pandemic.      # Considered to be at high risk of complications from the COVID-19 virus.  It is recommended to limit contact with other people and if possible to work remotely or provide a leave of absence to reduce the risk for COVID-19.      Ms. Enriquez verbalized agreement with and understanding of the rational for the diagnosis and treatment plan.  All questions were answered to best of my ability and the patient's satisfaction. Ms. Enriquez was advised to contact the clinic with any questions that may arise after the clinic visit.      Thank you for involving me in the care of the patient    Return to clinic: 3-4 months      HPI   Yovana Enriquez is a 44 year old female with medical history significant for subclinical hyperthyroidism, hypertension, irritable bowel syndrome, fibromyalgia, hypertension, non-celiac gluten sensitivity (reportedly with bowel biopsies and laboratory workup that did not show celiac disease), anxiety, and systemic lupus erythematosus who presents for follow-up of SLE.    Today, Ms. Enriquez reports diffuse body pain is overall stable.  Generally her body pain is worse after a long day and improved with rest.  Wakes up 2-3 times per night to urinate.  Sicca symptoms are well controlled with Evoxac and frequent sips of water.  No joint swelling.  Joints without increased warmth.  Morning stiffness for no more than 20 minutes.  Fatigue has improved with addition of prednisone; currently on 2.5 mg daily.    Denies fevers, chills, nausea, vomiting. No abdominal pain.  No chest pain/pressure, palpitations, or shortness of breath.  No rash currently. No LE swelling.  She has photosensitivity and photophobia.   Sicca symptoms are well-controlled with Evoxac and frequent sips of water. No eye pain or redness. No history of serositis. No history of DVT, pulmonary embolism, or miscarriage.    Raynaud's is controlled per patient.  Tolerating amlodipine    Does not wake up gasping for  air.    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  PULM: No SOB, wheeze, cough.  GI:  See history of present illness. No blood in stool. No abdominal pain, nausea, vomiting.  : No blood in urine.  MSK: See HPI.  NEURO: See history of present illness  EXT: No LE swelling  PSYCH: Negative    Active Problem List     Patient Active Problem List   Diagnosis     Systemic lupus erythematosus (H)     Menorrhagia     History of ovarian cyst     Dysmenorrhea     History of gestational diabetes mellitus, not currently pregnant     Intramural leiomyoma of uterus     Hyperlipidemia LDL goal <130     Subclinical hyperthyroidism     Anxiety     High risk medication use     Fibromyalgia     Chronic constipation     Irritable bowel syndrome     Health Care Home     Hypertension goal BP (blood pressure) < 140/90     Non-celiac gluten sensitivity     Raynaud's phenomenon without gangrene     Sjogren's syndrome (H)     Intramural and submucous leiomyoma of uterus     Adrenal insufficiency (H)     Past Medical History     Past Medical History:   Diagnosis Date     Arthritis      Chronic constipation 12/16/2013     Dysmenorrhea 10/1/2012     Fibromyalgia 11/15/2013     Health Care Home 3/19/2014    **See Letters for [facility] Care Plan: Emergency Care Plan       High risk medication use 9/13/2013     History of gestational diabetes mellitus, not currently pregnant 10/1/2012     History of ovarian cyst 10/1/2012     Hyperlipidemia LDL goal <130 10/18/2012     Hypertension goal BP (blood pressure) < 140/90 1/19/2015     Intramural leiomyoma of uterus 10/5/2012     Irritable bowel syndrome 3/11/2014    Patient states no history colonoscopy       Menorrhagia 10/1/2012     Non-celiac gluten sensitivity  2/8/2016     PONV (postoperative nausea and vomiting)      Subclinical hyperthyroidism 1/17/2013     Systemic lupus erythematosus (H) 4/23/2012     Past Surgical History     Past Surgical History:   Procedure Laterality Date     COLONOSCOPY N/A 2/20/2015    Procedure: COMBINED COLONOSCOPY, SINGLE OR MULTIPLE BIOPSY/POLYPECTOMY BY BIOPSY;  Surgeon: Fred Cullen MD;  Location: [facility] OR     COLONOSCOPY N/A 10/29/2018    Procedure: COMBINED COLONOSCOPY, SINGLE OR MULTIPLE BIOPSY/POLYPECTOMY BY BIOPSY;  Surgeon: Inez Sawyer MD;  Location: [facility] OR     COLONOSCOPY WITH CO2 INSUFFLATION N/A 2/20/2015    Procedure: COLONOSCOPY WITH CO2 INSUFFLATION;  Surgeon: Fred Cullen MD;  Location: [facility] OR     ENT SURGERY  10-24-14    Bottom lip biopsies     ESOPHAGOSCOPY, GASTROSCOPY, DUODENOSCOPY (EGD), COMBINED N/A 2/20/2015    Procedure: COMBINED ESOPHAGOSCOPY, GASTROSCOPY, DUODENOSCOPY (EGD), BIOPSY SINGLE OR MULTIPLE;  Surgeon: Fred Cullen MD;  Location: [facility] OR     HC BREATH HYDROGEN TEST  12/31/2013    Procedure: HYDROGEN BREATH TEST;  Surgeon: Camden Almazan MD;  Location: [facility] GI     HC HYSTEROSCOPY, SURGICAL; W/ ENDOMETRIAL ABLATION, ANY METHOD  10-19-12     SHOULDER SURGERY Right     R shoulder     TUBAL LIGATION  2004     Allergy   No Known Allergies  Current Medication List     Current Outpatient Medications   Medication Sig     acetaminophen (TYLENOL) 325 MG tablet Take 325-650 mg by mouth every 6 hours as needed for mild pain or headaches Maximum daily dose of acetaminophen is 3000 mg in 24 hours.     albuterol (PROAIR HFA/PROVENTIL HFA/VENTOLIN HFA) 108 (90 Base) MCG/ACT inhaler Inhale 2 puffs into the lungs every 4 hours as needed for shortness of breath / dyspnea or wheezing     amLODIPine (NORVASC) 5 MG tablet Take 1 tablet (5 mg) by mouth daily     azaTHIOprine (IMURAN) 50 MG tablet Take 1.5 tablets (75 mg) by mouth daily     azelastine (ASTELIN) 0.1 % nasal spray Spray 1  spray into both nostrils 2 times daily     azithromycin (ZITHROMAX) 250 MG tablet 2 tablets the first day, then 1 tablet daily for the next 4 days     Belimumab 200 MG/ML SOAJ Inject 200 mg Subcutaneous every 7 days . Hold for signs of infection and seek medical attention. Autoinjector     Calcium Carb-Cholecalciferol (CALCIUM + D3) 600-200 MG-UNIT TABS Take 1 tablet by mouth daily      cevimeline (EVOXAC) 30 MG capsule Take 1 capsule (30 mg) by mouth 2 times daily     diclofenac (VOLTAREN) 1 % GEL Apply 2-4 grams to knees or shoulders four times daily as needed using enclosed dosing card.     famotidine (PEPCID) 40 MG tablet Take 1 tablet (40 mg) by mouth nightly as needed for heartburn     fluticasone (FLONASE) 50 MCG/ACT nasal spray Spray 1 spray into both nostrils daily     fluticasone (FLONASE) 50 MCG/ACT nasal spray Spray 1-2 sprays into both nostrils daily     hydroxychloroquine (PLAQUENIL) 200 MG tablet Hydroxychloroquine 200mg daily; and an additional 200mg every other day.     ipratropium (ATROVENT HFA) 17 MCG/ACT inhaler Inhale 2 puffs into the lungs every 6 hours as needed     LORazepam (ATIVAN) 0.5 MG tablet Take 1 tablet (0.5 mg) by mouth once as needed for anxiety . Take 30min prior to MRI.  Bring 2nd tab to appt and take if needed     losartan (COZAAR) 25 MG tablet Take 1 tablet (25 mg) by mouth daily Due for follow up appointment with Bradford Mario in February 2019 for further refills.     MICROLET LANCETS MISC 1 each daily.     ranitidine (ZANTAC) 300 MG tablet TAKE 1 TABLET (300 MG) BY MOUTH 2 TIMES DAILY.     Vitamin D, Cholecalciferol, 1000 units CAPS Take 4,000 Int'l Units by mouth daily     albuterol (PROAIR HFA/PROVENTIL HFA/VENTOLIN HFA) 108 (90 BASE) MCG/ACT Inhaler Inhale 2 puffs into the lungs every 4 hours as needed for shortness of breath / dyspnea or wheezing Use with spacer (Patient not taking: Reported on 7/19/2019)     blood glucose test strip Used for testing glucose 2-3 times daily  (Patient not taking: Reported on 7/19/2019)     nitroFURantoin macrocrystal-monohydrate (MACROBID) 100 MG capsule Take 1 capsule (100 mg) by mouth 2 times daily (Patient not taking: Reported on 1/20/2020)     predniSONE (DELTASONE) 2.5 MG tablet Take 1 tablet (2.5 mg) by mouth daily ; after completing the April 2020 taper of prednisone.     Spacer/Aero-Holding Chambers (SPACER/AERO-HOLD CHAMBER MASK) EDITH 1 Adult size spacer to use with [facility] inhalers.     Spacer/Aero-Holding Chambers (SPACER/AERO-HOLD CHAMBER MASK) EDITH 1 Adult size spacer to use with [facility] inhalers.     No current facility-administered medications for this visit.          Social History   See HPI    Family History     Family History   Problem Relation Age of Onset     Respiratory Maternal Grandfather      Cancer Maternal Grandfather      Asthma Son      Circulatory Maternal Grandmother         Brain anurysm     Hypertension Mother      Glaucoma Mother 50        drops     Hypertension Sister      Hypertension Sister      Diabetes No family hx of      Cerebrovascular Disease No family hx of      Thyroid Disease No family hx of      Macular Degeneration No family hx of        Physical Exam       GEN: NAD  HEENT: MMM.  Anicteric, noninjected sclera  PULM: No increased work of breathing  MSK:  Hands and wrists without swelling.   PSYCH: Alert. Appropriate.        Labs / Imaging (select studies)     RF/CCP  Recent Labs   Lab Test 09/13/13  1504   CCPABY <20 Interpretation:  Negative   RHF 12     CBC  Recent Labs   Lab Test 04/07/20  1136 01/13/20  1804 10/21/19  1552   WBC 6.4 7.2 7.4   RBC 4.34 3.83 4.08   HGB 14.0 12.9 13.5   HCT 40.9 37.0 39.0   MCV 94 97 96   RDW 11.2 11.5 11.7    271 331   MCH 32.3 33.7* 33.1*   MCHC 34.2 34.9 34.6   NEUTROPHIL 81.6 76.7 80.7   LYMPH 12.4 14.7 11.7   MONOCYTE 4.4 7.1 6.5   EOSINOPHIL 0.8 1.1 0.7   BASOPHIL 0.8 0.4 0.4   ANEU 5.3 5.6 5.9   ALYM 0.8 1.1 0.9   ZOË 0.3 0.5 0.5   AEOS 0.1 0.1 0.1   ABAS 0.1 0.0 0.0      CMP  Recent Labs   Lab Test 04/07/20  1136 01/13/20  1804 10/21/19  1552    140 137   POTASSIUM 3.6 3.9 3.7   CHLORIDE 106 106 104   CO2 31 31 30   ANIONGAP 3 3 3   * 106* 101*   BUN 16 14 15   CR 0.71 0.73 0.66   GFRESTIMATED >90 >90 >90   GFRESTBLACK >90 >90 >90   MELANIA 9.1 8.9 9.3   BILITOTAL 0.3 0.3 0.3   ALBUMIN 4.3 3.7 4.1   PROTTOTAL 7.1 6.5* 7.1   ALKPHOS 54 54 48   AST 13 21 15   ALT 24 22 19     Calcium/VitaminD  Recent Labs   Lab Test 04/07/20  1136 01/13/20  1804 10/21/19  1552  04/16/19  1759  10/02/17  1814   MELANIA 9.1 8.9 9.3   < > 8.4*   < > 8.7   VITDT 59  --   --   --  40  --  37    < > = values in this interval not displayed.     ESR/CRP  Recent Labs   Lab Test 04/07/20  1136 01/13/20  1804 10/21/19  1552   SED 4 5 6   CRP <2.9 <2.9 <2.9     Lipid Panel  Recent Labs   Lab Test 01/26/19  0916 10/07/16  0714 12/18/12  1112   CHOL 133 137 135   TRIG 64 48 108   HDL 68 75 63   LDL 52 52 51   VLDL  --   --  22   CHOLHDLRATIO  --   --  2.1   NHDL 65 62  --      Hepatitis B  Recent Labs   Lab Test 03/29/16  1417 02/13/14  1835 05/22/12  1633   HEPBANG Nonreactive Negative Negative     Hepatitis C  Recent Labs   Lab Test 03/29/16  1417 02/13/14  1835 05/22/12  1633   HCVAB Nonreactive   Assay performance characteristics have not been established for newborns,   infants, and children   Negative Negative     Lyme ab screening  Recent Labs   Lab Test 05/30/15  1355   LYMEGM 0.55     Tuberculosis Screening  Recent Labs   Lab Test 07/03/17  1447 03/29/16  1417 02/13/14  1835   TBRSLT Negative Negative Negative   TBAGN 0.00 0.00 0.00     HIV Screening  Recent Labs   Lab Test 04/10/19  1808   HIAGAB Nonreactive     Immunization History     Immunization History   Administered Date(s) Administered     Influenza (IIV3) PF 10/01/2012, 09/23/2013     Influenza Vaccine IM > 6 months Valent IIV4 09/20/2016, 10/30/2017, 10/14/2019     Influenza Vaccine, 6+MO IM (QUADRIVALENT W/PRESERVATIVES) 08/11/2018      Pneumo Conj 13-V (2010&after) 04/25/2016     Pneumococcal 23 valent 10/01/2012, 11/06/2017     Tdap (Adacel,Boostrix) 12/17/2010          Chart documentation done in part with Dragon Voice recognition Software. Although reviewed after completion, some word and grammatical error may remain.       Video-Visit Details    Type of service:  Video Visit    Video Start Time: 8 AM  Video End Time: 8:20 AM    Originating Location (pt. Location): Home    Distant Location (provider location):  Home    Platform used for Video Visit: Brianna Colvin MD

## 2020-06-09 RX ORDER — CEVIMELINE HYDROCHLORIDE 30 MG/1
30 CAPSULE ORAL 2 TIMES DAILY
Qty: 180 CAPSULE | Refills: 3 | Status: SHIPPED | OUTPATIENT
Start: 2020-06-09 | End: 2021-01-18

## 2020-06-09 RX ORDER — AZATHIOPRINE 50 MG/1
75 TABLET ORAL DAILY
Qty: 135 TABLET | Refills: 1 | Status: SHIPPED | OUTPATIENT
Start: 2020-06-09 | End: 2020-12-21

## 2020-06-09 RX ORDER — PREDNISONE 2.5 MG/1
2.5 TABLET ORAL DAILY
Qty: 90 TABLET | Refills: 2 | Status: SHIPPED | OUTPATIENT
Start: 2020-06-09 | End: 2021-01-18

## 2020-06-11 DIAGNOSIS — K21.9 GASTROESOPHAGEAL REFLUX DISEASE WITHOUT ESOPHAGITIS: ICD-10-CM

## 2020-06-11 NOTE — TELEPHONE ENCOUNTER
Requested Prescriptions   Pending Prescriptions Disp Refills     famotidine (PEPCID) 40 MG tablet 30 tablet 0     Sig: Take 1 tablet (40 mg) by mouth nightly as needed for heartburn  Last Written Prescription Date:  5/11/20  Last Fill Quantity: 30,  # refills: 0   Last office visit: 6/4/2019 with prescribing provider:  1/20/20 DIEGO Wharton   Future Office Visit:               There is no refill protocol information for this order

## 2020-06-13 RX ORDER — FAMOTIDINE 40 MG/1
40 TABLET, FILM COATED ORAL
Qty: 30 TABLET | Refills: 0 | OUTPATIENT
Start: 2020-06-13

## 2020-06-14 NOTE — TELEPHONE ENCOUNTER
This is a duplicate request, other request sent on the same day, 6/11/20, was sent to the provider to approve.

## 2020-06-14 NOTE — TELEPHONE ENCOUNTER
"Routing refill request to provider for review/approval because:  Patient needs to be seen because it has been more than 1 year since last office visit.    Medication pended for approval, 30 day supply with reminder. This is the second reminder              Requested Prescriptions   Pending Prescriptions Disp Refills     famotidine (PEPCID) 40 MG tablet 30 tablet 0     Sig: Take 1 tablet (40 mg) by mouth nightly as needed for heartburn       H2 Blockers Protocol Failed - 6/11/2020 12:04 PM        Failed - Recent (12 mo) or future (30 days) visit within the authorizing provider's specialty     Patient has had an office visit with the authorizing provider or a provider within the authorizing providers department within the previous 12 mos or has a future within next 30 days. See \"Patient Info\" tab in inbasket, or \"Choose Columns\" in Meds & Orders section of the refill encounter.              Passed - Patient is age 12 or older        Passed - Medication is active on med list             "

## 2020-06-15 RX ORDER — FAMOTIDINE 40 MG/1
40 TABLET, FILM COATED ORAL
Qty: 30 TABLET | Refills: 0 | Status: SHIPPED | OUTPATIENT
Start: 2020-06-15 | End: 2020-08-01

## 2020-06-26 ENCOUNTER — MYC REFILL (OUTPATIENT)
Dept: PALLIATIVE MEDICINE | Facility: CLINIC | Age: 45
End: 2020-06-26

## 2020-06-26 DIAGNOSIS — F40.240 CLAUSTROPHOBIA: ICD-10-CM

## 2020-06-26 RX ORDER — LORAZEPAM 0.5 MG/1
0.5 TABLET ORAL
Qty: 2 TABLET | Refills: 0 | Status: CANCELLED | OUTPATIENT
Start: 2020-06-26

## 2020-06-30 RX ORDER — LORAZEPAM 0.5 MG/1
0.5 TABLET ORAL
Qty: 2 TABLET | Refills: 0 | Status: SHIPPED | OUTPATIENT
Start: 2020-06-30 | End: 2020-11-16

## 2020-06-30 NOTE — TELEPHONE ENCOUNTER
Message sent to pt:    João Hendrix,     Thank you for the clarification. Dr. Locktet has signed off on the lorazepam 0.5 mg prescription for you. You may take 1 tablet 30 minutes before the MRI. Have the 2nd tablet with you just in case you need to take it at some point. You will need to have someone with you to drive you home after the MRI if you take this sedating medication.     Take care,     TEJAS Pyle, RN-BC  Patient Care Supervisor  [facility] [place]

## 2020-06-30 NOTE — TELEPHONE ENCOUNTER
Script Eprescribed to pharmacy    Will send this to MA team to notify patient.    Signed Prescriptions:                        Disp   Refills    LORazepam (ATIVAN) 0.5 MG tablet           2 tabl*0        Sig: Take 1 tablet (0.5 mg) by mouth once as needed for           anxiety . Take 30min prior to MRI.  Bring 2nd tab           to appt and take if needed  Authorizing Provider: TETE PANIAGUA MD  [facility]

## 2020-06-30 NOTE — TELEPHONE ENCOUNTER
Nuno message from pt on 6/26 at 8194:    Refills have been requested for the following medications:         LORazepam (ATIVAN) 0.5 MG tablet [Keena Lockett MD]      Patient Comment: Getting the MRI on Thursday     Preferred pharmacy: [facility] [id number] [facility] - [place], [place] - [address]  ----------------------------    6/29 at 1154:    Alfonso Hendrix, This medication was prescribed to you last year for an MRI. Im wondering if this refill request is an error?     Rosa Isela Hayes, AVNIN, RN  Care Coordinator  [facility] [place]

## 2020-06-30 NOTE — TELEPHONE ENCOUNTER
6/29 at 2137:    No its not an error, I never picked it up last year & hadnt done the MRI yet. I am doing it on thursday.   -----------    Reviewed appt schedule. Cervical MRI is scheduled for 7/2 at 1130.     Pended lorazepam prescription for MRI. Will have Magdalena Lockett MD review.     TEJAS Pyle, RN-BC  Patient Care Supervisor  [facility] [place]

## 2020-07-03 ENCOUNTER — OFFICE VISIT (OUTPATIENT)
Dept: FAMILY MEDICINE | Facility: CLINIC | Age: 45
End: 2020-07-03
Payer: COMMERCIAL

## 2020-07-03 VITALS
RESPIRATION RATE: 16 BRPM | SYSTOLIC BLOOD PRESSURE: 122 MMHG | BODY MASS INDEX: 23.93 KG/M2 | HEART RATE: 74 BPM | TEMPERATURE: 97.7 F | DIASTOLIC BLOOD PRESSURE: 81 MMHG | WEIGHT: 146 LBS

## 2020-07-03 DIAGNOSIS — R39.9 UTI SYMPTOMS: ICD-10-CM

## 2020-07-03 DIAGNOSIS — N30.01 ACUTE CYSTITIS WITH HEMATURIA: Primary | ICD-10-CM

## 2020-07-03 DIAGNOSIS — R82.90 NONSPECIFIC FINDING ON EXAMINATION OF URINE: ICD-10-CM

## 2020-07-03 LAB
ALBUMIN UR-MCNC: 30 MG/DL
APPEARANCE UR: ABNORMAL
BACTERIA #/AREA URNS HPF: ABNORMAL /HPF
BILIRUB UR QL STRIP: NEGATIVE
COLOR UR AUTO: YELLOW
GLUCOSE UR STRIP-MCNC: NEGATIVE MG/DL
HGB UR QL STRIP: ABNORMAL
KETONES UR STRIP-MCNC: ABNORMAL MG/DL
LEUKOCYTE ESTERASE UR QL STRIP: ABNORMAL
NITRATE UR QL: NEGATIVE
NON-SQ EPI CELLS #/AREA URNS LPF: ABNORMAL /LPF
PH UR STRIP: 5.5 PH (ref 5–7)
RBC #/AREA URNS AUTO: ABNORMAL /HPF
SOURCE: ABNORMAL
SP GR UR STRIP: >1.03 (ref 1–1.03)
UROBILINOGEN UR STRIP-ACNC: 0.2 EU/DL (ref 0.2–1)
WBC #/AREA URNS AUTO: >100 /HPF

## 2020-07-03 PROCEDURE — 99213 OFFICE O/P EST LOW 20 MIN: CPT | Performed by: FAMILY MEDICINE

## 2020-07-03 PROCEDURE — 87086 URINE CULTURE/COLONY COUNT: CPT | Performed by: FAMILY MEDICINE

## 2020-07-03 PROCEDURE — 81001 URINALYSIS AUTO W/SCOPE: CPT | Performed by: FAMILY MEDICINE

## 2020-07-03 PROCEDURE — 87186 SC STD MICRODIL/AGAR DIL: CPT | Performed by: FAMILY MEDICINE

## 2020-07-03 PROCEDURE — 87088 URINE BACTERIA CULTURE: CPT | Performed by: FAMILY MEDICINE

## 2020-07-03 RX ORDER — NITROFURANTOIN 25; 75 MG/1; MG/1
100 CAPSULE ORAL 2 TIMES DAILY
Qty: 14 CAPSULE | Refills: 0 | Status: SHIPPED | OUTPATIENT
Start: 2020-07-03 | End: 2020-07-10

## 2020-07-03 ASSESSMENT — PAIN SCALES - GENERAL: PAINLEVEL: MILD PAIN (3)

## 2020-07-03 NOTE — PROGRESS NOTES
Subjective     Yovana Enriquez is a 44 year old female who presents to clinic today for the following health issues:    HPI   URINARY TRACT SYMPTOMS  Onset: a week but getting worse     Description:   Painful urination (Dysuria): YES           Frequency: YES  Blood in urine (Hematuria): YES  Delay in urine (Hesitency): YES    Intensity: severe    Progression of Symptoms:  worsening    Accompanying Signs & Symptoms:  Fever/chills: no   Flank pain no   Nausea and vomiting: no   Any vaginal symptoms: abnormal vaginal bleeding possibly. She says it is when she wipes and in the urine  Abdominal/Pelvic Pain: YES- she is having a lot of pressure    History:   History of frequent UTI's: YES  History of kidney stones: no   Sexually Active: YES  Possibility of pregnancy: No    Precipitating factors:   Urinary urgency    Therapies Tried and outcome: Increase fluid intake        Patient Active Problem List   Diagnosis     Systemic lupus erythematosus (H)     Menorrhagia     History of ovarian cyst     Dysmenorrhea     History of gestational diabetes mellitus, not currently pregnant     Intramural leiomyoma of uterus     Hyperlipidemia LDL goal <130     Subclinical hyperthyroidism     Anxiety     High risk medication use     Fibromyalgia     Chronic constipation     Irritable bowel syndrome     Health Care Home     Hypertension goal BP (blood pressure) < 140/90     Non-celiac gluten sensitivity     Raynaud's phenomenon without gangrene     Sjogren's syndrome (H)     Intramural and submucous leiomyoma of uterus     Adrenal insufficiency (H)     Past Surgical History:   Procedure Laterality Date     COLONOSCOPY N/A 2/20/2015    Procedure: COMBINED COLONOSCOPY, SINGLE OR MULTIPLE BIOPSY/POLYPECTOMY BY BIOPSY;  Surgeon: Fred Cullen MD;  Location: [facility]     COLONOSCOPY N/A 10/29/2018    Procedure: COMBINED COLONOSCOPY, SINGLE OR MULTIPLE BIOPSY/POLYPECTOMY BY BIOPSY;  Surgeon: Inez Sawyer MD;  Location:  OR      COLONOSCOPY WITH CO2 INSUFFLATION N/A 2/20/2015    Procedure: COLONOSCOPY WITH CO2 INSUFFLATION;  Surgeon: Fred Cullen MD;  Location: [facility] OR     ENT SURGERY  10-24-14    Bottom lip biopsies     ESOPHAGOSCOPY, GASTROSCOPY, DUODENOSCOPY (EGD), COMBINED N/A 2/20/2015    Procedure: COMBINED ESOPHAGOSCOPY, GASTROSCOPY, DUODENOSCOPY (EGD), BIOPSY SINGLE OR MULTIPLE;  Surgeon: Fred Cullen MD;  Location: [facility] OR     HC BREATH HYDROGEN TEST  12/31/2013    Procedure: HYDROGEN BREATH TEST;  Surgeon: Camden Almazan MD;  Location: [facility]     HC HYSTEROSCOPY, SURGICAL; W/ ENDOMETRIAL ABLATION, ANY METHOD  10-19-12     SHOULDER SURGERY Right     R shoulder     TUBAL LIGATION  2004       Social History     Tobacco Use     Smoking status: Never Smoker     Smokeless tobacco: Never Used     Tobacco comment: Lives in smoke free household   Substance Use Topics     Alcohol use: No     Alcohol/week: 0.0 standard drinks     Family History   Problem Relation Age of Onset     Respiratory Maternal Grandfather      Cancer Maternal Grandfather      Asthma Son      Circulatory Maternal Grandmother         Brain anurysm     Hypertension Mother      Glaucoma Mother 50        drops     Hypertension Sister      Hypertension Sister      Diabetes No family hx of      Cerebrovascular Disease No family hx of      Thyroid Disease No family hx of      Macular Degeneration No family hx of          Current Outpatient Medications   Medication Sig Dispense Refill     acetaminophen (TYLENOL) 325 MG tablet Take 325-650 mg by mouth every 6 hours as needed for mild pain or headaches Maximum daily dose of acetaminophen is 3000 mg in 24 hours.       amLODIPine (NORVASC) 5 MG tablet Take 1 tablet (5 mg) by mouth daily 90 tablet 3     azaTHIOprine (IMURAN) 50 MG tablet Take 1.5 tablets (75 mg) by mouth daily 135 tablet 1     azelastine (ASTELIN) 0.1 % nasal spray Spray 1 spray into both nostrils 2 times daily 1 Bottle 0      Belimumab 200 MG/ML SOAJ Inject 200 mg Subcutaneous every 7 days . Hold for signs of infection and seek medical attention. Autoinjector 4 mL 6     Calcium Carb-Cholecalciferol (CALCIUM + D3) 600-200 MG-UNIT TABS Take 1 tablet by mouth daily        cevimeline (EVOXAC) 30 MG capsule Take 1 capsule (30 mg) by mouth 2 times daily 180 capsule 3     diclofenac (VOLTAREN) 1 % GEL Apply 2-4 grams to knees or shoulders four times daily as needed using enclosed dosing card. 100 g 4     famotidine (PEPCID) 40 MG tablet Take 1 tablet (40 mg) by mouth nightly as needed for heartburn 30 tablet 0     fluticasone (FLONASE) 50 MCG/ACT nasal spray Spray 1 spray into both nostrils daily 16 g 1     hydroxychloroquine (PLAQUENIL) 200 MG tablet Hydroxychloroquine 200mg daily; and an additional 200mg every other day. 135 tablet 3     losartan (COZAAR) 25 MG tablet Take 1 tablet (25 mg) by mouth daily 90 tablet 1     nitroFURantoin macrocrystal-monohydrate (MACROBID) 100 MG capsule Take 1 capsule (100 mg) by mouth 2 times daily for 7 days 14 capsule 0     predniSONE (DELTASONE) 2.5 MG tablet Take 1 tablet (2.5 mg) by mouth daily 90 tablet 2     Vitamin D, Cholecalciferol, 1000 units CAPS Take 4,000 Int'l Units by mouth daily       albuterol (PROAIR HFA/PROVENTIL HFA/VENTOLIN HFA) 108 (90 Base) MCG/ACT inhaler Inhale 2 puffs into the lungs every 4 hours as needed for shortness of breath / dyspnea or wheezing (Patient not taking: Reported on 7/3/2020) 1 Inhaler 0     blood glucose test strip Used for testing glucose 2-3 times daily (Patient not taking: Reported on 7/19/2019) 1 Month 5     ipratropium (ATROVENT HFA) 17 MCG/ACT inhaler Inhale 2 puffs into the lungs every 6 hours as needed (Patient not taking: Reported on 7/3/2020) 1 Inhaler 0     LORazepam (ATIVAN) 0.5 MG tablet Take 1 tablet (0.5 mg) by mouth once as needed for anxiety . Take 30min prior to MRI.  Bring 2nd tab to appt and take if needed (Patient not taking: Reported on  7/3/2020) 2 tablet 0     MICROLET LANCETS MISC 1 each daily. (Patient not taking: Reported on 7/3/2020) 100 each 2     Spacer/Aero-Holding Chambers (SPACER/AERO-HOLD CHAMBER MASK) EDITH 1 Adult size spacer to use with MDI inhalers. (Patient not taking: Reported on 7/3/2020) 1 each 0     No Known Allergies      Reviewed and updated as needed this visit by Provider         Review of Systems   Constitutional, HEENT, cardiovascular, pulmonary, gi and gu systems are negative, except as otherwise noted.      Objective    /81   Pulse 74   Temp 97.7  F (36.5  C) (Tympanic)   Resp 16   Wt 66.2 kg (146 lb)   BMI 23.93 kg/m    Body mass index is 23.93 kg/m .  Physical Exam   GENERAL: healthy, alert and no distress  ABDOMEN: soft, no CVA tenderness. Suprapubic tenderness, no hepatosplenomegaly, no masses and bowel sounds normal  PSYCH: mentation appears normal, affect normal/bright    Diagnostic Test Results:  Labs reviewed in Epic  Results for orders placed or performed in visit on 07/03/20   *UA reflex to Microscopic and Culture ([place] and [facility] (except [place] and [place])     Status: Abnormal    Specimen: Midstream Urine   Result Value Ref Range    Color Urine Yellow     Appearance Urine Cloudy     Glucose Urine Negative NEG^Negative mg/dL    Bilirubin Urine Negative NEG^Negative    Ketones Urine Trace (A) NEG^Negative mg/dL    Specific Gravity Urine >1.030 1.003 - 1.035    Blood Urine Large (A) NEG^Negative    pH Urine 5.5 5.0 - 7.0 pH    Protein Albumin Urine 30 (A) NEG^Negative mg/dL    Urobilinogen Urine 0.2 0.2 - 1.0 EU/dL    Nitrite Urine Negative NEG^Negative    Leukocyte Esterase Urine Moderate (A) NEG^Negative    Source Midstream Urine    Urine Microscopic     Status: Abnormal   Result Value Ref Range    WBC Urine >100 (A) OTO5^0 - 5 /HPF    RBC Urine  (A) OTO2^O - 2 /HPF    Squamous Epithelial /LPF Urine Few FEW^Few /LPF    Bacteria Urine Moderate (A) NEG^Negative /HPF              Assessment & Plan     Yovana was seen today for uti.    Diagnoses and all orders for this visit:    Acute cystitis with hematuria  -     nitroFURantoin macrocrystal-monohydrate (MACROBID) 100 MG capsule; Take 1 capsule (100 mg) by mouth 2 times daily for 7 days  -     Repeat UA reflex to Microscopic FUTURE 14d; Future to ensure complete resolution of hematuria.     UTI symptoms  -     *UA reflex to Microscopic and Culture ([place] and [facility] (except [place] and [place])  -     Urine Microscopic    Nonspecific finding on examination of urine  -     Urine Culture Aerobic Bacterial       Patient education and Handout with home care instructions given. See AVS for details.        Return in about 1 week (around 7/10/2020), or if symptoms worsen or fail to improve.    Lea Ortega MD  [facility]

## 2020-07-05 LAB
BACTERIA SPEC CULT: ABNORMAL
SPECIMEN SOURCE: ABNORMAL

## 2020-07-06 ENCOUNTER — HOSPITAL ENCOUNTER (OUTPATIENT)
Dept: MRI IMAGING | Facility: CLINIC | Age: 45
Discharge: HOME | End: 2020-07-06
Attending: PSYCHIATRY & NEUROLOGY | Admitting: PSYCHIATRY & NEUROLOGY
Payer: COMMERCIAL

## 2020-07-06 DIAGNOSIS — M47.819 FACET ARTHROPATHY: ICD-10-CM

## 2020-07-06 DIAGNOSIS — M54.2 CERVICALGIA: ICD-10-CM

## 2020-07-06 PROCEDURE — 72141 MRI NECK SPINE W/O DYE: CPT

## 2020-07-26 DIAGNOSIS — Z20.822 ENCOUNTER FOR LABORATORY TESTING FOR COVID-19 VIRUS: ICD-10-CM

## 2020-07-26 DIAGNOSIS — N30.01 ACUTE CYSTITIS WITH HEMATURIA: ICD-10-CM

## 2020-07-26 LAB
ALBUMIN UR-MCNC: NEGATIVE MG/DL
APPEARANCE UR: CLEAR
BILIRUB UR QL STRIP: NEGATIVE
COLOR UR AUTO: YELLOW
GLUCOSE UR STRIP-MCNC: NEGATIVE MG/DL
HGB UR QL STRIP: NEGATIVE
KETONES UR STRIP-MCNC: NEGATIVE MG/DL
LEUKOCYTE ESTERASE UR QL STRIP: NEGATIVE
NITRATE UR QL: NEGATIVE
PH UR STRIP: 6 PH (ref 5–7)
SOURCE: NORMAL
SP GR UR STRIP: 1.01 (ref 1–1.03)
UROBILINOGEN UR STRIP-ACNC: 0.2 EU/DL (ref 0.2–1)

## 2020-07-26 PROCEDURE — 36415 COLL VENOUS BLD VENIPUNCTURE: CPT | Performed by: FAMILY MEDICINE

## 2020-07-26 PROCEDURE — 99000 SPECIMEN HANDLING OFFICE-LAB: CPT | Performed by: FAMILY MEDICINE

## 2020-07-26 PROCEDURE — 81003 URINALYSIS AUTO W/O SCOPE: CPT | Performed by: FAMILY MEDICINE

## 2020-07-26 PROCEDURE — 86769 SARS-COV-2 COVID-19 ANTIBODY: CPT | Mod: 90 | Performed by: FAMILY MEDICINE

## 2020-07-27 LAB
COVID-19 SPIKE RBD ABY TITER: NORMAL
COVID-19 SPIKE RBD ABY: NEGATIVE

## 2020-07-28 ENCOUNTER — VIRTUAL VISIT (OUTPATIENT)
Dept: PALLIATIVE MEDICINE | Facility: CLINIC | Age: 45
End: 2020-07-28
Payer: COMMERCIAL

## 2020-07-28 DIAGNOSIS — M79.7 FIBROMYALGIA: ICD-10-CM

## 2020-07-28 DIAGNOSIS — M32.9 SYSTEMIC LUPUS ERYTHEMATOSUS, UNSPECIFIED SLE TYPE, UNSPECIFIED ORGAN INVOLVEMENT STATUS (H): ICD-10-CM

## 2020-07-28 DIAGNOSIS — M70.62 TROCHANTERIC BURSITIS OF BOTH HIPS: Primary | ICD-10-CM

## 2020-07-28 DIAGNOSIS — M70.61 TROCHANTERIC BURSITIS OF BOTH HIPS: Primary | ICD-10-CM

## 2020-07-28 DIAGNOSIS — M47.819 FACET ARTHROPATHY: ICD-10-CM

## 2020-07-28 DIAGNOSIS — M54.2 CERVICALGIA: ICD-10-CM

## 2020-07-28 PROCEDURE — 99214 OFFICE O/P EST MOD 30 MIN: CPT | Mod: GT | Performed by: PSYCHIATRY & NEUROLOGY

## 2020-07-28 ASSESSMENT — PAIN SCALES - GENERAL: PAINLEVEL: MODERATE PAIN (5)

## 2020-07-28 NOTE — Clinical Note
Just wondering if the arthritis she has could be related to rheumatologic condition, especially given the C1-2 involvement on recent cervical MRI imaging.  Thought I would send this to you in case  to any specific changes.    Magdalena Lockett MD  [facility]

## 2020-07-28 NOTE — PATIENT INSTRUCTIONS
1. Schedule trochanteric bursa injections  2. Schedule medial branch block for neck- you need a   3. Schedule pain PT  4. Schedule follow up video appt in 3 months  5. We discussed trying to limit over the counter meds to 2-3 days/week to avoid medication overuse.  Excedrin may be something you can also try.  6. I will send note to Dr. Colvin re: arthritis

## 2020-07-28 NOTE — PROGRESS NOTES
"Yovana Enriquez is a 45 year old female who is being evaluated via a billable video visit.      The patient has been notified of following:     \"This video visit will be conducted via a call between you and your physician/provider. We have found that certain health care needs can be provided without the need for an in-person physical exam.  This service lets us provide the care you need with a video conversation.  If a prescription is necessary we can send it directly to your pharmacy.  If lab work is needed we can place an order for that and you can then stop by our lab to have the test done at a later time.    Video visits are billed at different rates depending on your insurance coverage.  Please reach out to your insurance provider with any questions.    If during the course of the call the physician/provider feels a video visit is not appropriate, you will not be charged for this service.\"    Patient has given verbal consent for Video visit? Yes  How would you like to obtain your AVS? MyChart  If you are dropped from the video visit, the video invite should be resent to: Text to cell phone: [phone]  Will anyone else be joining your video visit? [facility]    Date of visit: 7/28/2020     Chief complaint:   Chief Complaint   Patient presents with     Pain     Video visit due to COVID-19      Interval history:  Yovana Enriquez was last seen by me on 10/16/19    Recommendations/plan at the last visit were reviewed from 2016. Otherwise, most of her visits have been for bursa injections.    Since her last visit, Yovana Enriquez reports:    -she has constant pain in her neck.  It really hurst when she bends forward, and other range of motion  -it is often worse in the am when she wakes up.  She has a new bed, and she is getting a new pillow  -both sides of the neck bother her.  Into both shoulders.  Sometimes she will get shooting pain " into both arms, and some of her fingers will tingle, not sure which.  Intermittent.  Headaches go up the back of the head.  -no injury that she is aware of.  -not really a headache person before this.  -she will ice her neck, ibuprofen, acetaminophen (tylenol)   -taking over the counter meds several times/week.  -she is not aware of snoring or apnea, but has woken up gasping      Pain scores:  Pain intensity on average is 5 on a scale of 0-10.     Current pain treatments:   Ibuprofen 600-800mg, up to a couple times/day, several days/week.  Acetaminophen (tylenol) 1-2 tabs, up to a couple times/day  Lyrica 150mg TID-likes TID dosing better  Voltaren gel- helpful- on elbows, knee  Nortriptyline 30mg qhs- started for foot pain- no side effects, helps   Ibuprofen 800mg couple of times/week     Previous medication treatments included:   mobic- no side effects   Naprosyn   Gabapentin- only at night, was given for nerve pain in the feet  Lidocaine patches-not as helpful    Side Effects: no side effects reported.     Medications:  Current Outpatient Medications   Medication Sig Dispense Refill     acetaminophen (TYLENOL) 325 MG tablet Take 325-650 mg by mouth every 6 hours as needed for mild pain or headaches Maximum daily dose of acetaminophen is 3000 mg in 24 hours.       amLODIPine (NORVASC) 5 MG tablet Take 1 tablet (5 mg) by mouth daily 90 tablet 3     azaTHIOprine (IMURAN) 50 MG tablet Take 1.5 tablets (75 mg) by mouth daily 135 tablet 1     azelastine (ASTELIN) 0.1 % nasal spray Spray 1 spray into both nostrils 2 times daily 1 Bottle 0     Belimumab 200 MG/ML SOAJ Inject 200 mg Subcutaneous every 7 days . Hold for signs of infection and seek medical attention. Autoinjector 4 mL 6     Calcium Carb-Cholecalciferol (CALCIUM + D3) 600-200 MG-UNIT TABS Take 1 tablet by mouth daily        cevimeline (EVOXAC) 30 MG capsule Take 1 capsule (30 mg) by mouth 2 times daily 180 capsule 3     diclofenac (VOLTAREN) 1 % GEL Apply  2-4 grams to knees or shoulders four times daily as needed using enclosed dosing card. 100 g 4     famotidine (PEPCID) 40 MG tablet Take 1 tablet (40 mg) by mouth nightly as needed for heartburn 30 tablet 0     fluticasone (FLONASE) 50 MCG/ACT nasal spray Spray 1 spray into both nostrils daily 16 g 1     hydroxychloroquine (PLAQUENIL) 200 MG tablet Hydroxychloroquine 200mg daily; and an additional 200mg every other day. 135 tablet 3     losartan (COZAAR) 25 MG tablet Take 1 tablet (25 mg) by mouth daily 90 tablet 1     predniSONE (DELTASONE) 2.5 MG tablet Take 1 tablet (2.5 mg) by mouth daily 90 tablet 2     Vitamin D, Cholecalciferol, 1000 units CAPS Take 4,000 Int'l Units by mouth daily       albuterol (PROAIR HFA/PROVENTIL HFA/VENTOLIN HFA) 108 (90 Base) MCG/ACT inhaler Inhale 2 puffs into the lungs every 4 hours as needed for shortness of breath / dyspnea or wheezing (Patient not taking: Reported on 7/3/2020) 1 Inhaler 0     blood glucose test strip Used for testing glucose 2-3 times daily (Patient not taking: Reported on 7/19/2019) 1 Month 5     ipratropium (ATROVENT HFA) 17 MCG/ACT inhaler Inhale 2 puffs into the lungs every 6 hours as needed (Patient not taking: Reported on 7/3/2020) 1 Inhaler 0     LORazepam (ATIVAN) 0.5 MG tablet Take 1 tablet (0.5 mg) by mouth once as needed for anxiety . Take 30min prior to MRI.  Bring 2nd tab to appt and take if needed (Patient not taking: Reported on 7/3/2020) 2 tablet 0     MICROLET LANCETS MISC 1 each daily. (Patient not taking: Reported on 7/3/2020) 100 each 2     Spacer/Aero-Holding Chambers (SPACER/AERO-HOLD CHAMBER MASK) EDITH 1 Adult size spacer to use with MDI inhalers. (Patient not taking: Reported on 7/3/2020) 1 each 0       Medical History: any changes in medical history since they were last seen? No     Review of Systems:  The 14 system ROS was reviewed from the intake questionnaire, and is positive for: headache, neck pain  B/b problems: none  Mood: anxiety,  stress    Physical Exam:  not currently breastfeeding.  General: awake, alert   Gait: Normal  MSK exam:   Limitations of range of motion for extention, flexion and extension/rotation bilaterally > rotation.  Pain with movement, and limited to ~30 degrees    MRI 7/6/20  Segmental analysis:     Craniocervical junction/C1-C2: Mild degenerative changes of the median atlantoaxial joint. Otherwise unremarkable.     C2-C3: Minimal disc height loss. Symmetric small disc bulge without focal herniation. Normal facets. No spinal or neural foraminal stenosis.     C3-C4: Normal disc height. There is a disc bulge eccentric to the left with left more than right uncovertebral arthropathy and severe left facet arthropathy and mild right facet arthropathy. No spinal stenosis. Moderate left and mild right neural foraminal stenosis.     C4-C5: Minimal disc height loss. Small symmetric disc bulge without  focal herniation. Mild to moderate left and mild right facet  arthropathy. No spinal stenosis. No significant neural foraminal  stenosis.     C5-C6: Moderate disc height loss. There is a disc bulge eccentric to the right with superimposed right central disc protrusion. Posterior endplate osteophytic ridging and right more than left uncovertebral arthropathy. Mild spinal stenosis. Minimal mass effect on the right ventral aspect of the cord. Moderate to severe right and mild left neural foraminal stenosis.     C6-C7: Normal disc height. There is a disc bulge with superimposed small right central disc protrusion. Mild uncovertebral arthropathy and facet arthropathy. No spinal stenosis. No significant neural foraminal stenosis.     C7-T1: Normal disc height. No significant disc bulge or herniation. Normal facets. No spinal or neural foraminal stenosis.                                                                      IMPRESSION:  Multilevel degenerative disc disease and uncovertebral and facet arthropathy of the cervical spine, as  described. Mild spinal stenosis at C5-C6. Otherwise, no significant spinal stenosis. Moderate left neural foraminal stenosis at C3-C4 and moderate to severe right  neural foraminal stenosis at C5-C6. Milder degrees of neural foraminal narrowing elsewhere, as described.      Assessment:   1. Fibromyalgia- symptoms mostly of joint pain, but also with neck and low back pain. Currently flared  2. Chronic low back pain with features of SI joint and facet arthropathy.  Left piriformis   3. Lupus   4. Disturbed sleep/insomnia  5. Trochanteric bursitis  6. Elbow pain  7. Cervicogenic headache- has underlying facet arthropathy and disk changes.  ? Inflammatory condition    While she has some features of neuropathic and possible disk pain in the neck, I think the biggest picture may be the arthritis.  Will address that first.  Given she sometimes feels gasping, JOHNY as cause of morning headaches also possibility but given the severe neck pain will start there.  Advised her to talk to her partner about JOHNY symptoms.  Also, not sure if possible inflammatory component with neck, especially given C1-2 involvement.  Will reach out to Dr. Colvin.    Plan:  1. Physical therapy- pain PT ordered  2. Clinical Health Psychologist to address issues of relaxation, behavioral change, coping style, and other factors important to improvement. Not at this time  3. Diagnostic Studies:  none  4. Medication Management:   1. Discussed medication overuse and over the counter meds  2. She will also consider Excedrin  5. Further procedures recommended:   1. Bursa injections   2. Bilateral C3-5 medial branch block   6. Recommendations to PCP: none. Advised patient that she should keep eye out for JOHNY symptoms given morning headaches.  7. Advised about pillow.    8. Will route to Dr. Colvin- not sure if there is possibility for inflammatory arthritis  9. Follow up: 3 months- video    Magdalena Lockett MD  [facility]        Video-Visit  Details    Type of service:  Video Visit    Video Start Time: 1313  Video End Time:1338    Originating Location (pt. Location): Home    Distant Location (provider location):  [facility]     Platform used for Video Visit: Brianna Lockett MD  [facility]

## 2020-07-29 ENCOUNTER — TELEPHONE (OUTPATIENT)
Dept: PALLIATIVE MEDICINE | Facility: CLINIC | Age: 45
End: 2020-07-29

## 2020-07-29 NOTE — TELEPHONE ENCOUNTER
Screening questions for MBB Injections:    Injection to be done at which interventional clinic site? [facility] [place]     Instruct patient to arrive as directed prior to the scheduled appointment time:    [place]: 30 minutes before      Procedure ordered by Dr. Lockett    Procedure ordered? Cervical Medial Branch Block    What insurance would patient like us to bill for this procedure? [employer]       Worker's comp- Any injection DO NOT SCHEDULE and route to Celina Ardon.      [employer] insurance - If scheduling an SI joint injection DO NOT SCHEDULE and route to Celina Ardon.       MBBs must be scheduled with elapsed time interval of at least 2 weeks and not more than 6 months between the First MBB and the Second MBB for insurance purposes       [employer] - Any injection besides hip/shoulder/knee joint DO NOT SCHEDULE and route to Celina Ardon. She will obtain PA and call pt back to schedule procedure or notify pt of denial.       [employer]- PA required for all MBB's      **BCBS- ALL need to be routed to Celina for review if a PA is needed**    Any chance of pregnancy? NO   If YES, do NOT schedule and route to RN pool    Is an  needed? No     Patient has a drive home? (mandatory) Yes     Is patient taking any blood thinners (plavix, coumadin, jantoven, warfarin, heparin, pradaxa or dabigatran )? No    If hold needed, do NOT schedule, route to RN pool     Is patient taking any aspirin products? No     If more than 325mg/day, OK to schedule; Instruct pt to decrease to less than 325 mg for 7 days AND route to RN pool    For CERVICAL procedures, hold all aspirin products for 6 days.     Tell pt that if aspirin product is not held for 6 days, the procedure WILL BE cancelled.      Does the patient have a bleeding or clotting disorder? No    If YES, okay to schedule AND route to RN nurse pool    **For any patients with platelet count <100, must be forwarded to  provider**    Is patient diabetic? NO If YES, have them bring their glucometer.    Does patient have an active infection or treated for one within the past week? No    Is patient currently taking any antibiotics?  No    For patients on chronic, preventative, or prophylactic antibiotics, procedures may be scheduled.     For patients on antibiotics for active or recent infection:antibiotic course must have been completed for 4 days    Is patient currently taking any steroid medications? (i.e. Prednisone, Medrol)  Yes - Prednisone Chronic     For patients on steroid medications, course must have been completed for 4 days    Is patient actively being treated for cancer or immunocompromised? Yes - Lupus   If YES, do NOT schedule and route to RN    Are you able to get on and off an exam table with minimal or no assistance? Yes   If NO, do NOT schedule and route to RN    Are you able to roll over and lay on your stomach with minimal or no assistance? Yes   If NO, do NOT schedule and route to RN    Any allergies to contrast dye, iodine, shellfish, or numbing and steroid medications? No  (If so, inform nursing and note in scheduling comments.)    Allergies: Patient has no known allergies.     Has the patient had a flu shot or any other vaccinations within 7 days before or after the procedure.  No     Does patient have an MRI/CT?  YES: 2020  Check Procedure Scheduling Grid to see if required.      Was the MRI done within the last 3 years?  Yes    If yes, where was the MRI done i.e.[facility], [facility], [place], [facility] etc? [facility]      If no, do not schedule and route to nursing    If MRI was not done at [place], [facility] or [facility] do NOT schedule and route to nursing.      If pt has an imaging disc, the injection MAY be scheduled but pt has to bring disc to appt.     If they show up without the disc the injection cannot be done      Medial Branch Block Pre-Procedure Instructions    It is okay to take long  acting pain medications (if you are on them) the day of the procedure but try not to take any short acting medications unless absolutely necessary.    YES: Informed   Long acting meds would include: Gabapentin (Neurontin), MS Contin, Oxycontin        Short acting meds would include:  Percocet, Oxycodone, Vicodin, Ibuprofen     The day of the procedure, you should try to do things that provoke your pain, since the injection is being done to see if it will relieve your pain . YES: Informed     If your pain level is a 4 out of 10 or less on the day of the procedu re, please call [phone] to reschedule.  YES: Informed     Reminders:      If you are started on any steroids or antibiotics between now and your appointment, you must contact us because it may affect our ability to perform your procedure INFORMED      Instructed pt to arrive 30 minutes early for IV start if required. (Check Procedure Scheduling Grid) IYES: Informed       If this is for a cervical MBB aspirin needs to be held for 6 days.  YES: Informed       Do not schedule procedures requiring IV placement in the first appointment of the day or first appointment after lunch. Do NOT schedule at 0745, 0815 or 1245.        For patients 85 or older we recommend having an adult stay w/ them for the remainder of the day.      Does the patient have any questions? NO    Josselin GOMES    [facility]

## 2020-07-29 NOTE — TELEPHONE ENCOUNTER
Pt scheduled for video visit PT eval, bursa injection and pre-screened.    Josselin GOMES    [facility]

## 2020-08-01 ENCOUNTER — MYC REFILL (OUTPATIENT)
Dept: FAMILY MEDICINE | Facility: CLINIC | Age: 45
End: 2020-08-01

## 2020-08-01 DIAGNOSIS — J01.00 ACUTE NON-RECURRENT MAXILLARY SINUSITIS: ICD-10-CM

## 2020-08-01 DIAGNOSIS — J20.9 ACUTE BRONCHITIS, UNSPECIFIED ORGANISM: ICD-10-CM

## 2020-08-01 DIAGNOSIS — R09.82 POST-NASAL DRIP: ICD-10-CM

## 2020-08-01 DIAGNOSIS — K21.9 GASTROESOPHAGEAL REFLUX DISEASE WITHOUT ESOPHAGITIS: ICD-10-CM

## 2020-08-03 RX ORDER — FLUTICASONE PROPIONATE 50 MCG
1 SPRAY, SUSPENSION (ML) NASAL DAILY
Qty: 16 G | Refills: 1 | Status: SHIPPED | OUTPATIENT
Start: 2020-08-03 | End: 2020-12-22

## 2020-08-03 NOTE — TELEPHONE ENCOUNTER
Prescription approved per [facility] Refill Protocol.  Elizabeth Bergeron RN

## 2020-08-05 RX ORDER — FAMOTIDINE 40 MG/1
40 TABLET, FILM COATED ORAL
Qty: 30 TABLET | Refills: 0 | Status: SHIPPED | OUTPATIENT
Start: 2020-08-05 | End: 2020-09-23

## 2020-08-05 NOTE — TELEPHONE ENCOUNTER
"Prescription approved per [facility] Refill Protocol.            Requested Prescriptions   Pending Prescriptions Disp Refills     famotidine (PEPCID) 40 MG tablet 30 tablet 0     Sig: Take 1 tablet (40 mg) by mouth nightly as needed for heartburn       H2 Blockers Protocol Passed - 8/1/2020  8:23 PM        Passed - Patient is age 12 or older        Passed - Recent (12 mo) or future (30 days) visit within the authorizing provider's specialty     Patient has had an office visit with the authorizing provider or a provider within the authorizing providers department within the previous 12 mos or has a future within next 30 days. See \"Patient Info\" tab in inbasket, or \"Choose Columns\" in Meds & Orders section of the refill encounter.              Passed - Medication is active on med list             "

## 2020-08-27 DIAGNOSIS — M32.19 OTHER SYSTEMIC LUPUS ERYTHEMATOSUS WITH OTHER ORGAN INVOLVEMENT (H): ICD-10-CM

## 2020-08-27 NOTE — TELEPHONE ENCOUNTER
Requested Prescriptions   Pending Prescriptions Disp Refills     Belimumab 200 MG/ML SOAJ 4 mL 6     Sig: Inject 200 mg Subcutaneous every 7 days . Hold for signs of infection and seek medical attention. Autoinjector   Last Written Prescription Date:  7-18-20  Last Fill Quantity: 4,  # refills: 6   Last office visit: 10/14/2019 with prescribing provider:  10-14-19   Future Office Visit:   Next 5 appointments (look out 90 days)    Oct 12, 2020  8:00 AM CDT  Return Visit with Bradford Colvin MD  [facility] ([facility]) [id number] [id number]  [phone]                 There is no refill protocol information for this order

## 2020-08-31 NOTE — TELEPHONE ENCOUNTER
Sent [employer] message to patient and she replied back. Patient has 4 refills. And recieves from [facility]. Please deny.    Qing Zapata [facility]  8/31/2020 9:42 AM

## 2020-09-14 ENCOUNTER — MYC MEDICAL ADVICE (OUTPATIENT)
Dept: FAMILY MEDICINE | Facility: CLINIC | Age: 45
End: 2020-09-14

## 2020-09-14 DIAGNOSIS — R39.9 UTI SYMPTOMS: Primary | ICD-10-CM

## 2020-09-15 DIAGNOSIS — N30.00 ACUTE CYSTITIS WITHOUT HEMATURIA: Primary | ICD-10-CM

## 2020-09-15 DIAGNOSIS — R39.9 UTI SYMPTOMS: ICD-10-CM

## 2020-09-15 LAB
ALBUMIN UR-MCNC: NEGATIVE MG/DL
APPEARANCE UR: CLEAR
BACTERIA #/AREA URNS HPF: ABNORMAL /HPF
BILIRUB UR QL STRIP: NEGATIVE
COLOR UR AUTO: YELLOW
GLUCOSE UR STRIP-MCNC: NEGATIVE MG/DL
HGB UR QL STRIP: NEGATIVE
KETONES UR STRIP-MCNC: NEGATIVE MG/DL
LEUKOCYTE ESTERASE UR QL STRIP: NEGATIVE
MUCOUS THREADS #/AREA URNS LPF: PRESENT /LPF
NITRATE UR QL: NEGATIVE
NON-SQ EPI CELLS #/AREA URNS LPF: ABNORMAL /LPF
PH UR STRIP: 6 PH (ref 5–7)
RBC #/AREA URNS AUTO: ABNORMAL /HPF
SOURCE: ABNORMAL
SP GR UR STRIP: 1.02 (ref 1–1.03)
UROBILINOGEN UR STRIP-ACNC: 0.2 EU/DL (ref 0.2–1)
WBC #/AREA URNS AUTO: ABNORMAL /HPF

## 2020-09-15 PROCEDURE — 87186 SC STD MICRODIL/AGAR DIL: CPT | Performed by: FAMILY MEDICINE

## 2020-09-15 PROCEDURE — 87088 URINE BACTERIA CULTURE: CPT | Performed by: FAMILY MEDICINE

## 2020-09-15 PROCEDURE — 81001 URINALYSIS AUTO W/SCOPE: CPT | Performed by: FAMILY MEDICINE

## 2020-09-15 PROCEDURE — 87086 URINE CULTURE/COLONY COUNT: CPT | Performed by: FAMILY MEDICINE

## 2020-09-15 RX ORDER — SULFAMETHOXAZOLE/TRIMETHOPRIM 800-160 MG
1 TABLET ORAL 2 TIMES DAILY
Qty: 6 TABLET | Refills: 0 | Status: SHIPPED | OUTPATIENT
Start: 2020-09-15 | End: 2020-09-18

## 2020-09-15 NOTE — TELEPHONE ENCOUNTER
Please offer her a 3 pm appt if she's able to get in today. Otherwise labs signed off and she can schedule a lab only appt anytime. Thanks

## 2020-09-18 LAB
BACTERIA SPEC CULT: ABNORMAL
Lab: ABNORMAL
SPECIMEN SOURCE: ABNORMAL

## 2020-09-20 DIAGNOSIS — N39.0 E. COLI UTI: Primary | ICD-10-CM

## 2020-09-20 DIAGNOSIS — B96.20 E. COLI UTI: Primary | ICD-10-CM

## 2020-09-20 RX ORDER — CIPROFLOXACIN 500 MG/1
500 TABLET, FILM COATED ORAL 2 TIMES DAILY
Qty: 14 TABLET | Refills: 0 | Status: SHIPPED | OUTPATIENT
Start: 2020-09-20 | End: 2020-09-27

## 2020-09-21 DIAGNOSIS — K21.9 GASTROESOPHAGEAL REFLUX DISEASE WITHOUT ESOPHAGITIS: ICD-10-CM

## 2020-09-23 RX ORDER — FAMOTIDINE 40 MG/1
40 TABLET, FILM COATED ORAL
Qty: 30 TABLET | Refills: 8 | Status: SHIPPED | OUTPATIENT
Start: 2020-09-23 | End: 2021-10-11

## 2020-10-12 ENCOUNTER — VIRTUAL VISIT (OUTPATIENT)
Dept: RHEUMATOLOGY | Facility: CLINIC | Age: 45
End: 2020-10-12
Payer: COMMERCIAL

## 2020-10-12 ENCOUNTER — VIRTUAL VISIT (OUTPATIENT)
Dept: PALLIATIVE MEDICINE | Facility: CLINIC | Age: 45
End: 2020-10-12
Payer: COMMERCIAL

## 2020-10-12 DIAGNOSIS — L93.0 LUPUS ERYTHEMATOSUS, UNSPECIFIED FORM: Primary | ICD-10-CM

## 2020-10-12 DIAGNOSIS — M47.819 FACET ARTHROPATHY: ICD-10-CM

## 2020-10-12 DIAGNOSIS — Z79.899 HIGH RISK MEDICATION USE: ICD-10-CM

## 2020-10-12 DIAGNOSIS — M54.2 CERVICALGIA: Primary | ICD-10-CM

## 2020-10-12 DIAGNOSIS — I73.00 RAYNAUD'S PHENOMENON WITHOUT GANGRENE: ICD-10-CM

## 2020-10-12 DIAGNOSIS — M35.01 SJOGREN'S SYNDROME WITH KERATOCONJUNCTIVITIS SICCA (H): ICD-10-CM

## 2020-10-12 DIAGNOSIS — D47.2 MGUS (MONOCLONAL GAMMOPATHY OF UNKNOWN SIGNIFICANCE): ICD-10-CM

## 2020-10-12 PROCEDURE — 97161 PT EVAL LOW COMPLEX 20 MIN: CPT | Mod: GP | Performed by: PHYSICAL THERAPIST

## 2020-10-12 PROCEDURE — 97112 NEUROMUSCULAR REEDUCATION: CPT | Mod: GP | Performed by: PHYSICAL THERAPIST

## 2020-10-12 PROCEDURE — 99214 OFFICE O/P EST MOD 30 MIN: CPT | Mod: GT | Performed by: INTERNAL MEDICINE

## 2020-10-12 NOTE — PROGRESS NOTES
"Yovana Enriquez is a 45 year old female who is being evaluated via a billable video visit.      The patient has been notified of following:     \"This video visit will be conducted via a call between you and your physician/provider. We have found that certain health care needs can be provided without the need for an in-person physical exam.  This service lets us provide the care you need with a video conversation.  If a prescription is necessary we can send it directly to your pharmacy.  If lab work is needed we can place an order for that and you can then stop by our lab to have the test done at a later time.    Video visits are billed at different rates depending on your insurance coverage.  Please reach out to your insurance provider with any questions.    If during the course of the call the physician/provider feels a video visit is not appropriate, you will not be charged for this service.\"    Patient has given verbal consent for Video visit? Yes  How would you like to obtain your AVS? MyChart  If you are dropped from the video visit, the video invite should be resent to: Text to cell phone: [phone]  Will anyone else be joining your video visit? No    Rheumatology Video Visit      Yovana Enriquez MRN# [id number]   YOB: 1975 Age: 45 year old      Date of visit: 10/12/20   PCP: Bradford Mario    Chief Complaint   Patient presents with:  Systemic Lupus Erythematous: doing ok    Assessment and Plan     1. Systemic lupus erythematosus (YANELIS by EIA positive in the past, leukopenia/lymphocytopenia, hypocomplementemia, polyarthralgias, oral sores, history of malar rash, photophobia, photosensitivity, Raynaud's Phenomenon): Previously on methotrexate that was discontinued for unclear reasons but the patient reports that she tolerated it well. Currently on azathioprine 75 mg daily (dose reduced on 11/8/2018 from 100mg daily without worsening symptoms at that time, eventually reduced to 50 mg daily " and did well for a period of time; now on 75 mg daily), hydroxychloroquine 300 mg daily [avg], and Benlysta (worsening symptoms when stopped in the past).  Diffuse body pain that is more likely secondary to fibromyalgia and possible adrenal insufficiency, lower cortisol level in the past.  Maintain on her current dose of prednisone 2.5 mg daily.  Labs overdue; needed ASAP and in 3 mo.  - Continue azathioprine 75 mg daily   - Continue Evoxac 30 mg twice a day  - Continue hydroxychloroquine 300mg daily on average: 200mg daily with additional 200mg every other day (toxicity monitoring eye exam last done on 5/20/2019); note that because of the coronavirus pandemic hydroxychloroquine has become a potential therapeutic option for coronavirus and is therefore in short supply.  Recommend she try taking hydroxychloroquine only once daily to conserve her supply as it is important that she remain on it, but possibly do just as well from a lower dose   - Continue Benlysta 200 mg SQ every 7 days  - Continue Prednisone 2.5mg daily thereafter  - Labs within 1 week and in 3 mo: CBC, CMP, ESR, CRP, CK, dsDNA, C3, C4, UA, Uprotein:creatinine    2. Secondary Sjogren syndrome: Doing well with Evoxac and frequent sips of water. Dry eyes are not much of an issue currently. See #1.    3. Raynaud's Phenomenon: Amlodipine 5mg daily is effective.  - Continue amlodipine 5mg daily     4. Bilateral trochanteric bursitis: She receives steroid injections from the pain clinic.    5. Fibromyalgia: Managed by the pain clinic and PCP.    6. MGUS: SPEP and immunofixation advised to be done yearly per Dr. Yoselin Payan. This has been ordered    7.  Vaccinations: Vaccinations reviewed with Ms. Enriquez.  Risks and benefits of vaccinations were discussed.   CDC stance on shingrix when on moderate to high immunosuppression was reviewed.   I explained that Shingrix is used off label when under 50 years old and that the safety and efficacy of the vaccine has not  been tested in people younger than 50 years old.   - Influenza: encouraged yearly vaccination  - [id number]: up to date  - [id number]: up to date  - Shingrix: Advised receiving; patient is going to check on insurance coverage before determining if it is going to be received   - TDAP: advised receiving  Advised TDAP and influenza now; then at least 1 mo later to get Shingrix      # Relevant labs and imaging were reviewed with the patient    # High risk medication toxicity monitoring: discussion and labs reviewed; appropriate labs ordered. See above.  Instructed that if confirmed to have COVID-19 or exposure to someone with confirmed COVID-19 to call this clinic for directions on DMARD management.    # Note that this is a virtual visit to reduce the risk of COVID-19 exposure during this current pandemic.      # Considered to be at high risk of complications from the COVID-19 virus.  It is recommended to limit contact with other people and if possible to work remotely or provide a leave of absence to reduce the risk for COVID-19.      Ms. Enriquez verbalized agreement with and understanding of the rational for the diagnosis and treatment plan.  All questions were answered to best of my ability and the patient's satisfaction. Ms. Enriquez was advised to contact the clinic with any questions that may arise after the clinic visit.      Thank you for involving me in the care of the patient    Return to clinic: 3-4 months      HPI   Yovana Enriquez is a 45 year old female with medical history significant for subclinical hyperthyroidism, hypertension, irritable bowel syndrome, fibromyalgia, hypertension, non-celiac gluten sensitivity (reportedly with bowel biopsies and laboratory workup that did not show celiac disease), anxiety, and systemic lupus erythematosus who presents for follow-up of SLE.    Today, Ms. Enriquez reports that overall she is doing well.  Some neck pain and she plans to address with Dr. Lockett soon.   Sometimes with nasal or oral sore that she associates with stress - she says that her mother was dx'd with cancer, her sister had triplets at 26wks, and she is still working full time so this is a lot of stress on her.  Sicca symptoms are controlled with Evoxac and frequent sips of water.  No joint swelling.  Joints without increased warmth.  Morning stiffness for no more than 20 minutes.  Fatigue okay right now.      Denies fevers, chills, nausea, vomiting. No abdominal pain.  No chest pain/pressure, palpitations, or shortness of breath.  No rash currently. No LE swelling.  She has photosensitivity and photophobia.   No eye pain or redness. No history of serositis. No history of DVT, pulmonary embolism, or miscarriage.    Raynaud's is controlled per patient.  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  PULM: No SOB, wheeze, cough.  GI:  See history of present illness. No blood in stool. No abdominal pain, nausea, vomiting.  : No blood in urine.  MSK: See HPI.  NEURO: See history of present illness  EXT: No LE swelling  PSYCH: Negative    Active Problem List     Patient Active Problem List   Diagnosis     Systemic lupus erythematosus (H)     Menorrhagia     History of ovarian cyst     Dysmenorrhea     History of gestational diabetes mellitus, not currently pregnant     Intramural leiomyoma of uterus     Hyperlipidemia LDL goal <130     Subclinical hyperthyroidism     Anxiety     High risk medication use     Fibromyalgia     Chronic constipation     Irritable bowel syndrome     Health Care Home     Hypertension goal BP (blood pressure) < 140/90     Non-celiac gluten sensitivity     Raynaud's phenomenon without gangrene     Sjogren's syndrome (H)      Intramural and submucous leiomyoma of uterus     Adrenal insufficiency (H)     Past Medical History     Past Medical History:   Diagnosis Date     Arthritis      Chronic constipation 12/16/2013     Dysmenorrhea 10/1/2012     Fibromyalgia 11/15/2013     Health Care Home 3/19/2014    **See Letters for [facility] Care Plan: Emergency Care Plan       High risk medication use 9/13/2013     History of gestational diabetes mellitus, not currently pregnant 10/1/2012     History of ovarian cyst 10/1/2012     Hyperlipidemia LDL goal <130 10/18/2012     Hypertension goal BP (blood pressure) < 140/90 1/19/2015     Intramural leiomyoma of uterus 10/5/2012     Irritable bowel syndrome 3/11/2014    Patient states no history colonoscopy       Menorrhagia 10/1/2012     Non-celiac gluten sensitivity 2/8/2016     PONV (postoperative nausea and vomiting)      Subclinical hyperthyroidism 1/17/2013     Systemic lupus erythematosus (H) 4/23/2012     Past Surgical History     Past Surgical History:   Procedure Laterality Date     COLONOSCOPY N/A 2/20/2015    Procedure: COMBINED COLONOSCOPY, SINGLE OR MULTIPLE BIOPSY/POLYPECTOMY BY BIOPSY;  Surgeon: Fred Cullen MD;  Location: [facility] OR     COLONOSCOPY N/A 10/29/2018    Procedure: COMBINED COLONOSCOPY, SINGLE OR MULTIPLE BIOPSY/POLYPECTOMY BY BIOPSY;  Surgeon: Inez Sawyer MD;  Location: [facility]     COLONOSCOPY WITH CO2 INSUFFLATION N/A 2/20/2015    Procedure: COLONOSCOPY WITH CO2 INSUFFLATION;  Surgeon: Fred Cullen MD;  Location: [facility] OR     ENT SURGERY  10-24-14    Bottom lip biopsies     ESOPHAGOSCOPY, GASTROSCOPY, DUODENOSCOPY (EGD), COMBINED N/A 2/20/2015    Procedure: COMBINED ESOPHAGOSCOPY, GASTROSCOPY, DUODENOSCOPY (EGD), BIOPSY SINGLE OR MULTIPLE;  Surgeon: Fred Cullen MD;  Location: [facility] OR     HC BREATH HYDROGEN TEST  12/31/2013    Procedure: HYDROGEN BREATH TEST;  Surgeon: Camden Almazan MD;  Location: [facility]     HC HYSTEROSCOPY, SURGICAL; W/  ENDOMETRIAL ABLATION, ANY METHOD  10-19-12     SHOULDER SURGERY Right     R shoulder     TUBAL LIGATION  2004     Allergy   No Known Allergies  Current Medication List     Current Outpatient Medications   Medication Sig     acetaminophen (TYLENOL) 325 MG tablet Take 325-650 mg by mouth every 6 hours as needed for mild pain or headaches Maximum daily dose of acetaminophen is 3000 mg in 24 hours.     amLODIPine (NORVASC) 5 MG tablet Take 1 tablet (5 mg) by mouth daily     azaTHIOprine (IMURAN) 50 MG tablet Take 1.5 tablets (75 mg) by mouth daily     azelastine (ASTELIN) 0.1 % nasal spray Spray 1 spray into both nostrils 2 times daily     Belimumab 200 MG/ML SOAJ Inject 200 mg Subcutaneous every 7 days . Hold for signs of infection and seek medical attention. Autoinjector     Calcium Carb-Cholecalciferol (CALCIUM + D3) 600-200 MG-UNIT TABS Take 1 tablet by mouth daily      cevimeline (EVOXAC) 30 MG capsule Take 1 capsule (30 mg) by mouth 2 times daily     diclofenac (VOLTAREN) 1 % GEL Apply 2-4 grams to knees or shoulders four times daily as needed using enclosed dosing card.     famotidine (PEPCID) 40 MG tablet Take 1 tablet (40 mg) by mouth nightly as needed for heartburn     fluticasone (FLONASE) 50 MCG/ACT nasal spray Spray 1 spray into both nostrils daily     hydroxychloroquine (PLAQUENIL) 200 MG tablet Hydroxychloroquine 200mg daily; and an additional 200mg every other day.     losartan (COZAAR) 25 MG tablet Take 1 tablet (25 mg) by mouth daily     predniSONE (DELTASONE) 2.5 MG tablet Take 1 tablet (2.5 mg) by mouth daily     Vitamin D, Cholecalciferol, 1000 units CAPS Take 4,000 Int'l Units by mouth daily     albuterol (PROAIR HFA/PROVENTIL HFA/VENTOLIN HFA) 108 (90 Base) MCG/ACT inhaler Inhale 2 puffs into the lungs every 4 hours as needed for shortness of breath / dyspnea or wheezing (Patient not taking: Reported on 7/3/2020)     blood glucose test strip Used for testing glucose 2-3 times daily (Patient not  taking: Reported on 7/19/2019)     ipratropium (ATROVENT HFA) 17 MCG/ACT inhaler Inhale 2 puffs into the lungs every 6 hours as needed (Patient not taking: Reported on 7/3/2020)     LORazepam (ATIVAN) 0.5 MG tablet Take 1 tablet (0.5 mg) by mouth once as needed for anxiety . Take 30min prior to MRI.  Bring 2nd tab to appt and take if needed (Patient not taking: Reported on 7/3/2020)     MICROLET LANCETS MISC 1 each daily. (Patient not taking: Reported on 7/3/2020)     Spacer/Aero-Holding Chambers (SPACER/AERO-HOLD CHAMBER MASK) EDITH 1 Adult size spacer to use with MDI inhalers. (Patient not taking: Reported on 7/3/2020)     No current facility-administered medications for this visit.          Social History   See HPI    Family History     Family History   Problem Relation Age of Onset     Respiratory Maternal Grandfather      Cancer Maternal Grandfather      Asthma Son      Circulatory Maternal Grandmother         Brain anurysm     Hypertension Mother      Glaucoma Mother 50        drops     Hypertension Sister      Hypertension Sister      Diabetes No family hx of      Cerebrovascular Disease No family hx of      Thyroid Disease No family hx of      Macular Degeneration No family hx of        Physical Exam       GEN: NAD. Healthy appearing adult.   HEENT: MMM.  Anicteric, noninjected sclera. No obvious external lesions of the ear and nose. Hearing intact.  PULM: No increased work of breathing  MSK:  Hands and wrists without swelling.   SKIN: No rash or jaundice seen. No evidence of current or previous ischemic insult to the fingertips by visual inspection.   PSYCH: Alert. Appropriate.        Labs / Imaging (select studies)   RF/CCP  Recent Labs   Lab Test 09/13/13  1504   CCPABY <20 Interpretation:  Negative   RHF 12     YANELIS  Recent Labs   Lab Test 07/25/16  1433   ANAIGG <1:40  Reference range: <1:40  (Note)  <1:40  INTERPRETIVE INFORMATION: YANELIS by IFA, IgG  Anti-nuclear antibodies (YANELIS) are seen in a variety  of  systemic rheumatic diseases and are determined by indirect  fluorescence assay (IFA) using HEp-2 substrate with an  IgG-specific conjugate. YANELIS titers less than or equal to  1:80 have variable relevance while titers greater than or  equal to 1:160 are considered clinically significant. These  antibodies may precede clinical disease onset; however,  healthy individuals and those with advanced age have been  reported to be positive for YANELIS. When observed, one of the  five basic patterns is reported: homogeneous,  peripheral/rim, speckled, centromere, or nucleolar. If  cytoplasmic fluorescence is observed, it is noted. IFA  methodology is subjective and has occasionally been shown  to lack sensitivity for anti-SSA/Ro antibodies.  Negative results do not necessarily rule out the presence  of SSc. If clinical suspicion remains, consider further  te sting for U3-RNP, PM/Scl, or Th/To antibodies associated  with SSc.  Performed by [facility],  [address] [place] [id number] [phone]  [email or web address], Alcon Krishna MD, Lab. Director       RNP/Sm/SSA/SSB  Recent Labs   Lab Test 03/23/16  1759 01/04/16  1806 07/01/14  1211 09/13/13  1504   RNPIGG  --   --  <0.2  Negative    --    SMIGG <0.2  Negative   Antibody index (AI) values reflect qualitative changes in antibody   concentration that cannot be directly associated with clinical condition or   disease state.   <0.2  Negative   Antibody index (AI) values reflect qualitative changes in antibody   concentration that cannot be directly associated with clinical condition or   disease state.   <0.2  Negative    --    ENASM  --   --   --  0   SSAIGG  --   --  <0.2  Negative    --    ENASSA  --   --   --  3   SSBIGG  --   --  <0.2  Negative    --    ENASSB  --   --   --  0   ENARMP  --   --   --  0   SCLIGG  --   --  <0.2  Negative    --      dsDNA  Recent Labs   Lab Test 04/07/20  1136 01/13/20  1804 10/21/19  1552 07/17/19  1756 10/01/18  1313 06/04/18  1758  01/30/18  1752 10/02/17  1814 07/03/17  1447   DNA 1 <1 1 <1 <1 1 <1 <1 <1  Negative       C3/C4  Recent Labs   Lab Test 04/07/20  1136 01/13/20  1804 10/21/19  1552 07/17/19  1756 01/15/19  1800 10/01/18  1313 06/04/18  1758 01/30/18  1752 10/02/17  1814   V8YBKCE 100 85 77 67* 68* 63* 60* 86 75*   U7QVIXO 16 21 13* 13* 14* 14* 13* 21 15     Antiphospholipid Antibodies  Recent Labs   Lab Test 09/13/13  1504   CARG <15.0 Interpretation:  Negative   SANCHO <12.5 Interpretation:  Negative       IgG  Recent Labs   Lab Test 06/17/19 1813 01/30/15  1156 04/02/14  1539   IGG  --   --  897   IGA 82 109 107   IGM  --   --  361*     CBC  Recent Labs   Lab Test 04/07/20 1136 01/13/20  1804 10/21/19  1552   WBC 6.4 7.2 7.4   RBC 4.34 3.83 4.08   HGB 14.0 12.9 13.5   HCT 40.9 37.0 39.0   MCV 94 97 96   RDW 11.2 11.5 11.7    271 331   MCH 32.3 33.7* 33.1*   MCHC 34.2 34.9 34.6   NEUTROPHIL 81.6 76.7 80.7   LYMPH 12.4 14.7 11.7   MONOCYTE 4.4 7.1 6.5   EOSINOPHIL 0.8 1.1 0.7   BASOPHIL 0.8 0.4 0.4   ANEU 5.3 5.6 5.9   ALYM 0.8 1.1 0.9   ZOË 0.3 0.5 0.5   AEOS 0.1 0.1 0.1   ABAS 0.1 0.0 0.0     CMP  Recent Labs   Lab Test 04/07/20 1136 01/13/20  1804 10/21/19  1552 07/17/19  1756 05/30/19  0859 04/16/19  1759    140 137 138  --  140   POTASSIUM 3.6 3.9 3.7 3.8  --  3.9   CHLORIDE 106 106 104 105  --  107   CO2 31 31 30 28  --  26   ANIONGAP 3 3 3 5  --  7   * 106* 101* 84 84 143*   BUN 16 14 15 12  --  15   CR 0.71 0.73 0.66 0.66  --  0.70   GFRESTIMATED >90 >90 >90 >90  --  >90   GFRESTBLACK >90 >90 >90 >90  --  >90   MELANIA 9.1 8.9 9.3 8.5  --  8.4*   BILITOTAL 0.3 0.3 0.3 0.3  --  0.2   ALBUMIN 4.3 3.7 4.1 4.3  --  3.8   PROTTOTAL 7.1 6.5* 7.1 7.1  --  6.5*   ALKPHOS 54 54 48 45  --  56   AST 13 21 15 25  --  21   ALT 24 22 19 23  --  22     HgA1c  Recent Labs   Lab Test 05/30/19  0859 10/05/12  0824   A1C 5.2 5.3     Calcium/VitaminD  Recent Labs   Lab Test 04/07/20  1136 01/13/20  1804 10/21/19  1552  04/16/19  1759 04/16/19  1759 10/02/17  1814 10/02/17  1814   MELANIA 9.1 8.9 9.3   < > 8.4*   < > 8.7   VITDT 59  --   --   --  40  --  37    < > = values in this interval not displayed.     ESR/CRP  Recent Labs   Lab Test 04/07/20  1136 01/13/20  1804 10/21/19  1552   SED 4 5 6   CRP <2.9 <2.9 <2.9     CK/Aldolase  Recent Labs   Lab Test 04/07/20  1136 01/13/20  1804 07/17/19  1756   CKT 82 130 150     TSH/T4  Recent Labs   Lab Test 04/07/20  1136 04/10/19  1808 10/18/17  1445 10/07/16  0714 02/27/15  1058   TSH 0.44 0.44 0.33* 0.37*  --    T4  --   --  1.24 1.16 1.01     Lipid Panel  Recent Labs   Lab Test 01/26/19  0916 10/07/16  0714 12/18/12  1112   CHOL 133 137 135   TRIG 64 48 108   HDL 68 75 63   LDL 52 52 51   VLDL  --   --  22   CHOLHDLRATIO  --   --  2.1   NHDL 65 62  --      Hepatitis B  Recent Labs   Lab Test 03/29/16  1417 02/13/14  1835   HEPBANG Nonreactive Negative     Hepatitis C  Recent Labs   Lab Test 03/29/16  1417 02/13/14  1835   HCVAB Nonreactive   Assay performance characteristics have not been established for newborns,   infants, and children   Negative     Lyme ab screening  Recent Labs   Lab Test 05/30/15  1355   LYMEGM 0.55     Tuberculosis Screening  Recent Labs   Lab Test 07/03/17  1447 03/29/16  1417 02/13/14  1835   TBRSLT Negative Negative Negative   TBAGN 0.00 0.00 0.00     HIV Screening  Recent Labs   Lab Test 04/10/19  1808   HIAGAB Nonreactive     UA  Recent Labs   Lab Test 09/15/20  1655 07/26/20  0925 07/03/20  1010 04/07/20  1140 01/13/20  1811   COLOR Yellow Yellow Yellow Yellow Yellow   APPEARANCE Clear Clear Cloudy Clear Slightly Cloudy   URINEGLC Negative Negative Negative Negative Negative   URINEBILI Negative Negative Negative Negative Negative   SG 1.025 1.010 >1.030 >1.030 >1.030   URINEPH 6.0 6.0 5.5 6.5 7.0   PROTEIN Negative Negative 30* Negative Negative   UROBILINOGEN 0.2 0.2 0.2 0.2 0.2   NITRITE Negative Negative Negative Negative Negative   UBLD Negative Negative  Large* Negative Negative   LEUKEST Negative Negative Moderate* Trace* Negative   WBCU 5-10*  --  >100* 0 - 5 0 - 5   RBCU O - 2  --  * O - 2 O - 2   SQUAMOUSEPI Few  --  Few Few Few   BACTERIA Few*  --  Moderate* Few*  --    MUCOUS Present*  --   --  Present* Present*     Urine Microscopic  Recent Labs   Lab Test 09/15/20  1655 07/03/20  1010 04/07/20  1140 01/13/20  1811   WBCU 5-10* >100* 0 - 5 0 - 5   RBCU O - 2 * O - 2 O - 2   SQUAMOUSEPI Few Few Few Few   BACTERIA Few* Moderate* Few*  --    MUCOUS Present*  --  Present* Present*     Urine Protein  Recent Labs   Lab Test 04/07/20  1140 01/13/20  1811 10/21/19  1600   UTP 0.30 0.21 0.13   UTPG 0.16 0.18 0.16   UCRR 186 117 83     Immunization History     Immunization History   Administered Date(s) Administered     Influenza (IIV3) PF 10/01/2012, 09/23/2013     Influenza Vaccine IM > 6 months Valent IIV4 09/20/2016, 10/30/2017, 10/14/2019     Influenza Vaccine, 6+MO IM (QUADRIVALENT W/PRESERVATIVES) 08/11/2018     Pneumo Conj 13-V (2010&after) 04/25/2016     Pneumococcal 23 valent 10/01/2012, 11/06/2017     Tdap (Adacel,Boostrix) 12/17/2010          Chart documentation done in part with Dragon Voice recognition Software. Although reviewed after completion, some word and grammatical error may remain.       Video-Visit Details    Type of service:  Video Visit    Video Start Time: 8:02 AM  Video End Time: 8:17 AM    Originating Location (pt. Location): Home , in [place]    Distant Location (provider location):  Home    Platform used for Video Visit: Tracy Colvin MD

## 2020-10-12 NOTE — PROGRESS NOTES
"PHYSICAL THERAPY INITIAL EVALUATION and PLAN OF CARE    Patient Name:  Yovana Enriquez  : 1975  MRN:[id number]    The patient has been notified of the following:  \"This virtual visit will be conducted between you and your provider.  We have found that certain health care needs can be provided without the need for physical presence.  This service lets us provide the care you need with a virtual visit.\"    Due to external, as well as internal [facility] management of the COVID 19 Virus, Yovana Enriquez, was not seen in our clinic.  As a substitution, we implemented a virtual visit to manage this patient's condition utilizing the Capricor virtual visit platform.  The provider, Wilmer Narvaez PT, reviewed the patient's chart and spoke with the patient to determine the following telemedicine visit is appropriate and effective for the patient's care.    The following type of visit was completed:  Video Visit:  The Capricor platform uses a synchronous HIPAA compliant video stream for this encounter.  Video start time:  2:04pm  Video end time:  2:45pm  Provider location:  [facility]  Patient location:  Home  Patient has given verbal consent for video visit? Yes      SUBJECTIVE:  PRESENTATION AND ETIOLOGY  Chief Complaint: Cervicalgia limiting the ability to perform perform activities of daily living and perform required work activities.      Onset / Etiology: longstanding    Pattern Since Onset: Worsened    Pertinent Medical  / Surgical History: Epic Snapshot Reviewed:  Contributing factors to the severity / complexity of the patient's chief complaint include: see providers notes    Pain:  Frequency: Constant or Activity Dependent     LEVEL OF FUNCTION AT START OF CARE  Current Aggrevating Activities / Functional Limitations: walk 10-20', sit 30' stand 30'    Patient's selected goals for physical therapy: tolerate daily routine and work duties better    CURRENT / PREVIOUS INTERVENTION(S): "     Relieving Activities / Self Care / Exercise: trying to walk regularly    Previous / Current therapies for current chief complaint: PT    DEMOGRAPHICS  Employment Status: does in home     Social Support:  Good support    ===============================================================  OBJECTIVE:  POSTURE:  Observation: Note some jaw clenching and UT hyperactive. Forward head, shldr posture, alignment.      GAIT, LOCOMOTION, and BALANCE:  Gait and Locomotion: Non-Antalgic      RANGE OF MOTION:   Active: neck 50% for flex, ext, rot L and R.  B shldrs WFL    MUSCLE PERFORMANCE:   Strength: Weak scap setting      ===============================================================  Today's Treatment:  Initial evaluation  Neuromuscular Reeducation:   Posture  and Kinesthetic Body Awareness - ed on neutral spine  Ed on chronic pain PT POC, walking program  Added seated chin tucks  ===============================================================  ASSESSMENT:  Physical Therapy Diagnosis: Musculoskeletal Patterns    Patient requires PT intervention for the following impairments: Limited knowledge of condition and / or self care - inability to control symptoms, Impaired gait / weight bearing tolerance, Impaired posture / muscle imbalance, Joint hypomobility, Muscle strength and endurance limitations, Pain and Deconditioning    Anticipated Goals and Expected Outcomes:EDUCATION AND SELF CARE  Independent and safe with home exercise / self care program in 6 week(s).  Good working knowledge of posture principles / joint protection in 6 week(s).  FUNCTIONAL MOBILITY  Ambulation without increased pain or symptoms for community distances on all surfaces in 12 week(s).  WORK  All required job duties without increased pain or symptoms in 12 week(s).  ADL'S  Standing tolerance 45 minutes without increased pain or symptoms in 12 week(s).  Homemaking / Yardwork without increased pain or symptoms in 12 week(s).    Rehab potential for  achieving goals: fair.    ===============================================================  PLAN:   Patient will benefit from skilled physical therapy consisting of: neuromuscular reeducation of: movement, balance, coordination, kinesthetic sense, posture and proprioception for sitting and / or standing activities, education in self care / home management training to include instruction in: joint protection principles and symptom control techniques, therapeutic activities to achieve improved functional performance in: daily actvities and work activities and therapeutic exercise to develop: strength and endurance, joint mobility and joint stability    Assessment will be ongoing with changes in treatment as indicated.  Benefits/risks/alternatives to treatment have been reviewed and the patient has been instructed to contact this office if there are any questions or concerns.  This plan of care has been discussed with the patient and the patient is in agreement.     Frequency / Duration:  Patient will be seen for a total of 8-12 visits; at a frequency of every 1-3 weeks.    Total Visit Time: 41  minutes            Wilmer Montejose          PT                                Date:  10/12/2020    ====================================================     PRESENT: NA    MULTIDISCIPLINARY PATIENT / FAMILY EDUCATION RECORD  Department:  Physical Therapy    Readiness to Learn: Ability to understand verbal instructions,Ability to understand written instructions,Knowledge of educational needs / treatment plan  Specific Barriers to Learning: None  Referrals: None  Learning Needs: Rehabilitation techniques to improve functional independence  Who: Patient  How: Demonstration,Verbal instructions,Written instructions  Response: Appropriate verbal response,Asked questions,Demonstrated ability,Verbalized recall / understanding

## 2020-10-25 DIAGNOSIS — D47.2 MGUS (MONOCLONAL GAMMOPATHY OF UNKNOWN SIGNIFICANCE): ICD-10-CM

## 2020-10-25 DIAGNOSIS — L93.0 LUPUS ERYTHEMATOSUS, UNSPECIFIED FORM: ICD-10-CM

## 2020-10-25 LAB
ALBUMIN UR-MCNC: NEGATIVE MG/DL
APPEARANCE UR: CLEAR
BACTERIA #/AREA URNS HPF: ABNORMAL /HPF
BASOPHILS # BLD AUTO: 0.1 10E9/L (ref 0–0.2)
BASOPHILS NFR BLD AUTO: 0.9 %
BILIRUB UR QL STRIP: NEGATIVE
COLOR UR AUTO: YELLOW
CREAT UR-MCNC: 39 MG/DL
DIFFERENTIAL METHOD BLD: ABNORMAL
EOSINOPHIL # BLD AUTO: 0.1 10E9/L (ref 0–0.7)
EOSINOPHIL NFR BLD AUTO: 1.7 %
ERYTHROCYTE [DISTWIDTH] IN BLOOD BY AUTOMATED COUNT: 11.7 % (ref 10–15)
ERYTHROCYTE [SEDIMENTATION RATE] IN BLOOD BY WESTERGREN METHOD: 5 MM/H (ref 0–20)
GLUCOSE UR STRIP-MCNC: NEGATIVE MG/DL
HCT VFR BLD AUTO: 43 % (ref 35–47)
HGB BLD-MCNC: 14.8 G/DL (ref 11.7–15.7)
HGB UR QL STRIP: NEGATIVE
KETONES UR STRIP-MCNC: NEGATIVE MG/DL
LEUKOCYTE ESTERASE UR QL STRIP: ABNORMAL
LYMPHOCYTES # BLD AUTO: 0.9 10E9/L (ref 0.8–5.3)
LYMPHOCYTES NFR BLD AUTO: 16.1 %
MCH RBC QN AUTO: 33.3 PG (ref 26.5–33)
MCHC RBC AUTO-ENTMCNC: 34.4 G/DL (ref 31.5–36.5)
MCV RBC AUTO: 97 FL (ref 78–100)
MONOCYTES # BLD AUTO: 0.4 10E9/L (ref 0–1.3)
MONOCYTES NFR BLD AUTO: 6.7 %
NEUTROPHILS # BLD AUTO: 4.4 10E9/L (ref 1.6–8.3)
NEUTROPHILS NFR BLD AUTO: 74.6 %
NITRATE UR QL: NEGATIVE
NON-SQ EPI CELLS #/AREA URNS LPF: ABNORMAL /LPF
PH UR STRIP: 7 PH (ref 5–7)
PLATELET # BLD AUTO: 346 10E9/L (ref 150–450)
PROT UR-MCNC: <0.05 G/L
PROT/CREAT 24H UR: NORMAL G/G CR (ref 0–0.2)
RBC # BLD AUTO: 4.44 10E12/L (ref 3.8–5.2)
RBC #/AREA URNS AUTO: ABNORMAL /HPF
SOURCE: ABNORMAL
SP GR UR STRIP: 1.01 (ref 1–1.03)
UROBILINOGEN UR STRIP-ACNC: 0.2 EU/DL (ref 0.2–1)
WBC # BLD AUTO: 5.8 10E9/L (ref 4–11)
WBC #/AREA URNS AUTO: ABNORMAL /HPF

## 2020-10-25 PROCEDURE — 80053 COMPREHEN METABOLIC PANEL: CPT | Performed by: INTERNAL MEDICINE

## 2020-10-25 PROCEDURE — 82784 ASSAY IGA/IGD/IGG/IGM EACH: CPT | Mod: 59 | Performed by: INTERNAL MEDICINE

## 2020-10-25 PROCEDURE — 84165 PROTEIN E-PHORESIS SERUM: CPT | Performed by: PATHOLOGY

## 2020-10-25 PROCEDURE — 82784 ASSAY IGA/IGD/IGG/IGM EACH: CPT | Performed by: INTERNAL MEDICINE

## 2020-10-25 PROCEDURE — 81001 URINALYSIS AUTO W/SCOPE: CPT | Performed by: INTERNAL MEDICINE

## 2020-10-25 PROCEDURE — 85025 COMPLETE CBC W/AUTO DIFF WBC: CPT | Performed by: INTERNAL MEDICINE

## 2020-10-25 PROCEDURE — 86160 COMPLEMENT ANTIGEN: CPT | Mod: 59 | Performed by: INTERNAL MEDICINE

## 2020-10-25 PROCEDURE — 86140 C-REACTIVE PROTEIN: CPT | Performed by: INTERNAL MEDICINE

## 2020-10-25 PROCEDURE — 82550 ASSAY OF CK (CPK): CPT | Performed by: INTERNAL MEDICINE

## 2020-10-25 PROCEDURE — 86225 DNA ANTIBODY NATIVE: CPT | Performed by: INTERNAL MEDICINE

## 2020-10-25 PROCEDURE — 85652 RBC SED RATE AUTOMATED: CPT | Performed by: INTERNAL MEDICINE

## 2020-10-25 PROCEDURE — 84156 ASSAY OF PROTEIN URINE: CPT | Performed by: INTERNAL MEDICINE

## 2020-10-25 PROCEDURE — 86160 COMPLEMENT ANTIGEN: CPT | Performed by: INTERNAL MEDICINE

## 2020-10-25 PROCEDURE — 99N1036 PR STATISTIC TOTAL PROTEIN: Performed by: INTERNAL MEDICINE

## 2020-10-25 PROCEDURE — 36415 COLL VENOUS BLD VENIPUNCTURE: CPT | Performed by: INTERNAL MEDICINE

## 2020-10-25 PROCEDURE — 86334 IMMUNOFIX E-PHORESIS SERUM: CPT | Performed by: PATHOLOGY

## 2020-10-26 LAB
ALBUMIN SERPL ELPH-MCNC: 4.5 G/DL (ref 3.7–5.1)
ALBUMIN SERPL-MCNC: 4.4 G/DL (ref 3.4–5)
ALP SERPL-CCNC: 62 U/L (ref 40–150)
ALPHA1 GLOB SERPL ELPH-MCNC: 0.3 G/DL (ref 0.2–0.4)
ALPHA2 GLOB SERPL ELPH-MCNC: 0.7 G/DL (ref 0.5–0.9)
ALT SERPL W P-5'-P-CCNC: 23 U/L (ref 0–50)
ANION GAP SERPL CALCULATED.3IONS-SCNC: 4 MMOL/L (ref 3–14)
AST SERPL W P-5'-P-CCNC: 22 U/L (ref 0–45)
B-GLOBULIN SERPL ELPH-MCNC: 0.7 G/DL (ref 0.6–1)
BILIRUB SERPL-MCNC: 0.4 MG/DL (ref 0.2–1.3)
BUN SERPL-MCNC: 11 MG/DL (ref 7–30)
C3 SERPL-MCNC: 103 MG/DL (ref 81–157)
C4 SERPL-MCNC: >9 MG/DL (ref 13–39)
CALCIUM SERPL-MCNC: 9.1 MG/DL (ref 8.5–10.1)
CHLORIDE SERPL-SCNC: 104 MMOL/L (ref 94–109)
CK SERPL-CCNC: 102 U/L (ref 30–225)
CO2 SERPL-SCNC: 31 MMOL/L (ref 20–32)
CREAT SERPL-MCNC: 0.73 MG/DL (ref 0.52–1.04)
CRP SERPL-MCNC: <2.9 MG/L (ref 0–8)
DSDNA AB SER-ACNC: 1 IU/ML
GAMMA GLOB SERPL ELPH-MCNC: 1.1 G/DL (ref 0.7–1.6)
GFR SERPL CREATININE-BSD FRML MDRD: >90 ML/MIN/{1.73_M2}
GLUCOSE SERPL-MCNC: 86 MG/DL (ref 70–99)
IGA SERPL-MCNC: <2 MG/DL (ref 84–499)
IGG SERPL-MCNC: <17 MG/DL (ref 610–1616)
IGM SERPL-MCNC: 345 MG/DL (ref 35–242)
M PROTEIN SERPL ELPH-MCNC: 0.2 G/DL
POTASSIUM SERPL-SCNC: 4.1 MMOL/L (ref 3.4–5.3)
PROT PATTERN SERPL ELPH-IMP: ABNORMAL
PROT PATTERN SERPL IFE-IMP: ABNORMAL
PROT SERPL-MCNC: 7.5 G/DL (ref 6.8–8.8)
SODIUM SERPL-SCNC: 139 MMOL/L (ref 133–144)

## 2020-10-27 ENCOUNTER — TELEPHONE (OUTPATIENT)
Dept: RHEUMATOLOGY | Facility: CLINIC | Age: 45
End: 2020-10-27

## 2020-10-27 DIAGNOSIS — L93.0 LUPUS ERYTHEMATOSUS, UNSPECIFIED FORM: ICD-10-CM

## 2020-10-27 DIAGNOSIS — Z79.899 HIGH RISK MEDICATION USE: ICD-10-CM

## 2020-10-27 DIAGNOSIS — D47.2 MGUS (MONOCLONAL GAMMOPATHY OF UNKNOWN SIGNIFICANCE): ICD-10-CM

## 2020-10-27 DIAGNOSIS — R76.8 LOW SERUM IGG FOR AGE: Primary | ICD-10-CM

## 2020-10-27 DIAGNOSIS — R76.8 LOW SERUM IGA FOR AGE: ICD-10-CM

## 2020-10-27 NOTE — TELEPHONE ENCOUNTER
Patient states some of her labs are really off and she wants your interpretation of them.  Please call.    Thank you.

## 2020-10-28 ENCOUNTER — VIRTUAL VISIT (OUTPATIENT)
Dept: PALLIATIVE MEDICINE | Facility: CLINIC | Age: 45
End: 2020-10-28
Payer: COMMERCIAL

## 2020-10-28 DIAGNOSIS — M47.819 FACET ARTHROPATHY: ICD-10-CM

## 2020-10-28 DIAGNOSIS — M54.2 CERVICALGIA: Primary | ICD-10-CM

## 2020-10-28 PROCEDURE — 97110 THERAPEUTIC EXERCISES: CPT | Mod: GP | Performed by: PHYSICAL THERAPIST

## 2020-10-28 PROCEDURE — 97112 NEUROMUSCULAR REEDUCATION: CPT | Mod: GP | Performed by: PHYSICAL THERAPIST

## 2020-10-28 NOTE — PROGRESS NOTES
"Patient Name: Yovana Enriquez      YOB: 1975     Medical Record Number: [id number]  Diagnosis:    Cervicalgia  Facet arthropathy     The patient has been notified of the following:  \"This virtual visit will be conducted between you and your provider.  We have found that certain health care needs can be provided without the need for physical presence.  This service lets us provide the care you need with a virtual visit.\"    Due to external, as well as internal  [facility] [place] management of the COVID 19 Virus, Yovana Enriquez, was not seen in our clinic.  As a substitution, we implemented a virtual visit to manage this patient's condition utilizing the [employer] virtual visit platform.  The provider, Wilmer Narvaez PT, reviewed the patient's chart and spoke with the patient to determine the following telemedicine visit is appropriate and effective for the patient's care.    The following type of visit was completed:  Video Visit:  The [employer] Interaction platform uses a synchronous HIPAA compliant video stream for this encounter.  Video start time:  9:34am  Video end time:  10:08am  Provider location:  [place] [facility] [place]  Patient location:  Home  Patient has given verbal consent for video visit? Yes        Visit Info Length of Visit: 34'  Arrived: On Time   Exercise/Activity Education Instruction:  Postural Training/Anatomy  Pacing/Energy Conservation  Home Program  Self Care  Activity:  Therapeutic Exercise 23':  Aerobic Conditioning (*see \"Strength/Functional Actitivities Record for details of exercises performed)  Ed on han walk sim, ed on components of exercise rod aerobic #4  Stretching:  Upper Ext  bilateral, Lower Ext  bilateral, Cervical  Added stretches for thor ext, KTC, neck mid range rot, SB  Postural Training:  standing, sitting    Neuro re-ed 10':  Ed on neutral spine in all positions  Diaphragm breathing ed   Ed on following sequenced of 'breathe, stabilize, move' concept  Ed on " HR and relation to pain (pt has Apple Watch)   Notes: Tolerated session well.   Demonstrates/Verbalizes Technique: 3 (1= poor technique-difficulty performing exercises,significant cues required; 5= good technique-performs exercises without cues)  Body Awareness: 2, 3 (1=low awareness; 5=high awareness)  Posture/Stability: 2, 3 (1= poor posture, stability; 5= good posture, stability)   Motivational Level:   Ask questions, Eager to learn and Cooperative Participation:  Full   Patient Satisfaction:  satisfied   Response to Teaching:   cooperative and needs reinforcement  Factors that affect learning: None   Plan of Care  Cont PT to support reactivation and integration of self regulation pain management skills.   Therapist: Wilmer Narvaez, PT                 Date: 10/28/2020

## 2020-10-28 NOTE — TELEPHONE ENCOUNTER
Forwarded to Dr. Colvin to review and advise on labs from 10/25/20.    Jasen Pennington RN....10/28/2020 12:48 PM

## 2020-10-29 ENCOUNTER — VIRTUAL VISIT (OUTPATIENT)
Dept: PALLIATIVE MEDICINE | Facility: CLINIC | Age: 45
End: 2020-10-29
Payer: COMMERCIAL

## 2020-10-29 DIAGNOSIS — M54.2 CERVICALGIA: ICD-10-CM

## 2020-10-29 DIAGNOSIS — M70.62 TROCHANTERIC BURSITIS OF BOTH HIPS: ICD-10-CM

## 2020-10-29 DIAGNOSIS — R51.9 CHRONIC DAILY HEADACHE: ICD-10-CM

## 2020-10-29 DIAGNOSIS — M47.819 FACET ARTHROPATHY: Primary | ICD-10-CM

## 2020-10-29 DIAGNOSIS — M70.61 TROCHANTERIC BURSITIS OF BOTH HIPS: ICD-10-CM

## 2020-10-29 PROCEDURE — 99213 OFFICE O/P EST LOW 20 MIN: CPT | Mod: GT | Performed by: PSYCHIATRY & NEUROLOGY

## 2020-10-29 RX ORDER — NORTRIPTYLINE HCL 10 MG
10 CAPSULE ORAL AT BEDTIME
Qty: 90 CAPSULE | Refills: 3 | Status: SHIPPED | OUTPATIENT
Start: 2020-10-29 | End: 2021-02-26

## 2020-10-29 ASSESSMENT — PAIN SCALES - GENERAL: PAINLEVEL: MILD PAIN (3)

## 2020-10-29 NOTE — PATIENT INSTRUCTIONS
1. [facility] [place]   [address]  [place] [address]    Scheduling  [phone]  Phone  [phone]    2. Contact when you are cleared from COVID quarantine, we can move up your bursa injection- [place].    3. Follow up in 2 months- video    4. Restart nortriptyline. Start at 10mg at night. After 1 week, increase to 20mg/day.  If tolerating, after another week, titrate to     ----------------------------------------------------------------  Clinic Number:  [phone]     Call with any questions about your care and for scheduling assistance.     Calls are returned Monday through Friday between 8 AM and 4:30 PM. We usually get back to you within 2 business days depending on the issue/request.    If we are prescribing your medications:    For opioid medication refills, call the clinic or send a thrdPlace message 7 days in advance.  Please include:    Name of requested medication    Name of the pharmacy.    For non-opioid medications, call your pharmacy directly to request a refill. Please allow 3-4 days to be processed.     Per [place] State Law:    All controlled substance prescriptions must be filled within 30 days of being written.      For those controlled substances allowing refills, pickup must occur within 30 days of last fill.      We believe regular attendance is key to your success in our program!      Any time you are unable to keep your appointment we ask that you call us at least 24 hours in advance to cancel.This will allow us to offer the appointment time to another patient.     Multiple missed appointments may lead to dismissal from the clinic.

## 2020-10-29 NOTE — PROGRESS NOTES
"Yovana Enriquez is a 45 year old female who is being evaluated via a billable video visit.      The patient has been notified of following:     \"This video visit will be conducted via a call between you and your physician/provider. We have found that certain health care needs can be provided without the need for an in-person physical exam.  This service lets us provide the care you need with a video conversation.  If a prescription is necessary we can send it directly to your pharmacy.  If lab work is needed we can place an order for that and you can then stop by our lab to have the test done at a later time.    Video visits are billed at different rates depending on your insurance coverage.  Please reach out to your insurance provider with any questions.    If during the course of the call the physician/provider feels a video visit is not appropriate, you will not be charged for this service.\"    Patient has given verbal consent for Video visit? Yes  How would you like to obtain your AVS? MyChart  If you are dropped from the video visit, the video invite should be resent to: Text to cell phone: [phone]  Will anyone else be joining your video visit? No    Sarina Ramos CMA ([facility]    Date of visit: 10/29/2020      Chief complaint:   Chief Complaint   Patient presents with     Pain     Video visit due to COVID-19     Interval history:  Yovana Enriquez was last seen by me on 7/28/20    Recommendations/plan at the last visit included:  1. Physical therapy- pain PT ordered  2. Clinical Health Psychologist to address issues of relaxation, behavioral change, coping style, and other factors important to improvement. Not at this time  3. Diagnostic Studies:  none  4. Medication Management:   1. Discussed medication overuse and over the counter meds  2. She will also consider Excedrin  5. Further procedures recommended:   1. Bursa " injections   2. Bilateral C3-5 medial branch block   6. Recommendations to PCP: none. Advised patient that she should keep eye out for JOHNY symptoms given morning headaches.  7. Advised about pillow.    8. Will route to Dr. Colvin- not sure if there is possibility for inflammatory arthritis  9. Follow up: 3 months- video    Since her last visit, Yovana Enriquez reports:  -both of her sons have COVID. She is currently is getting retested  -needs to have procedures in her neck, is scheduled Monday but needs to be rescheduled. She isn't sure how she will do this, as she doesn't have her Mom anymore as she was recently diagnosed with cancer.  -she has a lot of pain with walking up. She has tried different pillows.  -she has appt for troch bursa injections later in Nov.      Pain scores:  Pain intensity on average is 3 on a scale of 0-10.     Current pain treatments:   Ibuprofen 600-800mg, up to a couple times/day, several days/week.- less redcent  Acetaminophen (tylenol) 1-2 tabs, up to a couple times/day  Lyrica 150mg TID-likes TID dosing better  Voltaren gel- helpful- on elbows, knee  Nortriptyline 30mg qhs- started for foot pain- no side effects, helps     Previous medication treatments included:   mobic- no side effects   Naprosyn   Gabapentin- only at night, was given for nerve pain in the feet  Lidocaine patches-not as helpful    Side Effects: no side effects reported.     Medications:  Current Outpatient Medications   Medication Sig Dispense Refill     acetaminophen (TYLENOL) 325 MG tablet Take 325-650 mg by mouth every 6 hours as needed for mild pain or headaches Maximum daily dose of acetaminophen is 3000 mg in 24 hours.       amLODIPine (NORVASC) 5 MG tablet Take 1 tablet (5 mg) by mouth daily 90 tablet 3     azaTHIOprine (IMURAN) 50 MG tablet Take 1.5 tablets (75 mg) by mouth daily 135 tablet 1     azelastine (ASTELIN) 0.1 % nasal spray Spray 1 spray into both nostrils 2 times daily 1 Bottle 0      Belimumab 200 MG/ML SOAJ Inject 200 mg Subcutaneous every 7 days . Hold for signs of infection and seek medical attention. Autoinjector 4 mL 6     Calcium Carb-Cholecalciferol (CALCIUM + D3) 600-200 MG-UNIT TABS Take 1 tablet by mouth daily        cevimeline (EVOXAC) 30 MG capsule Take 1 capsule (30 mg) by mouth 2 times daily 180 capsule 3     diclofenac (VOLTAREN) 1 % GEL Apply 2-4 grams to knees or shoulders four times daily as needed using enclosed dosing card. 100 g 4     famotidine (PEPCID) 40 MG tablet Take 1 tablet (40 mg) by mouth nightly as needed for heartburn 30 tablet 8     fluticasone (FLONASE) 50 MCG/ACT nasal spray Spray 1 spray into both nostrils daily 16 g 1     hydroxychloroquine (PLAQUENIL) 200 MG tablet Hydroxychloroquine 200mg daily; and an additional 200mg every other day. 135 tablet 3     losartan (COZAAR) 25 MG tablet Take 1 tablet (25 mg) by mouth daily 90 tablet 1     nortriptyline (PAMELOR) 10 MG capsule Take 1 capsule (10 mg) by mouth At Bedtime . After 1 week, increase to 20mg (2 tabs) at night.  After another week, increase to 30mg (3 tabs) at night. 90 capsule 3     predniSONE (DELTASONE) 2.5 MG tablet Take 1 tablet (2.5 mg) by mouth daily 90 tablet 2     Vitamin D, Cholecalciferol, 1000 units CAPS Take 4,000 Int'l Units by mouth daily       albuterol (PROAIR HFA/PROVENTIL HFA/VENTOLIN HFA) 108 (90 Base) MCG/ACT inhaler Inhale 2 puffs into the lungs every 4 hours as needed for shortness of breath / dyspnea or wheezing (Patient not taking: Reported on 7/3/2020) 1 Inhaler 0     blood glucose test strip Used for testing glucose 2-3 times daily (Patient not taking: Reported on 7/19/2019) 1 Month 5     ipratropium (ATROVENT HFA) 17 MCG/ACT inhaler Inhale 2 puffs into the lungs every 6 hours as needed (Patient not taking: Reported on 7/3/2020) 1 Inhaler 0     LORazepam (ATIVAN) 0.5 MG tablet Take 1 tablet (0.5 mg) by mouth once as needed for anxiety . Take 30min prior to MRI.  Bring 2nd tab  to appt and take if needed (Patient not taking: Reported on 7/3/2020) 2 tablet 0     MICROLET LANCETS MISC 1 each daily. (Patient not taking: Reported on 7/3/2020) 100 each 2     Spacer/Aero-Holding Chambers (SPACER/AERO-HOLD CHAMBER MASK) EDITH 1 Adult size spacer to use with [facility]I inhalers. (Patient not taking: Reported on 7/3/2020) 1 each 0       Medical History: any changes in medical history since they were last seen? No     Review of Systems:  The 14 system ROS was reviewed from the intake questionnaire, and is positive for: headache, neck pain  B/b problems: none  Mood: anxiety, stress    Physical Exam:  not currently breastfeeding.  General: awake, alert   Gait: Not observed  MSK exam:   Limitations of range of motion for of neck    Assessment:   1. Fibromyalgia- symptoms mostly of joint pain, but also with neck and low back pain. Currently flared  2. Chronic low back pain with features of SI joint and facet arthropathy.  Left piriformis   3. Lupus   4. Disturbed sleep/insomnia  5. Trochanteric bursitis  6. Elbow pain  7. Cervicogenic headache- has underlying facet arthropathy and disk changes.  ? Inflammatory condition    She is still in a time period of being in quarantine for her son's, and has an upcoming COVID test. We will cancel her appt on Monday.  Due to her new issues with getting in for procedures, we talked about options. Ultimately, it seems like she may need night/weekened hours to medial branch block, so will put in order for     Plan:  1. Physical therapy- pain PT previously ordered  2. Clinical Health Psychologist to address issues of relaxation, behavioral change, coping style, and other factors important to improvement. Not at this time  3. Diagnostic Studies:  none  4. Medication Management:   1. Restart nortriptyline. Start at 10mg at night. After 1 week, increase to 20mg/day.  If tolerating, after another week, titrate to   5. Further procedures recommended:   1. Bursa injections - she is  currently scheduled, but we discussed getting in earlier after COVID cleared  2. Bilateral C3-6 medial branch block   6. Recommendations to PCP: none.   7. Follow up: 2 months- video    Magdalena Lockett MD  [facility]          Video-Visit Details    Type of service:  Video Visit    Video Start Time: 1000  Video End Time: 1023    Originating Location (pt. Location): Home    Distant Location (provider location):  [facility]     Platform used for Video Visit: [facility]    Magdalena Lockett MD  [facility]

## 2020-10-29 NOTE — NURSING NOTE
The injection orders were faxed to [facility]. Orders signed VORB: Keena Lockett MD/Tennille Peña RN.    FERNANDA Null-BSN  [facility]

## 2020-10-30 NOTE — TELEPHONE ENCOUNTER
RN: please call to notify Ruma Enriquez that her labs showed a small monoclonal peak seen in the gamma fraction on the electrophoresis.  Immunofixation showed small IgG of lambda light chain type, and small IgM of kappa light chain type. She needs to follow up with her oncologist Dr. Yoselin Payan regarding these results. Please also fax the lab results to Dr. Yoselin Payan at [facility].    Immunoglobulins IgG and IgA are undetectably low.  It is possible that this is related to lupus or less likely azathioprine (less likely azathioprine related because or normal IgM and white blood cell count). Immune deficiencies may be associated with rheumaotologic disease so this may not necessarily be medication related.  Please have repeat labs to reassess the immunoglobulin levels - the order for this lab is in and can be completed at any [facility] lab.   If the low immunoglobulins are seen on the repeat lab then will plan to refer to an immunologist.    Social distancing, wearing a mask, and washing hands need to be a priority at this time with lower IgG and IgA levels that would raise infection risk.     Lab: quantitative immunoglobulins    Bradford Colvin MD  10/30/2020 7:09 AM

## 2020-10-30 NOTE — TELEPHONE ENCOUNTER
Called patient and discussed Dr. Colvin's message below. Patient agreed with the plan. Faxed lab results to Dr. Yoselin Payan, f: [phone].    Jasen Pennington RN....10/30/2020 2:16 PM

## 2020-11-11 ENCOUNTER — VIRTUAL VISIT (OUTPATIENT)
Dept: PALLIATIVE MEDICINE | Facility: CLINIC | Age: 45
End: 2020-11-11
Payer: COMMERCIAL

## 2020-11-11 DIAGNOSIS — M54.2 CERVICALGIA: Primary | ICD-10-CM

## 2020-11-11 DIAGNOSIS — M47.819 FACET ARTHROPATHY: ICD-10-CM

## 2020-11-11 PROCEDURE — 97112 NEUROMUSCULAR REEDUCATION: CPT | Mod: GP | Performed by: PHYSICAL THERAPIST

## 2020-11-11 PROCEDURE — 97110 THERAPEUTIC EXERCISES: CPT | Mod: GP | Performed by: PHYSICAL THERAPIST

## 2020-11-11 NOTE — PROGRESS NOTES
"Patient Name: Yovana Enriquez      YOB: 1975     Medical Record Number: [id number]  Diagnosis:    Cervicalgia  Facet arthropathy      The patient has been notified of the following:  \"This virtual visit will be conducted between you and your provider.  We have found that certain health care needs can be provided without the need for physical presence.  This service lets us provide the care you need with a virtual visit.\"     Due to external, as well as internal  [facility] [place] management of the COVID 19 Virus, Yovana Enriquez, was not seen in our clinic.  As a substitution, we implemented a virtual visit to manage this patient's condition utilizing the [employer] virtual visit platform.  The provider, Wilmer Narvaez PT, reviewed the patient's chart and spoke with the patient to determine the following telemedicine visit is appropriate and effective for the patient's care.     The following type of visit was completed:  Video Visit:  The [employer] platform uses a synchronous HIPAA compliant video stream for this encounter.  Video start time:  1:04pm  Video end time:  1:38pm  Provider location:  [place] [facility] [place]  Patient location:  Home  Patient has given verbal consent for video visit? Yes                Visit Info Length of Visit: 34'  Arrived: On Time   Exercise/Activity Education Instruction:  Postural Training/Anatomy  Pacing/Energy Conservation  Home Program  Self Care  Activity:  Therapeutic Exercise 20':  Aerobic Conditioning (*see \"Strength/Functional Actitivities Record for details of exercises performed)  Re-ed on components of exercise with emphasis on aerobic #4 - pt attempting to be consistent with her walking  Stretching:  Upper Ext  bilateral, Lower Ext  bilateral, Cervical  Stretches for thor ext, KTC, neck mid range rot, SB  Postural Training:  static, progressed to dynamic  Added standing scaption, shldr flexion and cues for sequence of scap squeeze first, thumbs " fwd, then shldr flex/scaption     Neuro re-ed 12':  Ed on finding most stable, home base, balanced standing position - soft knees, tripod feet, neutral spine, thumbs fwd  Worked on 'finding' neutral spine in prep for movement  Diaphragm breathing cues throughout  Emphasis on 'breathe, stabilize, move' sequencing  Review of how HR is related to pain (pt has Apple Watch)   Notes:   Reports she is kind of in a pain flare but did tolerate session well.  Slowly progressing toward goals.   Demonstrates/Verbalizes Technique: 3, 4 (1= poor technique-difficulty performing exercises,significant cues required; 5= good technique-performs exercises without cues)  Body Awareness:  3  (1=low awareness; 5=high awareness)  Posture/Stability:   3 (1= poor posture, stability; 5= good posture, stability)   Motivational Level:   Ask questions, Eager to learn and Cooperative Participation:  Full   Patient Satisfaction:  satisfied    Response to Teaching:   cooperative and needs reinforcement  Factors that affect learning: None   Plan of Care  Recommend to continue PT to support reactivation and integration of self regulation pain management skills.   Therapist:  Wilmer Narvaez, PT                 Date:  11/11/2020

## 2020-11-16 ENCOUNTER — VIRTUAL VISIT (OUTPATIENT)
Dept: PALLIATIVE MEDICINE | Facility: CLINIC | Age: 45
End: 2020-11-16
Payer: COMMERCIAL

## 2020-11-16 ENCOUNTER — OFFICE VISIT (OUTPATIENT)
Dept: OBGYN | Facility: CLINIC | Age: 45
End: 2020-11-16
Payer: COMMERCIAL

## 2020-11-16 VITALS
BODY MASS INDEX: 23.93 KG/M2 | HEIGHT: 65 IN | HEART RATE: 87 BPM | SYSTOLIC BLOOD PRESSURE: 141 MMHG | OXYGEN SATURATION: 99 % | DIASTOLIC BLOOD PRESSURE: 82 MMHG | WEIGHT: 143.6 LBS | TEMPERATURE: 98 F

## 2020-11-16 DIAGNOSIS — N95.2 ATROPHIC VAGINITIS: ICD-10-CM

## 2020-11-16 DIAGNOSIS — Z12.31 ENCOUNTER FOR SCREENING MAMMOGRAM FOR BREAST CANCER: ICD-10-CM

## 2020-11-16 DIAGNOSIS — Z01.419 ENCOUNTER FOR GYNECOLOGICAL EXAMINATION WITHOUT ABNORMAL FINDING: Primary | ICD-10-CM

## 2020-11-16 DIAGNOSIS — M47.819 FACET ARTHROPATHY: ICD-10-CM

## 2020-11-16 DIAGNOSIS — Z11.59 SCREENING FOR VIRAL DISEASE: ICD-10-CM

## 2020-11-16 DIAGNOSIS — M54.2 CERVICALGIA: Primary | ICD-10-CM

## 2020-11-16 PROCEDURE — 97110 THERAPEUTIC EXERCISES: CPT | Mod: GP | Performed by: PHYSICAL THERAPIST

## 2020-11-16 PROCEDURE — 99396 PREV VISIT EST AGE 40-64: CPT | Performed by: NURSE PRACTITIONER

## 2020-11-16 PROCEDURE — 97112 NEUROMUSCULAR REEDUCATION: CPT | Mod: GP | Performed by: PHYSICAL THERAPIST

## 2020-11-16 RX ORDER — ESTRADIOL 10 UG/1
INSERT VAGINAL
Qty: 42 TABLET | Refills: 3 | Status: SHIPPED | OUTPATIENT
Start: 2020-11-16 | End: 2021-02-26

## 2020-11-16 ASSESSMENT — PAIN SCALES - GENERAL: PAINLEVEL: NO PAIN (0)

## 2020-11-16 ASSESSMENT — MIFFLIN-ST. JEOR: SCORE: 1291.21

## 2020-11-16 NOTE — PROGRESS NOTES
SUBJECTIVE:   CC: Yovana Enriquez is an 45 year old woman who presents for preventive health visit.     {Healthy Habits:     Getting at least 3 servings of Calcium per day:  Yes    Bi-annual eye exam:  Yes    Dental care twice a year:  NO    Sleep apnea or symptoms of sleep apnea:  Daytime drowsiness    Diet:  Regular (no restrictions)    Frequency of exercise:  2-3 days/week    Duration of exercise:  15-30 minutes    Taking medications regularly:  Yes    Medication side effects:  None    PHQ-2 Total Score: 1    Additional concerns today:  Yes    Patient had an endometrial ablation a few years ago. In the last 6 months, cycles started to resume, but are irregular. Has been having problems with symptoms of urinary tract infections. Does not always come in for testing as sometimes symptoms resolve, questions if they are not true infections, but related to menopausal changes. Occasional discomfort/dryness with intercourse.     Patient's children were COVID-19 positive a few weeks ago, patient had negative testing, requests antibody screening, currently asymptomatic.     Today's PHQ-2 Score:   PHQ-2 ( 1999 Pfizer) 11/16/2020   Q1: Little interest or pleasure in doing things 0   Q2: Feeling down, depressed or hopeless 1   PHQ-2 Score 1   Q1: Little interest or pleasure in doing things Not at all   Q2: Feeling down, depressed or hopeless Several days   PHQ-2 Score 1       Abuse: Current or Past (Physical, Sexual or Emotional) - No  Do you feel safe in your environment? Yes        Social History     Tobacco Use     Smoking status: Never Smoker     Smokeless tobacco: Never Used     Tobacco comment: Lives in smoke free household   Substance Use Topics     Alcohol use: No     Alcohol/week: 0.0 standard drinks         Alcohol Use 11/16/2020   Prescreen: >3 drinks/day or >7 drinks/week? Not Applicable   Prescreen: >3 drinks/day or >7 drinks/week? -   No flowsheet data found.    Reviewed orders with patient.  Reviewed  health maintenance and updated orders accordingly - Yes  Patient Active Problem List   Diagnosis     Systemic lupus erythematosus (H)     Menorrhagia     History of ovarian cyst     Dysmenorrhea     History of gestational diabetes mellitus, not currently pregnant     Intramural leiomyoma of uterus     Hyperlipidemia LDL goal <130     Subclinical hyperthyroidism     Anxiety     High risk medication use     Fibromyalgia     Chronic constipation     Irritable bowel syndrome     Health Care Home     Hypertension goal BP (blood pressure) < 140/90     Non-celiac gluten sensitivity     Raynaud's phenomenon without gangrene     Sjogren's syndrome (H)     Intramural and submucous leiomyoma of uterus     Adrenal insufficiency (H)     Past Surgical History:   Procedure Laterality Date     COLONOSCOPY N/A 2/20/2015    Procedure: COMBINED COLONOSCOPY, SINGLE OR MULTIPLE BIOPSY/POLYPECTOMY BY BIOPSY;  Surgeon: Fred Cullen MD;  Location: [facility]     COLONOSCOPY N/A 10/29/2018    Procedure: COMBINED COLONOSCOPY, SINGLE OR MULTIPLE BIOPSY/POLYPECTOMY BY BIOPSY;  Surgeon: Inez Sawyer MD;  [facility]     COLONOSCOPY WITH CO2 INSUFFLATION N/A 2/20/2015    Procedure: COLONOSCOPY WITH CO2 INSUFFLATION;  Surgeon: Fred Cullen MD;  Location: [facility]     ENT SURGERY  10-24-14    Bottom lip biopsies     ESOPHAGOSCOPY, GASTROSCOPY, DUODENOSCOPY (EGD), COMBINED N/A 2/20/2015    Procedure: COMBINED ESOPHAGOSCOPY, GASTROSCOPY, DUODENOSCOPY (EGD), BIOPSY SINGLE OR MULTIPLE;  Surgeon: Fred Cullen MD;  Location: [facility] OR     HC BREATH HYDROGEN TEST  12/31/2013    Procedure: HYDROGEN BREATH TEST;  Surgeon: Camden Almazan MD;  Location:  GI      HYSTEROSCOPY, SURGICAL; W/ ENDOMETRIAL ABLATION, ANY METHOD  10-19-12     SHOULDER SURGERY Right     R shoulder     TUBAL LIGATION  2004       Social History     Tobacco Use     Smoking status: Never Smoker     Smokeless tobacco: Never Used     Tobacco  comment: Lives in smoke free household   Substance Use Topics     Alcohol use: No     Alcohol/week: 0.0 standard drinks     Family History   Problem Relation Age of Onset     Respiratory Maternal Grandfather      Cancer Maternal Grandfather      Asthma Son      Circulatory Maternal Grandmother         Brain anurysm     Hypertension Mother      Glaucoma Mother 50        drops     Hypertension Sister      Hypertension Sister      Diabetes No family hx of      Cerebrovascular Disease No family hx of      Thyroid Disease No family hx of      Macular Degeneration No family hx of            Mammogram Screening: Patient under age 50, mutual decision reflected in health maintenance.      Pertinent mammograms are reviewed under the imaging tab.  History of abnormal Pap smear: NO - age 30-65 PAP every 5 years with negative HPV co-testing recommended  PAP / HPV Latest Ref Rng & Units 9/20/2016 8/28/2013   PAP - NIL NIL   HPV 16 DNA NEG Negative -   HPV 18 DNA NEG Negative -   OTHER HR HPV NEG Negative -     Reviewed and updated as needed this visit by clinical staff  Tobacco  Allergies  Meds   Med Hx  Surg Hx  Fam Hx  Soc Hx        Reviewed and updated as needed this visit by Provider                Past Medical History:   Diagnosis Date     Arthritis      Chronic constipation 12/16/2013     Dysmenorrhea 10/1/2012     Fibromyalgia 11/15/2013     Health Care Home 3/19/2014    **See Letters for [facility] Care Plan: Emergency Care Plan       High risk medication use 9/13/2013     History of gestational diabetes mellitus, not currently pregnant 10/1/2012     History of ovarian cyst 10/1/2012     Hyperlipidemia LDL goal <130 10/18/2012     Hypertension goal BP (blood pressure) < 140/90 1/19/2015     Intramural leiomyoma of uterus 10/5/2012     Irritable bowel syndrome 3/11/2014    Patient states no history colonoscopy       Menorrhagia 10/1/2012     Non-celiac gluten sensitivity 2/8/2016     PONV (postoperative nausea and vomiting)       Subclinical hyperthyroidism 1/17/2013     Systemic lupus erythematosus (H) 4/23/2012      Past Surgical History:   Procedure Laterality Date     COLONOSCOPY N/A 2/20/2015    Procedure: COMBINED COLONOSCOPY, SINGLE OR MULTIPLE BIOPSY/POLYPECTOMY BY BIOPSY;  Surgeon: Fred Cullen MD;  [facility]     COLONOSCOPY N/A 10/29/2018    Procedure: COMBINED COLONOSCOPY, SINGLE OR MULTIPLE BIOPSY/POLYPECTOMY BY BIOPSY;  Surgeon: Inez Sawyer MD;  [facility]     COLONOSCOPY WITH CO2 INSUFFLATION N/A 2/20/2015    Procedure: COLONOSCOPY WITH CO2 INSUFFLATION;  Surgeon: Fred Cullen MD;  [facility]     ENT SURGERY  10-24-14    Bottom lip biopsies     ESOPHAGOSCOPY, GASTROSCOPY, DUODENOSCOPY (EGD), COMBINED N/A 2/20/2015    Procedure: COMBINED ESOPHAGOSCOPY, GASTROSCOPY, DUODENOSCOPY (EGD), BIOPSY SINGLE OR MULTIPLE;  Surgeon: Fred Cullen MD;  Location[facility]     HC BREATH HYDROGEN TEST  12/31/2013    Procedure: HYDROGEN BREATH TEST;  Surgeon: Camden Almazan MD;  Location: [facility]     HC HYSTEROSCOPY, SURGICAL; W/ ENDOMETRIAL ABLATION, ANY METHOD  10-19-12     SHOULDER SURGERY Right     R shoulder     TUBAL LIGATION  2004       Review of Systems  CONSTITUTIONAL: NEGATIVE for fever, chills, change in weight  INTEGUMENTARU/SKIN: NEGATIVE for worrisome rashes, moles or lesions  EYES: NEGATIVE for vision changes or irritation  ENT: NEGATIVE for ear, mouth and throat problems  RESP: NEGATIVE for significant cough or SOB  BREAST: NEGATIVE for masses, tenderness or discharge  CV: NEGATIVE for chest pain, palpitations or peripheral edema  GI: NEGATIVE for nausea, abdominal pain, heartburn, or change in bowel habits  : NEGATIVE for unusual urinary or vaginal symptoms except see above. Periods are irregular.  MUSCULOSKELETAL: NEGATIVE for significant arthralgias or myalgia  NEURO: NEGATIVE for weakness, dizziness or paresthesias  PSYCHIATRIC: NEGATIVE for changes in mood  "or affect     OBJECTIVE:   BP (!) 141/82 (BP Location: Right arm, Patient Position: Sitting, Cuff Size: Adult Regular)   Pulse 87   Temp 98  F (36.7  C) (Tympanic)   Ht 1.657 m (5' 5.25\")   Wt 65.1 kg (143 lb 9.6 oz)   SpO2 99%   BMI 23.71 kg/m    Physical Exam  GENERAL: healthy, alert and no distress  EYES: Eyes grossly normal to inspection, PERRL and conjunctivae and sclerae normal  HENT: ear canals and TM's normal  NECK: no adenopathy, no asymmetry, masses, or scars and thyroid normal to palpation  RESP: lungs clear to auscultation - no rales, rhonchi or wheezes  BREAST: normal without masses, tenderness or nipple discharge and no palpable axillary masses or adenopathy  CV: regular rate and rhythm, normal S1 S2, no S3 or S4, no murmur, click or rub, no peripheral edema and peripheral pulses strong  ABDOMEN: soft, nontender, no hepatosplenomegaly, no masses and bowel sounds normal   (female): normal female external genitalia, normal urethral meatus , vaginal mucosal atrophy and normal cervix, adnexae, and uterus without masses.  MS: no gross musculoskeletal defects noted, no edema  SKIN: no suspicious lesions or rashes  NEURO: Normal strength and tone, mentation intact and speech normal  PSYCH: mentation appears normal, affect normal/bright    ASSESSMENT/PLAN:   1. Encounter for gynecological examination without abnormal finding  Health maintenance reviewed    2. Encounter for screening mammogram for breast cancer  - MA Screening Digital Bilateral; Future    3. Screening for viral disease  - COVID-19 Virus (Coronavirus) Antibody & Titer Reflex; Future    4. Atrophic vaginitis  Discussed her urinary and vaginal symptoms and exam findings. We did review HRT vs localized estrogen and she would like to try this as this is her most bothersome symptoms. Discussed use, possible side effects. If urinary symptoms persist after 3 months use, consider need for Urology consult.   - estradiol (VAGIFEM) 10 MCG TABS " "vaginal tablet; Place 1 tablet vaginally nightly for 2 weeks, then reduce to twice weekly  Dispense: 42 tablet; Refill: 3    COUNSELING:  Reviewed preventive health counseling, as reflected in patient instructions  Special attention given to:        Regular exercise       Healthy diet/nutrition       (Mansi)menopause management    Estimated body mass index is 23.71 kg/m  as calculated from the following:    Height as of this encounter: 1.657 m (5' 5.25\").    Weight as of this encounter: 65.1 kg (143 lb 9.6 oz).        She reports that she has never smoked. She has never used smokeless tobacco.      Counseling Resources:  ATP IV Guidelines  Pooled Cohorts Equation Calculator  Breast Cancer Risk Calculator  BRCA-Related Cancer Risk Assessment: FHS-7 Tool  FRAX Risk Assessment  ICSI Preventive Guidelines  Dietary Guidelines for Americans, 2010  USDA's MyPlate  ASA Prophylaxis  Lung CA Screening    BELEN Calix CNP  [facility]  "

## 2020-11-17 NOTE — PROGRESS NOTES
"Patient Name: Yovana Enriquez      YOB: 1975     Medical Record Number: [id number]  Diagnosis:    Cervicalgia  Facet arthropathy      The patient has been notified of the following:  \"This virtual visit will be conducted between you and your provider.  We have found that certain health care needs can be provided without the need for physical presence.  This service lets us provide the care you need with a virtual visit.\"     Due to external, as well as internal  [facility] [place] management of the COVID 19 Virus, Yovana Enriquez, was not seen in our clinic.  As a substitution, we implemented a virtual visit to manage this patient's condition utilizing the [employer] virtual visit platform.  The provider, Wilmer Narvaez PT, reviewed the patient's chart and spoke with the patient to determine the following telemedicine visit is appropriate and effective for the patient's care.     The following type of visit was completed:  Video Visit:  The [employer] platform uses a synchronous HIPAA compliant video stream for this encounter.  Video start time:  1:03pm  Video end time:  1:37pm  Provider location:  [facility] [place]  Patient location:  Home  Patient has given verbal consent for video visit? Yes                   Visit Info Length of Visit: 34'  Arrived: On Time   Exercise/Activity Education Instruction:  Postural Training/Anatomy  Pacing/Energy Conservation  Home Program  Self Care  Activity:  Therapeutic Exercise 20':  Aerobic Conditioning (*see \"Strength/Functional Actitivities Record for details of exercises performed)  Standing march x 5'/fatigue with emphasis on aerobic #4   Stretching:  Upper Ext  bilateral, Lower Ext  bilateral, Cervical  Stretches for thor ext, KTC, neck mid range rot, SB - focus on neck  Postural Training:  dynamic  Added wall push ups with scap retract cues  Added lateral stepping  Reviewed standing scaption, shldr flexion and cues for sequence of scap squeeze " first, thumbs fwd, then shldr flex/scaption     Neuro re-ed 14':  Worked on finding her most stable, home base, balanced standing position - soft knees, tripod feet, neutral spine, thumbs fwd.  Mult cues remain helpful  Ed on closed chain vs open chain and functional relationship  Self manual cues helpful for technique with diaphragm breathing   Emphasis on 'breathe, stabilize, move' sequencing  Review of how HR is related to pain (pt has Apple Watch) - resting HR at 91bpm   Notes:   Doing better today than at last visit. Continues to slowly progress toward goals.   Demonstrates/Verbalizes Technique: 3, 4 (1= poor technique-difficulty performing exercises,significant cues required; 5= good technique-performs exercises without cues)  Body Awareness:  3  (1=low awareness; 5=high awareness)  Posture/Stability:   3 (1= poor posture, stability; 5= good posture, stability)   Motivational Level:   Ask questions, Eager to learn and Cooperative Participation:  Full   Patient Satisfaction:  satisfied    Response to Teaching:   cooperative and needs reinforcement  Factors that affect learning: None   Plan of Care  Recommend to continue PT to support reactivation and integration of self regulation pain management skills.   Therapist:  Wilmer Narvaez, PT                 Date:  11/16/2020

## 2020-11-21 ENCOUNTER — TRANSFERRED RECORDS (OUTPATIENT)
Dept: HEALTH INFORMATION MANAGEMENT | Facility: CLINIC | Age: 45
End: 2020-11-21

## 2020-11-23 ENCOUNTER — MYC MEDICAL ADVICE (OUTPATIENT)
Dept: PALLIATIVE MEDICINE | Facility: CLINIC | Age: 45
End: 2020-11-23

## 2020-11-23 NOTE — TELEPHONE ENCOUNTER
Is the nausea with the headaches, or more.  It isn't typical that neck pain would cause dizziness/nausea... but headache would.  I'm just wanting to make sure that you don't need to have that evaluated... like BP checks or thinking about your inner ear.     I can prescribe an antinausea pill, but ideally those are not taking consistently, as they can be associated with developing weird facial movements/muscle movements.  It is rare, but in general I just don't want you to do daily use.     Is there one you know works? Zofran is the most common.     Magdalena Lockett MD  [facility]

## 2020-11-23 NOTE — TELEPHONE ENCOUNTER
From: Ruma Enriquez      Created: 11/23/2020 1:42 PM        So nausea has been from headaches yes.  Dizziness has been something over the past couple weeks. Especially if i tilt my head down. I thought maybe it was due to the physicaly therapy & working my neck so much but not sure.   But the procedure i had on saturday caused alot of dizziness & then a massive migraine on sunday that put me out all day sunday. Is that normal?  They just numbed the 4 nerves on the left side. They called to schedule the next one but It makes me nervous to do another one. I am still reeling a bit from it today as it made me sick yesterday & couldnt eat much so still a little dizzy & nautious today!  Ruma          From: Ruma Enriquez      Created: 11/23/2020 2:06 PM        I do have a virtual appt tomorrow as i have had sinus pressure for a few weeks now, now that i think about it i wonder if theres a little more going on there? Maybe an inner ear thing?

## 2020-11-23 NOTE — TELEPHONE ENCOUNTER
This is pasted in a seperate encounter.    AVNI CulverN, RN  Care Coordinator  [facility] [place]

## 2020-11-23 NOTE — TELEPHONE ENCOUNTER
From: Ruma Enriquez      Created: 11/23/2020 9:34 AM        So i had my first practice numbing on my neck on saturday & it made me super dizzy & sunday i woke up with a severe migraine that took me out all day! I still have some nautiousness & dizziness today but the migraine is gone... not sure im going to be able to do the procedures.  Is there anything i can do for nutiousness & dizziness? Been having that more & more lately due to the physical therapy too!  Thanks!  Ruma        Routing to DR Lockett for review.     1. Bilateral C3-6 medial branch block     AVNI CulverN, RN  Care Coordinator  [facility] [place]

## 2020-11-24 ENCOUNTER — VIRTUAL VISIT (OUTPATIENT)
Dept: FAMILY MEDICINE | Facility: CLINIC | Age: 45
End: 2020-11-24
Payer: COMMERCIAL

## 2020-11-24 DIAGNOSIS — J01.00 ACUTE NON-RECURRENT MAXILLARY SINUSITIS: ICD-10-CM

## 2020-11-24 DIAGNOSIS — H83.02 LABYRINTHITIS OF LEFT EAR: Primary | ICD-10-CM

## 2020-11-24 PROCEDURE — 99213 OFFICE O/P EST LOW 20 MIN: CPT | Mod: GT | Performed by: PHYSICIAN ASSISTANT

## 2020-11-24 RX ORDER — PREDNISONE 20 MG/1
20 TABLET ORAL 2 TIMES DAILY
Qty: 10 TABLET | Refills: 0 | Status: SHIPPED | OUTPATIENT
Start: 2020-11-24 | End: 2020-11-29

## 2020-11-24 RX ORDER — MECLIZINE HYDROCHLORIDE 25 MG/1
12.5-25 TABLET ORAL EVERY 6 HOURS PRN
Qty: 40 TABLET | Refills: 0 | Status: SHIPPED | OUTPATIENT
Start: 2020-11-24 | End: 2021-06-13

## 2020-11-24 NOTE — PROGRESS NOTES
"Yovana Enriquez is a 45 year old female who is being evaluated via a billable video visit.      The patient has been notified of following:     \"This video visit will be conducted via a call between you and your physician/provider. We have found that certain health care needs can be provided without the need for an in-person physical exam.  This service lets us provide the care you need with a video conversation.  If a prescription is necessary we can send it directly to your pharmacy.  If lab work is needed we can place an order for that and you can then stop by our lab to have the test done at a later time.    Video visits are billed at different rates depending on your insurance coverage.  Please reach out to your insurance provider with any questions.    If during the course of the call the physician/provider feels a video visit is not appropriate, you will not be charged for this service.\"    Patient has given verbal consent for Video visit? Yes  How would you like to obtain your AVS? MyChart  If you are dropped from the video visit, the video invite should be resent to: Text to cell phone: [phone]  Will anyone else be joining your video visit? No    Subjective     Yovana Enriquez is a 45 year old female who presents today via video visit for the following health issues:    HPI     Acute Illness  Acute illness concerns: Sinus infection  Onset/Duration: 3 weeks   Symptoms:  Fever: no  Chills/Sweats: no  Headache (location?): no  Sinus Pressure: YES  Conjunctivitis:  no  Ear Pain: YES: left  Rhinorrhea: no  Congestion: YES  Sore Throat: no  Cough: no  Wheeze: no  Decreased Appetite: no  Dizziness: YES  Nausea: YES  Vomiting: no  Diarrhea: no  Dysuria/Freq.: no  Dysuria or Hematuria: no  Fatigue/Achiness: no    Therapies tried and outcome: Tylenol- little relief     Some nasal congestion.  Some pain involving maxillary and frontal sinuses. Some left ear pain. Some dizziness.  No muffled hearing some " minimal tinnitus. Room spins.     Video Start Time: 1:14 PM        Review of Systems   Constitutional, HEENT, cardiovascular, pulmonary, GI, , musculoskeletal, neuro, skin, endocrine and psych systems are negative, except as otherwise noted.      Objective           Vitals:  No vitals were obtained today due to virtual visit.    Physical Exam     GENERAL: Healthy, alert and no distress  EYES: Eyes grossly normal to inspection.  No discharge or erythema, or obvious scleral/conjunctival abnormalities.  RESP: No audible wheeze, cough, or visible cyanosis.  No visible retractions or increased work of breathing.    SKIN: Visible skin clear. No significant rash, abnormal pigmentation or lesions.  NEURO: Cranial nerves grossly intact.  Mentation and speech appropriate for age.  PSYCH: Mentation appears normal, affect normal/bright, judgement and insight intact, normal speech and appearance well-groomed.                Yovana was seen today for sinus problem.    Diagnoses and all orders for this visit:    Labyrinthitis of left ear  -     amoxicillin-clavulanate (AUGMENTIN) 875-125 MG tablet; Take 1 tablet by mouth 2 times daily  -     meclizine (ANTIVERT) 25 MG tablet; Take 0.5-1 tablets (12.5-25 mg) by mouth every 6 hours as needed for dizziness    Acute non-recurrent maxillary sinusitis  -     amoxicillin-clavulanate (AUGMENTIN) 875-125 MG tablet; Take 1 tablet by mouth 2 times daily  -     predniSONE (DELTASONE) 20 MG tablet; Take 1 tablet (20 mg) by mouth 2 times daily for 5 days      Advised supportive and symptomatic treatment.  Follow up with Provider - if condition persists or worsens.       Video-Visit Details    Type of service:  Video Visit    Video End Time:1:25 PM    Originating Location (pt. Location): Home    Distant Location (provider location):  [facility]     Platform used for Video Visit: [employer]

## 2020-11-24 NOTE — TELEPHONE ENCOUNTER
When people are doing something that could trigger a headache, the only think I typically recommend is using something like naprosyn.  I wouldn't recommend a triptan BEFORE a headache starts.  But when we do these blocks, we don't want meds that will relieve your pain, as we really want to see if the injection alone works.     So if those meds would improve your neck pain, then I wouldn't premedicate with it.  If they don't, you could try that.     I just don't know how you would respond to a 2nd one.     Let me know if you need a nausea med like Zofran, and if so, where it would be sent.     Magdalena Lockett MD  [facility]              Restore  Close  Cancel

## 2020-11-24 NOTE — TELEPHONE ENCOUNTER
From: Ruma Enriquez      Created: 11/24/2020 10:32 AM        So yes it numbed it & took the pain away,  but the migraine was massive! And made me sick for 2 days! Is there anything preventitive to take?  I also have a sinus infection i think right now & have a virtual appt today & have had it for a few weeks & makes me wonder if it has triggered an inner ear thing so maybe if i get that taken care of i wont have that reaction again?  It was just so hard.....

## 2020-11-24 NOTE — TELEPHONE ENCOUNTER
"So given procedures include needles going through muscles, that could trigger a migraine.  It isn't \"normal\", but people with migraines can be triggered by a variety of things, including causing issues with the muscles.  The needles are on the outside of the spine, so I don't think it would be much other than muscle involvement.     Did you feel you got any neck pain relief afterwards, or was it too hard to figure out?  If you didn't, then I wouldn't pursue more... but if you did-- then weighing the symptoms vs possibly doing a procedure for that pain would be up to you. The same could happen again.     If you are having dizziness with changing of your neck position, that sounds like an inner ear issue.  I would consider talking to your primary care doctor about that.  Sometimes they order PT focused on the inner ear and \"repositioning\".  Other times more of a formal workup is done of the dizziness.     Magdalena Lockett MD  [facility]   "

## 2020-12-04 ENCOUNTER — TELEPHONE (OUTPATIENT)
Dept: PHARMACY | Facility: CLINIC | Age: 45
End: 2020-12-04

## 2020-12-04 NOTE — TELEPHONE ENCOUNTER
Called to schedule MTM appt, scheduled for 1/6/21.    Marilyn Benito, PharmD  Medication Therapy Management Pharmacist  [phone]

## 2020-12-07 DIAGNOSIS — M32.19 OTHER SYSTEMIC LUPUS ERYTHEMATOSUS WITH OTHER ORGAN INVOLVEMENT (H): ICD-10-CM

## 2020-12-07 RX ORDER — PREDNISONE 2.5 MG/1
2.5 TABLET ORAL DAILY
Qty: 90 TABLET | Refills: 2 | Status: CANCELLED | OUTPATIENT
Start: 2020-12-07

## 2020-12-08 NOTE — TELEPHONE ENCOUNTER
A 9 month supply was sent on 6/9/20.  Patient should still have a refill on file.  Called pharmacy and they confirmed that patient still has refills.    Jasen Pennington RN....12/8/2020 3:17 PM

## 2020-12-16 NOTE — PROGRESS NOTES
"Yovana Enriquez is a 45 year old female who is being evaluated via a billable video visit.      The patient has been notified of following:     \"This video visit will be conducted via a call between you and your physician/provider. We have found that certain health care needs can be provided without the need for an in-person physical exam.  This service lets us provide the care you need with a video conversation.  If a prescription is necessary we can send it directly to your pharmacy.  If lab work is needed we can place an order for that and you can then stop by our lab to have the test done at a later time.    Video visits are billed at different rates depending on your insurance coverage.  Please reach out to your insurance provider with any questions.    If during the course of the call the physician/provider feels a video visit is not appropriate, you will not be charged for this service.\"    Patient has given verbal consent for Video visit? Yes  How would you like to obtain your AVS? MyChart  If you are dropped from the video visit, the video invite should be resent to: Text to cell phone: [phone]  Will anyone else be joining your video visit? No       Soraya Razo MA        Video-Visit Details    Type of service:  Video Visit    Video Start Time:0900  Video End Time: 0913 + 2 min on phone    Originating Location (pt. Location): Home    Distant Location (provider location):  [facility]     Platform used for Video Visit: Brianna Lockett MD  [facility]                               [facility]    Date of visit: 12/16/2020     Chief complaint:   Chief Complaint   Patient presents with     Pain     Video visit due to Covid-19      Interval history:  Yovana Enriquez was last seen by me on 10/29/20    Recommendations/plan at the last visit included:  1. Physical therapy- pain PT previously ordered  2. Clinical Health " Psychologist to address issues of relaxation, behavioral change, coping style, and other factors important to improvement. Not at this time  3. Diagnostic Studies:  none  4. Medication Management:   1. Restart nortriptyline. Start at 10mg at night. After 1 week, increase to 20mg/day.  If tolerating, after another week, titrate to   5. Further procedures recommended:   1. Bursa injections - she is currently scheduled, but we discussed getting in earlier after COVID cleared  2. Bilateral C3-6 medial branch block   6. Recommendations to PCP: none.   7. Follow up: 2 months- video      Since her last visit, Yovanafredrick Enriquez reports:  -sons had COVID, her and her  did not get it.  -her mom is currently hospitalized.  -she is planning to repeat the medial branch block - waiting for when they have staff on a weekend. She had migraine and severe nausea with first one. Would like pretreatment.  -she is getting more migraines.  When she gets them, it carries on to 2-3 days.  She feels like it is from her neck.  She gets nausea and pain into her eye. Currently taking Excedrin migraine  -she has appt for troch bursa injections later in Dec      Pain scores:  Pain intensity on average is 4 on a scale of 0-10.     Current pain treatments:   Ibuprofen 600-800mg, up to a couple times/day, several days/week.- less redcent  Acetaminophen (tylenol) 1-2 tabs, up to a couple times/day  Lyrica 150mg TID-likes TID dosing better  Voltaren gel- helpful- on elbows, knee  Nortriptyline 10mg qhs- some help, didn't go up further due to dry mouth  Excedrin migraine    Previous medication treatments included:   mobic- no side effects   Naprosyn   Gabapentin- only at night, was given for nerve pain in the feet  Lidocaine patches-not as helpful    Side Effects: no side effects     Medications:  Current Outpatient Medications   Medication Sig Dispense Refill     acetaminophen (TYLENOL) 325 MG tablet Take 325-650 mg by mouth every 6 hours as  needed for mild pain or headaches Maximum daily dose of acetaminophen is 3000 mg in 24 hours.       albuterol (PROAIR HFA/PROVENTIL HFA/VENTOLIN HFA) 108 (90 Base) MCG/ACT inhaler Inhale 2 puffs into the lungs every 4 hours as needed for shortness of breath / dyspnea or wheezing 1 Inhaler 0     amLODIPine (NORVASC) 5 MG tablet Take 1 tablet (5 mg) by mouth daily 90 tablet 3     amoxicillin-clavulanate (AUGMENTIN) 875-125 MG tablet Take 1 tablet by mouth 2 times daily 10 tablet 0     azaTHIOprine (IMURAN) 50 MG tablet Take 1.5 tablets (75 mg) by mouth daily 135 tablet 1     azelastine (ASTELIN) 0.1 % nasal spray Spray 1 spray into both nostrils 2 times daily 1 Bottle 0     Belimumab 200 MG/ML SOAJ Inject 200 mg Subcutaneous every 7 days . Hold for signs of infection and seek medical attention. Autoinjector 4 mL 6     blood glucose test strip Used for testing glucose 2-3 times daily 1 Month 5     butalbital-acetaminophen-caffeine (ESGIC) -40 MG tablet Take 1 tablet by mouth every 4 hours as needed for headaches . Max of 2/day. 10 tablet 0     Calcium Carb-Cholecalciferol (CALCIUM + D3) 600-200 MG-UNIT TABS Take 1 tablet by mouth daily        cevimeline (EVOXAC) 30 MG capsule Take 1 capsule (30 mg) by mouth 2 times daily 180 capsule 3     diclofenac (VOLTAREN) 1 % GEL Apply 2-4 grams to knees or shoulders four times daily as needed using enclosed dosing card. 100 g 4     estradiol (VAGIFEM) 10 MCG TABS vaginal tablet Place 1 tablet vaginally nightly for 2 weeks, then reduce to twice weekly 42 tablet 3     famotidine (PEPCID) 40 MG tablet Take 1 tablet (40 mg) by mouth nightly as needed for heartburn 30 tablet 8     fluticasone (FLONASE) 50 MCG/ACT nasal spray Spray 1 spray into both nostrils daily 16 g 1     hydroxychloroquine (PLAQUENIL) 200 MG tablet Hydroxychloroquine 200mg daily; and an additional 200mg every other day. 135 tablet 3     losartan (COZAAR) 25 MG tablet Take 1 tablet (25 mg) by mouth daily 90  tablet 1     meclizine (ANTIVERT) 25 MG tablet Take 0.5-1 tablets (12.5-25 mg) by mouth every 6 hours as needed for dizziness 40 tablet 0     MICROLET LANCETS MISC 1 each daily. 100 each 2     nortriptyline (PAMELOR) 10 MG capsule Take 1 capsule (10 mg) by mouth At Bedtime . After 1 week, increase to 20mg (2 tabs) at night.  After another week, increase to 30mg (3 tabs) at night. 90 capsule 3     ondansetron (ZOFRAN-ODT) 4 MG ODT tab Take 1 tablet (4 mg) by mouth every 8 hours as needed for nausea Recommend limited use 20 tablet 1     predniSONE (DELTASONE) 2.5 MG tablet Take 1 tablet (2.5 mg) by mouth daily 90 tablet 2     Spacer/Aero-Holding Chambers (SPACER/AERO-HOLD CHAMBER MASK) EDITH 1 Adult size spacer to use with MDI inhalers. 1 each 0     SUMAtriptan (IMITREX) 50 MG tablet Take 1 tablet (50 mg) by mouth at onset of headache for migraine . After 2 hours, if no relief, ok to take 2nd dose.  Limit 2 tabs/24 hours. 10 tablet 1     Vitamin D, Cholecalciferol, 1000 units CAPS Take 4,000 Int'l Units by mouth daily         Medical History: any changes in medical history since they were last seen? No     Review of Systems:  The 14 system ROS was reviewed from the intake questionnaire, and is positive for: headache, neck pain, nausea, dizziness  B/b problems: none  Mood: anxiety, stress    Physical Exam:  not currently breastfeeding.  General: awake, alert   Gait: Not observed. Appears to be walking around without difficulty while holding phone  Face symmetric, EOMI     Assessment:   1. Fibromyalgia- symptoms mostly of joint pain, but also with neck and low back pain. Currently flared  2. Chronic low back pain with features of SI joint and facet arthropathy.  Left piriformis   3. Lupus   4. Disturbed sleep/insomnia  5. Trochanteric bursitis  6. Elbow pain  7. Cervicogenic headache- has underlying facet arthropathy and disk changes.        Plan:  1. Physical therapy- pain PT- she is scheduling more.  2. Clinical Health  Psychologist to address issues of relaxation, behavioral change, coping style, and other factors important to improvement. Not at this time  3. Diagnostic Studies:  none  4. Medication Management:   1. Will add in nausea med. Given her work, will plan for less sedating- Zofran.  2. Discussed triptans- will start on imitrex. Side effects discussed.  3. For when at work and not able to do sedating. fiorecet provided as an option, but advised first time using needs to do it not at work.  Discussed locking up meds.  5. Further procedures recommended: as currently scheduled (medial branch block and trochanteric bursa injection)  6. Recommendations to PCP: none.   7. Follow up: 2 months- video    Magdalena Lockett MD  [facility]

## 2020-12-17 ENCOUNTER — VIRTUAL VISIT (OUTPATIENT)
Dept: PALLIATIVE MEDICINE | Facility: CLINIC | Age: 45
End: 2020-12-17
Payer: COMMERCIAL

## 2020-12-17 DIAGNOSIS — G43.009 MIGRAINE WITHOUT AURA AND WITHOUT STATUS MIGRAINOSUS, NOT INTRACTABLE: Primary | ICD-10-CM

## 2020-12-17 DIAGNOSIS — M54.2 CERVICALGIA: ICD-10-CM

## 2020-12-17 DIAGNOSIS — M47.819 FACET ARTHROPATHY: ICD-10-CM

## 2020-12-17 DIAGNOSIS — R11.0 NAUSEA: ICD-10-CM

## 2020-12-17 PROCEDURE — 99213 OFFICE O/P EST LOW 20 MIN: CPT | Mod: GT | Performed by: PSYCHIATRY & NEUROLOGY

## 2020-12-17 RX ORDER — ONDANSETRON 4 MG/1
4 TABLET, ORALLY DISINTEGRATING ORAL EVERY 8 HOURS PRN
Qty: 20 TABLET | Refills: 1 | Status: SHIPPED | OUTPATIENT
Start: 2020-12-17 | End: 2021-02-26

## 2020-12-17 RX ORDER — BUTALBITAL, ACETAMINOPHEN AND CAFFEINE 50; 325; 40 MG/1; MG/1; MG/1
1 TABLET ORAL EVERY 4 HOURS PRN
Qty: 10 TABLET | Refills: 0 | Status: SHIPPED | OUTPATIENT
Start: 2020-12-17 | End: 2021-02-26

## 2020-12-17 RX ORDER — SUMATRIPTAN 50 MG/1
50 TABLET, FILM COATED ORAL
Qty: 10 TABLET | Refills: 1 | Status: SHIPPED | OUTPATIENT
Start: 2020-12-17 | End: 2021-02-26

## 2020-12-17 ASSESSMENT — PAIN SCALES - GENERAL: PAINLEVEL: MODERATE PAIN (4)

## 2020-12-17 NOTE — PATIENT INSTRUCTIONS
1. Try the imitrex. After 2 hours, if not helpful, can do a 2nd dose.  Limit of 2 doses in 24 hour.  Can be sedating.  2. I wrote for yajaira.  Please limit to 2/day.  Likely less sedating, but I still want you to not be working when you first try.  Keep locked up.    3. zofran provided for nausea.  4. We will see you 12/30 for bursa injections, but also schedule about 2 months out.    ----------------------------------------------------------------  Clinic Number:  [phone]     Call with any questions about your care and for scheduling assistance.     Calls are returned Monday through Friday between 8 AM and 4:30 PM. We usually get back to you within 2 business days depending on the issue/request.    If we are prescribing your medications:    For opioid medication refills, call the clinic or send a [employer] message 7 days in advance.  Please include:    Name of requested medication    Name of the pharmacy.    For non-opioid medications, call your pharmacy directly to request a refill. Please allow 3-4 days to be processed.     Per [place] State Law:    All controlled substance prescriptions must be filled within 30 days of being written.      For those controlled substances allowing refills, pickup must occur within 30 days of last fill.      We believe regular attendance is key to your success in our program!      Any time you are unable to keep your appointment we ask that you call us at least 24 hours in advance to cancel.This will allow us to offer the appointment time to another patient.     Multiple missed appointments may lead to dismissal from the clinic.

## 2020-12-18 DIAGNOSIS — M32.19 OTHER SYSTEMIC LUPUS ERYTHEMATOSUS WITH OTHER ORGAN INVOLVEMENT (H): ICD-10-CM

## 2020-12-18 NOTE — TELEPHONE ENCOUNTER
Routing refill request to provider for review/approval because:  Drug not on the [facility] refill protocol     Jazz Mario RN, BSN, PHN  [facility] [place]

## 2020-12-21 RX ORDER — AZATHIOPRINE 50 MG/1
75 TABLET ORAL DAILY
Qty: 135 TABLET | Refills: 1 | Status: SHIPPED | OUTPATIENT
Start: 2020-12-21 | End: 2021-04-26

## 2020-12-30 ENCOUNTER — OFFICE VISIT (OUTPATIENT)
Dept: PALLIATIVE MEDICINE | Facility: CLINIC | Age: 45
End: 2020-12-30
Payer: COMMERCIAL

## 2020-12-30 VITALS — HEART RATE: 106 BPM | DIASTOLIC BLOOD PRESSURE: 93 MMHG | SYSTOLIC BLOOD PRESSURE: 133 MMHG

## 2020-12-30 DIAGNOSIS — M70.61 TROCHANTERIC BURSITIS OF BOTH HIPS: Primary | ICD-10-CM

## 2020-12-30 DIAGNOSIS — M70.62 TROCHANTERIC BURSITIS OF BOTH HIPS: Primary | ICD-10-CM

## 2020-12-30 PROCEDURE — 20610 DRAIN/INJ JOINT/BURSA W/O US: CPT | Mod: 50 | Performed by: PSYCHIATRY & NEUROLOGY

## 2020-12-30 RX ORDER — TRIAMCINOLONE ACETONIDE 40 MG/ML
40 INJECTION, SUSPENSION INTRA-ARTICULAR; INTRAMUSCULAR ONCE
Status: COMPLETED | OUTPATIENT
Start: 2020-12-30 | End: 2020-12-30

## 2020-12-30 RX ORDER — BUPIVACAINE HYDROCHLORIDE 5 MG/ML
4 INJECTION, SOLUTION EPIDURAL; INTRACAUDAL ONCE
Status: COMPLETED | OUTPATIENT
Start: 2020-12-30 | End: 2020-12-30

## 2020-12-30 RX ADMIN — TRIAMCINOLONE ACETONIDE 40 MG: 40 INJECTION, SUSPENSION INTRA-ARTICULAR; INTRAMUSCULAR at 15:06

## 2020-12-30 RX ADMIN — BUPIVACAINE HYDROCHLORIDE 20 MG: 5 INJECTION, SOLUTION EPIDURAL; INTRACAUDAL at 15:06

## 2020-12-30 ASSESSMENT — PAIN SCALES - GENERAL: PAINLEVEL: MILD PAIN (2)

## 2020-12-30 NOTE — PATIENT INSTRUCTIONS
[facility]   Post Procedure Instructions    Today you had: bursa injection      Medications used:  lidocaine   bupivicaine   kenolog           Go to the emergency room if you develop any shortness of breath    Monitor the injection sites for signs and symptoms of infection-fever, chills, redness, swelling, warmth, or drainage to areas.    You may have soreness at injection sites for up to 24 hours.    You may apply ice to the painful areas to help minimize the discomfort of the needle pokes.    Do not apply heat to sites for at least 12 hours.    You may use anti-inflammatory medications or Tylenol for pain control if necessary    Pain Clinic phone number during work hours Monday-Friday:  [phone]    After hours provider line: [phone]

## 2020-12-30 NOTE — PROGRESS NOTES
Pre procedure Diagnosis: trochanteric bursitis  Post procedure Diagnosis: Same  Procedure performed: bilateral trochanteric bursa injection  Anesthesia: none  Complications: none  Operators: Magdalena Lockett MD     Indications:   Yovana Enriquez is a 45 year old female, seen by me , here for trochanteric bursa injection.  They have a history of bilateral hip pain.  Exam shows bilateral tenderness along trochanteric bursa  and they have tried conservative treatment including medications.    Options/alternatives, benefits and risks were discussed with the patient including bleeding, infection, tissue trauma including tendons and muscles, and weakness.  Questions were answered to her satisfaction and she agrees to proceed. Voluntary informed consent was obtained and signed.     Vitals were reviewed: Yes  Allergies were reviewed:  Yes   Medications were reviewed:  Yes   Pre-procedure pain score: 2/10    Procedure:  After getting informed consent, patient was placed in a prone position on the procedure table.   A Pause for the Cause was performed.  Patient was prepped in sterile fashion.     The area over the right trochanter was palpated, and the tender area was identified, corresponding to the area of the trochanteric bursa.  The area was cleaned with Chloroprep. A 25G 3.5 inch needle was inserted, aiming towards the trochanter.  When bone was encountered, the needle was slightly drawn.  A solution containing local anesthesic and steroid was injected.  The needle was removed.  Hemostasis was achieved. Bandaids were placed as appropriate.    The procedure was repeated on the opposite side.    In total, 4ml of 0.5% bupivacaine, 4ml of 1% lidocaine, and 40mg of kenalog was injected, divided equally between the two sides.    Hemostasis was achieved, the area was cleaned, and bandaids were placed when appropriate.  The patient tolerated the procedure well.    Post-procedure pain score: not recorded  Follow-up includes:    -f/u with referring provider    Magdalena Lockett MD  [facility]

## 2021-01-06 DIAGNOSIS — I10 HYPERTENSION GOAL BP (BLOOD PRESSURE) < 140/90: ICD-10-CM

## 2021-01-11 DIAGNOSIS — Z11.59 SCREENING FOR VIRAL DISEASE: ICD-10-CM

## 2021-01-11 DIAGNOSIS — R76.8 LOW SERUM IGA FOR AGE: ICD-10-CM

## 2021-01-11 DIAGNOSIS — R76.8 LOW SERUM IGG FOR AGE: ICD-10-CM

## 2021-01-11 DIAGNOSIS — L93.0 LUPUS ERYTHEMATOSUS, UNSPECIFIED FORM: ICD-10-CM

## 2021-01-11 DIAGNOSIS — Z79.899 HIGH RISK MEDICATION USE: ICD-10-CM

## 2021-01-11 DIAGNOSIS — D47.2 MGUS (MONOCLONAL GAMMOPATHY OF UNKNOWN SIGNIFICANCE): ICD-10-CM

## 2021-01-11 LAB
ALBUMIN UR-MCNC: NEGATIVE MG/DL
APPEARANCE UR: CLEAR
BACTERIA #/AREA URNS HPF: ABNORMAL /HPF
BASOPHILS # BLD AUTO: 0 10E9/L (ref 0–0.2)
BASOPHILS NFR BLD AUTO: 0.5 %
BILIRUB UR QL STRIP: NEGATIVE
COLOR UR AUTO: YELLOW
DIFFERENTIAL METHOD BLD: ABNORMAL
EOSINOPHIL # BLD AUTO: 0.1 10E9/L (ref 0–0.7)
EOSINOPHIL NFR BLD AUTO: 0.6 %
ERYTHROCYTE [DISTWIDTH] IN BLOOD BY AUTOMATED COUNT: 11.5 % (ref 10–15)
ERYTHROCYTE [SEDIMENTATION RATE] IN BLOOD BY WESTERGREN METHOD: 4 MM/H (ref 0–20)
GLUCOSE UR STRIP-MCNC: NEGATIVE MG/DL
HCT VFR BLD AUTO: 38.7 % (ref 35–47)
HGB BLD-MCNC: 13.2 G/DL (ref 11.7–15.7)
HGB UR QL STRIP: ABNORMAL
KETONES UR STRIP-MCNC: NEGATIVE MG/DL
LEUKOCYTE ESTERASE UR QL STRIP: NEGATIVE
LYMPHOCYTES # BLD AUTO: 0.7 10E9/L (ref 0.8–5.3)
LYMPHOCYTES NFR BLD AUTO: 8.8 %
MCH RBC QN AUTO: 33 PG (ref 26.5–33)
MCHC RBC AUTO-ENTMCNC: 34.1 G/DL (ref 31.5–36.5)
MCV RBC AUTO: 97 FL (ref 78–100)
MONOCYTES # BLD AUTO: 0.5 10E9/L (ref 0–1.3)
MONOCYTES NFR BLD AUTO: 6.7 %
MUCOUS THREADS #/AREA URNS LPF: PRESENT /LPF
NEUTROPHILS # BLD AUTO: 6.7 10E9/L (ref 1.6–8.3)
NEUTROPHILS NFR BLD AUTO: 83.4 %
NITRATE UR QL: NEGATIVE
NON-SQ EPI CELLS #/AREA URNS LPF: ABNORMAL /LPF
PH UR STRIP: 6 PH (ref 5–7)
PLATELET # BLD AUTO: 316 10E9/L (ref 150–450)
RBC # BLD AUTO: 4 10E12/L (ref 3.8–5.2)
RBC #/AREA URNS AUTO: ABNORMAL /HPF
SOURCE: ABNORMAL
SP GR UR STRIP: >1.03 (ref 1–1.03)
UROBILINOGEN UR STRIP-ACNC: 0.2 EU/DL (ref 0.2–1)
WBC # BLD AUTO: 8.1 10E9/L (ref 4–11)
WBC #/AREA URNS AUTO: ABNORMAL /HPF

## 2021-01-11 PROCEDURE — 85025 COMPLETE CBC W/AUTO DIFF WBC: CPT | Performed by: INTERNAL MEDICINE

## 2021-01-11 PROCEDURE — 82784 ASSAY IGA/IGD/IGG/IGM EACH: CPT | Mod: 59 | Performed by: INTERNAL MEDICINE

## 2021-01-11 PROCEDURE — 86769 SARS-COV-2 COVID-19 ANTIBODY: CPT | Performed by: NURSE PRACTITIONER

## 2021-01-11 PROCEDURE — 86225 DNA ANTIBODY NATIVE: CPT | Performed by: INTERNAL MEDICINE

## 2021-01-11 PROCEDURE — 84156 ASSAY OF PROTEIN URINE: CPT | Performed by: INTERNAL MEDICINE

## 2021-01-11 PROCEDURE — 86140 C-REACTIVE PROTEIN: CPT | Performed by: INTERNAL MEDICINE

## 2021-01-11 PROCEDURE — 85652 RBC SED RATE AUTOMATED: CPT | Performed by: INTERNAL MEDICINE

## 2021-01-11 PROCEDURE — 82550 ASSAY OF CK (CPK): CPT | Performed by: INTERNAL MEDICINE

## 2021-01-11 PROCEDURE — 86160 COMPLEMENT ANTIGEN: CPT | Mod: 59 | Performed by: INTERNAL MEDICINE

## 2021-01-11 PROCEDURE — 81001 URINALYSIS AUTO W/SCOPE: CPT | Performed by: INTERNAL MEDICINE

## 2021-01-11 PROCEDURE — 86160 COMPLEMENT ANTIGEN: CPT | Performed by: INTERNAL MEDICINE

## 2021-01-11 PROCEDURE — 80053 COMPREHEN METABOLIC PANEL: CPT | Performed by: INTERNAL MEDICINE

## 2021-01-11 PROCEDURE — 36415 COLL VENOUS BLD VENIPUNCTURE: CPT | Performed by: INTERNAL MEDICINE

## 2021-01-11 PROCEDURE — 82784 ASSAY IGA/IGD/IGG/IGM EACH: CPT | Performed by: INTERNAL MEDICINE

## 2021-01-11 RX ORDER — LOSARTAN POTASSIUM 25 MG/1
25 TABLET ORAL DAILY
Qty: 90 TABLET | Refills: 0 | Status: SHIPPED | OUTPATIENT
Start: 2021-01-11 | End: 2021-08-31

## 2021-01-12 LAB
ALBUMIN SERPL-MCNC: 4 G/DL (ref 3.4–5)
ALP SERPL-CCNC: 55 U/L (ref 40–150)
ALT SERPL W P-5'-P-CCNC: 19 U/L (ref 0–50)
ANION GAP SERPL CALCULATED.3IONS-SCNC: 1 MMOL/L (ref 3–14)
AST SERPL W P-5'-P-CCNC: 14 U/L (ref 0–45)
BILIRUB SERPL-MCNC: 0.2 MG/DL (ref 0.2–1.3)
BUN SERPL-MCNC: 15 MG/DL (ref 7–30)
CALCIUM SERPL-MCNC: 8.8 MG/DL (ref 8.5–10.1)
CHLORIDE SERPL-SCNC: 106 MMOL/L (ref 94–109)
CK SERPL-CCNC: 89 U/L (ref 30–225)
CO2 SERPL-SCNC: 31 MMOL/L (ref 20–32)
CREAT SERPL-MCNC: 0.77 MG/DL (ref 0.52–1.04)
CREAT UR-MCNC: 138 MG/DL
CRP SERPL-MCNC: <2.9 MG/L (ref 0–8)
GFR SERPL CREATININE-BSD FRML MDRD: >90 ML/MIN/{1.73_M2}
GLUCOSE SERPL-MCNC: 99 MG/DL (ref 70–99)
POTASSIUM SERPL-SCNC: 3.9 MMOL/L (ref 3.4–5.3)
PROT SERPL-MCNC: 6.5 G/DL (ref 6.8–8.8)
PROT UR-MCNC: 0.19 G/L
PROT/CREAT 24H UR: 0.13 G/G CR (ref 0–0.2)
SODIUM SERPL-SCNC: 138 MMOL/L (ref 133–144)

## 2021-01-13 LAB
C3 SERPL-MCNC: 91 MG/DL (ref 81–157)
C4 SERPL-MCNC: 20 MG/DL (ref 13–39)
DSDNA AB SER-ACNC: <1 IU/ML
IGA SERPL-MCNC: 93 MG/DL (ref 84–499)
IGG SERPL-MCNC: 768 MG/DL (ref 610–1616)
IGM SERPL-MCNC: 286 MG/DL (ref 35–242)

## 2021-01-14 LAB
SARS-COV-2 AB PNL SERPL IA: NEGATIVE
SARS-COV-2 IGG SERPL IA-ACNC: NORMAL

## 2021-01-15 ENCOUNTER — HEALTH MAINTENANCE LETTER (OUTPATIENT)
Age: 46
End: 2021-01-15

## 2021-01-18 ENCOUNTER — VIRTUAL VISIT (OUTPATIENT)
Dept: RHEUMATOLOGY | Facility: CLINIC | Age: 46
End: 2021-01-18
Payer: COMMERCIAL

## 2021-01-18 DIAGNOSIS — M35.01 SJOGREN'S SYNDROME WITH KERATOCONJUNCTIVITIS SICCA (H): ICD-10-CM

## 2021-01-18 DIAGNOSIS — I73.00 RAYNAUD'S PHENOMENON WITHOUT GANGRENE: ICD-10-CM

## 2021-01-18 DIAGNOSIS — Z79.899 HIGH RISK MEDICATIONS (NOT ANTICOAGULANTS) LONG-TERM USE: ICD-10-CM

## 2021-01-18 DIAGNOSIS — M32.9 SYSTEMIC LUPUS ERYTHEMATOSUS, UNSPECIFIED SLE TYPE, UNSPECIFIED ORGAN INVOLVEMENT STATUS (H): Primary | ICD-10-CM

## 2021-01-18 DIAGNOSIS — M70.61 TROCHANTERIC BURSITIS OF BOTH HIPS: ICD-10-CM

## 2021-01-18 DIAGNOSIS — M70.62 TROCHANTERIC BURSITIS OF BOTH HIPS: ICD-10-CM

## 2021-01-18 PROCEDURE — 99214 OFFICE O/P EST MOD 30 MIN: CPT | Mod: GT | Performed by: INTERNAL MEDICINE

## 2021-01-18 RX ORDER — PREDNISONE 2.5 MG/1
2.5 TABLET ORAL DAILY
Qty: 90 TABLET | Refills: 2 | Status: SHIPPED | OUTPATIENT
Start: 2021-01-18 | End: 2021-02-10

## 2021-01-18 RX ORDER — CEVIMELINE HYDROCHLORIDE 30 MG/1
30 CAPSULE ORAL 2 TIMES DAILY
Qty: 180 CAPSULE | Refills: 3 | Status: SHIPPED | OUTPATIENT
Start: 2021-01-18 | End: 2021-12-20

## 2021-01-18 RX ORDER — AMLODIPINE BESYLATE 5 MG/1
5 TABLET ORAL DAILY
Qty: 90 TABLET | Refills: 3 | Status: SHIPPED | OUTPATIENT
Start: 2021-01-18 | End: 2021-10-19

## 2021-01-18 NOTE — PATIENT INSTRUCTIONS
RHEUMATOLOGY    Dr. Bradford Colvin    [facility]  [address] [place] [address]    Our new phone number for Rheumatology is [phone], this number will be able to help you schedule appointments for Dr. Colvin or if you have any message you would like sent to us.    Thank you for choosing [facility]!  Qing Zapata [facility]

## 2021-01-18 NOTE — PROGRESS NOTES
Yovana Enriquez  is a 45 year old year old female who is being evaluated via a billable video visit.      How would you like to obtain your AVS? MyChart  If the video visit is dropped, the invitation should be resent by: Text to cell phone: [phone]  Will anyone else be joining your video visit? No      Rheumatology Video Visit      Yovana Enriquez MRN# [id number]   YOB: 1975 Age: 45 year old      Date of visit: 1/18/21   PCP: Bradford Mario    Chief Complaint   Patient presents with:  Lupus erythematosus    Assessment and Plan     1. Systemic lupus erythematosus (YANELIS by EIA positive in the past, leukopenia/lymphocytopenia, hypocomplementemia, polyarthralgias, oral sores, history of malar rash, photophobia, photosensitivity, Raynaud's Phenomenon): Previously on methotrexate that was discontinued for unclear reasons but the patient reports that she tolerated it well. Currently on azathioprine 75 mg daily (dose reduced on 11/8/2018 from 100mg daily without worsening symptoms at that time, eventually reduced to 50 mg daily and did well for a period of time; now on 75 mg daily), hydroxychloroquine 300 mg daily [avg], and Benlysta (worsening symptoms when stopped in the past).  Overall doing well with regard to lupus at this time.  Noted that she has likely adrenal insufficiency with a lower dose of cortisol in the past and has had difficulty getting off of a low-dose of prednisone; okay to continue prednisone 2.5 mg daily.  Chronic illness, stable  - Continue azathioprine 75 mg daily   - Continue Evoxac 30 mg twice a day  - Continue hydroxychloroquine 300mg daily on average: 200mg daily with additional 200mg every other day (toxicity monitoring eye exam last done on 5/20/2019; advised that she update the eye exam)  - Continue Benlysta 200 mg SQ every 7 days  - Continue Prednisone 2.5mg daily thereafter  - Labs in 3 mo: CBC, CMP, ESR, CRP, CK, dsDNA, C3, C4, UA, Uprotein:creatinine    High risk  medication requiring intensive toxicity monitoring at least quarterly: labs ordered include CBC, Creatinine, Hepatic panel to monitor for cytopenia and transaminitis; also checking creatinine as it affects clearance of medication.     2. Secondary Sjogren syndrome: Doing well with Evoxac and frequent sips of water. Dry eyes are not much of an issue currently. See #1.  Chronic illness, stable    3. Raynaud's Phenomenon: Amlodipine 5mg daily is effective.  Chronic illness, stable  - Continue amlodipine 5mg daily     4. Bilateral trochanteric bursitis: She receives steroid injections from the pain clinic.  She had several questions about trochanteric bursitis today that were answered.  In addition to injections, I advised trying to not lay on her right side that is the most effective at this time.    5. Fibromyalgia: Managed by the pain clinic and PCP.    6. MGUS: SPEP and immunofixation advised to be done yearly per Dr. Yoselin Payan.     7.  Nasal sores: Based on description of vesicles that later crusted over, and occur at the same location on the outside and just on the inside of her nose each time, these are most consistent with HSV and I advised that she discuss with her PCP to consider possible culture and/or trial of oral medication    8.  Vaccinations: Vaccinations reviewed with Ms. Enriquez.  Risks and benefits of vaccinations were discussed.   CDC stance on shingrix when on moderate to high immunosuppression was reviewed.   I explained that Shingrix is used off label when under 50 years old and that the safety and efficacy of the vaccine has not been tested in people younger than 50 years old.   - Influenza: encouraged yearly vaccination  - [id number]: up to date  - [id number]: up to date  - Shingrix: Advised receiving; patient is going to check on insurance coverage before determining if it is going to be received   - TDAP: advised receiving  Advised TDAP and influenza now; then at least 1 mo later to get  Shingrix    #  Instructed that if confirmed to have COVID-19 or exposure to someone with confirmed COVID-19 to call this clinic for directions on DMARD management.    # This is a virtual visit to reduce the risk of COVID-19 exposure during this current pandemic.      # Considered to be at high risk of complications from the COVID-19 virus.  It is recommended to limit contact with other people and if possible to work remotely or provide a leave of absence to reduce the risk for COVID-19.      Total minutes spent in evaluation with patient, review of pertinent lab tests and chart notes, and documentation: 26     Ms. Enriquez verbalized agreement with and understanding of the rational for the diagnosis and treatment plan.  All questions were answered to best of my ability and the patient's satisfaction. Ms. Enriquez was advised to contact the clinic with any questions that may arise after the clinic visit.      Thank you for involving me in the care of the patient    Return to clinic: 3-4 months      HPI   Yovana Enriquez is a 45 year old female with medical history significant for subclinical hyperthyroidism, hypertension, irritable bowel syndrome, fibromyalgia, hypertension, non-celiac gluten sensitivity (reportedly with bowel biopsies and laboratory workup that did not show celiac disease), anxiety, and systemic lupus erythematosus who presents for follow-up of SLE.    Today, 1/18/2021: Mild low back pain and hip that she attributes to caring for a 5 mo. Troch bursae were injected in the pain clinic with improvement of the left but the right didn't get better. Lays on her right side. Morning stiffness affecting her back. No rash. No oral sores.  Nose sores on the outside of her nose, and sometimes just on the inside of the nose; forms small vesicle then crusts over; usually occurs in the same location.  Otherwise doing well she says.  Tolerating medications and finds that the are effective.  Raynaud's is well  controlled.    Denies fevers, chills, nausea, vomiting. No abdominal pain.  No chest pain/pressure, palpitations, or shortness of breath.  No rash currently. No LE swelling.  She has photosensitivity and photophobia.   No eye pain or redness. No history of serositis. No history of DVT, pulmonary embolism, or miscarriage.    Raynaud's is controlled per patient.  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  PULM: No SOB, wheeze, cough.  GI:  See history of present illness. No blood in stool. No abdominal pain, nausea, vomiting.  : No blood in urine.  MSK: See HPI.  NEURO: See history of present illness  EXT: No LE swelling  PSYCH: Negative    Active Problem List     Patient Active Problem List   Diagnosis     Systemic lupus erythematosus (H)     Menorrhagia     History of ovarian cyst     Dysmenorrhea     History of gestational diabetes mellitus, not currently pregnant     Intramural leiomyoma of uterus     Hyperlipidemia LDL goal <130     Subclinical hyperthyroidism     Anxiety     High risk medication use     Fibromyalgia     Chronic constipation     Irritable bowel syndrome     Health Care Home     Hypertension goal BP (blood pressure) < 140/90     Non-celiac gluten sensitivity     Raynaud's phenomenon without gangrene     Sjogren's syndrome (H)     Intramural and submucous leiomyoma of uterus     Adrenal insufficiency (H)     Past Medical History     Past Medical History:   Diagnosis Date     Arthritis      Chronic constipation 12/16/2013     Dysmenorrhea 10/1/2012     Fibromyalgia 11/15/2013     Health Care Home 3/19/2014    **See Letters for [facility] Care Plan: Emergency Care Plan       High risk medication use 9/13/2013     History of gestational  diabetes mellitus, not currently pregnant 10/1/2012     History of ovarian cyst 10/1/2012     Hyperlipidemia LDL goal <130 10/18/2012     Hypertension goal BP (blood pressure) < 140/90 1/19/2015     Intramural leiomyoma of uterus 10/5/2012     Irritable bowel syndrome 3/11/2014    Patient states no history colonoscopy       Menorrhagia 10/1/2012     Non-celiac gluten sensitivity 2/8/2016     PONV (postoperative nausea and vomiting)      Subclinical hyperthyroidism 1/17/2013     Systemic lupus erythematosus (H) 4/23/2012     Past Surgical History     Past Surgical History:   Procedure Laterality Date     COLONOSCOPY N/A 2/20/2015    Procedure: COMBINED COLONOSCOPY, SINGLE OR MULTIPLE BIOPSY/POLYPECTOMY BY BIOPSY;  Surgeon: Fred Cullen MD;  Location: [facility] OR     COLONOSCOPY N/A 10/29/2018    Procedure: COMBINED COLONOSCOPY, SINGLE OR MULTIPLE BIOPSY/POLYPECTOMY BY BIOPSY;  Surgeon: Inez Sawyer MD;  Location: [facility]     COLONOSCOPY WITH CO2 INSUFFLATION N/A 2/20/2015    Procedure: COLONOSCOPY WITH CO2 INSUFFLATION;  Surgeon: Fred Cullen MD;  Location: [facility] OR     ENT SURGERY  10-24-14    Bottom lip biopsies     ESOPHAGOSCOPY, GASTROSCOPY, DUODENOSCOPY (EGD), COMBINED N/A 2/20/2015    Procedure: COMBINED ESOPHAGOSCOPY, GASTROSCOPY, DUODENOSCOPY (EGD), BIOPSY SINGLE OR MULTIPLE;  Surgeon: Fred Cullen MD;  Location: [facility] OR     HC BREATH HYDROGEN TEST  12/31/2013    Procedure: HYDROGEN BREATH TEST;  Surgeon: Camden Almazan MD;  Location: [facility]     HC HYSTEROSCOPY, SURGICAL; W/ ENDOMETRIAL ABLATION, ANY METHOD  10-19-12     SHOULDER SURGERY Right     R shoulder     TUBAL LIGATION  2004     Allergy   No Known Allergies  Current Medication List     Current Outpatient Medications   Medication Sig     acetaminophen (TYLENOL) 325 MG tablet Take 325-650 mg by mouth every 6 hours as needed for mild pain or headaches Maximum daily dose of acetaminophen is 3000 mg in 24 hours.      albuterol (PROAIR HFA/PROVENTIL HFA/VENTOLIN HFA) 108 (90 Base) MCG/ACT inhaler Inhale 2 puffs into the lungs every 4 hours as needed for shortness of breath / dyspnea or wheezing     amLODIPine (NORVASC) 5 MG tablet Take 1 tablet (5 mg) by mouth daily     azaTHIOprine (IMURAN) 50 MG tablet Take 1.5 tablets (75 mg) by mouth daily     azelastine (ASTELIN) 0.1 % nasal spray Spray 1 spray into both nostrils 2 times daily     Belimumab 200 MG/ML SOAJ Inject 200 mg Subcutaneous every 7 days . Hold for signs of infection and seek medical attention. Autoinjector     butalbital-acetaminophen-caffeine (ESGIC) -40 MG tablet Take 1 tablet by mouth every 4 hours as needed for headaches . Max of 2/day.     Calcium Carb-Cholecalciferol (CALCIUM + D3) 600-200 MG-UNIT TABS Take 1 tablet by mouth daily      cevimeline (EVOXAC) 30 MG capsule Take 1 capsule (30 mg) by mouth 2 times daily     diclofenac (VOLTAREN) 1 % GEL Apply 2-4 grams to knees or shoulders four times daily as needed using enclosed dosing card.     estradiol (VAGIFEM) 10 MCG TABS vaginal tablet Place 1 tablet vaginally nightly for 2 weeks, then reduce to twice weekly     famotidine (PEPCID) 40 MG tablet Take 1 tablet (40 mg) by mouth nightly as needed for heartburn     fluticasone (FLONASE) 50 MCG/ACT nasal spray Spray 1 spray into both nostrils daily     hydroxychloroquine (PLAQUENIL) 200 MG tablet Hydroxychloroquine 200mg daily; and an additional 200mg every other day.     losartan (COZAAR) 25 MG tablet Take 1 tablet (25 mg) by mouth daily     meclizine (ANTIVERT) 25 MG tablet Take 0.5-1 tablets (12.5-25 mg) by mouth every 6 hours as needed for dizziness     nortriptyline (PAMELOR) 10 MG capsule Take 1 capsule (10 mg) by mouth At Bedtime . After 1 week, increase to 20mg (2 tabs) at night.  After another week, increase to 30mg (3 tabs) at night.     ondansetron (ZOFRAN-ODT) 4 MG ODT tab Take 1 tablet (4 mg) by mouth every 8 hours as needed for nausea Recommend  limited use     predniSONE (DELTASONE) 2.5 MG tablet Take 1 tablet (2.5 mg) by mouth daily     SUMAtriptan (IMITREX) 50 MG tablet Take 1 tablet (50 mg) by mouth at onset of headache for migraine . After 2 hours, if no relief, ok to take 2nd dose.  Limit 2 tabs/24 hours.     Vitamin D, Cholecalciferol, 1000 units CAPS Take 4,000 Int'l Units by mouth daily     amoxicillin-clavulanate (AUGMENTIN) 875-125 MG tablet Take 1 tablet by mouth 2 times daily     blood glucose test strip Used for testing glucose 2-3 times daily     MICROLET LANCETS MISC 1 each daily.     Spacer/Aero-Holding Chambers (SPACER/AERO-HOLD CHAMBER MASK) EDITH 1 Adult size spacer to use with [facility]I inhalers.     No current facility-administered medications for this visit.          Social History   See HPI    Family History     Family History   Problem Relation Age of Onset     Respiratory Maternal Grandfather      Cancer Maternal Grandfather      Asthma Son      Circulatory Maternal Grandmother         Brain anurysm     Hypertension Mother      Glaucoma Mother 50        drops     Hypertension Sister      Hypertension Sister      Diabetes No family hx of      Cerebrovascular Disease No family hx of      Thyroid Disease No family hx of      Macular Degeneration No family hx of        Physical Exam       GEN: NAD. Healthy appearing adult.   HEENT: MMM.  Anicteric, noninjected sclera. No obvious external lesions of the ear and nose. Hearing intact.  PULM: No increased work of breathing  MSK:  Hands and wrists without swelling.   SKIN: No rash or jaundice seen. No evidence of current or previous ischemic insult to the fingertips by visual inspection.   PSYCH: Alert. Appropriate.        Labs / Imaging (select studies)     RF/CCP  Recent Labs   Lab Test 09/13/13  1504   CCPABY <20 Interpretation:  Negative   RHF 12     YANELIS  Recent Labs   Lab Test 07/25/16  1433   ANAIGG <1:40  Reference range: <1:40  (Note)  <1:40  INTERPRETIVE INFORMATION: YANELIS by IFA,  IgG  Anti-nuclear antibodies (YANELIS) are seen in a variety of  systemic rheumatic diseases and are determined by indirect  fluorescence assay (IFA) using HEp-2 substrate with an  IgG-specific conjugate. YANELIS titers less than or equal to  1:80 have variable relevance while titers greater than or  equal to 1:160 are considered clinically significant. These  antibodies may precede clinical disease onset; however,  healthy individuals and those with advanced age have been  reported to be positive for YANELIS. When observed, one of the  five basic patterns is reported: homogeneous,  peripheral/rim, speckled, centromere, or nucleolar. If  cytoplasmic fluorescence is observed, it is noted. IFA  methodology is subjective and has occasionally been shown  to lack sensitivity for anti-SSA/Ro antibodies.  Negative results do not necessarily rule out the presence  of SSc. If clinical suspicion remains, consider further  te sting for U3-RNP, PM/Scl, or Th/To antibodies associated  with SSc.  Performed by [email or web address],  [address] [place] [id number] [phone]  [email or web address], Alcon Krishna MD, Lab. Director       RNP/Sm/SSA/SSB  Recent Labs   Lab Test 03/23/16  1759 01/04/16  1806 07/01/14  1211 09/13/13  1504   RNPIGG  --   --  <0.2  Negative    --    SMIGG <0.2  Negative   Antibody index (AI) values reflect qualitative changes in antibody   concentration that cannot be directly associated with clinical condition or   disease state.   <0.2  Negative   Antibody index (AI) values reflect qualitative changes in antibody   concentration that cannot be directly associated with clinical condition or   disease state.   <0.2  Negative    --    ENASM  --   --   --  0   SSAIGG  --   --  <0.2  Negative    --    ENASSA  --   --   --  3   SSBIGG  --   --  <0.2  Negative    --    ENASSB  --   --   --  0   ENARMP  --   --   --  0   SCLIGG  --   --  <0.2  Negative    --      dsDNA  Recent Labs   Lab Test 01/11/21  1805 10/25/20  1057  04/07/20  1136 01/13/20  1804 10/21/19  1552 07/17/19  1756 10/01/18  1313 06/04/18  1758 01/30/18  1752   DNA <1 1 1 <1 1 <1 <1 1 <1     C3/C4  Recent Labs   Lab Test 01/11/21  1805 10/25/20  1059 04/07/20  1136 01/13/20  1804 10/21/19  1552 07/17/19  1756 01/15/19  1800 10/01/18  1313 06/04/18  1758   R4GNYPO 91 103 100 85 77 67* 68* 63* 60*   Q9YHDTD 20 >9* 16 21 13* 13* 14* 14* 13*     Antiphospholipid Antibodies  Recent Labs   Lab Test 09/13/13  1504   CARG <15.0 Interpretation:  Negative   SANCHO <12.5 Interpretation:  Negative     IgG  Recent Labs   Lab Test 01/11/21  1805 10/25/20  1059 06/17/19  1813 04/02/14  1539 04/02/14  1539    <17*  --   --  897   IGA 93 <2* 82   < > 107   * 345*  --   --  361*    < > = values in this interval not displayed.     CBC  Recent Labs   Lab Test 01/11/21  1805 10/25/20  1059 04/07/20  1136   WBC 8.1 5.8 6.4   RBC 4.00 4.44 4.34   HGB 13.2 14.8 14.0   HCT 38.7 43.0 40.9   MCV 97 97 94   RDW 11.5 11.7 11.2    346 306   MCH 33.0 33.3* 32.3   MCHC 34.1 34.4 34.2   NEUTROPHIL 83.4 74.6 81.6   LYMPH 8.8 16.1 12.4   MONOCYTE 6.7 6.7 4.4   EOSINOPHIL 0.6 1.7 0.8   BASOPHIL 0.5 0.9 0.8   ANEU 6.7 4.4 5.3   ALYM 0.7* 0.9 0.8   ZOË 0.5 0.4 0.3   AEOS 0.1 0.1 0.1   ABAS 0.0 0.1 0.1     CMP  Recent Labs   Lab Test 01/11/21  1805 10/25/20  1059 04/07/20  1136 01/13/20  1804 10/21/19  1552 07/17/19  1756    139 140 140 137 138   POTASSIUM 3.9 4.1 3.6 3.9 3.7 3.8   CHLORIDE 106 104 106 106 104 105   CO2 31 31 31 31 30 28   ANIONGAP 1* 4 3 3 3 5   GLC 99 86 104* 106* 101* 84   BUN 15 11 16 14 15 12   CR 0.77 0.73 0.71 0.73 0.66 0.66   GFRESTIMATED >90 >90 >90 >90 >90 >90   GFRESTBLACK >90 >90 >90 >90 >90 >90   MELANIA 8.8 9.1 9.1 8.9 9.3 8.5   BILITOTAL 0.2 0.4 0.3 0.3 0.3 0.3   ALBUMIN 4.0 4.4 4.3 3.7 4.1 4.3   PROTTOTAL 6.5* 7.5 7.1 6.5* 7.1 7.1   ALKPHOS 55 62 54 54 48 45   AST 14 22 13 21 15 25   ALT 19 23 24 22 19 23     HgA1c  Recent Labs   Lab Test  05/30/19  0859   A1C 5.2     Calcium/VitaminD  Recent Labs   Lab Test 01/11/21  1805 10/25/20  1059 04/07/20  1136 04/16/19  1759 04/16/19  1759 10/02/17  1814 10/02/17  1814   MELANIA 8.8 9.1 9.1   < > 8.4*   < > 8.7   VITDT  --   --  59  --  40  --  37    < > = values in this interval not displayed.     ESR/CRP  Recent Labs   Lab Test 01/11/21  1805 10/25/20  1059 04/07/20  1136   SED 4 5 4   CRP <2.9 <2.9 <2.9     CK/Aldolase  Recent Labs   Lab Test 01/11/21  1805 10/25/20  1059 04/07/20  1136   CKT 89 102 82     TSH/T4  Recent Labs   Lab Test 04/07/20  1136 04/10/19  1808 10/18/17  1445 10/07/16  0714 02/27/15  1058   TSH 0.44 0.44 0.33* 0.37*  --    T4  --   --  1.24 1.16 1.01     Lipid Panel  Recent Labs   Lab Test 01/26/19  0916 10/07/16  0714 12/18/12  1112   CHOL 133 137 135   TRIG 64 48 108   HDL 68 75 63   LDL 52 52 51   VLDL  --   --  22   CHOLHDLRATIO  --   --  2.1   NHDL 65 62  --      Hepatitis B  Recent Labs   Lab Test 03/29/16  1417 02/13/14  1835   HEPBANG Nonreactive Negative     Hepatitis C  Recent Labs   Lab Test 03/29/16  1417 02/13/14  1835   HCVAB Nonreactive   Assay performance characteristics have not been established for newborns,   infants, and children   Negative     Lyme ab screening  Recent Labs   Lab Test 05/30/15  1355   LYMEGM 0.55     Tuberculosis Screening  Recent Labs   Lab Test 07/03/17  1447 03/29/16  1417 02/13/14  1835   TBRSLT Negative Negative Negative   TBAGN 0.00 0.00 0.00     HIV Screening  Recent Labs   Lab Test 04/10/19  1808   HIAGAB Nonreactive     UA  Recent Labs   Lab Test 01/11/21  1810 10/25/20  1110 09/15/20  1655 04/07/20  1140 04/07/20  1140   COLOR Yellow Yellow Yellow   < > Yellow   APPEARANCE Clear Clear Clear   < > Clear   URINEGLC Negative Negative Negative   < > Negative   URINEBILI Negative Negative Negative   < > Negative   SG >1.030 1.010 1.025   < > >1.030   URINEPH 6.0 7.0 6.0   < > 6.5   PROTEIN Negative Negative Negative   < > Negative   UROBILINOGEN  0.2 0.2 0.2   < > 0.2   NITRITE Negative Negative Negative   < > Negative   UBLD Small* Negative Negative   < > Negative   LEUKEST Negative Trace* Negative   < > Trace*   WBCU 0 - 5 0 - 5 5-10*   < > 0 - 5   RBCU O - 2 O - 2 O - 2   < > O - 2   SQUAMOUSEPI Few Few Few   < > Few   BACTERIA Few* Few* Few*   < > Few*   MUCOUS Present*  --  Present*  --  Present*    < > = values in this interval not displayed.     Urine Microscopic  Recent Labs   Lab Test 01/11/21  1810 10/25/20  1110 09/15/20  1655 04/07/20  1140 04/07/20  1140   WBCU 0 - 5 0 - 5 5-10*   < > 0 - 5   RBCU O - 2 O - 2 O - 2   < > O - 2   SQUAMOUSEPI Few Few Few   < > Few   BACTERIA Few* Few* Few*   < > Few*   MUCOUS Present*  --  Present*  --  Present*    < > = values in this interval not displayed.     Urine Protein  Recent Labs   Lab Test 01/11/21  1810 10/25/20  1110 04/07/20  1140   UTP 0.19 <0.05 0.30   UTPG 0.13 Unable to calculate due to low value 0.16   UCRR 138 39 186     Immunization History     Immunization History   Administered Date(s) Administered     Influenza (IIV3) PF 10/01/2012, 09/23/2013     Influenza Vaccine IM > 6 months Valent IIV4 09/20/2016, 10/30/2017, 10/14/2019     Influenza Vaccine, 6+MO IM (QUADRIVALENT W/PRESERVATIVES) 08/11/2018     Pneumo Conj 13-V (2010&after) 04/25/2016     Pneumococcal 23 valent 10/01/2012, 11/06/2017     Tdap (Adacel,Boostrix) 12/17/2010          Chart documentation done in part with Dragon Voice recognition Software. Although reviewed after completion, some word and grammatical error may remain.         Video-Visit Details    Type of service:  Video Visit    Video Start Time: 7:55 AM    Video End Time:8:13 AM    Originating Location (pt. Location): [place], [place]    Distant Location (provider location):  Home    Platform used for Video Visit: Brianna Colvin MD

## 2021-01-25 ENCOUNTER — TELEPHONE (OUTPATIENT)
Dept: PHARMACY | Facility: CLINIC | Age: 46
End: 2021-01-25

## 2021-01-25 NOTE — TELEPHONE ENCOUNTER
We have been unable to reach this patient for MTM follow-up after several attempts. We will stop reaching out to the patient at this time. Please let us know if we can assist in this patient's care in the future.     Routing to PCP as FLORENCIA Benito, PharmD  Medication Therapy Management Pharmacist  [phone]

## 2021-02-02 ENCOUNTER — MYC MEDICAL ADVICE (OUTPATIENT)
Dept: RHEUMATOLOGY | Facility: CLINIC | Age: 46
End: 2021-02-02

## 2021-02-04 ENCOUNTER — MYC MEDICAL ADVICE (OUTPATIENT)
Dept: PALLIATIVE MEDICINE | Facility: CLINIC | Age: 46
End: 2021-02-04

## 2021-02-04 NOTE — TELEPHONE ENCOUNTER
From: Ruma Enriquez      Created: 2/4/2021 8:54 AM        Hi Dr Lockett, so my right hip is still killing me, it is shooting down my leg especially at night & I'm having a hard time sleeping at night due to the pain.. could it have maybe got in the wrong spot?  Ruma Enriquez        12/30/20 Procedure performed: bilateral trochanteric bursa injection    Routing to DR Lockett to review.     Rosa Isela Hayes, AVNIN, RN  Care Coordinator  [facility] [place]

## 2021-02-05 NOTE — TELEPHONE ENCOUNTER
I'm happy to do another injection in that spot if you want.  I am out of the office through next week, so you could either wait or see my partner next week.     Of note, did you have improvement in the day you got the injection?  There is numbing that day, so that would provide information on if we were in a good spot.     It is also possible it is something else, but if you were tender when I pushed down on the side of the hip, then likely it was in the right spot.     Magdalena Lockett MD  [facility]

## 2021-02-05 NOTE — TELEPHONE ENCOUNTER
From: Ruma Enriquez      Created: 2/5/2021 9:14 AM        I think so but I dont remember.... the hard part for me is getting in for an appt! Maybe I will see when the latest possible apt is & see if i can get someone to come watch my  for a bit... if you press down on the spot & its bothersome you'd know right? Its just really bad & keeping me up at night & I dont know how to relieve it, it goes all down my leg & my knee throbs too! I tried volteran gel but it didnt help. Any other suggestions?  Ruma          From: Rosa Isela Hayes, RN      Created: 2/5/2021 12:18 PM        Alfonso Hendrix, If you press on an inflamed bursa it is painful and that would indicate bursitis which is what the injection you had would treat. Dr Lockett said it is okay to repeat this.      Is this pain different than that. If it is the following questions might help Dr Lockett make a recommendation.     1. Which side or both?  2. Where is the pain going down your leg? front, side,  Back of leg?  3. Does it go into the side of your pelvis or buttock?  4. How far down the leg does it go? Hip? Knee? Foot?   5. Is it burning or aching?     Thank you  TEJAS Culver, RN  Care Coordinator  [facility] [place]

## 2021-02-08 NOTE — TELEPHONE ENCOUNTER
2/5/2021 12:21 PM by Yovana Enriquez. No response.    She scheduled with Dr Lockett for bilateral bursa injections on 2/24/21.     AVNI CulverN, RN  Care Coordinator  [facility] [place]

## 2021-02-24 ENCOUNTER — MYC MEDICAL ADVICE (OUTPATIENT)
Dept: FAMILY MEDICINE | Facility: CLINIC | Age: 46
End: 2021-02-24

## 2021-02-24 ENCOUNTER — OFFICE VISIT (OUTPATIENT)
Dept: PALLIATIVE MEDICINE | Facility: CLINIC | Age: 46
End: 2021-02-24
Payer: COMMERCIAL

## 2021-02-24 VITALS — DIASTOLIC BLOOD PRESSURE: 85 MMHG | HEART RATE: 85 BPM | SYSTOLIC BLOOD PRESSURE: 128 MMHG

## 2021-02-24 DIAGNOSIS — M76.31 IT BAND SYNDROME, RIGHT: ICD-10-CM

## 2021-02-24 DIAGNOSIS — M70.62 TROCHANTERIC BURSITIS OF BOTH HIPS: Primary | ICD-10-CM

## 2021-02-24 DIAGNOSIS — M70.61 TROCHANTERIC BURSITIS OF BOTH HIPS: Primary | ICD-10-CM

## 2021-02-24 PROCEDURE — 20610 DRAIN/INJ JOINT/BURSA W/O US: CPT | Mod: RT | Performed by: PSYCHIATRY & NEUROLOGY

## 2021-02-24 RX ORDER — BUPIVACAINE HYDROCHLORIDE 5 MG/ML
5 INJECTION, SOLUTION EPIDURAL; INTRACAUDAL ONCE
Status: DISCONTINUED | OUTPATIENT
Start: 2021-02-24 | End: 2021-02-24 | Stop reason: CLARIF

## 2021-02-24 RX ORDER — BUPIVACAINE HYDROCHLORIDE 5 MG/ML
3 INJECTION, SOLUTION EPIDURAL; INTRACAUDAL ONCE
Status: COMPLETED | OUTPATIENT
Start: 2021-02-24 | End: 2021-02-24

## 2021-02-24 RX ORDER — TRIAMCINOLONE ACETONIDE 40 MG/ML
30 INJECTION, SUSPENSION INTRA-ARTICULAR; INTRAMUSCULAR ONCE
Status: COMPLETED | OUTPATIENT
Start: 2021-02-24 | End: 2021-02-24

## 2021-02-24 RX ADMIN — BUPIVACAINE HYDROCHLORIDE 15 MG: 5 INJECTION, SOLUTION EPIDURAL; INTRACAUDAL at 14:18

## 2021-02-24 RX ADMIN — TRIAMCINOLONE ACETONIDE 32 MG: 40 INJECTION, SUSPENSION INTRA-ARTICULAR; INTRAMUSCULAR at 14:18

## 2021-02-24 ASSESSMENT — PAIN SCALES - GENERAL: PAINLEVEL: MILD PAIN (2)

## 2021-02-24 NOTE — PATIENT INSTRUCTIONS
[facility]   Post Procedure Instructions    Today you had:    bursa injection      Medications used:  lidocaine   bupivicaine   kenolog           Go to the emergency room if you develop any shortness of breath    Monitor the injection sites for signs and symptoms of infection-fever, chills, redness, swelling, warmth, or drainage to areas.    You may have soreness at injection sites for up to 24 hours.    You may apply ice to the painful areas to help minimize the discomfort of the needle pokes.    Do not apply heat to sites for at least 12 hours.    You may use anti-inflammatory medications or Tylenol for pain control if necessary    Pain Clinic phone number during work hours Monday-Friday:  [phone]    After hours provider line: [phone]

## 2021-02-24 NOTE — PROGRESS NOTES
Pre procedure Diagnosis: trochanteric bursitis, IT band pain  Post procedure Diagnosis: Same  Procedure performed: right trochanteric bursa injection, right iliotibial band injection  Anesthesia: none  Complications: none  Operators: Magdalena Lockett MD     Indications:   Yovana Enriquez is a 45 year old female, seen by me, here for trochanteric bursa injection.  They have a history of bilateral hip pain, but today it is mostly right. She has had some going down her leg as well..  Exam shows right trochanteric bursa tenderness and  and they have tried conservative treatment including medications.    Options/alternatives, benefits and risks were discussed with the patient including bleeding, infection, tissue trauma including tendons and muscles, and weakness.  Questions were answered to her satisfaction and she agrees to proceed. Voluntary informed consent was obtained and signed.     Vitals were reviewed: Yes  Allergies were reviewed:  Yes   Medications were reviewed:  Yes   Pre-procedure pain score: 2/10    Procedure:  After getting informed consent, patient was placed in a prone position on the procedure table.   A Pause for the Cause was performed.  Patient was prepped in sterile fashion.     The area over the right trochanter was palpated, and the tender area was identified, corresponding to the area of the trochanteric bursa.  The area was cleaned with Chloroprep. A 25G 3.5 inch needle was inserted, aiming towards the trochanter.  When bone was encountered, the needle was slightly drawn.  A solution containing local anesthesic and steroid was injected.  The needle was removed.  Hemostasis was achieved. Bandaids were placed as appropriate.    An additional spot was found along the IT band on the right.    In total, 3ml of 0.5% bupivacaine, 3ml of 1% lidocaine, and 30mg of kenalog was injected, divided equally between the two sides.    Hemostasis was achieved, the area was cleaned, and bandaids were placed  when appropriate.  The patient tolerated the procedure well.    Post-procedure pain score: not recorded  Follow-up includes:   -f/u with referring provider    Magdalena Lockett MD  [facility]

## 2021-02-26 ENCOUNTER — VIRTUAL VISIT (OUTPATIENT)
Dept: FAMILY MEDICINE | Facility: CLINIC | Age: 46
End: 2021-02-26
Payer: COMMERCIAL

## 2021-02-26 ENCOUNTER — TELEPHONE (OUTPATIENT)
Dept: RHEUMATOLOGY | Facility: CLINIC | Age: 46
End: 2021-02-26

## 2021-02-26 DIAGNOSIS — T78.40XA ALLERGIC REACTION, INITIAL ENCOUNTER: ICD-10-CM

## 2021-02-26 DIAGNOSIS — R51.9 CHRONIC DAILY HEADACHE: ICD-10-CM

## 2021-02-26 DIAGNOSIS — T78.2XXA ANAPHYLACTOID REACTION, INITIAL ENCOUNTER: ICD-10-CM

## 2021-02-26 DIAGNOSIS — J31.0 CHRONIC RHINITIS: Primary | ICD-10-CM

## 2021-02-26 DIAGNOSIS — M47.819 FACET ARTHROPATHY: ICD-10-CM

## 2021-02-26 DIAGNOSIS — M54.2 CERVICALGIA: ICD-10-CM

## 2021-02-26 PROCEDURE — 99213 OFFICE O/P EST LOW 20 MIN: CPT | Mod: TEL | Performed by: PHYSICIAN ASSISTANT

## 2021-02-26 RX ORDER — NORTRIPTYLINE HCL 10 MG
10 CAPSULE ORAL AT BEDTIME
Qty: 90 CAPSULE | Refills: 1 | Status: SHIPPED | OUTPATIENT
Start: 2021-02-26 | End: 2022-03-28

## 2021-02-26 RX ORDER — EPINEPHRINE 0.3 MG/.3ML
0.3 INJECTION SUBCUTANEOUS PRN
Qty: 2 EACH | Refills: 2 | Status: SHIPPED | OUTPATIENT
Start: 2021-02-26 | End: 2021-06-13

## 2021-02-26 RX ORDER — AZELASTINE 1 MG/ML
1 SPRAY, METERED NASAL 2 TIMES DAILY
Qty: 30 ML | Refills: 3 | Status: SHIPPED | OUTPATIENT
Start: 2021-02-26 | End: 2023-08-07

## 2021-02-26 NOTE — TELEPHONE ENCOUNTER
Signed Prescriptions:                        Disp   Refills    nortriptyline (PAMELOR) 10 MG capsule      90 cap*1        Sig: Take 1 capsule (10 mg) by mouth At Bedtime . 3 months           supply  Authorizing Provider: TETE PANIAGUA MD  [facility]

## 2021-02-26 NOTE — TELEPHONE ENCOUNTER
Received fax request from [facility] pharmacy requesting refill(s) for nortriptyline (PAMELOR) 10 MG capsule    Last refilled on 01/25/21    Pt last seen on 12/17/20  Next appt scheduled for : none    Will facilitate refill.

## 2021-02-26 NOTE — TELEPHONE ENCOUNTER
Contact catherine and work her in for a phone visit to discuss these concerns more in depth. An appt today preferably.

## 2021-02-26 NOTE — PROGRESS NOTES
Ruma is a 45 year old who is being evaluated via a billable telephone visit.      What phone number would you like to be contacted at? [phone]  How would you like to obtain your AVS? Nuno Akhtar   Ruma is a 45 year old who presents for the following health issues     HPI       Possible allergic reaction - SOB  Onset/Duration: a few weeks  Symptoms:   Nasal congestion: yes  Sneezing: no  Red, itchy eyes: no  Progression of Symptoms:better  Accompanying Signs & Symptoms:  Cough: YES  Wheezing: YES  Rash: no  Sinus/facial pain: no  History:   Is it seasonal:NA  History of Asthma: YES - unsure, has been prescribed inhalers  Has allergy testing been done: YES many years ago  Precipitating factors:   Possibly from nasal spray or cough dropps  Alleviating factors:  None  Therapies tried and outcome: Benadryl    Anaphylactoid rxn to use of her flonase nasal spray.   Throat swelling and difficulty breathing. Suspect rxn to inert ingredient since she is currently taking prednisone and doing fine.  Also reacted similarly to a lemon/honey cough drop.  Benadryl helped, but these rxn's were very scary and she almost went the the ER.    Review of Systems   Constitutional, HEENT, cardiovascular, pulmonary, GI, , musculoskeletal, neuro, skin, endocrine and psych systems are negative, except as otherwise noted.      Objective           Vitals:  No vitals were obtained today due to virtual visit.    Physical Exam   healthy, alert and no distress  PSYCH: Alert and oriented times 3; coherent speech, normal   rate and volume, able to articulate logical thoughts, able   to abstract reason, no tangential thoughts, no hallucinations   or delusions  Her affect is normal  RESP: No cough, no audible wheezing, able to talk in full sentences  Remainder of exam unable to be completed due to telephone visits    Yovana was seen today for allergies.    Diagnoses and all orders for this visit:    Chronic rhinitis  -      azelastine (ASTELIN) 0.1 % nasal spray; Spray 1 spray into both nostrils 2 times daily    Allergic reaction, initial encounter  -     ALLERGY/ASTHMA ADULT REFERRAL  -     EPINEPHrine (ANY BX GENERIC EQUIV) 0.3 MG/0.3ML injection 2-pack; Inject 0.3 mLs (0.3 mg) into the muscle as needed for anaphylaxis    Anaphylactoid reaction, initial encounter      Advised supportive and symptomatic treatment.  Follow up with Provider - if condition persists or worsens.           Phone call duration: 13 minutes

## 2021-03-14 ENCOUNTER — HEALTH MAINTENANCE LETTER (OUTPATIENT)
Age: 46
End: 2021-03-14

## 2021-04-16 ENCOUNTER — MYC MEDICAL ADVICE (OUTPATIENT)
Dept: PALLIATIVE MEDICINE | Facility: CLINIC | Age: 46
End: 2021-04-16

## 2021-04-19 NOTE — TELEPHONE ENCOUNTER
From: Ruma Enriquez      Created: 4/16/2021 2:13 PM        Dr Lockett   So I am still struggling with the outside hip & the pain is radiating down my leg to my knee most of the time but sometimes down to the foot. But the throbbing in my hip & knee has been waking me up at night & Im not sure what to do for it.  Was thinking about taking some colagen but wondering if theres more I can do?  Also struggling with more headaches due to my neck & dizziness. I have canceled my appts for the neck procedures as i have been having so many & afraid to have another one. They have been migraines.  Thanks!  Ruma          From: Rosa Isela Hayes, RN      Created: 4/19/2021 11:14 AM        Alfonso Hendrix, I'm sorry to hear you are not feeling well.  I think it would be best right now for you to schedule a virtual visit with Dr Lockett. It sounds like you have multiple pain issues and that neither are well controlled so I think this is a good time to reconnect and discuss. To schedule that appointment please call [phone].   I will send her your update as well.      Is the hip and leg pain on the right?     You had injections in February for this pain. Did those injections provide any pain relief?     TEJAS Culver, RN  Care Coordinator  [facility] [place]        2/24/21 Procedure performed: right trochanteric bursa injection, right iliotibial band injection      From: Ruma Enriquez      Created: 4/20/2021 6:58 AM        Yes the hip pain is on the right. The injection seemed to work maybe a couple days...  I will call today to set up a virtual appt.  Thanks!  TEJAS Martinez, RN  Care Coordinator  [facility] [place]

## 2021-04-20 ENCOUNTER — MYC MEDICAL ADVICE (OUTPATIENT)
Dept: PALLIATIVE MEDICINE | Facility: CLINIC | Age: 46
End: 2021-04-20

## 2021-04-20 DIAGNOSIS — Z79.899 HIGH RISK MEDICATIONS (NOT ANTICOAGULANTS) LONG-TERM USE: ICD-10-CM

## 2021-04-20 DIAGNOSIS — M32.9 SYSTEMIC LUPUS ERYTHEMATOSUS, UNSPECIFIED SLE TYPE, UNSPECIFIED ORGAN INVOLVEMENT STATUS (H): ICD-10-CM

## 2021-04-20 LAB
ALBUMIN UR-MCNC: NEGATIVE MG/DL
APPEARANCE UR: CLEAR
BASOPHILS # BLD AUTO: 0.1 10E9/L (ref 0–0.2)
BASOPHILS NFR BLD AUTO: 0.8 %
BILIRUB UR QL STRIP: NEGATIVE
COLOR UR AUTO: YELLOW
DIFFERENTIAL METHOD BLD: NORMAL
EOSINOPHIL # BLD AUTO: 0.1 10E9/L (ref 0–0.7)
EOSINOPHIL NFR BLD AUTO: 0.8 %
ERYTHROCYTE [DISTWIDTH] IN BLOOD BY AUTOMATED COUNT: 11.5 % (ref 10–15)
ERYTHROCYTE [SEDIMENTATION RATE] IN BLOOD BY WESTERGREN METHOD: 5 MM/H (ref 0–20)
GLUCOSE UR STRIP-MCNC: NEGATIVE MG/DL
HCT VFR BLD AUTO: 40 % (ref 35–47)
HGB BLD-MCNC: 13.7 G/DL (ref 11.7–15.7)
HGB UR QL STRIP: NEGATIVE
KETONES UR STRIP-MCNC: NEGATIVE MG/DL
LEUKOCYTE ESTERASE UR QL STRIP: NEGATIVE
LYMPHOCYTES # BLD AUTO: 0.8 10E9/L (ref 0.8–5.3)
LYMPHOCYTES NFR BLD AUTO: 10.8 %
MCH RBC QN AUTO: 32.5 PG (ref 26.5–33)
MCHC RBC AUTO-ENTMCNC: 34.3 G/DL (ref 31.5–36.5)
MCV RBC AUTO: 95 FL (ref 78–100)
MONOCYTES # BLD AUTO: 0.5 10E9/L (ref 0–1.3)
MONOCYTES NFR BLD AUTO: 6.4 %
NEUTROPHILS # BLD AUTO: 6.3 10E9/L (ref 1.6–8.3)
NEUTROPHILS NFR BLD AUTO: 81.2 %
NITRATE UR QL: NEGATIVE
PH UR STRIP: 5.5 PH (ref 5–7)
PLATELET # BLD AUTO: 313 10E9/L (ref 150–450)
RBC # BLD AUTO: 4.22 10E12/L (ref 3.8–5.2)
SOURCE: NORMAL
SP GR UR STRIP: 1.01 (ref 1–1.03)
UROBILINOGEN UR STRIP-ACNC: 0.2 EU/DL (ref 0.2–1)
WBC # BLD AUTO: 7.7 10E9/L (ref 4–11)

## 2021-04-20 PROCEDURE — 86160 COMPLEMENT ANTIGEN: CPT | Mod: 59 | Performed by: INTERNAL MEDICINE

## 2021-04-20 PROCEDURE — 81003 URINALYSIS AUTO W/O SCOPE: CPT | Performed by: INTERNAL MEDICINE

## 2021-04-20 PROCEDURE — 80053 COMPREHEN METABOLIC PANEL: CPT | Performed by: INTERNAL MEDICINE

## 2021-04-20 PROCEDURE — 86140 C-REACTIVE PROTEIN: CPT | Performed by: INTERNAL MEDICINE

## 2021-04-20 PROCEDURE — 36415 COLL VENOUS BLD VENIPUNCTURE: CPT | Performed by: INTERNAL MEDICINE

## 2021-04-20 PROCEDURE — 86225 DNA ANTIBODY NATIVE: CPT | Performed by: INTERNAL MEDICINE

## 2021-04-20 PROCEDURE — 85025 COMPLETE CBC W/AUTO DIFF WBC: CPT | Performed by: INTERNAL MEDICINE

## 2021-04-20 PROCEDURE — 84156 ASSAY OF PROTEIN URINE: CPT | Performed by: INTERNAL MEDICINE

## 2021-04-20 PROCEDURE — 85652 RBC SED RATE AUTOMATED: CPT | Performed by: INTERNAL MEDICINE

## 2021-04-20 PROCEDURE — 82550 ASSAY OF CK (CPK): CPT | Performed by: INTERNAL MEDICINE

## 2021-04-20 NOTE — TELEPHONE ENCOUNTER
This info is pasted in a duplicate encounter started by the patient.     AVNI CulverN, RN  Care Coordinator  [facility] [place]

## 2021-04-20 NOTE — TELEPHONE ENCOUNTER
Spoke with Ruma and she is waiting for her sons COVID test to come back this afternoon. If it comes back negative she would like to take the  In person appointment tomorrow in [place] at 1245. She will let us know.    AVNI CulverN, RN  Care Coordinator  [facility] [place]

## 2021-04-20 NOTE — TELEPHONE ENCOUNTER
Ruma,  There is an inperson tomorrow at [place] at 1245 if that would work to see me.   If this is a bursa issue (outer hip) then I can do an injection, or we can examine for pains.  I'm hoping you see this soon, but will likely have my  team call you to offer the spot.     Magdalena Lockett MD  [facility]     Message sent to scheduling staff.

## 2021-04-21 LAB
ALBUMIN SERPL-MCNC: 4.1 G/DL (ref 3.4–5)
ALP SERPL-CCNC: 57 U/L (ref 40–150)
ALT SERPL W P-5'-P-CCNC: 25 U/L (ref 0–50)
ANION GAP SERPL CALCULATED.3IONS-SCNC: 1 MMOL/L (ref 3–14)
AST SERPL W P-5'-P-CCNC: 17 U/L (ref 0–45)
BILIRUB SERPL-MCNC: 0.3 MG/DL (ref 0.2–1.3)
BUN SERPL-MCNC: 13 MG/DL (ref 7–30)
CALCIUM SERPL-MCNC: 9 MG/DL (ref 8.5–10.1)
CHLORIDE SERPL-SCNC: 106 MMOL/L (ref 94–109)
CK SERPL-CCNC: 90 U/L (ref 30–225)
CO2 SERPL-SCNC: 33 MMOL/L (ref 20–32)
CREAT SERPL-MCNC: 0.76 MG/DL (ref 0.52–1.04)
CREAT UR-MCNC: 37 MG/DL
CRP SERPL-MCNC: <2.9 MG/L (ref 0–8)
GFR SERPL CREATININE-BSD FRML MDRD: >90 ML/MIN/{1.73_M2}
GLUCOSE SERPL-MCNC: 97 MG/DL (ref 70–99)
POTASSIUM SERPL-SCNC: 4.1 MMOL/L (ref 3.4–5.3)
PROT SERPL-MCNC: 6.8 G/DL (ref 6.8–8.8)
PROT UR-MCNC: <0.05 G/L
PROT/CREAT 24H UR: NORMAL G/G CR (ref 0–0.2)
SODIUM SERPL-SCNC: 140 MMOL/L (ref 133–144)

## 2021-04-22 LAB
C3 SERPL-MCNC: 97 MG/DL (ref 81–157)
C4 SERPL-MCNC: 20 MG/DL (ref 13–39)
DSDNA AB SER-ACNC: 1 IU/ML

## 2021-04-22 NOTE — TELEPHONE ENCOUNTER
Chart check shows appointment was cancelled.     AVNI CulverN, RN  Care Coordinator  [facility] [place]

## 2021-04-26 ENCOUNTER — TELEPHONE (OUTPATIENT)
Dept: RHEUMATOLOGY | Facility: CLINIC | Age: 46
End: 2021-04-26

## 2021-04-26 ENCOUNTER — VIRTUAL VISIT (OUTPATIENT)
Dept: RHEUMATOLOGY | Facility: CLINIC | Age: 46
End: 2021-04-26
Payer: COMMERCIAL

## 2021-04-26 DIAGNOSIS — R06.02 SHORTNESS OF BREATH: ICD-10-CM

## 2021-04-26 DIAGNOSIS — M32.9 SYSTEMIC LUPUS ERYTHEMATOSUS, UNSPECIFIED SLE TYPE, UNSPECIFIED ORGAN INVOLVEMENT STATUS (H): Primary | ICD-10-CM

## 2021-04-26 DIAGNOSIS — I73.00 RAYNAUD'S PHENOMENON WITHOUT GANGRENE: ICD-10-CM

## 2021-04-26 DIAGNOSIS — Z79.899 HIGH RISK MEDICATIONS (NOT ANTICOAGULANTS) LONG-TERM USE: ICD-10-CM

## 2021-04-26 DIAGNOSIS — M76.30 ILIOTIBIAL BAND SYNDROME, UNSPECIFIED LATERALITY: ICD-10-CM

## 2021-04-26 DIAGNOSIS — G89.29 CHRONIC PAIN OF BOTH KNEES: ICD-10-CM

## 2021-04-26 DIAGNOSIS — M35.01 SJOGREN'S SYNDROME WITH KERATOCONJUNCTIVITIS SICCA (H): ICD-10-CM

## 2021-04-26 DIAGNOSIS — R13.10 DYSPHAGIA, UNSPECIFIED TYPE: ICD-10-CM

## 2021-04-26 DIAGNOSIS — M25.562 CHRONIC PAIN OF BOTH KNEES: ICD-10-CM

## 2021-04-26 DIAGNOSIS — M25.561 CHRONIC PAIN OF BOTH KNEES: ICD-10-CM

## 2021-04-26 PROCEDURE — 99214 OFFICE O/P EST MOD 30 MIN: CPT | Mod: GT | Performed by: INTERNAL MEDICINE

## 2021-04-26 RX ORDER — AZATHIOPRINE 50 MG/1
75 TABLET ORAL DAILY
Qty: 135 TABLET | Refills: 1 | Status: SHIPPED | OUTPATIENT
Start: 2021-04-26 | End: 2021-07-27

## 2021-04-26 RX ORDER — HYDROXYCHLOROQUINE SULFATE 200 MG/1
TABLET, FILM COATED ORAL
Qty: 135 TABLET | Refills: 0 | Status: SHIPPED | OUTPATIENT
Start: 2021-04-26 | End: 2021-07-27

## 2021-04-26 RX ORDER — PREDNISONE 2.5 MG/1
2.5 TABLET ORAL DAILY
Qty: 90 TABLET | Refills: 1 | Status: SHIPPED | OUTPATIENT
Start: 2021-04-26 | End: 2021-07-27

## 2021-04-26 NOTE — PATIENT INSTRUCTIONS
RHEUMATOLOGY    Dr. Bradford Colvin    [facility]  [address] [place] [address]    Our new phone number for Rheumatology is [phone], this number will be able to help you schedule appointments for Dr. Colvin or if you have any message you would like sent to us.    Thank you for choosing [facility]!    Qing Zapata [facility]                    
Eye

## 2021-04-26 NOTE — PROGRESS NOTES
Yovana Enriquez  is a 45 year old year old female who is being evaluated via a billable video visit.      What state will you be in during your video visit? [place]  How would you like to obtain your AVS? Nuno  If the video visit is dropped, the invitation should be resent by: Text to cell phone: [phone]  Will anyone else be joining your video visit? No    Rheumatology Video Visit      Yovana Enriquez MRN# [id number]   YOB: 1975 Age: 45 year old      Date of visit: 4/26/21   PCP: Bradford Mario    Chief Complaint   Patient presents with:  Systemic Lupus Erythematous: not doing so great    Assessment and Plan     1. Systemic lupus erythematosus (YANELIS by EIA positive in the past, leukopenia/lymphocytopenia, hypocomplementemia, polyarthralgias, oral sores, history of malar rash, photophobia, photosensitivity, Raynaud's Phenomenon): Previously on methotrexate that was discontinued for unclear reasons but the patient reports that she tolerated it well. Currently on azathioprine 75 mg daily (dose reduced on 11/8/2018 from 100mg daily without worsening symptoms at that time, eventually reduced to 50 mg daily and did well for a period of time; now on 75 mg daily), hydroxychloroquine 300 mg daily [avg], and Benlysta (worsening symptoms when stopped in the past).  Overall doing well with regard to lupus at this time.  Noted that she has likely adrenal insufficiency with a lower dose of cortisol in the past and has had difficulty getting off of a low-dose of prednisone; okay to continue prednisone 2.5 mg daily.  Chronic illness, stable  - Continue azathioprine 75 mg daily   - Continue Evoxac 30 mg twice a day  - Continue hydroxychloroquine 300mg daily on average: 200mg daily with additional 200mg every other day (toxicity monitoring eye exam last done on 5/20/2019; advised that she update the eye exam)  - Continue Benlysta 200 mg SQ every 7 days  - Continue Prednisone 5mg daily   - Labs in 3 mo: CBC,  CMP, ESR, CRP, CK, dsDNA, C3, C4, UA, Uprotein:creatinine    High risk medication requiring intensive toxicity monitoring at least quarterly: labs ordered include CBC, Creatinine, Hepatic panel to monitor for cytopenia and transaminitis; also checking creatinine as it affects clearance of medication.     2. Secondary Sjogren syndrome: Doing well with Evoxac and frequent sips of water. Dry eyes are not much of an issue currently. See #1.  Chronic illness, stable    3. Raynaud's Phenomenon: Amlodipine 5mg daily is effective.  Chronic illness, stable  - Continue amlodipine 5mg daily     4.  Intermittent shortness of breath: She reports following with her PCP already for this issue.  Check pulmonary function tests, 6-minute walk, and echocardiogram.  No other associated symptoms.  She is also going to see an allergist, per patient.  - PFTs, echocardiogram, and 6-minute walk ordered; phone numbers provided so she may call to schedule these tests.  - Chest x-ray    5.  Dysphagia: With solids and liquids, intermittent and not occurring every day.  Advised that she be evaluated by gastroenterology.  - GI referral    6. Bilateral trochanteric bursitis, possible iliotibial band syndrome as well: She receives steroid injections from the pain clinic.  Possibly also with iliotibial band syndrome and advised that she start physical therapy exercises.  -Physical therapy referral    7.  Bilateral knee pain, worse on the left.  Left knee is locking and suspect meniscal issue.  Advised physical therapy.  Check x-ray of the left knee.  If worsening symptoms then may consider intra-articular steroid injection, and/or additional imaging.    8. Fibromyalgia: Managed by the pain clinic and PCP.    9. MGUS: SPEP and immunofixation advised to be done yearly per Dr. Yoselin Payan.     10.  Nasal sores: Based on description of vesicles that later crusted over, and occur at the same location on the outside and just on the inside of her nose each  time, these are most consistent with HSV and I advised that she discuss with her PCP to consider possible culture and/or trial of oral medication    #Status-post 2 doses of the Pfizer COVID-19 vaccine    Total minutes spent in evaluation with patient, documentation, , and review of pertinent studies and chart notes: 25     Ms. Enriquez verbalized agreement with and understanding of the rational for the diagnosis and treatment plan.  All questions were answered to best of my ability and the patient's satisfaction. Ms. Enriquez was advised to contact the clinic with any questions that may arise after the clinic visit.      Thank you for involving me in the care of the patient    Return to clinic: 3-4 months      HPI   Yovana Enriquez is a 45 year old female with medical history significant for subclinical hyperthyroidism, hypertension, irritable bowel syndrome, fibromyalgia, hypertension, non-celiac gluten sensitivity (reportedly with bowel biopsies and laboratory workup that did not show celiac disease), anxiety, and systemic lupus erythematosus who presents for follow-up of SLE.    Today, 4/26/2021: hip pain that radiates down the outside of her thigh to her knee.  Left knee pops/locks and has given out on her.  Fatigue persists.  Both parents were in the hospital for COVID19; her mom just returned home and her father is struggling in the hospital.  Headaches, neck pain. Sometimes feels like she cannot get a full breath.  Sometimes feels like she chokes on her salivary. Epipen was given to her because of issues with a honey drink she was using; planning to see an allergist. Sometimes chokes on food, solids and liquids, but not occurring every day.     Denies fevers, chills, nausea, vomiting. No abdominal pain.  No chest pain/pressure, palpitations, or shortness of breath.  No rash currently. No LE swelling.  She has photosensitivity and photophobia.   No eye pain or redness. No history of serositis. No  history of DVT, pulmonary embolism, or miscarriage.    Raynaud's is controlled per patient.  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    ROS   12 point review of system was completed and negative except as noted in the HPI     Active Problem List     Patient Active Problem List   Diagnosis     Systemic lupus erythematosus (H)     Menorrhagia     History of ovarian cyst     Dysmenorrhea     History of gestational diabetes mellitus, not currently pregnant     Intramural leiomyoma of uterus     Hyperlipidemia LDL goal <130     Subclinical hyperthyroidism     Anxiety     High risk medication use     Fibromyalgia     Chronic constipation     Irritable bowel syndrome     Health Care Home     Hypertension goal BP (blood pressure) < 140/90     Non-celiac gluten sensitivity     Raynaud's phenomenon without gangrene     Sjogren's syndrome (H)     Intramural and submucous leiomyoma of uterus     Adrenal insufficiency (H)     Past Medical History     Past Medical History:   Diagnosis Date     Arthritis      Chronic constipation 12/16/2013     Dysmenorrhea 10/1/2012     Fibromyalgia 11/15/2013     Health Care Home 3/19/2014    **See Letters for [facility] Care Plan: Emergency Care Plan       High risk medication use 9/13/2013     History of gestational diabetes mellitus, not currently pregnant 10/1/2012     History of ovarian cyst 10/1/2012     Hyperlipidemia LDL goal <130 10/18/2012     Hypertension goal BP (blood pressure) < 140/90 1/19/2015     Intramural leiomyoma of uterus 10/5/2012     Irritable bowel syndrome 3/11/2014    Patient states no history colonoscopy       Menorrhagia 10/1/2012     Non-celiac gluten sensitivity 2/8/2016     PONV (postoperative nausea and vomiting)      Subclinical hyperthyroidism 1/17/2013     Systemic lupus erythematosus (H) 4/23/2012     Past Surgical History     Past Surgical History:   Procedure Laterality Date     COLONOSCOPY N/A 2/20/2015     Procedure: COMBINED COLONOSCOPY, SINGLE OR MULTIPLE BIOPSY/POLYPECTOMY BY BIOPSY;  Surgeon: Fred Cullen MD;  Location: [facility] OR     COLONOSCOPY N/A 10/29/2018    Procedure: COMBINED COLONOSCOPY, SINGLE OR MULTIPLE BIOPSY/POLYPECTOMY BY BIOPSY;  Surgeon: Inez Sawyer MD;  Location: [facility]     COLONOSCOPY WITH CO2 INSUFFLATION N/A 2/20/2015    Procedure: COLONOSCOPY WITH CO2 INSUFFLATION;  Surgeon: Fred Cullen MD;  Location: [facility] OR     ENT SURGERY  10-24-14    Bottom lip biopsies     ESOPHAGOSCOPY, GASTROSCOPY, DUODENOSCOPY (EGD), COMBINED N/A 2/20/2015    Procedure: COMBINED ESOPHAGOSCOPY, GASTROSCOPY, DUODENOSCOPY (EGD), BIOPSY SINGLE OR MULTIPLE;  Surgeon: Fred Cullen MD;  Location: [facility] OR     HC BREATH HYDROGEN TEST  12/31/2013    Procedure: HYDROGEN BREATH TEST;  Surgeon: Camden Almazan MD;  Location: [facility]     HC HYSTEROSCOPY, SURGICAL; W/ ENDOMETRIAL ABLATION, ANY METHOD  10-19-12     SHOULDER SURGERY Right     R shoulder     TUBAL LIGATION  2004     Allergy     Allergies   Allergen Reactions     Fluticasone Propionate (Nasal) [Fluticasone] Anaphylaxis     Current Medication List     Current Outpatient Medications   Medication Sig     acetaminophen (TYLENOL) 325 MG tablet Take 325-650 mg by mouth every 6 hours as needed for mild pain or headaches Maximum daily dose of acetaminophen is 3000 mg in 24 hours.     amLODIPine (NORVASC) 5 MG tablet Take 1 tablet (5 mg) by mouth daily     azaTHIOprine (IMURAN) 50 MG tablet Take 1.5 tablets (75 mg) by mouth daily     azelastine (ASTELIN) 0.1 % nasal spray Spray 1 spray into both nostrils 2 times daily     Belimumab 200 MG/ML SOAJ Inject 200 mg Subcutaneous every 7 days . Hold for signs of infection and seek medical attention. Autoinjector     blood glucose test strip Used for testing glucose 2-3 times daily     Calcium Carb-Cholecalciferol (CALCIUM + D3) 600-200 MG-UNIT TABS Take 1 tablet by mouth daily      cevimeline  (EVOXAC) 30 MG capsule Take 1 capsule (30 mg) by mouth 2 times daily     diclofenac (VOLTAREN) 1 % GEL Apply 2-4 grams to knees or shoulders four times daily as needed using enclosed dosing card.     EPINEPHrine (ANY BX GENERIC EQUIV) 0.3 MG/0.3ML injection 2-pack Inject 0.3 mLs (0.3 mg) into the muscle as needed for anaphylaxis     famotidine (PEPCID) 40 MG tablet Take 1 tablet (40 mg) by mouth nightly as needed for heartburn     hydroxychloroquine (PLAQUENIL) 200 MG tablet Hydroxychloroquine 200mg daily; and an additional 200mg every other day.     losartan (COZAAR) 25 MG tablet Take 1 tablet (25 mg) by mouth daily     MICROLET LANCETS MISC 1 each daily.     nortriptyline (PAMELOR) 10 MG capsule Take 1 capsule (10 mg) by mouth At Bedtime . 3 months supply     predniSONE (DELTASONE) 2.5 MG tablet Prednisone 10mg daily [id number], then 5mg daily thereafter.     Vitamin D, Cholecalciferol, 1000 units CAPS Take 4,000 Int'l Units by mouth daily     meclizine (ANTIVERT) 25 MG tablet Take 0.5-1 tablets (12.5-25 mg) by mouth every 6 hours as needed for dizziness (Patient not taking: Reported on 2/26/2021)     No current facility-administered medications for this visit.          Social History   See HPI    Family History     Family History   Problem Relation Age of Onset     Respiratory Maternal Grandfather      Cancer Maternal Grandfather      Asthma Son      Circulatory Maternal Grandmother         Brain anurysm     Hypertension Mother      Glaucoma Mother 50        drops     Cancer Mother      Hypertension Sister      Hypertension Sister      Diabetes No family hx of      Cerebrovascular Disease No family hx of      Thyroid Disease No family hx of      Macular Degeneration No family hx of        Physical Exam       GEN: NAD. Healthy appearing adult.   HEENT: MMM.  Anicteric, noninjected sclera. No obvious external lesions of the ear and nose. Hearing intact.  PULM: No increased work of breathing  MSK:  Hands and wrists  without swelling.   SKIN: No rash or jaundice seen. No evidence of current or previous ischemic insult to the fingertips by visual inspection.   PSYCH: Alert. Appropriate.        Labs / Imaging (select studies)     RF/CCP  Recent Labs   Lab Test 09/13/13  1504   CCPABY <20 Interpretation:  Negative   RHF 12     YANELIS  Recent Labs   Lab Test 07/25/16  1433   ANAIGG <1:40  Reference range: <1:40  (Note)  <1:40  INTERPRETIVE INFORMATION: YANELIS by IFA, IgG  Anti-nuclear antibodies (YANELIS) are seen in a variety of  systemic rheumatic diseases and are determined by indirect  fluorescence assay (IFA) using HEp-2 substrate with an  IgG-specific conjugate. YANELIS titers less than or equal to  1:80 have variable relevance while titers greater than or  equal to 1:160 are considered clinically significant. These  antibodies may precede clinical disease onset; however,  healthy individuals and those with advanced age have been  reported to be positive for YANELIS. When observed, one of the  five basic patterns is reported: homogeneous,  peripheral/rim, speckled, centromere, or nucleolar. If  cytoplasmic fluorescence is observed, it is noted. IFA  methodology is subjective and has occasionally been shown  to lack sensitivity for anti-SSA/Ro antibodies.  Negative results do not necessarily rule out the presence  of SSc. If clinical suspicion remains, consider further  te sting for U3-RNP, PM/Scl, or Th/To antibodies associated  with SSc.  Performed by [facility],  [address] [place] [id number] [phone]  [email or web address] Alcon Krishna MD, Lab. Director       RNP/Sm/SSA/SSB  Recent Labs   Lab Test 03/23/16  1759 01/04/16  1806 07/01/14  1211 09/13/13  1504   RNPIGG  --   --  <0.2  Negative    --    SMIGG <0.2  Negative   Antibody index (AI) values reflect qualitative changes in antibody   concentration that cannot be directly associated with clinical condition or   disease state.   <0.2  Negative   Antibody index (AI) values  reflect qualitative changes in antibody   concentration that cannot be directly associated with clinical condition or   disease state.   <0.2  Negative    --    ENASM  --   --   --  0   SSAIGG  --   --  <0.2  Negative    --    ENASSA  --   --   --  3   SSBIGG  --   --  <0.2  Negative    --    ENASSB  --   --   --  0   ENARMP  --   --   --  0   SCLIGG  --   --  <0.2  Negative    --      dsDNA  Recent Labs   Lab Test 04/20/21  1804 01/11/21  1805 10/25/20  1059 04/07/20  1136 01/13/20  1804 10/21/19  1552 07/17/19  1756 10/01/18  1313 06/04/18  1758   DNA 1 <1 1 1 <1 1 <1 <1 1     C3/C4  Recent Labs   Lab Test 04/20/21  1804 01/11/21  1805 10/25/20  1059 04/07/20  1136 01/13/20  1804 10/21/19  1552 07/17/19  1756 01/15/19  1800 10/01/18  1313   W1ENLAV 97 91 103 100 85 77 67* 68* 63*   Z3MPBYS 20 20 >9* 16 21 13* 13* 14* 14*     Antiphospholipid Antibodies  Recent Labs   Lab Test 09/13/13  1504   CARG <15.0 Interpretation:  Negative   SANCHO <12.5 Interpretation:  Negative     IgG  Recent Labs   Lab Test 01/11/21  1805 10/25/20  1059 06/17/19  1813 04/02/14  1539 04/02/14  1539    <17*  --   --  897   IGA 93 <2* 82   < > 107   * 345*  --   --  361*    < > = values in this interval not displayed.     CBC  Recent Labs   Lab Test 04/20/21  1804 01/11/21  1805 10/25/20  1059   WBC 7.7 8.1 5.8   RBC 4.22 4.00 4.44   HGB 13.7 13.2 14.8   HCT 40.0 38.7 43.0   MCV 95 97 97   RDW 11.5 11.5 11.7    316 346   MCH 32.5 33.0 33.3*   MCHC 34.3 34.1 34.4   NEUTROPHIL 81.2 83.4 74.6   LYMPH 10.8 8.8 16.1   MONOCYTE 6.4 6.7 6.7   EOSINOPHIL 0.8 0.6 1.7   BASOPHIL 0.8 0.5 0.9   ANEU 6.3 6.7 4.4   ALYM 0.8 0.7* 0.9   ZOË 0.5 0.5 0.4   AEOS 0.1 0.1 0.1   ABAS 0.1 0.0 0.1     CMP  Recent Labs   Lab Test 04/20/21  1804 01/11/21  1805 10/25/20  1059 04/07/20  1136 01/13/20  1804 10/21/19  1552    138 139 140 140 137   POTASSIUM 4.1 3.9 4.1 3.6 3.9 3.7   CHLORIDE 106 106 104 106 106 104   CO2 33* 31 31 31 31 30    ANIONGAP 1* 1* 4 3 3 3   GLC 97 99 86 104* 106* 101*   BUN 13 15 11 16 14 15   CR 0.76 0.77 0.73 0.71 0.73 0.66   GFRESTIMATED >90 >90 >90 >90 >90 >90   GFRESTBLACK >90 >90 >90 >90 >90 >90   MELANIA 9.0 8.8 9.1 9.1 8.9 9.3   BILITOTAL 0.3 0.2 0.4 0.3 0.3 0.3   ALBUMIN 4.1 4.0 4.4 4.3 3.7 4.1   PROTTOTAL 6.8 6.5* 7.5 7.1 6.5* 7.1   ALKPHOS 57 55 62 54 54 48   AST 17 14 22 13 21 15   ALT 25 19 23 24 22 19     HgA1c  Recent Labs   Lab Test 05/30/19  0859   A1C 5.2     Calcium/VitaminD  Recent Labs   Lab Test 04/20/21  1804 01/11/21  1805 10/25/20  1059 04/07/20  1136 04/16/19  1759 04/16/19  1759 10/02/17  1814 10/02/17  1814   MELANIA 9.0 8.8 9.1 9.1   < > 8.4*   < > 8.7   VITDT  --   --   --  59  --  40  --  37    < > = values in this interval not displayed.     ESR/CRP  Recent Labs   Lab Test 04/20/21  1804 01/11/21  1805 10/25/20  1059   SED 5 4 5   CRP <2.9 <2.9 <2.9     CK/Aldolase  Recent Labs   Lab Test 04/20/21  1804 01/11/21  1805 10/25/20  1059   CKT 90 89 102     TSH/T4  Recent Labs   Lab Test 04/07/20  1136 04/10/19  1808 10/18/17  1445 10/07/16  0714 02/27/15  1058   TSH 0.44 0.44 0.33* 0.37*  --    T4  --   --  1.24 1.16 1.01     Lipid Panel  Recent Labs   Lab Test 01/26/19  0916 10/07/16  0714   CHOL 133 137   TRIG 64 48   HDL 68 75   LDL 52 52   NHDL 65 62     Hepatitis B  Recent Labs   Lab Test 03/29/16  1417 02/13/14  1835   HEPBANG Nonreactive Negative     Hepatitis C  Recent Labs   Lab Test 03/29/16  1417 02/13/14  1835   HCVAB Nonreactive   Assay performance characteristics have not been established for newborns,   infants, and children   Negative     Lyme ab screening  Recent Labs   Lab Test 05/30/15  1355   LYMEGM 0.55       Tuberculosis Screening  Recent Labs   Lab Test 07/03/17  1447 03/29/16  1417 02/13/14  1835   TBRSLT Negative Negative Negative   TBAGN 0.00 0.00 0.00     HIV Screening  Recent Labs   Lab Test 04/10/19  1808   HIAGAB Nonreactive     UA  Recent Labs   Lab Test 04/20/21  1819  01/11/21  1810 10/25/20  1110 09/15/20  1655 04/07/20  1140 04/07/20  1140   COLOR Yellow Yellow Yellow Yellow   < > Yellow   APPEARANCE Clear Clear Clear Clear   < > Clear   URINEGLC Negative Negative Negative Negative   < > Negative   URINEBILI Negative Negative Negative Negative   < > Negative   SG 1.015 >1.030 1.010 1.025   < > >1.030   URINEPH 5.5 6.0 7.0 6.0   < > 6.5   PROTEIN Negative Negative Negative Negative   < > Negative   UROBILINOGEN 0.2 0.2 0.2 0.2   < > 0.2   NITRITE Negative Negative Negative Negative   < > Negative   UBLD Negative Small* Negative Negative   < > Negative   LEUKEST Negative Negative Trace* Negative   < > Trace*   WBCU  --  0 - 5 0 - 5 5-10*   < > 0 - 5   RBCU  --  O - 2 O - 2 O - 2   < > O - 2   SQUAMOUSEPI  --  Few Few Few   < > Few   BACTERIA  --  Few* Few* Few*   < > Few*   MUCOUS  --  Present*  --  Present*  --  Present*    < > = values in this interval not displayed.     Urine Microscopic  Recent Labs   Lab Test 01/11/21  1810 10/25/20  1110 09/15/20  1655 04/07/20  1140 04/07/20  1140   WBCU 0 - 5 0 - 5 5-10*   < > 0 - 5   RBCU O - 2 O - 2 O - 2   < > O - 2   SQUAMOUSEPI Few Few Few   < > Few   BACTERIA Few* Few* Few*   < > Few*   MUCOUS Present*  --  Present*  --  Present*    < > = values in this interval not displayed.     Urine Protein  Recent Labs   Lab Test 04/20/21  1819 01/11/21  1810 10/25/20  1110   UTP <0.05 0.19 <0.05   UTPG Unable to calculate due to low value 0.13 Unable to calculate due to low value   UCRR 37 138 39     Immunization History     Immunization History   Administered Date(s) Administered     COVID-19,PF,Pfizer 03/13/2021     Influenza (IIV3) PF 10/01/2012, 09/23/2013     Influenza Vaccine IM > 6 months Valent IIV4 09/20/2016, 10/30/2017, 10/14/2019     Influenza Vaccine, 6+MO IM (QUADRIVALENT W/PRESERVATIVES) 08/11/2018     Pneumo Conj 13-V (2010&after) 04/25/2016     Pneumococcal 23 valent 10/01/2012, 11/06/2017     Tdap (Adacel,Boostrix) 12/17/2010           Chart documentation done in part with Dragon Voice recognition Software. Although reviewed after completion, some word and grammatical error may remain.         Video-Visit Details    Type of service:  Video Visit    Video Start Time: 8:06 AM    Video End Time:8:23 AM    Originating Location (pt. Location): Home    Distant Location (provider location):  [facility] in [place], [place]    Platform used for Video Visit: Brianna Colvin MD

## 2021-04-27 ENCOUNTER — ANCILLARY PROCEDURE (OUTPATIENT)
Dept: GENERAL RADIOLOGY | Facility: CLINIC | Age: 46
End: 2021-04-27
Attending: INTERNAL MEDICINE
Payer: COMMERCIAL

## 2021-04-27 DIAGNOSIS — R06.02 SHORTNESS OF BREATH: ICD-10-CM

## 2021-04-27 DIAGNOSIS — M76.30 ILIOTIBIAL BAND SYNDROME, UNSPECIFIED LATERALITY: ICD-10-CM

## 2021-04-27 DIAGNOSIS — M32.9 SYSTEMIC LUPUS ERYTHEMATOSUS, UNSPECIFIED SLE TYPE, UNSPECIFIED ORGAN INVOLVEMENT STATUS (H): ICD-10-CM

## 2021-04-27 DIAGNOSIS — G89.29 CHRONIC PAIN OF BOTH KNEES: ICD-10-CM

## 2021-04-27 DIAGNOSIS — M25.562 CHRONIC PAIN OF BOTH KNEES: ICD-10-CM

## 2021-04-27 DIAGNOSIS — M25.561 CHRONIC PAIN OF BOTH KNEES: ICD-10-CM

## 2021-04-27 PROCEDURE — 73562 X-RAY EXAM OF KNEE 3: CPT | Mod: LT | Performed by: RADIOLOGY

## 2021-04-27 PROCEDURE — 71046 X-RAY EXAM CHEST 2 VIEWS: CPT | Performed by: RADIOLOGY

## 2021-05-01 ENCOUNTER — TRANSFERRED RECORDS (OUTPATIENT)
Dept: HEALTH INFORMATION MANAGEMENT | Facility: CLINIC | Age: 46
End: 2021-05-01

## 2021-05-22 DIAGNOSIS — Z12.31 ENCOUNTER FOR SCREENING MAMMOGRAM FOR BREAST CANCER: ICD-10-CM

## 2021-05-22 PROCEDURE — 77067 SCR MAMMO BI INCL CAD: CPT | Mod: TC | Performed by: RADIOLOGY

## 2021-05-25 ENCOUNTER — THERAPY VISIT (OUTPATIENT)
Dept: PHYSICAL THERAPY | Facility: CLINIC | Age: 46
End: 2021-05-25
Payer: COMMERCIAL

## 2021-05-25 DIAGNOSIS — M25.561 CHRONIC PAIN OF BOTH KNEES: ICD-10-CM

## 2021-05-25 DIAGNOSIS — M25.562 CHRONIC PAIN OF BOTH KNEES: ICD-10-CM

## 2021-05-25 DIAGNOSIS — G89.29 CHRONIC PAIN OF BOTH KNEES: ICD-10-CM

## 2021-05-25 DIAGNOSIS — M76.30 ILIOTIBIAL BAND SYNDROME, UNSPECIFIED LATERALITY: ICD-10-CM

## 2021-05-25 PROCEDURE — 97161 PT EVAL LOW COMPLEX 20 MIN: CPT | Mod: GT | Performed by: PHYSICAL THERAPIST

## 2021-05-25 PROCEDURE — 97110 THERAPEUTIC EXERCISES: CPT | Mod: GT | Performed by: PHYSICAL THERAPIST

## 2021-05-25 NOTE — PROGRESS NOTES
Physical Therapy Initial Evaluation  Subjective:  The history is provided by the patient.   Therapist Generated HPI Evaluation  Problem details: Has pain in both hips and knees.  Receives injections into hip.  L hip feels better with injections, but R hip has not improved lately.  Pain travels down leg to knee on R.    Pain in knees is R > L..         Type of problem:  Bilateral knees.    This is a chronic condition.  Condition occurred with:  Insidious onset.  Where condition occurred: for unknown reasons.  Patient reports pain:  In the joint.  Pain is described as aching and sharp and is constant.  Pain radiates to:  Hip (pain starts in hip on R). Pain is worse in the P.M..  Since onset symptoms are unchanged.  Associated symptoms:  Locking and catching. Symptoms are exacerbated by ascending stairs, bending/squatting, descending stairs and walking  and relieved by rest.  Special tests included:  X-ray (L - ).  Previous treatment includes other (injections to hips). Improved with treatment: L injection is helpful/ R not improving anymore.  Restrictions due to condition include:  Working in normal job without restrictions ().  Barriers include:  None as reported by patient.                        Objective:  Standing Alignment:              Knee:  Normal      Gait:    Gait Type:  Normal                                                           Knee Evaluation:  ROM:  Arom wnl knee: WNL via video.  PROM: not assessed  Strength:  Not assessed            Ligament Testing:  Not Assessed                Special Tests: Not Assessed      Palpation:  Not Assessed      Edema:  Normal      Functional Testing:          Quad:    Single Leg Squat:  Left:       Moderate loss of control and femoral IR  Right:       Moderate loss of control and femoral IR  Bilateral Leg Squat:   Femoral IR and mild loss of control              General     ROS    Assessment/Plan:    Patient is a 45 year old female with both sides knee  complaints.    Patient has the following significant findings with corresponding treatment plan.                Diagnosis 1:  bilat knee pain  Pain -  self management, education, directional preference exercise and home program  Impaired muscle performance - neuro re-education and home program  Decreased function - therapeutic activities and home program    Therapy Evaluation Codes:   1) History comprised of:   Personal factors that impact the plan of care:      None.    Comorbidity factors that impact the plan of care are:      Fibromyalgia and lupus.     Medications impacting care: list in chart.  2) Examination of Body Systems comprised of:   Body structures and functions that impact the plan of care:      Knee.   Activity limitations that impact the plan of care are:      Squatting/kneeling and Stairs.  3) Clinical presentation characteristics are:   Stable/Uncomplicated.  4) Decision-Making    Low complexity using standardized patient assessment instrument and/or measureable assessment of functional outcome.  Cumulative Therapy Evaluation is: Low complexity.    Previous and current functional limitations:  (See Goal Flow Sheet for this information)    Short term and Long term goals: (See Goal Flow Sheet for this information)     Communication ability:  Patient appears to be able to clearly communicate and understand verbal and written communication and follow directions correctly.  Treatment Explanation - The following has been discussed with the patient:   RX ordered/plan of care  Anticipated outcomes  Possible risks and side effects  This patient would benefit from PT intervention to resume normal activities.   Rehab potential is good.    Frequency:  2 X a month, once daily  Duration:  for 3 months  Discharge Plan:  Achieve all LTG.  Independent in home treatment program.  Reach maximal therapeutic benefit.    Please refer to the daily flowsheet for treatment today, total treatment time and time spent  performing 1:1 timed codes.

## 2021-05-26 PROBLEM — M25.561 CHRONIC PAIN OF BOTH KNEES: Status: ACTIVE | Noted: 2021-05-26

## 2021-05-26 PROBLEM — M25.562 CHRONIC PAIN OF BOTH KNEES: Status: ACTIVE | Noted: 2021-05-26

## 2021-05-26 PROBLEM — G89.29 CHRONIC PAIN OF BOTH KNEES: Status: ACTIVE | Noted: 2021-05-26

## 2021-06-09 ENCOUNTER — MYC MEDICAL ADVICE (OUTPATIENT)
Dept: OBGYN | Facility: CLINIC | Age: 46
End: 2021-06-09

## 2021-06-09 DIAGNOSIS — R10.2 PELVIC PAIN IN FEMALE: Primary | ICD-10-CM

## 2021-06-09 NOTE — TELEPHONE ENCOUNTER
Pt is scheduled to see Dr. Agbeh on 6/14 for lower right abdominal pain radiating to back.  Pt is wondering is she can have an pelvic US ordered to do prior to seeing Dr. Agbeh on Monday, 6/14.    Will route to Dr. Agbeh to see if he would be willing to order an US or if he would prefer to see pt first before ordering any further testing.    Keya Doran RN

## 2021-06-10 NOTE — TELEPHONE ENCOUNTER
Agbeh, Cephas Mawuena, MD  You 10 minutes ago (8:06 AM)     PELVIC ULTRASOUND ORDERS PLACED.   CEPHAS AGBEH, MD.

## 2021-06-13 ENCOUNTER — OFFICE VISIT (OUTPATIENT)
Dept: URGENT CARE | Facility: URGENT CARE | Age: 46
End: 2021-06-13
Payer: COMMERCIAL

## 2021-06-13 VITALS
TEMPERATURE: 98.5 F | OXYGEN SATURATION: 100 % | DIASTOLIC BLOOD PRESSURE: 105 MMHG | WEIGHT: 139.6 LBS | SYSTOLIC BLOOD PRESSURE: 163 MMHG | BODY MASS INDEX: 23.05 KG/M2 | HEART RATE: 96 BPM

## 2021-06-13 DIAGNOSIS — R10.31 RLQ ABDOMINAL PAIN: Primary | ICD-10-CM

## 2021-06-13 LAB
ALBUMIN UR-MCNC: NEGATIVE MG/DL
APPEARANCE UR: CLEAR
BASOPHILS # BLD AUTO: 0.1 10E9/L (ref 0–0.2)
BASOPHILS NFR BLD AUTO: 1.2 %
BILIRUB UR QL STRIP: NEGATIVE
COLOR UR AUTO: YELLOW
DIFFERENTIAL METHOD BLD: NORMAL
EOSINOPHIL # BLD AUTO: 0.1 10E9/L (ref 0–0.7)
EOSINOPHIL NFR BLD AUTO: 1.7 %
ERYTHROCYTE [DISTWIDTH] IN BLOOD BY AUTOMATED COUNT: 11.6 % (ref 10–15)
GLUCOSE UR STRIP-MCNC: NEGATIVE MG/DL
HCT VFR BLD AUTO: 40.6 % (ref 35–47)
HGB BLD-MCNC: 13.5 G/DL (ref 11.7–15.7)
HGB UR QL STRIP: NEGATIVE
KETONES UR STRIP-MCNC: NEGATIVE MG/DL
LEUKOCYTE ESTERASE UR QL STRIP: NEGATIVE
LYMPHOCYTES # BLD AUTO: 0.8 10E9/L (ref 0.8–5.3)
LYMPHOCYTES NFR BLD AUTO: 13.7 %
MCH RBC QN AUTO: 32.2 PG (ref 26.5–33)
MCHC RBC AUTO-ENTMCNC: 33.3 G/DL (ref 31.5–36.5)
MCV RBC AUTO: 97 FL (ref 78–100)
MONOCYTES # BLD AUTO: 0.4 10E9/L (ref 0–1.3)
MONOCYTES NFR BLD AUTO: 6.2 %
NEUTROPHILS # BLD AUTO: 4.5 10E9/L (ref 1.6–8.3)
NEUTROPHILS NFR BLD AUTO: 77.2 %
NITRATE UR QL: NEGATIVE
PH UR STRIP: 7 PH (ref 5–7)
PLATELET # BLD AUTO: 300 10E9/L (ref 150–450)
RBC # BLD AUTO: 4.19 10E12/L (ref 3.8–5.2)
RBC #/AREA URNS AUTO: NORMAL /HPF
SOURCE: NORMAL
SP GR UR STRIP: <=1.005 (ref 1–1.03)
UROBILINOGEN UR STRIP-ACNC: 0.2 EU/DL (ref 0.2–1)
WBC # BLD AUTO: 5.8 10E9/L (ref 4–11)
WBC #/AREA URNS AUTO: NORMAL /HPF

## 2021-06-13 PROCEDURE — 81001 URINALYSIS AUTO W/SCOPE: CPT | Performed by: FAMILY MEDICINE

## 2021-06-13 PROCEDURE — 99214 OFFICE O/P EST MOD 30 MIN: CPT | Performed by: FAMILY MEDICINE

## 2021-06-13 PROCEDURE — 85025 COMPLETE CBC W/AUTO DIFF WBC: CPT | Performed by: FAMILY MEDICINE

## 2021-06-13 PROCEDURE — 36415 COLL VENOUS BLD VENIPUNCTURE: CPT | Performed by: FAMILY MEDICINE

## 2021-06-13 ASSESSMENT — PAIN SCALES - GENERAL: PAINLEVEL: EXTREME PAIN (8)

## 2021-06-13 NOTE — PROGRESS NOTES
CHIEF COMPLAINT    Lower right abdominal or pelvic pain.      HISTORY    This patient is a 45-year-old woman who has experienced a 1 to 2-week history of pain located primarily in the lower right abdomen or pelvic area.  The pain will begin there and radiates sometimes into her back and sometimes into the proximal right thigh.  She had actually contacted OB/GYN and has been scheduled for a pelvic ultrasound tomorrow early evening.  Level of pain at the maximum is described as 8 out of 10 although at present is about 3 out of 10.    Patient has not had fever.  She had some nausea today but no vomiting.  She is not having diarrhea or constipation.  No dysuria but sometimes notices frequent urination.    She is on some immunosuppressive medication for lupus.    She has a history of ovarian cysts and uterine fibroids.  She is s/p tubal ligation and s/p endometrial ablation procedures.      Patient Active Problem List   Diagnosis     Systemic lupus erythematosus (H)     Menorrhagia     History of ovarian cyst     Dysmenorrhea     History of gestational diabetes mellitus, not currently pregnant     Intramural leiomyoma of uterus     Hyperlipidemia LDL goal <130     Subclinical hyperthyroidism     Anxiety     High risk medication use     Fibromyalgia     Chronic constipation     Irritable bowel syndrome     Health Care Home     Hypertension goal BP (blood pressure) < 140/90     Non-celiac gluten sensitivity     Raynaud's phenomenon without gangrene     Sjogren's syndrome (H)     Intramural and submucous leiomyoma of uterus     Adrenal insufficiency (H)     Chronic pain of both knees       Current Outpatient Medications   Medication Sig Dispense Refill     acetaminophen (TYLENOL) 325 MG tablet Take 325-650 mg by mouth every 6 hours as needed for mild pain or headaches Maximum daily dose of acetaminophen is 3000 mg in 24 hours.       amLODIPine (NORVASC) 5 MG tablet Take 1 tablet (5 mg) by mouth daily 90 tablet 3      azaTHIOprine (IMURAN) 50 MG tablet Take 1.5 tablets (75 mg) by mouth daily 135 tablet 1     azelastine (ASTELIN) 0.1 % nasal spray Spray 1 spray into both nostrils 2 times daily 30 mL 3     Belimumab 200 MG/ML SOAJ Inject 200 mg Subcutaneous every 7 days . Hold for signs of infection and seek medical attention. Autoinjector 4 mL 6     blood glucose test strip Used for testing glucose 2-3 times daily 1 Month 5     Calcium Carb-Cholecalciferol (CALCIUM + D3) 600-200 MG-UNIT TABS Take 1 tablet by mouth daily        cevimeline (EVOXAC) 30 MG capsule Take 1 capsule (30 mg) by mouth 2 times daily 180 capsule 3     diclofenac (VOLTAREN) 1 % GEL Apply 2-4 grams to knees or shoulders four times daily as needed using enclosed dosing card. 100 g 4     famotidine (PEPCID) 40 MG tablet Take 1 tablet (40 mg) by mouth nightly as needed for heartburn 30 tablet 8     hydroxychloroquine (PLAQUENIL) 200 MG tablet Hydroxychloroquine 200mg daily; and an additional 200mg every other day. Yearly eye exam, including 10-2 VF and SD-OCT, is required 135 tablet 0     losartan (COZAAR) 25 MG tablet Take 1 tablet (25 mg) by mouth daily 90 tablet 0     nortriptyline (PAMELOR) 10 MG capsule Take 1 capsule (10 mg) by mouth At Bedtime . 3 months supply 90 capsule 1     predniSONE (DELTASONE) 2.5 MG tablet Take 1 tablet (2.5 mg) by mouth daily 90 tablet 1     Vitamin D, Cholecalciferol, 1000 units CAPS Take 4,000 Int'l Units by mouth daily           REVIEW OF SYSTEMS    No fever.  No congestion or sore throat.  No cough.  No swelling.  No rash.      EXAM  BP (!) 163/105   Pulse 96   Temp 98.5  F (36.9  C) (Tympanic)   Wt 63.3 kg (139 lb 9.6 oz)   SpO2 100%   BMI 23.05 kg/m        Patient is thin and basically appears well.  Afebrile.  HEENT -sclera nonicteric  Neck -without masses  Chest -nonlabored breathing.  Abdomen -nondistended, mild RLQ tenderness without guarding or mass.  No percussion tenderness.  Skin unremarkable.      Results for  orders placed or performed in visit on 06/13/21   CBC with platelets and differential     Status: None   Result Value Ref Range    WBC 5.8 4.0 - 11.0 10e9/L    RBC Count 4.19 3.8 - 5.2 10e12/L    Hemoglobin 13.5 11.7 - 15.7 g/dL    Hematocrit 40.6 35.0 - 47.0 %    MCV 97 78 - 100 fl    MCH 32.2 26.5 - 33.0 pg    MCHC 33.3 31.5 - 36.5 g/dL    RDW 11.6 10.0 - 15.0 %    Platelet Count 300 150 - 450 10e9/L    % Neutrophils 77.2 %    % Lymphocytes 13.7 %    % Monocytes 6.2 %    % Eosinophils 1.7 %    % Basophils 1.2 %    Absolute Neutrophil 4.5 1.6 - 8.3 10e9/L    Absolute Lymphocytes 0.8 0.8 - 5.3 10e9/L    Absolute Monocytes 0.4 0.0 - 1.3 10e9/L    Absolute Eosinophils 0.1 0.0 - 0.7 10e9/L    Absolute Basophils 0.1 0.0 - 0.2 10e9/L    Diff Method Automated Method    UA with Microscopic reflex to Culture     Status: None    Specimen: Midstream Urine   Result Value Ref Range    Color Urine Yellow     Appearance Urine Clear     Glucose Urine Negative NEG^Negative mg/dL    Bilirubin Urine Negative NEG^Negative    Ketones Urine Negative NEG^Negative mg/dL    Specific Gravity Urine <=1.005 1.003 - 1.035    pH Urine 7.0 5.0 - 7.0 pH    Protein Albumin Urine Negative NEG^Negative mg/dL    Urobilinogen Urine 0.2 0.2 - 1.0 EU/dL    Nitrite Urine Negative NEG^Negative    Blood Urine Negative NEG^Negative    Leukocyte Esterase Urine Negative NEG^Negative    Source Midstream Urine     WBC Urine 0 - 5 OTO5^0 - 5 /HPF    RBC Urine O - 2 OTO2^O - 2 /HPF         (R10.31) RLQ abdominal pain  (primary encounter diagnosis)  Comment:     1 to 2 weeks of RLQ abdominal pain noted.  Presently does not appear to have an acute abdomen.  No elevation of WBC.  No anemia.  No sign of UTI or hematuria to suggest a stone.  I believe she can wait for her scheduled ultrasound based on this evaluation today.    Plan: CBC with platelets and differential, UA with         Microscopic reflex to Culture        Follow-up for her scheduled ultrasound and  OB/GYN evaluation.  To emergency room for worsening symptoms.

## 2021-06-14 ENCOUNTER — TELEPHONE (OUTPATIENT)
Dept: PALLIATIVE MEDICINE | Facility: CLINIC | Age: 46
End: 2021-06-14

## 2021-06-14 NOTE — TELEPHONE ENCOUNTER
Ksenia at [facility] calling for Medical records information for procedure.  Writer [facility] medical records number.    Zack Moran, RN  Patient Care Supervisor   [facility]

## 2021-06-17 ENCOUNTER — HOSPITAL ENCOUNTER (OUTPATIENT)
Dept: ULTRASOUND IMAGING | Facility: CLINIC | Age: 46
Discharge: HOME | End: 2021-06-17
Attending: OBSTETRICS & GYNECOLOGY | Admitting: OBSTETRICS & GYNECOLOGY
Payer: COMMERCIAL

## 2021-06-17 DIAGNOSIS — R10.2 PELVIC PAIN IN FEMALE: ICD-10-CM

## 2021-06-17 PROCEDURE — 76830 TRANSVAGINAL US NON-OB: CPT

## 2021-06-21 ENCOUNTER — OFFICE VISIT (OUTPATIENT)
Dept: OBGYN | Facility: CLINIC | Age: 46
End: 2021-06-21
Payer: COMMERCIAL

## 2021-06-21 VITALS
HEART RATE: 82 BPM | BODY MASS INDEX: 23.28 KG/M2 | SYSTOLIC BLOOD PRESSURE: 141 MMHG | DIASTOLIC BLOOD PRESSURE: 91 MMHG | WEIGHT: 141 LBS

## 2021-06-21 DIAGNOSIS — R10.31 ABDOMINAL PAIN, RIGHT LOWER QUADRANT: Primary | ICD-10-CM

## 2021-06-21 PROCEDURE — 99214 OFFICE O/P EST MOD 30 MIN: CPT | Performed by: OBSTETRICS & GYNECOLOGY

## 2021-06-21 RX ORDER — DOCUSATE SODIUM 100 MG/1
100 CAPSULE, LIQUID FILLED ORAL 2 TIMES DAILY
Qty: 100 CAPSULE | Refills: 1 | Status: SHIPPED | OUTPATIENT
Start: 2021-06-21 | End: 2023-08-07

## 2021-06-21 NOTE — PROGRESS NOTES
Yovana is a 45 year old  referred here by self for consultation regarding persistent RLQ abdominal pains, described as dull and sometimes sharp x 2-3 weeks. .Feel bether laying down or standing. Sometimes pain radiates into lower back. She was evaluated in Urgent Care on  with normal CBC and UA. Pain was worse with intercourse this AM.  BM are irregular. No blood in her stool.    No menses with H/O endometrial ablation.  H/O fibroids and ovarian cysts. Ultrasound done on   21 is as bellow:  US PELVIC COMPLETE WITH TRANSVAGINAL 2021 6:38 PM     HISTORY: Right lower quadrant pelvic pain.     TECHNIQUE: Transabdominal images of the pelvis are supplemented with  endovaginal images to better define anatomy.     COMPARISON: 2019.     FINDINGS: The uterus measures 9.0 x 5.7 x 4.6cm. Endometrial stripe  thickness images 0.6 cm. Several fibroids are identified. They are  mainly intramural but some do distort the endometrial stripe. One in  the fundal region measures 3.4 cm. Another in the fundal to mid  uterine segment measures 1.2 cm. Another in the fundal region measures  2.4 cm. These fibroids are similar to the prior study. The ovaries are  within normal limits. No adnexal masses are seen. There is no free  pelvic fluid.                                                                      IMPRESSION:   1. No specific reason for right lower quadrant pain is demonstrated.  2. Stable fibroid uterus.  ROS: Ten point review of systems was reviewed and negative except the above.    Gyne: - abn pap (last pap ), - STD's    Past Medical History:   Diagnosis Date     Arthritis      Chronic constipation 2013     Dysmenorrhea 10/1/2012     Fibromyalgia 11/15/2013     Health Care Home 3/19/2014    **See Letters for HC Care Plan: Emergency Care Plan       High risk medication use 2013     History of gestational diabetes mellitus, not currently pregnant 10/1/2012     History of ovarian cyst  10/1/2012     Hyperlipidemia LDL goal <130 10/18/2012     Hypertension goal BP (blood pressure) < 140/90 1/19/2015     Intramural leiomyoma of uterus 10/5/2012     Irritable bowel syndrome 3/11/2014    Patient states no history colonoscopy       Menorrhagia 10/1/2012     Non-celiac gluten sensitivity 2/8/2016     PONV (postoperative nausea and vomiting)      Subclinical hyperthyroidism 1/17/2013     Systemic lupus erythematosus (H) 4/23/2012     Past Surgical History:   Procedure Laterality Date     COLONOSCOPY N/A 2/20/2015    Procedure: COMBINED COLONOSCOPY, SINGLE OR MULTIPLE BIOPSY/POLYPECTOMY BY BIOPSY;  Surgeon: Fred Cullen MD;  Location: [facility] OR     COLONOSCOPY N/A 10/29/2018    Procedure: COMBINED COLONOSCOPY, SINGLE OR MULTIPLE BIOPSY/POLYPECTOMY BY BIOPSY;  Surgeon: Inez Sawyer MD;  [facility] OR     COLONOSCOPY WITH CO2 INSUFFLATION N/A 2/20/2015    Procedure: COLONOSCOPY WITH CO2 INSUFFLATION;  Surgeon: Fred Cullen MD;  Location: [facility] OR     ENT SURGERY  10-24-14    Bottom lip biopsies     ESOPHAGOSCOPY, GASTROSCOPY, DUODENOSCOPY (EGD), COMBINED N/A 2/20/2015    Procedure: COMBINED ESOPHAGOSCOPY, GASTROSCOPY, DUODENOSCOPY (EGD), BIOPSY SINGLE OR MULTIPLE;  Surgeon: Fred Cullen MD;  Location: [facility] OR     HC BREATH HYDROGEN TEST  12/31/2013    Procedure: HYDROGEN BREATH TEST;  Surgeon: Camden Almazan MD;  Location: [facility] GI     HC HYSTEROSCOPY, SURGICAL; W/ ENDOMETRIAL ABLATION, ANY METHOD  10-19-12     SHOULDER SURGERY Right     R shoulder     TUBAL LIGATION  2004     Patient Active Problem List   Diagnosis     Systemic lupus erythematosus (H)     Menorrhagia     History of ovarian cyst     Dysmenorrhea     History of gestational diabetes mellitus, not currently pregnant     Intramural leiomyoma of uterus     Hyperlipidemia LDL goal <130     Subclinical hyperthyroidism     Anxiety     High risk medication use     Fibromyalgia     Chronic constipation      Irritable bowel syndrome     Health Care Home     Hypertension goal BP (blood pressure) < 140/90     Non-celiac gluten sensitivity     Raynaud's phenomenon without gangrene     Sjogren's syndrome (H)     Intramural and submucous leiomyoma of uterus     Adrenal insufficiency (H)     Chronic pain of both knees       ALL/Meds: Her medication and allergy histories were reviewed and are documented in their appropriate chart areas.    SH: - tob, - EtOH,     FH: Her family history was reviewed and documented in its appropriate chart area.    PE: BP (!) 141/91   Pulse 82   Wt 64 kg (141 lb)   Breastfeeding No   BMI 23.28 kg/m    Body mass index is 23.28 kg/m .    General Appearance:  healthy, alert, active, no distress  HEENT: NCAT  Abdomen: Soft, RLQ tenderness and mild CVA tenderness.  No masses  Pelvic:       - Ext: NEFG,        - Urethra: no lesions, no masses, no hypermobility       - Urethral Meatus: normal appearance,        - Bladder: no tenderness, no masses       - Vagina: pink, moist, normal rugae, no lesions, no discharge       - Cervix: no lesions, parous       - Uterus: normal sized, AV/, mobile, normal contour       - Adnexa: no masses, RLQ tenderness       - Rectal: deferred,        - Pelvic support: no cystocele, no rectocele, no uterine prolapse    A/P    ICD-10-CM    1. Abdominal pain, right lower quadrant  R10.31 CT Abdomen Pelvis w Contrast     docusate sodium (COLACE) 100 MG capsule     Start on stool softeners.  Etiology pains are unclear. If CT is normal, Consider Diagnostic Laparoscopy.    Total time preparing to see patient with reviewing prior encounter and labs, face to face time,  and coordinating care on the same calendar date: 30 mins.    CEPHAS AGBEH, MD.

## 2021-06-21 NOTE — PATIENT INSTRUCTIONS
If you have any questions regarding your visit, Please contact your care team.  [employer] [facility]: [phone]  Women s Health CLINIC HOURS TELEPHONE NUMBER   Cephas Agbeh, M.D. Becky-RN Kylie-RN Heidi-RN Deanna-MA Angela-  Cecille-         Monday-[place]    8:00a.m-4:45 p.m    Tuesday--[place]     8:00a.m-4:45 p.m.    Thursday-[place]    8:00a.m-4:45 p.m.    Friday-[place]    8:00a.m-4:45 p.m    [facility]   [address]   [place] [address]   [phone]   Fax [phone]   [phone]     [facility]   [address] [place] [address] [place] [address]   [phone]    [facility]  [address] [address] [address]  [phone]  [phone]   Urgent Care locations:    [facility] Monday-Friday  5 pm - 9 pm  Saturday and Sunday   9 am - 5 pm   Monday-Friday   5 pm - 9 pm  Saturday and Sunday  9 am - 5 pm    [phone]   If you need a medication refill, please contact your pharmacy. Please allow 3 business days for your refill to be completed.  As always, Thank you for trusting us with your healthcare needs!

## 2021-06-22 ENCOUNTER — ANCILLARY PROCEDURE (OUTPATIENT)
Dept: CT IMAGING | Facility: CLINIC | Age: 46
End: 2021-06-22
Attending: OBSTETRICS & GYNECOLOGY
Payer: COMMERCIAL

## 2021-06-22 DIAGNOSIS — R10.31 ABDOMINAL PAIN, RIGHT LOWER QUADRANT: ICD-10-CM

## 2021-06-22 PROCEDURE — 74177 CT ABD & PELVIS W/CONTRAST: CPT | Mod: TC | Performed by: RADIOLOGY

## 2021-06-22 RX ORDER — IOPAMIDOL 755 MG/ML
86 INJECTION, SOLUTION INTRAVASCULAR ONCE
Status: COMPLETED | OUTPATIENT
Start: 2021-06-22 | End: 2021-06-22

## 2021-06-22 RX ADMIN — IOPAMIDOL 86 ML: 755 INJECTION, SOLUTION INTRAVASCULAR at 08:01

## 2021-06-23 DIAGNOSIS — G43.009 MIGRAINE WITHOUT AURA AND WITHOUT STATUS MIGRAINOSUS, NOT INTRACTABLE: ICD-10-CM

## 2021-06-23 NOTE — TELEPHONE ENCOUNTER
Spoke with Ruma and she reports her Migraines are pretty well controlled right now and she doesn't need the Imitrex often but does like to have it on hand. Will route to Elizabeth to review request for refill.    AVNI CulverN, RN  Care Coordinator  [facility]

## 2021-06-23 NOTE — TELEPHONE ENCOUNTER
Received fax from pharmacy requesting refill(s) for SUMAtriptan (IMITREX) 50 MG tablet (Discontinued)     Last refilled on 01/27/21    Pt last seen on 02/24/21  Next appt scheduled for None    E-prescribe to:     [id number] [place] - [address]  [facility] - [place], [place] - [address]     Will facilitate refill.      Queta Mitchell MA  [facility]

## 2021-06-24 ENCOUNTER — TELEPHONE (OUTPATIENT)
Dept: PALLIATIVE MEDICINE | Facility: CLINIC | Age: 46
End: 2021-06-24

## 2021-06-24 DIAGNOSIS — R11.0 NAUSEA: ICD-10-CM

## 2021-06-24 RX ORDER — ONDANSETRON 4 MG/1
4 TABLET, ORALLY DISINTEGRATING ORAL EVERY 8 HOURS PRN
Qty: 20 TABLET | Refills: 0 | Status: SHIPPED | OUTPATIENT
Start: 2021-06-24 | End: 2023-08-07

## 2021-06-24 RX ORDER — SUMATRIPTAN 50 MG/1
50 TABLET, FILM COATED ORAL
Qty: 10 TABLET | Refills: 6 | Status: SHIPPED | OUTPATIENT
Start: 2021-06-24 | End: 2023-08-07

## 2021-06-24 NOTE — TELEPHONE ENCOUNTER
Signed Prescriptions:                        Disp   Refills    SUMAtriptan (IMITREX) 50 MG tablet         10 tab*6        Sig: Take 1 tablet (50 mg) by mouth at onset of headache           for migraine . After 2 hours, if no relief, ok to           take 2nd dose.  Limit 2 tabs/24 hours.  Authorizing Provider: TETE PANIAGUA MD  [facility]

## 2021-06-24 NOTE — TELEPHONE ENCOUNTER
Was given for nausea associated with headaches.    Signed Prescriptions:                        Disp   Refills    ondansetron (ZOFRAN-ODT) 4 MG ODT tab      20 tab*0        Sig: Take 1 tablet (4 mg) by mouth every 8 hours as needed           for nausea Recommend limited use  Authorizing Provider: TETE PANIAGUA MD  [facility]

## 2021-06-24 NOTE — TELEPHONE ENCOUNTER
Received fax request from:   [id number]  [address] [place] [address]  Phone: [phone] Fax: [phone]    Pharmacy requesting refill(s) for ONDANSETRON ODT 4 MG TABLET    Last refilled on 12/26/20    Pt last seen on 02/24/21    Next appt scheduled for No upcoming    Unable to facilitate refill. Medication is not listed in Meds&Orders.     Routing to provider.     Jackelin Hauser [facility] [place]

## 2021-06-24 NOTE — TELEPHONE ENCOUNTER
Forwarding to Dr. Lockett as this is not my patient.     Elizabeth GLOVER, RN CNP, FNP  [facility]

## 2021-07-19 ENCOUNTER — LAB (OUTPATIENT)
Dept: LAB | Facility: CLINIC | Age: 46
End: 2021-07-19
Payer: COMMERCIAL

## 2021-07-19 DIAGNOSIS — M32.9 SYSTEMIC LUPUS ERYTHEMATOSUS, UNSPECIFIED SLE TYPE, UNSPECIFIED ORGAN INVOLVEMENT STATUS (H): ICD-10-CM

## 2021-07-19 DIAGNOSIS — Z79.899 HIGH RISK MEDICATIONS (NOT ANTICOAGULANTS) LONG-TERM USE: ICD-10-CM

## 2021-07-19 LAB
BASOPHILS # BLD AUTO: 0 10E3/UL (ref 0–0.2)
BASOPHILS NFR BLD AUTO: 1 %
EOSINOPHIL # BLD AUTO: 0 10E3/UL (ref 0–0.7)
EOSINOPHIL NFR BLD AUTO: 0 %
ERYTHROCYTE [DISTWIDTH] IN BLOOD BY AUTOMATED COUNT: 11.2 % (ref 10–15)
ERYTHROCYTE [SEDIMENTATION RATE] IN BLOOD BY WESTERGREN METHOD: 4 MM/HR (ref 0–20)
HCT VFR BLD AUTO: 37.9 % (ref 35–47)
HGB BLD-MCNC: 12.7 G/DL (ref 11.7–15.7)
LYMPHOCYTES # BLD AUTO: 0.9 10E3/UL (ref 0.8–5.3)
LYMPHOCYTES NFR BLD AUTO: 10 %
MCH RBC QN AUTO: 32.2 PG (ref 26.5–33)
MCHC RBC AUTO-ENTMCNC: 33.5 G/DL (ref 31.5–36.5)
MCV RBC AUTO: 96 FL (ref 78–100)
MONOCYTES # BLD AUTO: 0.5 10E3/UL (ref 0–1.3)
MONOCYTES NFR BLD AUTO: 6 %
NEUTROPHILS # BLD AUTO: 7.2 10E3/UL (ref 1.6–8.3)
NEUTROPHILS NFR BLD AUTO: 83 %
PLATELET # BLD AUTO: 314 10E3/UL (ref 150–450)
RBC # BLD AUTO: 3.94 10E6/UL (ref 3.8–5.2)
WBC # BLD AUTO: 8.7 10E3/UL (ref 4–11)

## 2021-07-19 PROCEDURE — 86225 DNA ANTIBODY NATIVE: CPT

## 2021-07-19 PROCEDURE — 86140 C-REACTIVE PROTEIN: CPT

## 2021-07-19 PROCEDURE — 86160 COMPLEMENT ANTIGEN: CPT

## 2021-07-19 PROCEDURE — 85652 RBC SED RATE AUTOMATED: CPT

## 2021-07-19 PROCEDURE — 85025 COMPLETE CBC W/AUTO DIFF WBC: CPT

## 2021-07-19 PROCEDURE — 82550 ASSAY OF CK (CPK): CPT

## 2021-07-19 PROCEDURE — 36415 COLL VENOUS BLD VENIPUNCTURE: CPT

## 2021-07-19 PROCEDURE — 84156 ASSAY OF PROTEIN URINE: CPT

## 2021-07-19 PROCEDURE — 80053 COMPREHEN METABOLIC PANEL: CPT

## 2021-07-20 LAB
ALBUMIN SERPL-MCNC: 3.8 G/DL (ref 3.4–5)
ALP SERPL-CCNC: 60 U/L (ref 40–150)
ALT SERPL W P-5'-P-CCNC: 26 U/L (ref 0–50)
ANION GAP SERPL CALCULATED.3IONS-SCNC: 3 MMOL/L (ref 3–14)
AST SERPL W P-5'-P-CCNC: 24 U/L (ref 0–45)
BILIRUB SERPL-MCNC: 0.3 MG/DL (ref 0.2–1.3)
BUN SERPL-MCNC: 12 MG/DL (ref 7–30)
CALCIUM SERPL-MCNC: 8.8 MG/DL (ref 8.5–10.1)
CHLORIDE BLD-SCNC: 107 MMOL/L (ref 94–109)
CK SERPL-CCNC: 105 U/L
CO2 SERPL-SCNC: 30 MMOL/L (ref 20–32)
CREAT SERPL-MCNC: 0.79 MG/DL (ref 0.52–1.04)
CREAT UR-MCNC: 139 MG/DL
CRP SERPL-MCNC: <2.9 MG/L (ref 0–8)
GFR SERPL CREATININE-BSD FRML MDRD: 89 ML/MIN/1.73M2
GLUCOSE BLD-MCNC: 113 MG/DL (ref 70–99)
POTASSIUM BLD-SCNC: 3.8 MMOL/L (ref 3.4–5.3)
PROT SERPL-MCNC: 6.7 G/DL (ref 6.8–8.8)
PROT UR-MCNC: 0.12 G/L
PROT/CREAT 24H UR: 0.09 G/G CR (ref 0–0.2)
SODIUM SERPL-SCNC: 140 MMOL/L (ref 133–144)

## 2021-07-21 LAB
C3 SERPL-MCNC: 91 MG/DL (ref 81–157)
C4 SERPL-MCNC: 20 MG/DL (ref 13–39)
DSDNA AB SER-ACNC: <0.6 IU/ML

## 2021-07-27 ENCOUNTER — VIRTUAL VISIT (OUTPATIENT)
Dept: RHEUMATOLOGY | Facility: CLINIC | Age: 46
End: 2021-07-27
Payer: COMMERCIAL

## 2021-07-27 DIAGNOSIS — D47.2 MGUS (MONOCLONAL GAMMOPATHY OF UNKNOWN SIGNIFICANCE): ICD-10-CM

## 2021-07-27 DIAGNOSIS — M32.9 SYSTEMIC LUPUS ERYTHEMATOSUS, UNSPECIFIED SLE TYPE, UNSPECIFIED ORGAN INVOLVEMENT STATUS (H): Primary | ICD-10-CM

## 2021-07-27 DIAGNOSIS — Z79.899 HIGH RISK MEDICATIONS (NOT ANTICOAGULANTS) LONG-TERM USE: ICD-10-CM

## 2021-07-27 PROBLEM — M25.561 CHRONIC PAIN OF BOTH KNEES: Status: RESOLVED | Noted: 2021-05-26 | Resolved: 2021-07-27

## 2021-07-27 PROBLEM — G89.29 CHRONIC PAIN OF BOTH KNEES: Status: RESOLVED | Noted: 2021-05-26 | Resolved: 2021-07-27

## 2021-07-27 PROBLEM — M25.562 CHRONIC PAIN OF BOTH KNEES: Status: RESOLVED | Noted: 2021-05-26 | Resolved: 2021-07-27

## 2021-07-27 PROCEDURE — 99214 OFFICE O/P EST MOD 30 MIN: CPT | Mod: GT | Performed by: INTERNAL MEDICINE

## 2021-07-27 RX ORDER — AZATHIOPRINE 50 MG/1
75 TABLET ORAL DAILY
Qty: 135 TABLET | Refills: 2 | Status: SHIPPED | OUTPATIENT
Start: 2021-07-27 | End: 2021-12-20

## 2021-07-27 RX ORDER — HYDROXYCHLOROQUINE SULFATE 200 MG/1
TABLET, FILM COATED ORAL
Qty: 135 TABLET | Refills: 0 | Status: SHIPPED | OUTPATIENT
Start: 2021-07-27 | End: 2021-10-19

## 2021-07-27 RX ORDER — PREDNISONE 2.5 MG/1
2.5 TABLET ORAL DAILY
Qty: 90 TABLET | Refills: 2 | Status: SHIPPED | OUTPATIENT
Start: 2021-07-27 | End: 2021-12-20

## 2021-07-27 NOTE — PATIENT INSTRUCTIONS
RHEUMATOLOGY    Dr. Bradford Colvin    [facility]  [address] [place] [address]    Our new phone number for Rheumatology is [phone], this number will be able to help you schedule appointments for Dr. Colvin or if you have any message you would like sent to us.    Thank you for choosing [facility]!    Qing Zapata [facility]

## 2021-07-27 NOTE — PROGRESS NOTES
Yovana Enriquez  is a 46 year old year old female who is being evaluated via a billable video visit.      How would you like to obtain your AVS? MyChart  If the video visit is dropped, the invitation should be resent by: Text to cell phone: [phone]  Will anyone else be joining your video visit? No     Rheumatology Video Visit      Yovana Enriquez MRN# [id number]   YOB: 1975 Age: 46 year old      Date of visit: 7/27/21   PCP: Bradford Mario    Chief Complaint   Patient presents with:  Systemic Lupus Erythematous    Assessment and Plan     1. Systemic lupus erythematosus (YANELIS by EIA positive in the past, leukopenia/lymphocytopenia, hypocomplementemia, polyarthralgias, oral sores, history of malar rash, photophobia, photosensitivity, Raynaud's Phenomenon): Previously on methotrexate that was discontinued for unclear reasons but the patient reports that she tolerated it well. Currently on azathioprine 75 mg daily (dose reduced on 11/8/2018 from 100mg daily without worsening symptoms at that time, eventually reduced to 50 mg daily and did well for a period of time; now on 75 mg daily), hydroxychloroquine 300 mg daily [avg], and Benlysta (worsening symptoms when stopped in the past).  Overall doing well with regard to lupus at this time.  Noted that she has likely adrenal insufficiency with a lower dose of cortisol in the past and has had difficulty getting off of a low-dose of prednisone; okay to continue prednisone 2.5 mg daily.  Chronic illness, stable  - Continue azathioprine 75 mg daily   - Continue Evoxac 30 mg twice a day  - Continue hydroxychloroquine 300mg daily on average: 200mg daily with additional 200mg every other day (toxicity monitoring eye exam last done on 5/20/2019; advised that she update the eye exam)  - Continue Benlysta 200 mg SQ every 7 days  - Continue Prednisone 2.5mg daily   - Labs in 3 mo: CBC, CMP, ESR, CRP, CK, dsDNA, C3, C4, UA, Uprotein:creatinine    High risk  medication requiring intensive toxicity monitoring at least quarterly: labs ordered include CBC, Creatinine, Hepatic panel to monitor for cytopenia and hepatotoxicity; checking creatinine as it affects clearance of medication.        2. Secondary Sjogren syndrome: Doing well with Evoxac and frequent sips of water. Dry eyes are not much of an issue currently. See #1.  Chronic illness, stable    3. Raynaud's Phenomenon: Amlodipine 5mg daily is effective.  Chronic illness, stable  - Continue amlodipine 5mg daily     4. Bilateral trochanteric bursitis, possible iliotibial band syndrome as well: She receives steroid injections from the pain clinic but reportedly the last injections were not as helpful.  Possibly also with iliotibial band syndrome and advised that she start physical therapy exercises.  She would like to be reevaluated for this in person and this has been scheduled for next month.  Note that she has been to physical therapy with reported worsening of her symptoms.    5.  Bilateral knee pain, worse on the left.  Left knee is locking and suspect meniscal issue.  Advised physical therapy.If worsening symptoms then may consider intra-articular steroid injection, and/or additional imaging.  Not an issue at this time    6. Fibromyalgia: Managed by the pain clinic and PCP.    7. MGUS: SPEP and immunofixation advised to be done yearly per Dr. Yoselin Payan.    - Labs in 3 months: SPEP, serum immunofixation    8.  Bilateral ulnar neuropathy: Based on description provided.  Advised to avoid aggravating factors; do not lean on elbows and try to keep elbow straight.    # Status-post 2 doses of the Pfizer COVID-19 vaccine  .  Total minutes spent in evaluation with patient, documentation, , and review of pertinent studies and chart notes: 24     Ms. Enriquez verbalized agreement with and understanding of the rational for the diagnosis and treatment plan.  All questions were answered to best of my ability and the  patient's satisfaction. Ms. Enriquez was advised to contact the clinic with any questions that may arise after the clinic visit.      Thank you for involving me in the care of the patient    Return to clinic: 3-4 months      HPI   Yovana Enriquez is a 46 year old female with medical history significant for subclinical hyperthyroidism, hypertension, irritable bowel syndrome, fibromyalgia, hypertension, non-celiac gluten sensitivity (reportedly with bowel biopsies and laboratory workup that did not show celiac disease), anxiety, and systemic lupus erythematosus who presents for follow-up of SLE.    Today, 7/27/2021: Hip pain that radiates down the outside of her thigh to her knee.  Also numbness and tingling occurring on the bilateral third-fifth fingers, made worse when she has her elbows in a flexed position.  Otherwise doing well.  Stress from work, now that she has no backup after her mother had passed.    Denies fevers, chills, nausea, vomiting. No abdominal pain.  No chest pain/pressure, palpitations, or shortness of breath.  No rash currently. No LE swelling.  She has photosensitivity and photophobia.   No eye pain or redness. No history of serositis. No history of DVT, pulmonary embolism, or miscarriage.    Raynaud's is controlled per patient.  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    ROS   12 point review of system was completed and negative except as noted in the HPI     Active Problem List     Patient Active Problem List   Diagnosis     Systemic lupus erythematosus (H)     Menorrhagia     History of ovarian cyst     Dysmenorrhea     History of gestational diabetes mellitus, not currently pregnant     Intramural leiomyoma of uterus     Hyperlipidemia LDL goal <130     Subclinical hyperthyroidism     Anxiety     High risk medication use     Fibromyalgia     Chronic constipation     Irritable bowel syndrome     Health Care Home     Hypertension  goal BP (blood pressure) < 140/90     Non-celiac gluten sensitivity     Raynaud's phenomenon without gangrene     Sjogren's syndrome (H)     Intramural and submucous leiomyoma of uterus     Adrenal insufficiency (H)     Past Medical History     Past Medical History:   Diagnosis Date     Arthritis      Chronic constipation 12/16/2013     Dysmenorrhea 10/1/2012     Fibromyalgia 11/15/2013     Health Care Home 3/19/2014    **See Letters for H Care Plan: Emergency Care Plan       High risk medication use 9/13/2013     History of gestational diabetes mellitus, not currently pregnant 10/1/2012     History of ovarian cyst 10/1/2012     Hyperlipidemia LDL goal <130 10/18/2012     Hypertension goal BP (blood pressure) < 140/90 1/19/2015     Intramural leiomyoma of uterus 10/5/2012     Irritable bowel syndrome 3/11/2014    Patient states no history colonoscopy       Menorrhagia 10/1/2012     Non-celiac gluten sensitivity 2/8/2016     PONV (postoperative nausea and vomiting)      Subclinical hyperthyroidism 1/17/2013     Systemic lupus erythematosus (H) 4/23/2012     Past Surgical History     Past Surgical History:   Procedure Laterality Date     COLONOSCOPY N/A 2/20/2015    Procedure: COMBINED COLONOSCOPY, SINGLE OR MULTIPLE BIOPSY/POLYPECTOMY BY BIOPSY;  Surgeon: Fred [place] MD;  Location: [facility] OR     COLONOSCOPY N/A 10/29/2018    Procedure: COMBINED COLONOSCOPY, SINGLE OR MULTIPLE BIOPSY/POLYPECTOMY BY BIOPSY;  Surgeon: Inez Sawyer MD;  Location: [facility] OR     COLONOSCOPY WITH CO2 INSUFFLATION N/A 2/20/2015    Procedure: COLONOSCOPY WITH CO2 INSUFFLATION;  Surgeon: Fred [place] MD;  Location:  OR     ENT SURGERY  10-24-14    Bottom lip biopsies     ESOPHAGOSCOPY, GASTROSCOPY, DUODENOSCOPY (EGD), COMBINED N/A 2/20/2015    Procedure: COMBINED ESOPHAGOSCOPY, GASTROSCOPY, DUODENOSCOPY (EGD), BIOPSY SINGLE OR MULTIPLE;  Surgeon: Fred [place] MD;  Location:  OR      BREATH HYDROGEN  TEST  12/31/2013    Procedure: HYDROGEN BREATH TEST;  Surgeon: Camden Almazan MD;  Location:  GI      HYSTEROSCOPY, SURGICAL; W/ ENDOMETRIAL ABLATION, ANY METHOD  10-19-12     SHOULDER SURGERY Right     R shoulder     TUBAL LIGATION  2004     Allergy     Allergies   Allergen Reactions     Fluticasone Propionate (Nasal) [Fluticasone] Anaphylaxis     Current Medication List     Current Outpatient Medications   Medication Sig     acetaminophen (TYLENOL) 325 MG tablet Take 325-650 mg by mouth every 6 hours as needed for mild pain or headaches Maximum daily dose of acetaminophen is 3000 mg in 24 hours.     amLODIPine (NORVASC) 5 MG tablet Take 1 tablet (5 mg) by mouth daily     azaTHIOprine (IMURAN) 50 MG tablet Take 1.5 tablets (75 mg) by mouth daily     azelastine (ASTELIN) 0.1 % nasal spray Spray 1 spray into both nostrils 2 times daily     Belimumab 200 MG/ML SOAJ Inject 200 mg Subcutaneous every 7 days . Hold for signs of infection and seek medical attention. Autoinjector     Calcium Carb-Cholecalciferol (CALCIUM + D3) 600-200 MG-UNIT TABS Take 1 tablet by mouth daily      cevimeline (EVOXAC) 30 MG capsule Take 1 capsule (30 mg) by mouth 2 times daily     diclofenac (VOLTAREN) 1 % GEL Apply 2-4 grams to knees or shoulders four times daily as needed using enclosed dosing card.     docusate sodium (COLACE) 100 MG capsule Take 1 capsule (100 mg) by mouth 2 times daily     famotidine (PEPCID) 40 MG tablet Take 1 tablet (40 mg) by mouth nightly as needed for heartburn     hydroxychloroquine (PLAQUENIL) 200 MG tablet Hydroxychloroquine 200mg daily; and an additional 200mg every other day. Yearly eye exam, including 10-2 VF and SD-OCT, is required     losartan (COZAAR) 25 MG tablet Take 1 tablet (25 mg) by mouth daily     nortriptyline (PAMELOR) 10 MG capsule Take 1 capsule (10 mg) by mouth At Bedtime . 3 months supply     ondansetron (ZOFRAN-ODT) 4 MG ODT tab Take 1 tablet (4 mg) by mouth every 8 hours as  needed for nausea Recommend limited use     predniSONE (DELTASONE) 2.5 MG tablet Take 1 tablet (2.5 mg) by mouth daily     SUMAtriptan (IMITREX) 50 MG tablet Take 1 tablet (50 mg) by mouth at onset of headache for migraine . After 2 hours, if no relief, ok to take 2nd dose.  Limit 2 tabs/24 hours.     Vitamin D, Cholecalciferol, 1000 units CAPS Take 4,000 Int'l Units by mouth daily     blood glucose test strip Used for testing glucose 2-3 times daily     No current facility-administered medications for this visit.         Social History   See HPI    Family History     Family History   Problem Relation Age of Onset     Respiratory Maternal Grandfather      Cancer Maternal Grandfather      Asthma Son      Circulatory Maternal Grandmother         Brain anurysm     Hypertension Mother      Glaucoma Mother 50        drops     Cancer Mother      Hypertension Sister      Hypertension Sister      Diabetes No family hx of      Cerebrovascular Disease No family hx of      Thyroid Disease No family hx of      Macular Degeneration No family hx of        Physical Exam       GEN: NAD. Healthy appearing adult.   HEENT: MMM.  Anicteric, noninjected sclera. No obvious external lesions of the ear and nose. Hearing intact.  PULM: No increased work of breathing  MSK:  Hands and wrists without swelling.   SKIN: No rash or jaundice seen  PSYCH: Alert. Appropriate.        Labs / Imaging (select studies)     RF/CCP  Recent Labs   Lab Test 09/13/13  1504   CCPABY <20 Interpretation:  Negative   RHF 12     YANELIS  Recent Labs   Lab Test 07/25/16  1433   ANAIGG <1:40  Reference range: <1:40  (Note)  <1:40  INTERPRETIVE INFORMATION: YANELIS by IFA, IgG  Anti-nuclear antibodies (YANELIS) are seen in a variety of  systemic rheumatic diseases and are determined by indirect  fluorescence assay (IFA) using HEp-2 substrate with an  IgG-specific conjugate. YANELIS titers less than or equal to  1:80 have variable relevance while titers greater than or  equal to  1:160 are considered clinically significant. These  antibodies may precede clinical disease onset; however,  healthy individuals and those with advanced age have been  reported to be positive for YANELIS. When observed, one of the  five basic patterns is reported: homogeneous,  peripheral/rim, speckled, centromere, or nucleolar. If  cytoplasmic fluorescence is observed, it is noted. IFA  methodology is subjective and has occasionally been shown  to lack sensitivity for anti-SSA/Ro antibodies.  Negative results do not necessarily rule out the presence  of SSc. If clinical suspicion remains, consider further  te sting for U3-RNP, PM/Scl, or Th/To antibodies associated  with SSc.  Performed by [facility],  [address] [place] [id number] [phone]  [email or web address] Alcon Krishna MD, Lab. Director       RNP/Sm/SSA/SSB  Recent Labs   Lab Test 03/23/16 1759 01/04/16  1806 07/01/14  1211 09/13/13  1504   RNPIGG  --   --  <0.2  Negative    --    SMIGG <0.2  Negative   Antibody index (AI) values reflect qualitative changes in antibody   concentration that cannot be directly associated with clinical condition or   disease state.   <0.2  Negative   Antibody index (AI) values reflect qualitative changes in antibody   concentration that cannot be directly associated with clinical condition or   disease state.   <0.2  Negative    --    ENASM  --   --   --  0   SSAIGG  --   --  <0.2  Negative    --    ENASSA  --   --   --  3   SSBIGG  --   --  <0.2  Negative    --    ENASSB  --   --   --  0   ENARMP  --   --   --  0   SCLIGG  --   --  <0.2  Negative    --      dsDNA  Recent Labs   Lab Test 07/19/21  1820 04/20/21  1804 01/11/21  1805 10/25/20  1059 04/07/20  1136 01/13/20  1804 10/21/19  1552 07/17/19  1756 10/01/18  1313   DNA <0.6 1 <1 1 1 <1 1 <1 <1     C3/C4  Recent Labs   Lab Test 07/19/21  1820 04/20/21  1804 01/11/21  1805 10/25/20  1059 04/07/20  1136 01/13/20  1804 10/21/19  1552 07/17/19  1756 01/15/19  1800    R3DCXRL 91 97 91 103 100 85 77 67* 68*   R5KWCMC 20 20 20 >9* 16 21 13* 13* 14*     Antiphospholipid Antibodies  Recent Labs   Lab Test 09/13/13  1504   CARG <15.0 Interpretation:  Negative   SANCHO <12.5 Interpretation:  Negative     IgG  Recent Labs   Lab Test 01/11/21  1805 10/25/20  1059 06/17/19  1813 04/02/14  1539    <17*  --  897   IGA 93 <2* 82 107   * 345*  --  361*     CBC  Recent Labs   Lab Test 07/19/21  1820 06/13/21  1312 04/20/21  1804 01/11/21  1805   WBC 8.7 5.8 7.7 8.1   RBC 3.94 4.19 4.22 4.00   HGB 12.7 13.5 13.7 13.2   HCT 37.9 40.6 40.0 38.7   MCV 96 97 95 97   RDW 11.2 11.6 11.5 11.5    300 313 316   MCH 32.2 32.2 32.5 33.0   MCHC 33.5 33.3 34.3 34.1   NEUTROPHIL 83 77.2 81.2 83.4   LYMPH 10 13.7 10.8 8.8   MONOCYTE 6 6.2 6.4 6.7   EOSINOPHIL 0 1.7 0.8 0.6   BASOPHIL 1 1.2 0.8 0.5   ANEU  --  4.5 6.3 6.7   ALYM  --  0.8 0.8 0.7*   ZOË  --  0.4 0.5 0.5   AEOS  --  0.1 0.1 0.1   ABAS  --  0.1 0.1 0.0     CMP  Recent Labs   Lab Test 07/19/21  1820 04/20/21  1804 01/11/21  1805 10/25/20  1059 04/07/20  1136 01/13/20  1804    140 138 139 140 140   POTASSIUM 3.8 4.1 3.9 4.1 3.6 3.9   CHLORIDE 107 106 106 104 106 106   CO2 30 33* 31 31 31 31   ANIONGAP 3 1* 1* 4 3 3   * 97 99 86 104* 106*   BUN 12 13 15 11 16 14   CR 0.79 0.76 0.77 0.73 0.71 0.73   GFRESTIMATED 89 >90 >90 >90 >90 >90   GFRESTBLACK  --  >90 >90 >90 >90 >90   MELANIA 8.8 9.0 8.8 9.1 9.1 8.9   BILITOTAL 0.3 0.3 0.2 0.4 0.3 0.3   ALBUMIN 3.8 4.1 4.0 4.4 4.3 3.7   PROTTOTAL 6.7* 6.8 6.5* 7.5 7.1 6.5*   ALKPHOS 60 57 55 62 54 54   AST 24 17 14 22 13 21   ALT 26 25 19 23 24 22     HgA1c  Recent Labs   Lab Test 05/30/19  0859   A1C 5.2     Calcium/VitaminD  Recent Labs   Lab Test 07/19/21  1820 04/20/21  1804 01/11/21  1805 04/07/20  1136 04/16/19  1759 10/02/17  1814   MELANIA 8.8 9.0 8.8 9.1 8.4* 8.7   VITDT  --   --   --  59 40 37     ESR/CRP  Recent Labs   Lab Test 07/19/21  1820 04/20/21  1804 01/11/21  1805    SED 4 5 4   CRP <2.9 <2.9 <2.9     CK/Aldolase  Recent Labs   Lab Test 07/19/21  1820 04/20/21  1804 01/11/21  1805    90 89     TSH/T4  Recent Labs   Lab Test 04/07/20  1136 04/10/19  1808 10/18/17  1445 10/07/16  0714 02/27/15  1058   TSH 0.44 0.44 0.33* 0.37*  --    T4  --   --  1.24 1.16 1.01     Lipid Panel  Recent Labs   Lab Test 01/26/19  0916 10/07/16  0714   CHOL 133 137   TRIG 64 48   HDL 68 75   LDL 52 52   NHDL 65 62     Hepatitis B  Recent Labs   Lab Test 03/29/16  1417 02/13/14  1835   HEPBANG Nonreactive Negative     Hepatitis C  Recent Labs   Lab Test 03/29/16  1417 02/13/14  1835   HCVAB Nonreactive   Assay performance characteristics have not been established for newborns,   infants, and children   Negative     Lyme ab screening  Recent Labs   Lab Test 05/30/15  1355   LYMEGM 0.55     Tuberculosis Screening  Recent Labs   Lab Test 07/03/17  1447 03/29/16  1417 02/13/14  1835   TBRSLT Negative Negative Negative   TBAGN 0.00 0.00 0.00     HIV Screening  Recent Labs   Lab Test 04/10/19  1808   HIAGAB Nonreactive     UA  Recent Labs   Lab Test 06/13/21  1309 04/20/21  1819 01/11/21  1810 10/25/20  1110 09/15/20  1655 04/07/20  1140   COLOR Yellow Yellow Yellow Yellow Yellow Yellow   APPEARANCE Clear Clear Clear Clear Clear Clear   URINEGLC Negative Negative Negative Negative Negative Negative   URINEBILI Negative Negative Negative Negative Negative Negative   SG <=1.005 1.015 >1.030 1.010 1.025 >1.030   URINEPH 7.0 5.5 6.0 7.0 6.0 6.5   PROTEIN Negative Negative Negative Negative Negative Negative   UROBILINOGEN 0.2 0.2 0.2 0.2 0.2 0.2   NITRITE Negative Negative Negative Negative Negative Negative   UBLD Negative Negative Small* Negative Negative Negative   LEUKEST Negative Negative Negative Trace* Negative Trace*   WBCU 0 - 5  --  0 - 5 0 - 5 5-10* 0 - 5   RBCU O - 2  --  O - 2 O - 2 O - 2 O - 2   SQUAMOUSEPI  --   --  Few Few Few Few   BACTERIA  --   --  Few* Few* Few* Few*   MUCOUS  --    --  Present*  --  Present* Present*     Urine Microscopic  Recent Labs   Lab Test 06/13/21  1309 01/11/21  1810 10/25/20  1110 09/15/20  1655 04/07/20  1140   WBCU 0 - 5 0 - 5 0 - 5 5-10* 0 - 5   RBCU O - 2 O - 2 O - 2 O - 2 O - 2   SQUAMOUSEPI  --  Few Few Few Few   BACTERIA  --  Few* Few* Few* Few*   MUCOUS  --  Present*  --  Present* Present*     Urine Protein  Recent Labs   Lab Test 07/19/21  1820 04/20/21  1819 01/11/21  1810   UTP 0.12 <0.05 0.19   UTPG 0.09 Unable to calculate due to low value 0.13   UCRR 139 37 138     Immunization History     Immunization History   Administered Date(s) Administered     COVID-19,PF,Pfizer 03/13/2021, 04/03/2021     Influenza (IIV3) PF 10/01/2012, 09/23/2013     Influenza Vaccine IM > 6 months Valent IIV4 09/20/2016, 10/30/2017, 10/14/2019     Influenza Vaccine, 6+MO IM (QUADRIVALENT W/PRESERVATIVES) 08/11/2018     Pneumo Conj 13-V (2010&after) 04/25/2016     Pneumococcal 23 valent 10/01/2012, 11/06/2017     Tdap (Adacel,Boostrix) 12/17/2010          Chart documentation done in part with Dragon Voice recognition Software. Although reviewed after completion, some word and grammatical error may remain.    Video-Visit Details    Type of service:  Video Visit    Video Start Time: 11:52 AM    Video End Time:12:09 PM    Originating Location (pt. Location): [place], [place]    Distant Location (provider location):  Home    Platform used for Video Visit: Brianna Colvin MD

## 2021-07-27 NOTE — PROGRESS NOTES
7/27/2021 - initial eval only.  Pt canceled next visit and did not reschedule.  Discharge pt from PT.

## 2021-09-01 ENCOUNTER — OFFICE VISIT (OUTPATIENT)
Dept: FAMILY MEDICINE | Facility: CLINIC | Age: 46
End: 2021-09-01
Payer: COMMERCIAL

## 2021-09-01 VITALS
WEIGHT: 140.4 LBS | RESPIRATION RATE: 20 BRPM | DIASTOLIC BLOOD PRESSURE: 86 MMHG | HEART RATE: 107 BPM | SYSTOLIC BLOOD PRESSURE: 118 MMHG | TEMPERATURE: 98.7 F | OXYGEN SATURATION: 99 % | BODY MASS INDEX: 23.19 KG/M2

## 2021-09-01 DIAGNOSIS — I10 HYPERTENSION GOAL BP (BLOOD PRESSURE) < 140/90: ICD-10-CM

## 2021-09-01 DIAGNOSIS — E27.40 ADRENAL INSUFFICIENCY (H): ICD-10-CM

## 2021-09-01 DIAGNOSIS — R11.0 NAUSEA: ICD-10-CM

## 2021-09-01 DIAGNOSIS — R10.9 FLANK PAIN: Primary | ICD-10-CM

## 2021-09-01 DIAGNOSIS — N30.01 ACUTE CYSTITIS WITH HEMATURIA: ICD-10-CM

## 2021-09-01 LAB
ALBUMIN UR-MCNC: ABNORMAL MG/DL
APPEARANCE UR: ABNORMAL
BACTERIA #/AREA URNS HPF: ABNORMAL /HPF
BILIRUB UR QL STRIP: NEGATIVE
COLOR UR AUTO: YELLOW
GLUCOSE UR STRIP-MCNC: NEGATIVE MG/DL
HGB UR QL STRIP: ABNORMAL
KETONES UR STRIP-MCNC: NEGATIVE MG/DL
LEUKOCYTE ESTERASE UR QL STRIP: ABNORMAL
NITRATE UR QL: NEGATIVE
PH UR STRIP: 7 [PH] (ref 5–7)
RBC #/AREA URNS AUTO: ABNORMAL /HPF
SP GR UR STRIP: 1.01 (ref 1–1.03)
SQUAMOUS #/AREA URNS AUTO: ABNORMAL /LPF
UROBILINOGEN UR STRIP-ACNC: 0.2 E.U./DL
WBC #/AREA URNS AUTO: ABNORMAL /HPF
WBC CLUMPS #/AREA URNS HPF: PRESENT /HPF

## 2021-09-01 PROCEDURE — 81001 URINALYSIS AUTO W/SCOPE: CPT | Performed by: PHYSICIAN ASSISTANT

## 2021-09-01 PROCEDURE — 87186 SC STD MICRODIL/AGAR DIL: CPT | Performed by: PHYSICIAN ASSISTANT

## 2021-09-01 PROCEDURE — 99214 OFFICE O/P EST MOD 30 MIN: CPT | Performed by: PHYSICIAN ASSISTANT

## 2021-09-01 PROCEDURE — 87086 URINE CULTURE/COLONY COUNT: CPT | Performed by: PHYSICIAN ASSISTANT

## 2021-09-01 RX ORDER — LOSARTAN POTASSIUM 25 MG/1
25 TABLET ORAL DAILY
Qty: 90 TABLET | Refills: 1 | Status: SHIPPED | OUTPATIENT
Start: 2021-09-01 | End: 2022-05-20

## 2021-09-01 RX ORDER — SULFAMETHOXAZOLE/TRIMETHOPRIM 800-160 MG
1 TABLET ORAL 2 TIMES DAILY
Qty: 14 TABLET | Refills: 0 | Status: SHIPPED | OUTPATIENT
Start: 2021-09-01 | End: 2021-09-08

## 2021-09-01 NOTE — PROGRESS NOTES
Subjective   Ruma is a 46 year old who presents for the following health issues    HPI     Genitourinary - Female  Onset/Duration: 3 weeks  Description:   Painful urination (Dysuria): YES           Frequency: YES  Blood in urine (Hematuria): YES  Delay in urine (Hesitency): YES  Intensity: severe  Progression of Symptoms:  worsening  Accompanying Signs & Symptoms:  Fever/chills: YES - chills  Flank pain: no  Nausea and vomiting: YES - nausea  Vaginal symptoms: none  Abdominal/Pelvic Pain: YES - a little on lower right  History:   History of frequent UTI s: YES  History of kidney stones: no  Sexually Active: YES  Possibility of pregnancy: No  Precipitating or alleviating factors: None  Therapies tried and outcome:  none     Recheck of htn.    No chest pain/sob/palpitations/dizziness/ha's  No fever. No vaginal discharge.  Adrenal insufficiency stable.  Reviewed recent labs 6 wks ago.     Review of Systems   Constitutional, HEENT, cardiovascular, pulmonary, GI, , musculoskeletal, neuro, skin, endocrine and psych systems are negative, except as otherwise noted.      Objective    /86   Pulse 107   Temp 98.7  F (37.1  C) (Tympanic)   Resp 20   Wt 63.7 kg (140 lb 6.4 oz)   SpO2 99%   BMI 23.19 kg/m    Body mass index is 23.19 kg/m .  Physical Exam     Eye exam - right eye normal lid, conjunctiva, cornea, pupil and fundus, left eye normal lid, conjunctiva, cornea, pupil and fundus.  Thyroid not palpable, not enlarged, no nodules detected.  CHEST:chest clear to IPPA, no tachypnea, retractions or cyanosis and S1, S2 normal, no murmur, no gallop, rate regular.  Very mild right periumbilical abdominal pain. No cva tenderness.  No edema    Yovana was seen today for urinary problem.    Diagnoses and all orders for this visit:    Flank pain  -     UA with Microscopic reflex to Culture - lab collect; Future  -     UA with Microscopic reflex to Culture - lab collect  -     Urine Microscopic  -     Urine  Culture  -     Urine Culture    Nausea  -     UA with Microscopic reflex to Culture - lab collect; Future  -     UA with Microscopic reflex to Culture - lab collect  -     Urine Microscopic  -     Urine Culture  -     Urine Culture    Adrenal insufficiency (H)    Acute cystitis with hematuria  -     sulfamethoxazole-trimethoprim (BACTRIM DS) 800-160 MG tablet; Take 1 tablet by mouth 2 times daily for 7 days    Hypertension goal BP (blood pressure) < 140/90  -     losartan (COZAAR) 25 MG tablet; Take 1 tablet (25 mg) by mouth daily    Other orders  -     REVIEW OF HEALTH MAINTENANCE PROTOCOL ORDERS      work on lifestyle modification  Advised supportive and symptomatic treatment.  Follow up with Provider - if condition persists or worsens.

## 2021-09-02 LAB — BACTERIA UR CULT: ABNORMAL

## 2021-09-04 LAB — BACTERIA UR CULT: ABNORMAL

## 2021-09-13 DIAGNOSIS — N30.01 ACUTE CYSTITIS WITH HEMATURIA: ICD-10-CM

## 2021-09-15 ENCOUNTER — MYC MEDICAL ADVICE (OUTPATIENT)
Dept: RHEUMATOLOGY | Facility: CLINIC | Age: 46
End: 2021-09-15

## 2021-09-16 RX ORDER — SULFAMETHOXAZOLE/TRIMETHOPRIM 800-160 MG
TABLET ORAL
Qty: 14 TABLET | Refills: 0 | OUTPATIENT
Start: 2021-09-16

## 2021-09-16 NOTE — TELEPHONE ENCOUNTER
[employer] message sent to inquire about current flare..    Radha OJEDA RN Specialty Triage 9/16/2021 12:04 PM

## 2021-09-16 NOTE — TELEPHONE ENCOUNTER
Med was only for 7 day supply, pt needs to contact provider if needing refill. Message sent to pharmacy

## 2021-10-10 ENCOUNTER — LAB (OUTPATIENT)
Dept: LAB | Facility: CLINIC | Age: 46
End: 2021-10-10
Payer: COMMERCIAL

## 2021-10-10 DIAGNOSIS — K21.9 GASTROESOPHAGEAL REFLUX DISEASE WITHOUT ESOPHAGITIS: ICD-10-CM

## 2021-10-10 DIAGNOSIS — D47.2 MGUS (MONOCLONAL GAMMOPATHY OF UNKNOWN SIGNIFICANCE): ICD-10-CM

## 2021-10-10 DIAGNOSIS — M32.9 SYSTEMIC LUPUS ERYTHEMATOSUS, UNSPECIFIED SLE TYPE, UNSPECIFIED ORGAN INVOLVEMENT STATUS (H): ICD-10-CM

## 2021-10-10 LAB
ALBUMIN UR-MCNC: NEGATIVE MG/DL
APPEARANCE UR: CLEAR
BASOPHILS # BLD AUTO: 0 10E3/UL (ref 0–0.2)
BASOPHILS NFR BLD AUTO: 1 %
BILIRUB UR QL STRIP: NEGATIVE
COLOR UR AUTO: YELLOW
CREAT UR-MCNC: 52 MG/DL
EOSINOPHIL # BLD AUTO: 0.1 10E3/UL (ref 0–0.7)
EOSINOPHIL NFR BLD AUTO: 2 %
ERYTHROCYTE [DISTWIDTH] IN BLOOD BY AUTOMATED COUNT: 11.9 % (ref 10–15)
ERYTHROCYTE [SEDIMENTATION RATE] IN BLOOD BY WESTERGREN METHOD: 5 MM/HR (ref 0–20)
GLUCOSE UR STRIP-MCNC: NEGATIVE MG/DL
HCT VFR BLD AUTO: 37.9 % (ref 35–47)
HGB BLD-MCNC: 12.9 G/DL (ref 11.7–15.7)
HGB UR QL STRIP: NEGATIVE
KETONES UR STRIP-MCNC: NEGATIVE MG/DL
LEUKOCYTE ESTERASE UR QL STRIP: ABNORMAL
LYMPHOCYTES # BLD AUTO: 0.7 10E3/UL (ref 0.8–5.3)
LYMPHOCYTES NFR BLD AUTO: 14 %
MCH RBC QN AUTO: 32.3 PG (ref 26.5–33)
MCHC RBC AUTO-ENTMCNC: 34 G/DL (ref 31.5–36.5)
MCV RBC AUTO: 95 FL (ref 78–100)
MONOCYTES # BLD AUTO: 0.4 10E3/UL (ref 0–1.3)
MONOCYTES NFR BLD AUTO: 9 %
NEUTROPHILS # BLD AUTO: 3.4 10E3/UL (ref 1.6–8.3)
NEUTROPHILS NFR BLD AUTO: 75 %
NITRATE UR QL: NEGATIVE
PH UR STRIP: 5.5 [PH] (ref 5–7)
PLATELET # BLD AUTO: 283 10E3/UL (ref 150–450)
PROT UR-MCNC: 0.06 G/L
PROT/CREAT 24H UR: 0.12 G/G CR (ref 0–0.2)
RBC # BLD AUTO: 3.99 10E6/UL (ref 3.8–5.2)
RBC #/AREA URNS AUTO: ABNORMAL /HPF
SP GR UR STRIP: 1.01 (ref 1–1.03)
SQUAMOUS #/AREA URNS AUTO: ABNORMAL /LPF
TOTAL PROTEIN SERUM FOR ELP: 6.3 G/DL (ref 6.8–8.8)
UROBILINOGEN UR STRIP-ACNC: 0.2 E.U./DL
WBC # BLD AUTO: 4.6 10E3/UL (ref 4–11)
WBC #/AREA URNS AUTO: ABNORMAL /HPF

## 2021-10-10 PROCEDURE — 84156 ASSAY OF PROTEIN URINE: CPT

## 2021-10-10 PROCEDURE — 84165 PROTEIN E-PHORESIS SERUM: CPT | Performed by: PATHOLOGY

## 2021-10-10 PROCEDURE — 85025 COMPLETE CBC W/AUTO DIFF WBC: CPT

## 2021-10-10 PROCEDURE — 86140 C-REACTIVE PROTEIN: CPT

## 2021-10-10 PROCEDURE — 82550 ASSAY OF CK (CPK): CPT

## 2021-10-10 PROCEDURE — 36415 COLL VENOUS BLD VENIPUNCTURE: CPT

## 2021-10-10 PROCEDURE — 86225 DNA ANTIBODY NATIVE: CPT

## 2021-10-10 PROCEDURE — 86160 COMPLEMENT ANTIGEN: CPT | Mod: 59

## 2021-10-10 PROCEDURE — 84155 ASSAY OF PROTEIN SERUM: CPT

## 2021-10-10 PROCEDURE — 86160 COMPLEMENT ANTIGEN: CPT

## 2021-10-10 PROCEDURE — 81001 URINALYSIS AUTO W/SCOPE: CPT

## 2021-10-10 PROCEDURE — 86334 IMMUNOFIX E-PHORESIS SERUM: CPT

## 2021-10-10 PROCEDURE — 85652 RBC SED RATE AUTOMATED: CPT

## 2021-10-10 PROCEDURE — 80053 COMPREHEN METABOLIC PANEL: CPT

## 2021-10-11 LAB
ALBUMIN SERPL ELPH-MCNC: 4 G/DL (ref 3.7–5.1)
ALBUMIN SERPL-MCNC: 3.8 G/DL (ref 3.4–5)
ALP SERPL-CCNC: 55 U/L (ref 40–150)
ALPHA1 GLOB SERPL ELPH-MCNC: 0.2 G/DL (ref 0.2–0.4)
ALPHA2 GLOB SERPL ELPH-MCNC: 0.6 G/DL (ref 0.5–0.9)
ALT SERPL W P-5'-P-CCNC: 28 U/L (ref 0–50)
ANION GAP SERPL CALCULATED.3IONS-SCNC: 4 MMOL/L (ref 3–14)
AST SERPL W P-5'-P-CCNC: 22 U/L (ref 0–45)
B-GLOBULIN SERPL ELPH-MCNC: 0.6 G/DL (ref 0.6–1)
BILIRUB SERPL-MCNC: 0.3 MG/DL (ref 0.2–1.3)
BUN SERPL-MCNC: 11 MG/DL (ref 7–30)
C3 SERPL-MCNC: 89 MG/DL (ref 81–157)
C4 SERPL-MCNC: 17 MG/DL (ref 13–39)
CALCIUM SERPL-MCNC: 8.5 MG/DL (ref 8.5–10.1)
CHLORIDE BLD-SCNC: 107 MMOL/L (ref 94–109)
CK SERPL-CCNC: 87 U/L (ref 30–225)
CO2 SERPL-SCNC: 28 MMOL/L (ref 20–32)
CREAT SERPL-MCNC: 0.64 MG/DL (ref 0.52–1.04)
CRP SERPL-MCNC: <2.9 MG/L (ref 0–8)
GAMMA GLOB SERPL ELPH-MCNC: 0.9 G/DL (ref 0.7–1.6)
GFR SERPL CREATININE-BSD FRML MDRD: >90 ML/MIN/1.73M2
GLUCOSE BLD-MCNC: 89 MG/DL (ref 70–99)
M PROTEIN SERPL ELPH-MCNC: 0.1 G/DL
POTASSIUM BLD-SCNC: 4.3 MMOL/L (ref 3.4–5.3)
PROT PATTERN SERPL ELPH-IMP: ABNORMAL
PROT PATTERN SERPL IFE-IMP: NORMAL
PROT SERPL-MCNC: 6.4 G/DL (ref 6.8–8.8)
SODIUM SERPL-SCNC: 139 MMOL/L (ref 133–144)

## 2021-10-11 RX ORDER — FAMOTIDINE 40 MG/1
40 TABLET, FILM COATED ORAL
Qty: 30 TABLET | Refills: 1 | Status: SHIPPED | OUTPATIENT
Start: 2021-10-11 | End: 2022-02-07

## 2021-10-11 NOTE — TELEPHONE ENCOUNTER
"Requested Prescriptions   Pending Prescriptions Disp Refills     famotidine (PEPCID) 40 MG tablet [Pharmacy Med Name: FAMOTIDINE 40 MG TABLET] 30 tablet 8     Sig: TAKE 1 TABLET (40 MG) BY MOUTH NIGHTLY AS NEEDED FOR HEARTBURN       H2 Blockers Protocol Passed - 10/10/2021 12:30 AM        Passed - Patient is age 12 or older        Passed - Recent (12 mo) or future (30 days) visit within the authorizing provider's specialty     Patient has had an office visit with the authorizing provider or a provider within the authorizing providers department within the previous 12 mos or has a future within next 30 days. See \"Patient Info\" tab in inbasket, or \"Choose Columns\" in Meds & Orders section of the refill encounter.              Passed - Medication is active on med list             Prescription approved per [facility] Refill Protocol.  Julisa,RN,BSN  Triage Nurse  [facility]: [facility]      "

## 2021-10-12 LAB — DSDNA AB SER-ACNC: <0.6 IU/ML

## 2021-10-19 ENCOUNTER — VIRTUAL VISIT (OUTPATIENT)
Dept: RHEUMATOLOGY | Facility: CLINIC | Age: 46
End: 2021-10-19
Payer: COMMERCIAL

## 2021-10-19 DIAGNOSIS — Z79.899 HIGH RISK MEDICATIONS (NOT ANTICOAGULANTS) LONG-TERM USE: ICD-10-CM

## 2021-10-19 DIAGNOSIS — M32.9 SYSTEMIC LUPUS ERYTHEMATOSUS, UNSPECIFIED SLE TYPE, UNSPECIFIED ORGAN INVOLVEMENT STATUS (H): Primary | ICD-10-CM

## 2021-10-19 DIAGNOSIS — I73.00 RAYNAUD'S PHENOMENON WITHOUT GANGRENE: ICD-10-CM

## 2021-10-19 PROCEDURE — 99214 OFFICE O/P EST MOD 30 MIN: CPT | Mod: GT | Performed by: INTERNAL MEDICINE

## 2021-10-19 RX ORDER — AMLODIPINE BESYLATE 5 MG/1
5 TABLET ORAL DAILY
Qty: 90 TABLET | Refills: 3 | Status: SHIPPED | OUTPATIENT
Start: 2021-10-19 | End: 2021-12-20

## 2021-10-19 RX ORDER — HYDROXYCHLOROQUINE SULFATE 200 MG/1
TABLET, FILM COATED ORAL
Qty: 135 TABLET | Refills: 0 | Status: SHIPPED | OUTPATIENT
Start: 2021-10-19 | End: 2021-12-20

## 2021-10-19 NOTE — PATIENT INSTRUCTIONS
RHEUMATOLOGY    Dr. Bradford Colvin    [address] [place] [address]  Phone number: [phone]  Fax number: [phone]    Thank you for choosing [facility]!    Qing Zapata CMA Rheumatology

## 2021-10-19 NOTE — PROGRESS NOTES
Yovana Enriquez  is a 46 year old year old female who is being evaluated via a billable video visit.      How would you like to obtain your AVS? MyChart  If the video visit is dropped, the invitation should be resent by: Text to cell phone: [phone]  Will anyone else be joining your video visit? No    Rheumatology Video Visit      Yovana Enriquez MRN# [id number]   YOB: 1975 Age: 46 year old      Date of visit: 10/19/21   PCP: Bradford Mario    Chief Complaint   Patient presents with:  Systemic Lupus Erythematous    Assessment and Plan     1. Systemic lupus erythematosus (YANELIS by EIA positive in the past, leukopenia/lymphocytopenia, hypocomplementemia, polyarthralgias, oral sores, history of malar rash, photophobia, photosensitivity, Raynaud's Phenomenon): Previously on methotrexate that was discontinued for unclear reasons but the patient reports that she tolerated it well. Currently on azathioprine 75 mg daily (dose reduced on 11/8/2018 from 100mg daily without worsening symptoms at that time, eventually reduced to 50 mg daily and did well for a period of time; now on 75 mg daily), hydroxychloroquine 300 mg daily [avg], and Benlysta (worsening symptoms when stopped in the past).  Overall doing well with regard to lupus at this time.  Noted that she has likely adrenal insufficiency with a lower dose of cortisol in the past and has had difficulty getting off of a low-dose of prednisone; okay to continue prednisone 2.5 mg daily.  Chronic illness, stable  - Continue azathioprine 75 mg daily   - Continue Evoxac 30 mg twice a day  - Continue hydroxychloroquine 300mg daily on average: 200mg daily with additional 200mg every other day (toxicity monitoring eye exam last done on 5/20/2019; advised that she update the eye exam)  - Continue Benlysta 200 mg SQ every 7 days  - Continue Prednisone 2.5mg daily   - Labs in 3 mo: CBC, CMP, ESR, CRP, UA, Uprotein:creatinine    High risk medication requiring  intensive toxicity monitoring at least quarterly: labs ordered include CBC, Creatinine, Hepatic panel to monitor for cytopenia and hepatotoxicity; checking creatinine as it affects clearance of medication.     2. Secondary Sjogren syndrome: Doing well with Evoxac and frequent sips of water. Dry eyes are not much of an issue currently. See #1.  Chronic illness, stable    3. Raynaud's Phenomenon: Amlodipine 5mg daily is effective.  Chronic illness, stable  - Continue amlodipine 5mg daily     4. R>Ltrochanteric bursitis, possible iliotibial band syndrome as well: She receives steroid injections from the pain clinic but reportedly the last injections were not as helpful.  Possibly also with iliotibial band syndrome and advised that she start physical therapy exercises.   Note that she has been to physical therapy with reported worsening of her symptoms.  Previously scheduled for an in office evaluation but she was unable to make that appointment; scheduled for early November    5.  Bilateral knee pain: Degenerative in nature.  Also question if you have pes anserine bursitis.  Needs evaluation in clinic and has scheduled this for early November.    6. Fibromyalgia: Managed by the pain clinic and PCP.    7. MGUS: SPEP and immunofixation advised to be done yearly per Dr. Yoselin Payan.  Stable on most recent lab.    8.  Vaccinations: Vaccinations reviewed with Ms. Enriquez.    - Influenza: advised receiving at least 8 weeks after [id number] is administered  - [id number]: up to date  - [id number]: up to date  -  TDAP: Advised receiving  - COVID-19: has received the Pfizer COVID-19 vaccine 3/13/2021, 4/3/2021    A single additional dose of the Pfizer or Moderna COVID-19 vaccine is recommended at least 28 days after the completion of the 2-dose mRNA vaccine series for patients receiving any immunosuppressive or immunomodulatory therapy. Attempts should be made to match the additional mRNA dose type to the type given in the mRNA  primary series; however, if that is not feasible, a booster dose with the alternative mRNA vaccine is permitted.    Based on our current understanding (and this may change over time as we learn more), medications should be adjusted as noted below, if disease activity allows:  - NSAIDs and Acetaminophen: hold for 24 hours prior to vaccination if able to do so  - Azathioprine should be held for 1 week after the COVID19 booster vaccine    Total minutes spent in evaluation with patient, documentation, , and review of pertinent studies and chart notes: 20     Ms. Enriquez verbalized agreement with and understanding of the rational for the diagnosis and treatment plan.  All questions were answered to best of my ability and the patient's satisfaction. Ms. Enriquez was advised to contact the clinic with any questions that may arise after the clinic visit.      Thank you for involving me in the care of the patient    Return to clinic: 3-4 months      HPI   Yovana Enriquez is a 46 year old female with medical history significant for subclinical hyperthyroidism, hypertension, irritable bowel syndrome, fibromyalgia, hypertension, non-celiac gluten sensitivity (reportedly with bowel biopsies and laboratory workup that did not show celiac disease), anxiety, and systemic lupus erythematosus who presents for follow-up of SLE.    Today, 10/19/2021: Lupus is doing well just tolerating medications well.  However, has pain over the right trochanteric bursa and is able to push on the hip over the trochanteric bursa to reduce the pain.  Also with pain of her knee and she points to the prepatellar area and over the pes anserine bursa.  No rash.  Occasional oral sore.  States that she was unable to come to clinic for evaluation of her joint pain previously because of not having anybody at her  to cover her; her sister is now able to cover her so she would like to come in to the office     Denies fevers, chills, nausea,  vomiting. No abdominal pain.  No chest pain/pressure, palpitations, or shortness of breath.  No rash currently. No LE swelling.  She has photosensitivity and photophobia.   No eye pain or redness. No history of serositis. No history of DVT, pulmonary embolism, or miscarriage.     Raynaud's is controlled per patient.  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    ROS   12 point review of system was completed and negative except as noted in the HPI     Active Problem List     Patient Active Problem List   Diagnosis     Systemic lupus erythematosus (H)     Menorrhagia     History of ovarian cyst     Dysmenorrhea     History of gestational diabetes mellitus, not currently pregnant     Intramural leiomyoma of uterus     Hyperlipidemia LDL goal <130     Subclinical hyperthyroidism     Anxiety     High risk medication use     Fibromyalgia     Chronic constipation     Irritable bowel syndrome     Health Care Home     Hypertension goal BP (blood pressure) < 140/90     Non-celiac gluten sensitivity     Raynaud's phenomenon without gangrene     Sjogren's syndrome (H)     Intramural and submucous leiomyoma of uterus     Adrenal insufficiency (H)     Past Medical History     Past Medical History:   Diagnosis Date     Arthritis      Chronic constipation 12/16/2013     Dysmenorrhea 10/1/2012     Fibromyalgia 11/15/2013     Health Care Home 3/19/2014    **See Letters for [facility] Care Plan: Emergency Care Plan       High risk medication use 9/13/2013     History of gestational diabetes mellitus, not currently pregnant 10/1/2012     History of ovarian cyst 10/1/2012     Hyperlipidemia LDL goal <130 10/18/2012     Hypertension goal BP (blood pressure) < 140/90 1/19/2015     Intramural leiomyoma of uterus 10/5/2012     Irritable bowel syndrome 3/11/2014    Patient states no history colonoscopy       Menorrhagia 10/1/2012     Non-celiac gluten sensitivity 2/8/2016     PONV  (postoperative nausea and vomiting)      Subclinical hyperthyroidism 1/17/2013     Systemic lupus erythematosus (H) 4/23/2012     Past Surgical History     Past Surgical History:   Procedure Laterality Date     ABDOMEN SURGERY  2004    Tubal ligation     COLONOSCOPY N/A 2/20/2015    Procedure: COMBINED COLONOSCOPY, SINGLE OR MULTIPLE BIOPSY/POLYPECTOMY BY BIOPSY;  Surgeon: Fred Cullen MD;  Location: [facility] OR     COLONOSCOPY N/A 10/29/2018    Procedure: COMBINED COLONOSCOPY, SINGLE OR MULTIPLE BIOPSY/POLYPECTOMY BY BIOPSY;  Surgeon: Inez Sawyer MD;  Location: [facility]     COLONOSCOPY WITH CO2 INSUFFLATION N/A 2/20/2015    Procedure: COLONOSCOPY WITH CO2 INSUFFLATION;  Surgeon: Fred Cullen MD;  Location: [facility] OR     ENT SURGERY  10-24-14    Bottom lip biopsies     ESOPHAGOSCOPY, GASTROSCOPY, DUODENOSCOPY (EGD), COMBINED N/A 2/20/2015    Procedure: COMBINED ESOPHAGOSCOPY, GASTROSCOPY, DUODENOSCOPY (EGD), BIOPSY SINGLE OR MULTIPLE;  Surgeon: Fred Cullen MD;  Location: [facility] OR     HC BREATH HYDROGEN TEST  12/31/2013    Procedure: HYDROGEN BREATH TEST;  Surgeon: Camden Almazan MD;  Location: [facility]     HC HYSTEROSCOPY, SURGICAL; W/ ENDOMETRIAL ABLATION, ANY METHOD  10-19-12     ORTHOPEDIC SURGERY  12/2010     SHOULDER SURGERY Right     R shoulder     TUBAL LIGATION  2004     Allergy     Allergies   Allergen Reactions     Fluticasone Propionate (Nasal) [Fluticasone] Anaphylaxis     Current Medication List     Current Outpatient Medications   Medication Sig     acetaminophen (TYLENOL) 325 MG tablet Take 325-650 mg by mouth every 6 hours as needed for mild pain or headaches Maximum daily dose of acetaminophen is 3000 mg in 24 hours.     amLODIPine (NORVASC) 5 MG tablet Take 1 tablet (5 mg) by mouth daily     azaTHIOprine (IMURAN) 50 MG tablet Take 1.5 tablets (75 mg) by mouth daily     azelastine (ASTELIN) 0.1 % nasal spray Spray 1 spray into both nostrils 2 times daily      Belimumab 200 MG/ML SOAJ Inject 200 mg Subcutaneous every 7 days . Hold for signs of infection and seek medical attention. Autoinjector     Calcium Carb-Cholecalciferol (CALCIUM + D3) 600-200 MG-UNIT TABS Take 1 tablet by mouth daily      cevimeline (EVOXAC) 30 MG capsule Take 1 capsule (30 mg) by mouth 2 times daily     diclofenac (VOLTAREN) 1 % GEL Apply 2-4 grams to knees or shoulders four times daily as needed using enclosed dosing card.     docusate sodium (COLACE) 100 MG capsule Take 1 capsule (100 mg) by mouth 2 times daily     famotidine (PEPCID) 40 MG tablet TAKE 1 TABLET (40 MG) BY MOUTH NIGHTLY AS NEEDED FOR HEARTBURN     hydroxychloroquine (PLAQUENIL) 200 MG tablet Hydroxychloroquine 200mg daily; and an additional 200mg every other day. Yearly eye exam, including 10-2 VF and SD-OCT, is required     losartan (COZAAR) 25 MG tablet Take 1 tablet (25 mg) by mouth daily     nortriptyline (PAMELOR) 10 MG capsule Take 1 capsule (10 mg) by mouth At Bedtime . 3 months supply     ondansetron (ZOFRAN-ODT) 4 MG ODT tab Take 1 tablet (4 mg) by mouth every 8 hours as needed for nausea Recommend limited use     predniSONE (DELTASONE) 2.5 MG tablet Take 1 tablet (2.5 mg) by mouth daily     SUMAtriptan (IMITREX) 50 MG tablet Take 1 tablet (50 mg) by mouth at onset of headache for migraine . After 2 hours, if no relief, ok to take 2nd dose.  Limit 2 tabs/24 hours.     Vitamin D, Cholecalciferol, 1000 units CAPS Take 4,000 Int'l Units by mouth daily     blood glucose test strip Used for testing glucose 2-3 times daily     No current facility-administered medications for this visit.         Social History   See HPI    Family History     Family History   Problem Relation Age of Onset     Respiratory Maternal Grandfather      Cancer Maternal Grandfather      Other Cancer Maternal Grandfather         Skin cancer     Asthma Son      Circulatory Maternal Grandmother         Brain anurysm     Diabetes Maternal Grandmother       Hypertension Mother      Glaucoma Mother 50        drops     Cancer Mother      Hypertension Sister      Hypertension Sister      Diabetes Sister      Hypertension Sister      Anxiety Disorder Sister      Asthma Son      Diabetes No family hx of      Cerebrovascular Disease No family hx of      Thyroid Disease No family hx of      Macular Degeneration No family hx of        Physical Exam       GEN: NAD. Healthy appearing adult.   HEENT: MMM.  Anicteric, noninjected sclera. No obvious external lesions of the ear and nose. Hearing intact.  PULM: No increased work of breathing  MSK:  Hands and wrists without swelling.   SKIN: No rash or jaundice seen  PSYCH: Alert. Appropriate.        Labs / Imaging (select studies)     RF/CCP  Recent Labs   Lab Test 09/13/13  1504   CCPABY <20 Interpretation:  Negative   RHF 12     YANELIS  Recent Labs   Lab Test 07/25/16  1433   ANAIGG <1:40  Reference range: <1:40  (Note)  <1:40  INTERPRETIVE INFORMATION: YANELIS by IFA, IgG  Anti-nuclear antibodies (YANELIS) are seen in a variety of  systemic rheumatic diseases and are determined by indirect  fluorescence assay (IFA) using HEp-2 substrate with an  IgG-specific conjugate. YANELIS titers less than or equal to  1:80 have variable relevance while titers greater than or  equal to 1:160 are considered clinically significant. These  antibodies may precede clinical disease onset; however,  healthy individuals and those with advanced age have been  reported to be positive for YANELIS. When observed, one of the  five basic patterns is reported: homogeneous,  peripheral/rim, speckled, centromere, or nucleolar. If  cytoplasmic fluorescence is observed, it is noted. IFA  methodology is subjective and has occasionally been shown  to lack sensitivity for anti-SSA/Ro antibodies.  Negative results do not necessarily rule out the presence  of SSc. If clinical suspicion remains, consider further  te sting for U3-RNP, PM/Scl, or Th/To antibodies associated  with  SSc.  Performed by [facility],  [address] [place] [id number] [phone]  [email or web address], Alcon Krishna MD, Lab. Director       RNP/Sm/SSA/SSB  Recent Labs   Lab Test 03/23/16  1759 01/04/16  1806 07/01/14  1211 09/13/13  1504   RNPIGG  --   --  <0.2  Negative    --    SMIGG <0.2  Negative   Antibody index (AI) values reflect qualitative changes in antibody   concentration that cannot be directly associated with clinical condition or   disease state.   <0.2  Negative   Antibody index (AI) values reflect qualitative changes in antibody   concentration that cannot be directly associated with clinical condition or   disease state.   <0.2  Negative    --    ENASM  --   --   --  0   SSAIGG  --   --  <0.2  Negative    --    ENASSA  --   --   --  3   SSBIGG  --   --  <0.2  Negative    --    ENASSB  --   --   --  0   ENARMP  --   --   --  0   SCLIGG  --   --  <0.2  Negative    --      dsDNA  Recent Labs   Lab Test 10/10/21  1202 07/19/21  1820 04/20/21  1804 01/11/21  1805 10/25/20  1059 04/07/20  1136 01/13/20  1804 10/21/19  1552 07/17/19  1756   DNA <0.6 <0.6 1 <1 1 1 <1 1 <1     C3/C4  Recent Labs   Lab Test 10/10/21  1202 07/19/21  1820 04/20/21  1804 01/11/21  1805 10/25/20  1059 04/07/20  1136 01/13/20  1804 10/21/19  1552 07/17/19  1756   N3CSGXI 89 91 97 91 103 100 85 77 67*   U3KKKAA 17 20 20 20 >9* 16 21 13* 13*     Antiphospholipid Antibodies  Recent Labs   Lab Test 09/13/13  1504   CARG <15.0 Interpretation:  Negative   SANCHO <12.5 Interpretation:  Negative     IgG  Recent Labs   Lab Test 01/11/21  1805 10/25/20  1059 06/17/19  1813 01/30/15  1156 04/02/14  1539    <17*  --   --  897   IGA 93 <2* 82   < > 107   * 345*  --   --  361*    < > = values in this interval not displayed.     CBC  Recent Labs   Lab Test 10/10/21  1202 07/19/21  1820 06/13/21  1312 04/20/21  1804 04/20/21  1804 01/11/21  1805 01/11/21  1805   WBC 4.6 8.7 5.8   < > 7.7   < > 8.1   RBC 3.99 3.94 4.19   < > 4.22    < > 4.00   HGB 12.9 12.7 13.5   < > 13.7   < > 13.2   HCT 37.9 37.9 40.6   < > 40.0   < > 38.7   MCV 95 96 97   < > 95   < > 97   RDW 11.9 11.2 11.6   < > 11.5   < > 11.5    314 300   < > 313   < > 316   MCH 32.3 32.2 32.2   < > 32.5   < > 33.0   MCHC 34.0 33.5 33.3   < > 34.3   < > 34.1   NEUTROPHIL 75 83 77.2   < > 81.2   < > 83.4   LYMPH 14 10 13.7   < > 10.8   < > 8.8   MONOCYTE 9 6 6.2   < > 6.4   < > 6.7   EOSINOPHIL 2 0 1.7   < > 0.8   < > 0.6   BASOPHIL 1 1 1.2   < > 0.8   < > 0.5   ANEU  --   --  4.5  --  6.3  --  6.7   ALYM  --   --  0.8  --  0.8  --  0.7*   ZOË  --   --  0.4  --  0.5  --  0.5   AEOS  --   --  0.1  --  0.1  --  0.1   ABAS  --   --  0.1  --  0.1  --  0.0   ANEUTAUTO 3.4 7.2  --   --   --   --   --    ALYMPAUTO 0.7* 0.9  --   --   --   --   --    AMONOAUTO 0.4 0.5  --   --   --   --   --    AEOSAUTO 0.1 0.0  --   --   --   --   --    ABSBASO 0.0 0.0  --   --   --   --   --     < > = values in this interval not displayed.     CMP  Recent Labs   Lab Test 10/10/21  1202 07/19/21  1820 04/20/21  1804 01/11/21  1805 10/25/20  1059 10/25/20  1059 04/07/20  1136 04/07/20  1136    140 140 138   < > 139   < > 140   POTASSIUM 4.3 3.8 4.1 3.9   < > 4.1   < > 3.6   CHLORIDE 107 107 106 106   < > 104   < > 106   CO2 28 30 33* 31   < > 31   < > 31   ANIONGAP 4 3 1* 1*   < > 4   < > 3   GLC 89 113* 97 99  --  86  --  104*   BUN 11 12 13 15   < > 11   < > 16   CR 0.64 0.79 0.76 0.77   < > 0.73   < > 0.71   GFRESTIMATED >90 89 >90 >90   < > >90   < > >90   GFRESTBLACK  --   --  >90 >90  --  >90  --  >90   MELANIA 8.5 8.8 9.0 8.8   < > 9.1   < > 9.1   BILITOTAL 0.3 0.3 0.3 0.2   < > 0.4   < > 0.3   ALBUMIN 3.8 3.8 4.1 4.0   < > 4.4   < > 4.3   PROTTOTAL 6.4* 6.7* 6.8 6.5*   < > 7.5   < > 7.1   ALKPHOS 55 60 57 55   < > 62   < > 54   AST 22 24 17 14   < > 22   < > 13   ALT 28 26 25 19   < > 23   < > 24    < > = values in this interval not displayed.     HgA1c  Recent Labs   Lab Test 05/30/19  0859    A1C 5.2     Calcium/VitaminD  Recent Labs   Lab Test 10/10/21  1202 07/19/21  1820 04/20/21  1804 10/25/20  1059 04/07/20  1136 07/17/19  1756 04/16/19  1759 01/30/18  1752 10/02/17  1814   MELANIA 8.5 8.8 9.0   < > 9.1   < > 8.4*   < > 8.7   VITDT  --   --   --   --  59  --  40  --  37    < > = values in this interval not displayed.     ESR/CRP  Recent Labs   Lab Test 10/10/21  1202 07/19/21  1820 04/20/21  1804   SED 5 4 5   CRP <2.9 <2.9 <2.9     CK/Aldolase  Recent Labs   Lab Test 10/10/21  1202 07/19/21  1820 04/20/21  1804   CKT 87 105 90     TSH/T4  Recent Labs   Lab Test 04/07/20  1136 04/10/19  1808 10/18/17  1445 10/07/16  0714 10/07/16  0714 02/27/15  1058 01/30/15  1156   TSH 0.44 0.44 0.33*   < > 0.37*  --    < >   T4  --   --  1.24  --  1.16 1.01  --     < > = values in this interval not displayed.     Lipid Panel  Recent Labs   Lab Test 01/26/19  0916 10/07/16  0714   CHOL 133 137   TRIG 64 48   HDL 68 75   LDL 52 52   NHDL 65 62     Hepatitis B  Recent Labs   Lab Test 03/29/16  1417 02/13/14  1835   HEPBANG Nonreactive Negative     Hepatitis C  Recent Labs   Lab Test 03/29/16  1417 02/13/14  1835   HCVAB Nonreactive   Assay performance characteristics have not been established for newborns,   infants, and children   Negative     Lyme ab screening  Recent Labs   Lab Test 05/30/15  1355   LYMEGM 0.55     Tuberculosis Screening  Recent Labs   Lab Test 07/03/17  1447 03/29/16  1417 02/13/14  1835   TBRSLT Negative Negative Negative   TBAGN 0.00 0.00 0.00     HIV Screening  Recent Labs   Lab Test 04/10/19  1808   HIAGAB Nonreactive     UA  Recent Labs   Lab Test 10/10/21  1204 09/01/21  1522 06/13/21  1309 04/20/21  1819 01/11/21  1810 10/25/20  1110 10/25/20  1110 09/15/20  1655 09/15/20  1655 07/03/20  1010 04/07/20  1140   COLOR Yellow Yellow Yellow   < > Yellow   < > Yellow   < > Yellow   < > Yellow   APPEARANCE Clear Slightly Cloudy* Clear   < > Clear   < > Clear   < > Clear   < > Clear   URINEGLC  Negative Negative Negative   < > Negative   < > Negative   < > Negative   < > Negative   URINEBILI Negative Negative Negative   < > Negative   < > Negative   < > Negative   < > Negative   SG 1.010 1.015 <=1.005   < > >1.030   < > 1.010   < > 1.025   < > >1.030   URINEPH 5.5 7.0 7.0   < > 6.0   < > 7.0   < > 6.0   < > 6.5   PROTEIN Negative Trace* Negative   < > Negative   < > Negative   < > Negative   < > Negative   UROBILINOGEN 0.2 0.2 0.2   < > 0.2   < > 0.2   < > 0.2   < > 0.2   NITRITE Negative Negative Negative   < > Negative   < > Negative   < > Negative   < > Negative   UBLD Negative Moderate* Negative   < > Small*   < > Negative   < > Negative   < > Negative   LEUKEST Small* Moderate* Negative   < > Negative   < > Trace*   < > Negative   < > Trace*   WBCU 0-5 25-50* 0 - 5  --  0 - 5   < > 0 - 5   < > 5-10*   < > 0 - 5   RBCU 0-2 25-50* O - 2  --  O - 2   < > O - 2   < > O - 2   < > O - 2   SQUAMOUSEPI  --   --   --   --  Few  --  Few  --  Few   < > Few   BACTERIA  --  Few*  --   --  Few*  --  Few*   < > Few*   < > Few*   MUCOUS  --   --   --   --  Present*  --   --   --  Present*  --  Present*    < > = values in this interval not displayed.     Urine Microscopic  Recent Labs   Lab Test 10/10/21  1204 09/01/21  1522 06/13/21  1309 01/11/21  1810 01/11/21  1810 10/25/20  1110 10/25/20  1110 09/15/20  1655 09/15/20  1655 07/03/20  1010 04/07/20  1140   WBCU 0-5 25-50* 0 - 5   < > 0 - 5   < > 0 - 5   < > 5-10*   < > 0 - 5   RBCU 0-2 25-50* O - 2   < > O - 2   < > O - 2   < > O - 2   < > O - 2   SQUAMOUSEPI  --   --   --   --  Few  --  Few  --  Few   < > Few   BACTERIA  --  Few*  --   --  Few*  --  Few*   < > Few*   < > Few*   MUCOUS  --   --   --   --  Present*  --   --   --  Present*  --  Present*    < > = values in this interval not displayed.     Urine Protein  Recent Labs   Lab Test 10/10/21  1202 07/19/21  1820 04/20/21  1819   UTP 0.06 0.12 <0.05   UTPG 0.12 0.09 Unable to calculate due to low value   UCRR  52 139 37     Immunization History     Immunization History   Administered Date(s) Administered     COVID-19,PF,Pfizer 03/13/2021, 04/03/2021     Influenza (H1N1) 12/02/2009     Influenza (IIV3) PF 02/08/2007, 09/17/2010, 10/01/2012, 09/23/2013     Influenza Vaccine IM > 6 months Valent IIV4 (Alfuria,Fluzone) 09/23/2013, 09/20/2016, 10/30/2017, 10/14/2019     Influenza Vaccine, 6+MO IM (QUADRIVALENT W/PRESERVATIVES) 08/11/2018     Pneumo Conj 13-V (2010&after) 04/25/2016     Pneumococcal 23 valent 10/01/2012, 11/06/2017     Td (Adult), Adsorbed 02/12/2001     Tdap (Adacel,Boostrix) 12/17/2010          Chart documentation done in part with Dragon Voice recognition Software. Although reviewed after completion, some word and grammatical error may remain.      Video-Visit Details    Type of service:  Video Visit    Video Start Time: 3:11 PM    Video End Time:3:24 PM    Originating Location (pt. Location): [place], [place]    Distant Location (provider location):  Home    Platform used for Video Visit: Brianna Colvin MD

## 2021-10-24 ENCOUNTER — HEALTH MAINTENANCE LETTER (OUTPATIENT)
Age: 46
End: 2021-10-24

## 2021-12-19 ENCOUNTER — HEALTH MAINTENANCE LETTER (OUTPATIENT)
Age: 46
End: 2021-12-19

## 2021-12-20 ENCOUNTER — OFFICE VISIT (OUTPATIENT)
Dept: RHEUMATOLOGY | Facility: CLINIC | Age: 46
End: 2021-12-20
Payer: COMMERCIAL

## 2021-12-20 VITALS
DIASTOLIC BLOOD PRESSURE: 97 MMHG | OXYGEN SATURATION: 100 % | SYSTOLIC BLOOD PRESSURE: 162 MMHG | BODY MASS INDEX: 23.48 KG/M2 | TEMPERATURE: 97.4 F | HEART RATE: 73 BPM | WEIGHT: 142.2 LBS

## 2021-12-20 DIAGNOSIS — M35.01 SJOGREN'S SYNDROME WITH KERATOCONJUNCTIVITIS SICCA (H): ICD-10-CM

## 2021-12-20 DIAGNOSIS — M70.62 TROCHANTERIC BURSITIS OF BOTH HIPS: ICD-10-CM

## 2021-12-20 DIAGNOSIS — M70.61 TROCHANTERIC BURSITIS OF BOTH HIPS: ICD-10-CM

## 2021-12-20 DIAGNOSIS — Z79.899 HIGH RISK MEDICATIONS (NOT ANTICOAGULANTS) LONG-TERM USE: ICD-10-CM

## 2021-12-20 DIAGNOSIS — I73.00 RAYNAUD'S PHENOMENON WITHOUT GANGRENE: ICD-10-CM

## 2021-12-20 DIAGNOSIS — M32.9 SYSTEMIC LUPUS ERYTHEMATOSUS, UNSPECIFIED SLE TYPE, UNSPECIFIED ORGAN INVOLVEMENT STATUS (H): Primary | ICD-10-CM

## 2021-12-20 PROCEDURE — 99214 OFFICE O/P EST MOD 30 MIN: CPT | Mod: 25 | Performed by: INTERNAL MEDICINE

## 2021-12-20 PROCEDURE — 20610 DRAIN/INJ JOINT/BURSA W/O US: CPT | Mod: 50 | Performed by: INTERNAL MEDICINE

## 2021-12-20 RX ORDER — HYDROXYCHLOROQUINE SULFATE 200 MG/1
TABLET, FILM COATED ORAL
Qty: 135 TABLET | Refills: 0 | Status: SHIPPED | OUTPATIENT
Start: 2021-12-20 | End: 2022-08-12

## 2021-12-20 RX ORDER — METHYLPREDNISOLONE ACETATE 40 MG/ML
40 INJECTION, SUSPENSION INTRA-ARTICULAR; INTRALESIONAL; INTRAMUSCULAR; SOFT TISSUE ONCE
Status: COMPLETED | OUTPATIENT
Start: 2021-12-20 | End: 2021-12-20

## 2021-12-20 RX ORDER — CEVIMELINE HYDROCHLORIDE 30 MG/1
30 CAPSULE ORAL 2 TIMES DAILY
Qty: 180 CAPSULE | Refills: 3 | Status: SHIPPED | OUTPATIENT
Start: 2021-12-20 | End: 2022-05-11

## 2021-12-20 RX ORDER — PREDNISONE 2.5 MG/1
2.5 TABLET ORAL DAILY
Qty: 90 TABLET | Refills: 2 | Status: SHIPPED | OUTPATIENT
Start: 2021-12-20 | End: 2022-04-11

## 2021-12-20 RX ORDER — AMLODIPINE BESYLATE 5 MG/1
5 TABLET ORAL DAILY
Qty: 90 TABLET | Refills: 3 | Status: SHIPPED | OUTPATIENT
Start: 2021-12-20 | End: 2022-05-24

## 2021-12-20 RX ORDER — AZATHIOPRINE 50 MG/1
75 TABLET ORAL DAILY
Qty: 135 TABLET | Refills: 2 | Status: SHIPPED | OUTPATIENT
Start: 2021-12-20 | End: 2022-08-15

## 2021-12-20 RX ADMIN — METHYLPREDNISOLONE ACETATE 40 MG: 40 INJECTION, SUSPENSION INTRA-ARTICULAR; INTRALESIONAL; INTRAMUSCULAR; SOFT TISSUE at 08:01

## 2021-12-20 NOTE — PROGRESS NOTES
Rheumatology Clinic Visit      Yovana Enriquez MRN# [id number]   YOB: 1975 Age: 46 year old      Date of visit: 12/20/21   PCP: Bradford Mario    Chief Complaint   Patient presents with:  RECHECK: Follow up lupus- knee and hip pain    Assessment and Plan     1. Systemic lupus erythematosus (YANELIS by EIA positive in the past, leukopenia/lymphocytopenia, hypocomplementemia, polyarthralgias, oral sores, history of malar rash, photophobia, photosensitivity, Raynaud's Phenomenon): Previously on methotrexate that was discontinued for unclear reasons but the patient reports that she tolerated it well. Currently on azathioprine 75 mg daily (dose reduced on 11/8/2018 from 100mg daily without worsening symptoms at that time, eventually reduced to 50 mg daily and did well for a period of time; now on 75 mg daily), hydroxychloroquine 300 mg daily [avg], and Benlysta (worsening symptoms when stopped in the past).  Overall doing well with regard to lupus at this time.  Noted that she has likely adrenal insufficiency with a lower dose of cortisol in the past and has had difficulty getting off of a low-dose of prednisone; okay to continue prednisone 2.5 mg daily. 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  Chronic illness, stable  - Continue azathioprine 75 mg daily   - Continue Evoxac 30 mg twice a day  - Continue hydroxychloroquine 300mg daily on average: 200mg daily with additional 200mg every other day (toxicity monitoring eye exam last done on 5/20/2019; advised that she update the eye exam; required for next refill)  - Continue Benlysta 200 mg SQ every 7 days  - Continue Prednisone 2.5mg daily   - Labs within 1 month: CBC, CMP, ESR, CRP, UA, Uprotein:creatinine    High risk medication requiring intensive toxicity monitoring at least quarterly: labs ordered include CBC, Creatinine, Hepatic panel to  monitor for cytopenia and hepatotoxicity; checking creatinine as it affects clearance of medication.     2. Secondary Sjogren syndrome: Doing well with Evoxac and frequent sips of water. Dry eyes are not much of an issue currently. See #1.  Chronic illness, stable    3. Raynaud's Phenomenon: Amlodipine 5mg daily is effective.  Chronic illness, stable  - Continue amlodipine 5mg daily     4. R>Ltrochanteric bursitis, possible iliotibial band syndrome as well: She receives steroid injections from the pain clinic but reportedly the last injections were not as helpful.  Also with symptoms of iliotibial band syndrome and degenerative arthritis of the lumbar spine.  Discussed importance of PT exercises and she is doing this on her own at home. Steroid injections as documented in the procedure section.  Chronic illness, progressive.      5.  Crepitation of both knees: Worse crepitation on the left.  No knee pain/swelling/stiffness.    6. Fibromyalgia: Managed by the pain clinic and PCP.    7. MGUS: SPEP and immunofixation advised to be done yearly per Dr. Yoselin Payan.  Documented here for historical significance    8. Elevated blood pressure:  Ruma to follow up with Primary Care provider regarding elevated blood pressure.     9.  Vaccinations: Vaccinations reviewed with MsHans Zoe.    - Influenza: advised receiving at least 8 weeks after [id number] is administered  - [id number]: up to date  - [id number]: up to date  -  TDAP: Advised receiving  - COVID-19: has received the Pfizer COVID-19 vaccine 3/13/2021, 4/3/2021    A single additional dose of the Pfizer or Moderna COVID-19 vaccine is recommended at least 28 days after the completion of the 2-dose mRNA vaccine series for patients receiving any immunosuppressive or immunomodulatory therapy. Attempts should be made to match the additional mRNA dose type to the type given in the mRNA primary series; however, if that is not feasible, a booster dose with the alternative mRNA  vaccine is permitted.    Based on our current understanding (and this may change over time as we learn more), medications should be adjusted as noted below, if disease activity allows:  - NSAIDs and Acetaminophen: hold for 24 hours prior to vaccination if able to do so  - Azathioprine should be held for 1 week after the COVID19 booster vaccine    Total minutes spent in evaluation with patient, documentation, , and review of pertinent studies and chart notes: 25    Ms. Enriquez verbalized agreement with and understanding of the rational for the diagnosis and treatment plan.  All questions were answered to best of my ability and the patient's satisfaction. Ms. Enriquez was advised to contact the clinic with any questions that may arise after the clinic visit.      Thank you for involving me in the care of the patient    Return to clinic: February 2022 as already scheduled      HPI   Yovana Enriquez is a 46 year old female with medical history significant for subclinical hyperthyroidism, hypertension, irritable bowel syndrome, fibromyalgia, hypertension, non-celiac gluten sensitivity (reportedly with bowel biopsies and laboratory workup that did not show celiac disease), anxiety, and systemic lupus erythematosus who presents for follow-up of SLE.    Today, 12/20/2021: Lupus is doing okay.  Tolerating medications well.  Would like injections for both trochanteric bursa because they are bothering her.  Also with low back pain that radiates mostly down the right leg to the foot.  She is doing physical therapy exercises on her own at home and a lot of stretching exercises; when she went to formal physical therapy she had worsening symptoms so she is focused on doing slow exercises at home.  Crepitation of the left knee but no pain or swelling.    Denies fevers, chills, nausea, vomiting. No abdominal pain.  No chest pain/pressure, palpitations, or shortness of breath.  No rash currently. No LE swelling.  She  has photosensitivity and photophobia.   No eye pain or redness. No history of serositis. No history of DVT, pulmonary embolism, or miscarriage.     Raynaud's is controlled per patient.  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    ROS   12 point review of system was completed and negative except as noted in the HPI     Active Problem List     Patient Active Problem List   Diagnosis     Systemic lupus erythematosus (H)     Menorrhagia     History of ovarian cyst     Dysmenorrhea     History of gestational diabetes mellitus, not currently pregnant     Intramural leiomyoma of uterus     Hyperlipidemia LDL goal <130     Subclinical hyperthyroidism     Anxiety     High risk medication use     Fibromyalgia     Chronic constipation     Irritable bowel syndrome     Health Care Home     Hypertension goal BP (blood pressure) < 140/90     Non-celiac gluten sensitivity     Raynaud's phenomenon without gangrene     Sjogren's syndrome (H)     Intramural and submucous leiomyoma of uterus     Adrenal insufficiency (H)     Past Medical History     Past Medical History:   Diagnosis Date     Arthritis      Chronic constipation 12/16/2013     Dysmenorrhea 10/1/2012     Fibromyalgia 11/15/2013     Health Care Home 3/19/2014    **See Letters for [facility] Care Plan: Emergency Care Plan       High risk medication use 9/13/2013     History of gestational diabetes mellitus, not currently pregnant 10/1/2012     History of ovarian cyst 10/1/2012     Hyperlipidemia LDL goal <130 10/18/2012     Hypertension goal BP (blood pressure) < 140/90 1/19/2015     Intramural leiomyoma of uterus 10/5/2012     Irritable bowel syndrome 3/11/2014    Patient states no history colonoscopy       Menorrhagia 10/1/2012     Non-celiac gluten sensitivity 2/8/2016     PONV (postoperative nausea and vomiting)      Subclinical hyperthyroidism 1/17/2013     Systemic lupus erythematosus (H) 4/23/2012     Past Surgical  History     Past Surgical History:   Procedure Laterality Date     ABDOMEN SURGERY  2004    Tubal ligation     COLONOSCOPY N/A 2/20/2015    Procedure: COMBINED COLONOSCOPY, SINGLE OR MULTIPLE BIOPSY/POLYPECTOMY BY BIOPSY;  Surgeon: Fred Cullen MD;  Location: [facility] OR     COLONOSCOPY N/A 10/29/2018    Procedure: COMBINED COLONOSCOPY, SINGLE OR MULTIPLE BIOPSY/POLYPECTOMY BY BIOPSY;  Surgeon: Inez Sawyer MD;  Location: [facility]     COLONOSCOPY WITH CO2 INSUFFLATION N/A 2/20/2015    Procedure: COLONOSCOPY WITH CO2 INSUFFLATION;  Surgeon: Fred Cullen MD;  Location: [facility] OR     ENT SURGERY  10-24-14    Bottom lip biopsies     ESOPHAGOSCOPY, GASTROSCOPY, DUODENOSCOPY (EGD), COMBINED N/A 2/20/2015    Procedure: COMBINED ESOPHAGOSCOPY, GASTROSCOPY, DUODENOSCOPY (EGD), BIOPSY SINGLE OR MULTIPLE;  Surgeon: Fred Cullen MD;  Location: [facility] OR     HC BREATH HYDROGEN TEST  12/31/2013    Procedure: HYDROGEN BREATH TEST;  Surgeon: Camden Almazan MD;  Location: [facility]     HC HYSTEROSCOPY, SURGICAL; W/ ENDOMETRIAL ABLATION, ANY METHOD  10-19-12     ORTHOPEDIC SURGERY  12/2010     SHOULDER SURGERY Right     R shoulder     TUBAL LIGATION  2004     Allergy     Allergies   Allergen Reactions     Fluticasone Propionate (Nasal) [Fluticasone] Anaphylaxis     Current Medication List     Current Outpatient Medications   Medication Sig     acetaminophen (TYLENOL) 325 MG tablet Take 325-650 mg by mouth every 6 hours as needed for mild pain or headaches Maximum daily dose of acetaminophen is 3000 mg in 24 hours.     amLODIPine (NORVASC) 5 MG tablet Take 1 tablet (5 mg) by mouth daily     azaTHIOprine (IMURAN) 50 MG tablet Take 1.5 tablets (75 mg) by mouth daily     azelastine (ASTELIN) 0.1 % nasal spray Spray 1 spray into both nostrils 2 times daily     Belimumab 200 MG/ML SOAJ Inject 200 mg Subcutaneous every 7 days . Hold for signs of infection and seek medical attention. Autoinjector     blood  glucose test strip Used for testing glucose 2-3 times daily     Calcium Carb-Cholecalciferol (CALCIUM + D3) 600-200 MG-UNIT TABS Take 1 tablet by mouth daily      cevimeline (EVOXAC) 30 MG capsule Take 1 capsule (30 mg) by mouth 2 times daily     diclofenac (VOLTAREN) 1 % GEL Apply 2-4 grams to knees or shoulders four times daily as needed using enclosed dosing card.     docusate sodium (COLACE) 100 MG capsule Take 1 capsule (100 mg) by mouth 2 times daily     famotidine (PEPCID) 40 MG tablet TAKE 1 TABLET (40 MG) BY MOUTH NIGHTLY AS NEEDED FOR HEARTBURN     hydroxychloroquine (PLAQUENIL) 200 MG tablet Hydroxychloroquine 200mg daily; and an additional 200mg every other day. Yearly eye exam, including 10-2 VF and SD-OCT, is required     losartan (COZAAR) 25 MG tablet Take 1 tablet (25 mg) by mouth daily     nortriptyline (PAMELOR) 10 MG capsule Take 1 capsule (10 mg) by mouth At Bedtime . 3 months supply     ondansetron (ZOFRAN-ODT) 4 MG ODT tab Take 1 tablet (4 mg) by mouth every 8 hours as needed for nausea Recommend limited use     predniSONE (DELTASONE) 2.5 MG tablet Take 1 tablet (2.5 mg) by mouth daily     SUMAtriptan (IMITREX) 50 MG tablet Take 1 tablet (50 mg) by mouth at onset of headache for migraine . After 2 hours, if no relief, ok to take 2nd dose.  Limit 2 tabs/24 hours.     Vitamin D, Cholecalciferol, 1000 units CAPS Take 4,000 Int'l Units by mouth daily     No current facility-administered medications for this visit.         Social History   See HPI    Family History     Family History   Problem Relation Age of Onset     Respiratory Maternal Grandfather      Cancer Maternal Grandfather      Other Cancer Maternal Grandfather         Skin cancer     Asthma Son      Circulatory Maternal Grandmother         Brain anurysm     Diabetes Maternal Grandmother      Hypertension Mother      Glaucoma Mother 50        drops     Cancer Mother      Hypertension Sister      Hypertension Sister      Diabetes Sister       Hypertension Sister      Anxiety Disorder Sister      Asthma Son      Diabetes No family hx of      Cerebrovascular Disease No family hx of      Thyroid Disease No family hx of      Macular Degeneration No family hx of        Physical Exam       GEN: NAD. Healthy appearing adult.   HEENT:  Anicteric, noninjected sclera. No obvious external lesions of the ear and nose. Hearing intact.  CV: S1, S2. RRR. No m/r/g  PULM: No increased work of breathing. CTA bilaterally   MSK: MCPs, PIPs, DIPs without swelling or tenderness to palpation.  Wrists without swelling or tenderness to palpation.  Elbows and shoulders without swelling or tenderness to palpation.  Hips tender to palpation of the bilateral trochanteric bursae; negative straight leg test and internal and external range of motion were normal.  Spine nontender to palpation.  Both knees with crepitation but no effusion, increased warmth, or tenderness to palpation.  Ankles and MTPs without swelling or tenderness to palpation.    SKIN: No rash or jaundice seen  PSYCH: Alert. Appropriate.        Labs / Imaging (select studies)     YANELIS  Recent Labs   Lab Test 07/25/16  1433   ANAIGG <1:40  Reference range: <1:40  (Note)  <1:40  INTERPRETIVE INFORMATION: YANELIS by IFA, IgG  Anti-nuclear antibodies (YANELIS) are seen in a variety of  systemic rheumatic diseases and are determined by indirect  fluorescence assay (IFA) using HEp-2 substrate with an  IgG-specific conjugate. YANELIS titers less than or equal to  1:80 have variable relevance while titers greater than or  equal to 1:160 are considered clinically significant. These  antibodies may precede clinical disease onset; however,  healthy individuals and those with advanced age have been  reported to be positive for YANELIS. When observed, one of the  five basic patterns is reported: homogeneous,  peripheral/rim, speckled, centromere, or nucleolar. If  cytoplasmic fluorescence is observed, it is noted. IFA  methodology is subjective  and has occasionally been shown  to lack sensitivity for anti-SSA/Ro antibodies.  Negative results do not necessarily rule out the presence  of SSc. If clinical suspicion remains, consider further  te sting for U3-RNP, PM/Scl, or Th/To antibodies associated  with SSc.  Performed by [employer],  [facility] [place] [address] [phone]  [email or web address] Alcon Krishna MD, Lab. Director       RNP/Sm/SSA/SSB  Recent Labs   Lab Test 03/23/16 1759 01/04/16 1806 07/01/14  1211   RNPIGG  --   --  <0.2  Negative     SMIGG <0.2  Negative   Antibody index (AI) values reflect qualitative changes in antibody   concentration that cannot be directly associated with clinical condition or   disease state.   <0.2  Negative   Antibody index (AI) values reflect qualitative changes in antibody   concentration that cannot be directly associated with clinical condition or   disease state.   <0.2  Negative     SSAIGG  --   --  <0.2  Negative     SSBIGG  --   --  <0.2  Negative     SCLIGG  --   --  <0.2  Negative       dsDNA  Recent Labs   Lab Test 10/10/21  1202 07/19/21  1820 04/20/21  1804 01/11/21  1805 10/25/20  1059 04/07/20  1136 01/13/20  1804 10/21/19  1552 07/17/19  1756   DNA <0.6 <0.6 1 <1 1 1 <1 1 <1     C3/C4  Recent Labs   Lab Test 10/10/21  1202 07/19/21  1820 04/20/21  1804 01/11/21  1805 10/25/20  1059 04/07/20  1136 01/13/20  1804 10/21/19  1552 07/17/19  1756   T2AOXBJ 89 91 97 91 103 100 85 77 67*   F6VEHCD 17 20 20 20 >9* 16 21 13* 13*     IgG  Recent Labs   Lab Test 01/11/21  1805 10/25/20  1059 06/17/19  1813 01/30/15  1156 04/02/14  1539    <17*  --   --  897   IGA 93 <2* 82   < > 107   * 345*  --   --  361*    < > = values in this interval not displayed.     CBC  Recent Labs   Lab Test 10/10/21  1202 07/19/21  1820 06/13/21  1312 04/20/21  1804 01/11/21  1805   WBC 4.6 8.7 5.8 7.7 8.1   RBC 3.99 3.94 4.19 4.22 4.00   HGB 12.9 12.7 13.5 13.7 13.2   HCT 37.9 37.9 40.6 40.0 38.7   MCV 95  96 97 95 97   RDW 11.9 11.2 11.6 11.5 11.5    314 300 313 316   MCH 32.3 32.2 32.2 32.5 33.0   MCHC 34.0 33.5 33.3 34.3 34.1   NEUTROPHIL 75 83 77.2 81.2 83.4   LYMPH 14 10 13.7 10.8 8.8   MONOCYTE 9 6 6.2 6.4 6.7   EOSINOPHIL 2 0 1.7 0.8 0.6   BASOPHIL 1 1 1.2 0.8 0.5   ANEU  --   --  4.5 6.3 6.7   ALYM  --   --  0.8 0.8 0.7*   ZOË  --   --  0.4 0.5 0.5   AEOS  --   --  0.1 0.1 0.1   ABAS  --   --  0.1 0.1 0.0   ANEUTAUTO 3.4 7.2  --   --   --    ALYMPAUTO 0.7* 0.9  --   --   --    AMONOAUTO 0.4 0.5  --   --   --    AEOSAUTO 0.1 0.0  --   --   --    ABSBASO 0.0 0.0  --   --   --      CMP  Recent Labs   Lab Test 10/10/21  1202 07/19/21  1820 04/20/21  1804 01/11/21  1805 10/25/20  1059 04/07/20  1136    140 140 138 139 140   POTASSIUM 4.3 3.8 4.1 3.9 4.1 3.6   CHLORIDE 107 107 106 106 104 106   CO2 28 30 33* 31 31 31   ANIONGAP 4 3 1* 1* 4 3   GLC 89 113* 97 99 86 104*   BUN 11 12 13 15 11 16   CR 0.64 0.79 0.76 0.77 0.73 0.71   GFRESTIMATED >90 89 >90 >90 >90 >90   GFRESTBLACK  --   --  >90 >90 >90 >90   MELANIA 8.5 8.8 9.0 8.8 9.1 9.1   BILITOTAL 0.3 0.3 0.3 0.2 0.4 0.3   ALBUMIN 3.8 3.8 4.1 4.0 4.4 4.3   PROTTOTAL 6.4* 6.7* 6.8 6.5* 7.5 7.1   ALKPHOS 55 60 57 55 62 54   AST 22 24 17 14 22 13   ALT 28 26 25 19 23 24     HgA1c  Recent Labs   Lab Test 05/30/19  0859   A1C 5.2     Calcium/VitaminD  Recent Labs   Lab Test 10/10/21  1202 07/19/21  1820 04/20/21  1804 10/25/20  1059 04/07/20  1136 07/17/19  1756 04/16/19  1759 01/30/18  1752 10/02/17  1814   MELANIA 8.5 8.8 9.0   < > 9.1   < > 8.4*   < > 8.7   VITDT  --   --   --   --  59  --  40  --  37    < > = values in this interval not displayed.     ESR/CRP  Recent Labs   Lab Test 10/10/21  1202 07/19/21  1820 04/20/21  1804   SED 5 4 5   CRP <2.9 <2.9 <2.9     CK/Aldolase  Recent Labs   Lab Test 10/10/21  1202 07/19/21  1820 04/20/21  1804   CKT 87 105 90     TSH/T4  Recent Labs   Lab Test 04/07/20  1136 04/10/19  1808 10/18/17  1445 10/07/16  0714  02/27/15  1058   TSH 0.44 0.44 0.33* 0.37*  --    T4  --   --  1.24 1.16 1.01     Lipid Panel  Recent Labs   Lab Test 01/26/19  0916 10/07/16  0714   CHOL 133 137   TRIG 64 48   HDL 68 75   LDL 52 52   NHDL 65 62     Hepatitis B  Recent Labs   Lab Test 03/29/16  1417 02/13/14  1835   HEPBANG Nonreactive Negative     Hepatitis C  Recent Labs   Lab Test 03/29/16  1417 02/13/14  1835   HCVAB Nonreactive   Assay performance characteristics have not been established for newborns,   infants, and children   Negative     Lyme ab screening  Recent Labs   Lab Test 05/30/15  1355   LYMEGM 0.55     Tuberculosis Screening  Recent Labs   Lab Test 07/03/17  1447 03/29/16  1417 02/13/14  1835   TBRSLT Negative Negative Negative   TBAGN 0.00 0.00 0.00     HIV Screening  Recent Labs   Lab Test 04/10/19  1808   HIAGAB Nonreactive     UA  Recent Labs   Lab Test 10/10/21  1204 09/01/21  1522 06/13/21  1309 04/20/21  1819 01/11/21  1810 10/25/20  1110 09/15/20  1655 07/03/20  1010 04/07/20  1140   COLOR Yellow Yellow Yellow   < > Yellow Yellow Yellow   < > Yellow   APPEARANCE Clear Slightly Cloudy* Clear   < > Clear Clear Clear   < > Clear   URINEGLC Negative Negative Negative   < > Negative Negative Negative   < > Negative   URINEBILI Negative Negative Negative   < > Negative Negative Negative   < > Negative   SG 1.010 1.015 <=1.005   < > >1.030 1.010 1.025   < > >1.030   URINEPH 5.5 7.0 7.0   < > 6.0 7.0 6.0   < > 6.5   PROTEIN Negative Trace* Negative   < > Negative Negative Negative   < > Negative   UROBILINOGEN 0.2 0.2 0.2   < > 0.2 0.2 0.2   < > 0.2   NITRITE Negative Negative Negative   < > Negative Negative Negative   < > Negative   UBLD Negative Moderate* Negative   < > Small* Negative Negative   < > Negative   LEUKEST Small* Moderate* Negative   < > Negative Trace* Negative   < > Trace*   WBCU 0-5 25-50* 0 - 5  --  0 - 5 0 - 5 5-10*   < > 0 - 5   RBCU 0-2 25-50* O - 2  --  O - 2 O - 2 O - 2   < > O - 2   SQUAMOUSEPI  --   --    --   --  Few Few Few   < > Few   BACTERIA  --  Few*  --   --  Few* Few* Few*   < > Few*   MUCOUS  --   --   --   --  Present*  --  Present*  --  Present*    < > = values in this interval not displayed.     Urine Microscopic  Recent Labs   Lab Test 10/10/21  1204 09/01/21  1522 06/13/21  1309 01/11/21  1810 10/25/20  1110 09/15/20  1655 07/03/20  1010 04/07/20  1140   WBCU 0-5 25-50* 0 - 5 0 - 5 0 - 5 5-10*   < > 0 - 5   RBCU 0-2 25-50* O - 2 O - 2 O - 2 O - 2   < > O - 2   SQUAMOUSEPI  --   --   --  Few Few Few   < > Few   BACTERIA  --  Few*  --  Few* Few* Few*   < > Few*   MUCOUS  --   --   --  Present*  --  Present*  --  Present*    < > = values in this interval not displayed.     Urine Protein  Recent Labs   Lab Test 10/10/21  1202 07/19/21  1820 04/20/21  1819   UTP 0.06 0.12 <0.05   UTPG 0.12 0.09 Unable to calculate due to low value   UCRR 52 139 37     Immunization History     Immunization History   Administered Date(s) Administered     COVID-19,PF,Pfizer (12+ Yrs) 03/13/2021, 04/03/2021     Influenza (H1N1) 12/02/2009     Influenza (IIV3) PF 02/08/2007, 09/17/2010, 10/01/2012, 09/23/2013     Influenza Vaccine IM > 6 months Valent IIV4 (Alfuria,Fluzone) 09/23/2013, 09/20/2016, 10/30/2017, 10/14/2019     Influenza Vaccine, 6+MO IM (QUADRIVALENT W/PRESERVATIVES) 08/11/2018     Pneumo Conj 13-V (2010&after) 04/25/2016     Pneumococcal 23 valent 10/01/2012, 11/06/2017     Td (Adult), Adsorbed 02/12/2001     Tdap (Adacel,Boostrix) 12/17/2010         Procedure     Procedure: Steroid injection of the bilateral greater trochanteric bursae  Indication: Pain, bilateral greater trochanteric bursitis    The procedure was explained in detail. Risks including infection, pain, structural damage such as cartilage damage and tendon rupture, fat atrophy, skin hyper-/hypo-pigmentation, and medication reaction was explained. The need for rest of the affected joint for one week after the procedure was explained.  The option of  not doing the procedure was also provided. All questions were answered and the patient consented to the procedure.     A time-out was performed and the correct patient, procedure, and laterality were verified.    The right greater trochanteric bursa was examined and location for injection was identified - over the most tender area. The area was cleaned with chlorhexidine, twice.  Ethyl chloride was then used for topical anaesthetic.  Then a mixture of lidocaine 1% 2 mL and Depo-Medrol 40mg was injected over the most tender area in a fan-like pattern.     The left greater trochanteric bursa was examined and location for injection was identified - over the most tender area. The area was cleaned with chlorhexidine, twice.  Ethyl chloride was then used for topical anaesthetic.  Then a mixture of lidocaine 1% 2 mL and Depo-Medrol 40mg was injected over the most tender area in a fan-like pattern.     The patient tolerated the procedure well. No complications.    1% Lidocaine  : [employer]  Lot #: [id number]  EXPIRATION DATE: 04/2023  NDC: 8864-0729-05    MEDICATION: Methylprednisolone  LOT #: [id number]  [employer]  EXPIRATION DATE:  10/2022  NDC#: 0123-2042-20    MEDICATION: Methylprednisolone  LOT #: [id number]  [employer]  EXPIRATION DATE:  10/2022  NDC#: 4175-1901-67           Chart documentation done in part with Dragon Voice recognition Software. Although reviewed after completion, some word and grammatical error may remain.      Bradford Colvin MD

## 2021-12-23 ENCOUNTER — MYC MEDICAL ADVICE (OUTPATIENT)
Dept: PALLIATIVE MEDICINE | Facility: CLINIC | Age: 46
End: 2021-12-23

## 2021-12-23 ENCOUNTER — VIRTUAL VISIT (OUTPATIENT)
Dept: PALLIATIVE MEDICINE | Facility: CLINIC | Age: 46
End: 2021-12-23
Payer: COMMERCIAL

## 2021-12-23 DIAGNOSIS — M79.7 FIBROMYALGIA: Primary | ICD-10-CM

## 2021-12-23 DIAGNOSIS — M47.892 OTHER SPONDYLOSIS, CERVICAL REGION: ICD-10-CM

## 2021-12-23 DIAGNOSIS — G62.9 NEUROPATHY: ICD-10-CM

## 2021-12-23 PROCEDURE — 99213 OFFICE O/P EST LOW 20 MIN: CPT | Mod: GT | Performed by: PSYCHIATRY & NEUROLOGY

## 2021-12-23 RX ORDER — PREGABALIN 75 MG/1
75 CAPSULE ORAL 3 TIMES DAILY
Qty: 90 CAPSULE | Refills: 1 | Status: SHIPPED | OUTPATIENT
Start: 2021-12-23 | End: 2022-01-27 | Stop reason: SINTOL

## 2021-12-23 ASSESSMENT — PAIN SCALES - GENERAL: PAINLEVEL: MODERATE PAIN (4)

## 2021-12-23 NOTE — PROGRESS NOTES
12/23/21 1254   PEG: A Thee-Item Scale Assessing Pain Intensity and Interference        0 = No pain / No interference    10 = Pain as bad as you can imagine / Completely interferes   What number best describes your pain on average in the past week? 5   What number best describes how, during the past week, pain has interfered with your enjoyment of life? 4   What number best describes how, during the past week, pain has interfered with your general activity? 4   PEG Total Score 4.33

## 2021-12-23 NOTE — PROGRESS NOTES
NOTE:  If Pt is not in [place], Appointment needs to be canceled and rescheduled.  Ruma is a 46 year old who is being evaluated via a billable video visit.      How would you like to obtain your AVS? Toriharkris  If the video visit is dropped, the invitation should be resent by: [facility] waiting room  Will anyone else be joining your video visit? Taryn Ramos CMA ([facility])      Video Start Time: 1144  Video-Visit Details    Type of service:  Video Visit    Video End Time: 1157    Originating Location (pt. Location): Home    Distant Location (provider location):  [facility] [place]     Platform used for Video Visit: [facility]    Date of visit: 12/16/2020     Chief complaint:   Chief Complaint   Patient presents with     Pain     Interval history:  Yovana Enriquez was last seen by me on 10/29/20 (Seen for injections since)    Recommendations/plan at the last visit included:  1. Physical therapy- pain PT previously ordered  2. Clinical Health Psychologist to address issues of relaxation, behavioral change, coping style, and other factors important to improvement. Not at this time  3. Diagnostic Studies:  none  4. Medication Management:   1. Restart nortriptyline. Start at 10mg at night. After 1 week, increase to 20mg/day.  If tolerating, after another week, titrate to   5. Further procedures recommended:   1. Bursa injections - she is currently scheduled, but we discussed getting in earlier after COVID cleared  2. Bilateral C3-6 medial branch block   6. Recommendations to PCP: none.   7. Follow up: 2 months- video      Since her last visit, Yovana Enriquez reports:  -she is doing ok  Her migraines are worse right now  She does know that her neck is causign some of her problems.  She made some changes with her bed to try to help.  She did medial branch block - she had blocks, but didn't get the PT stuff sent in in time,  and ti all .  She doesn't want to repeat it all given how hard it was to do.  -the left side is worse.  -she has hip pain as well.  She had bursa injection but it wasn't helpful this time.  -she is taking imitrex and is doing nausea med with it.  Doesn't seem to be working as well.    Pain scores:  Moderate Pain (4)      Current pain treatments:   Ibuprofen 600-800mg, up to a couple times/day, several days/week.- less redcent  Acetaminophen (tylenol) 1-2 tabs, up to a couple times/day  Lyrica 150mg TID-likes TID dosing better  Voltaren gel- helpful- on elbows, knee  Nortriptyline 10mg qhs- some help, didn't go up further due to dry mouth  Excedrin migraine  imitrex 50mg     Previous medication treatments included:   mobic- no side effects   Naprosyn   Gabapentin- only at night, was given for nerve pain in the feet  Lidocaine patches-not as helpful    Side Effects: no side effects     Medications:  Current Outpatient Medications   Medication Sig Dispense Refill     acetaminophen (TYLENOL) 325 MG tablet Take 325-650 mg by mouth every 6 hours as needed for mild pain or headaches Maximum daily dose of acetaminophen is 3000 mg in 24 hours.       amLODIPine (NORVASC) 5 MG tablet Take 1 tablet (5 mg) by mouth daily 90 tablet 3     azaTHIOprine (IMURAN) 50 MG tablet Take 1.5 tablets (75 mg) by mouth daily 135 tablet 2     azelastine (ASTELIN) 0.1 % nasal spray Spray 1 spray into both nostrils 2 times daily 30 mL 3     Belimumab 200 MG/ML SOAJ Inject 200 mg Subcutaneous every 7 days . Hold for signs of infection and seek medical attention. Autoinjector 4 mL 6     blood glucose test strip Used for testing glucose 2-3 times daily 1 Month 5     Calcium Carb-Cholecalciferol (CALCIUM + D3) 600-200 MG-UNIT TABS Take 1 tablet by mouth daily        cevimeline (EVOXAC) 30 MG capsule Take 1 capsule (30 mg) by mouth 2 times daily 180 capsule 3     diclofenac (VOLTAREN) 1 % GEL Apply 2-4 grams to knees or shoulders four times  daily as needed using enclosed dosing card. 100 g 4     docusate sodium (COLACE) 100 MG capsule Take 1 capsule (100 mg) by mouth 2 times daily 100 capsule 1     famotidine (PEPCID) 40 MG tablet TAKE 1 TABLET (40 MG) BY MOUTH NIGHTLY AS NEEDED FOR HEARTBURN 30 tablet 1     hydroxychloroquine (PLAQUENIL) 200 MG tablet Hydroxychloroquine 200mg daily; and an additional 200mg every other day. Yearly eye exam, including 10-2 VF and SD-OCT, is required 135 tablet 0     losartan (COZAAR) 25 MG tablet Take 1 tablet (25 mg) by mouth daily 90 tablet 1     nortriptyline (PAMELOR) 10 MG capsule Take 1 capsule (10 mg) by mouth At Bedtime . 3 months supply 90 capsule 1     ondansetron (ZOFRAN-ODT) 4 MG ODT tab Take 1 tablet (4 mg) by mouth every 8 hours as needed for nausea Recommend limited use 20 tablet 0     predniSONE (DELTASONE) 2.5 MG tablet Take 1 tablet (2.5 mg) by mouth daily 90 tablet 2     pregabalin (LYRICA) 75 MG capsule Take 1 capsule (75 mg) by mouth 3 times daily . Titrate to this dose as instructed by clinic. 90 capsule 1     SUMAtriptan (IMITREX) 50 MG tablet Take 1 tablet (50 mg) by mouth at onset of headache for migraine . After 2 hours, if no relief, ok to take 2nd dose.  Limit 2 tabs/24 hours. 10 tablet 6     Vitamin D, Cholecalciferol, 1000 units CAPS Take 4,000 Int'l Units by mouth daily         Medical History: any changes in medical history since they were last seen? No     Physical Exam:  not currently breastfeeding.  General: awake, alert   Gait: Not observed. Appears to be walking around without difficulty while holding phone  Face symmetric, EOMI     Assessment:   1. Fibromyalgia- symptoms mostly of joint pain, but also with neck and low back pain. Currently flared  2. Chronic low back pain with features of SI joint and facet arthropathy.  Left piriformis   3. Lupus   4. Disturbed sleep/insomnia  5. Trochanteric bursitis  6. Elbow pain  7. Cervicogenic headache- has underlying facet arthropathy and  disk changes.        Plan:  1. Physical therapy- pain PT- she is scheduling more.  2. Clinical Health Psychologist to address issues of relaxation, behavioral change, coping style, and other factors important to improvement. Not at this time  3. Diagnostic Studies:  none  4. Medication Management:   1. Will add in nausea med. Given her work, will plan for less sedating- Zofran.  2. Discussed triptans- will start on imitrex. Side effects discussed.  3. For when at work and not able to do sedating. fiorecet provided as an option, but advised first time using needs to do it not at work.  Discussed locking up meds.  5. Further procedures recommended: as currently scheduled (medial branch block and trochanteric bursa injection)  6. Recommendations to PCP: none.   7. Follow up: 2 months- video    29 minutes spent on the date of encounter doing chart review, history, and exam documentation and further activities as noted above.   Magdalena Lockett MD  [facility]

## 2021-12-23 NOTE — PATIENT INSTRUCTIONS
1.  Schedule facet joint injections with Dr. Long- [phone]    2. Come in on Wed 12/29 at 7:45am for bursa injection with me    3. Restart Lyrica.  Take 75mg at night. If tolerating, ok to go to 75mg twice daily.  If tolerating after 3 days, ok to increase to 75mg 3 times daily.    4. Verify that the dose of Imitrex you are taking is 50mg.  If so, then you can increase your dose of Imitrex to 100mg.  Ok to repeat dose after 2 hours.  If this works, we can change your script to a 100mg tab.    ----------------------------------------------------------------  Clinic Number:  [phone]     Call with any questions about your care and for scheduling assistance.     Calls are returned Monday through Friday between 8 AM and 4:30 PM. We usually get back to you within 2 business days depending on the issue/request.    If we are prescribing your medications:    For opioid medication refills, call the clinic or send a Squid[employer] message 7 days in advance.  Please include:    Name of requested medication    Name of the pharmacy.    For non-opioid medications, call your pharmacy directly to request a refill. Please allow 3-4 days to be processed.     Per [place] State Law:    All controlled substance prescriptions must be filled within 30 days of being written.      For those controlled substances allowing refills, pickup must occur within 30 days of last fill.      We believe regular attendance is key to your success in our program!      Any time you are unable to keep your appointment we ask that you call us at least 24 hours in advance to cancel.This will allow us to offer the appointment time to another patient.     Multiple missed appointments may lead to dismissal from the clinic.

## 2021-12-27 ENCOUNTER — TELEPHONE (OUTPATIENT)
Dept: PALLIATIVE MEDICINE | Facility: CLINIC | Age: 46
End: 2021-12-27
Payer: COMMERCIAL

## 2021-12-27 NOTE — TELEPHONE ENCOUNTER
Screening Questions for Radiology Injections:    Injection to be done at which interventional clinic site? [facility]    Remind [place] Pt's that Covid test is no longer required except for sedation procedure Pt's.    Instruct patient to arrive as directed prior to the scheduled appointment time:    [facility]: 30 minutes before      [place]: 30 minutes before; if IV needed 1 hour before     Procedure ordered by Levy    Procedure ordered? left cervical facet joints- likely C3-6 area (C3/4, C4/5, C5/6 if possible, otherwise 2 joints)- with Snitzer      Transforaminal Cervical FATOU -  [place] does NOT have providers that do these- [facility] and [facility] do have providers that do    As a reminder, receiving steroids can decrease your body's ability to fight infection.   Would you still like to move forward with scheduling the injection?  Yes    What insurance would patient like us to bill for this procedure? [employer]      Worker's comp or MVA (motor vehicle accident) -Any injection DO NOT SCHEDULE and route to Celina Ardon.      [employer] insurance - For SI joint injections, DO NOT SCHEDULE and route Celina Ardon.       ALL BCBS, [employer] and [employer]-Route to Celina for review DO NOT SCHEDULE      IF SCHEDULING IN [place] AND NEEDS A PA, IT IS OKAY TO SCHEDULE. [place] HANDLES THEIR OWN PA'S AFTER THE PATIENT IS SCHEDULED. PLEASE SCHEDULE AT LEAST 1 WEEK OUT SO A PA CAN BE OBTAINED.    Any chance of pregnancy? NO   If YES, do NOT schedule and route to RN pool    Is an  needed? No     Patient has a drive home? (mandatory) YES: INFORMED    Is patient taking any blood thinners (That is: Coumadin, Warfarin, Jantoven, Pradaxa Xarelto, Eliquis, Edoxaban, Enoxaparin, Lovenox, Heparin, Arixtra, Fondaparinux, or Fragmin? OR Antiplatelet medication such as Plavix, Brilinta, or Effient? )? No   If hold needed, do NOT schedule, route to RN pool     Is patient taking any aspirin products  (includes Excedrin and Fiorinal)? No     If more than 325mg/day, OK to schedule; Instruct pt to decrease to less than 325 mg for 7 days AND route to RN pool    For CERVICAL procedures, hold all aspirin products for 6 days.     Tell pt that if aspirin product is not held for 6 days, the procedure WILL BE cancelled.      Does the patient have a bleeding or clotting disorder? No     If YES, okay to schedule AND route to RN nurse pool    For any patients with platelet count <100, must be forwarded to provider    Any allergies to contrast dye, iodine, shellfish, or numbing and steroid medications? No    If YES, add allergy information to appointment notes AND route to the RN pool     If FATOU and Contrast Dye / Iodine Allergy? DO NOT SCHEDULE, route to RN pool    Allergies: Fluticasone propionate (nasal) [fluticasone]     Is patient diabetic?  No  If YES, instruct them to bring their glucometer.    Does patient have an active infection or treated for one within the past week? No     Is patient currently taking any antibiotics?  No     For patients on chronic, preventative, or prophylactic antibiotics, procedures may be scheduled.     For patients on antibiotics for active or recent infection:antibiotic course must have been completed for 4 days    Is patient currently taking any steroid medications? (i.e. Prednisone, Medrol)  Yes - chronic     For patients on steroid medications, course must have been completed for 4 days    Is patient actively being treated for cancer or immunocompromised? No  If YES, do NOT schedule and route to RN pool     Are you able to get on and off an exam table with minimal or no assistance? Yes  If NO, do NOT schedule and route to RN pool    Are you able to roll over and lay on your stomach with minimal or no assistance? Yes  If NO, do NOT schedule and route to RN pool     Has the patient had a flu shot or any other vaccinations within 7 days before or after the procedure.  No     Have you  recently had a COVID vaccine or have plans to get it in the near future? No    If yes, explain that for the vaccine to work best they need to:       wait 1 week before and 1 week after getting Vaccine #1    wait 1 week before and 2 weeks after getting Vaccine #2    wait 1 week before and 2 weeks after getting Vaccine BOOSTER    If patient has concerns about the timing, send to RN pool     Does patient have an MRI/CT?  YES: 2020  Check Procedure Scheduling Grid to see if required.      Was the MRI done within the last 3 years?  Yes    If yes, where was the MRI done i.e.[facility], [facility], [facility], [facility] etc? [facility]      If no, do not schedule and route to RN pool    If MRI was not done at [facility], [facility] or [facility] do NOT schedule and route to RN pool.      If pt has an imaging disc, the injection MAY be scheduled but pt has to bring disc to appt.     If they show up without the disc the injection cannot be done    Procedure Specific Instructions:      If celiac plexus block, informed patient NPO for 6 hours and that it is okay to take medications with sips of water, especially blood pressure medications  Not Applicable         If this is for a cervical procedure, informed patient that aspirin needs to be held for 6 days.   Not Applicable      If IV needed:    Do not schedule procedures requiring IV placement in the first appointment of the day or first appointment after lunch. Do NOT schedule at 0745, 0815 or 1245.     Instructed pt to arrive 30 minutes early for IV start if required. (Check Procedure Scheduling Grid)  Not Applicable    Reminders:      If you are started on any steroids or antibiotics between now and your appointment, you must contact us because the procedure may need to be cancelled.  Yes      For all procedures except radiofrequency ablations (RFAs) and spinal cord stimulator (SCS) trials, informed patient:    IV sedation is not provided for this procedure.  If you feel that  an oral anti-anxiety medication is needed, you can discuss this further with your referring provider or primary care provider.  The Pain Clinic provider will discuss specifics of what the procedure includes at your appointment.  Most procedures last 10-20 minutes.  We use numbing medications to help with any discomfort during the procedure.  Not Applicable      For patients 85 or older we recommend having an adult stay w/ them for the remainder of the day.       Does the patient have any questions?  NO  Josselin Barragan  [facility]

## 2021-12-28 ENCOUNTER — RADIOLOGY INJECTION OFFICE VISIT (OUTPATIENT)
Dept: PALLIATIVE MEDICINE | Facility: CLINIC | Age: 46
End: 2021-12-28
Payer: COMMERCIAL

## 2021-12-28 VITALS — SYSTOLIC BLOOD PRESSURE: 145 MMHG | HEART RATE: 75 BPM | DIASTOLIC BLOOD PRESSURE: 92 MMHG

## 2021-12-28 DIAGNOSIS — M47.892 OTHER SPONDYLOSIS, CERVICAL REGION: ICD-10-CM

## 2021-12-28 DIAGNOSIS — M47.812 SPONDYLOSIS OF CERVICAL REGION WITHOUT MYELOPATHY OR RADICULOPATHY: ICD-10-CM

## 2021-12-28 PROCEDURE — 64490 INJ PARAVERT F JNT C/T 1 LEV: CPT | Mod: LT | Performed by: PAIN MEDICINE

## 2021-12-28 PROCEDURE — 64491 INJ PARAVERT F JNT C/T 2 LEV: CPT | Mod: LT | Performed by: PAIN MEDICINE

## 2021-12-28 RX ORDER — DEXAMETHASONE SODIUM PHOSPHATE 10 MG/ML
10 INJECTION INTRAMUSCULAR; INTRAVENOUS ONCE
Status: COMPLETED | OUTPATIENT
Start: 2021-12-28 | End: 2021-12-28

## 2021-12-28 RX ADMIN — DEXAMETHASONE SODIUM PHOSPHATE 10 MG: 10 INJECTION INTRAMUSCULAR; INTRAVENOUS at 09:46

## 2021-12-28 RX ADMIN — DEXAMETHASONE SODIUM PHOSPHATE 10 MG: 10 INJECTION INTRAMUSCULAR; INTRAVENOUS at 10:31

## 2021-12-28 ASSESSMENT — PAIN SCALES - GENERAL
PAINLEVEL: NO PAIN (1)
PAINLEVEL: MILD PAIN (3)

## 2021-12-28 NOTE — NURSING NOTE
Discharge Information    IV Discontiued Time:  NA    Amount of Fluid Infused:  NA    Discharge Criteria = When patient returns to baseline or as per MD order    Consciousness:  Pt is fully awake    Circulation:  BP +/- 20% of pre-procedure level    Respiration:  Patient is able to breathe deeply    O2 Sat:  Patient is able to maintain O2 Sat >92% on room air    Activity:  Moves 4 extremities on command    Ambulation:  Patient is able to stand and walk or stand and pivot into wheelchair    Dressing:  Clean/dry or No Dressing    Notes:   Discharge instructions and AVS given to patient    Patient meets criteria for discharge?  YES    Admitted to PCU?  No    Responsible adult present to accompany patient home?  Yes    Signature/Title:    Desiree Webster RN  RN Care Coordinator  [facility]

## 2021-12-28 NOTE — PROGRESS NOTES
Pre procedure Diagnosis: cervical facet arthropathy, cervical spondylosis   Post procedure Diagnosis: Same  Procedure performed: cervical facet joint injections at C3-4, 4-5 on the left, fluoroscopically guided, contrast controlled  Indication:  Therapeutic for pain  Anesthesia: none  Complication:  none  Operators: Koko Long MD                                    Indications:   Yovana Enriquez is a 46 year old female was sent for cervical facet procedures.  The patient has a history of chronic axial neck pain with HA.  Exam shows tenderness to palpation along the neck  and reproduction of pain with extension and lateral rotation of the cervical spine.  They have tried conservative treatment including meds/pt .     Options/alternatives, benefits and risks were discussed with the patient including bleeding, infection, flared pain, tissue trauma, exposure to radiation, reaction to medications including seizure, spinal cord injury, paralysis, weakness, numbness and headache.     Questions were answered to his satisfaction and he wishes to proceed. Voluntary informed consent was obtained and signed.      Vitals were reviewed: Yes  BP (!) 145/92   Pulse 75   Allergies were reviewed:  Yes   Medications were reviewed:  Yes   Pre-procedure pain score: 3/10     Procedure:  After obtaining signed informed consent, the patient was brought into the procedure suite and was placed in a prone position on the [facility] Frame.   A Pause for the Cause was performed.  The patient was prepped and draped in the usual sterile fashion.      Under AP fluoroscopic guidance the C3-4 , 4-5facet joints on the left side were identified, and the C-arm was rotated caudally to open the joint spaces. Then, Lidocaine 1% was injected at the needle entry points and needle tracts for local anesthetic. Then 27 gauge 3.5 inch spinal needles were inserted and advanced under AP and Lateral fluoroscopic guidance into the facet joints with  positive pain reproduction at all the levels.  Aspiration was negative at all the levels.      Then,  Omnipaque 300 contrast dye was injected after negative aspiration for heme and CSF in each joint, confirming appropriate placement.  A total of 1 ml of Omnipaque was used.  Omnipaque wasted:  9 ml.     Then, each facet joint was injected with 1 ml of a combination of dexamethasone 10 mg and  0.25% Bupivacaine 0.5 ml for a total injectate volume of 1.5 ml and the needles were flushed with Lidocaine 1% as they were withdrawn.       Hemostasis was achieved, the area was cleaned, and bandaids were placed when appropriate.  The patient tolerated the procedure well, and was taken to the recovery room.    Images were saved to PACS.     Post-procedure pain score: 1/10  Follow-up includes:   -f/u phone call in one week  -f/u with referring provider     Koko Long MD

## 2021-12-28 NOTE — NURSING NOTE
Pre-procedure Intake  If YES to any questions or NO to having a   Please complete laminated checklist and leave on the computer keyboard for Provider, verbally inform provider if able.    For SCS Trial, RFA's or any sedation procedure:  Have you been fasting? NA    If yes, for how long? NA    Are you taking any any blood thinners such as Coumadin, Warfarin, Jantoven, Pradaxa Xarelto, Eliquis, Edoxaban, Enoxaparin, Lovenox, Heparin, Arixtra, Fondaparinux, or Fragmin? OR Antiplatelet medication such as Plavix, Brilinta, or Effient?   No     If yes, when did you take your last dose? NA    Do you take aspirin?  No    If cervical procedure, have you held aspirin for 6 days?   NA    Do you have any allergies to contrast dye, iodine, steroid and/or numbing medications?  NO    Are you currently taking antibiotics or have an active infection?  NO    Have you had a fever/elevated temperature within the past week? NO    Are you currently taking oral steroids? YES: prednisone - maint med    Do you have a ? Yes    Are you pregnant or breastfeeding?  NO    Have you received the COVID-19 vaccine? Yes    If yes, was it your 1st, 2nd or only dose needed? 2nd     Date of most recent vaccine: 04/03/21    Notify provider and RNs if systolic BP >170, diastolic BP >100, P >100 or O2 sats < 90%      Sarina Ramos CMA (AAMA)

## 2021-12-28 NOTE — PATIENT INSTRUCTIONS
[facility]   Procedure Discharge Instructions    Today you saw:  Dr. Koko Long      You had an: Cervical facet joint injection      Medications used:  Lidocaine   Bupivacaine   Dexamethasone Omnipaque           Be cautious when walking. Numbness and/or weakness in the upper extremities may occur for up to 6-8 hours after the procedure due to effect of the local anesthetic    Do not drive for 6 hours. The effect of the local anesthetic could slow your reflexes.     You may resume your regular activities after 24 hours    Avoid strenuous activity for the first 24 hours    You may shower, however avoid swimming, tub baths or hot tubs for 24 hours following your procedure    You may have a mild to moderate increase in pain for several days following the injection.    It may take up to 14 days for the steroid medication to start working although you may feel the effect as early as a few days after the procedure.       You may use ice packs for 10-15 minutes, 3 to 4 times a day at the injection site for comfort    Do not use heat to painful areas for 6 to 8 hours. This will give the local anesthetic time to wear off and prevent you from accidentally burning your skin.     Unless you have been directed to avoid the use of anti-inflammatory medications (NSAIDS), you may use medications such as ibuprofen, Aleve or Tylenol for pain control if needed.     If you were fasting, you may resume your normal diet and medications after the procedure    If you have diabetes, check your blood sugar more frequently than usual as your blood sugar may be higher than normal for 10-14 days following a steroid injection. Contact your doctor who manages your diabetes if your blood sugar is higher than usual    Possible side effects of steroids that you may experience include flushing, elevated blood pressure, increased appetite, mild headaches and restlessness.  All of these symptoms will get better with  time.    If you experience any of the following, call the Pain Clinic during work hours (Mon-Friday 8-4:30 pm) at [phone] or the Provider Line after hours at [phone]:  -Fever over 100 degree F  -Swelling, bleeding, redness, drainage, warmth at the injection site  -Progressive weakness or numbness in your arms  -Loss of bowel or bladder function  -Unusual headache that is not relieved by Tylenol or other pain reliever  -Unusual new onset of pain that is not improving

## 2022-01-08 ENCOUNTER — LAB (OUTPATIENT)
Dept: LAB | Facility: CLINIC | Age: 47
End: 2022-01-08
Payer: COMMERCIAL

## 2022-01-08 DIAGNOSIS — Z79.899 HIGH RISK MEDICATIONS (NOT ANTICOAGULANTS) LONG-TERM USE: ICD-10-CM

## 2022-01-08 DIAGNOSIS — M32.9 SYSTEMIC LUPUS ERYTHEMATOSUS, UNSPECIFIED SLE TYPE, UNSPECIFIED ORGAN INVOLVEMENT STATUS (H): ICD-10-CM

## 2022-01-08 LAB
BASOPHILS # BLD AUTO: 0 10E3/UL (ref 0–0.2)
BASOPHILS NFR BLD AUTO: 1 %
CREAT UR-MCNC: 81 MG/DL
EOSINOPHIL # BLD AUTO: 0.1 10E3/UL (ref 0–0.7)
EOSINOPHIL NFR BLD AUTO: 1 %
ERYTHROCYTE [DISTWIDTH] IN BLOOD BY AUTOMATED COUNT: 11.3 % (ref 10–15)
ERYTHROCYTE [SEDIMENTATION RATE] IN BLOOD BY WESTERGREN METHOD: 4 MM/HR (ref 0–20)
HCT VFR BLD AUTO: 40.3 % (ref 35–47)
HGB BLD-MCNC: 13.3 G/DL (ref 11.7–15.7)
IMM GRANULOCYTES # BLD: 0 10E3/UL
IMM GRANULOCYTES NFR BLD: 1 %
LYMPHOCYTES # BLD AUTO: 1.2 10E3/UL (ref 0.8–5.3)
LYMPHOCYTES NFR BLD AUTO: 23 %
MCH RBC QN AUTO: 31.9 PG (ref 26.5–33)
MCHC RBC AUTO-ENTMCNC: 33 G/DL (ref 31.5–36.5)
MCV RBC AUTO: 97 FL (ref 78–100)
MONOCYTES # BLD AUTO: 0.3 10E3/UL (ref 0–1.3)
MONOCYTES NFR BLD AUTO: 6 %
NEUTROPHILS # BLD AUTO: 3.6 10E3/UL (ref 1.6–8.3)
NEUTROPHILS NFR BLD AUTO: 69 %
PLATELET # BLD AUTO: 294 10E3/UL (ref 150–450)
PROT UR-MCNC: 0.07 G/L
PROT/CREAT 24H UR: 0.09 G/G CR (ref 0–0.2)
RBC # BLD AUTO: 4.17 10E6/UL (ref 3.8–5.2)
WBC # BLD AUTO: 5.3 10E3/UL (ref 4–11)

## 2022-01-08 PROCEDURE — 84156 ASSAY OF PROTEIN URINE: CPT

## 2022-01-08 PROCEDURE — 86481 TB AG RESPONSE T-CELL SUSP: CPT

## 2022-01-08 PROCEDURE — 80053 COMPREHEN METABOLIC PANEL: CPT

## 2022-01-08 PROCEDURE — 86140 C-REACTIVE PROTEIN: CPT

## 2022-01-08 PROCEDURE — 85652 RBC SED RATE AUTOMATED: CPT

## 2022-01-08 PROCEDURE — 36415 COLL VENOUS BLD VENIPUNCTURE: CPT

## 2022-01-08 PROCEDURE — 85025 COMPLETE CBC W/AUTO DIFF WBC: CPT

## 2022-01-10 LAB
ALBUMIN SERPL-MCNC: 3.8 G/DL (ref 3.4–5)
ALP SERPL-CCNC: 69 U/L (ref 40–150)
ALT SERPL W P-5'-P-CCNC: 24 U/L (ref 0–50)
ANION GAP SERPL CALCULATED.3IONS-SCNC: 5 MMOL/L (ref 3–14)
AST SERPL W P-5'-P-CCNC: 14 U/L (ref 0–45)
BILIRUB SERPL-MCNC: 0.4 MG/DL (ref 0.2–1.3)
BUN SERPL-MCNC: 13 MG/DL (ref 7–30)
CALCIUM SERPL-MCNC: 8.6 MG/DL (ref 8.5–10.1)
CHLORIDE BLD-SCNC: 105 MMOL/L (ref 94–109)
CO2 SERPL-SCNC: 30 MMOL/L (ref 20–32)
CREAT SERPL-MCNC: 0.72 MG/DL (ref 0.52–1.04)
CRP SERPL-MCNC: <2.9 MG/L (ref 0–8)
GAMMA INTERFERON BACKGROUND BLD IA-ACNC: 0.01 IU/ML
GFR SERPL CREATININE-BSD FRML MDRD: >90 ML/MIN/1.73M2
GLUCOSE BLD-MCNC: 125 MG/DL (ref 70–99)
M TB IFN-G BLD-IMP: NEGATIVE
M TB IFN-G CD4+ BCKGRND COR BLD-ACNC: 9.02 IU/ML
MITOGEN IGNF BCKGRD COR BLD-ACNC: 0 IU/ML
MITOGEN IGNF BCKGRD COR BLD-ACNC: 0.01 IU/ML
POTASSIUM BLD-SCNC: 4.1 MMOL/L (ref 3.4–5.3)
PROT SERPL-MCNC: 6.6 G/DL (ref 6.8–8.8)
QUANTIFERON MITOGEN: 9.03 IU/ML
QUANTIFERON NIL TUBE: 0.01 IU/ML
QUANTIFERON TB1 TUBE: 0.02 IU/ML
QUANTIFERON TB2 TUBE: 0.01
SODIUM SERPL-SCNC: 140 MMOL/L (ref 133–144)

## 2022-01-10 NOTE — TELEPHONE ENCOUNTER
"I\"m confused why you are having a side effects, as you were taking 150mg 3 times daily in the past.  It certainly could be a side effect.     There are lower doses.  Often as people get used ot it they do fine... and given you were on higher doses in the past I would assume you would.  Do you want to continue trying it, stop, or lower down the tab size?     Magdalena Lockett MD  [facility]   "

## 2022-01-10 NOTE — TELEPHONE ENCOUNTER
From: Ruma Enriquez      Created: 1/10/2022 2:41 PM        I am only taking it one time a day right now, I couldnt increase cause of the side effect  Ruma

## 2022-01-10 NOTE — TELEPHONE ENCOUNTER
From: Ruma Enriquez      Created: 1/10/2022 10:22 AM        I started the lyrica about a week ago & it is making me wake up feeling dizzy or foggy. Kind of like I have a buzz! Is this normal? Will it stop as I get used to it? I havent increased it as I dont want this to get worse! I dont remember this when I used to take it.  Ruma              pregabalin (LYRICA) 75 MG capsule 90 capsule 1 12/23/2021  --   Sig - Route: Take 1 capsule (75 mg) by mouth 3 times daily . Titrate to this dose as instructed by clinic       From: Rosa Isela Hayes, RN      Created: 1/10/2022 2:34 PM        João Hendrix, I'm sorry to hear about the side effect. It is a side effect that can occur on this medication. I just wanted to verify your dose for planning. Are you currently taking 75 Mg three times daily?     TEJAS Culver, RN  Care Coordinator  [facility] [place]

## 2022-01-11 NOTE — TELEPHONE ENCOUNTER
From: Ruma Enriquez      Created: 1/10/2022 3:31 PM        Its been a while since I was on it & also was confused cause I used to take a higher dose too thats why I thought it was weird! But i used to use fluticosone & when i used it again after a while i was suddenly allergic to it & was having an aniflactic reaction.  I am definately not that bad but wondering if I will get used to it as time goes on?            From: Ruma Enriquez      Created: 1/10/2022 3:33 PM        I also couldnt figure out why i felt so dizzy or weird & realized it started when i started the med

## 2022-01-11 NOTE — TELEPHONE ENCOUNTER
From: Rosa Isela Hayes, RN      Created: 1/11/2022 10:27 AM        Okay Ruma, it is possible that the side effects will resolve with time but ultimately you will need to decide if it's tolerable enough to wait and see. I do recommend refraining from driving if you are feeling this way. The other options would be to discontinue using it or to try a smaller dose. Let me know what you decide.      TEJAS Culver, RN  Care Coordinator  [facility] [place]

## 2022-01-27 NOTE — TELEPHONE ENCOUNTER
From: Rosa Isela Hayes, RN      Created: 1/13/2022 3:54 PM        João Hendrix, Just checking in on you. I haven't heard back about how you want to move forward with the Lyrica.      TEJAS Culver, RN  Care Coordinator  [facility]          From: Ruma Enriquez      Created: 1/27/2022 12:15 PM        I stopped the lyrica, it didnt make me feel well so I didnt think it was worth continuing.            From: Rosa Isela Hayes, RN      Created: 1/27/2022 4:23 PM        Thank you for letting us know. I removed it from your medication list. Please feel free to call [phone] to schedule a follow up with Dr Lockett as needed.       TEJAS Culver, RN  Care Coordinator  [facility] [place]

## 2022-02-05 DIAGNOSIS — K21.9 GASTROESOPHAGEAL REFLUX DISEASE WITHOUT ESOPHAGITIS: ICD-10-CM

## 2022-02-07 RX ORDER — FAMOTIDINE 40 MG/1
40 TABLET, FILM COATED ORAL
Qty: 30 TABLET | Refills: 1 | Status: SHIPPED | OUTPATIENT
Start: 2022-02-07 | End: 2022-08-17

## 2022-02-07 NOTE — TELEPHONE ENCOUNTER
Prescription approved per [facility] Refill Protocol.  Elizabeth Bergeron RN

## 2022-03-03 ENCOUNTER — TELEPHONE (OUTPATIENT)
Dept: RHEUMATOLOGY | Facility: CLINIC | Age: 47
End: 2022-03-03
Payer: COMMERCIAL

## 2022-03-03 DIAGNOSIS — M32.9 SYSTEMIC LUPUS ERYTHEMATOSUS, UNSPECIFIED SLE TYPE, UNSPECIFIED ORGAN INVOLVEMENT STATUS (H): ICD-10-CM

## 2022-03-03 NOTE — TELEPHONE ENCOUNTER
BENLYSTA AUTO INJECTOR 1ML 4S  LEGALLY WE MUST RECEIVE THE PRESCRIPTION FROM YOUR OFFICE TO DISPENSE.   FAX BACK TO [facility] [phone]

## 2022-03-28 DIAGNOSIS — M54.2 CERVICALGIA: ICD-10-CM

## 2022-03-28 DIAGNOSIS — R51.9 CHRONIC DAILY HEADACHE: ICD-10-CM

## 2022-03-28 DIAGNOSIS — M47.819 FACET ARTHROPATHY: ICD-10-CM

## 2022-03-28 RX ORDER — NORTRIPTYLINE HCL 10 MG
10 CAPSULE ORAL AT BEDTIME
Qty: 90 CAPSULE | Refills: 1 | Status: SHIPPED | OUTPATIENT
Start: 2022-03-28 | End: 2023-01-09

## 2022-03-28 NOTE — TELEPHONE ENCOUNTER
Received fax from pharmacy requesting refill(s) for nortriptyline (PAMELOR) 10 MG capsule     Last refilled on 1/12/22    Pt last seen on 12/23/21  Next appt scheduled for none    E-prescribe to:    [id number] - [place], [place] - [address]    Will facilitate refill.

## 2022-04-03 ENCOUNTER — LAB (OUTPATIENT)
Dept: LAB | Facility: CLINIC | Age: 47
End: 2022-04-03
Payer: COMMERCIAL

## 2022-04-03 DIAGNOSIS — Z13.220 SCREENING FOR HYPERLIPIDEMIA: ICD-10-CM

## 2022-04-03 DIAGNOSIS — E05.90 SUBCLINICAL HYPERTHYROIDISM: ICD-10-CM

## 2022-04-03 DIAGNOSIS — M32.9 SYSTEMIC LUPUS ERYTHEMATOSUS, UNSPECIFIED SLE TYPE, UNSPECIFIED ORGAN INVOLVEMENT STATUS (H): ICD-10-CM

## 2022-04-03 DIAGNOSIS — M32.9 SYSTEMIC LUPUS ERYTHEMATOSUS, UNSPECIFIED SLE TYPE, UNSPECIFIED ORGAN INVOLVEMENT STATUS (H): Primary | ICD-10-CM

## 2022-04-03 LAB
HOLD SPECIMEN: NORMAL

## 2022-04-03 PROCEDURE — 80050 GENERAL HEALTH PANEL: CPT

## 2022-04-03 PROCEDURE — 80061 LIPID PANEL: CPT

## 2022-04-03 PROCEDURE — 86225 DNA ANTIBODY NATIVE: CPT

## 2022-04-03 PROCEDURE — 36415 COLL VENOUS BLD VENIPUNCTURE: CPT

## 2022-04-03 PROCEDURE — 86140 C-REACTIVE PROTEIN: CPT

## 2022-04-04 LAB
ALBUMIN SERPL-MCNC: 4.1 G/DL (ref 3.4–5)
ALP SERPL-CCNC: 55 U/L (ref 40–150)
ALT SERPL W P-5'-P-CCNC: 30 U/L (ref 0–50)
ANION GAP SERPL CALCULATED.3IONS-SCNC: 8 MMOL/L (ref 3–14)
AST SERPL W P-5'-P-CCNC: 22 U/L (ref 0–45)
BASOPHILS # BLD AUTO: 0 10E3/UL (ref 0–0.2)
BASOPHILS NFR BLD AUTO: 1 %
BILIRUB SERPL-MCNC: 0.4 MG/DL (ref 0.2–1.3)
BUN SERPL-MCNC: 16 MG/DL (ref 7–30)
CALCIUM SERPL-MCNC: 8.9 MG/DL (ref 8.5–10.1)
CHLORIDE BLD-SCNC: 105 MMOL/L (ref 94–109)
CHOLEST SERPL-MCNC: 177 MG/DL
CO2 SERPL-SCNC: 25 MMOL/L (ref 20–32)
CREAT SERPL-MCNC: 0.75 MG/DL (ref 0.52–1.04)
CRP SERPL-MCNC: <2.9 MG/L (ref 0–8)
EOSINOPHIL # BLD AUTO: 0.1 10E3/UL (ref 0–0.7)
EOSINOPHIL NFR BLD AUTO: 2 %
ERYTHROCYTE [DISTWIDTH] IN BLOOD BY AUTOMATED COUNT: 11.5 % (ref 10–15)
FASTING STATUS PATIENT QL REPORTED: NO
GFR SERPL CREATININE-BSD FRML MDRD: >90 ML/MIN/1.73M2
GLUCOSE BLD-MCNC: 109 MG/DL (ref 70–99)
HCT VFR BLD AUTO: 40.4 % (ref 35–47)
HDLC SERPL-MCNC: 80 MG/DL
HGB BLD-MCNC: 14 G/DL (ref 11.7–15.7)
LDLC SERPL CALC-MCNC: 71 MG/DL
LYMPHOCYTES # BLD AUTO: 0.6 10E3/UL (ref 0.8–5.3)
LYMPHOCYTES NFR BLD AUTO: 12 %
MCH RBC QN AUTO: 32.6 PG (ref 26.5–33)
MCHC RBC AUTO-ENTMCNC: 34.7 G/DL (ref 31.5–36.5)
MCV RBC AUTO: 94 FL (ref 78–100)
MONOCYTES # BLD AUTO: 0.4 10E3/UL (ref 0–1.3)
MONOCYTES NFR BLD AUTO: 7 %
NEUTROPHILS # BLD AUTO: 4.3 10E3/UL (ref 1.6–8.3)
NEUTROPHILS NFR BLD AUTO: 79 %
NONHDLC SERPL-MCNC: 97 MG/DL
PLATELET # BLD AUTO: 286 10E3/UL (ref 150–450)
POTASSIUM BLD-SCNC: 4.2 MMOL/L (ref 3.4–5.3)
PROT SERPL-MCNC: 6.9 G/DL (ref 6.8–8.8)
RBC # BLD AUTO: 4.29 10E6/UL (ref 3.8–5.2)
SODIUM SERPL-SCNC: 138 MMOL/L (ref 133–144)
TRIGL SERPL-MCNC: 132 MG/DL
TSH SERPL DL<=0.005 MIU/L-ACNC: 0.59 MU/L (ref 0.4–4)
WBC # BLD AUTO: 5.4 10E3/UL (ref 4–11)

## 2022-04-07 LAB — DSDNA AB SER-ACNC: 0.7 IU/ML

## 2022-04-10 ENCOUNTER — MYC MEDICAL ADVICE (OUTPATIENT)
Dept: RHEUMATOLOGY | Facility: CLINIC | Age: 47
End: 2022-04-10

## 2022-04-10 ENCOUNTER — LAB (OUTPATIENT)
Dept: LAB | Facility: CLINIC | Age: 47
End: 2022-04-10
Payer: COMMERCIAL

## 2022-04-10 DIAGNOSIS — M32.9 SYSTEMIC LUPUS ERYTHEMATOSUS, UNSPECIFIED SLE TYPE, UNSPECIFIED ORGAN INVOLVEMENT STATUS (H): ICD-10-CM

## 2022-04-10 LAB
ALBUMIN UR-MCNC: NEGATIVE MG/DL
APPEARANCE UR: CLEAR
BILIRUB UR QL STRIP: NEGATIVE
COLOR UR AUTO: YELLOW
CREAT UR-MCNC: 83 MG/DL
GLUCOSE UR STRIP-MCNC: NEGATIVE MG/DL
HGB UR QL STRIP: NEGATIVE
KETONES UR STRIP-MCNC: NEGATIVE MG/DL
LEUKOCYTE ESTERASE UR QL STRIP: NEGATIVE
NITRATE UR QL: NEGATIVE
PH UR STRIP: 7.5 [PH] (ref 5–7)
PROT UR-MCNC: 0.09 G/L
PROT/CREAT 24H UR: 0.11 G/G CR (ref 0–0.2)
SP GR UR STRIP: 1.01 (ref 1–1.03)
UROBILINOGEN UR STRIP-ACNC: 0.2 E.U./DL

## 2022-04-10 PROCEDURE — 81003 URINALYSIS AUTO W/O SCOPE: CPT

## 2022-04-10 PROCEDURE — 84156 ASSAY OF PROTEIN URINE: CPT

## 2022-04-11 RX ORDER — PREDNISONE 2.5 MG/1
2.5 TABLET ORAL DAILY
Qty: 90 TABLET | Refills: 2 | Status: SHIPPED | OUTPATIENT
Start: 2022-04-11 | End: 2022-11-15

## 2022-04-11 NOTE — TELEPHONE ENCOUNTER
RN: Hopefully restarting prednisone 2.5 mg daily will resolve her symptoms.  I see from previous documentation that her pharmacy did not receive the last prescription that was sent in December (for a 90-day supply with 2 refills).  I resent the prescription for prednisone today.    1. Systemic lupus erythematosus, unspecified SLE type, unspecified organ involvement status (H)  - predniSONE (DELTASONE) 2.5 MG tablet; Take 1 tablet (2.5 mg) by mouth in the morning.  Dispense: 90 tablet; Refill: 2      Bradford Colvin MD  4/11/2022 11:24 AM

## 2022-05-02 ENCOUNTER — TELEPHONE (OUTPATIENT)
Dept: PALLIATIVE MEDICINE | Facility: CLINIC | Age: 47
End: 2022-05-02
Payer: COMMERCIAL

## 2022-05-02 NOTE — TELEPHONE ENCOUNTER
Chart review:  Pt of Dr. Lockett's since:  2019  Last visit with Dr. Lockett: 12/23/21   Any changes to regimen at last visit:   No   On opioids:  No . On stable doses since:    Hx of injections:  Yes: SI and Cervical MBB  Medical cannabis:  No   Other?  no   If applicable: if pt is on opioids and is transferring to another pain provider were they told that their dosing may change? NA  If transfer is w/in was pt told that appointment w/ new provider HAS to be in-person?   N/A      Plan:  PCP can manage Pt's medications,  Pt should schedule repeat MBB with Pain if this was Helpful.     Zack Moran, RN  Patient Care Supervisor   [facility] [place]

## 2022-05-10 ENCOUNTER — TELEPHONE (OUTPATIENT)
Dept: PALLIATIVE MEDICINE | Facility: CLINIC | Age: 47
End: 2022-05-10

## 2022-05-10 NOTE — TELEPHONE ENCOUNTER
Dear Bradford Mario    This patient will be returning to you for management of their chronic pain.  I have noted that the patients pain plan has been stable for over 2 years years. The patient's daily MME is Zero, which is below the recommended 50MME/day. The patient's current pain plan includes Trochanteric Bursa injections, Cercival MBB-RFA process in December that Pt has not followed up on. Please reach out to me if you have questions.     Pt should call the pain clinic if she would like to continue with the MBB-RFA process.  Pt is being prescribed Nortriptyline from the Pain clinic that we would ask you to manage.     Pt is aware of the plan and asked to call and schedule a follow up with you as soon as possible.         We aim to offer the following    Considering non-opioid treatments before an opioid is prescribed.     Prescribing the lowest dose of an opioid. And offer tapering every 3 months for those with chronic nonmalignant pain ([place] dept of health, 2022).     Clear documentation why an opioid is being prescribed at a rate of more than 50MME/day    Multimodal pain plans with nonopioids, PT, acupuncture, injections, and pain psychology.  We utilize the following Guidelines when prescribing opioids:     [place] Department of Health :  Chronic pain opioid prescribing recommendations / [place] Opioid Guidelines ([place].gov)    CDC Guidelines: Opioid Prescribing  [employer]s  [facility]  Please continue the following:     Continue urine drug screens if the patient is on controlled substances - at least 2 times per year.     Consider pill counts.     Assess routinely for EMILIA/OUD.     Continue new PPAs (Patient Provider Agreements- aka Pain Contracts)     And continue to offer to taper to a reduced dose or to discontinuation at every face to face visit. See Opioid taper or discontinuation.

## 2022-05-11 ENCOUNTER — VIRTUAL VISIT (OUTPATIENT)
Dept: RHEUMATOLOGY | Facility: CLINIC | Age: 47
End: 2022-05-11
Payer: COMMERCIAL

## 2022-05-11 DIAGNOSIS — Z79.899 HIGH RISK MEDICATIONS (NOT ANTICOAGULANTS) LONG-TERM USE: ICD-10-CM

## 2022-05-11 DIAGNOSIS — M32.9 SYSTEMIC LUPUS ERYTHEMATOSUS, UNSPECIFIED SLE TYPE, UNSPECIFIED ORGAN INVOLVEMENT STATUS (H): Primary | ICD-10-CM

## 2022-05-11 DIAGNOSIS — I73.00 RAYNAUD'S PHENOMENON WITHOUT GANGRENE: ICD-10-CM

## 2022-05-11 DIAGNOSIS — M35.01 SJOGREN'S SYNDROME WITH KERATOCONJUNCTIVITIS SICCA (H): ICD-10-CM

## 2022-05-11 PROCEDURE — 99214 OFFICE O/P EST MOD 30 MIN: CPT | Mod: 95 | Performed by: INTERNAL MEDICINE

## 2022-05-11 RX ORDER — PILOCARPINE HYDROCHLORIDE 5 MG/1
TABLET, FILM COATED ORAL
Qty: 90 TABLET | Refills: 3 | Status: SHIPPED | OUTPATIENT
Start: 2022-05-11 | End: 2022-08-15

## 2022-05-11 NOTE — PATIENT INSTRUCTIONS
RHEUMATOLOGY    Dr. Bradford Colvin    [facility] [place]  [address] [place] [address]  Phone number: [phone]  Fax number: [phone]      Thank you for choosing [facility]!    Qing Zapata CMA Rheumatology    ---------------    COVID-19 vaccination:  A third dose of an mRNA COVID-19 vaccination is recommended to receive 28 days after the second mRNA COVID-19 vaccination was received; this 3rd dose should be from the same  as the first two doses, and will complete the initial vaccine series.   A 4th dose (1st booster) of the mRNA COVID-19 vaccination is recommended to be received 3 months after the third mRNA COVID-19 vaccination was received.  A 5th mRNA vaccine (2nd booster) may be received at least 4 months after the 4th dose.     Based on our current understanding (and this may change over time as we learn more), medications should be adjusted as noted below, if disease activity allows:  - NSAIDs and Acetaminophen: hold for 24 hours prior to vaccination if able to do so  - Azathioprine should be held for 1-2 weeks after each COVID-19 vaccine (as disease activity allows)  - Benlysta (belimumab) subcutaneous (SQ) should be held for 1-2 weeks after each COVID-19 vaccine (as disease activity allows)

## 2022-05-11 NOTE — PROGRESS NOTES
Yovana Enriquez  is a 46 year old year old who is being evaluated via a billable video visit.      How would you like to obtain your AVS? MyChart  If the video visit is dropped, the invitation should be resent by: Text to cell phone: [phone]   Will anyone else be joining your video visit? No     Rheumatology Video Visit      Yovana Enriquez MRN# [id number]   YOB: 1975 Age: 46 year old      Date of visit: 5/11/22   PCP: Bradford Mario    Chief Complaint   Patient presents with:  Systemic Lupus Erythematous: Doing good    Assessment and Plan     1. Systemic lupus erythematosus (YANELIS by EIA positive in the past, leukopenia/lymphocytopenia, hypocomplementemia, polyarthralgias, oral sores, history of malar rash, photophobia, photosensitivity, Raynaud's Phenomenon): Previously on methotrexate that was discontinued for unclear reasons but the patient reports that she tolerated it well. Currently on azathioprine 75 mg daily (dose reduced on 11/8/2018 from 100mg daily without worsening symptoms at that time, eventually reduced to 50 mg daily and did well for a period of time; now on 75 mg daily), hydroxychloroquine 300 mg daily [avg], and Benlysta (worsening symptoms when stopped in the past).  Overall doing well with regard to lupus at this time.  Noted that she has likely adrenal insufficiency with a lower dose of cortisol in the past and has had difficulty getting off of a low-dose of prednisone; so will continue prednisone 2.5 mg daily.   - Continue azathioprine 75 mg daily   - Continue hydroxychloroquine 300mg daily on average: 200mg daily with additional 200mg every other day (toxicity monitoring eye exam last done on 5/20/2019; advised that she update the eye exam; required for next refill)  - Continue Benlysta 200 mg SQ every 7 days  - Continue Prednisone 2.5mg daily   - Continue photoprotection: Covering up and using sunscreen  - Labs within 1 month: CBC, CMP, ESR, CRP, UA,  Uprotein:creatinine    High risk medication requiring intensive toxicity monitoring at least quarterly: labs ordered include CBC, Creatinine, Hepatic panel to monitor for cytopenia and hepatotoxicity; checking creatinine as it affects clearance of medication.     2. Secondary Sjogren syndrome: Doing well with Evoxac and frequent sips of water. Dry eyes are not much of an issue currently.  However, the cost of Evoxac has risen and she would like to try different medication to see if it is better priced.  Possibly pilocarpine will be more affordable and she would like to try this instead.  Risks and side effects of pilocarpine were reviewed in detail today.  Chronic illness  - Discontinue Evoxac 30 mg twice a day  - Start Pilocarpine 5mg daily x7days, then 5mg twice daily x7days, then 5mg three times daily thereafter.    3. Raynaud's Phenomenon: Amlodipine 5mg daily is effective.  Chronic illness, stable  - Continue amlodipine 5mg daily     4. R>Ltrochanteric bursitis, possible iliotibial band syndrome as well:  Also with symptoms of iliotibial band syndrome and degenerative arthritis of the lumbar spine.  Encouraged continued physical therapy exercises on her own at home.  Last steroid injection for trochanteric bursitis was very effective and she would like to have this repeated in the future; an appointment was made in June for this to occur.  Note that she could have had the injections today but she changed today's in office visit to a virtual visit.       5. Fibromyalgia: Managed by the pain clinic and PCP.    6. MGUS: SPEP and immunofixation advised to be done yearly per Dr. Yoselin Payan.  Documented here for historical significance    7. Elevated blood pressure:  Ruma to follow up with Primary Care provider regarding elevated blood pressure.     8.  Vaccinations: Vaccinations reviewed with Ms. Enriquez.    - Influenza: advised receiving at least 8 weeks after [id number] is administered  - [id number]: up to date  -  [id number]: up to date  -  TDAP: Advised receiving  - COVID-19: has received the Pfizer COVID-19 vaccine 3/13/2021, 4/3/2021.  A third dose of an mRNA COVID-19 vaccination is recommended to receive 28 days after the second mRNA COVID-19 vaccination was received; this 3rd dose should be from the same  as the first two doses, and will complete the initial vaccine series.   A 4th dose (1st booster) of the mRNA COVID-19 vaccination is recommended to be received 3 months after the third mRNA COVID-19 vaccination was received.  A 5th mRNA vaccine (2nd booster) may be received at least 4 months after the 4th dose.   Based on our current understanding (and this may change over time as we learn more), medications should be adjusted as noted below, if disease activity allows:  - NSAIDs and Acetaminophen: hold for 24 hours prior to vaccination if able to do so  - Azathioprine should be held for 1-2 weeks after each COVID-19 vaccine (as disease activity allows)  - Benlysta (belimumab) subcutaneous (SQ) should be held for 1-2 weeks after each COVID-19 vaccine (as disease activity allows)    Total minutes spent in evaluation with patient, documentation, , and review of pertinent studies and chart notes: 22    Ms. Enriquez verbalized agreement with and understanding of the rational for the diagnosis and treatment plan.  All questions were answered to best of my ability and the patient's satisfaction. Ms. Enriquez was advised to contact the clinic with any questions that may arise after the clinic visit.      Thank you for involving me in the care of the patient    Return to clinic: July, to match up with labs    HPI   Yovana Enriquez is a 46 year old female with medical history significant for subclinical hyperthyroidism, hypertension, irritable bowel syndrome, fibromyalgia, hypertension, non-celiac gluten sensitivity (reportedly with bowel biopsies and laboratory workup that did not show celiac disease),  anxiety, and systemic lupus erythematosus who presents for follow-up of SLE.    Today, 5/11/2022: She changed today's in office visit to a virtual visit.  Steroid injections for trochanteric bursitis were very effective; she says that she will likely need a repeat as her symptoms are starting to return.  Otherwise doing well.  Would like to try a different medication other than Evoxac if possible because the cost for Evoxac has significantly risen per patient.  She has never been on pilocarpine.  Dry eye and dry mouth are well controlled at this time on her current regimen.  Arthritis is well controlled on her current regimen.  Raynaud's phenomenon is well controlled on amlodipine.  No lower extremity edema.    Denies fevers, chills, nausea, vomiting. No abdominal pain.  No chest pain/pressure, palpitations, or shortness of breath.  No rash currently. No LE swelling.  She has photosensitivity and photophobia.   No eye pain or redness. No history of serositis. No history of DVT, pulmonary embolism, or miscarriage.     Raynaud's is controlled per patient.  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    ROS   12 point review of system was completed and negative except as noted in the HPI     Active Problem List     Patient Active Problem List   Diagnosis     Systemic lupus erythematosus (H)     Menorrhagia     History of ovarian cyst     Dysmenorrhea     History of gestational diabetes mellitus, not currently pregnant     Intramural leiomyoma of uterus     Hyperlipidemia LDL goal <130     Subclinical hyperthyroidism     Anxiety     High risk medication use     Fibromyalgia     Chronic constipation     Irritable bowel syndrome     Health Care Home     Hypertension goal BP (blood pressure) < 140/90     Non-celiac gluten sensitivity     Raynaud's phenomenon without gangrene     Sjogren's syndrome (H)     Intramural and submucous leiomyoma of uterus     Adrenal  insufficiency (H)     Past Medical History     Past Medical History:   Diagnosis Date     Arthritis      Chronic constipation 12/16/2013     Dysmenorrhea 10/1/2012     Fibromyalgia 11/15/2013     Health Care Home 3/19/2014    **See Letters for [facility] Care Plan: Emergency Care Plan       High risk medication use 9/13/2013     History of gestational diabetes mellitus, not currently pregnant 10/1/2012     History of ovarian cyst 10/1/2012     Hyperlipidemia LDL goal <130 10/18/2012     Hypertension goal BP (blood pressure) < 140/90 1/19/2015     Intramural leiomyoma of uterus 10/5/2012     Irritable bowel syndrome 3/11/2014    Patient states no history colonoscopy       Menorrhagia 10/1/2012     Non-celiac gluten sensitivity 2/8/2016     PONV (postoperative nausea and vomiting)      Subclinical hyperthyroidism 1/17/2013     Systemic lupus erythematosus (H) 4/23/2012     Past Surgical History     Past Surgical History:   Procedure Laterality Date     ABDOMEN SURGERY  2004    Tubal ligation     COLONOSCOPY N/A 2/20/2015    Procedure: COMBINED COLONOSCOPY, SINGLE OR MULTIPLE BIOPSY/POLYPECTOMY BY BIOPSY;  Surgeon: Fred Cullen MD;  Location: [facility] OR     COLONOSCOPY N/A 10/29/2018    Procedure: COMBINED COLONOSCOPY, SINGLE OR MULTIPLE BIOPSY/POLYPECTOMY BY BIOPSY;  Surgeon: Inez Sawyer MD;  Location: [facility]     COLONOSCOPY WITH CO2 INSUFFLATION N/A 2/20/2015    Procedure: COLONOSCOPY WITH CO2 INSUFFLATION;  Surgeon: Fred Cullen MD;  Location: [facility] OR     ENT SURGERY  10-24-14    Bottom lip biopsies     ESOPHAGOSCOPY, GASTROSCOPY, DUODENOSCOPY (EGD), COMBINED N/A 2/20/2015    Procedure: COMBINED ESOPHAGOSCOPY, GASTROSCOPY, DUODENOSCOPY (EGD), BIOPSY SINGLE OR MULTIPLE;  Surgeon: Fred Cullen MD;  Location: [facility] OR     HC BREATH HYDROGEN TEST  12/31/2013    Procedure: HYDROGEN BREATH TEST;  Surgeon: Camden Almazan MD;  Location: [facility]     HC HYSTEROSCOPY, SURGICAL; W/ ENDOMETRIAL  ABLATION, ANY METHOD  10-19-12     ORTHOPEDIC SURGERY  12/2010     SHOULDER SURGERY Right     R shoulder     TUBAL LIGATION  2004     Allergy     Allergies   Allergen Reactions     Fluticasone Propionate (Nasal) [Fluticasone] Anaphylaxis     Current Medication List     Current Outpatient Medications   Medication Sig     amLODIPine (NORVASC) 5 MG tablet Take 1 tablet (5 mg) by mouth daily     azaTHIOprine (IMURAN) 50 MG tablet Take 1.5 tablets (75 mg) by mouth daily     azelastine (ASTELIN) 0.1 % nasal spray Spray 1 spray into both nostrils 2 times daily     Belimumab 200 MG/ML SOAJ Inject 200 mg Subcutaneous every 7 days . Hold for signs of infection and seek medical attention. Autoinjector     Calcium Carb-Cholecalciferol (CALCIUM + D3) 600-200 MG-UNIT TABS Take 1 tablet by mouth daily      cevimeline (EVOXAC) 30 MG capsule Take 1 capsule (30 mg) by mouth 2 times daily     diclofenac (VOLTAREN) 1 % GEL Apply 2-4 grams to knees or shoulders four times daily as needed using enclosed dosing card.     docusate sodium (COLACE) 100 MG capsule Take 1 capsule (100 mg) by mouth 2 times daily     famotidine (PEPCID) 40 MG tablet TAKE 1 TABLET (40 MG) BY MOUTH NIGHTLY AS NEEDED FOR HEARTBURN     hydroxychloroquine (PLAQUENIL) 200 MG tablet Hydroxychloroquine 200mg daily; and an additional 200mg every other day. Yearly eye exam, including 10-2 VF and SD-OCT, is required     losartan (COZAAR) 25 MG tablet Take 1 tablet (25 mg) by mouth daily     nortriptyline (PAMELOR) 10 MG capsule Take 1 capsule (10 mg) by mouth At Bedtime . 3 months supply     ondansetron (ZOFRAN-ODT) 4 MG ODT tab Take 1 tablet (4 mg) by mouth every 8 hours as needed for nausea Recommend limited use     predniSONE (DELTASONE) 2.5 MG tablet Take 1 tablet (2.5 mg) by mouth in the morning.     SUMAtriptan (IMITREX) 50 MG tablet Take 1 tablet (50 mg) by mouth at onset of headache for migraine . After 2 hours, if no relief, ok to take 2nd dose.  Limit 2 tabs/24  hours.     Vitamin D, Cholecalciferol, 1000 units CAPS Take 4,000 Int'l Units by mouth daily     acetaminophen (TYLENOL) 325 MG tablet Take 325-650 mg by mouth every 6 hours as needed for mild pain or headaches Maximum daily dose of acetaminophen is 3000 mg in 24 hours. (Patient not taking: Reported on 12/28/2021)     blood glucose test strip Used for testing glucose 2-3 times daily (Patient not taking: Reported on 12/28/2021)     No current facility-administered medications for this visit.         Social History   See HPI    Family History     Family History   Problem Relation Age of Onset     Respiratory Maternal Grandfather      Cancer Maternal Grandfather      Other Cancer Maternal Grandfather         Skin cancer     Asthma Son      Circulatory Maternal Grandmother         Brain anurysm     Diabetes Maternal Grandmother      Hypertension Mother      Glaucoma Mother 50        drops     Cancer Mother      Hypertension Sister      Hypertension Sister      Diabetes Sister      Hypertension Sister      Anxiety Disorder Sister      Asthma Son      Diabetes No family hx of      Cerebrovascular Disease No family hx of      Thyroid Disease No family hx of      Macular Degeneration No family hx of        Physical Exam       GEN: NAD. Healthy appearing adult.   HEENT: MMM.  Anicteric, noninjected sclera. No obvious external lesions of the ear and nose. Hearing intact.  PULM: No increased work of breathing  MSK:  Hands and wrists without swelling.   SKIN: No rash or jaundice seen  PSYCH: Alert. Appropriate.           Labs / Imaging (select studies)     YANELIS  Recent Labs   Lab Test 07/25/16  1433   ANAIGG <1:40  Reference range: <1:40  (Note)  <1:40  INTERPRETIVE INFORMATION: YANELIS by IFA, IgG  Anti-nuclear antibodies (YANELIS) are seen in a variety of  systemic rheumatic diseases and are determined by indirect  fluorescence assay (IFA) using HEp-2 substrate with an  IgG-specific conjugate. YANELIS titers less than or equal to  1:80  have variable relevance while titers greater than or  equal to 1:160 are considered clinically significant. These  antibodies may precede clinical disease onset; however,  healthy individuals and those with advanced age have been  reported to be positive for YANELIS. When observed, one of the  five basic patterns is reported: homogeneous,  peripheral/rim, speckled, centromere, or nucleolar. If  cytoplasmic fluorescence is observed, it is noted. IFA  methodology is subjective and has occasionally been shown  to lack sensitivity for anti-SSA/Ro antibodies.  Negative results do not necessarily rule out the presence  of SSc. If clinical suspicion remains, consider further  te sting for U3-RNP, PM/Scl, or Th/To antibodies associated  with SSc.  Performed by [facility],  [address] [place] [id number] [phone]  [email or web address], Alcon Krishna MD, Lab. Director       RNP/Sm/SSA/SSB  Recent Labs   Lab Test 03/23/16  1759 01/04/16  1806 07/01/14  1211   RNPIGG  --   --  <0.2  Negative     SMIGG <0.2  Negative   Antibody index (AI) values reflect qualitative changes in antibody   concentration that cannot be directly associated with clinical condition or   disease state.   <0.2  Negative   Antibody index (AI) values reflect qualitative changes in antibody   concentration that cannot be directly associated with clinical condition or   disease state.   <0.2  Negative     SSAIGG  --   --  <0.2  Negative     SSBIGG  --   --  <0.2  Negative     SCLIGG  --   --  <0.2  Negative       dsDNA  Recent Labs   Lab Test 04/03/22  1236 10/10/21  1202 07/19/21  1820 04/20/21  1804 01/11/21  1805 10/25/20  1059 04/07/20  1136 01/13/20  1804 10/21/19  1552   DNA 0.7 <0.6 <0.6 1 <1 1 1 <1 1     C3/C4  Recent Labs   Lab Test 10/10/21  1202 07/19/21  1820 04/20/21  1804 01/11/21  1805 10/25/20  1059 04/07/20  1136 01/13/20  1804 10/21/19  1552 07/17/19  1756   Q4PAOUA 89 91 97 91 103 100 85 77 67*   [id number] 17 20 20 20 >9* 16 21 13* 13*      CBC  Recent Labs   Lab Test 04/03/22  1236 01/08/22  1156 10/10/21  1202 07/19/21  1820 06/13/21  1312 04/20/21  1804 01/11/21  1805   WBC 5.4 5.3 4.6   < > 5.8 7.7 8.1   RBC 4.29 4.17 3.99   < > 4.19 4.22 4.00   HGB 14.0 13.3 12.9   < > 13.5 13.7 13.2   HCT 40.4 40.3 37.9   < > 40.6 40.0 38.7   MCV 94 97 95   < > 97 95 97   RDW 11.5 11.3 11.9   < > 11.6 11.5 11.5    294 283   < > 300 313 316   MCH 32.6 31.9 32.3   < > 32.2 32.5 33.0   MCHC 34.7 33.0 34.0   < > 33.3 34.3 34.1   NEUTROPHIL 79 69 75   < > 77.2 81.2 83.4   LYMPH 12 23 14   < > 13.7 10.8 8.8   MONOCYTE 7 6 9   < > 6.2 6.4 6.7   EOSINOPHIL 2 1 2   < > 1.7 0.8 0.6   BASOPHIL 1 1 1   < > 1.2 0.8 0.5   ANEU  --   --   --   --  4.5 6.3 6.7   ALYM  --   --   --   --  0.8 0.8 0.7*   ZOË  --   --   --   --  0.4 0.5 0.5   AEOS  --   --   --   --  0.1 0.1 0.1   ABAS  --   --   --   --  0.1 0.1 0.0   ANEUTAUTO 4.3 3.6 3.4   < >  --   --   --    ALYMPAUTO 0.6* 1.2 0.7*   < >  --   --   --    AMONOAUTO 0.4 0.3 0.4   < >  --   --   --    AEOSAUTO 0.1 0.1 0.1   < >  --   --   --    ABSBASO 0.0 0.0 0.0   < >  --   --   --     < > = values in this interval not displayed.     CMP  Recent Labs   Lab Test 04/03/22  1236 01/08/22  1156 10/10/21  1202 07/19/21  1820 04/20/21  1804 01/11/21  1805 10/25/20  1059 04/07/20  1136    140 139 140 140 138 139 140   POTASSIUM 4.2 4.1 4.3 3.8 4.1 3.9 4.1 3.6   CHLORIDE 105 105 107 107 106 106 104 106   CO2 25 30 28 30 33* 31 31 31   ANIONGAP 8 5 4 3 1* 1* 4 3   * 125* 89 113* 97 99 86 104*   BUN 16 13 11 12 13 15 11 16   CR 0.75 0.72 0.64 0.79 0.76 0.77 0.73 0.71   GFRESTIMATED >90 >90 >90 89 >90 >90 >90 >90   GFRESTBLACK  --   --   --   --  >90 >90 >90 >90   MELANIA 8.9 8.6 8.5 8.8 9.0 8.8 9.1 9.1   BILITOTAL 0.4 0.4 0.3 0.3 0.3 0.2 0.4 0.3   ALBUMIN 4.1 3.8 3.8 3.8 4.1 4.0 4.4 4.3   PROTTOTAL 6.9 6.6* 6.4* 6.7* 6.8 6.5* 7.5 7.1   ALKPHOS 55 69 55 60 57 55 62 54   AST 22 14 22 24 17 14 22 13   ALT 30 24 28 26 25  19 23 24     HgA1c  Recent Labs   Lab Test 05/30/19  0859   A1C 5.2     Calcium/VitaminD  Recent Labs   Lab Test 04/03/22  1236 01/08/22  1156 10/10/21  1202 10/25/20  1059 04/07/20  1136 07/17/19  1756 04/16/19  1759 01/30/18  1752 10/02/17  1814   MELANIA 8.9 8.6 8.5   < > 9.1   < > 8.4*   < > 8.7   VITDT  --   --   --   --  59  --  40  --  37    < > = values in this interval not displayed.     ESR/CRP  Recent Labs   Lab Test 04/03/22  1236 01/08/22  1156 10/10/21  1202 07/19/21  1820   SED  --  4 5 4   CRP <2.9 <2.9 <2.9 <2.9     CK/Aldolase  Recent Labs   Lab Test 10/10/21  1202 07/19/21  1820 04/20/21  1804   CKT 87 105 90     TSH/T4  Recent Labs   Lab Test 04/03/22  1236 04/07/20  1136 04/10/19  1808 10/18/17  1445 10/07/16  0714 02/27/15  1058   TSH 0.59 0.44 0.44 0.33* 0.37*  --    T4  --   --   --  1.24 1.16 1.01     Lipid Panel  Recent Labs   Lab Test 04/03/22  1236 01/26/19  0916 10/07/16  0714   CHOL 177 133 137   TRIG 132 64 48   HDL 80 68 75   LDL 71 52 52   NHDL 97 65 62     Hepatitis B  Recent Labs   Lab Test 03/29/16  1417   HEPBANG Nonreactive     Hepatitis C  Recent Labs   Lab Test 03/29/16  1417   HCVAB Nonreactive   Assay performance characteristics have not been established for newborns,   infants, and children       Lyme ab screening  Recent Labs   Lab Test 05/30/15  1355   LYMEGM 0.55     Tuberculosis Screening  Recent Labs   Lab Test 01/08/22  1156 07/03/17  1447 03/29/16  1417   TBRES Negative  --   --    TBRSLT  --  Negative Negative   TBAGN  --  0.00 0.00     HIV Screening  Recent Labs   Lab Test 04/10/19  1808   HIAGAB Nonreactive     UA  Recent Labs   Lab Test 04/10/22  1232 10/10/21  1204 09/01/21  1522 06/13/21  1309 04/20/21  1819 01/11/21  1810 10/25/20  1110 09/15/20  1655 07/03/20  1010 04/07/20  1140   COLOR Yellow Yellow Yellow Yellow   < > Yellow Yellow Yellow   < > Yellow   APPEARANCE Clear Clear Slightly Cloudy* Clear   < > Clear Clear Clear   < > Clear   URINEGLC Negative  Negative Negative Negative   < > Negative Negative Negative   < > Negative   URINEBILI Negative Negative Negative Negative   < > Negative Negative Negative   < > Negative   SG 1.015 1.010 1.015 <=1.005   < > >1.030 1.010 1.025   < > >1.030   URINEPH 7.5* 5.5 7.0 7.0   < > 6.0 7.0 6.0   < > 6.5   PROTEIN Negative Negative Trace* Negative   < > Negative Negative Negative   < > Negative   UROBILINOGEN 0.2 0.2 0.2 0.2   < > 0.2 0.2 0.2   < > 0.2   NITRITE Negative Negative Negative Negative   < > Negative Negative Negative   < > Negative   UBLD Negative Negative Moderate* Negative   < > Small* Negative Negative   < > Negative   LEUKEST Negative Small* Moderate* Negative   < > Negative Trace* Negative   < > Trace*   WBCU  --  0-5 25-50* 0 - 5  --  0 - 5 0 - 5 5-10*   < > 0 - 5   RBCU  --  0-2 25-50* O - 2  --  O - 2 O - 2 O - 2   < > O - 2   SQUAMOUSEPI  --   --   --   --   --  Few Few Few   < > Few   BACTERIA  --   --  Few*  --   --  Few* Few* Few*   < > Few*   MUCOUS  --   --   --   --   --  Present*  --  Present*  --  Present*    < > = values in this interval not displayed.     Urine Microscopic  Recent Labs   Lab Test 10/10/21  1204 09/01/21  1522 06/13/21  1309 01/11/21  1810 10/25/20  1110 09/15/20  1655 07/03/20  1010 04/07/20  1140   WBCU 0-5 25-50* 0 - 5 0 - 5 0 - 5 5-10*   < > 0 - 5   RBCU 0-2 25-50* O - 2 O - 2 O - 2 O - 2   < > O - 2   SQUAMOUSEPI  --   --   --  Few Few Few   < > Few   BACTERIA  --  Few*  --  Few* Few* Few*   < > Few*   MUCOUS  --   --   --  Present*  --  Present*  --  Present*    < > = values in this interval not displayed.     Urine Protein  Recent Labs   Lab Test 04/10/22  1232 01/08/22  1156 10/10/21  1202   UTP 0.09 0.07 0.06   UTPG 0.11 0.09 0.12   UCRR 83 81 52     Immunization History     Immunization History   Administered Date(s) Administered     COVID-19,NAWAF,Pfizer (12+ Yrs) 03/13/2021, 04/03/2021     Influenza (H1N1) 12/02/2009     Influenza (IIV3) PF 02/08/2007, 09/17/2010,  10/01/2012, 09/23/2013     Influenza Vaccine IM > 6 months Valent IIV4 (Alfuria,Fluzone) 09/23/2013, 09/20/2016, 10/30/2017, 10/14/2019     Influenza Vaccine, 6+MO IM (QUADRIVALENT W/PRESERVATIVES) 08/11/2018     Pneumo Conj 13-V (2010&after) 04/25/2016     Pneumococcal 23 valent 10/01/2012, 11/06/2017     Td (Adult), Adsorbed 02/12/2001     Tdap (Adacel,Boostrix) 12/17/2010              Chart documentation done in part with Dragon Voice recognition Software. Although reviewed after completion, some word and grammatical error may remain.        Video-Visit Details    Type of service:  Video Visit    Video Start Time: 8:22 AM    Video End Time: 8:34 AM     Originating Location (pt. Location): [place], [place]    Distant Location (provider location):  [facility] in [place], [place]    Platform used for Video Visit: Brianna Colvin MD

## 2022-05-17 DIAGNOSIS — I10 HYPERTENSION GOAL BP (BLOOD PRESSURE) < 140/90: ICD-10-CM

## 2022-05-19 NOTE — TELEPHONE ENCOUNTER
Routing refill request to provider for review/approval because:  Drug not on the [facility] refill protocol   BP Readings from Last 3 Encounters:   12/28/21 (!) 145/92   12/20/21 (!) 162/97   09/01/21 118/86

## 2022-05-20 RX ORDER — LOSARTAN POTASSIUM 25 MG/1
25 TABLET ORAL DAILY
Qty: 30 TABLET | Refills: 0 | Status: SHIPPED | OUTPATIENT
Start: 2022-05-20 | End: 2022-05-24

## 2022-05-24 ENCOUNTER — VIRTUAL VISIT (OUTPATIENT)
Dept: FAMILY MEDICINE | Facility: CLINIC | Age: 47
End: 2022-05-24
Payer: COMMERCIAL

## 2022-05-24 DIAGNOSIS — I10 HYPERTENSION GOAL BP (BLOOD PRESSURE) < 140/90: Primary | ICD-10-CM

## 2022-05-24 DIAGNOSIS — I73.00 RAYNAUD'S PHENOMENON WITHOUT GANGRENE: ICD-10-CM

## 2022-05-24 PROCEDURE — 99214 OFFICE O/P EST MOD 30 MIN: CPT | Mod: TEL | Performed by: PHYSICIAN ASSISTANT

## 2022-05-24 RX ORDER — LOSARTAN POTASSIUM 25 MG/1
25 TABLET ORAL DAILY
Qty: 90 TABLET | Refills: 1 | Status: SHIPPED | OUTPATIENT
Start: 2022-05-24 | End: 2022-08-03

## 2022-05-24 RX ORDER — AMLODIPINE BESYLATE 5 MG/1
5 TABLET ORAL DAILY
Qty: 90 TABLET | Refills: 1 | Status: SHIPPED | OUTPATIENT
Start: 2022-05-24 | End: 2022-08-15

## 2022-05-24 NOTE — PROGRESS NOTES
Ruma is a 46 year old who is being evaluated via a billable telephone visit.      What phone number would you like to be contacted at? [phone]  How would you like to obtain your AVS? Nuno Akhtar   Ruma is a 46 year old who presents for the following health issuesHPI     Hypertension Follow-up      Do you check your blood pressure regularly outside of the clinic? No     Are you following a low salt diet? Yes    Are your blood pressures ever more than 140 on the top number (systolic) OR more   than 90 on the bottom number (diastolic), for example 140/90? Yes      How many servings of fruits and vegetables do you eat daily?  2-3    On average, how many sweetened beverages do you drink each day (Examples: soda, juice, sweet tea, etc.  Do NOT count diet or artificially sweetened beverages)?   2    How many days per week do you exercise enough to make your heart beat faster? 7    How many minutes a day do you exercise enough to make your heart beat faster? 60 or more    How many days per week do you miss taking your medication? 0    No chest pain/sob/palpitations/dizziness/ha's  Tolerating meds well.  Home blood pressures have been running with in normal limits.    Review of Systems   Constitutional, HEENT, cardiovascular, pulmonary, GI, , musculoskeletal, neuro, skin, endocrine and psych systems are negative, except as otherwise noted.      Objective           Vitals:  No vitals were obtained today due to virtual visit.    Physical Exam   healthy, alert and no distress  PSYCH: Alert and oriented times 3; coherent speech, normal   rate and volume, able to articulate logical thoughts, able   to abstract reason, no tangential thoughts, no hallucinations   or delusions  Her affect is normal  RESP: No cough, no audible wheezing, able to talk in full sentences  Remainder of exam unable to be completed due to telephone visits    Yovana was seen today for recheck medication.    Diagnoses and all orders  for this visit:    Hypertension goal BP (blood pressure) < 140/90  -     losartan (COZAAR) 25 MG tablet; Take 1 tablet (25 mg) by mouth daily    Raynaud's phenomenon without gangrene  -     amLODIPine (NORVASC) 5 MG tablet; Take 1 tablet (5 mg) by mouth daily      work on lifestyle modification  Recheck in 6 mos         Phone call duration: 10 minutes

## 2022-06-17 ENCOUNTER — MYC MEDICAL ADVICE (OUTPATIENT)
Dept: OBGYN | Facility: CLINIC | Age: 47
End: 2022-06-17
Payer: COMMERCIAL

## 2022-06-17 DIAGNOSIS — R30.0 DYSURIA: Primary | ICD-10-CM

## 2022-06-17 NOTE — TELEPHONE ENCOUNTER
Pt last seen 6/21/2021 for abdominal pain.    Pt having concerns for possible UTI, having increased frequency, burning, blood in urine and urgency.    Pt denies fever.    Pt asking for lab only appt for UTI, RN routing to provider regarding orders if appropriate.    Katelyn Callahan RN on 6/17/2022 at 8:10 AM

## 2022-06-24 ENCOUNTER — OFFICE VISIT (OUTPATIENT)
Dept: OBGYN | Facility: CLINIC | Age: 47
End: 2022-06-24
Payer: COMMERCIAL

## 2022-06-24 VITALS
OXYGEN SATURATION: 100 % | BODY MASS INDEX: 22.95 KG/M2 | HEART RATE: 75 BPM | WEIGHT: 142.8 LBS | SYSTOLIC BLOOD PRESSURE: 134 MMHG | HEIGHT: 66 IN | DIASTOLIC BLOOD PRESSURE: 82 MMHG

## 2022-06-24 DIAGNOSIS — Z01.419 ENCOUNTER FOR GYNECOLOGICAL EXAMINATION WITHOUT ABNORMAL FINDING: Primary | ICD-10-CM

## 2022-06-24 DIAGNOSIS — R30.0 DYSURIA: ICD-10-CM

## 2022-06-24 DIAGNOSIS — Z23 NEED FOR TDAP VACCINATION: ICD-10-CM

## 2022-06-24 LAB
ALBUMIN UR-MCNC: NEGATIVE MG/DL
APPEARANCE UR: CLEAR
BILIRUB UR QL STRIP: NEGATIVE
COLOR UR AUTO: YELLOW
GLUCOSE UR STRIP-MCNC: NEGATIVE MG/DL
HGB UR QL STRIP: NEGATIVE
KETONES UR STRIP-MCNC: NEGATIVE MG/DL
LEUKOCYTE ESTERASE UR QL STRIP: ABNORMAL
NITRATE UR QL: NEGATIVE
PH UR STRIP: 6 [PH] (ref 5–7)
RBC #/AREA URNS AUTO: ABNORMAL /HPF
SP GR UR STRIP: <=1.005 (ref 1–1.03)
SQUAMOUS #/AREA URNS AUTO: ABNORMAL /LPF
UROBILINOGEN UR STRIP-ACNC: 0.2 E.U./DL
WBC #/AREA URNS AUTO: ABNORMAL /HPF

## 2022-06-24 PROCEDURE — 99396 PREV VISIT EST AGE 40-64: CPT | Mod: 25 | Performed by: NURSE PRACTITIONER

## 2022-06-24 PROCEDURE — G0145 SCR C/V CYTO,THINLAYER,RESCR: HCPCS | Performed by: NURSE PRACTITIONER

## 2022-06-24 PROCEDURE — 87624 HPV HI-RISK TYP POOLED RSLT: CPT | Performed by: NURSE PRACTITIONER

## 2022-06-24 PROCEDURE — 99213 OFFICE O/P EST LOW 20 MIN: CPT | Mod: 25 | Performed by: NURSE PRACTITIONER

## 2022-06-24 PROCEDURE — 90471 IMMUNIZATION ADMIN: CPT | Performed by: NURSE PRACTITIONER

## 2022-06-24 PROCEDURE — 90715 TDAP VACCINE 7 YRS/> IM: CPT | Performed by: NURSE PRACTITIONER

## 2022-06-24 PROCEDURE — 81001 URINALYSIS AUTO W/SCOPE: CPT | Performed by: NURSE PRACTITIONER

## 2022-06-24 PROCEDURE — 87086 URINE CULTURE/COLONY COUNT: CPT | Performed by: NURSE PRACTITIONER

## 2022-06-24 ASSESSMENT — ENCOUNTER SYMPTOMS
ARTHRALGIAS: 1
MYALGIAS: 1
BREAST MASS: 0
DYSURIA: 1
NAUSEA: 1
HEMATURIA: 1

## 2022-06-24 ASSESSMENT — PAIN SCALES - GENERAL: PAINLEVEL: NO PAIN (0)

## 2022-06-24 NOTE — PATIENT INSTRUCTIONS
If you have any questions regarding your visit, Please contact your care team.     [facility]: [phone]  To Schedule an Appointment 24/7  Call: [phone] HOURS TELEPHONE NUMBER     Chelsey Delgado- APRN CNP      Evelin Zepeda-FERNANDA Laura-RN  Ksenia Cruz-Surgery Scheduler  [place]ec[place]-Surgery Scheduler         Monday 7:30 am-5:00 pm    Tuesday 8:00 am-4:00 pm    Wednesday 7:30 am-4:00 pm  [place]    Thursday 8:00 am-11:00 am    Friday 7:30 am-4:00 pm [address] [place] [address] [phone] ask for [facility]  [phone] Fax    Imaging Scheduling all locations  [phone]     [facility]  [address] [place] [address]   [place], [place] [id number] [phone]         Urgent Care locations:  [facility]   Monday-Friday  10 am - 8 pm  Saturday and Sunday   9 am - 5 pm     [phone]   If you need a medication refill, please contact your pharmacy. Please allow 3 business days for your refill to be completed.  As always, Thank you for trusting us with your healthcare needs!      see additional instructions from your care team below

## 2022-06-24 NOTE — PROGRESS NOTES
Prior to immunization administration, verified patients identity using patient s name and date of birth. Please see Immunization Activity for additional information.     Screening Questionnaire for Adult Immunization    Are you sick today?   No   Do you have allergies to medications, food, a vaccine component or latex?   Yes   Have you ever had a serious reaction after receiving a vaccination?   No   Do you have a long-term health problem with heart, lung, kidney, or metabolic disease (e.g., diabetes), asthma, a blood disorder, no spleen, complement component deficiency, a cochlear implant, or a spinal fluid leak?  Are you on long-term aspirin therapy?   Yes   Do you have cancer, leukemia, HIV/AIDS, or any other immune system problem?   No   Do you have a parent, brother, or sister with an immune system problem?   No   In the past 3 months, have you taken medications that affect  your immune system, such as prednisone, other steroids, or anticancer drugs; drugs for the treatment of rheumatoid arthritis, Crohn s disease, or psoriasis; or have you had radiation treatments?   Yes   Have you had a seizure, or a brain or other nervous system problem?   No   During the past year, have you received a transfusion of blood or blood    products, or been given immune (gamma) globulin or antiviral drug?   No   For women: Are you pregnant or is there a chance you could become       pregnant during the next month?   No   Have you received any vaccinations in the past 4 weeks?   No     Immunization questionnaire was positive for at least one answer.  Notified Chelsey GLOVER CNP.        Per orders of Chelsey GLOVER CNP, injection of Tdap given by Deb Salazar CMA. Patient instructed to remain in clinic for 15 minutes afterwards, and to report any adverse reaction to me immediately.       Screening performed by Deb Salazar CMA on 6/24/2022 at 9:00 AM.

## 2022-06-24 NOTE — PROGRESS NOTES
SUBJECTIVE:   CC: Yovana Enriquez is an 46 year old woman who presents for preventive health visit.     Healthy Habits:     Getting at least 3 servings of Calcium per day:  Yes    Bi-annual eye exam:  Yes    Dental care twice a year:  Yes    Sleep apnea or symptoms of sleep apnea:  Daytime drowsiness    Diet:  Regular (no restrictions)    Frequency of exercise:  4-5 days/week    Duration of exercise:  30-45 minutes    Taking medications regularly:  Yes    Medication side effects:  Not applicable    PHQ-2 Total Score: 0    Additional concerns today:  Yes      Patient had sent a message last week due to intermittent urinary symptoms for the last few weeks. Appointment was recommended, but waited until her scheduled appointment today. For the last 3 weeks, has had intermittent urinary symptoms including dysuria, urgency, frequency, hematuria and some sense of incomplete emptying. Left side low back pain, that can be gone for days and then recur for short while. No consistent symptoms. No abnormal vaginal symptoms, fever, pelvic pain, nausea.    Today's PHQ-2 Score:   PHQ-2 ( 1999 Pfizer) 6/24/2022   Q1: Little interest or pleasure in doing things 0   Q2: Feeling down, depressed or hopeless 0   PHQ-2 Score 0   PHQ-2 Total Score (12-17 Years)- Positive if 3 or more points; Administer PHQ-A if positive -   Q1: Little interest or pleasure in doing things Not at all   Q2: Feeling down, depressed or hopeless Not at all   PHQ-2 Score 0       Abuse: Current or Past (Physical, Sexual or Emotional) - No  Do you feel safe in your environment? Yes        Social History     Tobacco Use     Smoking status: Never Smoker     Smokeless tobacco: Never Used     Tobacco comment: Lives in smoke free household   Substance Use Topics     Alcohol use: No     Alcohol/week: 0.0 standard drinks         Alcohol Use 6/24/2022   Prescreen: >3 drinks/day or >7 drinks/week? Not Applicable   Prescreen: >3 drinks/day or >7 drinks/week? -   No  flowsheet data found.    Reviewed orders with patient.  Reviewed health maintenance and updated orders accordingly - Yes  Patient Active Problem List   Diagnosis     Systemic lupus erythematosus (H)     Menorrhagia     History of ovarian cyst     Dysmenorrhea     History of gestational diabetes mellitus, not currently pregnant     Intramural leiomyoma of uterus     Hyperlipidemia LDL goal <130     Subclinical hyperthyroidism     Anxiety     High risk medication use     Fibromyalgia     Chronic constipation     Irritable bowel syndrome     Health Care Home     Hypertension goal BP (blood pressure) < 140/90     Non-celiac gluten sensitivity     Raynaud's phenomenon without gangrene     Sjogren's syndrome (H)     Intramural and submucous leiomyoma of uterus     Adrenal insufficiency (H)     Past Surgical History:   Procedure Laterality Date     ABDOMEN SURGERY  2004    Tubal ligation     COLONOSCOPY N/A 2/20/2015    Procedure: COMBINED COLONOSCOPY, SINGLE OR MULTIPLE BIOPSY/POLYPECTOMY BY BIOPSY;  Surgeon: Fred Cullen MD;  Location: [facility]     COLONOSCOPY N/A 10/29/2018    Procedure: COMBINED COLONOSCOPY, SINGLE OR MULTIPLE BIOPSY/POLYPECTOMY BY BIOPSY;  Surgeon: Inez Sawyer MD;  [facility]     COLONOSCOPY WITH CO2 INSUFFLATION N/A 2/20/2015    Procedure: COLONOSCOPY WITH CO2 INSUFFLATION;  Surgeon: Fred Cullen MD;  Location: [facility]     ENT SURGERY  10-24-14    Bottom lip biopsies     ESOPHAGOSCOPY, GASTROSCOPY, DUODENOSCOPY (EGD), COMBINED N/A 2/20/2015    Procedure: COMBINED ESOPHAGOSCOPY, GASTROSCOPY, DUODENOSCOPY (EGD), BIOPSY SINGLE OR MULTIPLE;  Surgeon: Fred Cullen MD;  Location: [facility]      BREATH HYDROGEN TEST  12/31/2013    Procedure: HYDROGEN BREATH TEST;  Surgeon: Camden Almazan MD;  [facility]: [facility]      HYSTEROSCOPY, SURGICAL; W/ ENDOMETRIAL ABLATION, ANY METHOD  10-19-12     ORTHOPEDIC SURGERY  12/2010     SHOULDER SURGERY Right     R shoulder      TUBAL LIGATION  2004       Social History     Tobacco Use     Smoking status: Never Smoker     Smokeless tobacco: Never Used     Tobacco comment: Lives in smoke free household   Substance Use Topics     Alcohol use: No     Alcohol/week: 0.0 standard drinks     Family History   Problem Relation Age of Onset     Respiratory Maternal Grandfather      Cancer Maternal Grandfather      Other Cancer Maternal Grandfather         Skin cancer     Asthma Son      Circulatory Maternal Grandmother         Brain anurysm     Diabetes Maternal Grandmother      Hypertension Mother      Glaucoma Mother 50        drops     Cancer Mother      Hypertension Sister      Hypertension Sister      Diabetes Sister      Hypertension Sister      Anxiety Disorder Sister      Asthma Son      Diabetes No family hx of      Cerebrovascular Disease No family hx of      Thyroid Disease No family hx of      Macular Degeneration No family hx of            Breast Cancer Screening:  Mammogram Screening: Recommended annual mammography  Pertinent mammograms are reviewed under the imaging tab.    History of abnormal Pap smear: NO - age 30-65 PAP every 5 years with negative HPV co-testing recommended  PAP / HPV Latest Ref Rng & Units 9/20/2016 8/28/2013   PAP (Historical) - NIL NIL   HPV16 NEG Negative -   HPV18 NEG Negative -   HRHPV NEG Negative -     Reviewed and updated as needed this visit by clinical staff   Tobacco  Allergies  Meds   Med Hx  Surg Hx  Fam Hx  Soc Hx          Reviewed and updated as needed this visit by Provider                   Past Medical History:   Diagnosis Date     Arthritis      Chronic constipation 12/16/2013     Dysmenorrhea 10/1/2012     Fibromyalgia 11/15/2013     Health Care Home 3/19/2014    **See Letters for [facility] Care Plan: Emergency Care Plan       High risk medication use 9/13/2013     History of gestational diabetes mellitus, not currently pregnant 10/1/2012     History of ovarian cyst 10/1/2012      "Hyperlipidemia LDL goal <130 10/18/2012     Hypertension goal BP (blood pressure) < 140/90 1/19/2015     Intramural leiomyoma of uterus 10/5/2012     Irritable bowel syndrome 3/11/2014    Patient states no history colonoscopy       Menorrhagia 10/1/2012     Non-celiac gluten sensitivity 2/8/2016     PONV (postoperative nausea and vomiting)      Subclinical hyperthyroidism 1/17/2013     Systemic lupus erythematosus (H) 4/23/2012      Past Surgical History:   Procedure Laterality Date     ABDOMEN SURGERY  2004    Tubal ligation     COLONOSCOPY N/A 2/20/2015    Procedure: COMBINED COLONOSCOPY, SINGLE OR MULTIPLE BIOPSY/POLYPECTOMY BY BIOPSY;  Surgeon: Fred Cullen MD;  [facility]     COLONOSCOPY N/A 10/29/2018    Procedure: COMBINED COLONOSCOPY, SINGLE OR MULTIPLE BIOPSY/POLYPECTOMY BY BIOPSY;  Surgeon: Inez Sawyer MD;  [facility]     COLONOSCOPY WITH CO2 INSUFFLATION N/A 2/20/2015    Procedure: COLONOSCOPY WITH CO2 INSUFFLATION;  Surgeon: Fred Cullen MD;  [facility]     ENT SURGERY  10-24-14    Bottom lip biopsies     ESOPHAGOSCOPY, GASTROSCOPY, DUODENOSCOPY (EGD), COMBINED N/A 2/20/2015    Procedure: COMBINED ESOPHAGOSCOPY, GASTROSCOPY, DUODENOSCOPY (EGD), BIOPSY SINGLE OR MULTIPLE;  Surgeon: Fred Cullen MD;  [facility]: [facility]     HC BREATH HYDROGEN TEST  12/31/2013    Procedure: HYDROGEN BREATH TEST;  Surgeon: Camden Almazan MD;  [facility]     HC HYSTEROSCOPY, SURGICAL; W/ ENDOMETRIAL ABLATION, ANY METHOD  10-19-12     ORTHOPEDIC SURGERY  12/2010     SHOULDER SURGERY Right     R shoulder     TUBAL LIGATION  2004       Review of Systems   ROS: 10 point ROS neg other than the symptoms noted above in the HPI.     OBJECTIVE:   BP (!) 168/83 (BP Location: Right arm, Patient Position: Sitting, Cuff Size: Adult Regular)   Pulse 75   Ht 1.676 m (5' 6\")   Wt 64.8 kg (142 lb 12.8 oz)   SpO2 100%   BMI 23.05 kg/m     Repeat blood pressure " 134/82  Physical Exam  GENERAL: healthy, alert and no distress  NECK: no adenopathy, no asymmetry, masses, or scars and thyroid normal to palpation  RESP: lungs clear to auscultation - no rales, rhonchi or wheezes  BREAST: normal without masses, tenderness or nipple discharge and no palpable axillary masses or adenopathy  CV: regular rate and rhythm, normal S1 S2, no S3 or S4, no murmur, click or rub, no peripheral edema and peripheral pulses strong  ABDOMEN: soft, nontender, no hepatosplenomegaly, no masses and bowel sounds normal   (female): normal female external genitalia, normal urethral meatus, vaginal mucosa pink, moist, well rugated, and normal cervix/adnexa/uterus without masses or discharge  MS: no gross musculoskeletal defects noted, no edema  SKIN: no suspicious lesions or rashes  NEURO: Normal strength and tone, mentation intact and speech normal  PSYCH: mentation appears normal, affect normal/bright    Diagnostic Test Results:  Labs reviewed in Epic  Results for orders placed or performed in visit on 06/24/22 (from the past 24 hour(s))   UA with Microscopic   Result Value Ref Range    Color Urine Yellow Colorless, Straw, Light Yellow, Yellow    Appearance Urine Clear Clear    Glucose Urine Negative Negative mg/dL    Bilirubin Urine Negative Negative    Ketones Urine Negative Negative mg/dL    Specific Gravity Urine <=1.005 1.003 - 1.035    Blood Urine Negative Negative    pH Urine 6.0 5.0 - 7.0    Protein Albumin Urine Negative Negative mg/dL    Urobilinogen Urine 0.2 0.2, 1.0 E.U./dL    Nitrite Urine Negative Negative    Leukocyte Esterase Urine Trace (A) Negative   Urine Microscopic Exam   Result Value Ref Range    RBC Urine None Seen 0-2 /HPF /HPF    WBC Urine 0-5 0-5 /HPF /HPF    Squamous Epithelials Urine Few (A) None Seen /LPF     *Note: Due to a large number of results and/or encounters for the requested time period, some results have not been displayed. A complete set of results can be found  "in Results Review.       ASSESSMENT/PLAN:   (Z01.419) Encounter for gynecological examination without abnormal finding  (primary encounter diagnosis)  Comment: Mammogram scheduled  Plan: Pap Screen with HPV - recommended age 30 - 65         years         (Z23) Need for Tdap vaccination  Plan: TDAP VACCINE (Adacel, Boostrix)          (R30.0) Dysuria  Comment: Await urine culture and if positive, treat as indicated. If negative and symptoms persist, to follow up with PCP.  Plan: UA with Microscopic, Urine Culture, Urine         Microscopic Exam         COUNSELING:  Reviewed preventive health counseling, as reflected in patient instructions  Special attention given to:        Regular exercise       Healthy diet/nutrition    Estimated body mass index is 23.05 kg/m  as calculated from the following:    Height as of this encounter: 1.676 m (5' 6\").    Weight as of this encounter: 64.8 kg (142 lb 12.8 oz).        She reports that she has never smoked. She has never used smokeless tobacco.      Counseling Resources:  ATP IV Guidelines  Pooled Cohorts Equation Calculator  Breast Cancer Risk Calculator  BRCA-Related Cancer Risk Assessment: FHS-7 Tool  FRAX Risk Assessment  ICSI Preventive Guidelines  Dietary Guidelines for Americans, 2010  USDA's MyPlate  ASA Prophylaxis  Lung CA Screening    [facility]  "

## 2022-06-26 LAB — BACTERIA UR CULT: NORMAL

## 2022-06-28 LAB
BKR LAB AP GYN ADEQUACY: NORMAL
BKR LAB AP GYN INTERPRETATION: NORMAL
BKR LAB AP HPV REFLEX: NORMAL
BKR LAB AP PREVIOUS ABNORMAL: NORMAL
PATH REPORT.COMMENTS IMP SPEC: NORMAL
PATH REPORT.COMMENTS IMP SPEC: NORMAL
PATH REPORT.RELEVANT HX SPEC: NORMAL

## 2022-06-30 PROBLEM — Z12.4 SCREENING FOR MALIGNANT NEOPLASM OF CERVIX: Status: ACTIVE | Noted: 2022-06-30

## 2022-06-30 LAB
HUMAN PAPILLOMA VIRUS 16 DNA: NEGATIVE
HUMAN PAPILLOMA VIRUS 18 DNA: NEGATIVE
HUMAN PAPILLOMA VIRUS FINAL DIAGNOSIS: NORMAL
HUMAN PAPILLOMA VIRUS OTHER HR: NEGATIVE

## 2022-07-02 ENCOUNTER — LAB (OUTPATIENT)
Dept: LAB | Facility: CLINIC | Age: 47
End: 2022-07-02
Payer: COMMERCIAL

## 2022-07-02 DIAGNOSIS — M32.9 SYSTEMIC LUPUS ERYTHEMATOSUS, UNSPECIFIED SLE TYPE, UNSPECIFIED ORGAN INVOLVEMENT STATUS (H): ICD-10-CM

## 2022-07-02 LAB
ALBUMIN MFR UR ELPH: 8.6 MG/DL
ALBUMIN UR-MCNC: NEGATIVE MG/DL
APPEARANCE UR: CLEAR
BASOPHILS # BLD AUTO: 0 10E3/UL (ref 0–0.2)
BASOPHILS NFR BLD AUTO: 1 %
BILIRUB UR QL STRIP: NEGATIVE
COLOR UR AUTO: YELLOW
CREAT UR-MCNC: 94.9 MG/DL
EOSINOPHIL # BLD AUTO: 0.1 10E3/UL (ref 0–0.7)
EOSINOPHIL NFR BLD AUTO: 3 %
ERYTHROCYTE [DISTWIDTH] IN BLOOD BY AUTOMATED COUNT: 11.6 % (ref 10–15)
ERYTHROCYTE [SEDIMENTATION RATE] IN BLOOD BY WESTERGREN METHOD: 4 MM/HR (ref 0–20)
GLUCOSE UR STRIP-MCNC: NEGATIVE MG/DL
HCT VFR BLD AUTO: 40.1 % (ref 35–47)
HGB BLD-MCNC: 14 G/DL (ref 11.7–15.7)
HGB UR QL STRIP: NEGATIVE
KETONES UR STRIP-MCNC: NEGATIVE MG/DL
LEUKOCYTE ESTERASE UR QL STRIP: NEGATIVE
LYMPHOCYTES # BLD AUTO: 1.1 10E3/UL (ref 0.8–5.3)
LYMPHOCYTES NFR BLD AUTO: 24 %
MCH RBC QN AUTO: 32.9 PG (ref 26.5–33)
MCHC RBC AUTO-ENTMCNC: 34.9 G/DL (ref 31.5–36.5)
MCV RBC AUTO: 94 FL (ref 78–100)
MONOCYTES # BLD AUTO: 0.4 10E3/UL (ref 0–1.3)
MONOCYTES NFR BLD AUTO: 8 %
NEUTROPHILS # BLD AUTO: 3 10E3/UL (ref 1.6–8.3)
NEUTROPHILS NFR BLD AUTO: 65 %
NITRATE UR QL: NEGATIVE
PH UR STRIP: 6.5 [PH] (ref 5–7)
PLATELET # BLD AUTO: 299 10E3/UL (ref 150–450)
PROT/CREAT 24H UR: 0.09 MG/MG CR (ref 0–0.2)
RBC # BLD AUTO: 4.25 10E6/UL (ref 3.8–5.2)
SP GR UR STRIP: 1.01 (ref 1–1.03)
UROBILINOGEN UR STRIP-ACNC: 0.2 E.U./DL
WBC # BLD AUTO: 4.7 10E3/UL (ref 4–11)

## 2022-07-02 PROCEDURE — 85025 COMPLETE CBC W/AUTO DIFF WBC: CPT

## 2022-07-02 PROCEDURE — 81003 URINALYSIS AUTO W/O SCOPE: CPT

## 2022-07-02 PROCEDURE — 85652 RBC SED RATE AUTOMATED: CPT

## 2022-07-02 PROCEDURE — 80053 COMPREHEN METABOLIC PANEL: CPT

## 2022-07-02 PROCEDURE — 86225 DNA ANTIBODY NATIVE: CPT

## 2022-07-02 PROCEDURE — 86160 COMPLEMENT ANTIGEN: CPT | Mod: 59

## 2022-07-02 PROCEDURE — 86160 COMPLEMENT ANTIGEN: CPT

## 2022-07-02 PROCEDURE — 86140 C-REACTIVE PROTEIN: CPT

## 2022-07-02 PROCEDURE — 36415 COLL VENOUS BLD VENIPUNCTURE: CPT

## 2022-07-02 PROCEDURE — 84156 ASSAY OF PROTEIN URINE: CPT

## 2022-07-05 LAB
ALBUMIN SERPL-MCNC: 3.9 G/DL (ref 3.4–5)
ALP SERPL-CCNC: 56 U/L (ref 40–150)
ALT SERPL W P-5'-P-CCNC: 20 U/L (ref 0–50)
ANION GAP SERPL CALCULATED.3IONS-SCNC: 5 MMOL/L (ref 3–14)
AST SERPL W P-5'-P-CCNC: 20 U/L (ref 0–45)
BILIRUB SERPL-MCNC: 0.4 MG/DL (ref 0.2–1.3)
BUN SERPL-MCNC: 13 MG/DL (ref 7–30)
C3 SERPL-MCNC: 98 MG/DL (ref 81–157)
C4 SERPL-MCNC: 22 MG/DL (ref 13–39)
CALCIUM SERPL-MCNC: 8.8 MG/DL (ref 8.5–10.1)
CHLORIDE BLD-SCNC: 104 MMOL/L (ref 94–109)
CO2 SERPL-SCNC: 31 MMOL/L (ref 20–32)
CREAT SERPL-MCNC: 0.69 MG/DL (ref 0.52–1.04)
CRP SERPL-MCNC: <2.9 MG/L (ref 0–8)
DSDNA AB SER-ACNC: <0.6 IU/ML
GFR SERPL CREATININE-BSD FRML MDRD: >90 ML/MIN/1.73M2
GLUCOSE BLD-MCNC: 99 MG/DL (ref 70–99)
POTASSIUM BLD-SCNC: 3.8 MMOL/L (ref 3.4–5.3)
PROT SERPL-MCNC: 6.7 G/DL (ref 6.8–8.8)
SODIUM SERPL-SCNC: 140 MMOL/L (ref 133–144)

## 2022-07-16 ENCOUNTER — ANCILLARY PROCEDURE (OUTPATIENT)
Dept: MAMMOGRAPHY | Facility: CLINIC | Age: 47
End: 2022-07-16
Attending: PHYSICIAN ASSISTANT
Payer: COMMERCIAL

## 2022-07-16 DIAGNOSIS — Z12.31 VISIT FOR SCREENING MAMMOGRAM: ICD-10-CM

## 2022-07-16 PROCEDURE — 77067 SCR MAMMO BI INCL CAD: CPT | Mod: TC | Performed by: RADIOLOGY

## 2022-07-16 PROCEDURE — 77063 BREAST TOMOSYNTHESIS BI: CPT | Mod: TC | Performed by: RADIOLOGY

## 2022-08-02 DIAGNOSIS — I10 HYPERTENSION GOAL BP (BLOOD PRESSURE) < 140/90: ICD-10-CM

## 2022-08-03 RX ORDER — LOSARTAN POTASSIUM 25 MG/1
TABLET ORAL
Qty: 90 TABLET | Refills: 0 | Status: SHIPPED | OUTPATIENT
Start: 2022-08-03 | End: 2023-03-16

## 2022-08-03 NOTE — TELEPHONE ENCOUNTER
Prescription approved per [facility] Refill Protocol.    Angie Carey RN BSN  [facility]

## 2022-08-05 ENCOUNTER — OFFICE VISIT (OUTPATIENT)
Dept: OPHTHALMOLOGY | Facility: CLINIC | Age: 47
End: 2022-08-05
Payer: COMMERCIAL

## 2022-08-05 DIAGNOSIS — M32.9 SYSTEMIC LUPUS ERYTHEMATOSUS, UNSPECIFIED SLE TYPE, UNSPECIFIED ORGAN INVOLVEMENT STATUS (H): ICD-10-CM

## 2022-08-05 DIAGNOSIS — Z79.899 HIGH RISK MEDICATION USE: Primary | ICD-10-CM

## 2022-08-05 PROCEDURE — 92083 EXTENDED VISUAL FIELD XM: CPT | Performed by: STUDENT IN AN ORGANIZED HEALTH CARE EDUCATION/TRAINING PROGRAM

## 2022-08-05 PROCEDURE — 92012 INTRM OPH EXAM EST PATIENT: CPT | Performed by: STUDENT IN AN ORGANIZED HEALTH CARE EDUCATION/TRAINING PROGRAM

## 2022-08-05 PROCEDURE — 92134 CPTRZ OPH DX IMG PST SGM RTA: CPT | Performed by: STUDENT IN AN ORGANIZED HEALTH CARE EDUCATION/TRAINING PROGRAM

## 2022-08-05 ASSESSMENT — SLIT LAMP EXAM - LIDS
COMMENTS: NORMAL
COMMENTS: NORMAL

## 2022-08-05 ASSESSMENT — VISUAL ACUITY
OD_CC: 20/25
CORRECTION_TYPE: GLASSES
METHOD: SNELLEN - LINEAR
OS_CC: 20/25

## 2022-08-05 ASSESSMENT — REFRACTION_WEARINGRX
OD_AXIS: 020
OD_ADD: +2.00
OS_SPHERE: -0.75
SPECS_TYPE: PAL
OS_ADD: +2.00
OD_SPHERE: -0.75
OS_AXIS: 180
OD_CYLINDER: +0.25
OS_CYLINDER: +0.50

## 2022-08-05 ASSESSMENT — EXTERNAL EXAM - RIGHT EYE: OD_EXAM: NORMAL

## 2022-08-05 ASSESSMENT — CUP TO DISC RATIO
OD_RATIO: 0.4 UD
OS_RATIO: 0.4 UD

## 2022-08-05 ASSESSMENT — EXTERNAL EXAM - LEFT EYE: OS_EXAM: NORMAL

## 2022-08-05 NOTE — LETTER
8/5/2022         RE: Yovana Enriquez  [address]  [place] [address]        Dear Colleague,    Thank you for referring your patient, Yovana Enriquez, to the [facility]. Please see a copy of my visit note below.     Current Eye Medications:  Artificial tears - both eyes PRN     Subjective:  Referred by Dr. Colvin for High-Risk Med Testing.  Vision is stable with present gls - last eye exam with outside optometrist 6 months ago.  Occasional dryness - relief with artificial tears as needed.     Hydroxychloroquine 200 mg - alternates 200mg and 400mg every other day for sjogrens and lupus.      Objective:  See Ophthalmology Exam.       Assessment:  Yovana Enriquez is a 47 year old female who presents with:   Encounter Diagnoses   Name Primary?     High risk medication use Yes    Plaquenil for about 10 years now.    Macular SD-OCT within normal limits both eyes.    Grubbs visual field (HVF) 10-2 within normal limits both eyes.    No signs of Plaquenil retinal toxicity at present.        Systemic lupus erythematosus, unspecified SLE type, unspecified organ involvement status (H)        Plan:  Normal Plaquenil eye tests today.    Continue artificial tears up to four times a day as needed     Azael Queen MD  [phone]          Again, thank you for allowing me to participate in the care of your patient.        Sincerely,        Azael Queen MD

## 2022-08-05 NOTE — PROGRESS NOTES
Current Eye Medications:  Artificial tears - both eyes PRN     Subjective:  Referred by Dr. Colvin for High-Risk Med Testing.  Vision is stable with present gls - last eye exam with outside optometrist 6 months ago.  Occasional dryness - relief with artificial tears as needed.     Hydroxychloroquine 200 mg - alternates 200mg and 400mg every other day for sjogrens and lupus.      Objective:  See Ophthalmology Exam.       Assessment:  Yovana Enriquez is a 47 year old female who presents with:   Encounter Diagnoses   Name Primary?     High risk medication use Yes    Plaquenil for about 10 years now.    Macular SD-OCT within normal limits both eyes.    Grubbs visual field (HVF) 10-2 within normal limits both eyes.    No signs of Plaquenil retinal toxicity at present.        Systemic lupus erythematosus, unspecified SLE type, unspecified organ involvement status (H)        Plan:  Normal Plaquenil eye tests today.    Continue artificial tears up to four times a day as needed     Azael Queen MD  [phone]

## 2022-08-05 NOTE — PATIENT INSTRUCTIONS
Normal Plaquenil eye tests today.    Continue artificial tears up to four times a day as needed     Azael Queen MD  [phone]

## 2022-08-11 DIAGNOSIS — M32.9 SYSTEMIC LUPUS ERYTHEMATOSUS, UNSPECIFIED SLE TYPE, UNSPECIFIED ORGAN INVOLVEMENT STATUS (H): ICD-10-CM

## 2022-08-12 RX ORDER — HYDROXYCHLOROQUINE SULFATE 200 MG/1
TABLET, FILM COATED ORAL
Qty: 135 TABLET | Refills: 0 | Status: SHIPPED | OUTPATIENT
Start: 2022-08-12 | End: 2022-08-15

## 2022-08-12 NOTE — TELEPHONE ENCOUNTER
Patient had a Plaquenil exam on 8/5/22 at the [facility] which was normal. Refilled Plaquenil prescription per rheumatology protocol.    Jasen RICE RN....8/12/2022 11:32 AM

## 2022-08-12 NOTE — TELEPHONE ENCOUNTER
Patient's last visit note from 5/11/22 with Dr. Colvin states:    - Continue hydroxychloroquine 300mg daily on average: 200mg daily with additional 200mg every other day (toxicity monitoring eye exam last done on 5/20/2019; advised that she update the eye exam; required for next refill)    Sent patient a [employer] message inquiring as to whether she has had an updated eye exam.    Jasen RICE RN....8/12/2022 10:47 AM

## 2022-08-15 ENCOUNTER — OFFICE VISIT (OUTPATIENT)
Dept: RHEUMATOLOGY | Facility: CLINIC | Age: 47
End: 2022-08-15
Payer: COMMERCIAL

## 2022-08-15 VITALS
HEART RATE: 85 BPM | SYSTOLIC BLOOD PRESSURE: 131 MMHG | WEIGHT: 142 LBS | DIASTOLIC BLOOD PRESSURE: 87 MMHG | OXYGEN SATURATION: 99 % | BODY MASS INDEX: 22.92 KG/M2

## 2022-08-15 DIAGNOSIS — I73.00 RAYNAUD'S PHENOMENON WITHOUT GANGRENE: ICD-10-CM

## 2022-08-15 DIAGNOSIS — M32.9 SYSTEMIC LUPUS ERYTHEMATOSUS, UNSPECIFIED SLE TYPE, UNSPECIFIED ORGAN INVOLVEMENT STATUS (H): Primary | ICD-10-CM

## 2022-08-15 DIAGNOSIS — M70.62 TROCHANTERIC BURSITIS OF BOTH HIPS: ICD-10-CM

## 2022-08-15 DIAGNOSIS — Z79.899 HIGH RISK MEDICATIONS (NOT ANTICOAGULANTS) LONG-TERM USE: ICD-10-CM

## 2022-08-15 DIAGNOSIS — M35.01 SJOGREN'S SYNDROME WITH KERATOCONJUNCTIVITIS SICCA (H): ICD-10-CM

## 2022-08-15 DIAGNOSIS — M70.61 TROCHANTERIC BURSITIS OF BOTH HIPS: ICD-10-CM

## 2022-08-15 PROCEDURE — 99214 OFFICE O/P EST MOD 30 MIN: CPT | Performed by: INTERNAL MEDICINE

## 2022-08-15 RX ORDER — METHYLPREDNISOLONE ACETATE 40 MG/ML
40 INJECTION, SUSPENSION INTRA-ARTICULAR; INTRALESIONAL; INTRAMUSCULAR; SOFT TISSUE ONCE
Status: CANCELLED | OUTPATIENT
Start: 2022-08-15 | End: 2022-08-15

## 2022-08-15 RX ORDER — AZATHIOPRINE 50 MG/1
75 TABLET ORAL DAILY
Qty: 135 TABLET | Refills: 2 | Status: SHIPPED | OUTPATIENT
Start: 2022-08-15 | End: 2023-04-28

## 2022-08-15 RX ORDER — CEVIMELINE HYDROCHLORIDE 30 MG/1
30 CAPSULE ORAL 2 TIMES DAILY
Qty: 180 CAPSULE | Refills: 2 | Status: SHIPPED | OUTPATIENT
Start: 2022-08-15 | End: 2022-11-15

## 2022-08-15 RX ORDER — AMLODIPINE BESYLATE 5 MG/1
5 TABLET ORAL DAILY
Qty: 90 TABLET | Refills: 2 | Status: SHIPPED | OUTPATIENT
Start: 2022-08-15 | End: 2023-04-28

## 2022-08-15 RX ORDER — HYDROXYCHLOROQUINE SULFATE 200 MG/1
TABLET, FILM COATED ORAL
Qty: 135 TABLET | Refills: 3 | Status: SHIPPED | OUTPATIENT
Start: 2022-08-15 | End: 2023-04-28

## 2022-08-15 NOTE — PROGRESS NOTES
Rheumatology Clinic Visit      Yovana Enriquez MRN# [id number]   YOB: 1975 Age: 47 year old      Date of visit: 8/15/22   PCP: Bradford Mario    Chief Complaint   Patient presents with:  Systemic Lupus Erythematous: Doing good.Would like both hips injected.    Assessment and Plan     1. Systemic lupus erythematosus (YANELIS by EIA positive in the past, leukopenia/lymphocytopenia, hypocomplementemia, polyarthralgias, oral sores, history of malar rash, photophobia, photosensitivity, Raynaud's Phenomenon): Previously on methotrexate that was discontinued for unclear reasons but the patient reports that she tolerated it well. Currently on azathioprine 75 mg daily (dose reduced on 11/8/2018 from 100mg daily without worsening symptoms at that time, eventually reduced to 50 mg daily and did well for a period of time; now on 75 mg daily), hydroxychloroquine 300 mg daily [avg], and Benlysta (worsening symptoms when stopped in the past).  Overall doing well with regard to lupus at this time.  Noted that she has likely adrenal insufficiency with a lower dose of cortisol in the past and has had difficulty getting off of a low-dose of prednisone; so will continue prednisone 2.5 mg daily.   - Continue azathioprine 75 mg daily   - Continue hydroxychloroquine 300mg daily on average: 200mg daily with additional 200mg every other day (toxicity monitoring eye exam last done on 8/5/2022 by Dr. Queen)  - Continue Benlysta 200 mg SQ every 7 days  - Continue Prednisone 2.5mg daily   - Continue photoprotection: Covering up and using sunscreen  - Labs in early October: CBC, CMP, ESR, CRP, UA, Uprotein:creatinine    High risk medication requiring intensive toxicity monitoring at least quarterly: labs ordered include CBC, Creatinine, Hepatic panel to monitor for cytopenia and hepatotoxicity; checking creatinine as it affects clearance of medication.     2. Secondary Sjogren syndrome: Doing well with Evoxac and frequent sips of  water, but then Evoxac became more expensive so pilocarpine was tried but not tolerated (GI upset).  Therefore, changed back to Evoxac.  She is going to check with her insurance about possible tier exception to help with Evoxac cost. Dry eyes well controled with artificial tears as needed.  Chronic illness  - Restart Evoxac 30 mg twice a day  - Discontinue Pilocarpine 5mg daily x7days, then 5mg twice daily x7days, then 5mg three times daily thereafter.    3. Raynaud's Phenomenon: Amlodipine 5mg daily is effective.  Chronic illness, stable  - Continue amlodipine 5mg daily     4. R>Ltrochanteric bursitis, possible iliotibial band syndrome as well:  Also with symptoms of iliotibial band syndrome and degenerative arthritis of the lumbar spine.  Encouraged continued physical therapy exercises on her own at home.  Last steroid injection for trochanteric bursitis was very effective and only minimal symptoms at this time and recently; she does not feel like a repeat steroid injection is needed at this time.        5. Fibromyalgia: Managed by the pain clinic and PCP.    6. MGUS: SPEP and immunofixation advised to be done yearly per Dr. Yoselin Payan; she may follow-up with her PCP for this issue.  Documented here for historical significance    7.  Vaccinations: Vaccinations reviewed with Ms. Enriquez.    - Influenza: encouraged yearly vaccination  - [id number]: up to date  - [id number]: up to date    - COVID-19: has received the Pfizer COVID-19 vaccine 3/13/2021, 4/3/2021.A third dose of an mRNA COVID-19 vaccination is recommended to receive 28 days after the second mRNA COVID-19 vaccination was received; this 3rd dose should be from the same  as the first two doses, and will complete the initial vaccine series.   A 4th dose (1st booster) of the mRNA COVID-19 vaccination is recommended to be received 3 months after the third mRNA COVID-19 vaccination was received.  A 5th mRNA vaccine (2nd booster) may be received at  least 4 months after the 4th dose.   Based on our current understanding (and this may change over time as we learn more), medications should be adjusted as noted below, if disease activity allows:  - NSAIDs and Acetaminophen: hold for 24 hours prior to vaccination if able to do so  - Azathioprine should be held for 1-2 weeks after each COVID-19 vaccine (as disease activity allows)  - Benlysta (belimumab) subcutaneous (SQ) should be held for 1-2 weeks after each COVID-19 vaccine (as disease activity allows)       Total minutes spent in evaluation with patient, documentation, , and review of pertinent studies and chart notes: 24    Ms. Enriquez verbalized agreement with and understanding of the rational for the diagnosis and treatment plan.  All questions were answered to best of my ability and the patient's satisfaction. Ms. Enriquez was advised to contact the clinic with any questions that may arise after the clinic visit.      Thank you for involving me in the care of the patient    Return to clinic: 3-4 months    HPI   Yovana Enriquez is a 47 year old female with medical history significant for subclinical hyperthyroidism, hypertension, irritable bowel syndrome, fibromyalgia, hypertension, non-celiac gluten sensitivity (reportedly with bowel biopsies and laboratory workup that did not show celiac disease), anxiety, and systemic lupus erythematosus who presents for follow-up of SLE.    Today, 8/15/2022: Currently doing well.  Minimal pain at the trochanteric bursae does not feel like she needs a repeat steroid injection at this time.  Pilocarpine caused stomach upset and she would like to retry Evoxac.  Dry eyes controlled with artificial tears; dry mouth is not doing well since stopping Evoxac.  Raynaud's phenomenon is well controlled on amlodipine.  Photosensitivity: Fatigue.  Tries to avoid sun exposure, uses sunscreen and covers up.    Denies fevers, chills, nausea, vomiting. No abdominal pain.  No  chest pain/pressure, palpitations, or shortness of breath.  No rash currently. No LE swelling.  She has photosensitivity and photophobia.   No eye pain or redness. No history of serositis. No history of DVT, pulmonary embolism, or miscarriage.     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    ROS   12 point review of system was completed and negative except as noted in the HPI     Active Problem List     Patient Active Problem List   Diagnosis     Systemic lupus erythematosus (H)     Menorrhagia     History of ovarian cyst     Dysmenorrhea     History of gestational diabetes mellitus, not currently pregnant     Intramural leiomyoma of uterus     Hyperlipidemia LDL goal <130     Subclinical hyperthyroidism     Anxiety     High risk medication use     Fibromyalgia     Chronic constipation     Irritable bowel syndrome     Health Care Home     Hypertension goal BP (blood pressure) < 140/90     Non-celiac gluten sensitivity     Raynaud's phenomenon without gangrene     Sjogren's syndrome (H)     Intramural and submucous leiomyoma of uterus     Adrenal insufficiency (H)     Screening for malignant neoplasm of cervix     Past Medical History     Past Medical History:   Diagnosis Date     Arthritis      Chronic constipation 12/16/2013     Dysmenorrhea 10/1/2012     Fibromyalgia 11/15/2013     Health Care Home 3/19/2014    **See Letters for [facility] Care Plan: Emergency Care Plan       High risk medication use 9/13/2013     History of gestational diabetes mellitus, not currently pregnant 10/1/2012     History of ovarian cyst 10/1/2012     Hyperlipidemia LDL goal <130 10/18/2012     Hypertension goal BP (blood pressure) < 140/90 1/19/2015     Intramural leiomyoma of uterus 10/5/2012     Irritable bowel syndrome 3/11/2014    Patient states no history colonoscopy       Menorrhagia 10/1/2012     Non-celiac gluten sensitivity 2/8/2016     PONV (postoperative nausea and vomiting)      Subclinical  hyperthyroidism 1/17/2013     Systemic lupus erythematosus (H) 4/23/2012     Past Surgical History     Past Surgical History:   Procedure Laterality Date     ABDOMEN SURGERY  2004    Tubal ligation     COLONOSCOPY N/A 2/20/2015    Procedure: COMBINED COLONOSCOPY, SINGLE OR MULTIPLE BIOPSY/POLYPECTOMY BY BIOPSY;  Surgeon: Fred Cullen MD;  Location: [facility] OR     COLONOSCOPY N/A 10/29/2018    Procedure: COMBINED COLONOSCOPY, SINGLE OR MULTIPLE BIOPSY/POLYPECTOMY BY BIOPSY;  Surgeon: Inez Sawyer MD;  Location: [facility]     COLONOSCOPY WITH CO2 INSUFFLATION N/A 2/20/2015    Procedure: COLONOSCOPY WITH CO2 INSUFFLATION;  Surgeon: Fred Cullen MD;  Location: [facility] OR     ENT SURGERY  10-24-14    Bottom lip biopsies     ESOPHAGOSCOPY, GASTROSCOPY, DUODENOSCOPY (EGD), COMBINED N/A 2/20/2015    Procedure: COMBINED ESOPHAGOSCOPY, GASTROSCOPY, DUODENOSCOPY (EGD), BIOPSY SINGLE OR MULTIPLE;  Surgeon: Fred Cullen MD;  Location: [facility] OR     HC BREATH HYDROGEN TEST  12/31/2013    Procedure: HYDROGEN BREATH TEST;  Surgeon: Camden Almazan MD;  Location: [facility]     HC HYSTEROSCOPY, SURGICAL; W/ ENDOMETRIAL ABLATION, ANY METHOD  10-19-12     ORTHOPEDIC SURGERY  12/2010     SHOULDER SURGERY Right     R shoulder     TUBAL LIGATION  2004     Allergy     Allergies   Allergen Reactions     Fluticasone Propionate (Nasal) [Fluticasone] Anaphylaxis     Current Medication List     Current Outpatient Medications   Medication Sig     acetaminophen (TYLENOL) 325 MG tablet Take 325-650 mg by mouth every 6 hours as needed for mild pain or headaches Maximum daily dose of acetaminophen is 3000 mg in 24 hours.     amLODIPine (NORVASC) 5 MG tablet Take 1 tablet (5 mg) by mouth daily     azaTHIOprine (IMURAN) 50 MG tablet Take 1.5 tablets (75 mg) by mouth daily     azelastine (ASTELIN) 0.1 % nasal spray Spray 1 spray into both nostrils 2 times daily     Belimumab 200 MG/ML SOAJ Inject 200 mg Subcutaneous every 7  days . Hold for signs of infection and seek medical attention. Autoinjector     Calcium Carb-Cholecalciferol (CALCIUM + D3) 600-200 MG-UNIT TABS Take 1 tablet by mouth daily     diclofenac (VOLTAREN) 1 % GEL Apply 2-4 grams to knees or shoulders four times daily as needed using enclosed dosing card.     famotidine (PEPCID) 40 MG tablet TAKE 1 TABLET (40 MG) BY MOUTH NIGHTLY AS NEEDED FOR HEARTBURN     hydroxychloroquine (PLAQUENIL) 200 MG tablet Hydroxychloroquine 200mg daily; and an additional 200mg every other day. Yearly eye exam, including 10-2 VF and SD-OCT, is required     losartan (COZAAR) 25 MG tablet TAKE 1 TABLET BY MOUTH EVERY DAY     nortriptyline (PAMELOR) 10 MG capsule Take 1 capsule (10 mg) by mouth At Bedtime . 3 months supply     ondansetron (ZOFRAN-ODT) 4 MG ODT tab Take 1 tablet (4 mg) by mouth every 8 hours as needed for nausea Recommend limited use     predniSONE (DELTASONE) 2.5 MG tablet Take 1 tablet (2.5 mg) by mouth in the morning.     Vitamin D, Cholecalciferol, 1000 units CAPS Take 4,000 Int'l Units by mouth daily     blood glucose test strip Used for testing glucose 2-3 times daily (Patient not taking: No sig reported)     docusate sodium (COLACE) 100 MG capsule Take 1 capsule (100 mg) by mouth 2 times daily (Patient not taking: No sig reported)     pilocarpine (SALAGEN) 5 MG tablet Pilocarpine 5mg daily x7days, then 5mg twice daily x7days, then 5mg three times daily thereafter. (Patient not taking: No sig reported)     SUMAtriptan (IMITREX) 50 MG tablet Take 1 tablet (50 mg) by mouth at onset of headache for migraine . After 2 hours, if no relief, ok to take 2nd dose.  Limit 2 tabs/24 hours. (Patient taking differently: Take 50 mg by mouth at onset of headache for migraine . After 2 hours, if no relief, ok to take 2nd dose.  Limit 2 tabs/24 hours.)     No current facility-administered medications for this visit.         Social History   See HPI    Family History     Family History  "  Problem Relation Age of Onset     Respiratory Maternal Grandfather      Cancer Maternal Grandfather      Other Cancer Maternal Grandfather         Skin cancer     Asthma Son      Circulatory Maternal Grandmother         Brain anurysm     Diabetes Maternal Grandmother      Hypertension Mother      Glaucoma Mother 50        drops     Cancer Mother      Hypertension Sister      Hypertension Sister      Diabetes Sister      Hypertension Sister      Anxiety Disorder Sister      Asthma Son      Diabetes No family hx of      Cerebrovascular Disease No family hx of      Thyroid Disease No family hx of      Macular Degeneration No family hx of        Physical Exam     Temp Readings from Last 3 Encounters:   12/20/21 97.4  F (36.3  C) (Oral)   09/01/21 98.7  F (37.1  C) (Tympanic)   06/13/21 98.5  F (36.9  C) (Tympanic)     BP Readings from Last 5 Encounters:   08/15/22 131/87   06/24/22 134/82   12/28/21 (!) 145/92   12/20/21 (!) 162/97   09/01/21 118/86     Pulse Readings from Last 1 Encounters:   08/15/22 85     Resp Readings from Last 1 Encounters:   09/01/21 20     Estimated body mass index is 22.92 kg/m  as calculated from the following:    Height as of 6/24/22: 1.676 m (5' 6\").    Weight as of this encounter: 64.4 kg (142 lb).      GEN: NAD.   HEENT:  Anicteric, noninjected sclera. No obvious external lesions of the ear and nose. Hearing intact.  CV: S1, S2. RRR. No m/r/g  PULM: No increased work of breathing. CTA bilaterally   MSK: MCPs, PIPs, DIPs without swelling or tenderness to palpation.  Heberden's nodes present.  Wrists without swelling or tenderness to palpation.  Elbows and shoulders without swelling or tenderness to palpation.  Shoulders with normal range of motion.  Knees, ankles, and MTPs without swelling or tenderness to palpation.  Mild tenderness to palpation of the bilateral trochanteric bursae.  SKIN: No rash or jaundice seen  PSYCH: Alert. Appropriate.        Labs / Imaging (select studies) "     YANELIS  Recent Labs   Lab Test 07/25/16  1433   ANAIGG <1:40  Reference range: <1:40  (Note)  <1:40  INTERPRETIVE INFORMATION: YANELIS by IFA, IgG  Anti-nuclear antibodies (YANELIS) are seen in a variety of  systemic rheumatic diseases and are determined by indirect  fluorescence assay (IFA) using HEp-2 substrate with an  IgG-specific conjugate. YANELIS titers less than or equal to  1:80 have variable relevance while titers greater than or  equal to 1:160 are considered clinically significant. These  antibodies may precede clinical disease onset; however,  healthy individuals and those with advanced age have been  reported to be positive for YANELIS. When observed, one of the  five basic patterns is reported: homogeneous,  peripheral/rim, speckled, centromere, or nucleolar. If  cytoplasmic fluorescence is observed, it is noted. IFA  methodology is subjective and has occasionally been shown  to lack sensitivity for anti-SSA/Ro antibodies.  Negative results do not necessarily rule out the presence  of SSc. If clinical suspicion remains, consider further  te sting for U3-RNP, PM/Scl, or Th/To antibodies associated  with SSc.  Performed by [facility],  [address] [place] [id number] [phone]  [email or web address] Alcon Krishna MD, Lab. Director       RNP/Sm/SSA/SSB  Recent Labs   Lab Test 03/23/16  1759 01/04/16  1806 07/01/14  1211   RNPIGG  --   --  <0.2  Negative     SMIGG <0.2  Negative   Antibody index (AI) values reflect qualitative changes in antibody   concentration that cannot be directly associated with clinical condition or   disease state.   <0.2  Negative   Antibody index (AI) values reflect qualitative changes in antibody   concentration that cannot be directly associated with clinical condition or   disease state.   <0.2  Negative     SSAIGG  --   --  <0.2  Negative     SSBIGG  --   --  <0.2  Negative     SCLIGG  --   --  <0.2  Negative       dsDNA  Recent Labs   Lab Test 07/02/22  0905 04/03/22  1236  10/10/21  1202 07/19/21  1820 04/20/21  1804 01/11/21  1805 10/25/20  1059 04/07/20  1136 01/13/20  1804   DNA <0.6 0.7 <0.6 <0.6 1 <1 1 1 <1     C3/C4  Recent Labs   Lab Test 07/02/22  0905 10/10/21  1202 07/19/21  1820 04/20/21  1804 01/11/21  1805 10/25/20  1059 04/07/20  1136 01/13/20  1804 10/21/19  1552   M5YIJEQ 98 89 91 97 91 103 100 85 77   M3CPRDJ 22 17 20 20 20 >9* 16 21 13*     CBC  Recent Labs   Lab Test 07/02/22  0905 04/03/22  1236 01/08/22  1156 07/19/21  1820 06/13/21  1312 04/20/21  1804 01/11/21  1805   WBC 4.7 5.4 5.3   < > 5.8 7.7 8.1   RBC 4.25 4.29 4.17   < > 4.19 4.22 4.00   HGB 14.0 14.0 13.3   < > 13.5 13.7 13.2   HCT 40.1 40.4 40.3   < > 40.6 40.0 38.7   MCV 94 94 97   < > 97 95 97   RDW 11.6 11.5 11.3   < > 11.6 11.5 11.5    286 294   < > 300 313 316   MCH 32.9 32.6 31.9   < > 32.2 32.5 33.0   MCHC 34.9 34.7 33.0   < > 33.3 34.3 34.1   NEUTROPHIL 65 79 69   < > 77.2 81.2 83.4   LYMPH 24 12 23   < > 13.7 10.8 8.8   MONOCYTE 8 7 6   < > 6.2 6.4 6.7   EOSINOPHIL 3 2 1   < > 1.7 0.8 0.6   BASOPHIL 1 1 1   < > 1.2 0.8 0.5   ANEU  --   --   --   --  4.5 6.3 6.7   ALYM  --   --   --   --  0.8 0.8 0.7*   ZOË  --   --   --   --  0.4 0.5 0.5   AEOS  --   --   --   --  0.1 0.1 0.1   ABAS  --   --   --   --  0.1 0.1 0.0   ANEUTAUTO 3.0 4.3 3.6   < >  --   --   --    ALYMPAUTO 1.1 0.6* 1.2   < >  --   --   --    AMONOAUTO 0.4 0.4 0.3   < >  --   --   --    AEOSAUTO 0.1 0.1 0.1   < >  --   --   --    ABSBASO 0.0 0.0 0.0   < >  --   --   --     < > = values in this interval not displayed.     CMP  Recent Labs   Lab Test 07/02/22  0905 04/03/22  1236 01/08/22  1156 10/10/21  1202 07/19/21  1820 07/19/21  1820 04/20/21  1804 01/11/21  1805 10/25/20  1059 04/07/20  1136    138 140 139   < > 140 140 138 139 140   POTASSIUM 3.8 4.2 4.1 4.3   < > 3.8 4.1 3.9 4.1 3.6   CHLORIDE 104 105 105 107   < > 107 106 106 104 106   CO2 31 25 30 28   < > 30 33* 31 31 31   ANIONGAP 5 8 5 4   < > 3 1* 1* 4 3    GLC 99 109* 125* 89  --  113* 97 99 86 104*   BUN 13 16 13 11   < > 12 13 15 11 16   CR 0.69 0.75 0.72 0.64   < > 0.79 0.76 0.77 0.73 0.71   GFRESTIMATED >90 >90 >90 >90   < > 89 >90 >90 >90 >90   GFRESTBLACK  --   --   --   --   --   --  >90 >90 >90 >90   MELANIA 8.8 8.9 8.6 8.5   < > 8.8 9.0 8.8 9.1 9.1   BILITOTAL 0.4 0.4 0.4 0.3   < > 0.3 0.3 0.2 0.4 0.3   ALBUMIN 3.9 4.1 3.8 3.8   < > 3.8 4.1 4.0 4.4 4.3   PROTTOTAL 6.7* 6.9 6.6* 6.4*   < > 6.7* 6.8 6.5* 7.5 7.1   ALKPHOS 56 55 69 55   < > 60 57 55 62 54   AST 20 22 14 22   < > 24 17 14 22 13   ALT 20 30 24 28   < > 26 25 19 23 24    < > = values in this interval not displayed.       HgA1c  Recent Labs   Lab Test 05/30/19  0859   A1C 5.2     Calcium/VitaminD  Recent Labs   Lab Test 07/02/22  0905 04/03/22  1236 01/08/22  1156 10/25/20  1059 04/07/20  1136 07/17/19  1756 04/16/19  1759 01/30/18  1752 10/02/17  1814   MELANIA 8.8 8.9 8.6   < > 9.1   < > 8.4*   < > 8.7   VITDT  --   --   --   --  59  --  40  --  37    < > = values in this interval not displayed.     ESR/CRP  Recent Labs   Lab Test 07/02/22  0905 04/03/22  1236 01/08/22  1156 10/10/21  1202   SED 4  --  4 5   CRP <2.9 <2.9 <2.9 <2.9     CK/Aldolase  Recent Labs   Lab Test 10/10/21  1202 07/19/21  1820 04/20/21  1804   CKT 87 105 90     TSH/T4  Recent Labs   Lab Test 04/03/22  1236 04/07/20  1136 04/10/19  1808 10/18/17  1445 10/07/16  0714 02/27/15  1058   TSH 0.59 0.44 0.44 0.33* 0.37*  --    T4  --   --   --  1.24 1.16 1.01     Lipid Panel  Recent Labs   Lab Test 04/03/22  1236 01/26/19  0916 10/07/16  0714   CHOL 177 133 137   TRIG 132 64 48   HDL 80 68 75   LDL 71 52 52   NHDL 97 65 62     Hepatitis B  Recent Labs   Lab Test 03/29/16  1417   HEPBANG Nonreactive     Hepatitis C  Recent Labs   Lab Test 03/29/16  1417   HCVAB Nonreactive   Assay performance characteristics have not been established for newborns,   infants, and children       Lyme ab screening  Recent Labs   Lab Test 05/30/15  1354    LYMEGM 0.55     Lyme confirmation testing by Western Blot  No results for input(s): LYWG, LYWM in the last 21980 hours.  Tuberculosis Screening  Recent Labs   Lab Test 01/08/22  1156 07/03/17  1447 03/29/16  1417   TBRES Negative  --   --    TBRSLT  --  Negative Negative   TBAGN  --  0.00 0.00     HIV Screening  Recent Labs   Lab Test 04/10/19  1808   HIAGAB Nonreactive     UA  Recent Labs   Lab Test 07/02/22  0905 06/24/22  0921 04/10/22  1232 10/10/21  1204 09/01/21  1522 04/20/21  1819 01/11/21  1810 10/25/20  1110 09/15/20  1655 07/03/20  1010 04/07/20  1140   COLOR Yellow Yellow Yellow Yellow Yellow   < > Yellow Yellow Yellow   < > Yellow   APPEARANCE Clear Clear Clear Clear Slightly Cloudy*   < > Clear Clear Clear   < > Clear   URINEGLC Negative Negative Negative Negative Negative   < > Negative Negative Negative   < > Negative   URINEBILI Negative Negative Negative Negative Negative   < > Negative Negative Negative   < > Negative   SG 1.015 <=1.005 1.015 1.010 1.015   < > >1.030 1.010 1.025   < > >1.030   URINEPH 6.5 6.0 7.5* 5.5 7.0   < > 6.0 7.0 6.0   < > 6.5   PROTEIN Negative Negative Negative Negative Trace*   < > Negative Negative Negative   < > Negative   UROBILINOGEN 0.2 0.2 0.2 0.2 0.2   < > 0.2 0.2 0.2   < > 0.2   NITRITE Negative Negative Negative Negative Negative   < > Negative Negative Negative   < > Negative   UBLD Negative Negative Negative Negative Moderate*   < > Small* Negative Negative   < > Negative   LEUKEST Negative Trace* Negative Small* Moderate*   < > Negative Trace* Negative   < > Trace*   WBCU  --  0-5  --  0-5 25-50*   < > 0 - 5 0 - 5 5-10*   < > 0 - 5   RBCU  --  None Seen  --  0-2 25-50*   < > O - 2 O - 2 O - 2   < > O - 2   SQUAMOUSEPI  --   --   --   --   --   --  Few Few Few   < > Few   BACTERIA  --   --   --   --  Few*  --  Few* Few* Few*   < > Few*   MUCOUS  --   --   --   --   --   --  Present*  --  Present*  --  Present*    < > = values in this interval not displayed.      Urine Microscopic  Recent Labs   Lab Test 06/24/22  0921 10/10/21  1204 09/01/21  1522 06/13/21  1309 01/11/21  1810 10/25/20  1110 09/15/20  1655 07/03/20  1010 04/07/20  1140   WBCU 0-5 0-5 25-50*   < > 0 - 5 0 - 5 5-10*   < > 0 - 5   RBCU None Seen 0-2 25-50*   < > O - 2 O - 2 O - 2   < > O - 2   SQUAMOUSEPI  --   --   --   --  Few Few Few   < > Few   BACTERIA  --   --  Few*  --  Few* Few* Few*   < > Few*   MUCOUS  --   --   --   --  Present*  --  Present*  --  Present*    < > = values in this interval not displayed.     Urine Protein  Recent Labs   Lab Test 07/02/22  0905 04/10/22  1232 01/08/22  1156 10/10/21  1202   UTP  --  0.09 0.07 0.06   UTPG  0.09 0.11 0.09 0.12   UCRR 94.9 83 81 52     Immunization History     Immunization History   Administered Date(s) Administered     COVID-19,PF,Pfizer (12+ Yrs) 03/13/2021, 04/03/2021     Influenza (H1N1) 12/02/2009     Influenza (IIV3) PF 02/08/2007, 09/17/2010, 10/01/2012, 09/23/2013     Influenza Vaccine IM > 6 months Valent IIV4 (Alfuria,Fluzone) 09/23/2013, 09/20/2016, 10/30/2017, 10/14/2019     Influenza Vaccine, 6+MO IM (QUADRIVALENT W/PRESERVATIVES) 08/11/2018     Pneumo Conj 13-V (2010&after) 04/25/2016     Pneumococcal 23 valent 10/01/2012, 11/06/2017     Td (Adult), Adsorbed 02/12/2001     Tdap (Adacel,Boostrix) 12/17/2010, 06/24/2022            Chart documentation done in part with Dragon Voice recognition Software. Although reviewed after completion, some word and grammatical error may remain.        Bradford Colvin MD

## 2022-08-15 NOTE — PATIENT INSTRUCTIONS
RHEUMATOLOGY    Dr. Bradford Colvin    [address] [place] [address]  Phone number: [phone]  Fax number: [phone]      Thank you for choosing [facility]!    Qing Zapata CMA Rheumatology

## 2022-09-19 DIAGNOSIS — K21.9 GASTROESOPHAGEAL REFLUX DISEASE WITHOUT ESOPHAGITIS: ICD-10-CM

## 2022-09-19 RX ORDER — FAMOTIDINE 40 MG/1
TABLET, FILM COATED ORAL
Qty: 90 TABLET | Refills: 0 | Status: SHIPPED | OUTPATIENT
Start: 2022-09-19 | End: 2023-03-16

## 2022-10-15 ENCOUNTER — HEALTH MAINTENANCE LETTER (OUTPATIENT)
Age: 47
End: 2022-10-15

## 2022-10-16 ENCOUNTER — LAB (OUTPATIENT)
Dept: LAB | Facility: CLINIC | Age: 47
End: 2022-10-16
Payer: COMMERCIAL

## 2022-10-16 DIAGNOSIS — Z79.899 HIGH RISK MEDICATIONS (NOT ANTICOAGULANTS) LONG-TERM USE: ICD-10-CM

## 2022-10-16 DIAGNOSIS — M32.9 SYSTEMIC LUPUS ERYTHEMATOSUS, UNSPECIFIED SLE TYPE, UNSPECIFIED ORGAN INVOLVEMENT STATUS (H): ICD-10-CM

## 2022-10-16 LAB
ALBUMIN MFR UR ELPH: <6 MG/DL
ALBUMIN UR-MCNC: NEGATIVE MG/DL
APPEARANCE UR: CLEAR
BASOPHILS # BLD AUTO: 0.1 10E3/UL (ref 0–0.2)
BASOPHILS NFR BLD AUTO: 1 %
BILIRUB UR QL STRIP: NEGATIVE
COLOR UR AUTO: YELLOW
CREAT UR-MCNC: 76 MG/DL
EOSINOPHIL # BLD AUTO: 0.1 10E3/UL (ref 0–0.7)
EOSINOPHIL NFR BLD AUTO: 3 %
ERYTHROCYTE [DISTWIDTH] IN BLOOD BY AUTOMATED COUNT: 12 % (ref 10–15)
ERYTHROCYTE [SEDIMENTATION RATE] IN BLOOD BY WESTERGREN METHOD: 5 MM/HR (ref 0–20)
GLUCOSE UR STRIP-MCNC: NEGATIVE MG/DL
HCT VFR BLD AUTO: 39.4 % (ref 35–47)
HGB BLD-MCNC: 13.7 G/DL (ref 11.7–15.7)
HGB UR QL STRIP: NEGATIVE
KETONES UR STRIP-MCNC: NEGATIVE MG/DL
LEUKOCYTE ESTERASE UR QL STRIP: ABNORMAL
LYMPHOCYTES # BLD AUTO: 1 10E3/UL (ref 0.8–5.3)
LYMPHOCYTES NFR BLD AUTO: 23 %
MCH RBC QN AUTO: 32.9 PG (ref 26.5–33)
MCHC RBC AUTO-ENTMCNC: 34.8 G/DL (ref 31.5–36.5)
MCV RBC AUTO: 95 FL (ref 78–100)
MONOCYTES # BLD AUTO: 0.4 10E3/UL (ref 0–1.3)
MONOCYTES NFR BLD AUTO: 8 %
NEUTROPHILS # BLD AUTO: 2.9 10E3/UL (ref 1.6–8.3)
NEUTROPHILS NFR BLD AUTO: 66 %
NITRATE UR QL: NEGATIVE
PH UR STRIP: 6 [PH] (ref 5–7)
PLATELET # BLD AUTO: 301 10E3/UL (ref 150–450)
PROT/CREAT 24H UR: NORMAL MG/G{CREAT}
RBC # BLD AUTO: 4.16 10E6/UL (ref 3.8–5.2)
RBC #/AREA URNS AUTO: NORMAL /HPF
SP GR UR STRIP: 1.02 (ref 1–1.03)
UROBILINOGEN UR STRIP-ACNC: 0.2 E.U./DL
WBC # BLD AUTO: 4.4 10E3/UL (ref 4–11)
WBC #/AREA URNS AUTO: NORMAL /HPF

## 2022-10-16 PROCEDURE — 80053 COMPREHEN METABOLIC PANEL: CPT

## 2022-10-16 PROCEDURE — 85652 RBC SED RATE AUTOMATED: CPT

## 2022-10-16 PROCEDURE — 81001 URINALYSIS AUTO W/SCOPE: CPT

## 2022-10-16 PROCEDURE — 84156 ASSAY OF PROTEIN URINE: CPT

## 2022-10-16 PROCEDURE — 85025 COMPLETE CBC W/AUTO DIFF WBC: CPT

## 2022-10-16 PROCEDURE — 36415 COLL VENOUS BLD VENIPUNCTURE: CPT

## 2022-10-16 PROCEDURE — 86140 C-REACTIVE PROTEIN: CPT

## 2022-10-17 LAB
ALBUMIN SERPL-MCNC: 3.9 G/DL (ref 3.4–5)
ALP SERPL-CCNC: 56 U/L (ref 40–150)
ALT SERPL W P-5'-P-CCNC: 22 U/L (ref 0–50)
ANION GAP SERPL CALCULATED.3IONS-SCNC: 2 MMOL/L (ref 3–14)
AST SERPL W P-5'-P-CCNC: 16 U/L (ref 0–45)
BILIRUB SERPL-MCNC: 0.3 MG/DL (ref 0.2–1.3)
BUN SERPL-MCNC: 12 MG/DL (ref 7–30)
CALCIUM SERPL-MCNC: 8.9 MG/DL (ref 8.5–10.1)
CHLORIDE BLD-SCNC: 109 MMOL/L (ref 94–109)
CO2 SERPL-SCNC: 32 MMOL/L (ref 20–32)
CREAT SERPL-MCNC: 0.76 MG/DL (ref 0.52–1.04)
CRP SERPL-MCNC: <2.9 MG/L (ref 0–8)
GFR SERPL CREATININE-BSD FRML MDRD: >90 ML/MIN/1.73M2
GLUCOSE BLD-MCNC: 85 MG/DL (ref 70–99)
POTASSIUM BLD-SCNC: 4.2 MMOL/L (ref 3.4–5.3)
PROT SERPL-MCNC: 6.7 G/DL (ref 6.8–8.8)
SODIUM SERPL-SCNC: 143 MMOL/L (ref 133–144)

## 2022-11-14 NOTE — PROGRESS NOTES
"GI CLINIC VISIT    CC/REFERRING MD:  Bradford Mario  REASON FOR CONSULTATION:   Bradford Mario for   Chief Complaint   Patient presents with     Gastrointestinal Problem     Irritable Bowel Syndrome       ASSESSMENT/PLAN:  Ms. Enriquez is a 42 yo woman presenting for follow up. She has outlet obstruction causing constipation, with associated lower abdominal discomfort. She also has episodes of diarrhea and weakness in the setting of a positive HBT in the past.     She improved on rifaximin, which would be helpful for both IBS and SIBO.     # Alternating diarrhea and constipation -- IBS-M vs. recurrent/persistent SIBO   # History of SIBO  --Obtain HBT to check for e/o SIBO  ----------If positive, will consider MRE to check for structural causes in the small bowel (e.g. diverticulae, fistulas, etc)  ----------If more than 4 documented episodes per year, will consider prophylactic antibiotics  --Rifaximin prescribed   --Check CBC, vitamin b12, folate   --Nutrition referral for FODMAP/SIBO diet     # Constipation, outlet obstruction  ---Biofeedback therapy for pelvic floor dysfunction  ---Miralax, titrated to 1-2 well-formed BMs per day    RTC: Return in about 3 months (around 11/15/2018).     A total of 40 minutes, face to face, was spent with this patient, >50% of which was counseling regarding the above delineated issues.    Inez Sawyer MD   of Medicine  Division of Gastroenterology, Hepatology and Nutrition  [facility]    HPI  Ms. Enriquez is a 43 yo woman who is presenting to GI clinic for evaluation of \"IBS\", which was diagnosed about 2 years ago, per the patient.     At baseline, she reports having constipation that was previously treated with Miralax. Over the past 2 years, has had worsening symptoms.  Reports central and lower abdominal pain, severe bloating, weakness, diarrhea.     Her bowel habits have a cyclical pattern as below:  Day #1: up to 10 loose (non-bloody) BMs " times per day. Has severe central/lower abdominal pain and bloating.   Days #2-day #5 No BMs during this period. Second day: feels drained and weak. Around day 4/5, experiences abdominal pain and bloating.    Day #6-1 to 2 weeks: feels OK. Has 1 BM during entire period. Stool is hard with a sense of incomplete evacuation.     Added Benefiber 2 weeks ago, taking 2-3 times per day.  As a result, has had a BM once weekly (still come in spurts).    Endorses chills, fatigue; no fever.  Works as a day care worker.   Has been trying to follow FODMAP diet. Garlic, onion, gluten, dairy trigger episodes of abdominal pain/bloating.       Has tried Miralax, benefiber, dulcolax without regulation of stools.      No abdominal surgeries. Weight fluctuates. No personal distressers.Had 4 vaginal deliveries.     Interval history, 3/27/2018  Started Augmentin on Thursday for likely recurrent SIBO.   Cut out sugar from diet early last week.   Started to feel significantly better on Friday. Still has some weakness and occasional nausea.   Had a BM after Miralax in the setting of no stool x a few days.    Interval history, 8/2018   Reports she had a garlic dish while vacationing and had an attack of diarrhea. Attributes this to SIBO.   Takes Miralax once daily. Can go 3-4 days without a BM.   Endorses central/lower abdominal pain that worsens with defecation.   Did not pursue biofeedback due to anxiety. She is willing to reconsider.     ROS:    No fevers   No weight loss  No blurry vision, double vision or change in vision  No sore throat  No lymphadenopathy  No headache, paraesthesias, or weakness in a limb  No shortness of breath or wheezing  No chest pain or pressure  No arthralgias or myalgias  No rashes or skin changes  No odynophagia or dysphagia  No BRBPR, hematochezia, melena  No dysuria, frequency or urgency  No hot/cold intolerance or polyria    PROBLEM LIST  Patient Active Problem List    Diagnosis Date Noted     Fibromyalgia  11/15/2013     Priority: High     High risk medication use 09/13/2013     Priority: High     Intramural and submucous leiomyoma of uterus 04/05/2018     Priority: Medium     Sjogren's syndrome (H) 04/10/2017     Priority: Medium     Raynaud's phenomenon without gangrene 12/19/2016     Priority: Medium     Non-celiac gluten sensitivity 02/08/2016     Priority: Medium     Hypertension goal BP (blood pressure) < 140/90 01/19/2015     Priority: Medium     Health Care Home 03/19/2014     Priority: Medium     **See Letters for HC Care Plan: Emergency Care Plan         Irritable bowel syndrome 03/11/2014     Priority: Medium     Patient states no history colonoscopy         Chronic constipation 12/16/2013     Priority: Medium     Anxiety 04/10/2013     Priority: Medium     Subclinical hyperthyroidism 01/17/2013     Priority: Medium     Hyperlipidemia LDL goal <130 10/18/2012     Priority: Medium     Intramural leiomyoma of uterus 10/05/2012     Priority: Medium     Menorrhagia 10/01/2012     Priority: Medium     History of ovarian cyst 10/01/2012     Priority: Medium     Dysmenorrhea 10/01/2012     Priority: Medium     History of gestational diabetes mellitus, not currently pregnant 10/01/2012     Priority: Medium     Systemic lupus erythematosus (H) 04/23/2012     Priority: Medium     Positive antinuclear antibody, photosensitivity and synovitis         PERTINENT PAST MEDICAL HISTORY:  Past Medical History:   Diagnosis Date     Chronic constipation 12/16/2013     Dysmenorrhea 10/1/2012     Fibromyalgia 11/15/2013     Health Care Home 3/19/2014    **See Letters for HC Care Plan: Emergency Care Plan       High risk medication use 9/13/2013     History of gestational diabetes mellitus, not currently pregnant 10/1/2012     History of ovarian cyst 10/1/2012     Hyperlipidemia LDL goal <130 10/18/2012     Hypertension goal BP (blood pressure) < 140/90 1/19/2015     Intramural leiomyoma of uterus 10/5/2012     Irritable bowel  syndrome 3/11/2014    Patient states no history colonoscopy       Menorrhagia 10/1/2012     Non-celiac gluten sensitivity 2/8/2016     PONV (postoperative nausea and vomiting)      Subclinical hyperthyroidism 1/17/2013     Systemic lupus erythematosus (H) 4/23/2012       PREVIOUS SURGERIES:  Past Surgical History:   Procedure Laterality Date     COLONOSCOPY N/A 2/20/2015    Procedure: COMBINED COLONOSCOPY, SINGLE OR MULTIPLE BIOPSY/POLYPECTOMY BY BIOPSY;  Surgeon: Fred Cullen MD;  Location: [facility] OR     COLONOSCOPY WITH CO2 INSUFFLATION N/A 2/20/2015    Procedure: COLONOSCOPY WITH CO2 INSUFFLATION;  Surgeon: Fred Cullen MD;  Location: [facility] OR     ENT SURGERY  10-24-14    Bottom lip biopsies     ESOPHAGOSCOPY, GASTROSCOPY, DUODENOSCOPY (EGD), COMBINED N/A 2/20/2015    Procedure: COMBINED ESOPHAGOSCOPY, GASTROSCOPY, DUODENOSCOPY (EGD), BIOPSY SINGLE OR MULTIPLE;  Surgeon: Fred Cullen MD;  Location: [facility] OR     HC BREATH HYDROGEN TEST  12/31/2013    Procedure: HYDROGEN BREATH TEST;  Surgeon: Camden Almazan MD;  Location: [facility]     HC HYSTEROSCOPY, SURGICAL; W/ ENDOMETRIAL ABLATION, ANY METHOD  10-19-12     SHOULDER SURGERY Right     R shoulder     TUBAL LIGATION  2004       PREVIOUS ENDOSCOPY:  cscope 2/2015 (indication diarrhea) to TI showed rectal erythema, possible cecal compression. Bx showed non-specific mild chronic inflammation.     CT a/p 2/2015 was normal without evidence of kristina-cecal lesions other than small bowel in RLQ. There was fecal debris distending the colon from the cecum to prox sigmoid.    ALLERGIES:   No Known Allergies    PERTINENT MEDICATIONS:    Current Outpatient Prescriptions:      albuterol (PROAIR HFA/PROVENTIL HFA/VENTOLIN HFA) 108 (90 BASE) MCG/ACT Inhaler, Inhale 2 puffs into the lungs every 4 hours as needed for shortness of breath / dyspnea or wheezing Use with spacer, Disp: 1 Inhaler, Rfl: 0     amLODIPine (NORVASC) 5 MG tablet, Take 1 tablet  (5 mg) by mouth daily, Disp: 90 tablet, Rfl: 1     azaTHIOprine (IMURAN) 50 MG tablet, Take 2 tablets (100 mg) by mouth daily, Disp: 180 tablet, Rfl: 1     Belimumab 200 MG/ML SOAJ, Inject 200 mg Subcutaneous every 7 days . Hold for signs of infection and seek medical attention. Autoinjector, Disp: 4 Syringe, Rfl: 6     blood glucose test strip, Used for testing glucose 2-3 times daily, Disp: 1 Month, Rfl: 5     buPROPion (WELLBUTRIN) 75 MG tablet, TAKE 1 TABLET BY MOUTH 2 TIMES DAILY, Disp: 180 tablet, Rfl: 1     Calcium Carb-Cholecalciferol (CALCIUM + D3) 600-200 MG-UNIT TABS, 1 tablet daily, Disp: , Rfl:      cevimeline (EVOXAC) 30 MG capsule, Take 1 capsule (30 mg) by mouth 2 times daily, Disp: 180 capsule, Rfl: 3     diclofenac (VOLTAREN) 1 % GEL, Apply 2-4 grams to knees or shoulders four times daily as needed using enclosed dosing card., Disp: 100 g, Rfl: 4     fluticasone (FLONASE) 50 MCG/ACT nasal spray, Spray 1-2 sprays into both nostrils daily, Disp: 1 Bottle, Rfl: 11     hydroxychloroquine (PLAQUENIL) 200 MG tablet, Hydroxychloroquine 200mg in the morning and 100mg in the evening., Disp: 135 tablet, Rfl: 1     losartan (COZAAR) 25 MG tablet, TAKE 1 TABLET BY MOUTH DAILY, Disp: 90 tablet, Rfl: 0     MICROLET LANCETS MISC, 1 each daily., Disp: 100 each, Rfl: 2     Multiple Vitamin (MULTIVITAMIN OR), Take  by mouth., Disp: , Rfl:      nortriptyline (PAMELOR) 10 MG capsule, TAKE 3 CAPSULES BY MOUTH AT BEDTIME, Disp: 90 capsule, Rfl: 1     pregabalin (LYRICA) 150 MG capsule, Take 1 capsule (150 mg) by mouth 3 times daily . 3 month supply., Disp: 270 capsule, Rfl: 1     ranitidine (ZANTAC) 300 MG tablet, TAKE 1 TABLET BY MOUTH 2 TIMES DAILY, Disp: 180 tablet, Rfl: 0     rifaximin (XIFAXAN) 550 MG TABS tablet, Take 1 tablet (550 mg) by mouth 3 times daily for 10 days, Disp: 30 tablet, Rfl: 0     Spacer/Aero-Holding Chambers (SPACER/AERO-HOLD CHAMBER MASK) EDITH, 1 Adult size spacer to use with MDI inhalers.,  "Disp: 1 each, Rfl: 0     predniSONE (DELTASONE) 2.5 MG tablet, Take 1-2 tablets (2.5-5 mg) by mouth daily (Patient not taking: Reported on 8/15/2018), Disp: 180 tablet, Rfl: 1    SOCIAL HISTORY:  Social History     Social History     Marital status:      Spouse name: Brian     Number of children: 4     Years of education: 12     Occupational History      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     Social History Main Topics     Smoking status: Never Smoker     Smokeless tobacco: Never Used      Comment: Lives in smoke free household     Alcohol use No     Drug use: No     Sexual activity: Yes     Partners: Male     Birth control/ protection: Surgical      Comment: tubal     Other Topics Concern     Not on file     Social History Narrative       FAMILY HISTORY:  Family History   Problem Relation Age of Onset     Respiratory Maternal Grandfather      Cancer Maternal Grandfather      Asthma Son      Circulatory Maternal Grandmother      Brain anurysm     Hypertension Mother      Glaucoma Mother 50     drops     Hypertension Sister      Hypertension Sister      Diabetes No family hx of      Cerebrovascular Disease No family hx of      Thyroid Disease No family hx of      Macular Degeneration No family hx of        Past/family/social history reviewed and no changes    PHYSICAL EXAMINATION:  Constitutional: aaox3, cooperative, pleasant, not dyspneic/diaphoretic, no acute distress  Vitals reviewed: /80 (BP Location: Left arm, Patient Position: Sitting, Cuff Size: Adult Regular)  Pulse 88  Temp 98.5  F (36.9  C) (Oral)  Resp 16  Ht 1.68 m (5' 6.14\")  Wt 60.9 kg (134 lb 4.8 oz)  SpO2 95%  BMI 21.58 kg/m2  Wt:   Wt Readings from Last 2 Encounters:   08/15/18 60.9 kg (134 lb 4.8 oz)   07/12/18 60.6 kg (133 lb 9.6 oz)      Eyes: Sclera anicteric/injected  Ears/nose/mouth/throat: Normal oropharynx without ulcers or exudate, mucus membranes moist, hearing intact  Neck: supple, thyroid normal size  CV: RRR, no " murmurs. No edema  Respiratory: CTA bl. Unlabored breathing  Lymph: No axillary, submandibular, supraclavicular or inguinal lymphadenopathy  Abd: soft, nondistended, +bs, no hepatosplenomegaly; NTTP, no peritoneal signs  Skin: warm, perfused, no jaundice. Pressure type pain in the lower abdomen. No RT or IG.   Psych: Normal affect   MSK: Normal gait    PERTINENT STUDIES:    HBT on 12/31/2013 positive     Orders Only on 10/02/2017   Component Date Value Ref Range Status     WBC 10/02/2017 6.6  4.0 - 11.0 10e9/L Final     RBC Count 10/02/2017 3.72* 3.8 - 5.2 10e12/L Final     Hemoglobin 10/02/2017 13.0  11.7 - 15.7 g/dL Final     Hematocrit 10/02/2017 37.8  35.0 - 47.0 % Final     MCV 10/02/2017 102* 78 - 100 fl Final     MCH 10/02/2017 34.9* 26.5 - 33.0 pg Final     MCHC 10/02/2017 34.4  31.5 - 36.5 g/dL Final     RDW 10/02/2017 11.8  10.0 - 15.0 % Final     Platelet Count 10/02/2017 239  150 - 450 10e9/L Final     Diff Method 10/02/2017 Automated Method   Final     % Neutrophils 10/02/2017 78.5  % Final     % Lymphocytes 10/02/2017 13.4  % Final     % Monocytes 10/02/2017 7.3  % Final     % Eosinophils 10/02/2017 0.3  % Final     % Basophils 10/02/2017 0.5  % Final     Absolute Neutrophil 10/02/2017 5.2  1.6 - 8.3 10e9/L Final     Absolute Lymphocytes 10/02/2017 0.9  0.8 - 5.3 10e9/L Final     Absolute Monocytes 10/02/2017 0.5  0.0 - 1.3 10e9/L Final     Absolute Eosinophils 10/02/2017 0.0  0.0 - 0.7 10e9/L Final     Absolute Basophils 10/02/2017 0.0  0.0 - 0.2 10e9/L Final     CK Total 10/02/2017 134  30 - 225 U/L Final     Complement C3 10/02/2017 75* 76 - 169 mg/dL Final     DNA-ds 10/02/2017 <1  <10 IU/mL Final     CRP Inflammation 10/02/2017 <2.9  0.0 - 8.0 mg/L Final     Sodium 10/02/2017 138  133 - 144 mmol/L Final     Potassium 10/02/2017 3.7  3.4 - 5.3 mmol/L Final     Chloride 10/02/2017 103  94 - 109 mmol/L Final     Carbon Dioxide 10/02/2017 27  20 - 32 mmol/L Final     Anion Gap 10/02/2017 8  3 - 14  mmol/L Final     Glucose 10/02/2017 112* 70 - 99 mg/dL Final     Urea Nitrogen 10/02/2017 10  7 - 30 mg/dL Final     Creatinine 10/02/2017 0.88  0.52 - 1.04 mg/dL Final     GFR Estimate 10/02/2017 70  >60 mL/min/1.7m2 Final     GFR Estimate If Black 10/02/2017 84  >60 mL/min/1.7m2 Final     Calcium 10/02/2017 8.7  8.5 - 10.1 mg/dL Final     Bilirubin Total 10/02/2017 0.2  0.2 - 1.3 mg/dL Final     Albumin 10/02/2017 3.8  3.4 - 5.0 g/dL Final     Protein Total 10/02/2017 6.8  6.8 - 8.8 g/dL Final     Alkaline Phosphatase 10/02/2017 60  40 - 150 U/L Final     ALT 10/02/2017 21  0 - 50 U/L Final     AST 10/02/2017 22  0 - 45 U/L Final     Complement C4 10/02/2017 15  15 - 50 mg/dL Final     Sed Rate 10/02/2017 4  0 - 20 mm/h Final     Protein Random Urine 10/02/2017 0.07  g/L Final     Protein Total Urine g/gr Creatinine 10/02/2017 0.14  0 - 0.2 g/g Cr Final     Color Urine 10/02/2017 Yellow   Final     Appearance Urine 10/02/2017 Clear   Final     Glucose Urine 10/02/2017 Negative  NEG^Negative mg/dL Final     Bilirubin Urine 10/02/2017 Negative  NEG^Negative Final     Ketones Urine 10/02/2017 Negative  NEG^Negative mg/dL Final     Specific Gravity Urine 10/02/2017 1.010  1.003 - 1.035 Final     pH Urine 10/02/2017 6.0  5.0 - 7.0 pH Final     Protein Albumin Urine 10/02/2017 Negative  NEG^Negative mg/dL Final     Urobilinogen Urine 10/02/2017 0.2  0.2 - 1.0 EU/dL Final     Nitrite Urine 10/02/2017 Negative  NEG^Negative Final     Blood Urine 10/02/2017 Negative  NEG^Negative Final     Leukocyte Esterase Urine 10/02/2017 Negative  NEG^Negative Final     Source 10/02/2017 Midstream Urine   Final     WBC Urine 10/02/2017 O - 2  OTO2^O - 2 /HPF Final     RBC Urine 10/02/2017 O - 2  OTO2^O - 2 /HPF Final     Squamous Epithelial /LPF Urine 10/02/2017 Few  FEW^Few /LPF Final     Vitamin D Deficiency screening 10/02/2017 37  20 - 75 ug/L Final     Creatinine Urine 10/02/2017 48  mg/dL Final            No

## 2022-11-15 ENCOUNTER — VIRTUAL VISIT (OUTPATIENT)
Dept: RHEUMATOLOGY | Facility: CLINIC | Age: 47
End: 2022-11-15
Payer: COMMERCIAL

## 2022-11-15 DIAGNOSIS — M32.9 SYSTEMIC LUPUS ERYTHEMATOSUS, UNSPECIFIED SLE TYPE, UNSPECIFIED ORGAN INVOLVEMENT STATUS (H): Primary | ICD-10-CM

## 2022-11-15 DIAGNOSIS — Z79.899 HIGH RISK MEDICATIONS (NOT ANTICOAGULANTS) LONG-TERM USE: ICD-10-CM

## 2022-11-15 DIAGNOSIS — D47.2 MGUS (MONOCLONAL GAMMOPATHY OF UNKNOWN SIGNIFICANCE): ICD-10-CM

## 2022-11-15 DIAGNOSIS — I73.00 RAYNAUD'S PHENOMENON WITHOUT GANGRENE: ICD-10-CM

## 2022-11-15 DIAGNOSIS — M35.01 SJOGREN'S SYNDROME WITH KERATOCONJUNCTIVITIS SICCA (H): ICD-10-CM

## 2022-11-15 PROCEDURE — 99214 OFFICE O/P EST MOD 30 MIN: CPT | Mod: 95 | Performed by: INTERNAL MEDICINE

## 2022-11-15 RX ORDER — CEVIMELINE HYDROCHLORIDE 30 MG/1
30 CAPSULE ORAL 3 TIMES DAILY
Qty: 270 CAPSULE | Refills: 2 | Status: SHIPPED | OUTPATIENT
Start: 2022-11-15 | End: 2023-01-23

## 2022-11-15 RX ORDER — PREDNISONE 2.5 MG/1
2.5 TABLET ORAL DAILY
Qty: 90 TABLET | Refills: 2 | Status: SHIPPED | OUTPATIENT
Start: 2022-11-15 | End: 2023-07-28

## 2022-11-15 NOTE — PROGRESS NOTES
Yovana Enriquez  is a 47 year old year old who is being evaluated via a billable video visit.      How would you like to obtain your AVS? MyChart  If the video visit is dropped, the invitation should be resent by: Text to cell phone: [phone]   Will anyone else be joining your video visit? No     Rheumatology Video Visit      Yovana Enriquez MRN# [id number]   YOB: 1975 Age: 47 year old      Date of visit: 11/15/22   PCP: Bradford Mario    Chief Complaint   Patient presents with:  Systemic lupus erythematosus: Feeling fatigue and some pain    Assessment and Plan     1. Systemic lupus erythematosus (YANELIS by EIA positive in the past, leukopenia/lymphocytopenia, hypocomplementemia, polyarthralgias, oral sores, history of malar rash, photophobia, photosensitivity, Raynaud's Phenomenon): Previously on methotrexate that was discontinued for unclear reasons but the patient reports that she tolerated it well. Currently on azathioprine 75 mg daily (dose reduced on 11/8/2018 from 100mg daily without worsening symptoms at that time, eventually reduced to 50 mg daily and did well for a period of time; now on 75 mg daily), hydroxychloroquine 300 mg daily [avg], and Benlysta (worsening symptoms when stopped in the past).  Overall doing well with regard to lupus at this time.  Noted that she has likely adrenal insufficiency with a lower dose of cortisol in the past and has had difficulty getting off of a low-dose of prednisone; so will continue prednisone 2.5 mg daily.   - Continue azathioprine 75 mg daily   - Continue hydroxychloroquine 300mg daily on average: 200mg daily with additional 200mg every other day (toxicity monitoring eye exam last done on 8/5/2022 by Dr. Queen)  - Continue Benlysta 200 mg SQ every 7 days  - Continue Prednisone 2.5mg daily   - Continue photoprotection: Covering up and using sunscreen  - Labs in January: CBC, CMP, ESR, CRP, UA, Uprotein:creatinine      High risk medication requiring  intensive toxicity monitoring at least quarterly: labs ordered include CBC, Creatinine, Hepatic panel to monitor for cytopenia and hepatotoxicity; checking creatinine as it affects clearance of medication.     2. Secondary Sjogren syndrome: Doing well with Evoxac and frequent sips of water, but then Evoxac became more expensive so pilocarpine was tried but not tolerated (GI upset).  Therefore, changed back to Evoxac and improved with BID dosing but may improve more from TID dosing and she'd like to try this. Dry eyes well controled with artificial tears as needed.  Chronic illness  - Increase Evoxac from 30 mg twice a day, to 30 mg three times daily    3. Raynaud's Phenomenon: Amlodipine 5mg daily is effective.  Chronic illness, stable  - Continue amlodipine 5mg daily     4. R>Ltrochanteric bursitis, possible iliotibial band syndrome as well:  Also with symptoms of iliotibial band syndrome and degenerative arthritis of the lumbar spine.  Encouraged continued physical therapy exercises on her own at home.  Last steroid injection for trochanteric bursitis was very effective and only minimal symptoms at this time and recently; she does not feel like a repeat steroid injection is needed at this time.        5. Fibromyalgia: Managed by the pain clinic and PCP.    6. MGUS: SPEP and immunofixation advised to be done yearly per Dr. Yoselin Payan; she may follow-up with her PCP for this issue.  Documented here for historical significance. Patient asks for SPEP with next labs.  - Lab in January 7.  Vaccinations: Vaccinations reviewed with Ms. Enriquez.    - Influenza: encouraged yearly vaccination  - [id number]: up to date  - [id number]: up to date  - COVID-19: advised updating, and to hold azathioprine and benlysta for 2 weeks after COVID-19 vaccination      Total minutes spent in evaluation with patient, documentation, , and review of pertinent studies and chart notes: 18    Ms. Enriquez verbalized agreement with  and understanding of the rational for the diagnosis and treatment plan.  All questions were answered to best of my ability and the patient's satisfaction. Ms. Enriquez was advised to contact the clinic with any questions that may arise after the clinic visit.      Thank you for involving me in the care of the patient    Return to clinic: 3-4 months    HPI   Yovana Enriquez is a 47 year old female with medical history significant for subclinical hyperthyroidism, hypertension, irritable bowel syndrome, fibromyalgia, hypertension, non-celiac gluten sensitivity (reportedly with bowel biopsies and laboratory workup that did not show celiac disease), anxiety, and systemic lupus erythematosus who presents for follow-up of SLE.    Today, 11/15/2022: morning stiffness 30 min. Mild joint ache with weather changes.  No joint swelling.  Overall feels like she is doing well.  Evoxac BID helpful, she'd like to try TID.  No weight loss or night sweats. Raynaud's controlled. Photosensitivity: fatigue. Tries to avoid sun exposure; uses sunscreen and covers up. Dry eyes controlled with artificial teras.     Denies fevers, chills, nausea, vomiting. No abdominal pain.  No chest pain/pressure, palpitations, or shortness of breath.  No rash currently. No LE swelling.  She has photosensitivity and photophobia.   No eye pain or redness. No history of serositis. No history of DVT, pulmonary embolism, or miscarriage.     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    ROS   12 point review of system was completed and negative except as noted in the HPI     Active Problem List     Patient Active Problem List   Diagnosis     Systemic lupus erythematosus (H)     Menorrhagia     History of ovarian cyst     Dysmenorrhea     History of gestational diabetes mellitus, not currently pregnant     Intramural leiomyoma of uterus     Hyperlipidemia LDL goal <130     Subclinical hyperthyroidism     Anxiety     High risk  medication use     Fibromyalgia     Chronic constipation     Irritable bowel syndrome     Health Care Home     Hypertension goal BP (blood pressure) < 140/90     Non-celiac gluten sensitivity     Raynaud's phenomenon without gangrene     Sjogren's syndrome (H)     Intramural and submucous leiomyoma of uterus     Adrenal insufficiency (H)     Screening for malignant neoplasm of cervix     Past Medical History     Past Medical History:   Diagnosis Date     Arthritis      Chronic constipation 12/16/2013     Dysmenorrhea 10/1/2012     Fibromyalgia 11/15/2013     Health Care Home 3/19/2014    **See Letters for [facility] Care Plan: Emergency Care Plan       High risk medication use 9/13/2013     History of gestational diabetes mellitus, not currently pregnant 10/1/2012     History of ovarian cyst 10/1/2012     Hyperlipidemia LDL goal <130 10/18/2012     Hypertension goal BP (blood pressure) < 140/90 1/19/2015     Intramural leiomyoma of uterus 10/5/2012     Irritable bowel syndrome 3/11/2014    Patient states no history colonoscopy       Menorrhagia 10/1/2012     Non-celiac gluten sensitivity 2/8/2016     PONV (postoperative nausea and vomiting)      Subclinical hyperthyroidism 1/17/2013     Systemic lupus erythematosus (H) 4/23/2012     Past Surgical History     Past Surgical History:   Procedure Laterality Date     ABDOMEN SURGERY  2004    Tubal ligation     COLONOSCOPY N/A 2/20/2015    Procedure: COMBINED COLONOSCOPY, SINGLE OR MULTIPLE BIOPSY/POLYPECTOMY BY BIOPSY;  Surgeon: Fred Cullen MD;  Location: [facility]     COLONOSCOPY N/A 10/29/2018    Procedure: COMBINED COLONOSCOPY, SINGLE OR MULTIPLE BIOPSY/POLYPECTOMY BY BIOPSY;  Surgeon: Inez Sawyer MD;  Location: [facility]     COLONOSCOPY WITH CO2 INSUFFLATION N/A 2/20/2015    Procedure: COLONOSCOPY WITH CO2 INSUFFLATION;  Surgeon: Fred Cullen MD;  Location: [facility] OR     ENT SURGERY  10-24-14    Bottom lip biopsies     ESOPHAGOSCOPY, GASTROSCOPY,  DUODENOSCOPY (EGD), COMBINED N/A 2/20/2015    Procedure: COMBINED ESOPHAGOSCOPY, GASTROSCOPY, DUODENOSCOPY (EGD), BIOPSY SINGLE OR MULTIPLE;  Surgeon: Fred Cullen MD;  Location: [facility] OR     HC BREATH HYDROGEN TEST  12/31/2013    Procedure: HYDROGEN BREATH TEST;  Surgeon: Camden Almazan MD;  Location: [facility]     HC HYSTEROSCOPY, SURGICAL; W/ ENDOMETRIAL ABLATION, ANY METHOD  10-19-12     ORTHOPEDIC SURGERY  12/2010     SHOULDER SURGERY Right     R shoulder     TUBAL LIGATION  2004     Allergy     Allergies   Allergen Reactions     Fluticasone Propionate (Nasal) [Fluticasone] Anaphylaxis     Current Medication List     Current Outpatient Medications   Medication Sig     acetaminophen (TYLENOL) 325 MG tablet Take 325-650 mg by mouth every 6 hours as needed for mild pain or headaches Maximum daily dose of acetaminophen is 3000 mg in 24 hours.     amLODIPine (NORVASC) 5 MG tablet Take 1 tablet (5 mg) by mouth daily     azaTHIOprine (IMURAN) 50 MG tablet Take 1.5 tablets (75 mg) by mouth daily     azelastine (ASTELIN) 0.1 % nasal spray Spray 1 spray into both nostrils 2 times daily     Belimumab 200 MG/ML SOAJ Inject 200 mg Subcutaneous every 7 days . Hold for signs of infection and seek medical attention. Autoinjector     blood glucose test strip Used for testing glucose 2-3 times daily     Calcium Carb-Cholecalciferol (CALCIUM + D3) 600-200 MG-UNIT TABS Take 1 tablet by mouth daily     cevimeline (EVOXAC) 30 MG capsule Take 1 capsule (30 mg) by mouth 2 times daily     diclofenac (VOLTAREN) 1 % GEL Apply 2-4 grams to knees or shoulders four times daily as needed using enclosed dosing card.     docusate sodium (COLACE) 100 MG capsule Take 1 capsule (100 mg) by mouth 2 times daily     famotidine (PEPCID) 40 MG tablet TAKE 1 TABLET BY MOUTH NIGHTLY AS NEEDED FOR HEARTBURN     hydroxychloroquine (PLAQUENIL) 200 MG tablet Hydroxychloroquine 200mg daily; and an additional 200mg every other day. Yearly eye  exam, including 10-2 VF and SD-OCT, is required     losartan (COZAAR) 25 MG tablet TAKE 1 TABLET BY MOUTH EVERY DAY     nortriptyline (PAMELOR) 10 MG capsule Take 1 capsule (10 mg) by mouth At Bedtime . 3 months supply     ondansetron (ZOFRAN-ODT) 4 MG ODT tab Take 1 tablet (4 mg) by mouth every 8 hours as needed for nausea Recommend limited use     predniSONE (DELTASONE) 2.5 MG tablet Take 1 tablet (2.5 mg) by mouth in the morning.     SUMAtriptan (IMITREX) 50 MG tablet Take 1 tablet (50 mg) by mouth at onset of headache for migraine . After 2 hours, if no relief, ok to take 2nd dose.  Limit 2 tabs/24 hours. (Patient taking differently: Take 50 mg by mouth at onset of headache for migraine . After 2 hours, if no relief, ok to take 2nd dose.  Limit 2 tabs/24 hours.)     Vitamin D, Cholecalciferol, 1000 units CAPS Take 4,000 Int'l Units by mouth daily     No current facility-administered medications for this visit.         Social History   See HPI    Family History     Family History   Problem Relation Age of Onset     Respiratory Maternal Grandfather      Cancer Maternal Grandfather      Other Cancer Maternal Grandfather         Skin cancer     Asthma Son      Circulatory Maternal Grandmother         Brain anurysm     Diabetes Maternal Grandmother      Hypertension Mother      Glaucoma Mother 50        drops     Cancer Mother      Hypertension Sister      Hypertension Sister      Diabetes Sister      Hypertension Sister      Anxiety Disorder Sister      Asthma Son      Diabetes No family hx of      Cerebrovascular Disease No family hx of      Thyroid Disease No family hx of      Macular Degeneration No family hx of        Physical Exam     Temp Readings from Last 3 Encounters:   12/20/21 97.4  F (36.3  C) (Oral)   09/01/21 98.7  F (37.1  C) (Tympanic)   06/13/21 98.5  F (36.9  C) (Tympanic)     BP Readings from Last 5 Encounters:   08/15/22 131/87   06/24/22 134/82   12/28/21 (!) 145/92   12/20/21 (!) 162/97  "  09/01/21 118/86     Pulse Readings from Last 1 Encounters:   08/15/22 85     Resp Readings from Last 1 Encounters:   09/01/21 20     Estimated body mass index is 22.92 kg/m  as calculated from the following:    Height as of 6/24/22: 1.676 m (5' 6\").    Weight as of 8/15/22: 64.4 kg (142 lb).    GEN: NAD. Healthy appearing adult.   HEENT:   Anicteric, noninjected sclera. No obvious external lesions of the ear and nose. Hearing intact.  PULM: No increased work of breathing  MSK:  Hands and wrists without swelling  SKIN: No rash or jaundice seen  PSYCH: Alert. Appropriate.     Labs / Imaging (select studies)     YANELIS  Recent Labs   Lab Test 07/25/16  1433   ANAIGG <1:40  Reference range: <1:40  (Note)  <1:40  INTERPRETIVE INFORMATION: YANELIS by IFA, IgG  Anti-nuclear antibodies (YANELIS) are seen in a variety of  systemic rheumatic diseases and are determined by indirect  fluorescence assay (IFA) using HEp-2 substrate with an  IgG-specific conjugate. YANELIS titers less than or equal to  1:80 have variable relevance while titers greater than or  equal to 1:160 are considered clinically significant. These  antibodies may precede clinical disease onset; however,  healthy individuals and those with advanced age have been  reported to be positive for YANELIS. When observed, one of the  five basic patterns is reported: homogeneous,  peripheral/rim, speckled, centromere, or nucleolar. If  cytoplasmic fluorescence is observed, it is noted. IFA  methodology is subjective and has occasionally been shown  to lack sensitivity for anti-SSA/Ro antibodies.  Negative results do not necessarily rule out the presence  of SSc. If clinical suspicion remains, consider further  te sting for U3-RNP, PM/Scl, or Th/To antibodies associated  with SSc.  Performed by [facility],  [address] [place] [id number] [phone]  [email or web address], Alcon Krishna MD, Lab. Director       RNP/Sm/SSA/SSB  Recent Labs   Lab Test 03/23/16  1759 01/04/16  1806 "   SMIGG <0.2  Negative   Antibody index (AI) values reflect qualitative changes in antibody   concentration that cannot be directly associated with clinical condition or   disease state.   <0.2  Negative   Antibody index (AI) values reflect qualitative changes in antibody   concentration that cannot be directly associated with clinical condition or   disease state.       dsDNA  Recent Labs   Lab Test 07/02/22  0905 04/03/22  1236 10/10/21  1202 07/19/21  1820 04/20/21  1804 01/11/21  1805 10/25/20  1059 04/07/20  1136 01/13/20  1804   DNA <0.6 0.7 <0.6 <0.6 1 <1 1 1 <1     C3/C4  Recent Labs   Lab Test 07/02/22  0905 10/10/21  1202 07/19/21  1820 04/20/21  1804 01/11/21  1805 10/25/20  1059 04/07/20  1136 01/13/20  1804 10/21/19  1552   V9XQARI 98 89 91 97 91 103 100 85 77   I0HCOST 22 17 20 20 20 >9* 16 21 13*     IgG  Recent Labs   Lab Test 01/11/21  1805 10/25/20  1059 06/17/19  1813    <17*  --    IGA 93 <2* 82   * 345*  --      CBC  Recent Labs   Lab Test 10/16/22  1205 07/02/22  0905 04/03/22  1236 07/19/21  1820 06/13/21  1312 04/20/21  1804 01/11/21  1805   WBC 4.4 4.7 5.4   < > 5.8 7.7 8.1   RBC 4.16 4.25 4.29   < > 4.19 4.22 4.00   HGB 13.7 14.0 14.0   < > 13.5 13.7 13.2   HCT 39.4 40.1 40.4   < > 40.6 40.0 38.7   MCV 95 94 94   < > 97 95 97   RDW 12.0 11.6 11.5   < > 11.6 11.5 11.5    299 286   < > 300 313 316   MCH 32.9 32.9 32.6   < > 32.2 32.5 33.0   MCHC 34.8 34.9 34.7   < > 33.3 34.3 34.1   NEUTROPHIL 66 65 79   < > 77.2 81.2 83.4   LYMPH 23 24 12   < > 13.7 10.8 8.8   MONOCYTE 8 8 7   < > 6.2 6.4 6.7   EOSINOPHIL 3 3 2   < > 1.7 0.8 0.6   BASOPHIL 1 1 1   < > 1.2 0.8 0.5   ANEU  --   --   --   --  4.5 6.3 6.7   ALYM  --   --   --   --  0.8 0.8 0.7*   ZOË  --   --   --   --  0.4 0.5 0.5   AEOS  --   --   --   --  0.1 0.1 0.1   ABAS  --   --   --   --  0.1 0.1 0.0   ANEUTAUTO 2.9 3.0 4.3   < >  --   --   --    ALYMPAUTO 1.0 1.1 0.6*   < >  --   --   --    AMONOAUTO 0.4 0.4 0.4    < >  --   --   --    AEOSAUTO 0.1 0.1 0.1   < >  --   --   --    ABSBASO 0.1 0.0 0.0   < >  --   --   --     < > = values in this interval not displayed.     CMP  Recent Labs   Lab Test 10/16/22  1205 07/02/22  0905 04/03/22  1236 01/08/22  1156 10/10/21  1202 07/19/21  1820 07/19/21  1820 04/20/21  1804 01/11/21  1805 10/25/20  1059 04/07/20  1136    140 138 140 139   < > 140 140 138 139 140   POTASSIUM 4.2 3.8 4.2 4.1 4.3   < > 3.8 4.1 3.9 4.1 3.6   CHLORIDE 109 104 105 105 107   < > 107 106 106 104 106   CO2 32 31 25 30 28   < > 30 33* 31 31 31   ANIONGAP 2* 5 8 5 4   < > 3 1* 1* 4 3   GLC 85 99 109* 125* 89  --  113* 97 99 86 104*   BUN 12 13 16 13 11   < > 12 13 15 11 16   CR 0.76 0.69 0.75 0.72 0.64   < > 0.79 0.76 0.77 0.73 0.71   GFRESTIMATED >90 >90 >90 >90 >90   < > 89 >90 >90 >90 >90   GFRESTBLACK  --   --   --   --   --   --   --  >90 >90 >90 >90   MELANIA 8.9 8.8 8.9 8.6 8.5   < > 8.8 9.0 8.8 9.1 9.1   BILITOTAL 0.3 0.4 0.4 0.4 0.3   < > 0.3 0.3 0.2 0.4 0.3   ALBUMIN 3.9 3.9 4.1 3.8 3.8   < > 3.8 4.1 4.0 4.4 4.3   PROTTOTAL 6.7* 6.7* 6.9 6.6* 6.4*   < > 6.7* 6.8 6.5* 7.5 7.1   ALKPHOS 56 56 55 69 55   < > 60 57 55 62 54   AST 16 20 22 14 22   < > 24 17 14 22 13   ALT 22 20 30 24 28   < > 26 25 19 23 24    < > = values in this interval not displayed.     HgA1c  Recent Labs   Lab Test 05/30/19  0859   A1C 5.2     Calcium/VitaminD  Recent Labs   Lab Test 10/16/22  1205 07/02/22  0905 04/03/22  1236 10/25/20  1059 04/07/20  1136 07/17/19  1756 04/16/19  1759 01/30/18  1752 10/02/17  1814   MELANIA 8.9 8.8 8.9   < > 9.1   < > 8.4*   < > 8.7   VITDT  --   --   --   --  59  --  40  --  37    < > = values in this interval not displayed.     ESR/CRP  Recent Labs   Lab Test 10/16/22  1205 07/02/22  0905 04/03/22  1236 01/08/22  1156   SED 5 4  --  4   CRP <2.9 <2.9 <2.9 <2.9     CK/Aldolase  Recent Labs   Lab Test 10/10/21  1202 07/19/21  1820 04/20/21  1804   CKT 87 105 90     TSH/T4  Recent Labs   Lab Test  04/03/22  1236 04/07/20  1136 04/10/19  1808 10/18/17  1445 10/07/16  0714 02/27/15  1058   TSH 0.59 0.44 0.44 0.33* 0.37*  --    T4  --   --   --  1.24 1.16 1.01     Lipid Panel  Recent Labs   Lab Test 04/03/22  1236 01/26/19  0916 10/07/16  0714   CHOL 177 133 137   TRIG 132 64 48   HDL 80 68 75   LDL 71 52 52   NHDL 97 65 62     Hepatitis B  Recent Labs   Lab Test 03/29/16  1417   HEPBANG Nonreactive     Hepatitis C  Recent Labs   Lab Test 03/29/16  1417   HCVAB Nonreactive   Assay performance characteristics have not been established for newborns,   infants, and children       Lyme ab screening  Recent Labs   Lab Test 05/30/15  1355   LYMEGM 0.55     Tuberculosis Screening  Recent Labs   Lab Test 01/08/22  1156 07/03/17  1447 03/29/16  1417   TBRES Negative  --   --    TBRSLT  --  Negative Negative   TBAGN  --  0.00 0.00     HIV Screening  Recent Labs   Lab Test 04/10/19  1808   HIAGAB Nonreactive     UA  Recent Labs   Lab Test 10/16/22  1205 07/02/22  0905 06/24/22  0921 04/10/22  1232 10/10/21  1204 09/01/21  1522 04/20/21  1819 01/11/21  1810 10/25/20  1110 09/15/20  1655 07/03/20  1010 04/07/20  1140   COLOR Yellow Yellow Yellow   < > Yellow Yellow   < > Yellow Yellow Yellow   < > Yellow   APPEARANCE Clear Clear Clear   < > Clear Slightly Cloudy*   < > Clear Clear Clear   < > Clear   URINEGLC Negative Negative Negative   < > Negative Negative   < > Negative Negative Negative   < > Negative   URINEBILI Negative Negative Negative   < > Negative Negative   < > Negative Negative Negative   < > Negative   SG 1.020 1.015 <=1.005   < > 1.010 1.015   < > >1.030 1.010 1.025   < > >1.030   URINEPH 6.0 6.5 6.0   < > 5.5 7.0   < > 6.0 7.0 6.0   < > 6.5   PROTEIN Negative Negative Negative   < > Negative Trace*   < > Negative Negative Negative   < > Negative   UROBILINOGEN 0.2 0.2 0.2   < > 0.2 0.2   < > 0.2 0.2 0.2   < > 0.2   NITRITE Negative Negative Negative   < > Negative Negative   < > Negative Negative Negative    < > Negative   UBLD Negative Negative Negative   < > Negative Moderate*   < > Small* Negative Negative   < > Negative   LEUKEST Trace* Negative Trace*   < > Small* Moderate*   < > Negative Trace* Negative   < > Trace*   WBCU 0-5  --  0-5  --  0-5 25-50*   < > 0 - 5 0 - 5 5-10*   < > 0 - 5   RBCU 0-2  --  None Seen  --  0-2 25-50*   < > O - 2 O - 2 O - 2   < > O - 2   SQUAMOUSEPI  --   --   --   --   --   --   --  Few Few Few   < > Few   BACTERIA  --   --   --   --   --  Few*  --  Few* Few* Few*   < > Few*   MUCOUS  --   --   --   --   --   --   --  Present*  --  Present*  --  Present*    < > = values in this interval not displayed.     Urine Microscopic  Recent Labs   Lab Test 10/16/22  1205 06/24/22  0921 10/10/21  1204 09/01/21  1522 06/13/21  1309 01/11/21  1810 10/25/20  1110 09/15/20  1655 07/03/20  1010 04/07/20  1140   WBCU 0-5 0-5 0-5 25-50*   < > 0 - 5 0 - 5 5-10*   < > 0 - 5   RBCU 0-2 None Seen 0-2 25-50*   < > O - 2 O - 2 O - 2   < > O - 2   SQUAMOUSEPI  --   --   --   --   --  Few Few Few   < > Few   BACTERIA  --   --   --  Few*  --  Few* Few* Few*   < > Few*   MUCOUS  --   --   --   --   --  Present*  --  Present*  --  Present*    < > = values in this interval not displayed.     Urine Protein  GHUTP and UTP= Urine protein (random), GHUTPG and UTPG = urine protein:creatinine ratio (random), UCRR = urine creatinine (random)  Recent Labs   Lab Test 10/16/22  1205 07/02/22  0905 04/10/22  1232 01/08/22  1156 10/10/21  1202   GHUTP <6.0 8.6  --   --   --    UTP  --   --  0.09 0.07 0.06   GHUTPG  --  0.09  --   --   --    UTPG  --   --  0.11 0.09 0.12   UCRR 76.0 94.9 83 81 52       Immunization History     Immunization History   Administered Date(s) Administered     COVID-19,PF,Pfizer (12+ Yrs) 03/13/2021, 04/03/2021     Influenza (H1N1) 12/02/2009     Influenza (IIV3) PF 02/08/2007, 09/17/2010, 10/01/2012, 09/23/2013     Influenza Vaccine IM > 6 months Valent IIV4 (Alfuria,Fluzone) 09/23/2013, 09/20/2016,  10/30/2017, 10/14/2019     Influenza Vaccine, 6+MO IM (QUADRIVALENT W/PRESERVATIVES) 08/11/2018     Pneumo Conj 13-V (2010&after) 04/25/2016     Pneumococcal 23 valent 10/01/2012, 11/06/2017     Td (Adult), Adsorbed 02/12/2001     Tdap (Adacel,Boostrix) 12/17/2010, 06/24/2022            Chart documentation done in part with Dragon Voice recognition Software. Although reviewed after completion, some word and grammatical error may remain.      Video-Visit Details    Type of service:  Video Visit    Video Start Time: 9:04 AM    Video End Time:9:12 AM    Originating Location (pt. Location): [place], [place]    Distant Location (provider location):  off site [place]    Platform used for Video Visit: danielle Colvin MD

## 2022-11-15 NOTE — PATIENT INSTRUCTIONS
RHEUMATOLOGY    Dr. Bradford Colvin    [facility]  [address] [place] [address]  [place] [address]  Phone number: [phone]  Fax number: [phone]    You may schedule your FLU shot by calling [phone] or if you would like to get your shot at a [facility] pharmacy you may schedule online at [email or web address].    Thank you for choosing [facility]!    Qing Zapata CMA Rheumatology

## 2023-01-06 DIAGNOSIS — M47.819 FACET ARTHROPATHY: ICD-10-CM

## 2023-01-06 DIAGNOSIS — M54.2 CERVICALGIA: ICD-10-CM

## 2023-01-06 DIAGNOSIS — R51.9 CHRONIC DAILY HEADACHE: ICD-10-CM

## 2023-01-09 RX ORDER — NORTRIPTYLINE HCL 10 MG
CAPSULE ORAL
Qty: 90 CAPSULE | Refills: 0 | Status: SHIPPED | OUTPATIENT
Start: 2023-01-09 | End: 2023-04-16

## 2023-01-11 ENCOUNTER — VIRTUAL VISIT (OUTPATIENT)
Dept: FAMILY MEDICINE | Facility: CLINIC | Age: 48
End: 2023-01-11
Payer: COMMERCIAL

## 2023-01-11 DIAGNOSIS — J01.00 ACUTE NON-RECURRENT MAXILLARY SINUSITIS: Primary | ICD-10-CM

## 2023-01-11 PROCEDURE — 99213 OFFICE O/P EST LOW 20 MIN: CPT | Mod: TEL | Performed by: PHYSICIAN ASSISTANT

## 2023-01-11 RX ORDER — PREDNISONE 20 MG/1
20 TABLET ORAL 2 TIMES DAILY
Qty: 10 TABLET | Refills: 0 | Status: SHIPPED | OUTPATIENT
Start: 2023-01-11 | End: 2023-01-16

## 2023-01-11 RX ORDER — AZITHROMYCIN 250 MG/1
TABLET, FILM COATED ORAL
Qty: 6 TABLET | Refills: 0 | Status: SHIPPED | OUTPATIENT
Start: 2023-01-11 | End: 2023-04-28

## 2023-01-11 NOTE — PROGRESS NOTES
Ruma is a 47 year old who is being evaluated via a billable telephone visit.      What phone number would you like to be contacted at? [phone]  How would you like to obtain your AVS? Nuno    Distant Location (provider location):  On-site        Subjective   Ruma is a 47 year old presenting for the following health issues:  Sinus Problem (6 days)      HPI     Acute Illness  Acute illness concerns: sinus pressure  Onset/Duration: 6 days  Symptoms:  Fever: No  Chills/Sweats: YES  Headache (location?): YES  Sinus Pressure: YES  Conjunctivitis:  No  Ear Pain: no  Rhinorrhea: YES  Congestion: YES  Sore Throat: YES  Cough: YES-non-productive  Wheeze: No  Decreased Appetite: No  Nausea: No  Vomiting: No  Diarrhea: No  Dysuria/Freq.: No  Dysuria or Hematuria: No  Fatigue/Achiness: No  Sick/Strep Exposure: does   Therapies tried and outcome: ibuprofen, nasal spray    Symptoms x 6-7 days. Congestion. Facial pain and pressure has evolved. Some purulent discharge. Some tooth pain. Positive forward lean test. Mild cough. No wheezing. No fevers.   Review of Systems   Constitutional, HEENT, cardiovascular, pulmonary, GI, , musculoskeletal, neuro, skin, endocrine and psych systems are negative, except as otherwise noted.      Objective           Vitals:  No vitals were obtained today due to virtual visit.    Physical Exam   healthy, alert and no distress  PSYCH: Alert and oriented times 3; coherent speech, normal   rate and volume, able to articulate logical thoughts, able   to abstract reason, no tangential thoughts, no hallucinations   or delusions  Her affect is normal  RESP: No cough, no audible wheezing, able to talk in full sentences  Remainder of exam unable to be completed due to telephone visits    Yovana was seen today for sinus problem.    Diagnoses and all orders for this visit:    Acute non-recurrent maxillary sinusitis  -     predniSONE (DELTASONE) 20 MG tablet; Take 1 tablet (20 mg) by mouth 2  times daily for 5 days  -     azithromycin (ZITHROMAX) 250 MG tablet; Two tablets first day, then one tablet daily for four days.      Fluids, steamy showers, rest.  Advised supportive and symptomatic treatment.  Follow up with Provider - if condition persists or worsens.           Phone call duration: 7 minutes

## 2023-01-14 ENCOUNTER — LAB (OUTPATIENT)
Dept: LAB | Facility: CLINIC | Age: 48
End: 2023-01-14
Payer: COMMERCIAL

## 2023-01-14 DIAGNOSIS — M32.9 SYSTEMIC LUPUS ERYTHEMATOSUS, UNSPECIFIED SLE TYPE, UNSPECIFIED ORGAN INVOLVEMENT STATUS (H): ICD-10-CM

## 2023-01-14 DIAGNOSIS — D47.2 MGUS (MONOCLONAL GAMMOPATHY OF UNKNOWN SIGNIFICANCE): ICD-10-CM

## 2023-01-14 DIAGNOSIS — Z79.899 HIGH RISK MEDICATIONS (NOT ANTICOAGULANTS) LONG-TERM USE: ICD-10-CM

## 2023-01-14 LAB
ALBUMIN MFR UR ELPH: 14.7 MG/DL (ref 1–14)
ALBUMIN UR-MCNC: NEGATIVE MG/DL
APPEARANCE UR: CLEAR
BASOPHILS # BLD AUTO: 0 10E3/UL (ref 0–0.2)
BASOPHILS NFR BLD AUTO: 1 %
BILIRUB UR QL STRIP: NEGATIVE
COLOR UR AUTO: YELLOW
CREAT UR-MCNC: 98.2 MG/DL
EOSINOPHIL # BLD AUTO: 0.2 10E3/UL (ref 0–0.7)
EOSINOPHIL NFR BLD AUTO: 3 %
ERYTHROCYTE [DISTWIDTH] IN BLOOD BY AUTOMATED COUNT: 11.8 % (ref 10–15)
ERYTHROCYTE [SEDIMENTATION RATE] IN BLOOD BY WESTERGREN METHOD: 6 MM/HR (ref 0–20)
GLUCOSE UR STRIP-MCNC: NEGATIVE MG/DL
HCT VFR BLD AUTO: 38 % (ref 35–47)
HGB BLD-MCNC: 13.2 G/DL (ref 11.7–15.7)
HGB UR QL STRIP: NEGATIVE
KETONES UR STRIP-MCNC: NEGATIVE MG/DL
LEUKOCYTE ESTERASE UR QL STRIP: NEGATIVE
LYMPHOCYTES # BLD AUTO: 1.5 10E3/UL (ref 0.8–5.3)
LYMPHOCYTES NFR BLD AUTO: 25 %
MCH RBC QN AUTO: 33 PG (ref 26.5–33)
MCHC RBC AUTO-ENTMCNC: 34.7 G/DL (ref 31.5–36.5)
MCV RBC AUTO: 95 FL (ref 78–100)
MONOCYTES # BLD AUTO: 0.4 10E3/UL (ref 0–1.3)
MONOCYTES NFR BLD AUTO: 7 %
NEUTROPHILS # BLD AUTO: 3.8 10E3/UL (ref 1.6–8.3)
NEUTROPHILS NFR BLD AUTO: 64 %
NITRATE UR QL: NEGATIVE
PH UR STRIP: 6.5 [PH] (ref 5–7)
PLATELET # BLD AUTO: 299 10E3/UL (ref 150–450)
PROT/CREAT 24H UR: 0.15 MG/MG CR (ref 0–0.2)
RBC # BLD AUTO: 4 10E6/UL (ref 3.8–5.2)
SP GR UR STRIP: 1.01 (ref 1–1.03)
TOTAL PROTEIN SERUM FOR ELP: 5.9 G/DL (ref 6.4–8.3)
UROBILINOGEN UR STRIP-ACNC: 0.2 E.U./DL
WBC # BLD AUTO: 6 10E3/UL (ref 4–11)

## 2023-01-14 PROCEDURE — 85652 RBC SED RATE AUTOMATED: CPT

## 2023-01-14 PROCEDURE — 80053 COMPREHEN METABOLIC PANEL: CPT

## 2023-01-14 PROCEDURE — 86225 DNA ANTIBODY NATIVE: CPT

## 2023-01-14 PROCEDURE — 84165 PROTEIN E-PHORESIS SERUM: CPT

## 2023-01-14 PROCEDURE — 84156 ASSAY OF PROTEIN URINE: CPT

## 2023-01-14 PROCEDURE — 85025 COMPLETE CBC W/AUTO DIFF WBC: CPT

## 2023-01-14 PROCEDURE — 84155 ASSAY OF PROTEIN SERUM: CPT

## 2023-01-14 PROCEDURE — 81003 URINALYSIS AUTO W/O SCOPE: CPT

## 2023-01-14 PROCEDURE — 86140 C-REACTIVE PROTEIN: CPT

## 2023-01-14 PROCEDURE — 36415 COLL VENOUS BLD VENIPUNCTURE: CPT

## 2023-01-16 LAB
ALBUMIN SERPL ELPH-MCNC: 3.8 G/DL (ref 3.7–5.1)
ALBUMIN SERPL-MCNC: 3.7 G/DL (ref 3.4–5)
ALP SERPL-CCNC: 57 U/L (ref 40–150)
ALPHA1 GLOB SERPL ELPH-MCNC: 0.2 G/DL (ref 0.2–0.4)
ALPHA2 GLOB SERPL ELPH-MCNC: 0.5 G/DL (ref 0.5–0.9)
ALT SERPL W P-5'-P-CCNC: 32 U/L (ref 0–50)
ANION GAP SERPL CALCULATED.3IONS-SCNC: 6 MMOL/L (ref 3–14)
AST SERPL W P-5'-P-CCNC: 16 U/L (ref 0–45)
B-GLOBULIN SERPL ELPH-MCNC: 0.5 G/DL (ref 0.6–1)
BILIRUB SERPL-MCNC: 0.4 MG/DL (ref 0.2–1.3)
BUN SERPL-MCNC: 13 MG/DL (ref 7–30)
CALCIUM SERPL-MCNC: 8.8 MG/DL (ref 8.5–10.1)
CHLORIDE BLD-SCNC: 107 MMOL/L (ref 94–109)
CO2 SERPL-SCNC: 29 MMOL/L (ref 20–32)
CREAT SERPL-MCNC: 0.7 MG/DL (ref 0.52–1.04)
CRP SERPL-MCNC: <2.9 MG/L (ref 0–8)
DSDNA AB SER-ACNC: 0.6 IU/ML
GAMMA GLOB SERPL ELPH-MCNC: 0.8 G/DL (ref 0.7–1.6)
GFR SERPL CREATININE-BSD FRML MDRD: >90 ML/MIN/1.73M2
GLUCOSE BLD-MCNC: 108 MG/DL (ref 70–99)
M PROTEIN SERPL ELPH-MCNC: 0.2 G/DL
POTASSIUM BLD-SCNC: 3.5 MMOL/L (ref 3.4–5.3)
PROT PATTERN SERPL ELPH-IMP: ABNORMAL
PROT SERPL-MCNC: 6.4 G/DL (ref 6.8–8.8)
SODIUM SERPL-SCNC: 142 MMOL/L (ref 133–144)

## 2023-01-23 ENCOUNTER — VIRTUAL VISIT (OUTPATIENT)
Dept: RHEUMATOLOGY | Facility: CLINIC | Age: 48
End: 2023-01-23
Payer: COMMERCIAL

## 2023-01-23 DIAGNOSIS — M32.9 SYSTEMIC LUPUS ERYTHEMATOSUS, UNSPECIFIED SLE TYPE, UNSPECIFIED ORGAN INVOLVEMENT STATUS (H): Primary | ICD-10-CM

## 2023-01-23 DIAGNOSIS — Z79.899 HIGH RISK MEDICATIONS (NOT ANTICOAGULANTS) LONG-TERM USE: ICD-10-CM

## 2023-01-23 DIAGNOSIS — M35.01 SJOGREN'S SYNDROME WITH KERATOCONJUNCTIVITIS SICCA (H): ICD-10-CM

## 2023-01-23 DIAGNOSIS — I73.00 RAYNAUD'S PHENOMENON WITHOUT GANGRENE: ICD-10-CM

## 2023-01-23 PROCEDURE — 99214 OFFICE O/P EST MOD 30 MIN: CPT | Mod: 95 | Performed by: INTERNAL MEDICINE

## 2023-01-23 RX ORDER — CEVIMELINE HYDROCHLORIDE 30 MG/1
30 CAPSULE ORAL 2 TIMES DAILY
Qty: 180 CAPSULE | Refills: 2 | Status: SHIPPED | OUTPATIENT
Start: 2023-01-23 | End: 2023-07-28

## 2023-01-23 NOTE — PROGRESS NOTES
Yovana Enriquez  is a 47 year old year old who is being evaluated via a billable video visit.      How would you like to obtain your AVS? MyChart  If the video visit is dropped, the invitation should be resent by: Text to cell phone: [phone]   Will anyone else be joining your video visit? No     Rheumatology Video Visit      Yovana Enriquez MRN# [id number]   YOB: 1975 Age: 47 year old      Date of visit: 1/23/23   PCP: Bradford Mario    Chief Complaint   Patient presents with:  Systemic lupus erythematosus    Assessment and Plan     1. Systemic lupus erythematosus (YANELIS by EIA positive in the past, leukopenia/lymphocytopenia, hypocomplementemia, polyarthralgias, oral sores, history of malar rash, photophobia, photosensitivity, Raynaud's Phenomenon): Previously on methotrexate that was discontinued for unclear reasons but the patient reports that she tolerated it well. Currently on azathioprine 75 mg daily (dose reduced on 11/8/2018 from 100mg daily without worsening symptoms at that time, eventually reduced to 50 mg daily and did well for a period of time; now on 75 mg daily), hydroxychloroquine 300 mg daily [avg], and Benlysta (worsening symptoms when stopped in the past).  Overall doing well with regard to lupus at this time.  Noted that she has likely adrenal insufficiency with a lower dose of cortisol in the past and has had difficulty getting off of a low-dose of prednisone; so will continue prednisone 2.5 mg daily.    Chronic illness, stable.    - Continue azathioprine 75 mg daily   - Continue hydroxychloroquine 300mg daily on average: 200mg daily with additional 200mg every other day (toxicity monitoring eye exam last done on 8/5/2022 by Dr. Queen)  - Continue Benlysta 200 mg SQ every 7 days  - Continue Prednisone 2.5mg daily   - Continue photoprotection: Covering up and using sunscreen  - Labs in 3 months: CBC, CMP, C3, C4, dsDNA ESR, CRP, UA, Uprotein:creatinine    High risk medication  requiring intensive toxicity monitoring at least quarterly: labs ordered include CBC, Creatinine, Hepatic panel to monitor for cytopenia and hepatotoxicity; checking creatinine as it affects clearance of medication.     2. Secondary Sjogren syndrome: Doing well with Evoxac and frequent sips of water, but then Evoxac became more expensive so pilocarpine was tried but not tolerated (GI upset).  Therefore, changed back to Evoxac and was doing well with BID dosing (GI upset when TID). Dry eyes well controled with artificial tears as needed.  Chronic illness  - Reduce Evoxac from 30 mg 3 times daily, to 30 mg twice daily     3. Raynaud's Phenomenon: Amlodipine 5mg daily is effective.  Chronic illness, stable  - Continue amlodipine 5mg daily      4. R>Ltrochanteric bursitis, possible iliotibial band syndrome as well:  Also with symptoms of iliotibial band syndrome and degenerative arthritis of the lumbar spine.  Symptoms improved with home exercises.        5. Fibromyalgia: Managed by PCP.    6. MGUS: SPEP and immunofixation advised to be done yearly per Dr. Yoselin Payan; she may follow-up with her PCP for this issue.  Documented here for historical significance.    7.  Vaccinations: Vaccinations reviewed with Ms. Enriquez.    - Influenza: encouraged yearly vaccination  - [id number]: up to date  - [id number]: up to date  - COVID-19: advised updating, and to hold azathioprine and benlysta for 2 weeks after COVID-19 vaccination      Total minutes spent in evaluation with patient, documentation, , and review of pertinent studies and chart notes: 16    Ms. Enriquez verbalized agreement with and understanding of the rational for the diagnosis and treatment plan.  All questions were answered to best of my ability and the patient's satisfaction. Ms. Enriquez was advised to contact the clinic with any questions that may arise after the clinic visit.      Thank you for involving me in the care of the patient    Return to  clinic: 3-4 months    [facility]   Yovana Enriquez is a 47 year old female with medical history significant for subclinical hyperthyroidism, hypertension, irritable bowel syndrome, fibromyalgia, hypertension, non-celiac gluten sensitivity (reportedly with bowel biopsies and laboratory workup that did not show celiac disease), anxiety, and systemic lupus erythematosus who presents for follow-up of SLE.    Today, 1/23/2023: Currently doing well.  However she had  mild unilateral flank pain that has since resolved; no hematuria.  Expect 3 times daily caused stomach upset so she reduced it to twice daily.  Working on stretching and exercises and her hips have been feeling better.  Trying to lose weight.  Raynaud's controlled.  Fatigue with sunlight exposure, unchanged.  Dry eyes controlled with artificial tears.    Denies fevers, chills, nausea, vomiting. No abdominal pain.  No chest pain/pressure, palpitations, or shortness of breath.  No rash currently. No LE swelling.  She has photosensitivity and photophobia.   No eye pain or redness. No history of serositis. No history of DVT, pulmonary embolism, or miscarriage.     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    ROS   12 point review of system was completed and negative except as noted in the HPI     Active Problem List     Patient Active Problem List   Diagnosis     Systemic lupus erythematosus (H)     Menorrhagia     History of ovarian cyst     Dysmenorrhea     History of gestational diabetes mellitus, not currently pregnant     Intramural leiomyoma of uterus     Hyperlipidemia LDL goal <130     Subclinical hyperthyroidism     Anxiety     High risk medication use     Fibromyalgia     Chronic constipation     Irritable bowel syndrome     Health Care Home     Hypertension goal BP (blood pressure) < 140/90     Non-celiac gluten sensitivity     Raynaud's phenomenon without gangrene     Sjogren's syndrome (H)     Intramural and submucous  leiomyoma of uterus     Adrenal insufficiency (H)     Screening for malignant neoplasm of cervix     Past Medical History     Past Medical History:   Diagnosis Date     Arthritis      Chronic constipation 12/16/2013     Dysmenorrhea 10/1/2012     Fibromyalgia 11/15/2013     Health Care Home 3/19/2014    **See Letters for [facility] Care Plan: Emergency Care Plan       High risk medication use 9/13/2013     History of gestational diabetes mellitus, not currently pregnant 10/1/2012     History of ovarian cyst 10/1/2012     Hyperlipidemia LDL goal <130 10/18/2012     Hypertension goal BP (blood pressure) < 140/90 1/19/2015     Intramural leiomyoma of uterus 10/5/2012     Irritable bowel syndrome 3/11/2014    Patient states no history colonoscopy       Menorrhagia 10/1/2012     Non-celiac gluten sensitivity 2/8/2016     PONV (postoperative nausea and vomiting)      Subclinical hyperthyroidism 1/17/2013     Systemic lupus erythematosus (H) 4/23/2012     Past Surgical History     Past Surgical History:   Procedure Laterality Date     ABDOMEN SURGERY  2004    Tubal ligation     COLONOSCOPY N/A 2/20/2015    Procedure: COMBINED COLONOSCOPY, SINGLE OR MULTIPLE BIOPSY/POLYPECTOMY BY BIOPSY;  Surgeon: Fred Cullen MD;  Location: [facility] OR     COLONOSCOPY N/A 10/29/2018    Procedure: COMBINED COLONOSCOPY, SINGLE OR MULTIPLE BIOPSY/POLYPECTOMY BY BIOPSY;  Surgeon: Inez Sawyer MD;  Location: [facility] OR     COLONOSCOPY WITH CO2 INSUFFLATION N/A 2/20/2015    Procedure: COLONOSCOPY WITH CO2 INSUFFLATION;  Surgeon: Fred Cullen MD;  Location: [facility] OR     ENT SURGERY  10-24-14    Bottom lip biopsies     ESOPHAGOSCOPY, GASTROSCOPY, DUODENOSCOPY (EGD), COMBINED N/A 2/20/2015    Procedure: COMBINED ESOPHAGOSCOPY, GASTROSCOPY, DUODENOSCOPY (EGD), BIOPSY SINGLE OR MULTIPLE;  Surgeon: Fred Cullen MD;  Location: [facility] OR     HC BREATH HYDROGEN TEST  12/31/2013    Procedure: HYDROGEN BREATH TEST;  Surgeon: Attila Rae  Camden OJEDA MD;  Location: [facility] HYSTEROSCOPY, SURGICAL; W/ ENDOMETRIAL ABLATION, ANY METHOD  10-19-12     ORTHOPEDIC SURGERY  12/2010     SHOULDER SURGERY Right     R shoulder     TUBAL LIGATION  2004     Allergy     Allergies   Allergen Reactions     Fluticasone Propionate (Nasal) [Fluticasone] Anaphylaxis     Current Medication List     Current Outpatient Medications   Medication Sig     acetaminophen (TYLENOL) 325 MG tablet Take 325-650 mg by mouth every 6 hours as needed for mild pain or headaches Maximum daily dose of acetaminophen is 3000 mg in 24 hours.     amLODIPine (NORVASC) 5 MG tablet Take 1 tablet (5 mg) by mouth daily     azaTHIOprine (IMURAN) 50 MG tablet Take 1.5 tablets (75 mg) by mouth daily     azelastine (ASTELIN) 0.1 % nasal spray Spray 1 spray into both nostrils 2 times daily     azithromycin (ZITHROMAX) 250 MG tablet Two tablets first day, then one tablet daily for four days.     Belimumab 200 MG/ML SOAJ Inject 200 mg Subcutaneous every 7 days . Hold for signs of infection and seek medical attention. Autoinjector     blood glucose test strip Used for testing glucose 2-3 times daily     Calcium Carb-Cholecalciferol (CALCIUM + D3) 600-200 MG-UNIT TABS Take 1 tablet by mouth daily     cevimeline (EVOXAC) 30 MG capsule Take 1 capsule (30 mg) by mouth 3 times daily     diclofenac (VOLTAREN) 1 % GEL Apply 2-4 grams to knees or shoulders four times daily as needed using enclosed dosing card.     docusate sodium (COLACE) 100 MG capsule Take 1 capsule (100 mg) by mouth 2 times daily     famotidine (PEPCID) 40 MG tablet TAKE 1 TABLET BY MOUTH NIGHTLY AS NEEDED FOR HEARTBURN     hydroxychloroquine (PLAQUENIL) 200 MG tablet Hydroxychloroquine 200mg daily; and an additional 200mg every other day. Yearly eye exam, including 10-2 VF and SD-OCT, is required     losartan (COZAAR) 25 MG tablet TAKE 1 TABLET BY MOUTH EVERY DAY     nortriptyline (PAMELOR) 10 MG capsule TAKE 1 CAPSULE BY MOUTH AT BEDTIME      ondansetron (ZOFRAN-ODT) 4 MG ODT tab Take 1 tablet (4 mg) by mouth every 8 hours as needed for nausea Recommend limited use     predniSONE (DELTASONE) 2.5 MG tablet Take 1 tablet (2.5 mg) by mouth daily     SUMAtriptan (IMITREX) 50 MG tablet Take 1 tablet (50 mg) by mouth at onset of headache for migraine . After 2 hours, if no relief, ok to take 2nd dose.  Limit 2 tabs/24 hours. (Patient taking differently: Take 50 mg by mouth at onset of headache for migraine . After 2 hours, if no relief, ok to take 2nd dose.  Limit 2 tabs/24 hours.)     Vitamin D, Cholecalciferol, 1000 units CAPS Take 4,000 Int'l Units by mouth daily     No current facility-administered medications for this visit.         Social History   See HPI    Family History     Family History   Problem Relation Age of Onset     Respiratory Maternal Grandfather      Cancer Maternal Grandfather      Other Cancer Maternal Grandfather         Skin cancer     Asthma Son      Circulatory Maternal Grandmother         Brain anurysm     Diabetes Maternal Grandmother      Hypertension Mother      Glaucoma Mother 50        drops     Cancer Mother      Hypertension Sister      Hypertension Sister      Diabetes Sister      Hypertension Sister      Anxiety Disorder Sister      Asthma Son      Diabetes No family hx of      Cerebrovascular Disease No family hx of      Thyroid Disease No family hx of      Macular Degeneration No family hx of        Physical Exam     Temp Readings from Last 3 Encounters:   12/20/21 97.4  F (36.3  C) (Oral)   09/01/21 98.7  F (37.1  C) (Tympanic)   06/13/21 98.5  F (36.9  C) (Tympanic)     BP Readings from Last 5 Encounters:   08/15/22 131/87   06/24/22 134/82   12/28/21 (!) 145/92   12/20/21 (!) 162/97   09/01/21 118/86     Pulse Readings from Last 1 Encounters:   08/15/22 85     Resp Readings from Last 1 Encounters:   09/01/21 20     Estimated body mass index is 22.92 kg/m  as calculated from the following:    Height as of 6/24/22: 1.676  "m (5' 6\").    Weight as of 8/15/22: 64.4 kg (142 lb).    GEN: NAD. Healthy appearing adult.   HEENT:  Anicteric, noninjected sclera. No obvious external lesions of the ear and nose. Hearing intact.  PULM: No increased work of breathing  MSK:  Hands and wrists without swelling  SKIN: No rash or jaundice seen  PSYCH: Alert. Appropriate.     Labs / Imaging (select studies)     YANELIS  Recent Labs   Lab Test 07/25/16  1433   ANAIGG <1:40  Reference range: <1:40  (Note)  <1:40  INTERPRETIVE INFORMATION: YANELIS by IFA, IgG  Anti-nuclear antibodies (YANELIS) are seen in a variety of  systemic rheumatic diseases and are determined by indirect  fluorescence assay (IFA) using HEp-2 substrate with an  IgG-specific conjugate. YANELIS titers less than or equal to  1:80 have variable relevance while titers greater than or  equal to 1:160 are considered clinically significant. These  antibodies may precede clinical disease onset; however,  healthy individuals and those with advanced age have been  reported to be positive for YANELIS. When observed, one of the  five basic patterns is reported: homogeneous,  peripheral/rim, speckled, centromere, or nucleolar. If  cytoplasmic fluorescence is observed, it is noted. IFA  methodology is subjective and has occasionally been shown  to lack sensitivity for anti-SSA/Ro antibodies.  Negative results do not necessarily rule out the presence  of SSc. If clinical suspicion remains, consider further  te sting for U3-RNP, PM/Scl, or Th/To antibodies associated  with SSc.  Performed by [facility],  [address] [place] [id number] [phone]  [email or web address] Alcon Krishna MD, Lab. Director       RNP/Sm/SSA/SSB  Recent Labs   Lab Test 03/23/16  1759 01/04/16  1806   SMIGG <0.2  Negative   Antibody index (AI) values reflect qualitative changes in antibody   concentration that cannot be directly associated with clinical condition or   disease state.   <0.2  Negative   Antibody index (AI) values reflect " qualitative changes in antibody   concentration that cannot be directly associated with clinical condition or   disease state.       dsDNA  Recent Labs   Lab Test 01/14/23  0917 07/02/22  0905 04/03/22  1236 10/10/21  1202 07/19/21  1820 04/20/21  1804 01/11/21  1805 10/25/20  1059 04/07/20  1136   DNA 0.6 <0.6 0.7 <0.6 <0.6 1 <1 1 1     C3/C4  Recent Labs   Lab Test 07/02/22  0905 10/10/21  1202 07/19/21  1820 04/20/21  1804 01/11/21  1805 10/25/20  1059 04/07/20  1136 01/13/20  1804 10/21/19  1552   G7UEXDB 98 89 91 97 91 103 100 85 77   M6DFNGC 22 17 20 20 20 >9* 16 21 13*     CBC  Recent Labs   Lab Test 01/14/23  0917 10/16/22  1205 07/02/22  0905 07/19/21  1820 06/13/21  1312 04/20/21  1804 01/11/21  1805   WBC 6.0 4.4 4.7   < > 5.8 7.7 8.1   RBC 4.00 4.16 4.25   < > 4.19 4.22 4.00   HGB 13.2 13.7 14.0   < > 13.5 13.7 13.2   HCT 38.0 39.4 40.1   < > 40.6 40.0 38.7   MCV 95 95 94   < > 97 95 97   RDW 11.8 12.0 11.6   < > 11.6 11.5 11.5    301 299   < > 300 313 316   MCH 33.0 32.9 32.9   < > 32.2 32.5 33.0   MCHC 34.7 34.8 34.9   < > 33.3 34.3 34.1   NEUTROPHIL 64 66 65   < > 77.2 81.2 83.4   LYMPH 25 23 24   < > 13.7 10.8 8.8   MONOCYTE 7 8 8   < > 6.2 6.4 6.7   EOSINOPHIL 3 3 3   < > 1.7 0.8 0.6   BASOPHIL 1 1 1   < > 1.2 0.8 0.5   ANEU  --   --   --   --  4.5 6.3 6.7   ALYM  --   --   --   --  0.8 0.8 0.7*   ZOË  --   --   --   --  0.4 0.5 0.5   AEOS  --   --   --   --  0.1 0.1 0.1   ABAS  --   --   --   --  0.1 0.1 0.0   ANEUTAUTO 3.8 2.9 3.0   < >  --   --   --    ALYMPAUTO 1.5 1.0 1.1   < >  --   --   --    AMONOAUTO 0.4 0.4 0.4   < >  --   --   --    AEOSAUTO 0.2 0.1 0.1   < >  --   --   --    ABSBASO 0.0 0.1 0.0   < >  --   --   --     < > = values in this interval not displayed.     CMP  Recent Labs   Lab Test 01/14/23  0917 10/16/22  1205 07/02/22  0905 04/03/22  1236 01/08/22  1156 10/10/21  1202 07/19/21  1820 04/20/21  1804 01/11/21  1805 10/25/20  1059 04/07/20  1136    143 140 138 140  139   < > 140 138 139 140   POTASSIUM 3.5 4.2 3.8 4.2 4.1 4.3   < > 4.1 3.9 4.1 3.6   CHLORIDE 107 109 104 105 105 107   < > 106 106 104 106   CO2 29 32 31 25 30 28   < > 33* 31 31 31   ANIONGAP 6 2* 5 8 5 4   < > 1* 1* 4 3   * 85 99 109* 125* 89   < > 97 99 86 104*   BUN 13 12 13 16 13 11   < > 13 15 11 16   CR 0.70 0.76 0.69 0.75 0.72 0.64   < > 0.76 0.77 0.73 0.71   GFRESTIMATED >90 >90 >90 >90 >90 >90   < > >90 >90 >90 >90   GFRESTBLACK  --   --   --   --   --   --   --  >90 >90 >90 >90   MELANIA 8.8 8.9 8.8 8.9 8.6 8.5   < > 9.0 8.8 9.1 9.1   BILITOTAL 0.4 0.3 0.4 0.4 0.4 0.3   < > 0.3 0.2 0.4 0.3   ALBUMIN 3.7 3.9 3.9 4.1 3.8 3.8   < > 4.1 4.0 4.4 4.3   PROTTOTAL 6.4* 6.7* 6.7* 6.9 6.6* 6.4*   < > 6.8 6.5* 7.5 7.1   ALKPHOS 57 56 56 55 69 55   < > 57 55 62 54   AST 16 16 20 22 14 22   < > 17 14 22 13   ALT 32 22 20 30 24 28   < > 25 19 23 24    < > = values in this interval not displayed.     HgA1c  Recent Labs   Lab Test 05/30/19  0859   A1C 5.2     Calcium/VitaminD  Recent Labs   Lab Test 01/14/23  0917 10/16/22  1205 07/02/22  0905 10/25/20  1059 04/07/20  1136 07/17/19  1756 04/16/19  1759 01/30/18  1752 10/02/17  1814   MELANIA 8.8 8.9 8.8   < > 9.1   < > 8.4*   < > 8.7   VITDT  --   --   --   --  59  --  40  --  37    < > = values in this interval not displayed.     ESR/CRP  Recent Labs   Lab Test 01/14/23  0917 10/16/22  1205 07/02/22  0905   SED 6 5 4   CRP <2.9 <2.9 <2.9     CK/Aldolase  Recent Labs   Lab Test 10/10/21  1202 07/19/21  1820 04/20/21  1804   CKT 87 105 90     TSH/T4  Recent Labs   Lab Test 04/03/22  1236 04/07/20  1136 04/10/19  1808 10/18/17  1445 10/07/16  0714 02/27/15  1058   TSH 0.59 0.44 0.44 0.33* 0.37*  --    T4  --   --   --  1.24 1.16 1.01     Lipid Panel  Recent Labs   Lab Test 04/03/22  1236 01/26/19  0916 10/07/16  0714   CHOL 177 133 137   TRIG 132 64 48   HDL 80 68 75   LDL 71 52 52   NHDL 97 65 62     Hepatitis B  Recent Labs   Lab Test 03/29/16  1417   HEPBANG  Nonreactive     Hepatitis C  Recent Labs   Lab Test 03/29/16  1417   HCVAB Nonreactive   Assay performance characteristics have not been established for newborns,   infants, and children       Lyme ab screening  Recent Labs   Lab Test 05/30/15  1355   LYMEGM 0.55     Tuberculosis Screening  Recent Labs   Lab Test 01/08/22  1156 07/03/17  1447 03/29/16  1417   TBRES Negative  --   --    TBRSLT  --  Negative Negative   TBAGN  --  0.00 0.00     HIV Screening  Recent Labs   Lab Test 04/10/19  1808   HIAGAB Nonreactive     UA  Recent Labs   Lab Test 01/14/23  1124 10/16/22  1205 07/02/22  0905 06/24/22  0921 04/10/22  1232 10/10/21  1204 09/01/21  1522 04/20/21  1819 01/11/21  1810 10/25/20  1110 09/15/20  1655 07/03/20  1010 04/07/20  1140   COLOR Yellow Yellow Yellow Yellow   < > Yellow Yellow   < > Yellow Yellow Yellow   < > Yellow   APPEARANCE Clear Clear Clear Clear   < > Clear Slightly Cloudy*   < > Clear Clear Clear   < > Clear   URINEGLC Negative Negative Negative Negative   < > Negative Negative   < > Negative Negative Negative   < > Negative   URINEBILI Negative Negative Negative Negative   < > Negative Negative   < > Negative Negative Negative   < > Negative   SG 1.015 1.020 1.015 <=1.005   < > 1.010 1.015   < > >1.030 1.010 1.025   < > >1.030   URINEPH 6.5 6.0 6.5 6.0   < > 5.5 7.0   < > 6.0 7.0 6.0   < > 6.5   PROTEIN Negative Negative Negative Negative   < > Negative Trace*   < > Negative Negative Negative   < > Negative   UROBILINOGEN 0.2 0.2 0.2 0.2   < > 0.2 0.2   < > 0.2 0.2 0.2   < > 0.2   NITRITE Negative Negative Negative Negative   < > Negative Negative   < > Negative Negative Negative   < > Negative   UBLD Negative Negative Negative Negative   < > Negative Moderate*   < > Small* Negative Negative   < > Negative   LEUKEST Negative Trace* Negative Trace*   < > Small* Moderate*   < > Negative Trace* Negative   < > Trace*   WBCU  --  0-5  --  0-5  --  0-5 25-50*   < > 0 - 5 0 - 5 5-10*   < > 0 - 5    RBCU  --  0-2  --  None Seen  --  0-2 25-50*   < > O - 2 O - 2 O - 2   < > O - 2   SQUAMOUSEPI  --   --   --   --   --   --   --   --  Few Few Few   < > Few   BACTERIA  --   --   --   --   --   --  Few*  --  Few* Few* Few*   < > Few*   MUCOUS  --   --   --   --   --   --   --   --  Present*  --  Present*  --  Present*    < > = values in this interval not displayed.     Urine Microscopic  Recent Labs   Lab Test 10/16/22  1205 06/24/22  0921 10/10/21  1204 09/01/21  1522 06/13/21  1309 01/11/21  1810 10/25/20  1110 09/15/20  1655 07/03/20  1010 04/07/20  1140   WBCU 0-5 0-5 0-5 25-50*   < > 0 - 5 0 - 5 5-10*   < > 0 - 5   RBCU 0-2 None Seen 0-2 25-50*   < > O - 2 O - 2 O - 2   < > O - 2   SQUAMOUSEPI  --   --   --   --   --  Few Few Few   < > Few   BACTERIA  --   --   --  Few*  --  Few* Few* Few*   < > Few*   MUCOUS  --   --   --   --   --  Present*  --  Present*  --  Present*    < > = values in this interval not displayed.     Urine Protein  GHUTP and UTP= Urine protein (random), GHUTPG and UTPG = urine protein:creatinine ratio (random), UCRR = urine creatinine (random)  Recent Labs   Lab Test 01/14/23  1124 10/16/22  1205 07/02/22  0905 04/10/22  1232 01/08/22  1156 10/10/21  1202   GHUTP 14.7* <6.0 8.6  --   --   --    UTP  --   --   --  0.09 0.07 0.06   GHUTPG 0.15  --  0.09  --   --   --    UTPG  --   --   --  0.11 0.09 0.12   UCRR 98.2 76.0 94.9 83 81 52     Immunization History     Immunization History   Administered Date(s) Administered     COVID-19 Vaccine 12+ (Pfizer) 03/13/2021, 04/03/2021     Influenza (H1N1) 12/02/2009     Influenza (IIV3) PF 02/08/2007, 09/17/2010, 10/01/2012, 09/23/2013     Influenza Vaccine >6 months (Alfuria,Fluzone) 09/23/2013, 09/20/2016, 10/30/2017, 10/14/2019     Influenza Vaccine, 6+MO IM (QUADRIVALENT W/PRESERVATIVES) 08/11/2018     Pneumo Conj 13-V (2010&after) 04/25/2016     Pneumococcal 23 valent 10/01/2012, 11/06/2017     Td (Adult), Adsorbed 02/12/2001     Tdap  (Adacel,Boostrix) 12/17/2010, 06/24/2022            Chart documentation done in part with Dragon Voice recognition Software. Although reviewed after completion, some word and grammatical error may remain.        Video-Visit Details    Type of service:  Video Visit    Video Start Time: 9:49 AM    Video End Time: 9:59 AM AM    Originating Location (pt. Location): Home, [place]    Distant Location (provider location):  Off-site, [place]    Platform used for Video Visit: Brianna Colvin MD

## 2023-01-23 NOTE — PATIENT INSTRUCTIONS
RHEUMATOLOGY    Dr. Bradford Colvin    [facility]  [address] [place] [address]  [place] [address]  Phone number: [phone]  Fax number: [phone]    You may schedule your FLU shot by calling [phone] or if you would like to get your shot at a [place] pharmacy you may schedule online at [email or web address].    Thank you for choosing [facility]!    Qing Zapata CMA Rheumatology

## 2023-02-07 ENCOUNTER — TELEPHONE (OUTPATIENT)
Dept: RHEUMATOLOGY | Facility: CLINIC | Age: 48
End: 2023-02-07
Payer: COMMERCIAL

## 2023-02-07 NOTE — TELEPHONE ENCOUNTER
Prior Authorization Approval    Authorization Effective Date: 1/8/2023  Authorization Expiration Date: 2/7/2024  Medication: Benlysta PA renewal Approval   Approved Dose/Quantity: 4 per 28  Reference #: [id number]   Insurance Company: [employer] - Phone [phone] Fax [phone]  Expected CoPay:       CoPay Card Available:      Foundation Assistance Needed:    Which Pharmacy is filling the prescription (Not needed for infusion/clinic administered): [facility] - [address] [place] [address]  Pharmacy Notified:    Patient Notified:

## 2023-03-27 ENCOUNTER — E-VISIT (OUTPATIENT)
Dept: FAMILY MEDICINE | Facility: CLINIC | Age: 48
End: 2023-03-27

## 2023-03-27 ENCOUNTER — VIRTUAL VISIT (OUTPATIENT)
Dept: FAMILY MEDICINE | Facility: CLINIC | Age: 48
End: 2023-03-27
Payer: COMMERCIAL

## 2023-03-27 DIAGNOSIS — R25.1 TREMULOUSNESS: Primary | ICD-10-CM

## 2023-03-27 DIAGNOSIS — R42 DIZZINESS: ICD-10-CM

## 2023-03-27 DIAGNOSIS — Z53.9 ERRONEOUS ENCOUNTER--DISREGARD: Primary | ICD-10-CM

## 2023-03-27 PROCEDURE — 99213 OFFICE O/P EST LOW 20 MIN: CPT | Mod: 93 | Performed by: PHYSICIAN ASSISTANT

## 2023-03-27 RX ORDER — GABAPENTIN 100 MG/1
CAPSULE ORAL
Qty: 90 CAPSULE | Refills: 1 | Status: SHIPPED | OUTPATIENT
Start: 2023-03-27 | End: 2023-08-07

## 2023-03-27 NOTE — PROGRESS NOTES
{PROVIDER CHARTING PREFERENCE:809151}    Subjective   Ruma is a 47 year old, presenting for the following health issues:  No chief complaint on file.  No flowsheet data found.  HPI     {SUPERLIST (Optional):230306}  {additonal problems for provider to add (Optional):758415}      Review of Systems   {ROS COMP (Optional):805213}      Objective    There were no vitals taken for this visit.  There is no height or weight on file to calculate BMI.  Physical Exam   {Exam List (Optional):573074}    {Diagnostic Test Results (Optional):399228}    {AMBULATORY ATTESTATION (Optional):461431}

## 2023-03-27 NOTE — PROGRESS NOTES
Ruma is a 47 year old who is being evaluated via a billable telephone visit.      What phone number would you like to be contacted at? [phone]  How would you like to obtain your AVS? Nuno    Distant Location (provider location):  On-site        Subjective   Ruma is a 47 year old, presenting for the following health issues:  Tremors  No flowsheet data found.  HPI     Tremors  - shaking, vibration  Type feeling since on a long car ride  - 2 wks    Noted following a road trip to [facility]   Off and on since.   History of lupus.  Some hand and arm paresthesias. Some mild dizziness. Mild intermittent ear pain. No fevers. No aural crepitations. Mild cold symptoms. Intermittent tinnitus. No vision or hearing loss/changes. Some inc tremulousness. No new meds. No recent issue with profound ha's .         Review of Systems   Constitutional, HEENT, cardiovascular, pulmonary, GI, , musculoskeletal, neuro, skin, endocrine and psych systems are negative, except as otherwise noted.      Objective           Vitals:  No vitals were obtained today due to virtual visit.    Physical Exam   healthy, alert and no distress  PSYCH: Alert and oriented times 3; coherent speech, normal   rate and volume, able to articulate logical thoughts, able   to abstract reason, no tangential thoughts, no hallucinations   or delusions  Her affect is normal  RESP: No cough, no audible wheezing, able to talk in full sentences  Remainder of exam unable to be completed due to telephone visits    Yovana was seen today for tremors.    Diagnoses and all orders for this visit:    Tremulousness  -     MR Brain w/o Contrast; Future  -     Adult Neurology  Referral; Future  -     gabapentin (NEURONTIN) 100 MG capsule; Take one cap at bedtime for 3 days, then 1 cap bid x 3 days, then 1 cap tid thereafter    Dizziness  -     MR Brain w/o Contrast; Future  -     Adult Neurology  Referral; Future      work on lifestyle  modification          Phone call duration: 15 minutes

## 2023-03-28 NOTE — TELEPHONE ENCOUNTER
Appointment made 3/27/23  Samantha Shen CMA  This encounter was opened in error. Please disregard.

## 2023-03-29 ENCOUNTER — OFFICE VISIT (OUTPATIENT)
Dept: URGENT CARE | Facility: URGENT CARE | Age: 48
End: 2023-03-29
Payer: COMMERCIAL

## 2023-03-29 VITALS
HEART RATE: 102 BPM | TEMPERATURE: 97.7 F | RESPIRATION RATE: 14 BRPM | SYSTOLIC BLOOD PRESSURE: 162 MMHG | OXYGEN SATURATION: 99 % | BODY MASS INDEX: 21.31 KG/M2 | WEIGHT: 132 LBS | DIASTOLIC BLOOD PRESSURE: 94 MMHG

## 2023-03-29 DIAGNOSIS — H66.001 NON-RECURRENT ACUTE SUPPURATIVE OTITIS MEDIA OF RIGHT EAR WITHOUT SPONTANEOUS RUPTURE OF TYMPANIC MEMBRANE: Primary | ICD-10-CM

## 2023-03-29 DIAGNOSIS — R05.1 ACUTE COUGH: ICD-10-CM

## 2023-03-29 DIAGNOSIS — R07.0 THROAT PAIN: ICD-10-CM

## 2023-03-29 LAB — DEPRECATED S PYO AG THROAT QL EIA: NEGATIVE

## 2023-03-29 PROCEDURE — 87651 STREP A DNA AMP PROBE: CPT | Performed by: NURSE PRACTITIONER

## 2023-03-29 PROCEDURE — 99213 OFFICE O/P EST LOW 20 MIN: CPT | Performed by: NURSE PRACTITIONER

## 2023-03-29 NOTE — PROGRESS NOTES
SUBJECTIVE:   Yovana Enriquez is a 47 year old female presenting with a chief complaint of chills, cough - productive, ear pain right and sore throat.  Onset of symptoms was 2 week(s) ago.  Course of illness is worsening.    Severity moderate  Treatment measures tried include Tylenol/Ibuprofen, OTC Cough med, cough drops and Fluids.  Predisposing factors include ill contact: Runs a .    Past Medical History:   Diagnosis Date     Arthritis      Chronic constipation 12/16/2013     Dysmenorrhea 10/1/2012     Fibromyalgia 11/15/2013     Health Care Home 3/19/2014    **See Letters for H Care Plan: Emergency Care Plan       High risk medication use 9/13/2013     History of gestational diabetes mellitus, not currently pregnant 10/1/2012     History of ovarian cyst 10/1/2012     Hyperlipidemia LDL goal <130 10/18/2012     Hypertension goal BP (blood pressure) < 140/90 1/19/2015     Intramural leiomyoma of uterus 10/5/2012     Irritable bowel syndrome 3/11/2014    Patient states no history colonoscopy       Menorrhagia 10/1/2012     Non-celiac gluten sensitivity 2/8/2016     PONV (postoperative nausea and vomiting)      Subclinical hyperthyroidism 1/17/2013     Systemic lupus erythematosus (H) 4/23/2012     Current Outpatient Medications   Medication Sig Dispense Refill     amLODIPine (NORVASC) 5 MG tablet Take 1 tablet (5 mg) by mouth daily 90 tablet 2     amoxicillin-clavulanate (AUGMENTIN) 875-125 MG tablet Take 1 tablet by mouth 2 times daily for 10 days 20 tablet 0     azaTHIOprine (IMURAN) 50 MG tablet Take 1.5 tablets (75 mg) by mouth daily 135 tablet 2     Belimumab 200 MG/ML SOAJ Inject 200 mg Subcutaneous every 7 days . Hold for signs of infection and seek medical attention. Autoinjector 4 mL 4     blood glucose test strip Used for testing glucose 2-3 times daily 1 Month 5     Calcium Carb-Cholecalciferol (CALCIUM + D3) 600-200 MG-UNIT TABS Take 1 tablet by mouth daily       cevimeline (EVOXAC) 30 MG  capsule Take 1 capsule (30 mg) by mouth 2 times daily 180 capsule 2     hydroxychloroquine (PLAQUENIL) 200 MG tablet Hydroxychloroquine 200mg daily; and an additional 200mg every other day. Yearly eye exam, including 10-2 VF and SD-OCT, is required 135 tablet 3     losartan (COZAAR) 25 MG tablet TAKE 1 TABLET BY MOUTH ONCE DAILY 90 tablet 1     nortriptyline (PAMELOR) 10 MG capsule TAKE 1 CAPSULE BY MOUTH AT BEDTIME 90 capsule 0     Vitamin D, Cholecalciferol, 1000 units CAPS Take 4,000 Int'l Units by mouth daily       acetaminophen (TYLENOL) 325 MG tablet Take 325-650 mg by mouth every 6 hours as needed for mild pain or headaches Maximum daily dose of acetaminophen is 3000 mg in 24 hours. (Patient not taking: Reported on 3/29/2023)       azelastine (ASTELIN) 0.1 % nasal spray Spray 1 spray into both nostrils 2 times daily (Patient not taking: Reported on 3/29/2023) 30 mL 3     azithromycin (ZITHROMAX) 250 MG tablet Two tablets first day, then one tablet daily for four days. (Patient not taking: Reported on 3/27/2023) 6 tablet 0     diclofenac (VOLTAREN) 1 % GEL Apply 2-4 grams to knees or shoulders four times daily as needed using enclosed dosing card. (Patient not taking: Reported on 3/29/2023) 100 g 4     docusate sodium (COLACE) 100 MG capsule Take 1 capsule (100 mg) by mouth 2 times daily (Patient not taking: Reported on 3/27/2023) 100 capsule 1     famotidine (PEPCID) 40 MG tablet TAKE 1 TABLET BY MOUTH NIGHTLY AS NEEDED FOR HEARTBURN (Patient not taking: Reported on 3/29/2023) 90 tablet 1     gabapentin (NEURONTIN) 100 MG capsule Take one cap at bedtime for 3 days, then 1 cap bid x 3 days, then 1 cap tid thereafter (Patient not taking: Reported on 3/29/2023) 90 capsule 1     ondansetron (ZOFRAN-ODT) 4 MG ODT tab Take 1 tablet (4 mg) by mouth every 8 hours as needed for nausea Recommend limited use (Patient not taking: Reported on 3/29/2023) 20 tablet 0     predniSONE (DELTASONE) 2.5 MG tablet Take 1 tablet  (2.5 mg) by mouth daily (Patient not taking: Reported on 3/29/2023) 90 tablet 2     SUMAtriptan (IMITREX) 50 MG tablet Take 1 tablet (50 mg) by mouth at onset of headache for migraine . After 2 hours, if no relief, ok to take 2nd dose.  Limit 2 tabs/24 hours. (Patient not taking: Reported on 3/29/2023) 10 tablet 6     Social History     Tobacco Use     Smoking status: Never     Smokeless tobacco: Never     Tobacco comments:     Lives in smoke free household   Substance Use Topics     Alcohol use: No     Alcohol/week: 0.0 standard drinks         OBJECTIVE:  BP (!) 162/94   Pulse 102   Temp 97.7  F (36.5  C) (Tympanic)   Resp 14   Wt 59.9 kg (132 lb)   SpO2 99%   BMI 21.31 kg/m    GENERAL APPEARANCE: healthy, alert and no distress  EYES: EOMI,  PERRL, conjunctiva clear  HENT: ear canals and left TM normal, right with bulging erythematous TM,  Nose and mouth without ulcers, pharynx with erythema, cobblestoning, no dysphonia  NECK: supple, nontender, no lymphadenopathy  RESP: lungs clear to auscultation - no rales, rhonchi or wheezes  CV: regular rates and rhythm, normal S1 S2, no murmur noted  ABDOMEN:  soft, nontender, no HSM or masses and bowel sounds normal  NEURO: Normal strength and tone, sensory exam grossly normal,  normal speech and mentation  SKIN: no suspicious lesions or rashes    ASSESSMENT:  Otitis media of right ear  Sore throat  Cough    PLAN:  You have an ear infection. We will treat this with an antibiotic. I have sent Augmentin to your pharmacy. Please take this twice daily for 10 days. Give with food. Please take a probiotic while on the antibiotic.  Follow up in 3-5 days if not improving as expected, sooner if you feel worse.  We will call you if the strep test comes back positive. The Augmentin should cover for strep infection.

## 2023-03-30 LAB — GROUP A STREP BY PCR: NOT DETECTED

## 2023-03-30 NOTE — PATIENT INSTRUCTIONS
You have an ear infection. We will treat this with an antibiotic. I have sent Augmentin to your pharmacy. Please take this twice daily for 10 days. Give with food. Please take a probiotic while on the antibiotic.  Follow up in 3-5 days if not improving as expected, sooner if you feel worse.  We will call you if the strep test comes back positive. The Augmentin should cover for strep infection.

## 2023-04-06 ENCOUNTER — ANCILLARY PROCEDURE (OUTPATIENT)
Dept: MRI IMAGING | Facility: CLINIC | Age: 48
End: 2023-04-06
Attending: PHYSICIAN ASSISTANT
Payer: COMMERCIAL

## 2023-04-06 DIAGNOSIS — R42 DIZZINESS: ICD-10-CM

## 2023-04-06 DIAGNOSIS — R25.1 TREMULOUSNESS: ICD-10-CM

## 2023-04-06 PROCEDURE — 70551 MRI BRAIN STEM W/O DYE: CPT | Mod: TC | Performed by: RADIOLOGY

## 2023-04-23 ENCOUNTER — LAB (OUTPATIENT)
Dept: LAB | Facility: CLINIC | Age: 48
End: 2023-04-23
Payer: COMMERCIAL

## 2023-04-23 DIAGNOSIS — M32.9 SYSTEMIC LUPUS ERYTHEMATOSUS, UNSPECIFIED SLE TYPE, UNSPECIFIED ORGAN INVOLVEMENT STATUS (H): ICD-10-CM

## 2023-04-23 DIAGNOSIS — Z79.899 HIGH RISK MEDICATIONS (NOT ANTICOAGULANTS) LONG-TERM USE: ICD-10-CM

## 2023-04-23 LAB
ALBUMIN MFR UR ELPH: <6 MG/DL (ref 1–14)
ALBUMIN UR-MCNC: NEGATIVE MG/DL
APPEARANCE UR: CLEAR
BASOPHILS # BLD AUTO: 0 10E3/UL (ref 0–0.2)
BASOPHILS NFR BLD AUTO: 1 %
BILIRUB UR QL STRIP: NEGATIVE
COLOR UR AUTO: YELLOW
CREAT UR-MCNC: 22.1 MG/DL
EOSINOPHIL # BLD AUTO: 0.1 10E3/UL (ref 0–0.7)
EOSINOPHIL NFR BLD AUTO: 2 %
ERYTHROCYTE [DISTWIDTH] IN BLOOD BY AUTOMATED COUNT: 12.1 % (ref 10–15)
ERYTHROCYTE [SEDIMENTATION RATE] IN BLOOD BY WESTERGREN METHOD: 5 MM/HR (ref 0–20)
GLUCOSE UR STRIP-MCNC: NEGATIVE MG/DL
HCT VFR BLD AUTO: 39.6 % (ref 35–47)
HGB BLD-MCNC: 13.8 G/DL (ref 11.7–15.7)
HGB UR QL STRIP: NEGATIVE
KETONES UR STRIP-MCNC: NEGATIVE MG/DL
LEUKOCYTE ESTERASE UR QL STRIP: ABNORMAL
LYMPHOCYTES # BLD AUTO: 0.6 10E3/UL (ref 0.8–5.3)
LYMPHOCYTES NFR BLD AUTO: 11 %
MCH RBC QN AUTO: 33.1 PG (ref 26.5–33)
MCHC RBC AUTO-ENTMCNC: 34.8 G/DL (ref 31.5–36.5)
MCV RBC AUTO: 95 FL (ref 78–100)
MONOCYTES # BLD AUTO: 0.3 10E3/UL (ref 0–1.3)
MONOCYTES NFR BLD AUTO: 5 %
NEUTROPHILS # BLD AUTO: 4.5 10E3/UL (ref 1.6–8.3)
NEUTROPHILS NFR BLD AUTO: 81 %
NITRATE UR QL: NEGATIVE
PH UR STRIP: 6.5 [PH] (ref 5–7)
PLATELET # BLD AUTO: 323 10E3/UL (ref 150–450)
PROT/CREAT 24H UR: NORMAL MG/G{CREAT}
RBC # BLD AUTO: 4.17 10E6/UL (ref 3.8–5.2)
RBC #/AREA URNS AUTO: NORMAL /HPF
SP GR UR STRIP: <=1.005 (ref 1–1.03)
UROBILINOGEN UR STRIP-ACNC: 0.2 E.U./DL
WBC # BLD AUTO: 5.6 10E3/UL (ref 4–11)
WBC #/AREA URNS AUTO: NORMAL /HPF

## 2023-04-23 PROCEDURE — 80053 COMPREHEN METABOLIC PANEL: CPT

## 2023-04-23 PROCEDURE — 86225 DNA ANTIBODY NATIVE: CPT

## 2023-04-23 PROCEDURE — 84156 ASSAY OF PROTEIN URINE: CPT

## 2023-04-23 PROCEDURE — 85652 RBC SED RATE AUTOMATED: CPT

## 2023-04-23 PROCEDURE — 81001 URINALYSIS AUTO W/SCOPE: CPT

## 2023-04-23 PROCEDURE — 86160 COMPLEMENT ANTIGEN: CPT | Mod: 59

## 2023-04-23 PROCEDURE — 86160 COMPLEMENT ANTIGEN: CPT

## 2023-04-23 PROCEDURE — 85025 COMPLETE CBC W/AUTO DIFF WBC: CPT

## 2023-04-23 PROCEDURE — 36415 COLL VENOUS BLD VENIPUNCTURE: CPT

## 2023-04-23 PROCEDURE — 86140 C-REACTIVE PROTEIN: CPT

## 2023-04-24 LAB
ALBUMIN SERPL-MCNC: 4.1 G/DL (ref 3.4–5)
ALP SERPL-CCNC: 59 U/L (ref 40–150)
ALT SERPL W P-5'-P-CCNC: 38 U/L (ref 0–50)
ANION GAP SERPL CALCULATED.3IONS-SCNC: 2 MMOL/L (ref 3–14)
AST SERPL W P-5'-P-CCNC: 29 U/L (ref 0–45)
BILIRUB SERPL-MCNC: 0.3 MG/DL (ref 0.2–1.3)
BUN SERPL-MCNC: 15 MG/DL (ref 7–30)
C3 SERPL-MCNC: 93 MG/DL (ref 81–157)
C4 SERPL-MCNC: 22 MG/DL (ref 13–39)
CALCIUM SERPL-MCNC: 8.8 MG/DL (ref 8.5–10.1)
CHLORIDE BLD-SCNC: 108 MMOL/L (ref 94–109)
CO2 SERPL-SCNC: 30 MMOL/L (ref 20–32)
CREAT SERPL-MCNC: 0.72 MG/DL (ref 0.52–1.04)
CRP SERPL-MCNC: <2.9 MG/L (ref 0–8)
GFR SERPL CREATININE-BSD FRML MDRD: >90 ML/MIN/1.73M2
GLUCOSE BLD-MCNC: 103 MG/DL (ref 70–99)
POTASSIUM BLD-SCNC: 3.9 MMOL/L (ref 3.4–5.3)
PROT SERPL-MCNC: 6.9 G/DL (ref 6.8–8.8)
SODIUM SERPL-SCNC: 140 MMOL/L (ref 133–144)

## 2023-04-25 LAB — DSDNA AB SER-ACNC: <0.6 IU/ML

## 2023-04-28 ENCOUNTER — VIRTUAL VISIT (OUTPATIENT)
Dept: RHEUMATOLOGY | Facility: CLINIC | Age: 48
End: 2023-04-28
Payer: COMMERCIAL

## 2023-04-28 DIAGNOSIS — M32.9 SYSTEMIC LUPUS ERYTHEMATOSUS, UNSPECIFIED SLE TYPE, UNSPECIFIED ORGAN INVOLVEMENT STATUS (H): Primary | ICD-10-CM

## 2023-04-28 DIAGNOSIS — I73.00 RAYNAUD'S PHENOMENON WITHOUT GANGRENE: ICD-10-CM

## 2023-04-28 PROCEDURE — 99214 OFFICE O/P EST MOD 30 MIN: CPT | Mod: VID | Performed by: INTERNAL MEDICINE

## 2023-04-28 RX ORDER — AMLODIPINE BESYLATE 5 MG/1
5 TABLET ORAL DAILY
Qty: 90 TABLET | Refills: 2 | Status: SHIPPED | OUTPATIENT
Start: 2023-04-28 | End: 2023-08-07

## 2023-04-28 RX ORDER — AZATHIOPRINE 50 MG/1
75 TABLET ORAL DAILY
Qty: 135 TABLET | Refills: 2 | Status: SHIPPED | OUTPATIENT
Start: 2023-04-28 | End: 2023-12-08

## 2023-04-28 RX ORDER — HYDROXYCHLOROQUINE SULFATE 200 MG/1
TABLET, FILM COATED ORAL
Qty: 135 TABLET | Refills: 2 | Status: SHIPPED | OUTPATIENT
Start: 2023-04-28 | End: 2023-12-08

## 2023-04-28 NOTE — PROGRESS NOTES
Yovana Enriquez  is a 47 year old year old who is being evaluated via a billable video visit.      How would you like to obtain your AVS? MyChart  If the video visit is dropped, the invitation should be resent by: Text to cell phone: [phone]   Will anyone else be joining your video visit? No     Rheumatology Video Visit      Yovana Enriquez MRN# [id number]   YOB: 1975 Age: 47 year old      Date of visit: 4/28/23   PCP: Bradford Mario    Chief Complaint   Patient presents with:  Systemic lupus erythematosus    Assessment and Plan     1. Systemic lupus erythematosus (YANELIS by EIA positive in the past, leukopenia/lymphocytopenia, hypocomplementemia, polyarthralgias, oral sores, history of malar rash, photophobia, photosensitivity, Raynaud's Phenomenon): Previously on methotrexate that was discontinued for unclear reasons but the patient reports that she tolerated it well. Currently on azathioprine 75 mg daily (dose reduced on 11/8/2018 from 100mg daily without worsening symptoms at that time, eventually reduced to 50 mg daily and did well for a period of time; now on 75 mg daily), hydroxychloroquine 300 mg daily [avg], and Benlysta (worsening symptoms when stopped in the past).  Overall doing well with regard to lupus at this time.  Noted that she has likely adrenal insufficiency and has done much better with staying on prednisone 2.5 mg daily.  Chronic illness, stable.    - Continue azathioprine 75 mg daily   - Continue hydroxychloroquine  200mg daily with additional 200mg every other day (toxicity monitoring eye exam last done on 8/5/2022 by Dr. Queen)  - Continue Benlysta 200 mg SQ every 7 days  - Continue Prednisone 2.5mg daily   - Continue photoprotection: Covering up and using sunscreen  - Labs in 3 months: CBC, CMP,  ESR, CRP, UA, Uprotein:creatinine    High risk medication requiring intensive toxicity monitoring at least quarterly: labs ordered include CBC, Creatinine, Hepatic panel to  monitor for cytopenia and hepatotoxicity; checking creatinine as it affects clearance of medication.     2. Secondary Sjogren syndrome: Doing well with Evoxac and frequent sips of water, but then Evoxac became more expensive so pilocarpine was tried but not tolerated (GI upset).  Therefore, changed back to Evoxac and was doing well with BID dosing (GI upset when TID). Dry eyes well controled with artificial tears as needed.  Chronic illness  - Continue Evoxac  30 mg twice daily     3. Raynaud's Phenomenon: Amlodipine 5mg daily is effective.  Chronic illness, stable  - Continue amlodipine 5mg daily      4. R>Ltrochanteric bursitis, possible iliotibial band syndrome as well:  Also with symptoms of iliotibial band syndrome and degenerative arthritis of the lumbar spine.  Symptoms improved with home exercises.        5. Fibromyalgia: Managed by PCP.    6. MGUS: SPEP and immunofixation advised to be done yearly per Dr. Yoselin Payan; she may follow-up with her PCP for this issue.  Documented here for historical significance.    7.  Vaccinations: Vaccinations reviewed with Ms. Enriquez.    - Influenza: encouraged yearly vaccination  - [id number]: up to date  - [id number]: up to date  - COVID-19: advised updating, and to hold azathioprine and benlysta for 2 weeks after COVID-19 vaccination      8. Elevated blood pressure:  Ruma to follow up with Primary Care provider regarding elevated blood pressure.     Total minutes spent in evaluation with patient, documentation, , and review of pertinent studies and chart notes: 16    Ms. Enriquez verbalized agreement with and understanding of the rational for the diagnosis and treatment plan.  All questions were answered to best of my ability and the patient's satisfaction. Ms. Enriquez was advised to contact the clinic with any questions that may arise after the clinic visit.      Thank you for involving me in the care of the patient    Return to clinic: 3-4 months    HPI    Yovana Enriquez is a 47 year old female with medical history significant for subclinical hyperthyroidism, hypertension, irritable bowel syndrome, fibromyalgia, hypertension, non-celiac gluten sensitivity (reportedly with bowel biopsies and laboratory workup that did not show celiac disease), anxiety, and systemic lupus erythematosus who presents for follow-up of SLE.    Today, 4/28/2023: Had 1 day of knee pain and 2 days of finger pain that resolved spontaneously.  Otherwise doing well.  Raynaud's is doing well.  Sjogren's is controlled.  Confirms that she is taking medications as prescribed.  Arthritis does not limit her daily activities.  Overall happy with how well she is doing.    Denies fevers, chills, nausea, vomiting. No abdominal pain.  No chest pain/pressure, palpitations, or shortness of breath.  No rash currently. No LE swelling.  She has photosensitivity and photophobia.   No eye pain or redness. No history of serositis. No history of DVT, pulmonary embolism, or miscarriage.     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    ROS   12 point review of system was completed and negative except as noted in the HPI     Active Problem List     Patient Active Problem List   Diagnosis     Systemic lupus erythematosus (H)     Menorrhagia     History of ovarian cyst     Dysmenorrhea     History of gestational diabetes mellitus, not currently pregnant     Intramural leiomyoma of uterus     Hyperlipidemia LDL goal <130     Subclinical hyperthyroidism     Anxiety     High risk medication use     Fibromyalgia     Chronic constipation     Irritable bowel syndrome     Health Care Home     Hypertension goal BP (blood pressure) < 140/90     Non-celiac gluten sensitivity     Raynaud's phenomenon without gangrene     Sjogren's syndrome (H)     Intramural and submucous leiomyoma of uterus     Adrenal insufficiency (H)     Screening for malignant neoplasm of cervix     Past Medical History      Past Medical History:   Diagnosis Date     Arthritis      Chronic constipation 12/16/2013     Dysmenorrhea 10/1/2012     Fibromyalgia 11/15/2013     Health Care Home 3/19/2014    **See Letters for [facility] Care Plan: Emergency Care Plan       High risk medication use 9/13/2013     History of gestational diabetes mellitus, not currently pregnant 10/1/2012     History of ovarian cyst 10/1/2012     Hyperlipidemia LDL goal <130 10/18/2012     Hypertension goal BP (blood pressure) < 140/90 1/19/2015     Intramural leiomyoma of uterus 10/5/2012     Irritable bowel syndrome 3/11/2014    Patient states no history colonoscopy       Menorrhagia 10/1/2012     Non-celiac gluten sensitivity 2/8/2016     PONV (postoperative nausea and vomiting)      Subclinical hyperthyroidism 1/17/2013     Systemic lupus erythematosus (H) 4/23/2012     Past Surgical History     Past Surgical History:   Procedure Laterality Date     ABDOMEN SURGERY  2004    Tubal ligation     COLONOSCOPY N/A 2/20/2015    Procedure: COMBINED COLONOSCOPY, SINGLE OR MULTIPLE BIOPSY/POLYPECTOMY BY BIOPSY;  Surgeon: Fred Cullen MD;  Location: [facility] OR     COLONOSCOPY N/A 10/29/2018    Procedure: COMBINED COLONOSCOPY, SINGLE OR MULTIPLE BIOPSY/POLYPECTOMY BY BIOPSY;  Surgeon: Inez Sawyer MD;  Location: [facility]     COLONOSCOPY WITH CO2 INSUFFLATION N/A 2/20/2015    Procedure: COLONOSCOPY WITH CO2 INSUFFLATION;  Surgeon: Fred Cullen MD;  Location: [facility] OR     ENT SURGERY  10-24-14    Bottom lip biopsies     ESOPHAGOSCOPY, GASTROSCOPY, DUODENOSCOPY (EGD), COMBINED N/A 2/20/2015    Procedure: COMBINED ESOPHAGOSCOPY, GASTROSCOPY, DUODENOSCOPY (EGD), BIOPSY SINGLE OR MULTIPLE;  Surgeon: Fred Cullen MD;  Location: [facility] OR     HC BREATH HYDROGEN TEST  12/31/2013    Procedure: HYDROGEN BREATH TEST;  Surgeon: Camden Almazan MD;  Location: [facility]     HC HYSTEROSCOPY, SURGICAL; W/ ENDOMETRIAL ABLATION, ANY METHOD  10-19-12     ORTHOPEDIC  SURGERY  12/2010     SHOULDER SURGERY Right     R shoulder     TUBAL LIGATION  2004     Allergy     Allergies   Allergen Reactions     Fluticasone Propionate (Nasal) [Fluticasone] Anaphylaxis     Current Medication List     Current Outpatient Medications   Medication Sig     amLODIPine (NORVASC) 5 MG tablet Take 1 tablet (5 mg) by mouth daily     azaTHIOprine (IMURAN) 50 MG tablet Take 1.5 tablets (75 mg) by mouth daily     Belimumab 200 MG/ML SOAJ Inject 200 mg Subcutaneous every 7 days . Hold for signs of infection and seek medical attention. Autoinjector     Calcium Carb-Cholecalciferol (CALCIUM + D3) 600-200 MG-UNIT TABS Take 1 tablet by mouth daily     cevimeline (EVOXAC) 30 MG capsule Take 1 capsule (30 mg) by mouth 2 times daily     hydroxychloroquine (PLAQUENIL) 200 MG tablet Hydroxychloroquine 200mg daily; and an additional 200mg every other day. Yearly eye exam, including 10-2 VF and SD-OCT, is required     losartan (COZAAR) 25 MG tablet TAKE 1 TABLET BY MOUTH ONCE DAILY     nortriptyline (PAMELOR) 10 MG capsule TAKE 1 CAPSULE BY MOUTH AT BEDTIME     Vitamin D, Cholecalciferol, 1000 units CAPS Take 4,000 Int'l Units by mouth daily     acetaminophen (TYLENOL) 325 MG tablet Take 325-650 mg by mouth every 6 hours as needed for mild pain or headaches Maximum daily dose of acetaminophen is 3000 mg in 24 hours. (Patient not taking: Reported on 3/29/2023)     azelastine (ASTELIN) 0.1 % nasal spray Spray 1 spray into both nostrils 2 times daily (Patient not taking: Reported on 3/29/2023)     azithromycin (ZITHROMAX) 250 MG tablet Two tablets first day, then one tablet daily for four days. (Patient not taking: Reported on 3/27/2023)     blood glucose test strip Used for testing glucose 2-3 times daily     diclofenac (VOLTAREN) 1 % GEL Apply 2-4 grams to knees or shoulders four times daily as needed using enclosed dosing card. (Patient not taking: Reported on 3/29/2023)     docusate sodium (COLACE) 100 MG capsule  Take 1 capsule (100 mg) by mouth 2 times daily (Patient not taking: Reported on 3/27/2023)     famotidine (PEPCID) 40 MG tablet TAKE 1 TABLET BY MOUTH NIGHTLY AS NEEDED FOR HEARTBURN (Patient not taking: Reported on 3/29/2023)     gabapentin (NEURONTIN) 100 MG capsule Take one cap at bedtime for 3 days, then 1 cap bid x 3 days, then 1 cap tid thereafter (Patient not taking: Reported on 3/29/2023)     ondansetron (ZOFRAN-ODT) 4 MG ODT tab Take 1 tablet (4 mg) by mouth every 8 hours as needed for nausea Recommend limited use (Patient not taking: Reported on 3/29/2023)     predniSONE (DELTASONE) 2.5 MG tablet Take 1 tablet (2.5 mg) by mouth daily (Patient not taking: Reported on 3/29/2023)     SUMAtriptan (IMITREX) 50 MG tablet Take 1 tablet (50 mg) by mouth at onset of headache for migraine . After 2 hours, if no relief, ok to take 2nd dose.  Limit 2 tabs/24 hours. (Patient not taking: Reported on 3/29/2023)     No current facility-administered medications for this visit.     Social History   See HPI    Family History     Family History   Problem Relation Age of Onset     Respiratory Maternal Grandfather      Cancer Maternal Grandfather      Other Cancer Maternal Grandfather         Skin cancer     Asthma Son      Circulatory Maternal Grandmother         Brain anurysm     Diabetes Maternal Grandmother      Hypertension Mother      Glaucoma Mother 50        drops     Cancer Mother      Hypertension Sister      Hypertension Sister      Diabetes Sister      Hypertension Sister      Anxiety Disorder Sister      Asthma Son      Diabetes No family hx of      Cerebrovascular Disease No family hx of      Thyroid Disease No family hx of      Macular Degeneration No family hx of        Physical Exam     Temp Readings from Last 3 Encounters:   03/29/23 97.7  F (36.5  C) (Tympanic)   12/20/21 97.4  F (36.3  C) (Oral)   09/01/21 98.7  F (37.1  C) (Tympanic)     BP Readings from Last 5 Encounters:   03/29/23 (!) 162/94   08/15/22  "131/87   06/24/22 134/82   12/28/21 (!) 145/92   12/20/21 (!) 162/97     Pulse Readings from Last 1 Encounters:   03/29/23 102     Resp Readings from Last 1 Encounters:   03/29/23 14     Estimated body mass index is 21.31 kg/m  as calculated from the following:    Height as of 6/24/22: 1.676 m (5' 6\").    Weight as of 3/29/23: 59.9 kg (132 lb).    GEN: NAD. Healthy appearing adult.   HEENT:  Anicteric, noninjected sclera. No obvious external lesions of the ear and nose. Hearing intact.  PULM: No increased work of breathing  MSK:  Hands and wrists without swelling  SKIN: No rash or jaundice seen  PSYCH: Alert. Appropriate.     Labs / Imaging (select studies)       YANELIS  Recent Labs   Lab Test 07/25/16  1433   ANAIGG <1:40  Reference range: <1:40  (Note)  <1:40  INTERPRETIVE INFORMATION: YANELIS by IFA, IgG  Anti-nuclear antibodies (YANELIS) are seen in a variety of  systemic rheumatic diseases and are determined by indirect  fluorescence assay (IFA) using HEp-2 substrate with an  IgG-specific conjugate. YANELIS titers less than or equal to  1:80 have variable relevance while titers greater than or  equal to 1:160 are considered clinically significant. These  antibodies may precede clinical disease onset; however,  healthy individuals and those with advanced age have been  reported to be positive for YANELIS. When observed, one of the  five basic patterns is reported: homogeneous,  peripheral/rim, speckled, centromere, or nucleolar. If  cytoplasmic fluorescence is observed, it is noted. IFA  methodology is subjective and has occasionally been shown  to lack sensitivity for anti-SSA/Ro antibodies.  Negative results do not necessarily rule out the presence  of SSc. If clinical suspicion remains, consider further  te sting for U3-RNP, PM/Scl, or Th/To antibodies associated  with SSc.  Performed by [facility],  [address] [place] [id number] [phone]  [email or web address], Alcon Krishna MD, Lab. Director   "     RNP/Sm/SSA/SSB  Recent Labs   Lab Test 03/23/16  1759 01/04/16  1806   SMIGG <0.2  Negative   Antibody index (AI) values reflect qualitative changes in antibody   concentration that cannot be directly associated with clinical condition or   disease state.   <0.2  Negative   Antibody index (AI) values reflect qualitative changes in antibody   concentration that cannot be directly associated with clinical condition or   disease state.       dsDNA  Recent Labs   Lab Test 04/23/23  1305 01/14/23  0917 07/02/22  0905 04/03/22  1236 10/10/21  1202 07/19/21  1820 04/20/21  1804 01/11/21  1805 10/25/20  1059   DNA <0.6 0.6 <0.6 0.7 <0.6 <0.6 1 <1 1     C3/C4  Recent Labs   Lab Test 04/23/23  1305 07/02/22  0905 10/10/21  1202 07/19/21  1820 04/20/21  1804 01/11/21  1805 10/25/20  1059 04/07/20  1136 01/13/20  1804   J0PWBYV 93 98 89 91 97 91 103 100 85   F9ORERO 22 22 17 20 20 20 >9* 16 21     IgG  Recent Labs   Lab Test 01/11/21  1805 10/25/20  1059 06/17/19  1813    <17*  --    IGA 93 <2* 82   * 345*  --      CBC  Recent Labs   Lab Test 04/23/23  1305 01/14/23  0917 10/16/22  1205 07/19/21  1820 06/13/21  1312 04/20/21  1804 01/11/21  1805   WBC 5.6 6.0 4.4   < > 5.8 7.7 8.1   RBC 4.17 4.00 4.16   < > 4.19 4.22 4.00   HGB 13.8 13.2 13.7   < > 13.5 13.7 13.2   HCT 39.6 38.0 39.4   < > 40.6 40.0 38.7   MCV 95 95 95   < > 97 95 97   RDW 12.1 11.8 12.0   < > 11.6 11.5 11.5    299 301   < > 300 313 316   MCH 33.1* 33.0 32.9   < > 32.2 32.5 33.0   MCHC 34.8 34.7 34.8   < > 33.3 34.3 34.1   NEUTROPHIL 81 64 66   < > 77.2 81.2 83.4   LYMPH 11 25 23   < > 13.7 10.8 8.8   MONOCYTE 5 7 8   < > 6.2 6.4 6.7   EOSINOPHIL 2 3 3   < > 1.7 0.8 0.6   BASOPHIL 1 1 1   < > 1.2 0.8 0.5   ANEU  --   --   --   --  4.5 6.3 6.7   ALYM  --   --   --   --  0.8 0.8 0.7*   ZOË  --   --   --   --  0.4 0.5 0.5   AEOS  --   --   --   --  0.1 0.1 0.1   ABAS  --   --   --   --  0.1 0.1 0.0   ANEUTAUTO 4.5 3.8 2.9   < >  --   --    --    ALYMPAUTO 0.6* 1.5 1.0   < >  --   --   --    AMONOAUTO 0.3 0.4 0.4   < >  --   --   --    AEOSAUTO 0.1 0.2 0.1   < >  --   --   --    ABSBASO 0.0 0.0 0.1   < >  --   --   --     < > = values in this interval not displayed.     CMP  Recent Labs   Lab Test 04/23/23  1305 01/14/23  0917 10/16/22  1205 07/02/22  0905 04/03/22  1236 01/08/22  1156 07/19/21  1820 04/20/21  1804 01/11/21  1805 10/25/20  1059 04/07/20  1136    142 143 140 138 140   < > 140 138 139 140   POTASSIUM 3.9 3.5 4.2 3.8 4.2 4.1   < > 4.1 3.9 4.1 3.6   CHLORIDE 108 107 109 104 105 105   < > 106 106 104 106   CO2 30 29 32 31 25 30   < > 33* 31 31 31   ANIONGAP 2* 6 2* 5 8 5   < > 1* 1* 4 3   * 108* 85 99 109* 125*   < > 97 99 86 104*   BUN 15 13 12 13 16 13   < > 13 15 11 16   CR 0.72 0.70 0.76 0.69 0.75 0.72   < > 0.76 0.77 0.73 0.71   GFRESTIMATED >90 >90 >90 >90 >90 >90   < > >90 >90 >90 >90   GFRESTBLACK  --   --   --   --   --   --   --  >90 >90 >90 >90   MELANIA 8.8 8.8 8.9 8.8 8.9 8.6   < > 9.0 8.8 9.1 9.1   BILITOTAL 0.3 0.4 0.3 0.4 0.4 0.4   < > 0.3 0.2 0.4 0.3   ALBUMIN 4.1 3.7 3.9 3.9 4.1 3.8   < > 4.1 4.0 4.4 4.3   PROTTOTAL 6.9 6.4* 6.7* 6.7* 6.9 6.6*   < > 6.8 6.5* 7.5 7.1   ALKPHOS 59 57 56 56 55 69   < > 57 55 62 54   AST 29 16 16 20 22 14   < > 17 14 22 13   ALT 38 32 22 20 30 24   < > 25 19 23 24    < > = values in this interval not displayed.     HgA1c  Recent Labs   Lab Test 05/30/19  0859   A1C 5.2     Calcium/VitaminD  Recent Labs   Lab Test 04/23/23  1305 01/14/23  0917 10/16/22  1205 10/25/20  1059 04/07/20  1136 07/17/19  1756 04/16/19  1759 01/30/18  1752 10/02/17  1814   MELANIA 8.8 8.8 8.9   < > 9.1   < > 8.4*   < > 8.7   VITDT  --   --   --   --  59  --  40  --  37    < > = values in this interval not displayed.     ESR/CRP  Recent Labs   Lab Test 04/23/23  1305 01/14/23  0917 10/16/22  1205   SED 5 6 5   CRP <2.9 <2.9 <2.9     CK/Aldolase  Recent Labs   Lab Test 10/10/21  1202 07/19/21  1820 04/20/21  1804    CKT 87 105 90     TSH/T4  Recent Labs   Lab Test 04/03/22  1236 04/07/20  1136 04/10/19  1808 10/18/17  1445 10/07/16  0714 10/07/16  0714 02/27/15  1058   TSH 0.59 0.44 0.44 0.33*   < > 0.37*  --    T4  --   --   --  1.24  --  1.16 1.01    < > = values in this interval not displayed.     Lipid Panel  Recent Labs   Lab Test 04/03/22  1236 01/26/19  0916 10/07/16  0714   CHOL 177 133 137   TRIG 132 64 48   HDL 80 68 75   LDL 71 52 52   NHDL 97 65 62     Hepatitis B  Recent Labs   Lab Test 03/29/16  1417   HEPBANG Nonreactive     Hepatitis C  Recent Labs   Lab Test 03/29/16  1417   HCVAB Nonreactive   Assay performance characteristics have not been established for newborns,   infants, and children       Lyme ab screening  Recent Labs   Lab Test 05/30/15  1355   LYMEGM 0.55       Tuberculosis Screening  Recent Labs   Lab Test 01/08/22  1156 07/03/17  1447 03/29/16  1417   TBRES Negative  --   --    TBRSLT  --  Negative Negative   TBAGN  --  0.00 0.00     HIV Screening  Recent Labs   Lab Test 04/10/19  1808   HIAGAB Nonreactive     UA  Recent Labs   Lab Test 04/23/23  1305 01/14/23  1124 10/16/22  1205 07/02/22  0905 06/24/22  0921 10/10/21  1204 09/01/21  1522 04/20/21  1819 01/11/21  1810 10/25/20  1110 09/15/20  1655 07/03/20  1010 04/07/20  1140   COLOR Yellow Yellow Yellow   < > Yellow   < > Yellow   < > Yellow Yellow Yellow   < > Yellow   APPEARANCE Clear Clear Clear   < > Clear   < > Slightly Cloudy*   < > Clear Clear Clear   < > Clear   URINEGLC Negative Negative Negative   < > Negative   < > Negative   < > Negative Negative Negative   < > Negative   URINEBILI Negative Negative Negative   < > Negative   < > Negative   < > Negative Negative Negative   < > Negative   SG <=1.005 1.015 1.020   < > <=1.005   < > 1.015   < > >1.030 1.010 1.025   < > >1.030   URINEPH 6.5 6.5 6.0   < > 6.0   < > 7.0   < > 6.0 7.0 6.0   < > 6.5   PROTEIN Negative Negative Negative   < > Negative   < > Trace*   < > Negative Negative  Negative   < > Negative   UROBILINOGEN 0.2 0.2 0.2   < > 0.2   < > 0.2   < > 0.2 0.2 0.2   < > 0.2   NITRITE Negative Negative Negative   < > Negative   < > Negative   < > Negative Negative Negative   < > Negative   UBLD Negative Negative Negative   < > Negative   < > Moderate*   < > Small* Negative Negative   < > Negative   LEUKEST Trace* Negative Trace*   < > Trace*   < > Moderate*   < > Negative Trace* Negative   < > Trace*   WBCU 0-5  --  0-5  --  0-5   < > 25-50*   < > 0 - 5 0 - 5 5-10*   < > 0 - 5   RBCU None Seen  --  0-2  --  None Seen   < > 25-50*   < > O - 2 O - 2 O - 2   < > O - 2   SQUAMOUSEPI  --   --   --   --   --   --   --   --  Few Few Few   < > Few   BACTERIA  --   --   --   --   --   --  Few*  --  Few* Few* Few*   < > Few*   MUCOUS  --   --   --   --   --   --   --   --  Present*  --  Present*  --  Present*    < > = values in this interval not displayed.     Urine Microscopic  Recent Labs   Lab Test 04/23/23  1305 10/16/22  1205 06/24/22  0921 10/10/21  1204 09/01/21  1522 06/13/21  1309 01/11/21  1810 10/25/20  1110 09/15/20  1655 07/03/20  1010 04/07/20  1140   WBCU 0-5 0-5 0-5   < > 25-50*   < > 0 - 5 0 - 5 5-10*   < > 0 - 5   RBCU None Seen 0-2 None Seen   < > 25-50*   < > O - 2 O - 2 O - 2   < > O - 2   SQUAMOUSEPI  --   --   --   --   --   --  Few Few Few   < > Few   BACTERIA  --   --   --   --  Few*  --  Few* Few* Few*   < > Few*   MUCOUS  --   --   --   --   --   --  Present*  --  Present*  --  Present*    < > = values in this interval not displayed.     Urine Protein  GHUTP and UTP= Urine protein (random), GHUTPG and UTPG = urine protein:creatinine ratio (random), UCRR = urine creatinine (random)  Recent Labs   Lab Test 04/23/23  1305 01/14/23  1124 10/16/22  1205 07/02/22  0905 07/02/22  0905 04/10/22  1232 01/08/22  1156 10/10/21  1202   GHUTP <6.0 14.7* <6.0   < > 8.6  --   --   --    UTP  --   --   --   --   --  0.09 0.07 0.06   GHUTPG  --  0.15  --   --  0.09  --   --   --    UTPG   --   --   --   --   --  0.11 0.09 0.12   UCRR 22.1 98.2 76.0   < > 94.9 83 81 52    < > = values in this interval not displayed.     Immunization History     Immunization History   Administered Date(s) Administered     COVID-19 Vaccine 12+ (Pfizer) 03/13/2021, 04/03/2021     Influenza (H1N1) 12/02/2009     Influenza (IIV3) PF 02/08/2007, 09/17/2010, 10/01/2012, 09/23/2013     Influenza Vaccine >6 months (Alfuria,Fluzone) 09/23/2013, 09/20/2016, 10/30/2017, 10/14/2019     Influenza Vaccine, 6+MO IM (QUADRIVALENT W/PRESERVATIVES) 08/11/2018     Pneumo Conj 13-V (2010&after) 04/25/2016     Pneumococcal 23 valent 10/01/2012, 11/06/2017     TDAP (Adacel,Boostrix) 12/17/2010, 06/24/2022     Td (Adult), Adsorbed 02/12/2001            Chart documentation done in part with Dragon Voice recognition Software. Although reviewed after completion, some word and grammatical error may remain.        Video-Visit Details    Type of service:  Video Visit    Video Start Time: 8:56 AM    Video End Time:9:08 AM    Originating Location (pt. Location): [place], [place]    Distant Location (provider location):  [place]    Platform used for Video Visit: Brianna Colvin MD

## 2023-04-28 NOTE — PATIENT INSTRUCTIONS
RHEUMATOLOGY    Dr. Bradford Colvin    [facility]  [address] [place] [id number]  Phone number: [phone]  Fax number: [phone]      Thank you for choosing [facility]!

## 2023-05-25 ENCOUNTER — PATIENT OUTREACH (OUTPATIENT)
Dept: CARE COORDINATION | Facility: CLINIC | Age: 48
End: 2023-05-25
Payer: COMMERCIAL

## 2023-06-09 ENCOUNTER — PATIENT OUTREACH (OUTPATIENT)
Dept: CARE COORDINATION | Facility: CLINIC | Age: 48
End: 2023-06-09
Payer: COMMERCIAL

## 2023-06-16 ENCOUNTER — PATIENT OUTREACH (OUTPATIENT)
Dept: CARE COORDINATION | Facility: CLINIC | Age: 48
End: 2023-06-16
Payer: COMMERCIAL

## 2023-07-14 ENCOUNTER — PATIENT OUTREACH (OUTPATIENT)
Dept: CARE COORDINATION | Facility: CLINIC | Age: 48
End: 2023-07-14
Payer: COMMERCIAL

## 2023-07-23 ENCOUNTER — LAB (OUTPATIENT)
Dept: LAB | Facility: CLINIC | Age: 48
End: 2023-07-23
Payer: COMMERCIAL

## 2023-07-23 DIAGNOSIS — M32.9 SYSTEMIC LUPUS ERYTHEMATOSUS, UNSPECIFIED SLE TYPE, UNSPECIFIED ORGAN INVOLVEMENT STATUS (H): ICD-10-CM

## 2023-07-23 LAB
ALBUMIN MFR UR ELPH: 9.2 MG/DL
ALBUMIN SERPL BCG-MCNC: 4.1 G/DL (ref 3.5–5.2)
ALBUMIN UR-MCNC: NEGATIVE MG/DL
ALP SERPL-CCNC: 57 U/L (ref 35–104)
ALT SERPL W P-5'-P-CCNC: 14 U/L (ref 0–50)
ANION GAP SERPL CALCULATED.3IONS-SCNC: 8 MMOL/L (ref 7–15)
APPEARANCE UR: CLEAR
AST SERPL W P-5'-P-CCNC: 24 U/L (ref 0–45)
BASOPHILS # BLD AUTO: 0 10E3/UL (ref 0–0.2)
BASOPHILS NFR BLD AUTO: 1 %
BILIRUB SERPL-MCNC: 0.2 MG/DL
BILIRUB UR QL STRIP: NEGATIVE
BUN SERPL-MCNC: 11 MG/DL (ref 6–20)
CALCIUM SERPL-MCNC: 8.7 MG/DL (ref 8.6–10)
CHLORIDE SERPL-SCNC: 105 MMOL/L (ref 98–107)
COLOR UR AUTO: YELLOW
CREAT SERPL-MCNC: 0.64 MG/DL (ref 0.51–0.95)
CREAT UR-MCNC: 69.7 MG/DL
CRP SERPL-MCNC: <3 MG/L
DEPRECATED HCO3 PLAS-SCNC: 26 MMOL/L (ref 22–29)
EOSINOPHIL # BLD AUTO: 0.1 10E3/UL (ref 0–0.7)
EOSINOPHIL NFR BLD AUTO: 1 %
ERYTHROCYTE [DISTWIDTH] IN BLOOD BY AUTOMATED COUNT: 11.6 % (ref 10–15)
ERYTHROCYTE [SEDIMENTATION RATE] IN BLOOD BY WESTERGREN METHOD: 4 MM/HR (ref 0–30)
GFR SERPL CREATININE-BSD FRML MDRD: >90 ML/MIN/1.73M2
GLUCOSE SERPL-MCNC: 118 MG/DL (ref 70–99)
GLUCOSE UR STRIP-MCNC: NEGATIVE MG/DL
HCT VFR BLD AUTO: 38.8 % (ref 35–47)
HGB BLD-MCNC: 13.2 G/DL (ref 11.7–15.7)
HGB UR QL STRIP: NEGATIVE
IMM GRANULOCYTES # BLD: 0 10E3/UL
IMM GRANULOCYTES NFR BLD: 0 %
KETONES UR STRIP-MCNC: NEGATIVE MG/DL
LEUKOCYTE ESTERASE UR QL STRIP: NEGATIVE
LYMPHOCYTES # BLD AUTO: 0.6 10E3/UL (ref 0.8–5.3)
LYMPHOCYTES NFR BLD AUTO: 11 %
MCH RBC QN AUTO: 32.4 PG (ref 26.5–33)
MCHC RBC AUTO-ENTMCNC: 34 G/DL (ref 31.5–36.5)
MCV RBC AUTO: 95 FL (ref 78–100)
MONOCYTES # BLD AUTO: 0.3 10E3/UL (ref 0–1.3)
MONOCYTES NFR BLD AUTO: 6 %
NEUTROPHILS # BLD AUTO: 4.5 10E3/UL (ref 1.6–8.3)
NEUTROPHILS NFR BLD AUTO: 82 %
NITRATE UR QL: NEGATIVE
PH UR STRIP: 6 [PH] (ref 5–7)
PLATELET # BLD AUTO: 308 10E3/UL (ref 150–450)
POTASSIUM SERPL-SCNC: 4.3 MMOL/L (ref 3.4–5.3)
PROT SERPL-MCNC: 6.3 G/DL (ref 6.4–8.3)
PROT/CREAT 24H UR: 0.13 MG/MG CR (ref 0–0.2)
RBC # BLD AUTO: 4.08 10E6/UL (ref 3.8–5.2)
SODIUM SERPL-SCNC: 139 MMOL/L (ref 136–145)
SP GR UR STRIP: 1.01 (ref 1–1.03)
UROBILINOGEN UR STRIP-ACNC: 0.2 E.U./DL
WBC # BLD AUTO: 5.5 10E3/UL (ref 4–11)

## 2023-07-23 PROCEDURE — 81003 URINALYSIS AUTO W/O SCOPE: CPT

## 2023-07-23 PROCEDURE — 36415 COLL VENOUS BLD VENIPUNCTURE: CPT

## 2023-07-23 PROCEDURE — 86140 C-REACTIVE PROTEIN: CPT

## 2023-07-23 PROCEDURE — 80053 COMPREHEN METABOLIC PANEL: CPT

## 2023-07-23 PROCEDURE — 85025 COMPLETE CBC W/AUTO DIFF WBC: CPT

## 2023-07-23 PROCEDURE — 85652 RBC SED RATE AUTOMATED: CPT

## 2023-07-23 PROCEDURE — 84156 ASSAY OF PROTEIN URINE: CPT

## 2023-07-28 ENCOUNTER — VIRTUAL VISIT (OUTPATIENT)
Dept: RHEUMATOLOGY | Facility: CLINIC | Age: 48
End: 2023-07-28
Payer: COMMERCIAL

## 2023-07-28 DIAGNOSIS — Z79.899 HIGH RISK MEDICATIONS (NOT ANTICOAGULANTS) LONG-TERM USE: ICD-10-CM

## 2023-07-28 DIAGNOSIS — I73.00 RAYNAUD'S PHENOMENON WITHOUT GANGRENE: ICD-10-CM

## 2023-07-28 DIAGNOSIS — M32.9 SYSTEMIC LUPUS ERYTHEMATOSUS, UNSPECIFIED SLE TYPE, UNSPECIFIED ORGAN INVOLVEMENT STATUS (H): Primary | ICD-10-CM

## 2023-07-28 DIAGNOSIS — M35.01 SJOGREN'S SYNDROME WITH KERATOCONJUNCTIVITIS SICCA (H): ICD-10-CM

## 2023-07-28 PROCEDURE — 99214 OFFICE O/P EST MOD 30 MIN: CPT | Mod: VID | Performed by: INTERNAL MEDICINE

## 2023-07-28 RX ORDER — PREDNISONE 2.5 MG/1
2.5 TABLET ORAL DAILY
Qty: 90 TABLET | Refills: 2 | Status: SHIPPED | OUTPATIENT
Start: 2023-07-28 | End: 2023-12-08

## 2023-07-28 RX ORDER — CEVIMELINE HYDROCHLORIDE 30 MG/1
30 CAPSULE ORAL 2 TIMES DAILY
Qty: 180 CAPSULE | Refills: 2 | Status: SHIPPED | OUTPATIENT
Start: 2023-07-28 | End: 2023-12-08

## 2023-07-28 NOTE — PROGRESS NOTES
Yovana Enriquez  is a 48 year old year old who is being evaluated via a billable video visit.      How would you like to obtain your AVS? MyChart  If the video visit is dropped, the invitation should be resent by: Text to cell phone: [phone]   Will anyone else be joining your video visit? No      Rheumatology Video Visit      Yovana Enriquez MRN# [id number]   YOB: 1975 Age: 48 year old      Date of visit: 7/28/23   PCP: Bradford Mario    Chief Complaint   Patient presents with:  Systemic lupus erythematosus    Assessment and Plan     1. Systemic lupus erythematosus (YANELIS by EIA positive in the past, leukopenia/lymphocytopenia, hypocomplementemia, polyarthralgias, oral sores, history of malar rash, photophobia, photosensitivity, Raynaud's Phenomenon): Previously on methotrexate that was discontinued for unclear reasons but the patient reports that she tolerated it well. Currently on azathioprine 75 mg daily (dose reduced on 11/8/2018 from 100mg daily without worsening symptoms at that time, eventually reduced to 50 mg daily and did well for a period of time; now on 75 mg daily), hydroxychloroquine 300 mg daily [avg], and Benlysta (worsening symptoms when stopped in the past).  Overall doing well with regard to lupus at this time.  Noted that she has likely adrenal insufficiency and has done much better with staying on prednisone 2.5 mg daily.  Chronic illness, stable.    - Continue azathioprine 75 mg daily   - Continue hydroxychloroquine  200mg daily with additional 200mg every other day (toxicity monitoring eye exam last done on 8/5/2022 by Dr. Queen)  - Continue Benlysta 200 mg SQ every 7 days  - Continue Prednisone 2.5mg daily (trouble reducing below this point, likely adrenal insufficiency)  - Continue photoprotection: Covering up and using sunscreen  - Labs in 3 months: CBC, CMP,  ESR, CRP, UA, Uprotein:creatinine    High risk medication requiring intensive toxicity monitoring at least  quarterly: labs ordered include CBC, Creatinine, Hepatic panel to monitor for cytopenia and hepatotoxicity; checking creatinine as it affects clearance of medication.     2. Secondary Sjogren syndrome: Doing well with Evoxac and frequent sips of water, but then Evoxac became more expensive so pilocarpine was tried but not tolerated (GI upset).  Therefore, changed back to Evoxac and was doing well with BID dosing (GI upset when TID). Dry eyes well controled with artificial tears as needed.  Chronic illness  - Continue Evoxac  30 mg twice daily     3. Raynaud's Phenomenon: Amlodipine 5mg daily is effective.  Chronic illness, stable  - Continue amlodipine 5mg daily      4.  History of R>Ltrochanteric bursitis, possible iliotibial band syndrome as well:  Also with symptoms of iliotibial band syndrome and degenerative arthritis of the lumbar spine.  Symptoms improved with home exercises.  Doing well at this time.        5. Fibromyalgia: Managed by PCP.    6. MGUS: SPEP and immunofixation advised to be done yearly per Dr. Yoselin Payan; she may follow-up with her PCP for this issue.  Documented here for historical significance.    7.  Vaccinations: Vaccinations reviewed with Ms. Enriquez.    - Influenza: encouraged yearly vaccination  - [id number]: up to date  - [id number]: up to date  - COVID-19: advised updating, and to hold azathioprine and benlysta for 2 weeks after COVID-19 vaccination    - Shingrix: advised updating    8. Elevated blood pressure: Ruma to follow up with Primary Care provider regarding elevated blood pressure.     Total minutes spent in evaluation with patient, documentation, , and review of pertinent studies and chart notes: 18    Ms. Enriquez verbalized agreement with and understanding of the rational for the diagnosis and treatment plan.  All questions were answered to best of my ability and the patient's satisfaction. Ms. Enriquez was advised to contact the clinic with any questions that may  arise after the clinic visit.      Thank you for involving me in the care of the patient    Return to clinic: 3-4 months    HPI   Yovana Enriquez is a 48 year old female with medical history significant for subclinical hyperthyroidism, hypertension, irritable bowel syndrome, fibromyalgia, hypertension, non-celiac gluten sensitivity (reportedly with bowel biopsies and laboratory workup that did not show celiac disease), anxiety, and systemic lupus erythematosus who presents for follow-up of SLE.    Today, 7/28/2023: reports that she is doing well at this except for mild foot ache for just a couple minutes in the morning that resolves with walking for a couple minutes.  She does not specifically have pain on the plantar aspect of her feet with the first few steps of the day.  No joint swelling.  No rash.  No oral or nasal sores.  Reports having no trochanteric bursitis pain currently.  Raynaud's is well controlled.  Sjogren's is controlled.  She is happy with how well she is doing.    Denies fevers, chills, nausea, vomiting. No abdominal pain. no chest pain/pressure, palpitations, or shortness of breath.  No rash currently. No LE swelling.  She has photosensitivity and photophobia.   No eye pain or redness.     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    ROS   12 point review of system was completed and negative except as noted in the HPI     Active Problem List     Patient Active Problem List   Diagnosis    Systemic lupus erythematosus (H)    Menorrhagia    History of ovarian cyst    Dysmenorrhea    History of gestational diabetes mellitus, not currently pregnant    Intramural leiomyoma of uterus    Hyperlipidemia LDL goal <130    Subclinical hyperthyroidism    Anxiety    High risk medication use    Fibromyalgia    Chronic constipation    Irritable bowel syndrome    Health Care Home    Hypertension goal BP (blood pressure) < 140/90    Non-celiac gluten sensitivity    Raynaud's  phenomenon without gangrene    Sjogren's syndrome (H)    Intramural and submucous leiomyoma of uterus    Adrenal insufficiency (H)    Screening for malignant neoplasm of cervix     Past Medical History     Past Medical History:   Diagnosis Date    Arthritis     Chronic constipation 12/16/2013    Dysmenorrhea 10/1/2012    Fibromyalgia 11/15/2013    Health Care Home 3/19/2014    **See Letters for [facility] Care Plan: Emergency Care Plan      High risk medication use 9/13/2013    History of gestational diabetes mellitus, not currently pregnant 10/1/2012    History of ovarian cyst 10/1/2012    Hyperlipidemia LDL goal <130 10/18/2012    Hypertension goal BP (blood pressure) < 140/90 1/19/2015    Intramural leiomyoma of uterus 10/5/2012    Irritable bowel syndrome 3/11/2014    Patient states no history colonoscopy      Menorrhagia 10/1/2012    Non-celiac gluten sensitivity 2/8/2016    PONV (postoperative nausea and vomiting)     Subclinical hyperthyroidism 1/17/2013    Systemic lupus erythematosus (H) 4/23/2012     Past Surgical History     Past Surgical History:   Procedure Laterality Date    ABDOMEN SURGERY  2004    Tubal ligation    COLONOSCOPY N/A 2/20/2015    Procedure: COMBINED COLONOSCOPY, SINGLE OR MULTIPLE BIOPSY/POLYPECTOMY BY BIOPSY;  Surgeon: Fred Cullen MD;  Location: [facility] OR    COLONOSCOPY N/A 10/29/2018    Procedure: COMBINED COLONOSCOPY, SINGLE OR MULTIPLE BIOPSY/POLYPECTOMY BY BIOPSY;  Surgeon: Inez Sawyer MD;  Location: [facility] OR    COLONOSCOPY WITH CO2 INSUFFLATION N/A 2/20/2015    Procedure: COLONOSCOPY WITH CO2 INSUFFLATION;  Surgeon: Fred Cullen MD;  Location: [facility] OR    ENT SURGERY  10-24-14    Bottom lip biopsies    ESOPHAGOSCOPY, GASTROSCOPY, DUODENOSCOPY (EGD), COMBINED N/A 2/20/2015    Procedure: COMBINED ESOPHAGOSCOPY, GASTROSCOPY, DUODENOSCOPY (EGD), BIOPSY SINGLE OR MULTIPLE;  Surgeon: Fred Cullen MD;  Location:  OR     BREATH HYDROGEN TEST  12/31/2013     Procedure: HYDROGEN BREATH TEST;  Surgeon: Camden Almazan MD;  Location: [facility] HYSTEROSCOPY, SURGICAL; W/ ENDOMETRIAL ABLATION, ANY METHOD  10-19-12    ORTHOPEDIC SURGERY  12/2010    SHOULDER SURGERY Right     R shoulder    TUBAL LIGATION  2004     Allergy     Allergies   Allergen Reactions    Fluticasone Propionate (Nasal) [Fluticasone] Anaphylaxis     Current Medication List     Current Outpatient Medications   Medication Sig    amLODIPine (NORVASC) 5 MG tablet Take 1 tablet (5 mg) by mouth daily    azaTHIOprine (IMURAN) 50 MG tablet Take 1.5 tablets (75 mg) by mouth daily    Belimumab 200 MG/ML SOAJ Inject 200 mg Subcutaneous every 7 days . Hold for signs of infection and seek medical attention. Autoinjector    Calcium Carb-Cholecalciferol (CALCIUM + D3) 600-200 MG-UNIT TABS Take 1 tablet by mouth daily    cevimeline (EVOXAC) 30 MG capsule Take 1 capsule (30 mg) by mouth 2 times daily    hydroxychloroquine (PLAQUENIL) 200 MG tablet Hydroxychloroquine 200mg daily; and an additional 200mg every other day. Yearly eye exam, including 10-2 VF and SD-OCT, is required    losartan (COZAAR) 25 MG tablet TAKE 1 TABLET BY MOUTH ONCE DAILY    nortriptyline (PAMELOR) 10 MG capsule TAKE 1 CAPSULE BY MOUTH AT BEDTIME    Vitamin D, Cholecalciferol, 1000 units CAPS Take 4,000 Int'l Units by mouth daily    acetaminophen (TYLENOL) 325 MG tablet Take 325-650 mg by mouth every 6 hours as needed for mild pain or headaches Maximum daily dose of acetaminophen is 3000 mg in 24 hours. (Patient not taking: Reported on 3/29/2023)    azelastine (ASTELIN) 0.1 % nasal spray Spray 1 spray into both nostrils 2 times daily (Patient not taking: Reported on 3/29/2023)    blood glucose test strip Used for testing glucose 2-3 times daily    diclofenac (VOLTAREN) 1 % GEL Apply 2-4 grams to knees or shoulders four times daily as needed using enclosed dosing card. (Patient not taking: Reported on 3/29/2023)    docusate sodium (COLACE) 100  MG capsule Take 1 capsule (100 mg) by mouth 2 times daily (Patient not taking: Reported on 3/27/2023)    famotidine (PEPCID) 40 MG tablet TAKE 1 TABLET BY MOUTH NIGHTLY AS NEEDED FOR HEARTBURN (Patient not taking: Reported on 3/29/2023)    gabapentin (NEURONTIN) 100 MG capsule Take one cap at bedtime for 3 days, then 1 cap bid x 3 days, then 1 cap tid thereafter (Patient not taking: Reported on 3/29/2023)    ondansetron (ZOFRAN-ODT) 4 MG ODT tab Take 1 tablet (4 mg) by mouth every 8 hours as needed for nausea Recommend limited use (Patient not taking: Reported on 3/29/2023)    predniSONE (DELTASONE) 2.5 MG tablet Take 1 tablet (2.5 mg) by mouth daily (Patient not taking: Reported on 3/29/2023)    SUMAtriptan (IMITREX) 50 MG tablet Take 1 tablet (50 mg) by mouth at onset of headache for migraine . After 2 hours, if no relief, ok to take 2nd dose.  Limit 2 tabs/24 hours. (Patient not taking: Reported on 3/29/2023)     No current facility-administered medications for this visit.     Social History   See HPI    Family History     Family History   Problem Relation Age of Onset    Respiratory Maternal Grandfather     Cancer Maternal Grandfather     Other Cancer Maternal Grandfather         Skin cancer    Asthma Son     Circulatory Maternal Grandmother         Brain anurysm    Diabetes Maternal Grandmother     Hypertension Mother     Glaucoma Mother 50        drops    Cancer Mother     Hypertension Sister     Hypertension Sister     Diabetes Sister     Hypertension Sister     Anxiety Disorder Sister     Asthma Son     Diabetes No family hx of     Cerebrovascular Disease No family hx of     Thyroid Disease No family hx of     Macular Degeneration No family hx of        Physical Exam     Temp Readings from Last 3 Encounters:   03/29/23 97.7  F (36.5  C) (Tympanic)   12/20/21 97.4  F (36.3  C) (Oral)   09/01/21 98.7  F (37.1  C) (Tympanic)     BP Readings from Last 5 Encounters:   03/29/23 (!) 162/94   08/15/22 131/87  "  06/24/22 134/82   12/28/21 (!) 145/92   12/20/21 (!) 162/97     Pulse Readings from Last 1 Encounters:   03/29/23 102     Resp Readings from Last 1 Encounters:   03/29/23 14     Estimated body mass index is 21.31 kg/m  as calculated from the following:    Height as of 6/24/22: 1.676 m (5' 6\").    Weight as of 3/29/23: 59.9 kg (132 lb).    GEN: NAD. Healthy appearing adult.   HEENT:  Anicteric, noninjected sclera. No obvious external lesions of the ear and nose. Hearing intact.  PULM: No increased work of breathing  MSK:  Hands and wrists without swelling  SKIN: No rash or jaundice seen  PSYCH: Alert. Appropriate.     Labs / Imaging (select studies)       YANELIS  Recent Labs   Lab Test 07/25/16  1433   ANAIGG <1:40  Reference range: <1:40  (Note)  <1:40  INTERPRETIVE INFORMATION: YANELIS by IFA, IgG  Anti-nuclear antibodies (YANELIS) are seen in a variety of  systemic rheumatic diseases and are determined by indirect  fluorescence assay (IFA) using HEp-2 substrate with an  IgG-specific conjugate. YANELIS titers less than or equal to  1:80 have variable relevance while titers greater than or  equal to 1:160 are considered clinically significant. These  antibodies may precede clinical disease onset; however,  healthy individuals and those with advanced age have been  reported to be positive for YANELIS. When observed, one of the  five basic patterns is reported: homogeneous,  peripheral/rim, speckled, centromere, or nucleolar. If  cytoplasmic fluorescence is observed, it is noted. IFA  methodology is subjective and has occasionally been shown  to lack sensitivity for anti-SSA/Ro antibodies.  Negative results do not necessarily rule out the presence  of SSc. If clinical suspicion remains, consider further  te sting for U3-RNP, PM/Scl, or Th/To antibodies associated  with SSc.  Performed by [facility],  [address] [place] [id number] [phone]  [email or web address], Alcon Krishna MD, Lab. Director       RNP/Sm/SSA/SSB  Recent " Labs   Lab Test 03/23/16  1759 01/04/16  1806   SMIGG <0.2  Negative   Antibody index (AI) values reflect qualitative changes in antibody   concentration that cannot be directly associated with clinical condition or   disease state.   <0.2  Negative   Antibody index (AI) values reflect qualitative changes in antibody   concentration that cannot be directly associated with clinical condition or   disease state.       dsDNA  Recent Labs   Lab Test 04/23/23  1305 01/14/23  0917 07/02/22  0905 04/03/22  1236 10/10/21  1202 07/19/21  1820 04/20/21  1804 01/11/21  1805 10/25/20  1059   DNA <0.6 0.6 <0.6 0.7 <0.6 <0.6 1 <1 1     C3/C4  Recent Labs   Lab Test 04/23/23  1305 07/02/22  0905 10/10/21  1202 07/19/21  1820 04/20/21  1804 01/11/21  1805 10/25/20  1059 04/07/20  1136 01/13/20  1804   S1LPEWI 93 98 89 91 97 91 103 100 85   Y9GSJLZ 22 22 17 20 20 20 >9* 16 21     CBC  Recent Labs   Lab Test 07/23/23  1326 04/23/23  1305 01/14/23  0917 07/19/21  1820 06/13/21  1312 04/20/21  1804 01/11/21  1805   WBC 5.5 5.6 6.0   < > 5.8 7.7 8.1   RBC 4.08 4.17 4.00   < > 4.19 4.22 4.00   HGB 13.2 13.8 13.2   < > 13.5 13.7 13.2   HCT 38.8 39.6 38.0   < > 40.6 40.0 38.7   MCV 95 95 95   < > 97 95 97   RDW 11.6 12.1 11.8   < > 11.6 11.5 11.5    323 299   < > 300 313 316   MCH 32.4 33.1* 33.0   < > 32.2 32.5 33.0   MCHC 34.0 34.8 34.7   < > 33.3 34.3 34.1   NEUTROPHIL 82 81 64   < > 77.2 81.2 83.4   LYMPH 11 11 25   < > 13.7 10.8 8.8   MONOCYTE 6 5 7   < > 6.2 6.4 6.7   EOSINOPHIL 1 2 3   < > 1.7 0.8 0.6   BASOPHIL 1 1 1   < > 1.2 0.8 0.5   ANEU  --   --   --   --  4.5 6.3 6.7   ALYM  --   --   --   --  0.8 0.8 0.7*   ZOË  --   --   --   --  0.4 0.5 0.5   AEOS  --   --   --   --  0.1 0.1 0.1   ABAS  --   --   --   --  0.1 0.1 0.0   ANEUTAUTO 4.5 4.5 3.8   < >  --   --   --    ALYMPAUTO 0.6* 0.6* 1.5   < >  --   --   --    AMONOAUTO 0.3 0.3 0.4   < >  --   --   --    AEOSAUTO 0.1 0.1 0.2   < >  --   --   --    ABSBASO 0.0 0.0  0.0   < >  --   --   --     < > = values in this interval not displayed.     CMP  Recent Labs   Lab Test 07/23/23  1326 04/23/23  1305 01/14/23  0917 10/16/22  1205 07/02/22  0905 04/03/22  1236 07/19/21  1820 04/20/21  1804 01/11/21  1805 10/25/20  1059 04/07/20  1136    140 142 143 140 138   < > 140 138 139 140   POTASSIUM 4.3 3.9 3.5 4.2 3.8 4.2   < > 4.1 3.9 4.1 3.6   CHLORIDE 105 108 107 109 104 105   < > 106 106 104 106   CO2 26 30 29 32 31 25   < > 33* 31 31 31   ANIONGAP 8 2* 6 2* 5 8   < > 1* 1* 4 3   * 103* 108* 85 99 109*   < > 97 99 86 104*   BUN 11.0 15 13 12 13 16   < > 13 15 11 16   CR 0.64 0.72 0.70 0.76 0.69 0.75   < > 0.76 0.77 0.73 0.71   GFRESTIMATED >90 >90 >90 >90 >90 >90   < > >90 >90 >90 >90   GFRESTBLACK  --   --   --   --   --   --   --  >90 >90 >90 >90   MELANIA 8.7 8.8 8.8 8.9 8.8 8.9   < > 9.0 8.8 9.1 9.1   BILITOTAL 0.2 0.3 0.4 0.3 0.4 0.4   < > 0.3 0.2 0.4 0.3   ALBUMIN 4.1 4.1 3.7 3.9 3.9 4.1   < > 4.1 4.0 4.4 4.3   PROTTOTAL 6.3* 6.9 6.4* 6.7* 6.7* 6.9   < > 6.8 6.5* 7.5 7.1   ALKPHOS 57 59 57 56 56 55   < > 57 55 62 54   AST 24 29 16 16 20 22   < > 17 14 22 13   ALT 14 38 32 22 20 30   < > 25 19 23 24    < > = values in this interval not displayed.     HgA1c  Recent Labs   Lab Test 05/30/19  0859   A1C 5.2       Calcium/VitaminD  Recent Labs   Lab Test 07/23/23  1326 04/23/23  1305 01/14/23  0917 10/25/20  1059 04/07/20  1136 07/17/19  1756 04/16/19  1759 01/30/18  1752 10/02/17  1814   MELANIA 8.7 8.8 8.8   < > 9.1   < > 8.4*   < > 8.7   VITDT  --   --   --   --  59  --  40  --  37    < > = values in this interval not displayed.     ESR/CRP  Recent Labs   Lab Test 07/23/23  1326 04/23/23  1305 01/14/23  0917 10/16/22  1205   SED 4 5 6 5   CRP  --  <2.9 <2.9 <2.9   CRPI <3.00  --   --   --      CK/Aldolase  Recent Labs   Lab Test 10/10/21  1202 07/19/21  1820 04/20/21  1804   CKT 87 105 90     TSH/T4  Recent Labs   Lab Test 04/03/22  1236 04/07/20  1136 04/10/19  1808  10/18/17  1445 10/07/16  0714   TSH 0.59 0.44 0.44 0.33* 0.37*   T4  --   --   --  1.24 1.16     Lipid Panel  Recent Labs   Lab Test 04/03/22  1236 01/26/19  0916 10/07/16  0714   CHOL 177 133 137   TRIG 132 64 48   HDL 80 68 75   LDL 71 52 52   NHDL 97 65 62     Hepatitis B  Recent Labs   Lab Test 03/29/16  1417   HEPBANG Nonreactive     Hepatitis C  Recent Labs   Lab Test 03/29/16  1417   HCVAB Nonreactive   Assay performance characteristics have not been established for newborns,   infants, and children       Lyme ab screening  Recent Labs   Lab Test 05/30/15  1355   LYMEGM 0.55     Tuberculosis Screening  Recent Labs   Lab Test 01/08/22  1156 07/03/17  1447 03/29/16  1417   TBRES Negative  --   --    TBRSLT  --  Negative Negative   TBAGN  --  0.00 0.00     HIV Screening  Recent Labs   Lab Test 04/10/19  1808   HIAGAB Nonreactive     UA  Recent Labs   Lab Test 07/23/23  1326 04/23/23  1305 01/14/23  1124 10/16/22  1205 07/02/22  0905 06/24/22  0921 10/10/21  1204 09/01/21  1522 04/20/21  1819 01/11/21  1810 10/25/20  1110 09/15/20  1655 07/03/20  1010 04/07/20  1140   COLOR Yellow Yellow Yellow Yellow   < > Yellow   < > Yellow   < > Yellow Yellow Yellow   < > Yellow   APPEARANCE Clear Clear Clear Clear   < > Clear   < > Slightly Cloudy*   < > Clear Clear Clear   < > Clear   URINEGLC Negative Negative Negative Negative   < > Negative   < > Negative   < > Negative Negative Negative   < > Negative   URINEBILI Negative Negative Negative Negative   < > Negative   < > Negative   < > Negative Negative Negative   < > Negative   SG 1.015 <=1.005 1.015 1.020   < > <=1.005   < > 1.015   < > >1.030 1.010 1.025   < > >1.030   URINEPH 6.0 6.5 6.5 6.0   < > 6.0   < > 7.0   < > 6.0 7.0 6.0   < > 6.5   PROTEIN Negative Negative Negative Negative   < > Negative   < > Trace*   < > Negative Negative Negative   < > Negative   UROBILINOGEN 0.2 0.2 0.2 0.2   < > 0.2   < > 0.2   < > 0.2 0.2 0.2   < > 0.2   NITRITE Negative Negative  Negative Negative   < > Negative   < > Negative   < > Negative Negative Negative   < > Negative   UBLD Negative Negative Negative Negative   < > Negative   < > Moderate*   < > Small* Negative Negative   < > Negative   LEUKEST Negative Trace* Negative Trace*   < > Trace*   < > Moderate*   < > Negative Trace* Negative   < > Trace*   WBCU  --  0-5  --  0-5  --  0-5   < > 25-50*   < > 0 - 5 0 - 5 5-10*   < > 0 - 5   RBCU  --  None Seen  --  0-2  --  None Seen   < > 25-50*   < > O - 2 O - 2 O - 2   < > O - 2   SQUAMOUSEPI  --   --   --   --   --   --   --   --   --  Few Few Few   < > Few   BACTERIA  --   --   --   --   --   --   --  Few*  --  Few* Few* Few*   < > Few*   MUCOUS  --   --   --   --   --   --   --   --   --  Present*  --  Present*  --  Present*    < > = values in this interval not displayed.     Urine Microscopic  Recent Labs   Lab Test 04/23/23  1305 10/16/22  1205 06/24/22  0921 10/10/21  1204 09/01/21  1522 06/13/21  1309 01/11/21  1810 10/25/20  1110 09/15/20  1655 07/03/20  1010 04/07/20  1140   WBCU 0-5 0-5 0-5   < > 25-50*   < > 0 - 5 0 - 5 5-10*   < > 0 - 5   RBCU None Seen 0-2 None Seen   < > 25-50*   < > O - 2 O - 2 O - 2   < > O - 2   SQUAMOUSEPI  --   --   --   --   --   --  Few Few Few   < > Few   BACTERIA  --   --   --   --  Few*  --  Few* Few* Few*   < > Few*   MUCOUS  --   --   --   --   --   --  Present*  --  Present*  --  Present*    < > = values in this interval not displayed.     Urine Protein  GHUTP and UTP= Urine protein (random), GHUTPG and UTPG = urine protein:creatinine ratio (random), UCRR = urine creatinine (random)  Recent Labs   Lab Test 07/23/23  1326 04/23/23  1305 01/14/23  1124 10/16/22  1205 07/02/22  0905 04/10/22  1232 01/08/22  1156 10/10/21  1202   GHUTP 9.2 <6.0 14.7*   < > 8.6  --   --   --    UTP  --   --   --   --   --  0.09 0.07 0.06   GHUTPG 0.13  --  0.15  --  0.09  --   --   --    UTPG  --   --   --   --   --  0.11 0.09 0.12   UCRR 69.7 22.1 98.2   < > 94.9 83 81  52    < > = values in this interval not displayed.     Immunization History     Immunization History   Administered Date(s) Administered    COVID-19 MONOVALENT 12+ (Pfizer) 03/13/2021, 04/03/2021    Influenza (H1N1) 12/02/2009    Influenza (IIV3) PF 02/08/2007, 09/17/2010, 10/01/2012, 09/23/2013    Influenza Vaccine >6 months (Alfuria,Fluzone) 09/23/2013, 09/20/2016, 10/30/2017, 10/14/2019    Influenza Vaccine, 6+MO IM (QUADRIVALENT W/PRESERVATIVES) 08/11/2018    Pneumo Conj 13-V (2010&after) 04/25/2016    Pneumococcal 23 valent 10/01/2012, 11/06/2017    TDAP (Adacel,Boostrix) 12/17/2010, 06/24/2022    Td (Adult), Adsorbed 02/12/2001            Chart documentation done in part with Dragon Voice recognition Software. Although reviewed after completion, some word and grammatical error may remain.      Video-Visit Details    Type of service:  Video Visit    Video Start Time: 8:29 AM    Video End Time: 8:40 AM    Originating Location (pt. Location): Home, [place]    Distant Location (provider location):  off site, [place]    Platform used for Video Visit: Brianna Colvin MD

## 2023-07-28 NOTE — PATIENT INSTRUCTIONS
RHEUMATOLOGY    [facility]  [address] [place] [address]  [place] [address]    Phone number: [phone]  Fax number: [phone]    If you need a medication refill, please contact us as you may need lab work and/or a follow up visit prior to your refill.      Thank you for choosing [facility]!    Qing Zapata CMA Rheumatology

## 2023-08-07 ENCOUNTER — OFFICE VISIT (OUTPATIENT)
Dept: FAMILY MEDICINE | Facility: CLINIC | Age: 48
End: 2023-08-07
Payer: COMMERCIAL

## 2023-08-07 VITALS
OXYGEN SATURATION: 99 % | BODY MASS INDEX: 22.98 KG/M2 | WEIGHT: 142.4 LBS | SYSTOLIC BLOOD PRESSURE: 120 MMHG | DIASTOLIC BLOOD PRESSURE: 80 MMHG | HEART RATE: 72 BPM | RESPIRATION RATE: 16 BRPM | TEMPERATURE: 97.4 F

## 2023-08-07 DIAGNOSIS — E05.90 SUBCLINICAL HYPERTHYROIDISM: ICD-10-CM

## 2023-08-07 DIAGNOSIS — M47.819 FACET ARTHROPATHY: ICD-10-CM

## 2023-08-07 DIAGNOSIS — R51.9 CHRONIC DAILY HEADACHE: ICD-10-CM

## 2023-08-07 DIAGNOSIS — I73.00 RAYNAUD'S PHENOMENON WITHOUT GANGRENE: ICD-10-CM

## 2023-08-07 DIAGNOSIS — M54.2 CERVICALGIA: ICD-10-CM

## 2023-08-07 DIAGNOSIS — E78.5 HYPERLIPIDEMIA LDL GOAL <130: ICD-10-CM

## 2023-08-07 DIAGNOSIS — M32.9 SYSTEMIC LUPUS ERYTHEMATOSUS, UNSPECIFIED SLE TYPE, UNSPECIFIED ORGAN INVOLVEMENT STATUS (H): ICD-10-CM

## 2023-08-07 DIAGNOSIS — I10 HYPERTENSION GOAL BP (BLOOD PRESSURE) < 140/90: Primary | ICD-10-CM

## 2023-08-07 PROCEDURE — 99213 OFFICE O/P EST LOW 20 MIN: CPT | Performed by: PHYSICIAN ASSISTANT

## 2023-08-07 RX ORDER — AMLODIPINE BESYLATE 5 MG/1
5 TABLET ORAL DAILY
Qty: 90 TABLET | Refills: 2 | Status: SHIPPED | OUTPATIENT
Start: 2023-08-07 | End: 2023-12-08

## 2023-08-07 RX ORDER — NORTRIPTYLINE HCL 10 MG
10 CAPSULE ORAL AT BEDTIME
Qty: 90 CAPSULE | Refills: 1 | Status: SHIPPED | OUTPATIENT
Start: 2023-08-07 | End: 2023-10-13

## 2023-08-07 RX ORDER — LOSARTAN POTASSIUM 25 MG/1
25 TABLET ORAL DAILY
Qty: 90 TABLET | Refills: 1 | Status: SHIPPED | OUTPATIENT
Start: 2023-08-07 | End: 2023-10-13

## 2023-08-07 NOTE — PROGRESS NOTES
Subjective   Ruma is a 48 year old, presenting for the following health issues:  Follow Up (On medications in order to refill meds in the future )        8/7/2023     4:45 PM   Additional Questions   Roomed by Noemi       History of Present Illness       Hypertension: She presents for follow up of hypertension.  She does not check blood pressure  regularly outside of the clinic. Outpatient blood pressures have not been over 140/90. She does not follow a low salt diet.     She eats 2-3 servings of fruits and vegetables daily.She consumes 2 sweetened beverage(s) daily.She exercises with enough effort to increase her heart rate 30 to 60 minutes per day.  She exercises with enough effort to increase her heart rate 5 days per week. She is missing 2 dose(s) of medications per week.           Review of Systems   Constitutional, HEENT, cardiovascular, pulmonary, GI, , musculoskeletal, neuro, skin, endocrine and psych systems are negative, except as otherwise noted.      Objective    /80   Pulse 72   Temp 97.4  F (36.3  C) (Temporal)   Resp 16   Wt 64.6 kg (142 lb 6.4 oz)   SpO2 99%   BMI 22.98 kg/m    Body mass index is 22.98 kg/m .  Physical Exam     Eye exam - right eye normal lid, conjunctiva, cornea, pupil and fundus, left eye normal lid, conjunctiva, cornea, pupil and fundus.  Thyroid not palpable, not enlarged, no nodules detected.  CHEST:chest clear to IPPA, no tachypnea, retractions or cyanosis, and S1, S2 normal, no murmur, no gallop, rate regular.    Yovana was seen today for follow up.    Diagnoses and all orders for this visit:    Hypertension goal BP (blood pressure) < 140/90  -     losartan (COZAAR) 25 MG tablet; Take 1 tablet (25 mg) by mouth daily    Hyperlipidemia LDL goal <130    Subclinical hyperthyroidism    Raynaud's phenomenon without gangrene  -     amLODIPine (NORVASC) 5 MG tablet; Take 1 tablet (5 mg) by mouth daily    Systemic lupus erythematosus, unspecified SLE type,  unspecified organ involvement status (H)    Cervicalgia  -     nortriptyline (PAMELOR) 10 MG capsule; Take 1 capsule (10 mg) by mouth At Bedtime    Facet arthropathy  -     nortriptyline (PAMELOR) 10 MG capsule; Take 1 capsule (10 mg) by mouth At Bedtime    Chronic daily headache  -     nortriptyline (PAMELOR) 10 MG capsule; Take 1 capsule (10 mg) by mouth At Bedtime    Other orders  -     REVIEW OF HEALTH MAINTENANCE PROTOCOL ORDERS      Recheck in 6 mos  work on lifestyle modification  Continue meds.

## 2023-08-20 ENCOUNTER — HEALTH MAINTENANCE LETTER (OUTPATIENT)
Age: 48
End: 2023-08-20

## 2023-09-09 ENCOUNTER — ANCILLARY PROCEDURE (OUTPATIENT)
Dept: MAMMOGRAPHY | Facility: CLINIC | Age: 48
End: 2023-09-09
Attending: PHYSICIAN ASSISTANT
Payer: COMMERCIAL

## 2023-09-09 DIAGNOSIS — Z12.31 VISIT FOR SCREENING MAMMOGRAM: ICD-10-CM

## 2023-09-09 PROCEDURE — 77067 SCR MAMMO BI INCL CAD: CPT | Mod: TC | Performed by: RADIOLOGY

## 2023-09-09 PROCEDURE — 77063 BREAST TOMOSYNTHESIS BI: CPT | Mod: TC | Performed by: RADIOLOGY

## 2023-10-13 DIAGNOSIS — K21.9 GASTROESOPHAGEAL REFLUX DISEASE WITHOUT ESOPHAGITIS: Primary | ICD-10-CM

## 2023-10-13 DIAGNOSIS — M47.819 FACET ARTHROPATHY: ICD-10-CM

## 2023-10-13 DIAGNOSIS — I10 HYPERTENSION GOAL BP (BLOOD PRESSURE) < 140/90: ICD-10-CM

## 2023-10-13 DIAGNOSIS — R51.9 CHRONIC DAILY HEADACHE: ICD-10-CM

## 2023-10-13 DIAGNOSIS — M54.2 CERVICALGIA: ICD-10-CM

## 2023-10-13 RX ORDER — NORTRIPTYLINE HCL 10 MG
10 CAPSULE ORAL AT BEDTIME
Qty: 90 CAPSULE | Refills: 0 | Status: SHIPPED | OUTPATIENT
Start: 2023-10-13 | End: 2024-05-29

## 2023-10-13 RX ORDER — LOSARTAN POTASSIUM 25 MG/1
25 TABLET ORAL DAILY
Qty: 90 TABLET | Refills: 0 | Status: SHIPPED | OUTPATIENT
Start: 2023-10-13 | End: 2024-05-29

## 2023-10-13 RX ORDER — FAMOTIDINE 40 MG/1
TABLET, FILM COATED ORAL
Qty: 90 TABLET | Refills: 0 | Status: SHIPPED | OUTPATIENT
Start: 2023-10-13 | End: 2024-05-07

## 2023-10-24 NOTE — PATIENT INSTRUCTIONS
"Chief Complaint   Patient presents with    Physical     annual       Initial /88   Pulse 88   Temp 98.8  F (37.1  C) (Tympanic)   Resp 16   Ht 1.651 m (5' 5\")   Wt 74.4 kg (164 lb)   LMP 10/13/2023 (Exact Date)   SpO2 97%   Breastfeeding No   BMI 27.29 kg/m   Estimated body mass index is 27.29 kg/m  as calculated from the following:    Height as of this encounter: 1.651 m (5' 5\").    Weight as of this encounter: 74.4 kg (164 lb).  Medication Review: complete    The next two questions are to help us understand your food security.  If you are feeling you need any assistance in this area, we have resources available to support you today.          10/18/2023   SDOH- Food Insecurity   Within the past 12 months, did you worry that your food would run out before you got money to buy more? N   Within the past 12 months, did the food you bought just not last and you didn t have money to get more? N         Health Care Directive:  Patient does not have a Health Care Directive or Living Will: Discussed advance care planning with patient; however, patient declined at this time.    Norma J. Gosselin, LPN      " [facility]   Post Procedure Instructions    Today you had: k   bursa injection      Medications used:  lidocaine   bupivicaine   kenolog           Go to the emergency room if you develop any shortness of breath    Monitor the injection sites for signs and symptoms of infection-fever, chills, redness, swelling, warmth, or drainage to areas.    You may have soreness at injection sites for up to 24 hours.    You may apply ice to the painful areas to help minimize the discomfort of the needle pokes.    Do not apply heat to sites for at least 12 hours.    You may use anti-inflammatory medications or Tylenol for pain control if necessary  Nurse line: [phone]  After hours provider line: [phone]  Appointment line: [phone]

## 2023-11-04 ENCOUNTER — LAB (OUTPATIENT)
Dept: LAB | Facility: CLINIC | Age: 48
End: 2023-11-04
Payer: COMMERCIAL

## 2023-11-04 DIAGNOSIS — E05.90 SUBCLINICAL HYPERTHYROIDISM: ICD-10-CM

## 2023-11-04 DIAGNOSIS — M35.01 SJOGREN'S SYNDROME WITH KERATOCONJUNCTIVITIS SICCA (H): ICD-10-CM

## 2023-11-04 DIAGNOSIS — Z79.899 HIGH RISK MEDICATIONS (NOT ANTICOAGULANTS) LONG-TERM USE: ICD-10-CM

## 2023-11-04 DIAGNOSIS — E78.5 HYPERLIPIDEMIA LDL GOAL <130: ICD-10-CM

## 2023-11-04 DIAGNOSIS — M32.9 SYSTEMIC LUPUS ERYTHEMATOSUS, UNSPECIFIED SLE TYPE, UNSPECIFIED ORGAN INVOLVEMENT STATUS (H): ICD-10-CM

## 2023-11-04 LAB
ALBUMIN MFR UR ELPH: <6 MG/DL
ALBUMIN SERPL BCG-MCNC: 4.2 G/DL (ref 3.5–5.2)
ALBUMIN UR-MCNC: NEGATIVE MG/DL
ALP SERPL-CCNC: 49 U/L (ref 35–104)
ALT SERPL W P-5'-P-CCNC: 18 U/L (ref 0–50)
ANION GAP SERPL CALCULATED.3IONS-SCNC: 10 MMOL/L (ref 7–15)
APPEARANCE UR: CLEAR
AST SERPL W P-5'-P-CCNC: 25 U/L (ref 0–45)
BASOPHILS # BLD AUTO: 0 10E3/UL (ref 0–0.2)
BASOPHILS NFR BLD AUTO: 1 %
BILIRUB SERPL-MCNC: 0.4 MG/DL
BILIRUB UR QL STRIP: NEGATIVE
BUN SERPL-MCNC: 9.6 MG/DL (ref 6–20)
CALCIUM SERPL-MCNC: 8.9 MG/DL (ref 8.6–10)
CHLORIDE SERPL-SCNC: 103 MMOL/L (ref 98–107)
CHOLEST SERPL-MCNC: 154 MG/DL
COLOR UR AUTO: YELLOW
CREAT SERPL-MCNC: 0.7 MG/DL (ref 0.51–0.95)
CREAT UR-MCNC: 46.9 MG/DL
CRP SERPL-MCNC: <3 MG/L
DEPRECATED HCO3 PLAS-SCNC: 27 MMOL/L (ref 22–29)
EGFRCR SERPLBLD CKD-EPI 2021: >90 ML/MIN/1.73M2
EOSINOPHIL # BLD AUTO: 0.1 10E3/UL (ref 0–0.7)
EOSINOPHIL NFR BLD AUTO: 2 %
ERYTHROCYTE [DISTWIDTH] IN BLOOD BY AUTOMATED COUNT: 11.6 % (ref 10–15)
ERYTHROCYTE [SEDIMENTATION RATE] IN BLOOD BY WESTERGREN METHOD: 4 MM/HR (ref 0–30)
GLUCOSE SERPL-MCNC: 80 MG/DL (ref 70–99)
GLUCOSE UR STRIP-MCNC: NEGATIVE MG/DL
HCT VFR BLD AUTO: 40.5 % (ref 35–47)
HDLC SERPL-MCNC: 77 MG/DL
HGB BLD-MCNC: 13.7 G/DL (ref 11.7–15.7)
HGB UR QL STRIP: NEGATIVE
IMM GRANULOCYTES # BLD: 0 10E3/UL
IMM GRANULOCYTES NFR BLD: 0 %
KETONES UR STRIP-MCNC: NEGATIVE MG/DL
LDLC SERPL CALC-MCNC: 62 MG/DL
LEUKOCYTE ESTERASE UR QL STRIP: NEGATIVE
LYMPHOCYTES # BLD AUTO: 1.1 10E3/UL (ref 0.8–5.3)
LYMPHOCYTES NFR BLD AUTO: 16 %
MCH RBC QN AUTO: 32.2 PG (ref 26.5–33)
MCHC RBC AUTO-ENTMCNC: 33.8 G/DL (ref 31.5–36.5)
MCV RBC AUTO: 95 FL (ref 78–100)
MONOCYTES # BLD AUTO: 0.5 10E3/UL (ref 0–1.3)
MONOCYTES NFR BLD AUTO: 8 %
NEUTROPHILS # BLD AUTO: 4.9 10E3/UL (ref 1.6–8.3)
NEUTROPHILS NFR BLD AUTO: 74 %
NITRATE UR QL: NEGATIVE
NONHDLC SERPL-MCNC: 77 MG/DL
PH UR STRIP: 7 [PH] (ref 5–7)
PLATELET # BLD AUTO: 292 10E3/UL (ref 150–450)
POTASSIUM SERPL-SCNC: 3.9 MMOL/L (ref 3.4–5.3)
PROT SERPL-MCNC: 6.4 G/DL (ref 6.4–8.3)
PROT/CREAT 24H UR: NORMAL MG/G{CREAT}
RBC # BLD AUTO: 4.26 10E6/UL (ref 3.8–5.2)
SODIUM SERPL-SCNC: 140 MMOL/L (ref 135–145)
SP GR UR STRIP: 1.01 (ref 1–1.03)
TRIGL SERPL-MCNC: 75 MG/DL
TSH SERPL DL<=0.005 MIU/L-ACNC: 0.76 UIU/ML (ref 0.3–4.2)
UROBILINOGEN UR STRIP-ACNC: 0.2 E.U./DL
WBC # BLD AUTO: 6.7 10E3/UL (ref 4–11)

## 2023-11-04 PROCEDURE — 80053 COMPREHEN METABOLIC PANEL: CPT

## 2023-11-04 PROCEDURE — 86140 C-REACTIVE PROTEIN: CPT

## 2023-11-04 PROCEDURE — 80061 LIPID PANEL: CPT

## 2023-11-04 PROCEDURE — 81003 URINALYSIS AUTO W/O SCOPE: CPT

## 2023-11-04 PROCEDURE — 84156 ASSAY OF PROTEIN URINE: CPT

## 2023-11-04 PROCEDURE — 36415 COLL VENOUS BLD VENIPUNCTURE: CPT

## 2023-11-04 PROCEDURE — 85652 RBC SED RATE AUTOMATED: CPT

## 2023-11-04 PROCEDURE — 85025 COMPLETE CBC W/AUTO DIFF WBC: CPT

## 2023-11-04 PROCEDURE — 84443 ASSAY THYROID STIM HORMONE: CPT

## 2023-12-08 ENCOUNTER — VIRTUAL VISIT (OUTPATIENT)
Dept: RHEUMATOLOGY | Facility: CLINIC | Age: 48
End: 2023-12-08
Payer: COMMERCIAL

## 2023-12-08 DIAGNOSIS — I73.00 RAYNAUD'S PHENOMENON WITHOUT GANGRENE: ICD-10-CM

## 2023-12-08 DIAGNOSIS — Z79.899 HIGH RISK MEDICATIONS (NOT ANTICOAGULANTS) LONG-TERM USE: ICD-10-CM

## 2023-12-08 DIAGNOSIS — M32.9 SYSTEMIC LUPUS ERYTHEMATOSUS, UNSPECIFIED SLE TYPE, UNSPECIFIED ORGAN INVOLVEMENT STATUS (H): ICD-10-CM

## 2023-12-08 DIAGNOSIS — D47.2 MGUS (MONOCLONAL GAMMOPATHY OF UNKNOWN SIGNIFICANCE): Primary | ICD-10-CM

## 2023-12-08 DIAGNOSIS — M35.01 SJOGREN'S SYNDROME WITH KERATOCONJUNCTIVITIS SICCA (H): ICD-10-CM

## 2023-12-08 PROCEDURE — 99214 OFFICE O/P EST MOD 30 MIN: CPT | Mod: VID | Performed by: INTERNAL MEDICINE

## 2023-12-08 RX ORDER — CEVIMELINE HYDROCHLORIDE 30 MG/1
30 CAPSULE ORAL 2 TIMES DAILY
Qty: 180 CAPSULE | Refills: 2 | Status: SHIPPED | OUTPATIENT
Start: 2023-12-08 | End: 2024-08-09

## 2023-12-08 RX ORDER — PREDNISONE 2.5 MG/1
2.5 TABLET ORAL DAILY
Qty: 90 TABLET | Refills: 2 | Status: SHIPPED | OUTPATIENT
Start: 2023-12-08 | End: 2024-08-09

## 2023-12-08 RX ORDER — HYDROXYCHLOROQUINE SULFATE 200 MG/1
TABLET, FILM COATED ORAL
Qty: 135 TABLET | Refills: 2 | Status: SHIPPED | OUTPATIENT
Start: 2023-12-08 | End: 2024-08-09

## 2023-12-08 RX ORDER — AZATHIOPRINE 50 MG/1
75 TABLET ORAL DAILY
Qty: 135 TABLET | Refills: 2 | Status: SHIPPED | OUTPATIENT
Start: 2023-12-08 | End: 2024-08-09

## 2023-12-08 RX ORDER — AMLODIPINE BESYLATE 5 MG/1
5 TABLET ORAL DAILY
Qty: 90 TABLET | Refills: 2 | Status: SHIPPED | OUTPATIENT
Start: 2023-12-08 | End: 2024-08-09

## 2023-12-08 NOTE — PROGRESS NOTES
Yovana Enriquez  is a 48 year old year old who is being evaluated via a billable video visit.      How would you like to obtain your AVS? MyChart  If the video visit is dropped, the invitation should be resent by: Text to cell phone: [phone]   Will anyone else be joining your video visit? No      Rheumatology Video Visit      Yovana Enriquez MRN# [id number]   YOB: 1975 Age: 48 year old      Date of visit: 12/08/23   PCP: Bradford Mario    Chief Complaint   Patient presents with:  RECHECK: Systemic lupus erythematosus    Assessment and Plan     1. Systemic lupus erythematosus (YANELIS by EIA positive in the past, leukopenia/lymphocytopenia, hypocomplementemia, polyarthralgias, oral sores, history of malar rash, photophobia, photosensitivity, Raynaud's Phenomenon): Previously on methotrexate that was discontinued for unclear reasons but the patient reports that she tolerated it well. Currently on azathioprine 75 mg daily (dose reduced on 11/8/2018 from 100mg daily without worsening symptoms at that time, eventually reduced to 50 mg daily and did well for a period of time; now on 75 mg daily), hydroxychloroquine 300 mg daily [avg], and Benlysta (worsening symptoms when stopped in the past).  Overall doing well with regard to lupus at this time.  Noted that she has likely adrenal insufficiency and has done much better with staying on prednisone 2.5 mg daily.  Chronic illness, stable.    - Continue azathioprine 75 mg daily   - Continue hydroxychloroquine  200mg daily with additional 200mg every other day (toxicity monitoring eye exam last done on 8/5/2022 by Dr. Queen)  - Continue Benlysta 200 mg SQ every 7 days  - Continue Prednisone 2.5mg daily (trouble reducing below this point, likely adrenal insufficiency)  - Continue photoprotection: Covering up and using sunscreen  - Labs in early February: CBC, CMP, UA, Uprotein:creatinine  - Labs in early May: CBC, CMP, ESR, CRP, C3, C4, dsDNA, UA,  Uprotein:creatinine    High risk medication requiring intensive toxicity monitoring at least quarterly.    2. Secondary Sjogren syndrome: Doing well with Evoxac and frequent sips of water, but then Evoxac became more expensive so pilocarpine was tried but not tolerated (GI upset).  Therefore, changed back to Evoxac and was doing well with BID dosing (GI upset when TID). Dry eyes well controled with artificial tears as needed.  Chronic illness  - Continue Evoxac 30 mg twice daily     3. Raynaud's Phenomenon: Amlodipine 5mg daily is effective.  Chronic illness, stable  - Continue amlodipine 5mg daily      4.  History of R>Ltrochanteric bursitis, possible iliotibial band syndrome as well:  Also with symptoms of iliotibial band syndrome and degenerative arthritis of the lumbar spine.  Symptoms improved with home exercises.  Doing well at this time.        5. Fibromyalgia: Managed by PCP.    6. MGUS: SPEP and immunofixation advised to be done yearly per Dr. Yoselin Payan; she may follow-up with her PCP for this issue.  Documented here for historical significance.    7.  Vaccinations: Vaccinations reviewed with Ms. Enriquez.    - Influenza: encouraged yearly vaccination  - [id number]: up to date  - [id number]: up to date  - COVID-19: advised keeping updated, and to hold azathioprine and benlysta for 1-2 weeks after COVID-19 vaccination    - Shingrix: advised vaccination      Total minutes spent in evaluation with patient, documentation, , and review of pertinent studies and chart notes: 18    Ms. Enriquez verbalized agreement with and understanding of the rational for the diagnosis and treatment plan.  All questions were answered to best of my ability and the patient's satisfaction. Ms. Enriquez was advised to contact the clinic with any questions that may arise after the clinic visit.      Thank you for involving me in the care of the patient    Return to clinic: May    ABDON   Yovana Enriquez is a 48 year old  female with medical history significant for subclinical hyperthyroidism, hypertension, irritable bowel syndrome, fibromyalgia, hypertension, non-celiac gluten sensitivity (reportedly with bowel biopsies and laboratory workup that did not show celiac disease), anxiety, and systemic lupus erythematosus who presents for follow-up of SLE.    Today, 12/8/2023: Currently doing well.  Had mild ache of her toes on 1 foot for a few days that has since resolved.  Raynaud's phenomenon is controlled with amlodipine.  No lower extremity edema.  Evoxac is effective for Sjogren's syndrome; worsening symptoms with any missed dose.  Azathioprine, hydroxychloroquine, Benlysta, and prednisone are taken as prescribed.  Perimenopausal and occasionally feels extra warm but this tends to come and go.  Going to the gym now to maintain weight at around 140 pounds.    Denies fevers, chills, nausea, vomiting. No abdominal pain. no chest pain/pressure, palpitations, or shortness of breath.  No rash currently. No LE swelling.  She has photosensitivity and photophobia.   No eye pain or redness.     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    ROS   12 point review of system was completed and negative except as noted in the HPI     Active Problem List     Patient Active Problem List   Diagnosis    Systemic lupus erythematosus (H)    Menorrhagia    History of ovarian cyst    Dysmenorrhea    History of gestational diabetes mellitus, not currently pregnant    Intramural leiomyoma of uterus    Hyperlipidemia LDL goal <130    Subclinical hyperthyroidism    Anxiety    High risk medication use    Fibromyalgia    Chronic constipation    Irritable bowel syndrome    Health Care Home    Hypertension goal BP (blood pressure) < 140/90    Non-celiac gluten sensitivity    Raynaud's phenomenon without gangrene    Sjogren's syndrome (H24)    Intramural and submucous leiomyoma of uterus    Adrenal insufficiency (H24)    Screening  for malignant neoplasm of cervix     Past Medical History     Past Medical History:   Diagnosis Date    Arthritis     Chronic constipation 12/16/2013    Dysmenorrhea 10/1/2012    Fibromyalgia 11/15/2013    Health Care Home 3/19/2014    **See Letters for [facility] Care Plan: Emergency Care Plan      High risk medication use 9/13/2013    History of gestational diabetes mellitus, not currently pregnant 10/1/2012    History of ovarian cyst 10/1/2012    Hyperlipidemia LDL goal <130 10/18/2012    Hypertension goal BP (blood pressure) < 140/90 1/19/2015    Intramural leiomyoma of uterus 10/5/2012    Irritable bowel syndrome 3/11/2014    Patient states no history colonoscopy      Menorrhagia 10/1/2012    Non-celiac gluten sensitivity 2/8/2016    PONV (postoperative nausea and vomiting)     Subclinical hyperthyroidism 1/17/2013    Systemic lupus erythematosus (H) 4/23/2012     Past Surgical History     Past Surgical History:   Procedure Laterality Date    ABDOMEN SURGERY  2004    Tubal ligation    COLONOSCOPY N/A 2/20/2015    Procedure: COMBINED COLONOSCOPY, SINGLE OR MULTIPLE BIOPSY/POLYPECTOMY BY BIOPSY;  Surgeon: Fred Cullen MD;  Location: [facility] OR    COLONOSCOPY N/A 10/29/2018    Procedure: COMBINED COLONOSCOPY, SINGLE OR MULTIPLE BIOPSY/POLYPECTOMY BY BIOPSY;  Surgeon: Inez Sawyer MD;  Location[facility]    COLONOSCOPY WITH CO2 INSUFFLATION N/A 2/20/2015    Procedure: COLONOSCOPY WITH CO2 INSUFFLATION;  Surgeon: Fred Cullen MD;  Location: [facility] OR    ENT SURGERY  10-24-14    Bottom lip biopsies    ESOPHAGOSCOPY, GASTROSCOPY, DUODENOSCOPY (EGD), COMBINED N/A 2/20/2015    Procedure: COMBINED ESOPHAGOSCOPY, GASTROSCOPY, DUODENOSCOPY (EGD), BIOPSY SINGLE OR MULTIPLE;  Surgeon: Fred Cullen MD;  Location: [facility] OR    HC BREATH HYDROGEN TEST  12/31/2013    Procedure: HYDROGEN BREATH TEST;  Surgeon: Camden Almazan MD;  Location: [facility]    HC HYSTEROSCOPY, SURGICAL; W/ ENDOMETRIAL ABLATION, ANY  METHOD  10-19-12    ORTHOPEDIC SURGERY  12/2010    SHOULDER SURGERY Right     R shoulder    TUBAL LIGATION  2004     Allergy     Allergies   Allergen Reactions    Fluticasone Propionate (Nasal) [Fluticasone] Anaphylaxis     Current Medication List     Current Outpatient Medications   Medication Sig    amLODIPine (NORVASC) 5 MG tablet Take 1 tablet (5 mg) by mouth daily    azaTHIOprine (IMURAN) 50 MG tablet Take 1.5 tablets (75 mg) by mouth daily    azelastine (ASTELIN) 0.1 % nasal spray Spray 1 spray into both nostrils 2 times daily    Belimumab 200 MG/ML SOAJ Inject 200 mg Subcutaneous every 7 days . Hold for signs of infection and seek medical attention. Autoinjector    blood glucose test strip Used for testing glucose 2-3 times daily    Calcium Carb-Cholecalciferol (CALCIUM + D3) 600-200 MG-UNIT TABS Take 1 tablet by mouth daily    cevimeline (EVOXAC) 30 MG capsule Take 1 capsule (30 mg) by mouth 2 times daily    famotidine (PEPCID) 40 MG tablet TAKE 1 TABLET BY MOUTH NIGHTLY AS NEEDED FOR HEARTBURN    hydroxychloroquine (PLAQUENIL) 200 MG tablet Hydroxychloroquine 200mg daily; and an additional 200mg every other day. Yearly eye exam, including 10-2 VF and SD-OCT, is required    losartan (COZAAR) 25 MG tablet TAKE 1 TABLET BY MOUTH ONCE DAILY    nortriptyline (PAMELOR) 10 MG capsule TAKE 1 CAPSULE BY MOUTH AT BEDTIME    predniSONE (DELTASONE) 2.5 MG tablet Take 1 tablet (2.5 mg) by mouth daily    SUMAtriptan (IMITREX) 50 MG tablet Take 50 mg by mouth at onset of headache for migraine    Vitamin D, Cholecalciferol, 1000 units CAPS Take 4,000 Int'l Units by mouth daily     No current facility-administered medications for this visit.     Social History   See HPI    Family History     Family History   Problem Relation Age of Onset    Respiratory Maternal Grandfather     Cancer Maternal Grandfather     Other Cancer Maternal Grandfather         Skin cancer    Asthma Son     Circulatory Maternal Grandmother          "Brain anurysm    Diabetes Maternal Grandmother     Hypertension Mother     Glaucoma Mother 50        drops    Cancer Mother     Hypertension Sister     Hypertension Sister     Diabetes Sister     Hypertension Sister     Anxiety Disorder Sister     Asthma Son     Diabetes No family hx of     Cerebrovascular Disease No family hx of     Thyroid Disease No family hx of     Macular Degeneration No family hx of        Physical Exam     Temp Readings from Last 3 Encounters:   08/07/23 97.4  F (36.3  C) (Temporal)   03/29/23 97.7  F (36.5  C) (Tympanic)   12/20/21 97.4  F (36.3  C) (Oral)     BP Readings from Last 5 Encounters:   08/07/23 120/80   03/29/23 (!) 162/94   08/15/22 131/87   06/24/22 134/82   12/28/21 (!) 145/92     Pulse Readings from Last 1 Encounters:   08/07/23 72     Resp Readings from Last 1 Encounters:   08/07/23 16     Estimated body mass index is 22.98 kg/m  as calculated from the following:    Height as of 6/24/22: 1.676 m (5' 6\").    Weight as of 8/7/23: 64.6 kg (142 lb 6.4 oz).    GEN: NAD. Healthy appearing adult.   HEENT:  Anicteric, noninjected sclera. No obvious external lesions of the ear and nose. Hearing intact.  PULM: No increased work of breathing  MSK:  Hands and wrists without swelling  SKIN: No rash or jaundice seen  PSYCH: Alert. Appropriate.     Labs / Imaging (select studies)       YANELIS  Recent Labs   Lab Test 07/25/16  1433   ANAIGG <1:40  Reference range: <1:40  (Note)  <1:40  INTERPRETIVE INFORMATION: YANELIS by IFA, IgG  Anti-nuclear antibodies (YANELIS) are seen in a variety of  systemic rheumatic diseases and are determined by indirect  fluorescence assay (IFA) using HEp-2 substrate with an  IgG-specific conjugate. YANELIS titers less than or equal to  1:80 have variable relevance while titers greater than or  equal to 1:160 are considered clinically significant. These  antibodies may precede clinical disease onset; however,  healthy individuals and those with advanced age have been  reported " to be positive for YANELIS. When observed, one of the  five basic patterns is reported: homogeneous,  peripheral/rim, speckled, centromere, or nucleolar. If  cytoplasmic fluorescence is observed, it is noted. IFA  methodology is subjective and has occasionally been shown  to lack sensitivity for anti-SSA/Ro antibodies.  Negative results do not necessarily rule out the presence  of SSc. If clinical suspicion remains, consider further  te sting for U3-RNP, PM/Scl, or Th/To antibodies associated  with SSc.  Performed by [facility],  [address] [place] [id number] [phone]  [email or web address], Alcon Krishna MD, Lab. Director       RNP/Sm/SSA/SSB  Recent Labs   Lab Test 03/23/16  1759 01/04/16  1806   SMIGG <0.2  Negative   Antibody index (AI) values reflect qualitative changes in antibody   concentration that cannot be directly associated with clinical condition or   disease state.   <0.2  Negative   Antibody index (AI) values reflect qualitative changes in antibody   concentration that cannot be directly associated with clinical condition or   disease state.       dsDNA  Recent Labs   Lab Test 04/23/23  1305 01/14/23  0917 07/02/22  0905 04/03/22  1236 10/10/21  1202 07/19/21  1820 04/20/21  1804 01/11/21  1805 10/25/20  1059   DNA <0.6 0.6 <0.6 0.7 <0.6 <0.6 1 <1 1     C3/C4  Recent Labs   Lab Test 04/23/23  1305 07/02/22  0905 10/10/21  1202 07/19/21  1820 04/20/21  1804 01/11/21  1805 10/25/20  1059 04/07/20  1136 01/13/20  1804   [id number] 93 98 89 91 97 91 103 100 85   [id number] 22 22 17 20 20 20 >9* 16 21     IgG  Recent Labs   Lab Test 01/11/21  1805 10/25/20  1059 06/17/19  1813    <17*  --    IGA 93 <2* 82   * 345*  --      CBC  Recent Labs   Lab Test 11/04/23  1043 07/23/23  1326 04/23/23  1305 07/19/21  1820 06/13/21  1312 04/20/21  1804 01/11/21  1805   WBC 6.7 5.5 5.6   < > 5.8 7.7 8.1   RBC 4.26 4.08 4.17   < > 4.19 4.22 4.00   HGB 13.7 13.2 13.8   < > 13.5 13.7 13.2   HCT 40.5 38.8 39.6    < > 40.6 40.0 38.7   MCV 95 95 95   < > 97 95 97   RDW 11.6 11.6 12.1   < > 11.6 11.5 11.5    308 323   < > 300 313 316   MCH 32.2 32.4 33.1*   < > 32.2 32.5 33.0   MCHC 33.8 34.0 34.8   < > 33.3 34.3 34.1   NEUTROPHIL 74 82 81   < > 77.2 81.2 83.4   LYMPH 16 11 11   < > 13.7 10.8 8.8   MONOCYTE 8 6 5   < > 6.2 6.4 6.7   EOSINOPHIL 2 1 2   < > 1.7 0.8 0.6   BASOPHIL 1 1 1   < > 1.2 0.8 0.5   ANEU  --   --   --   --  4.5 6.3 6.7   ALYM  --   --   --   --  0.8 0.8 0.7*   ZOË  --   --   --   --  0.4 0.5 0.5   AEOS  --   --   --   --  0.1 0.1 0.1   ABAS  --   --   --   --  0.1 0.1 0.0   ANEUTAUTO 4.9 4.5 4.5   < >  --   --   --    ALYMPAUTO 1.1 0.6* 0.6*   < >  --   --   --    AMONOAUTO 0.5 0.3 0.3   < >  --   --   --    AEOSAUTO 0.1 0.1 0.1   < >  --   --   --    ABSBASO 0.0 0.0 0.0   < >  --   --   --     < > = values in this interval not displayed.     CMP  Recent Labs   Lab Test 11/04/23  1043 07/23/23  1326 04/23/23  1305 01/14/23  0917 10/16/22  1205 07/02/22  0905 07/19/21  1820 04/20/21  1804 01/11/21  1805 10/25/20  1059 04/07/20  1136    139 140 142 143 140   < > 140 138 139 140   POTASSIUM 3.9 4.3 3.9 3.5 4.2 3.8   < > 4.1 3.9 4.1 3.6   CHLORIDE 103 105 108 107 109 104   < > 106 106 104 106   CO2 27 26 30 29 32 31   < > 33* 31 31 31   ANIONGAP 10 8 2* 6 2* 5   < > 1* 1* 4 3   GLC 80 118* 103* 108* 85 99   < > 97 99 86 104*   BUN 9.6 11.0 15 13 12 13   < > 13 15 11 16   CR 0.70 0.64 0.72 0.70 0.76 0.69   < > 0.76 0.77 0.73 0.71   GFRESTIMATED >90 >90 >90 >90 >90 >90   < > >90 >90 >90 >90   GFRESTBLACK  --   --   --   --   --   --   --  >90 >90 >90 >90   MELANIA 8.9 8.7 8.8 8.8 8.9 8.8   < > 9.0 8.8 9.1 9.1   BILITOTAL 0.4 0.2 0.3 0.4 0.3 0.4   < > 0.3 0.2 0.4 0.3   ALBUMIN 4.2 4.1 4.1 3.7 3.9 3.9   < > 4.1 4.0 4.4 4.3   PROTTOTAL 6.4 6.3* 6.9 6.4* 6.7* 6.7*   < > 6.8 6.5* 7.5 7.1   ALKPHOS 49 57 59 57 56 56   < > 57 55 62 54   AST 25 24 29 16 16 20   < > 17 14 22 13   ALT 18 14 38 32 22 20   < > 25  19 23 24    < > = values in this interval not displayed.     HgA1c  Recent Labs   Lab Test 05/30/19  0859   A1C 5.2     Calcium/VitaminD  Recent Labs   Lab Test 11/04/23  1043 07/23/23  1326 04/23/23  1305 10/25/20  1059 04/07/20  1136 07/17/19  1756 04/16/19  1759 01/30/18  1752 10/02/17  1814   MELANIA 8.9 8.7 8.8   < > 9.1   < > 8.4*   < > 8.7   VITDT  --   --   --   --  59  --  40  --  37    < > = values in this interval not displayed.     ESR/CRP  Recent Labs   Lab Test 11/04/23  1043 07/23/23  1326 04/23/23  1305 01/14/23  0917 10/16/22  1205   SED 4 4 5 6 5   CRP  --   --  <2.9 <2.9 <2.9   CRPI <3.00 <3.00  --   --   --      CK/Aldolase  Recent Labs   Lab Test 10/10/21  1202 07/19/21  1820 04/20/21  1804   CKT 87 105 90     TSH/T4  Recent Labs   Lab Test 11/04/23  1043 04/03/22  1236 04/07/20  1136 04/10/19  1808 10/18/17  1445 10/07/16  0714   TSH 0.76 0.59 0.44   < > 0.33* 0.37*   T4  --   --   --   --  1.24 1.16    < > = values in this interval not displayed.     Lipid Panel  Recent Labs   Lab Test 11/04/23  1043 04/03/22  1236 01/26/19  0916   CHOL 154 177 133   TRIG 75 132 64   HDL 77 80 68   LDL 62 71 52   NHDL 77 97 65     Hepatitis B  Recent Labs   Lab Test 03/29/16  1417   HEPBANG Nonreactive     Hepatitis C  Recent Labs   Lab Test 03/29/16  1417   HCVAB Nonreactive   Assay performance characteristics have not been established for newborns,   infants, and children       Tuberculosis Screening  Recent Labs   Lab Test 01/08/22  1156 07/03/17  1447 03/29/16  1417   TBRES Negative  --   --    TBRSLT  --  Negative Negative   TBAGN  --  0.00 0.00     HIV Screening  Recent Labs   Lab Test 04/10/19  1808   HIAGAB Nonreactive     UA  Recent Labs   Lab Test 11/04/23  1140 07/23/23  1326 04/23/23  1305 01/14/23  1124 10/16/22  1205 07/02/22  0905 06/24/22  0921 10/10/21  1204 09/01/21  1522 04/20/21  1819 01/11/21  1810 10/25/20  1110 09/15/20  1655 07/03/20  1010 04/07/20  1140   COLOR Yellow Yellow Yellow   < >  Yellow   < > Yellow   < > Yellow   < > Yellow Yellow Yellow   < > Yellow   APPEARANCE Clear Clear Clear   < > Clear   < > Clear   < > Slightly Cloudy*   < > Clear Clear Clear   < > Clear   URINEGLC Negative Negative Negative   < > Negative   < > Negative   < > Negative   < > Negative Negative Negative   < > Negative   URINEBILI Negative Negative Negative   < > Negative   < > Negative   < > Negative   < > Negative Negative Negative   < > Negative   SG 1.010 1.015 <=1.005   < > 1.020   < > <=1.005   < > 1.015   < > >1.030 1.010 1.025   < > >1.030   URINEPH 7.0 6.0 6.5   < > 6.0   < > 6.0   < > 7.0   < > 6.0 7.0 6.0   < > 6.5   PROTEIN Negative Negative Negative   < > Negative   < > Negative   < > Trace*   < > Negative Negative Negative   < > Negative   UROBILINOGEN 0.2 0.2 0.2   < > 0.2   < > 0.2   < > 0.2   < > 0.2 0.2 0.2   < > 0.2   NITRITE Negative Negative Negative   < > Negative   < > Negative   < > Negative   < > Negative Negative Negative   < > Negative   UBLD Negative Negative Negative   < > Negative   < > Negative   < > Moderate*   < > Small* Negative Negative   < > Negative   LEUKEST Negative Negative Trace*   < > Trace*   < > Trace*   < > Moderate*   < > Negative Trace* Negative   < > Trace*   WBCU  --   --  0-5  --  0-5  --  0-5   < > 25-50*   < > 0 - 5 0 - 5 5-10*   < > 0 - 5   RBCU  --   --  None Seen  --  0-2  --  None Seen   < > 25-50*   < > O - 2 O - 2 O - 2   < > O - 2   SQUAMOUSEPI  --   --   --   --   --   --   --   --   --   --  Few Few Few   < > Few   BACTERIA  --   --   --   --   --   --   --   --  Few*  --  Few* Few* Few*   < > Few*   MUCOUS  --   --   --   --   --   --   --   --   --   --  Present*  --  Present*  --  Present*    < > = values in this interval not displayed.     Urine Microscopic  Recent Labs   Lab Test 04/23/23  1305 10/16/22  1205 06/24/22  0921 10/10/21  1204 09/01/21  1522 06/13/21  1309 01/11/21  1810 10/25/20  1110 09/15/20  1655 07/03/20  1010 04/07/20  1140   WBCU 0-5  0-5 0-5   < > 25-50*   < > 0 - 5 0 - 5 5-10*   < > 0 - 5   RBCU None Seen 0-2 None Seen   < > 25-50*   < > O - 2 O - 2 O - 2   < > O - 2   SQUAMOUSEPI  --   --   --   --   --   --  Few Few Few   < > Few   BACTERIA  --   --   --   --  Few*  --  Few* Few* Few*   < > Few*   MUCOUS  --   --   --   --   --   --  Present*  --  Present*  --  Present*    < > = values in this interval not displayed.     Urine Protein  GHUTP and UTP= Urine protein (random), GHUTPG and UTPG = urine protein:creatinine ratio (random), UCRR = urine creatinine (random)  Recent Labs   Lab Test 11/04/23  1140 07/23/23  1326 04/23/23  1305 01/14/23  1124 10/16/22  1205 07/02/22  0905 04/10/22  1232 01/08/22  1156 10/10/21  1202   GHUTP <6.0 9.2 <6.0 14.7*   < > 8.6  --   --   --    UTP  --   --   --   --   --   --  0.09 0.07 0.06   GHUTPG  --  0.13  --  0.15  --  0.09  --   --   --    UTPG  --   --   --   --   --   --  0.11 0.09 0.12   UCRR 46.9 69.7 22.1 98.2   < > 94.9 83 81 52    < > = values in this interval not displayed.     Immunization History     Immunization History   Administered Date(s) Administered    COVID-19 MONOVALENT 12+ (Pfizer) 03/13/2021, 04/03/2021    Influenza (H1N1) 12/02/2009    Influenza (IIV3) PF 02/08/2007, 09/17/2010, 10/01/2012, 09/23/2013    Influenza Vaccine >6 months,quad, PF 09/23/2013, 09/20/2016, 10/30/2017, 10/14/2019    Influenza Vaccine, 6+MO IM (QUADRIVALENT W/PRESERVATIVES) 08/11/2018    Pneumo Conj 13-V (2010&after) 04/25/2016    Pneumococcal 23 valent 10/01/2012, 11/06/2017    TDAP (Adacel,Boostrix) 12/17/2010, 06/24/2022    Td (Adult), Adsorbed 02/12/2001            Chart documentation done in part with Dragon Voice recognition Software. Although reviewed after completion, some word and grammatical error may remain.      Video-Visit Details    Type of service:  Video Visit    Video Start Time: 11:14 AM    Video End Time: 11:22 AM    Originating Location (pt. Location): Home, [place]    Distant Location (provider  location):  off site, [place]    Platform used for Video Visit: Brianna Colvin MD

## 2024-01-10 ENCOUNTER — TELEPHONE (OUTPATIENT)
Dept: RHEUMATOLOGY | Facility: CLINIC | Age: 49
End: 2024-01-10
Payer: COMMERCIAL

## 2024-01-10 NOTE — TELEPHONE ENCOUNTER
Prior Authorization Approval    Medication: BELIMUMAB 200 MG/ML SC SOAJ  Authorization Effective Date: 12/11/2023  Authorization Expiration Date: 1/8/2025  Approved Dose/Quantity: 4 / 28  Reference #: Zoe (Key: [id number])   Insurance Company: MEDICA - Phone [phone] Fax [phone]:  1MEDICA  Expected CoPay: $    CoPay Card Available: Other (see comments)    Financial Assistance Needed: [email or web address]  Which Pharmacy is filling the prescription: [address]  Pharmacy Notified: no - renewal  Patient Notified: no - renewal  Zoe (Key: [id number])        Thank You,     Sadia Zavala [facility]  Specialty Pharmacy Clinic [facility] Specialty  [email or web address]  Phone: [phone]  Fax: [phone]

## 2024-02-11 ENCOUNTER — LAB (OUTPATIENT)
Dept: LAB | Facility: CLINIC | Age: 49
End: 2024-02-11
Payer: COMMERCIAL

## 2024-02-11 DIAGNOSIS — D47.2 MGUS (MONOCLONAL GAMMOPATHY OF UNKNOWN SIGNIFICANCE): ICD-10-CM

## 2024-02-11 DIAGNOSIS — M32.9 SYSTEMIC LUPUS ERYTHEMATOSUS, UNSPECIFIED SLE TYPE, UNSPECIFIED ORGAN INVOLVEMENT STATUS (H): ICD-10-CM

## 2024-02-11 DIAGNOSIS — Z79.899 HIGH RISK MEDICATIONS (NOT ANTICOAGULANTS) LONG-TERM USE: ICD-10-CM

## 2024-02-11 LAB
ALBUMIN MFR UR ELPH: 31.8 MG/DL
ALBUMIN SERPL BCG-MCNC: 4.3 G/DL (ref 3.5–5.2)
ALBUMIN UR-MCNC: 30 MG/DL
ALP SERPL-CCNC: 50 U/L (ref 40–150)
ALT SERPL W P-5'-P-CCNC: 24 U/L (ref 0–50)
ANION GAP SERPL CALCULATED.3IONS-SCNC: 8 MMOL/L (ref 7–15)
APPEARANCE UR: CLEAR
AST SERPL W P-5'-P-CCNC: 29 U/L (ref 0–45)
BACTERIA #/AREA URNS HPF: ABNORMAL /HPF
BASOPHILS # BLD AUTO: 0 10E3/UL (ref 0–0.2)
BASOPHILS NFR BLD AUTO: 1 %
BILIRUB SERPL-MCNC: 0.3 MG/DL
BILIRUB UR QL STRIP: NEGATIVE
BUN SERPL-MCNC: 13.5 MG/DL (ref 6–20)
CALCIUM SERPL-MCNC: 9.2 MG/DL (ref 8.6–10)
CHLORIDE SERPL-SCNC: 104 MMOL/L (ref 98–107)
COLOR UR AUTO: YELLOW
CREAT SERPL-MCNC: 0.75 MG/DL (ref 0.51–0.95)
CREAT UR-MCNC: 186 MG/DL
DEPRECATED HCO3 PLAS-SCNC: 29 MMOL/L (ref 22–29)
EGFRCR SERPLBLD CKD-EPI 2021: >90 ML/MIN/1.73M2
EOSINOPHIL # BLD AUTO: 0.1 10E3/UL (ref 0–0.7)
EOSINOPHIL NFR BLD AUTO: 2 %
ERYTHROCYTE [DISTWIDTH] IN BLOOD BY AUTOMATED COUNT: 11.4 % (ref 10–15)
GLUCOSE SERPL-MCNC: 102 MG/DL (ref 70–99)
GLUCOSE UR STRIP-MCNC: NEGATIVE MG/DL
HCT VFR BLD AUTO: 38.6 % (ref 35–47)
HGB BLD-MCNC: 13.4 G/DL (ref 11.7–15.7)
HGB UR QL STRIP: NEGATIVE
IMM GRANULOCYTES # BLD: 0 10E3/UL
IMM GRANULOCYTES NFR BLD: 0 %
KETONES UR STRIP-MCNC: NEGATIVE MG/DL
LEUKOCYTE ESTERASE UR QL STRIP: ABNORMAL
LYMPHOCYTES # BLD AUTO: 1.1 10E3/UL (ref 0.8–5.3)
LYMPHOCYTES NFR BLD AUTO: 28 %
MCH RBC QN AUTO: 32.7 PG (ref 26.5–33)
MCHC RBC AUTO-ENTMCNC: 34.7 G/DL (ref 31.5–36.5)
MCV RBC AUTO: 94 FL (ref 78–100)
MONOCYTES # BLD AUTO: 0.3 10E3/UL (ref 0–1.3)
MONOCYTES NFR BLD AUTO: 7 %
NEUTROPHILS # BLD AUTO: 2.4 10E3/UL (ref 1.6–8.3)
NEUTROPHILS NFR BLD AUTO: 63 %
NITRATE UR QL: NEGATIVE
PH UR STRIP: 5.5 [PH] (ref 5–7)
PLATELET # BLD AUTO: 278 10E3/UL (ref 150–450)
POTASSIUM SERPL-SCNC: 4 MMOL/L (ref 3.4–5.3)
PROT SERPL-MCNC: 6.3 G/DL (ref 6.4–8.3)
PROT/CREAT 24H UR: 0.17 MG/MG CR (ref 0–0.2)
RBC # BLD AUTO: 4.1 10E6/UL (ref 3.8–5.2)
RBC #/AREA URNS AUTO: ABNORMAL /HPF
SODIUM SERPL-SCNC: 141 MMOL/L (ref 135–145)
SP GR UR STRIP: >=1.03 (ref 1–1.03)
SQUAMOUS #/AREA URNS AUTO: ABNORMAL /LPF
TOTAL PROTEIN SERUM FOR ELP: 6.1 G/DL (ref 6.4–8.3)
UROBILINOGEN UR STRIP-ACNC: 0.2 E.U./DL
WBC # BLD AUTO: 3.8 10E3/UL (ref 4–11)
WBC #/AREA URNS AUTO: ABNORMAL /HPF

## 2024-02-11 PROCEDURE — 81001 URINALYSIS AUTO W/SCOPE: CPT

## 2024-02-11 PROCEDURE — 84165 PROTEIN E-PHORESIS SERUM: CPT | Performed by: PATHOLOGY

## 2024-02-11 PROCEDURE — 36415 COLL VENOUS BLD VENIPUNCTURE: CPT

## 2024-02-11 PROCEDURE — 84155 ASSAY OF PROTEIN SERUM: CPT | Mod: 59

## 2024-02-11 PROCEDURE — 80053 COMPREHEN METABOLIC PANEL: CPT

## 2024-02-11 PROCEDURE — 87086 URINE CULTURE/COLONY COUNT: CPT

## 2024-02-11 PROCEDURE — 84156 ASSAY OF PROTEIN URINE: CPT

## 2024-02-11 PROCEDURE — 85025 COMPLETE CBC W/AUTO DIFF WBC: CPT

## 2024-02-11 NOTE — TELEPHONE ENCOUNTER
PA Initiation    Medication: Benlysta-PA Pending  Insurance Company: [email or web address] - Phone [phone] Fax [phone]  Pharmacy Filling the Rx: [facility] - [place], [place] - [facility] [address]  Filling Pharmacy Phone:    Filling Pharmacy Fax:    Start Date: 4/19/2021      
Prior Authorization Approval    Authorization Effective Date: 3/31/2021  Authorization Expiration Date: 4/30/2022  Medication: Benlysta-PA Approved  Approved Dose/Quantity: 4/28  Reference #: [id number]   Insurance Company: [employer] - Phone [phone] Fax [phone]  Expected CoPay:       CoPay Card Available:      Foundation Assistance Needed:    Which Pharmacy is filling the prescription (Not needed for infusion/clinic administered): [facility] - [place], [place] - [facility] [address]  Pharmacy Notified:    Patient Notified:        
urinary symptoms

## 2024-02-12 LAB
ALBUMIN SERPL ELPH-MCNC: 4.1 G/DL (ref 3.7–5.1)
ALPHA1 GLOB SERPL ELPH-MCNC: 0.2 G/DL (ref 0.2–0.4)
ALPHA2 GLOB SERPL ELPH-MCNC: 0.5 G/DL (ref 0.5–0.9)
B-GLOBULIN SERPL ELPH-MCNC: 0.5 G/DL (ref 0.6–1)
BACTERIA UR CULT: NORMAL
GAMMA GLOB SERPL ELPH-MCNC: 0.8 G/DL (ref 0.7–1.6)
LOCATION OF TASK: ABNORMAL
M PROTEIN SERPL ELPH-MCNC: 0.2 G/DL
PROT PATTERN SERPL ELPH-IMP: ABNORMAL

## 2024-02-15 ENCOUNTER — MYC MEDICAL ADVICE (OUTPATIENT)
Dept: RHEUMATOLOGY | Facility: CLINIC | Age: 49
End: 2024-02-15
Payer: COMMERCIAL

## 2024-02-16 NOTE — TELEPHONE ENCOUNTER
RN: Please call to notify Ruma Enriquez that the white blood cell count is just below the normal range but the subsets otherwise blood cell count are okay.      This urinalysis suggests a possible urinary tract infection (UTI).  If having any pain or burning with urination, increased urinary frequency, foul smelling urine, fevers, chills, or other symptoms to suggest infection then evaluation with the primary care provider is required to determine appropriate treatment for this.  If there are no symptoms of a UTI then no further action is needed for this laboratory abnormality; because not all findings such as this represent an infection.      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  While this has not changed, I think that periodically she should follow-up with her oncologist to see if anything needs to change or if any additional workup is needed.    Bradford Colvin MD  2/16/2024 11:39 AM

## 2024-02-16 NOTE — TELEPHONE ENCOUNTER
Called and scheduled in person appt for Tuesday per provider recommendation.    Kandi HAIRSTON, Specialty RN 2/16/2024 2:53 PM

## 2024-02-16 NOTE — TELEPHONE ENCOUNTER
RN called and discussed provider notes on lab results. Patient did not report any UTI symptoms. Verbalized understanding.    Kandi HAIRSTON, Specialty RN 2/16/2024 12:37 PM

## 2024-02-20 ENCOUNTER — OFFICE VISIT (OUTPATIENT)
Dept: RHEUMATOLOGY | Facility: CLINIC | Age: 49
End: 2024-02-20
Payer: COMMERCIAL

## 2024-02-20 VITALS
HEART RATE: 87 BPM | BODY MASS INDEX: 21.95 KG/M2 | WEIGHT: 136 LBS | DIASTOLIC BLOOD PRESSURE: 90 MMHG | SYSTOLIC BLOOD PRESSURE: 134 MMHG | OXYGEN SATURATION: 99 %

## 2024-02-20 DIAGNOSIS — M32.9 SYSTEMIC LUPUS ERYTHEMATOSUS, UNSPECIFIED SLE TYPE, UNSPECIFIED ORGAN INVOLVEMENT STATUS (H): Primary | ICD-10-CM

## 2024-02-20 DIAGNOSIS — M19.90 INFLAMMATORY ARTHRITIS: ICD-10-CM

## 2024-02-20 DIAGNOSIS — M79.7 FIBROMYALGIA: ICD-10-CM

## 2024-02-20 PROCEDURE — 99214 OFFICE O/P EST MOD 30 MIN: CPT | Performed by: INTERNAL MEDICINE

## 2024-02-20 PROCEDURE — G2211 COMPLEX E/M VISIT ADD ON: HCPCS | Performed by: INTERNAL MEDICINE

## 2024-02-20 RX ORDER — PREDNISONE 5 MG/1
TABLET ORAL
Qty: 50 TABLET | Refills: 0 | Status: SHIPPED | OUTPATIENT
Start: 2024-02-20 | End: 2024-03-11

## 2024-02-20 NOTE — NURSING NOTE
RAPID3 (0-30) Cumulative Score  12.8          RAPID3 Weighted Score (divide #4 by 3 and that is the weighted score)  4.2

## 2024-02-20 NOTE — PROGRESS NOTES
Rheumatology Clinic Visit      Yovana Enriquez MRN# [id number]   YOB: 1975 Age: 48 year old      Date of visit: 2/20/24   PCP: Bradford Mario    Chief Complaint   Patient presents with:  RECHECK    Assessment and Plan     1. Systemic lupus erythematosus (YANELIS by EIA positive in the past, leukopenia/lymphocytopenia, hypocomplementemia, polyarthralgias, oral sores, history of malar rash, photophobia, photosensitivity, Raynaud's Phenomenon): Previously on methotrexate that was discontinued for unclear reasons but the patient reports that she tolerated it well. Currently on azathioprine 75 mg daily (dose reduced on 11/8/2018 from 100mg daily without worsening symptoms at that time, eventually reduced to 50 mg daily and did well for a period of time; now on 75 mg daily), hydroxychloroquine 300 mg daily [avg], and Benlysta (worsening symptoms when stopped in the past).  Previously was doing well with regard to lupus.  However, more inflammatory arthritis symptoms at the second and third MCPs and left second-third PIPs, possibly due to missing several days of prednisone 2.5 mg daily; treat with a course of prednisone as noted below.   Note that she has likely adrenal insufficiency and has done much better with staying on prednisone 2.5 mg daily.  Chronic illness, stable.    - Continue azathioprine 75 mg daily   - Continue hydroxychloroquine  200mg daily with additional 200mg every other day (toxicity monitoring eye exam last done on 8/5/2022 by Dr. Queen)  - Continue Benlysta 200 mg SQ every 7 days  - Start prednisone 20 mg daily x 5 days, then 15 mg daily x 5 days, then 10 mg daily x 5 days, then 5 mg daily x 5 days, then resume prednisone 2.5 mg daily baseline  - Continue Prednisone 2.5mg daily (trouble reducing below this point, likely adrenal insufficiency), after completing prednisone course noted above  - Continue photoprotection: Covering up and using sunscreen  - Labs in early May: CBC, CMP, ESR,  CRP, C3, C4, dsDNA, UA, Uprotein:creatinine    High risk medication requiring intensive toxicity monitoring at least quarterly.    # Prednisone Risks and Benefits: The risks and benefits of prednisone were discussed in detail and the patient verbalized understanding.  The risks discussed include, but are not limited to, weight gain, fluid retention, impaired wound healing, hyperglycemia, adrenal suppression, GI upset, peptic ulcer, hepatotoxicity, aseptic necrosis of the femoral and humeral heads, osteoporosis, myopathy, tendon rupture (particularly Achilles tendon), ocular changes including an increased intraocular pressure.  I encouraged reviewing the package insert and asking any questions about the medication.      2. Secondary Sjogren syndrome: Doing well with Evoxac and frequent sips of water, but then Evoxac became more expensive so pilocarpine was tried but not tolerated (GI upset).  Therefore, changed back to Evoxac and was doing well with BID dosing (GI upset when TID). Dry eyes well controled with artificial tears as needed.  Chronic illness  - Continue Evoxac 30 mg twice daily     3. Raynaud's Phenomenon: Amlodipine 5mg daily is effective.  Chronic illness, stable  - Continue amlodipine 5mg daily      4.  History of R>Ltrochanteric bursitis, possible iliotibial band syndrome as well:  Also with symptoms of iliotibial band syndrome and degenerative arthritis of the lumbar spine.  Symptoms improved with home exercises.  Doing well at this time.        5. Fibromyalgia: Currently managed by PCP.  More active fibromyalgia symptoms at this time.  She would like to is reestablished in the pain clinic  -Pain management referral    6. Osteoarthritis of multiple joints: Primarily affecting the DIPs right first CMC.  Advised hand therapy; declined by patient.    7. MGUS: SPEP advised to be done yearly per Dr. Yoselin Payan; she may follow-up with her PCP for this issue.  because of persistent abnormality on SPEP she is  going to be reevaluated by oncology.  Patient reports that she has scheduled an appointment with oncology.    8. Vaccinations: Vaccinations reviewed with Ms. Enriquez.    - Influenza: encouraged yearly vaccination  - [id number]: up to date  - [id number]: up to date  - COVID-19: advised keeping updated, and to hold azathioprine and benlysta for 1-2 weeks after COVID-19 vaccination      9. Elevated blood pressure:  Ruma to follow up with Primary Care provider regarding elevated blood pressure.     Total minutes spent in evaluation with patient, documentation, , and review of pertinent studies and chart notes: 22  The longitudinal plan of care for the rheumatology problem(s) were addressed during this visit.  Due to added complexity of care, we will continue to support the patient and the subsequent management of this condition with ongoing continuity of care.     Ms. Enriquez verbalized agreement with and understanding of the rational for the diagnosis and treatment plan.  All questions were answered to best of my ability and the patient's satisfaction. Ms. Enriquez was advised to contact the clinic with any questions that may arise after the clinic visit.      Thank you for involving me in the care of the patient    Return to clinic: Rose COPPOLA   Yovana Enriquez is a 48 year old female with medical history significant for subclinical hyperthyroidism, hypertension, irritable bowel syndrome, fibromyalgia, hypertension, non-celiac gluten sensitivity (reportedly with bowel biopsies and laboratory workup that did not show celiac disease), anxiety, and systemic lupus erythematosus who presents for follow-up of SLE.    12/8/2023: Currently doing well.  Had mild ache of her toes on 1 foot for a few days that has since resolved.  Raynaud's phenomenon is controlled with amlodipine.  No lower extremity edema.  Evoxac is effective for Sjogren's syndrome; worsening symptoms with any missed dose.  Azathioprine,  hydroxychloroquine, Benlysta, and prednisone are taken as prescribed.  Perimenopausal and occasionally feels extra warm but this tends to come and go.  Going to the gym now to maintain weight at around 140 pounds.    Today, 2/20/2024: Missed several days of prednisone 2.5 mg daily and felt increased pain, fatigue, and generally unwell while off prednisone; she missed prednisone 2.5 mg daily because she says she was just did not make time to  her refill.  Now back on prednisone 2.5 mg daily.  Pain at the MCPs, PIPs, DIPs, right first CMC.  Diffuse body pain.    Denies fevers, chills, nausea, vomiting. No abdominal pain. no chest pain/pressure, palpitations, or shortness of breath.  No rash currently. No LE swelling.  She has photosensitivity and photophobia.   No eye pain or redness.     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    ROS   12 point review of system was completed and negative except as noted in the HPI     Active Problem List     Patient Active Problem List   Diagnosis    Systemic lupus erythematosus (H)    Menorrhagia    History of ovarian cyst    Dysmenorrhea    History of gestational diabetes mellitus, not currently pregnant    Intramural leiomyoma of uterus    Hyperlipidemia LDL goal <130    Subclinical hyperthyroidism    Anxiety    High risk medication use    Fibromyalgia    Chronic constipation    Irritable bowel syndrome    Health Care Home    Hypertension goal BP (blood pressure) < 140/90    Non-celiac gluten sensitivity    Raynaud's phenomenon without gangrene    Sjogren's syndrome (H24)    Intramural and submucous leiomyoma of uterus    Adrenal insufficiency (H24)    Screening for malignant neoplasm of cervix     Past Medical History     Past Medical History:   Diagnosis Date    Arthritis     Chronic constipation 12/16/2013    Dysmenorrhea 10/1/2012    Fibromyalgia 11/15/2013    Health Care Home 3/19/2014    **See Letters for [facility] Care Plan: Emergency  Care Plan      High risk medication use 9/13/2013    History of gestational diabetes mellitus, not currently pregnant 10/1/2012    History of ovarian cyst 10/1/2012    Hyperlipidemia LDL goal <130 10/18/2012    Hypertension goal BP (blood pressure) < 140/90 1/19/2015    Intramural leiomyoma of uterus 10/5/2012    Irritable bowel syndrome 3/11/2014    Patient states no history colonoscopy      Menorrhagia 10/1/2012    Non-celiac gluten sensitivity 2/8/2016    PONV (postoperative nausea and vomiting)     Subclinical hyperthyroidism 1/17/2013    Systemic lupus erythematosus (H) 4/23/2012     Past Surgical History     Past Surgical History:   Procedure Laterality Date    ABDOMEN SURGERY  2004    Tubal ligation    COLONOSCOPY N/A 2/20/2015    Procedure: COMBINED COLONOSCOPY, SINGLE OR MULTIPLE BIOPSY/POLYPECTOMY BY BIOPSY;  Surgeon: Fred Cullen MD;  Location: [facility] OR    COLONOSCOPY N/A 10/29/2018    Procedure: COMBINED COLONOSCOPY, SINGLE OR MULTIPLE BIOPSY/POLYPECTOMY BY BIOPSY;  Surgeon: Inez Sawyer MD;  Location: [facility]    COLONOSCOPY WITH CO2 INSUFFLATION N/A 2/20/2015    Procedure: COLONOSCOPY WITH CO2 INSUFFLATION;  Surgeon: Fred Cullen MD;  Location: [facility] OR    ENT SURGERY  10-24-14    Bottom lip biopsies    ESOPHAGOSCOPY, GASTROSCOPY, DUODENOSCOPY (EGD), COMBINED N/A 2/20/2015    Procedure: COMBINED ESOPHAGOSCOPY, GASTROSCOPY, DUODENOSCOPY (EGD), BIOPSY SINGLE OR MULTIPLE;  Surgeon: Fred Cullen MD;  Location: [facility] OR    HC BREATH HYDROGEN TEST  12/31/2013    Procedure: HYDROGEN BREATH TEST;  Surgeon: Camden Almazan MD;  Location: [facility]    HC HYSTEROSCOPY, SURGICAL; W/ ENDOMETRIAL ABLATION, ANY METHOD  10-19-12    ORTHOPEDIC SURGERY  12/2010    SHOULDER SURGERY Right     R shoulder    TUBAL LIGATION  2004     Allergy     Allergies   Allergen Reactions    Fluticasone Propionate (Nasal) [Fluticasone] Anaphylaxis     Current Medication List     Current Outpatient  Medications   Medication Sig    azaTHIOprine (IMURAN) 50 MG tablet Take 1.5 tablets (75 mg) by mouth daily    Belimumab 200 MG/ML SOAJ Inject 200 mg Subcutaneous every 7 days . Hold for signs of infection and seek medical attention. Autoinjector    hydroxychloroquine (PLAQUENIL) 200 MG tablet Hydroxychloroquine 200mg daily; and an additional 200mg every other day. Yearly eye exam, including 10-2 VF and SD-OCT, is required    predniSONE (DELTASONE) 2.5 MG tablet Take 1 tablet (2.5 mg) by mouth daily    amLODIPine (NORVASC) 5 MG tablet Take 1 tablet (5 mg) by mouth daily    azelastine (ASTELIN) 0.1 % nasal spray Spray 1 spray into both nostrils 2 times daily    blood glucose test strip Used for testing glucose 2-3 times daily    Calcium Carb-Cholecalciferol (CALCIUM + D3) 600-200 MG-UNIT TABS Take 1 tablet by mouth daily    cevimeline (EVOXAC) 30 MG capsule Take 1 capsule (30 mg) by mouth 2 times daily    famotidine (PEPCID) 40 MG tablet TAKE 1 TABLET BY MOUTH NIGHTLY AS NEEDED FOR HEARTBURN    losartan (COZAAR) 25 MG tablet TAKE 1 TABLET BY MOUTH ONCE DAILY    nortriptyline (PAMELOR) 10 MG capsule TAKE 1 CAPSULE BY MOUTH AT BEDTIME    SUMAtriptan (IMITREX) 50 MG tablet Take 50 mg by mouth at onset of headache for migraine    Vitamin D, Cholecalciferol, 1000 units CAPS Take 4,000 Int'l Units by mouth daily     No current facility-administered medications for this visit.     Social History   See HPI    Family History     Family History   Problem Relation Age of Onset    Respiratory Maternal Grandfather     Cancer Maternal Grandfather     Other Cancer Maternal Grandfather         Skin cancer    Asthma Son     Circulatory Maternal Grandmother         Brain anurysm    Diabetes Maternal Grandmother     Hypertension Mother     Glaucoma Mother 50        drops    Cancer Mother     Hypertension Sister     Hypertension Sister     Diabetes Sister     Hypertension Sister     Anxiety Disorder Sister     Asthma Son     Diabetes No  "family hx of     Cerebrovascular Disease No family hx of     Thyroid Disease No family hx of     Macular Degeneration No family hx of        Physical Exam     Temp Readings from Last 3 Encounters:   08/07/23 97.4  F (36.3  C) (Temporal)   03/29/23 97.7  F (36.5  C) (Tympanic)   12/20/21 97.4  F (36.3  C) (Oral)     BP Readings from Last 5 Encounters:   02/20/24 (!) 134/90   08/07/23 120/80   03/29/23 (!) 162/94   08/15/22 131/87   06/24/22 134/82     Pulse Readings from Last 1 Encounters:   02/20/24 87     Resp Readings from Last 1 Encounters:   08/07/23 16     Estimated body mass index is 21.95 kg/m  as calculated from the following:    Height as of 6/24/22: 1.676 m (5' 6\").    Weight as of this encounter: 61.7 kg (136 lb).      GEN: NAD. Healthy appearing adult.   HEENT:  Anicteric, noninjected sclera. No obvious external lesions of the ear and nose. Hearing intact.  CV: S1, S2. RRR. No m/r/g  PULM: No increased work of breathing. CTA bilaterally   MSK: MCPs, PIPs, DIPs, wrists, elbows, shoulders, knees, ankles, and MTPs were diffusely tender to palpation.  Mild synovial swelling of the right and left second-third MCPs and the left second-third PIPs.  Small Heberden's nodes.  Squaring and tenderness palpation of the right first CMC joint.  All fibromyalgia tender points positive.  SKIN: No rash or jaundice seen  PSYCH: Alert. Appropriate.      Labs / Imaging (select studies)     YANELIS  Recent Labs   Lab Test 07/25/16  1433   ANAIGG <1:40  Reference range: <1:40  (Note)  <1:40  INTERPRETIVE INFORMATION: YANELIS by IFA, IgG  Anti-nuclear antibodies (YANELIS) are seen in a variety of  systemic rheumatic diseases and are determined by indirect  fluorescence assay (IFA) using HEp-2 substrate with an  IgG-specific conjugate. YANELIS titers less than or equal to  1:80 have variable relevance while titers greater than or  equal to 1:160 are considered clinically significant. These  antibodies may precede clinical disease onset; " however,  healthy individuals and those with advanced age have been  reported to be positive for YANELIS. When observed, one of the  five basic patterns is reported: homogeneous,  peripheral/rim, speckled, centromere, or nucleolar. If  cytoplasmic fluorescence is observed, it is noted. IFA  methodology is subjective and has occasionally been shown  to lack sensitivity for anti-SSA/Ro antibodies.  Negative results do not necessarily rule out the presence  of SSc. If clinical suspicion remains, consider further  te sting for U3-RNP, PM/Scl, or Th/To antibodies associated  with SSc.  Performed by [facility],  [address] [place] [id number] [phone]  [email or web address], Alcon Krishna MD, Lab. Director       RNP/Sm/SSA/SSB  Recent Labs   Lab Test 03/23/16  1759 01/04/16  1806   SMIGG <0.2  Negative   Antibody index (AI) values reflect qualitative changes in antibody   concentration that cannot be directly associated with clinical condition or   disease state.   <0.2  Negative   Antibody index (AI) values reflect qualitative changes in antibody   concentration that cannot be directly associated with clinical condition or   disease state.       dsDNA  Recent Labs   Lab Test 04/23/23  1305 01/14/23  0917 07/02/22  0905 04/03/22  1236 10/10/21  1202 07/19/21  1820 04/20/21  1804 01/11/21  1805 10/25/20  1059   DNA <0.6 0.6 <0.6 0.7 <0.6 <0.6 1 <1 1     C3/C4  Recent Labs   Lab Test 04/23/23  1305 07/02/22  0905 10/10/21  1202 07/19/21  1820 04/20/21  1804 01/11/21  1805 10/25/20  1059 04/07/20  1136 01/13/20  1804   O1HRHJR 93 98 89 91 97 91 103 100 85   K5KRPEE 22 22 17 20 20 20 >9* 16 21     IgG  Recent Labs   Lab Test 01/11/21  1805 10/25/20  1059 06/17/19  1813    <17*  --    IGA 93 <2* 82   * 345*  --      CBC  Recent Labs   Lab Test 02/11/24  1158 11/04/23  1043 07/23/23  1326 07/19/21  1820 06/13/21  1312 04/20/21  1804 01/11/21  1805   WBC 3.8* 6.7 5.5   < > 5.8 7.7 8.1   RBC 4.10 4.26 4.08   < >  4.19 4.22 4.00   HGB 13.4 13.7 13.2   < > 13.5 13.7 13.2   HCT 38.6 40.5 38.8   < > 40.6 40.0 38.7   MCV 94 95 95   < > 97 95 97   RDW 11.4 11.6 11.6   < > 11.6 11.5 11.5    292 308   < > 300 313 316   MCH 32.7 32.2 32.4   < > 32.2 32.5 33.0   MCHC 34.7 33.8 34.0   < > 33.3 34.3 34.1   NEUTROPHIL 63 74 82   < > 77.2 81.2 83.4   LYMPH 28 16 11   < > 13.7 10.8 8.8   MONOCYTE 7 8 6   < > 6.2 6.4 6.7   EOSINOPHIL 2 2 1   < > 1.7 0.8 0.6   BASOPHIL 1 1 1   < > 1.2 0.8 0.5   ANEU  --   --   --   --  4.5 6.3 6.7   ALYM  --   --   --   --  0.8 0.8 0.7*   ZOË  --   --   --   --  0.4 0.5 0.5   AEOS  --   --   --   --  0.1 0.1 0.1   ABAS  --   --   --   --  0.1 0.1 0.0   ANEUTAUTO 2.4 4.9 4.5   < >  --   --   --    ALYMPAUTO 1.1 1.1 0.6*   < >  --   --   --    AMONOAUTO 0.3 0.5 0.3   < >  --   --   --    AEOSAUTO 0.1 0.1 0.1   < >  --   --   --    ABSBASO 0.0 0.0 0.0   < >  --   --   --     < > = values in this interval not displayed.     CMP  Recent Labs   Lab Test 02/11/24  1158 11/04/23  1043 07/23/23  1326 04/23/23  1305 01/14/23  0917 10/16/22  1205 07/19/21  1820 04/20/21  1804 01/11/21  1805 10/25/20  1059 04/07/20  1136    140 139 140 142 143   < > 140 138 139 140   POTASSIUM 4.0 3.9 4.3 3.9 3.5 4.2   < > 4.1 3.9 4.1 3.6   CHLORIDE 104 103 105 108 107 109   < > 106 106 104 106   CO2 29 27 26 30 29 32   < > 33* 31 31 31   ANIONGAP 8 10 8 2* 6 2*   < > 1* 1* 4 3   * 80 118* 103* 108* 85   < > 97 99 86 104*   BUN 13.5 9.6 11.0 15 13 12   < > 13 15 11 16   CR 0.75 0.70 0.64 0.72 0.70 0.76   < > 0.76 0.77 0.73 0.71   GFRESTIMATED >90 >90 >90 >90 >90 >90   < > >90 >90 >90 >90   GFRESTBLACK  --   --   --   --   --   --   --  >90 >90 >90 >90   MELANIA 9.2 8.9 8.7 8.8 8.8 8.9   < > 9.0 8.8 9.1 9.1   BILITOTAL 0.3 0.4 0.2 0.3 0.4 0.3   < > 0.3 0.2 0.4 0.3   ALBUMIN 4.3 4.2 4.1 4.1 3.7 3.9   < > 4.1 4.0 4.4 4.3   PROTTOTAL 6.3* 6.4 6.3* 6.9 6.4* 6.7*   < > 6.8 6.5* 7.5 7.1   ALKPHOS 50 49 57 59 57 56   < > 57 55  62 54   AST 29 25 24 29 16 16   < > 17 14 22 13   ALT 24 18 14 38 32 22   < > 25 19 23 24    < > = values in this interval not displayed.     HgA1c  Recent Labs   Lab Test 05/30/19  0859   A1C 5.2     Calcium/VitaminD  Recent Labs   Lab Test 02/11/24  1158 11/04/23  1043 07/23/23  1326 10/25/20  1059 04/07/20  1136 07/17/19  1756 04/16/19  1759 01/30/18  1752 10/02/17  1814   MELANIA 9.2 8.9 8.7   < > 9.1   < > 8.4*   < > 8.7   VITDT  --   --   --   --  59  --  40  --  37    < > = values in this interval not displayed.     ESR/CRP  Recent Labs   Lab Test 11/04/23  1043 07/23/23  1326 04/23/23  1305 01/14/23  0917 10/16/22  1205   SED 4 4 5 6 5   CRP  --   --  <2.9 <2.9 <2.9   CRPI <3.00 <3.00  --   --   --      CK/Aldolase  Recent Labs   Lab Test 10/10/21  1202 07/19/21  1820 04/20/21  1804   CKT 87 105 90     TSH/T4  Recent Labs   Lab Test 11/04/23  1043 04/03/22  1236 04/07/20  1136 04/10/19  1808 10/18/17  1445 10/07/16  0714   TSH 0.76 0.59 0.44   < > 0.33* 0.37*   T4  --   --   --   --  1.24 1.16    < > = values in this interval not displayed.     Lipid Panel  Recent Labs   Lab Test 11/04/23  1043 04/03/22  1236 01/26/19  0916   CHOL 154 177 133   TRIG 75 132 64   HDL 77 80 68   LDL 62 71 52   NHDL 77 97 65     Hepatitis B  Recent Labs   Lab Test 03/29/16  1417   HEPBANG Nonreactive     Hepatitis C  Recent Labs   Lab Test 03/29/16  1417   HCVAB Nonreactive   Assay performance characteristics have not been established for newborns,   infants, and children       Tuberculosis Screening  Recent Labs   Lab Test 01/08/22  1156 07/03/17  1447 03/29/16  1417   TBRES Negative  --   --    TBRSLT  --  Negative Negative   TBAGN  --  0.00 0.00     HIV Screening  Recent Labs   Lab Test 04/10/19  1808   HIAGAB Nonreactive     UA  Recent Labs   Lab Test 02/11/24  1211 11/04/23  1140 07/23/23  1326 04/23/23  1305 01/14/23  1124 10/16/22  1205 10/10/21  1204 09/01/21  1522 04/20/21  1819 01/11/21  1810 10/25/20  1110 09/15/20  1655  07/03/20  1010 04/07/20  1140   COLOR Yellow Yellow Yellow Yellow   < > Yellow   < > Yellow   < > Yellow Yellow Yellow   < > Yellow   APPEARANCE Clear Clear Clear Clear   < > Clear   < > Slightly Cloudy*   < > Clear Clear Clear   < > Clear   URINEGLC Negative Negative Negative Negative   < > Negative   < > Negative   < > Negative Negative Negative   < > Negative   URINEBILI Negative Negative Negative Negative   < > Negative   < > Negative   < > Negative Negative Negative   < > Negative   SG >=1.030 1.010 1.015 <=1.005   < > 1.020   < > 1.015   < > >1.030 1.010 1.025   < > >1.030   URINEPH 5.5 7.0 6.0 6.5   < > 6.0   < > 7.0   < > 6.0 7.0 6.0   < > 6.5   PROTEIN 30* Negative Negative Negative   < > Negative   < > Trace*   < > Negative Negative Negative   < > Negative   UROBILINOGEN 0.2 0.2 0.2 0.2   < > 0.2   < > 0.2   < > 0.2 0.2 0.2   < > 0.2   NITRITE Negative Negative Negative Negative   < > Negative   < > Negative   < > Negative Negative Negative   < > Negative   UBLD Negative Negative Negative Negative   < > Negative   < > Moderate*   < > Small* Negative Negative   < > Negative   LEUKEST Moderate* Negative Negative Trace*   < > Trace*   < > Moderate*   < > Negative Trace* Negative   < > Trace*   WBCU 25-50*  --   --  0-5  --  0-5   < > 25-50*   < > 0 - 5 0 - 5 5-10*   < > 0 - 5   RBCU 0-2  --   --  None Seen  --  0-2   < > 25-50*   < > O - 2 O - 2 O - 2   < > O - 2   SQUAMOUSEPI  --   --   --   --   --   --   --   --   --  Few Few Few   < > Few   BACTERIA Many*  --   --   --   --   --   --  Few*  --  Few* Few* Few*   < > Few*   MUCOUS  --   --   --   --   --   --   --   --   --  Present*  --  Present*  --  Present*    < > = values in this interval not displayed.     Urine Microscopic  Recent Labs   Lab Test 02/11/24  1211 04/23/23  1305 10/16/22  1205 10/10/21  1204 09/01/21  1522 06/13/21  1309 01/11/21  1810 10/25/20  1110 09/15/20  1655 07/03/20  1010 04/07/20  1140   WBCU 25-50* 0-5 0-5   < > 25-50*   < > 0  - 5 0 - 5 5-10*   < > 0 - 5   RBCU 0-2 None Seen 0-2   < > 25-50*   < > O - 2 O - 2 O - 2   < > O - 2   SQUAMOUSEPI  --   --   --   --   --   --  Few Few Few   < > Few   BACTERIA Many*  --   --   --  Few*  --  Few* Few* Few*   < > Few*   MUCOUS  --   --   --   --   --   --  Present*  --  Present*  --  Present*    < > = values in this interval not displayed.     Urine Protein  GHUTP and UTP= Urine protein (random), GHUTPG and UTPG = urine protein:creatinine ratio (random), UCRR = urine creatinine (random)  Recent Labs   Lab Test 02/11/24  1210 11/04/23  1140 07/23/23  1326 04/23/23  1305 01/14/23  1124 07/02/22  0905 04/10/22  1232 01/08/22  1156 10/10/21  1202   GHUTP 31.8 <6.0 9.2   < > 14.7*   < >  --   --   --    UTP  --   --   --   --   --   --  0.09 0.07 0.06   GHUTPG 0.17  --  0.13  --  0.15   < >  --   --   --    UTPG  --   --   --   --   --   --  0.11 0.09 0.12   UCRR 186.0 46.9 69.7   < > 98.2   < > 83 81 52    < > = values in this interval not displayed.     Immunization History     Immunization History   Administered Date(s) Administered    COVID-19 MONOVALENT 12+ (Pfizer) 03/13/2021, 04/03/2021    Influenza (H1N1) 12/02/2009    Influenza (IIV3) PF 02/08/2007, 09/17/2010, 10/01/2012, 09/23/2013    Influenza Vaccine >6 months,quad, PF 09/23/2013, 09/20/2016, 10/30/2017, 10/14/2019    Influenza Vaccine, 6+MO IM (QUADRIVALENT W/PRESERVATIVES) 08/11/2018    Pneumo Conj 13-V (2010&after) 04/25/2016    Pneumococcal 23 valent 10/01/2012, 11/06/2017    TDAP (Adacel,Boostrix) 12/17/2010, 06/24/2022    Td (Adult), Adsorbed 02/12/2001            Chart documentation done in part with Dragon Voice recognition Software. Although reviewed after completion, some word and grammatical error may remain.    Bradford Colvin MD

## 2024-03-05 ENCOUNTER — OFFICE VISIT (OUTPATIENT)
Dept: FAMILY MEDICINE | Facility: CLINIC | Age: 49
End: 2024-03-05
Payer: COMMERCIAL

## 2024-03-05 VITALS
BODY MASS INDEX: 22.18 KG/M2 | DIASTOLIC BLOOD PRESSURE: 80 MMHG | RESPIRATION RATE: 12 BRPM | WEIGHT: 138 LBS | TEMPERATURE: 98 F | HEIGHT: 66 IN | SYSTOLIC BLOOD PRESSURE: 118 MMHG | HEART RATE: 79 BPM | OXYGEN SATURATION: 98 %

## 2024-03-05 DIAGNOSIS — Z12.31 ENCOUNTER FOR SCREENING MAMMOGRAM FOR BREAST CANCER: ICD-10-CM

## 2024-03-05 DIAGNOSIS — Z00.00 ROUTINE GENERAL MEDICAL EXAMINATION AT A HEALTH CARE FACILITY: Primary | ICD-10-CM

## 2024-03-05 DIAGNOSIS — R35.0 URINARY FREQUENCY: ICD-10-CM

## 2024-03-05 DIAGNOSIS — Z12.11 COLON CANCER SCREENING: ICD-10-CM

## 2024-03-05 DIAGNOSIS — Z79.52 CURRENT CHRONIC USE OF SYSTEMIC STEROIDS: ICD-10-CM

## 2024-03-05 DIAGNOSIS — B37.31 YEAST INFECTION OF THE VAGINA: ICD-10-CM

## 2024-03-05 DIAGNOSIS — E05.90 SUBCLINICAL HYPERTHYROIDISM: ICD-10-CM

## 2024-03-05 LAB
CLUE CELLS: ABNORMAL
HBA1C MFR BLD: 5.6 % (ref 0–5.6)
TRICHOMONAS, WET PREP: ABNORMAL
WBC'S/HIGH POWER FIELD, WET PREP: ABNORMAL
YEAST, WET PREP: PRESENT

## 2024-03-05 PROCEDURE — 84443 ASSAY THYROID STIM HORMONE: CPT | Performed by: PHYSICIAN ASSISTANT

## 2024-03-05 PROCEDURE — 83036 HEMOGLOBIN GLYCOSYLATED A1C: CPT | Performed by: PHYSICIAN ASSISTANT

## 2024-03-05 PROCEDURE — 99213 OFFICE O/P EST LOW 20 MIN: CPT | Mod: 25 | Performed by: PHYSICIAN ASSISTANT

## 2024-03-05 PROCEDURE — 87210 SMEAR WET MOUNT SALINE/INK: CPT | Performed by: PHYSICIAN ASSISTANT

## 2024-03-05 PROCEDURE — 99396 PREV VISIT EST AGE 40-64: CPT | Performed by: PHYSICIAN ASSISTANT

## 2024-03-05 PROCEDURE — 36415 COLL VENOUS BLD VENIPUNCTURE: CPT | Performed by: PHYSICIAN ASSISTANT

## 2024-03-05 SDOH — HEALTH STABILITY: PHYSICAL HEALTH: ON AVERAGE, HOW MANY MINUTES DO YOU ENGAGE IN EXERCISE AT THIS LEVEL?: 40 MIN

## 2024-03-05 SDOH — HEALTH STABILITY: PHYSICAL HEALTH: ON AVERAGE, HOW MANY DAYS PER WEEK DO YOU ENGAGE IN MODERATE TO STRENUOUS EXERCISE (LIKE A BRISK WALK)?: 5 DAYS

## 2024-03-05 ASSESSMENT — PAIN SCALES - GENERAL: PAINLEVEL: NO PAIN (1)

## 2024-03-05 ASSESSMENT — SOCIAL DETERMINANTS OF HEALTH (SDOH): HOW OFTEN DO YOU GET TOGETHER WITH FRIENDS OR RELATIVES?: ONCE A WEEK

## 2024-03-05 NOTE — PROGRESS NOTES
Preventive Care Visit  [facility]  TY CHONG PA-C, Physician Assistant - Medical  Mar 5, 2024    Assessment & Plan     Routine general medical examination at a health care facility  Healthy  Continue eye and dental care  Continue exercise as able  Discussed shingles vaccine. Enc patient to get this.   Ordered appropriate preventive testing and labs. See results documentation for follow up.     F/U annually for wellness exam and in the interim as needed for problem visits.    Encounter for screening mammogram for breast cancer  Ordered for September - MA Screen Bilateral w/[place]; Future    Colon cancer screening  - Colonoscopy Screening  Referral; Future    Current chronic use of systemic steroids  Systemic steroids long term  - DX Hip/Pelvis/Spine; Future  - Hemoglobin A1c; Future  - Hemoglobin A1c    Urinary frequency  - Wet prep - Clinic Collect  Positive for yeast. Pt on medications that can induce prolonged Qt if taken with fluconazole. Therefore will send rx for miconzaole intravaginally.     Subclinical hyperthyroidism  - TSH with free T4 reflex; Future  - TSH with free T4 reflex      Counseling  Appropriate preventive services were discussed with this patient, including applicable screening as appropriate for fall prevention, nutrition, physical activity, Tobacco-use cessation, weight loss and cognition.  Checklist reviewing preventive services available has been given to the patient.  Reviewed patient's diet, addressing concerns and/or questions.   She is at risk for psychosocial distress and has been provided with information to reduce risk.     Hermilo Hendrix is a 48 year old, presenting for the following:  Physical        3/5/2024     5:07 PM   Additional Questions   Roomed by Michelle   Accompanied by self         3/5/2024     5:07 PM   Patient Reported Additional Medications   Patient reports taking the following new medications n/a        Health Care  Directive  Patient does not have a Health Care Directive or Living Will      Well Women Physical Exam:    LMP: Had ablation, spotting occasionally  Fertility history: 4 kids  Birth Control: tubal  STD Screening: No conerns    PAP/Cervical Cancer Screening: not due  Mammogram: due in September  Colon Cancer Screenin, polyp removed. Due now.   DXA: daily steroids  Immunizations: Discussed shingles. Declined CoVID    Smoker: None  Interested in Smoking Cessation: N/A  Alcohol Use: None    Diet: All food groups.   Exercise: Yes  Supplements: Takes calcium and vitamin D  Cholesterol Screening: Done in November  Diabetes Screening: Done    Depression and Anxiety Screening: Perimenopausal mood changes.     Other Concerns: Perimenopausal symptoms. Look up venlafaxine, sertraline and Viozah  No other concerns              3/5/2024   General Health   How would you rate your overall physical health? Good   Feel stress (tense, anxious, or unable to sleep) Only a little   (!) STRESS CONCERN      3/5/2024   Nutrition   Three or more servings of calcium each day? Yes   Diet: Regular (no restrictions)   How many servings of fruit and vegetables per day? (!) 2-3   How many sweetened beverages each day? 0-1         3/5/2024   Exercise   Days per week of moderate/strenous exercise 5 days   Average minutes spent exercising at this level 40 min         3/5/2024   Social Factors   Frequency of gathering with friends or relatives Once a week   Worry food won't last until get money to buy more No   Food not last or not have enough money for food? No   Do you have housing?  Yes   Are you worried about losing your housing? No   Lack of transportation? No   Unable to get utilities (heat,electricity)? No         3/5/2024   Fall Risk   Fallen 2 or more times in the past year? No   Trouble with walking or balance? No          3/5/2024   Dental   Dentist two times every year? Yes            Today's PHQ-2 Score:       3/5/2024     5:04 PM    PHQ-2 ( 1999 [employer])   Q1: Little interest or pleasure in doing things 0   Q2: Feeling down, depressed or hopeless 0   PHQ-2 Score 0   Q1: Little interest or pleasure in doing things Not at all   Q2: Feeling down, depressed or hopeless Not at all   PHQ-2 Score 0           3/5/2024   Substance Use   Alcohol more than 3/day or more than 7/wk Not Applicable   Do you use any other substances recreationally? No     Social History     Tobacco Use    Smoking status: Never    Smokeless tobacco: Never    Tobacco comments:     Lives in smoke free household   Vaping Use    Vaping Use: Never used   Substance Use Topics    Alcohol use: No     Alcohol/week: 0.0 standard drinks of alcohol    Drug use: No             3/5/2024   Breast Cancer Screening   Family history of breast, colon, or ovarian cancer? No / Unknown         9/9/2023   LAST FHS-7 RESULTS   1st degree relative breast or ovarian cancer No   Any relative bilateral breast cancer No   Any male have breast cancer No   Any ONE woman have BOTH breast AND ovarian cancer No   Any woman with breast cancer before 50yrs No   2 or more relatives with breast AND/OR ovarian cancer No   2 or more relatives with breast AND/OR bowel cancer No        Mammogram Screening - Mammogram every 1-2 years updated in Health Maintenance based on mutual decision making        3/5/2024   STI Screening   New sexual partner(s) since last STI/HIV test? No     History of abnormal Pap smear: NO - age 30-65 PAP every 5 years with negative HPV co-testing recommended        Latest Ref Rng & Units 6/24/2022     9:12 AM 9/20/2016     6:20 PM 9/20/2016    12:00 AM   PAP / HPV   PAP  Negative for Intraepithelial Lesion or Malignancy (NILM)      PAP (Historical)    NIL    HPV 16 DNA Negative Negative  Negative     HPV 18 DNA Negative Negative  Negative     Other HR HPV Negative Negative  Negative       ASCVD Risk   The 10-year ASCVD risk score (Richy DEVLIN, et al., 2019) is: 0.7%    Values used to  calculate the score:      Age: 48 years      Sex: Female      Is Non- : No      Diabetic: No      Tobacco smoker: No      Systolic Blood Pressure: 118 mmHg      Is BP treated: Yes      HDL Cholesterol: 77 mg/dL      Total Cholesterol: 154 mg/dL    Fracture Risk Assessment Tool  Link to Frax Calculator  Use the information below to complete the Frax calculator  : 1975  Sex: female  Weight (kg): 62.6 kg (actual weight)  Height (cm): 166.4 cm  Previous Fragility Fracture:  No  History of parent with fractured hip:  No  Current Smoking:  No  Patient has been on glucocorticoids for more than 3 months (5mg/day or more): Yes  Rheumatoid Arthritis on Problem List:  No  Secondary Osteoporosis on Problem List:  No  Consumes 3 or more units of alcohol per day: No  Femoral Neck BMD (g/cm2)           Reviewed and updated as needed this visit by Provider                    Past Medical History:   Diagnosis Date    Arthritis     Chronic constipation 2013    Dysmenorrhea 10/1/2012    Fibromyalgia 11/15/2013    Health Care Home 3/19/2014    **See Letters for H Care Plan: Emergency Care Plan      High risk medication use 2013    History of gestational diabetes mellitus, not currently pregnant 10/1/2012    History of ovarian cyst 10/1/2012    Hyperlipidemia LDL goal <130 10/18/2012    Hypertension goal BP (blood pressure) < 140/90 2015    Intramural leiomyoma of uterus 10/5/2012    Irritable bowel syndrome 3/11/2014    Patient states no history colonoscopy      Menorrhagia 10/1/2012    Non-celiac gluten sensitivity 2016    PONV (postoperative nausea and vomiting)     Subclinical hyperthyroidism 2013    Systemic lupus erythematosus (H) 2012     Past Surgical History:   Procedure Laterality Date    ABDOMEN SURGERY  2004    Tubal ligation    COLONOSCOPY N/A 2015    Procedure: COMBINED COLONOSCOPY, SINGLE OR MULTIPLE BIOPSY/POLYPECTOMY BY BIOPSY;  Surgeon: Fred Cullen  "MD Dante;  Location: [facility] OR    COLONOSCOPY N/A 10/29/2018    Procedure: COMBINED COLONOSCOPY, SINGLE OR MULTIPLE BIOPSY/POLYPECTOMY BY BIOPSY;  Surgeon: Inez Sawyer MD;  Location: [facility]    COLONOSCOPY WITH CO2 INSUFFLATION N/A 2015    Procedure: COLONOSCOPY WITH CO2 INSUFFLATION;  Surgeon: Fred Cullen MD;  Location: [facility] OR    ENT SURGERY  10-24-14    Bottom lip biopsies    ESOPHAGOSCOPY, GASTROSCOPY, DUODENOSCOPY (EGD), COMBINED N/A 2015    Procedure: COMBINED ESOPHAGOSCOPY, GASTROSCOPY, DUODENOSCOPY (EGD), BIOPSY SINGLE OR MULTIPLE;  Surgeon: Fred Cullen MD;  Location: [facility] OR    HC BREATH HYDROGEN TEST  2013    Procedure: HYDROGEN BREATH TEST;  Surgeon: Camden Almazan MD;  Location: [facility] GI    HC HYSTEROSCOPY, SURGICAL; W/ ENDOMETRIAL ABLATION, ANY METHOD  10-19-12    ORTHOPEDIC SURGERY  2010    SHOULDER SURGERY Right     R shoulder    TUBAL LIGATION       OB History    Para Term  AB Living   5 4 3 1 1 4   SAB IAB Ectopic Multiple Live Births   1 0 0 0 4      # Outcome Date GA Lbr Cristian/2nd Weight Sex Delivery Anes PTL Lv   5 Term 04 37w0d   M    ANGELA      Birth Comments: HTN, GDM   4  99 36w0d   M    ANGELA      Birth Comments: HTN   3 Term 97 37w0d   F    ANGELA      Birth Comments: HTN   2 Term 96 38w0d   F    ANGELA      Birth Comments: HTN   1 SAB                  Review of Systems  Constitutional, HEENT, cardiovascular, pulmonary, GI, , musculoskeletal, neuro, skin, endocrine and psych systems are negative, except as otherwise noted.     Objective    Exam  /80   Pulse 79   Temp 98  F (36.7  C) (Tympanic)   Resp 12   Ht 1.664 m (5' 5.5\")   Wt 62.6 kg (138 lb)   SpO2 98%   BMI 22.62 kg/m     Estimated body mass index is 22.62 kg/m  as calculated from the following:    Height as of this encounter: 1.664 m (5' 5.5\").    Weight as of this encounter: 62.6 kg (138 lb).    Physical " Exam  Vitals reviewed.   Constitutional:       Appearance: Normal appearance. She is well-developed.   HENT:      Head: Normocephalic and atraumatic.      Jaw: No trismus.      Right Ear: Tympanic membrane, ear canal and external ear normal.      Left Ear: Tympanic membrane, ear canal and external ear normal.      Nose: Nose normal. No rhinorrhea.      Mouth/Throat:      Mouth: Mucous membranes are moist.      Dentition: Normal dentition.      Tongue: No lesions. Tongue does not deviate from midline.      Pharynx: Oropharynx is clear. Uvula midline.      Tonsils: No tonsillar exudate. 2+ on the right. 2+ on the left.   Eyes:      General: Lids are normal. No allergic shiner.     Extraocular Movements: Extraocular movements intact.      Conjunctiva/sclera: Conjunctivae normal.   Neck:      Thyroid: No thyroid mass, thyromegaly or thyroid tenderness.      Trachea: Trachea normal.   Cardiovascular:      Rate and Rhythm: Normal rate and regular rhythm.      Pulses:           Posterior tibial pulses are 2+ on the right side and 2+ on the left side.      Heart sounds: Normal heart sounds. No murmur heard.  Pulmonary:      Effort: Pulmonary effort is normal.      Breath sounds: Normal breath sounds.   Abdominal:      General: Abdomen is flat. Bowel sounds are normal.      Palpations: Abdomen is soft.      Tenderness: There is no abdominal tenderness.      Hernia: No hernia is present.   Musculoskeletal:      Cervical back: Normal range of motion.      Right lower leg: No edema.      Left lower leg: No edema.      Comments: Ambulatory without assistive device  Limbs with grossly normal AROM   Lymphadenopathy:      Head:      Right side of head: No submental, submandibular, tonsillar, preauricular or posterior auricular adenopathy.      Left side of head: No submental, submandibular, tonsillar, preauricular or posterior auricular adenopathy.      Cervical: No cervical adenopathy.      Upper Body:      Right upper body: No  supraclavicular adenopathy.      Left upper body: No supraclavicular adenopathy.   Skin:     General: Skin is warm and dry.      Capillary Refill: Capillary refill takes 2 to 3 seconds.      Findings: No lesion, rash or wound.   Neurological:      General: No focal deficit present.      Mental Status: She is alert.      Motor: Motor function is intact.      Coordination: Coordination is intact.      Gait: Gait is intact.      Deep Tendon Reflexes:      Reflex Scores:       Patellar reflexes are 2+ on the right side and 2+ on the left side.     Comments: CN II-XII grossly intact   Psychiatric:         Attention and Perception: Attention normal.         Mood and Affect: Mood normal.         Behavior: Behavior is cooperative.         Thought Content: Thought content normal.       Signed Electronically by: TY CHONG PA-C

## 2024-03-05 NOTE — PATIENT INSTRUCTIONS
Preventive Care Advice   This is general advice given by our system to help you stay healthy. However, your care team may have specific advice just for you. Please talk to your care team about your preventive care needs.  Nutrition  Eat 5 or more servings of fruits and vegetables each day.  Try wheat bread, brown rice and whole grain pasta (instead of white bread, rice, and pasta).  Get enough calcium and vitamin D. Check the label on foods and aim for 100% of the RDA (recommended daily allowance).  Lifestyle  Exercise at least 150 minutes each week   (30 minutes a day, 5 days a week).  Do muscle strengthening activities 2 days a week. These help control your weight and prevent disease.  No smoking.  Wear sunscreen to prevent skin cancer.  Have a dental exam and cleaning every 6 months.  Yearly exams  See your health care team every year to talk about:  Any changes in your health.  Any medicines your care team has prescribed.  Preventive care, family planning, and ways to prevent chronic diseases.  Shots (vaccines)   HPV shots (up to age 26), if you've never had them before.  Hepatitis B shots (up to age 59), if you've never had them before.  COVID-19 shot: Get this shot when it's due.  Flu shot: Get a flu shot every year.  Tetanus shot: Get a tetanus shot every 10 years.  Pneumococcal, hepatitis A, and RSV shots: Ask your care team if you need these based on your risk.  Shingles shot (for age 50 and up).  General health tests  Diabetes screening:  Starting at age 35, Get screened for diabetes at least every 3 years.  If you are younger than age 35, ask your care team if you should be screened for diabetes.  Cholesterol test: At age 39, start having a cholesterol test every 5 years, or more often if advised.  Bone density scan (DEXA): At age 50, ask your care team if you should have this scan for osteoporosis (brittle bones).  Hepatitis C: Get tested at least once in your life.  STIs (sexually transmitted  infections)  Before age 24: Ask your care team if you should be screened for STIs.  After age 24: Get screened for STIs if you're at risk. You are at risk for STIs (including HIV) if:  You are sexually active with more than one person.  You don't use condoms every time.  You or a partner was diagnosed with a sexually transmitted infection.  If you are at risk for HIV, ask about PrEP medicine to prevent HIV.  Get tested for HIV at least once in your life, whether you are at risk for HIV or not.  Cancer screening tests  Cervical cancer screening: If you have a cervix, begin getting regular cervical cancer screening tests at age 21. Most people who have regular screenings with normal results can stop after age 65. Talk about this with your provider.  Breast cancer scan (mammogram): If you've ever had breasts, begin having regular mammograms starting at age 40. This is a scan to check for breast cancer.  Colon cancer screening: It is important to start screening for colon cancer at age 45.  Have a colonoscopy test every 10 years (or more often if you're at risk) Or, ask your provider about stool tests like a FIT test every year or Cologuard test every 3 years.  To learn more about your testing options, visit: https://www.skedge.me/811721.pdf.  For help making a decision, visit: https://bit.ly/pr51519.  Prostate cancer screening test: If you have a prostate and are age 55 to 69, ask your provider if you would benefit from a yearly prostate cancer screening test.  Lung cancer screening: If you are a current or former smoker age 50 to 80, ask your care team if ongoing lung cancer screenings are right for you.  For informational purposes only. Not to replace the advice of your health care provider. Copyright   2023 [employer]. All rights reserved. Clinically reviewed by the [facility] Transitions Program. [employer] 707907 - REV 01/24.

## 2024-03-07 DIAGNOSIS — N95.1 PERIMENOPAUSAL SYMPTOMS: Primary | ICD-10-CM

## 2024-03-07 LAB — TSH SERPL DL<=0.005 MIU/L-ACNC: 0.36 UIU/ML (ref 0.3–4.2)

## 2024-03-15 ENCOUNTER — TELEPHONE (OUTPATIENT)
Dept: FAMILY MEDICINE | Facility: CLINIC | Age: 49
End: 2024-03-15

## 2024-03-15 NOTE — TELEPHONE ENCOUNTER
Patient copay for fezolinetant is $628,29 for 90 days supply. We have made several attempts to reach patient, but have not had a response.  We will profile the prescription for now.  Thank you  Nicolle Calloway, [facility]  [address] [place] [address]  [place] [address]  [phone]

## 2024-03-22 ENCOUNTER — TRANSFERRED RECORDS (OUTPATIENT)
Dept: HEALTH INFORMATION MANAGEMENT | Facility: CLINIC | Age: 49
End: 2024-03-22

## 2024-03-29 ENCOUNTER — ANCILLARY PROCEDURE (OUTPATIENT)
Dept: BONE DENSITY | Facility: CLINIC | Age: 49
End: 2024-03-29
Attending: PHYSICIAN ASSISTANT
Payer: COMMERCIAL

## 2024-03-29 DIAGNOSIS — Z79.52 CURRENT CHRONIC USE OF SYSTEMIC STEROIDS: ICD-10-CM

## 2024-03-29 PROCEDURE — 77080 DXA BONE DENSITY AXIAL: CPT | Mod: TC | Performed by: PHYSICIAN ASSISTANT

## 2024-04-13 ENCOUNTER — OFFICE VISIT (OUTPATIENT)
Dept: URGENT CARE | Facility: URGENT CARE | Age: 49
End: 2024-04-13
Payer: COMMERCIAL

## 2024-04-13 VITALS
BODY MASS INDEX: 23.04 KG/M2 | WEIGHT: 140.6 LBS | DIASTOLIC BLOOD PRESSURE: 90 MMHG | SYSTOLIC BLOOD PRESSURE: 142 MMHG | HEART RATE: 76 BPM | OXYGEN SATURATION: 100 % | TEMPERATURE: 98.1 F

## 2024-04-13 DIAGNOSIS — S20.362A TICK BITE OF LEFT FRONT WALL OF THORAX, INITIAL ENCOUNTER: Primary | ICD-10-CM
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
## 2024-04-13 PROCEDURE — 99213 OFFICE O/P EST LOW 20 MIN: CPT | Performed by: FAMILY MEDICINE

## 2024-04-13 RX ORDER — DOXYCYCLINE 100 MG/1
100 CAPSULE ORAL 2 TIMES DAILY
Qty: 20 CAPSULE | Refills: 0 | Status: SHIPPED | OUTPATIENT
Start: 2024-04-13 | End: 2024-04-23

## 2024-04-13 ASSESSMENT — PAIN SCALES - GENERAL: PAINLEVEL: MILD PAIN (2)

## 2024-04-13 NOTE — PROGRESS NOTES
Yovana Enriquez is a 48 year old female who comes in today for tick  Noticed it a few hours ago    Patient had  pull it out with tweezers    Maybe head still in there    Hurts in the location    [place]ing last night is likely source    Full physical not done     Mentation and affect are fine    No tremor of speech or extremity    Patient in no distress    On left anterior chest wall patient has very subtle short dark line present.  No palpable abnormality.  No surrounding redness/ swelling.      No drainage.    No cellulitis.    She brought in the tick which was mostly intact. Unclear if the entire organism present but certainly almost     ASSESSMENT / PLAN:  (S20.362A,  W57.XXXA) Tick bite of left front wall of thorax, initial encounter  (primary encounter diagnosis)  Comment: discussed in detail.  Digging in skin here for any residual tick fragment would not likely provide any benefit and would likely damage skin/ lead  to  more scarring.  Thus observe.  Did print prescription.  Patient to fill if any further symptoms arise  tick was not on for that long and was not real deep. She agreed with plan.  Follow up prn.    Plan: doxycycline hyclate (VIBRAMYCIN) 100 MG capsule               I reviewed the patient's medications, allergies, medical history, family history, and social history.    Jonas Eaton MD

## 2024-05-05 ENCOUNTER — LAB (OUTPATIENT)
Dept: LAB | Facility: CLINIC | Age: 49
End: 2024-05-05
Payer: COMMERCIAL

## 2024-05-05 DIAGNOSIS — M32.9 SYSTEMIC LUPUS ERYTHEMATOSUS, UNSPECIFIED SLE TYPE, UNSPECIFIED ORGAN INVOLVEMENT STATUS (H): ICD-10-CM
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
## 2024-05-05 DIAGNOSIS — Z79.899 HIGH RISK MEDICATIONS (NOT ANTICOAGULANTS) LONG-TERM USE: ICD-10-CM

## 2024-05-05 LAB
ALBUMIN MFR UR ELPH: <6 MG/DL
ALBUMIN SERPL BCG-MCNC: 4.9 G/DL (ref 3.5–5.2)
ALBUMIN UR-MCNC: NEGATIVE MG/DL
ALP SERPL-CCNC: 57 U/L (ref 40–150)
ALT SERPL W P-5'-P-CCNC: 36 U/L (ref 0–50)
ANION GAP SERPL CALCULATED.3IONS-SCNC: 11 MMOL/L (ref 7–15)
APPEARANCE UR: CLEAR
AST SERPL W P-5'-P-CCNC: 38 U/L (ref 0–45)
BASOPHILS # BLD AUTO: 0.1 10E3/UL (ref 0–0.2)
BASOPHILS NFR BLD AUTO: 1 %
BILIRUB SERPL-MCNC: 0.3 MG/DL
BILIRUB UR QL STRIP: NEGATIVE
BUN SERPL-MCNC: 12.4 MG/DL (ref 6–20)
CALCIUM SERPL-MCNC: 9.7 MG/DL (ref 8.6–10)
CHLORIDE SERPL-SCNC: 100 MMOL/L (ref 98–107)
COLOR UR AUTO: YELLOW
CREAT SERPL-MCNC: 0.74 MG/DL (ref 0.51–0.95)
CREAT UR-MCNC: 25.6 MG/DL
CRP SERPL-MCNC: <3 MG/L
DEPRECATED HCO3 PLAS-SCNC: 28 MMOL/L (ref 22–29)
EGFRCR SERPLBLD CKD-EPI 2021: >90 ML/MIN/1.73M2
EOSINOPHIL # BLD AUTO: 0.4 10E3/UL (ref 0–0.7)
EOSINOPHIL NFR BLD AUTO: 8 %
ERYTHROCYTE [DISTWIDTH] IN BLOOD BY AUTOMATED COUNT: 11.1 % (ref 10–15)
ERYTHROCYTE [SEDIMENTATION RATE] IN BLOOD BY WESTERGREN METHOD: 5 MM/HR (ref 0–30)
GLUCOSE SERPL-MCNC: 96 MG/DL (ref 70–99)
GLUCOSE UR STRIP-MCNC: NEGATIVE MG/DL
HCT VFR BLD AUTO: 43 % (ref 35–47)
HGB BLD-MCNC: 14.8 G/DL (ref 11.7–15.7)
HGB UR QL STRIP: NEGATIVE
IMM GRANULOCYTES # BLD: 0 10E3/UL
IMM GRANULOCYTES NFR BLD: 0 %
KETONES UR STRIP-MCNC: NEGATIVE MG/DL
LEUKOCYTE ESTERASE UR QL STRIP: NEGATIVE
LYMPHOCYTES # BLD AUTO: 0.9 10E3/UL (ref 0.8–5.3)
LYMPHOCYTES NFR BLD AUTO: 18 %
MCH RBC QN AUTO: 31.7 PG (ref 26.5–33)
MCHC RBC AUTO-ENTMCNC: 34.4 G/DL (ref 31.5–36.5)
MCV RBC AUTO: 92 FL (ref 78–100)
MONOCYTES # BLD AUTO: 0.3 10E3/UL (ref 0–1.3)
MONOCYTES NFR BLD AUTO: 5 %
NEUTROPHILS # BLD AUTO: 3.4 10E3/UL (ref 1.6–8.3)
NEUTROPHILS NFR BLD AUTO: 68 %
NITRATE UR QL: NEGATIVE
PH UR STRIP: 7 [PH] (ref 5–7)
PLATELET # BLD AUTO: 321 10E3/UL (ref 150–450)
POTASSIUM SERPL-SCNC: 4.3 MMOL/L (ref 3.4–5.3)
PROT SERPL-MCNC: 7.4 G/DL (ref 6.4–8.3)
PROT/CREAT 24H UR: NORMAL MG/G{CREAT}
RBC # BLD AUTO: 4.67 10E6/UL (ref 3.8–5.2)
SODIUM SERPL-SCNC: 139 MMOL/L (ref 135–145)
SP GR UR STRIP: 1.01 (ref 1–1.03)
UROBILINOGEN UR STRIP-ACNC: 0.2 E.U./DL
WBC # BLD AUTO: 5.1 10E3/UL (ref 4–11)

## 2024-05-05 PROCEDURE — 85025 COMPLETE CBC W/AUTO DIFF WBC: CPT

## 2024-05-05 PROCEDURE — 85652 RBC SED RATE AUTOMATED: CPT

## 2024-05-05 PROCEDURE — 86225 DNA ANTIBODY NATIVE: CPT

## 2024-05-05 PROCEDURE — 99000 SPECIMEN HANDLING OFFICE-LAB: CPT

## 2024-05-05 PROCEDURE — 84156 ASSAY OF PROTEIN URINE: CPT

## 2024-05-05 PROCEDURE — 86618 LYME DISEASE ANTIBODY: CPT

## 2024-05-05 PROCEDURE — 86666 EHRLICHIA ANTIBODY: CPT | Mod: 90

## 2024-05-05 PROCEDURE — 86160 COMPLEMENT ANTIGEN: CPT

## 2024-05-05 PROCEDURE — 81003 URINALYSIS AUTO W/O SCOPE: CPT

## 2024-05-05 PROCEDURE — 36415 COLL VENOUS BLD VENIPUNCTURE: CPT

## 2024-05-05 PROCEDURE — 80053 COMPREHEN METABOLIC PANEL: CPT

## 2024-05-05 PROCEDURE — 86140 C-REACTIVE PROTEIN: CPT

## 2024-05-06 LAB
B BURGDOR IGG+IGM SER QL: 0.35
C3 SERPL-MCNC: 111 MG/DL (ref 81–157)
C4 SERPL-MCNC: 23 MG/DL (ref 13–39)

## 2024-05-07 DIAGNOSIS — K21.9 GASTROESOPHAGEAL REFLUX DISEASE WITHOUT ESOPHAGITIS: ICD-10-CM

## 2024-05-07 LAB — DSDNA AB SER-ACNC: <0.6 IU/ML

## 2024-05-07 RX ORDER — FAMOTIDINE 40 MG/1
40 TABLET, FILM COATED ORAL
Qty: 90 TABLET | Refills: 0 | Status: SHIPPED | OUTPATIENT
Start: 2024-05-07

## 2024-05-10 ENCOUNTER — VIRTUAL VISIT (OUTPATIENT)
Dept: RHEUMATOLOGY | Facility: CLINIC | Age: 49
End: 2024-05-10
Payer: COMMERCIAL

## 2024-05-10 DIAGNOSIS — M35.01 SJOGREN'S SYNDROME WITH KERATOCONJUNCTIVITIS SICCA (H): ICD-10-CM

## 2024-05-10 DIAGNOSIS — I73.00 RAYNAUD'S PHENOMENON WITHOUT GANGRENE: ICD-10-CM

## 2024-05-10 DIAGNOSIS — Z79.899 LONG-TERM USE OF HYDROXYCHLOROQUINE: ICD-10-CM

## 2024-05-10 DIAGNOSIS — Z79.899 HIGH RISK MEDICATIONS (NOT ANTICOAGULANTS) LONG-TERM USE: ICD-10-CM

## 2024-05-10 DIAGNOSIS — M32.9 SYSTEMIC LUPUS ERYTHEMATOSUS, UNSPECIFIED SLE TYPE, UNSPECIFIED ORGAN INVOLVEMENT STATUS (H): Primary | ICD-10-CM

## 2024-05-10 DIAGNOSIS — M19.90 INFLAMMATORY ARTHRITIS: ICD-10-CM

## 2024-05-10 LAB
A PHAGOCYTOPH IGG TITR SER IF: NORMAL {TITER}
A PHAGOCYTOPH IGM TITR SER IF: NORMAL {TITER}

## 2024-05-10 PROCEDURE — G2211 COMPLEX E/M VISIT ADD ON: HCPCS | Mod: 95 | Performed by: INTERNAL MEDICINE

## 2024-05-10 PROCEDURE — 99214 OFFICE O/P EST MOD 30 MIN: CPT | Mod: 95 | Performed by: INTERNAL MEDICINE

## 2024-05-10 NOTE — PROGRESS NOTES
Yovana Enriquez  is a 48 year old year old who is being evaluated via a billable video visit.      How would you like to obtain your AVS? MyChart  If the video visit is dropped, the invitation should be resent by: Text to cell phone: [phone]   Will anyone else be joining your video visit? No     Rheumatology Video Visit      Yovana Enriquez MRN# [id number]   YOB: 1975 Age: 48 year old      Date of visit: 5/10/24   PCP: Bradford Mario  Onc: [facility]    Chief Complaint   No chief complaint on file.    Assessment and Plan     1. Systemic lupus erythematosus (YANELIS by EIA positive in the past, leukopenia/lymphocytopenia, hypocomplementemia, polyarthralgias, oral sores, history of malar rash, photophobia, photosensitivity, Raynaud's Phenomenon): Previously on methotrexate that was discontinued for unclear reasons but the patient reports that she tolerated it well. Currently on azathioprine 75 mg daily (dose reduced on 11/8/2018 from 100mg daily without worsening symptoms at that time, eventually reduced to 50 mg daily and did well for a period of time; now on 75 mg daily), hydroxychloroquine 300 mg daily [avg], and Benlysta (worsening symptoms when stopped in the past).  Previously was doing well with regard to lupus.  However, more inflammatory arthritis symptoms at the second and third MCPs and left second-third PIPs, possibly due to missing several days of prednisone 2.5 mg daily; treat with a course of prednisone as noted below.   Note that she has likely adrenal insufficiency and has done much better with staying on prednisone 2.5 mg daily.  Chronic illness, stable.    - Continue azathioprine 75 mg daily   - Continue hydroxychloroquine  200mg daily with additional 200mg every other day (toxicity monitoring eye exam last done on 8/5/2022 by Dr. Queen)  - Continue Benlysta 200 mg SQ every 7 days  - Continue Prednisone 2.5mg daily (trouble reducing below this point, likely adrenal  insufficiency)  - Continue photoprotection  - Ophthalmology referral for hydroxychloroquine toxicity monitoring   - Labs in 3 months: CBC, CMP, ESR, CRP, UA, Uprotein:creatinine    High risk medication requiring intensive toxicity monitoring at least quarterly.    2. Secondary Sjogren syndrome: Doing well with Evoxac and frequent sips of water, but then Evoxac became more expensive so pilocarpine was tried but not tolerated (GI upset).  Therefore, changed back to Evoxac and was doing well with BID dosing (GI upset when TID). Dry eyes well controled with artificial tears as needed.  Chronic illness  - Continue Evoxac 30 mg twice daily     3. Raynaud's Phenomenon: Amlodipine 5mg daily is effective.  Chronic illness, stable  - Continue amlodipine 5mg daily      4.  History of R>Ltrochanteric bursitis, possible iliotibial band syndrome as well:  Also with symptoms of iliotibial band syndrome and degenerative arthritis of the lumbar spine.  Symptoms improved with home exercises.  Doing well at this time.        5. Fibromyalgia: Currently managed by PCP.  More active fibromyalgia symptoms at this time.  She would like to is reestablished in the pain clinic  -Pain management referral given previously and she is planning to schedule    6. Osteoarthritis of multiple joints: Primarily affecting the DIPs right first CMC.  Advised hand therapy; declined by patient.    7. MGUS: Followed at [facility] and the patient reports that she is going to have labs performed every 6 months methotrexate of her oncologist    8. Vaccinations: Vaccinations reviewed with Ms. Enriquez.    - Influenza: encouraged yearly vaccination  - [id number]: up to date  - [id number]: up to date  - Shingrix: Advised vaccination  - COVID-19: advised keeping updated, and to hold azathioprine and benlysta for 1-2 weeks after COVID-19 vaccination      9. Elevated blood pressure:  Ruma to follow up with Primary Care provider regarding elevated blood  pressure.      Total minutes spent in evaluation with patient, documentation, , and review of pertinent studies and chart notes: 14  The longitudinal plan of care for the rheumatology problem(s) were addressed during this visit.  Due to added complexity of care, we will continue to support the patient and the subsequent management of this condition with ongoing continuity of care.     Ms. Enriquez verbalized agreement with and understanding of the rational for the diagnosis and treatment plan.  All questions were answered to best of my ability and the patient's satisfaction. Ms. Enriquez was advised to contact the clinic with any questions that may arise after the clinic visit.      Thank you for involving me in the care of the patient    Return to clinic: 3 months    HPI   Yovana Enriquez is a 48 year old female with medical history significant for subclinical hyperthyroidism, hypertension, irritable bowel syndrome, fibromyalgia, hypertension, non-celiac gluten sensitivity (reportedly with bowel biopsies and laboratory workup that did not show celiac disease), anxiety, and systemic lupus erythematosus who presents for follow-up of SLE.    12/8/2023: Currently doing well.  Had mild ache of her toes on 1 foot for a few days that has since resolved.  Raynaud's phenomenon is controlled with amlodipine.  No lower extremity edema.  Evoxac is effective for Sjogren's syndrome; worsening symptoms with any missed dose.  Azathioprine, hydroxychloroquine, Benlysta, and prednisone are taken as prescribed.  Perimenopausal and occasionally feels extra warm but this tends to come and go.  Going to the gym now to maintain weight at around 140 pounds.    2/20/2024: Missed several days of prednisone 2.5 mg daily and felt increased pain, fatigue, and generally unwell while off prednisone; she missed prednisone 2.5 mg daily because she says she was just did not make time to  her refill.  Now back on prednisone 2.5  mg daily.  Pain at the MCPs, PIPs, DIPs, right first CMC.  Diffuse body pain.    Today, 5/10/2024: Currently feels well.  Since last visit she went to see oncology at [facility] where she says that they are going to monitor her labs every 6 months.  Sugars controlled with Evoxac, frequent sips of water, seeing a dentist regularly.  Plans to establish care in the pain clinic for fibromyalgia management.  Lupus controlled at this time.  Overall she says she is happy with how well she is doing right now.    Denies fevers, chills, nausea, vomiting. No abdominal pain. no chest pain/pressure, palpitations, or shortness of breath.  No rash currently. No LE swelling.  She has photosensitivity and photophobia.   No eye pain or redness.     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    ROS   12 point review of system was completed and negative except as noted in the HPI     Active Problem List     Patient Active Problem List   Diagnosis    Systemic lupus erythematosus (H)    Menorrhagia    History of ovarian cyst    Dysmenorrhea    History of gestational diabetes mellitus, not currently pregnant    Intramural leiomyoma of uterus    Hyperlipidemia LDL goal <130    Subclinical hyperthyroidism    Anxiety    High risk medication use    Fibromyalgia    Chronic constipation    Irritable bowel syndrome    Health Care Home    Hypertension goal BP (blood pressure) < 140/90    Non-celiac gluten sensitivity    Raynaud's phenomenon without gangrene    Sjogren's syndrome (H24)    Intramural and submucous leiomyoma of uterus    Adrenal insufficiency (H24)    Screening for malignant neoplasm of cervix     Past Medical History     Past Medical History:   Diagnosis Date    Arthritis     Chronic constipation 12/16/2013    Dysmenorrhea 10/1/2012    Fibromyalgia 11/15/2013    Health Care Home 3/19/2014    **See Letters for [facility] Care Plan: Emergency Care Plan      High risk medication use 9/13/2013     History of gestational diabetes mellitus, not currently pregnant 10/1/2012    History of ovarian cyst 10/1/2012    Hyperlipidemia LDL goal <130 10/18/2012    Hypertension goal BP (blood pressure) < 140/90 1/19/2015    Intramural leiomyoma of uterus 10/5/2012    Irritable bowel syndrome 3/11/2014    Patient states no history colonoscopy      Menorrhagia 10/1/2012    Non-celiac gluten sensitivity 2/8/2016    PONV (postoperative nausea and vomiting)     Subclinical hyperthyroidism 1/17/2013    Systemic lupus erythematosus (H) 4/23/2012     Past Surgical History     Past Surgical History:   Procedure Laterality Date    ABDOMEN SURGERY  2004    Tubal ligation    COLONOSCOPY N/A 2/20/2015    Procedure: COMBINED COLONOSCOPY, SINGLE OR MULTIPLE BIOPSY/POLYPECTOMY BY BIOPSY;  Surgeon: Fred Cullen MD;  Location: [facility]    COLONOSCOPY N/A 10/29/2018    Procedure: COMBINED COLONOSCOPY, SINGLE OR MULTIPLE BIOPSY/POLYPECTOMY BY BIOPSY;  Surgeon: Inez Sawyer MD;  Location: [facility]    COLONOSCOPY WITH CO2 INSUFFLATION N/A 2/20/2015    Procedure: COLONOSCOPY WITH CO2 INSUFFLATION;  Surgeon: Fred Cullen MD;  Location: [facility]    ENT SURGERY  10-24-14    Bottom lip biopsies    ESOPHAGOSCOPY, GASTROSCOPY, DUODENOSCOPY (EGD), COMBINED N/A 2/20/2015    Procedure: COMBINED ESOPHAGOSCOPY, GASTROSCOPY, DUODENOSCOPY (EGD), BIOPSY SINGLE OR MULTIPLE;  Surgeon: Fred Cullen MD;  Location: [facility]    HC BREATH HYDROGEN TEST  12/31/2013    Procedure: HYDROGEN BREATH TEST;  Surgeon: Camden Almazan MD;  Location: [facility]    HC HYSTEROSCOPY, SURGICAL; W/ ENDOMETRIAL ABLATION, ANY METHOD  10-19-12    ORTHOPEDIC SURGERY  12/2010    SHOULDER SURGERY Right     R shoulder    TUBAL LIGATION  2004     Allergy     Allergies   Allergen Reactions    Fluticasone Propionate (Nasal) [Fluticasone] Anaphylaxis     Current Medication List     Current Outpatient Medications   Medication Sig Dispense Refill    amLODIPine  (NORVASC) 5 MG tablet Take 1 tablet (5 mg) by mouth daily 90 tablet 2    azaTHIOprine (IMURAN) 50 MG tablet Take 1.5 tablets (75 mg) by mouth daily 135 tablet 2    azelastine (ASTELIN) 0.1 % nasal spray Spray 1 spray into both nostrils 2 times daily      Belimumab 200 MG/ML SOAJ Inject 200 mg Subcutaneous every 7 days . Hold for signs of infection and seek medical attention. Autoinjector 4 mL 6    blood glucose test strip Used for testing glucose 2-3 times daily 1 Month 5    Calcium Carb-Cholecalciferol (CALCIUM + D3) 600-200 MG-UNIT TABS Take 1 tablet by mouth daily      cevimeline (EVOXAC) 30 MG capsule Take 1 capsule (30 mg) by mouth 2 times daily 180 capsule 2    famotidine (PEPCID) 40 MG tablet Take 1 tablet (40 mg) by mouth nightly as needed for heartburn 90 tablet 0    Fezolinetant 45 MG TABS Take 45 mg by mouth daily 90 tablet 3    hydroxychloroquine (PLAQUENIL) 200 MG tablet Hydroxychloroquine 200mg daily; and an additional 200mg every other day. Yearly eye exam, including 10-2 VF and SD-OCT, is required 135 tablet 2    losartan (COZAAR) 25 MG tablet TAKE 1 TABLET BY MOUTH ONCE DAILY 90 tablet 0    nortriptyline (PAMELOR) 10 MG capsule TAKE 1 CAPSULE BY MOUTH AT BEDTIME 90 capsule 0    predniSONE (DELTASONE) 2.5 MG tablet Take 1 tablet (2.5 mg) by mouth daily 90 tablet 2    SUMAtriptan (IMITREX) 50 MG tablet Take 50 mg by mouth at onset of headache for migraine      Vitamin D, Cholecalciferol, 1000 units CAPS Take 4,000 Int'l Units by mouth daily       No current facility-administered medications for this visit.     Social History   See HPI    Family History     Family History   Problem Relation Age of Onset    Respiratory Maternal Grandfather     Cancer Maternal Grandfather     Other Cancer Maternal Grandfather         Skin cancer    Asthma Son     Circulatory Maternal Grandmother         Brain anurysm    Diabetes Maternal Grandmother     Hypertension Mother     Glaucoma Mother 50        drops    Cancer  "Mother     Hypertension Sister     Hypertension Sister     Diabetes Sister     Hypertension Sister     Anxiety Disorder Sister     Asthma Son     Diabetes No family hx of     Cerebrovascular Disease No family hx of     Thyroid Disease No family hx of     Macular Degeneration No family hx of        Physical Exam     Temp Readings from Last 3 Encounters:   04/13/24 98.1  F (36.7  C) (Tympanic)   03/05/24 98  F (36.7  C) (Tympanic)   08/07/23 97.4  F (36.3  C) (Temporal)     BP Readings from Last 5 Encounters:   04/13/24 (!) 142/90   03/05/24 118/80   02/20/24 (!) 134/90   08/07/23 120/80   03/29/23 (!) 162/94     Pulse Readings from Last 1 Encounters:   04/13/24 76     Resp Readings from Last 1 Encounters:   03/05/24 12     Estimated body mass index is 23.04 kg/m  as calculated from the following:    Height as of 3/5/24: 1.664 m (5' 5.5\").    Weight as of 4/13/24: 63.8 kg (140 lb 9.6 oz).      GEN: NAD. Healthy appearing adult.   HEENT: MMM.  Anicteric, noninjected sclera. No obvious external lesions of the ear and nose. Hearing intact.  PULM: No increased work of breathing  MSK:  Hands and wrists without swelling.  SKIN: No rash or jaundice seen  PSYCH: Alert. Appropriate.      Labs / Imaging (select studies)     AYNELIS  Recent Labs   Lab Test 07/25/16  1433   ANAIGG <1:40  Reference range: <1:40  (Note)  <1:40  INTERPRETIVE INFORMATION: YANELIS by IFA, IgG  Anti-nuclear antibodies (YANELIS) are seen in a variety of  systemic rheumatic diseases and are determined by indirect  fluorescence assay (IFA) using HEp-2 substrate with an  IgG-specific conjugate. YANELIS titers less than or equal to  1:80 have variable relevance while titers greater than or  equal to 1:160 are considered clinically significant. These  antibodies may precede clinical disease onset; however,  healthy individuals and those with advanced age have been  reported to be positive for YANELIS. When observed, one of the  five basic patterns is reported: " homogeneous,  peripheral/rim, speckled, centromere, or nucleolar. If  cytoplasmic fluorescence is observed, it is noted. IFA  methodology is subjective and has occasionally been shown  to lack sensitivity for anti-SSA/Ro antibodies.  Negative results do not necessarily rule out the presence  of SSc. If clinical suspicion remains, consider further  te sting for U3-RNP, PM/Scl, or Th/To antibodies associated  with SSc.  Performed by [facility],  [address] [place] [id number] [phone]  [email or web address] Alcon Krishna MD, Lab. Director       RNP/Sm/SSA/SSB  Recent Labs   Lab Test 03/23/16  1759   SMIGG <0.2  Negative   Antibody index (AI) values reflect qualitative changes in antibody   concentration that cannot be directly associated with clinical condition or   disease state.       dsDNA  Recent Labs   Lab Test 05/05/24  1205 04/23/23  1305 01/14/23  0917 07/02/22  0905 04/03/22  1236 10/10/21  1202 07/19/21  1820 04/20/21  1804 01/11/21  1805   DNA <0.6 <0.6 0.6 <0.6 0.7 <0.6 <0.6 1 <1     C3/C4  Recent Labs   Lab Test 05/05/24  1205 04/23/23  1305 07/02/22  0905 10/10/21  1202 07/19/21  1820 04/20/21  1804 01/11/21  1805 10/25/20  1059 04/07/20  1136   J9RBHMC 111 93 98 89 91 97 91 103 100   D1HRZCP 23 22 22 17 20 20 20 >9* 16     IgG  Recent Labs   Lab Test 01/11/21  1805 10/25/20  1059 06/17/19  1813    <17*  --    IGA 93 <2* 82   * 345*  --      CBC  Recent Labs   Lab Test 05/05/24  1205 02/11/24  1158 11/04/23  1043 07/19/21  1820 06/13/21  1312 04/20/21  1804 01/11/21  1805   WBC 5.1 3.8* 6.7   < > 5.8 7.7 8.1   RBC 4.67 4.10 4.26   < > 4.19 4.22 4.00   HGB 14.8 13.4 13.7   < > 13.5 13.7 13.2   HCT 43.0 38.6 40.5   < > 40.6 40.0 38.7   MCV 92 94 95   < > 97 95 97   RDW 11.1 11.4 11.6   < > 11.6 11.5 11.5    278 292   < > 300 313 316   MCH 31.7 32.7 32.2   < > 32.2 32.5 33.0   MCHC 34.4 34.7 33.8   < > 33.3 34.3 34.1   NEUTROPHIL 68 63 74   < > 77.2 81.2 83.4   LYMPH 18 28 16    < > 13.7 10.8 8.8   MONOCYTE 5 7 8   < > 6.2 6.4 6.7   EOSINOPHIL 8 2 2   < > 1.7 0.8 0.6   BASOPHIL 1 1 1   < > 1.2 0.8 0.5   ANEU  --   --   --   --  4.5 6.3 6.7   ALYM  --   --   --   --  0.8 0.8 0.7*   ZOË  --   --   --   --  0.4 0.5 0.5   AEOS  --   --   --   --  0.1 0.1 0.1   ABAS  --   --   --   --  0.1 0.1 0.0   ANEUTAUTO 3.4 2.4 4.9   < >  --   --   --    ALYMPAUTO 0.9 1.1 1.1   < >  --   --   --    AMONOAUTO 0.3 0.3 0.5   < >  --   --   --    AEOSAUTO 0.4 0.1 0.1   < >  --   --   --    ABSBASO 0.1 0.0 0.0   < >  --   --   --     < > = values in this interval not displayed.     CMP  Recent Labs   Lab Test 05/05/24  1205 02/11/24  1158 11/04/23  1043 07/23/23  1326 04/23/23  1305 01/14/23  0917 07/19/21  1820 04/20/21  1804 01/11/21  1805 10/25/20  1059 04/07/20  1136    141 140 139 140 142   < > 140 138 139 140   POTASSIUM 4.3 4.0 3.9 4.3 3.9 3.5   < > 4.1 3.9 4.1 3.6   CHLORIDE 100 104 103 105 108 107   < > 106 106 104 106   CO2 28 29 27 26 30 29   < > 33* 31 31 31   ANIONGAP 11 8 10 8 2* 6   < > 1* 1* 4 3   GLC 96 102* 80 118* 103* 108*   < > 97 99 86 104*   BUN 12.4 13.5 9.6 11.0 15 13   < > 13 15 11 16   CR 0.74 0.75 0.70 0.64 0.72 0.70   < > 0.76 0.77 0.73 0.71   GFRESTIMATED >90 >90 >90 >90 >90 >90   < > >90 >90 >90 >90   GFRESTBLACK  --   --   --   --   --   --   --  >90 >90 >90 >90   MELANIA 9.7 9.2 8.9 8.7 8.8 8.8   < > 9.0 8.8 9.1 9.1   BILITOTAL 0.3 0.3 0.4 0.2 0.3 0.4   < > 0.3 0.2 0.4 0.3   ALBUMIN 4.9 4.3 4.2 4.1 4.1 3.7   < > 4.1 4.0 4.4 4.3   PROTTOTAL 7.4 6.3* 6.4 6.3* 6.9 6.4*   < > 6.8 6.5* 7.5 7.1   ALKPHOS 57 50 49 57 59 57   < > 57 55 62 54   AST 38 29 25 24 29 16   < > 17 14 22 13   ALT 36 24 18 14 38 32   < > 25 19 23 24    < > = values in this interval not displayed.     HgA1c  Recent Labs   Lab Test 03/05/24  1808 05/30/19  0859   A1C 5.6 5.2     Calcium/VitaminD  Recent Labs   Lab Test 05/05/24  1205 02/11/24  1158 11/04/23  1043 10/25/20  1059 04/07/20  1136 07/17/19  1756  04/16/19  1759 01/30/18  1752 10/02/17  1814   MELANIA 9.7 9.2 8.9   < > 9.1   < > 8.4*   < > 8.7   VITDT  --   --   --   --  59  --  40  --  37    < > = values in this interval not displayed.     ESR/CRP  Recent Labs   Lab Test 05/05/24  1205 11/04/23  1043 07/23/23  1326 04/23/23  1305 01/14/23  0917 10/16/22  1205   SED 5 4 4 5 6 5   CRP  --   --   --  <2.9 <2.9 <2.9   CRPI <3.00 <3.00 <3.00  --   --   --      CK/Aldolase  Recent Labs   Lab Test 10/10/21  1202 07/19/21  1820 04/20/21  1804   CKT 87 105 90     TSH/T4  Recent Labs   Lab Test 03/05/24  1808 11/04/23  1043 04/03/22  1236 04/10/19  1808 10/18/17  1445 10/07/16  0714   TSH 0.36 0.76 0.59   < > 0.33* 0.37*   T4  --   --   --   --  1.24 1.16    < > = values in this interval not displayed.     Lipid Panel  Recent Labs   Lab Test 11/04/23  1043 04/03/22  1236 01/26/19  0916   CHOL 154 177 133   TRIG 75 132 64   HDL 77 80 68   LDL 62 71 52   NHDL 77 97 65     Hepatitis B  Recent Labs   Lab Test 03/29/16  1417   HEPBANG Nonreactive     Hepatitis C  Recent Labs   Lab Test 03/29/16  1417   HCVAB Nonreactive   Assay performance characteristics have not been established for newborns,   infants, and children       Lyme ab screening  Recent Labs   Lab Test 05/05/24  1205   LYMEGM 0.35     Tuberculosis Screening  Recent Labs   Lab Test 01/08/22  1156 07/03/17  1447 03/29/16  1417   TBRES Negative  --   --    TBRSLT  --  Negative Negative   TBAGN  --  0.00 0.00     HIV Screening  Recent Labs   Lab Test 04/10/19  1808   HIAGAB Nonreactive     UA  Recent Labs   Lab Test 05/05/24  1205 02/11/24  1211 11/04/23  1140 07/23/23  1326 04/23/23  1305 01/14/23  1124 10/16/22  1205 10/10/21  1204 09/01/21  1522 04/20/21  1819 01/11/21  1810 10/25/20  1110 09/15/20  1655 07/03/20  1010 04/07/20  1140   COLOR Yellow Yellow Yellow   < > Yellow   < > Yellow   < > Yellow   < > Yellow Yellow Yellow   < > Yellow   APPEARANCE Clear Clear Clear   < > Clear   < > Clear   < > Slightly  Cloudy*   < > Clear Clear Clear   < > Clear   URINEGLC Negative Negative Negative   < > Negative   < > Negative   < > Negative   < > Negative Negative Negative   < > Negative   URINEBILI Negative Negative Negative   < > Negative   < > Negative   < > Negative   < > Negative Negative Negative   < > Negative   SG 1.010 >=1.030 1.010   < > <=1.005   < > 1.020   < > 1.015   < > >1.030 1.010 1.025   < > >1.030   URINEPH 7.0 5.5 7.0   < > 6.5   < > 6.0   < > 7.0   < > 6.0 7.0 6.0   < > 6.5   PROTEIN Negative 30* Negative   < > Negative   < > Negative   < > Trace*   < > Negative Negative Negative   < > Negative   UROBILINOGEN 0.2 0.2 0.2   < > 0.2   < > 0.2   < > 0.2   < > 0.2 0.2 0.2   < > 0.2   NITRITE Negative Negative Negative   < > Negative   < > Negative   < > Negative   < > Negative Negative Negative   < > Negative   UBLD Negative Negative Negative   < > Negative   < > Negative   < > Moderate*   < > Small* Negative Negative   < > Negative   LEUKEST Negative Moderate* Negative   < > Trace*   < > Trace*   < > Moderate*   < > Negative Trace* Negative   < > Trace*   WBCU  --  25-50*  --   --  0-5  --  0-5   < > 25-50*   < > 0 - 5 0 - 5 5-10*   < > 0 - 5   RBCU  --  0-2  --   --  None Seen  --  0-2   < > 25-50*   < > O - 2 O - 2 O - 2   < > O - 2   SQUAMOUSEPI  --   --   --   --   --   --   --   --   --   --  Few Few Few   < > Few   BACTERIA  --  Many*  --   --   --   --   --   --  Few*  --  Few* Few* Few*   < > Few*   MUCOUS  --   --   --   --   --   --   --   --   --   --  Present*  --  Present*  --  Present*    < > = values in this interval not displayed.     Urine Microscopic  Recent Labs   Lab Test 02/11/24  1211 04/23/23  1305 10/16/22  1205 10/10/21  1204 09/01/21  1522 06/13/21  1309 01/11/21  1810 10/25/20  1110 09/15/20  1655 07/03/20  1010 04/07/20  1140   WBCU 25-50* 0-5 0-5   < > 25-50*   < > 0 - 5 0 - 5 5-10*   < > 0 - 5   RBCU 0-2 None Seen 0-2   < > 25-50*   < > O - 2 O - 2 O - 2   < > O - 2   SQUAMOUSEPI   --   --   --   --   --   --  Few Few Few   < > Few   BACTERIA Many*  --   --   --  Few*  --  Few* Few* Few*   < > Few*   MUCOUS  --   --   --   --   --   --  Present*  --  Present*  --  Present*    < > = values in this interval not displayed.     Urine Protein  GHUTP and UTP= Urine protein (random), GHUTPG and UTPG = urine protein:creatinine ratio (random), UCRR = urine creatinine (random)  Recent Labs   Lab Test 05/05/24  1205 02/11/24  1210 11/04/23  1140 07/23/23  1326 04/23/23  1305 01/14/23  1124 07/02/22  0905 04/10/22  1232 01/08/22  1156 10/10/21  1202   GHUTP <6.0 31.8 <6.0 9.2   < > 14.7*   < >  --   --   --    UTP  --   --   --   --   --   --   --  0.09 0.07 0.06   GHUTPG  --  0.17  --  0.13  --  0.15   < >  --   --   --    UTPG  --   --   --   --   --   --   --  0.11 0.09 0.12   UCRR 25.6 186.0 46.9 69.7   < > 98.2   < > 83 81 52    < > = values in this interval not displayed.       Immunization History     Immunization History   Administered Date(s) Administered    COVID-19 MONOVALENT 12+ (Pfizer) 03/13/2021, 04/03/2021    Influenza (H1N1) 12/02/2009    Influenza (IIV3) PF 02/08/2007, 09/17/2010, 10/01/2012, 09/23/2013    Influenza Vaccine >6 months,quad, PF 09/23/2013, 09/20/2016, 10/30/2017, 10/14/2019    Influenza Vaccine, 6+MO IM (QUADRIVALENT W/PRESERVATIVES) 08/11/2018    Pneumo Conj 13-V (2010&after) 04/25/2016    Pneumococcal 23 valent 10/01/2012, 11/06/2017    TDAP (Adacel,Boostrix) 12/17/2010, 06/24/2022    Td (Adult), Adsorbed 02/12/2001            Chart documentation done in part with Dragon Voice recognition Software. Although reviewed after completion, some word and grammatical error may remain.      Video-Visit Details    Type of service:  Video Visit    Video Start Time: 9:33 AM    Video End Time: 9:39 AM    Originating Location (pt. Location): Home, [place]    Distant Location (provider location):  off site, [place]    Platform used for Video Visit: danielle Colvin MD

## 2024-07-31 ENCOUNTER — OFFICE VISIT (OUTPATIENT)
Dept: FAMILY MEDICINE | Facility: CLINIC | Age: 49
End: 2024-07-31
Payer: COMMERCIAL

## 2024-07-31 VITALS
TEMPERATURE: 97.9 F | WEIGHT: 140.8 LBS | BODY MASS INDEX: 22.63 KG/M2 | RESPIRATION RATE: 20 BRPM | OXYGEN SATURATION: 97 % | HEIGHT: 66 IN | DIASTOLIC BLOOD PRESSURE: 80 MMHG | SYSTOLIC BLOOD PRESSURE: 134 MMHG | HEART RATE: 80 BPM

## 2024-07-31 DIAGNOSIS — L81.2 FRECKLES: Primary | ICD-10-CM

## 2024-07-31 PROCEDURE — 99213 OFFICE O/P EST LOW 20 MIN: CPT | Performed by: NURSE PRACTITIONER

## 2024-07-31 ASSESSMENT — PAIN SCALES - GENERAL: PAINLEVEL: NO PAIN (0)

## 2024-07-31 NOTE — PROGRESS NOTES
"  Assessment & Plan     [place]  -Discussed how skin changes on the breasts can sometimes be signs of inflammatory breast cancer. These signs include puckering, dimpling, pulling in on the nipple, nipple discharge, \"orange peel\" texture of the skin, scaling/peeling of the skin, redness/rash, inflammation of the breast, new lump or swelling of the breast. Discussed signs of skin cancer/melanoma including irregular borders, asymmetry, size greater than 6 mm, evolving/changing appearance. As the freckles/nevi she has do not have any of these characteristics, are perfectly round, symmetrical, small, with even borders, low concern for malignancy at this time. Recommend continuing to monitor and follow up promptly if any of the above symptoms develop or if there are new changes.         Hermilo Hendrix is a 49 year old, presenting for the following health issues:  Breast Problem        7/31/2024     4:52 PM   Additional Questions   Roomed by Doreen TALAMANTES   Accompanied by self     History of Present Illness       Reason for visit:  Moles on nipples, i feel fine just want to make sure their normal  Symptom onset:  3-4 weeks ago  Symptoms include:  Moles on nipples  Symptom intensity:  Mild  Symptom progression:  Staying the same  Had these symptoms before:  No  What makes it worse:  No  What makes it better:  N/a    She eats 2-3 servings of fruits and vegetables daily.She consumes 2 sweetened beverage(s) daily.She exercises with enough effort to increase her heart rate 30 to 60 minutes per day.  She exercises with enough effort to increase her heart rate 3 or less days per week. She is missing 1 dose(s) of medications per week.  She is not taking prescribed medications regularly due to remembering to take.             Objective    /80   Pulse 80   Temp 97.9  F (36.6  C) (Tympanic)   Resp 20   Ht 1.664 m (5' 5.5\")   Wt 63.9 kg (140 lb 12.8 oz)   LMP  (LMP Unknown)   SpO2 97%   BMI 23.07 kg/m    Body mass index " is 23.07 kg/m .  Physical Exam  Constitutional:       General: She is not in acute distress.     Appearance: Normal appearance.   HENT:      Right Ear: External ear normal.      Left Ear: External ear normal.   Eyes:      Pupils: Pupils are equal, round, and reactive to light.   Cardiovascular:      Rate and Rhythm: Normal rate.      Pulses: Normal pulses.      Heart sounds: Normal heart sounds. No murmur heard.     No friction rub. No gallop.   Pulmonary:      Effort: Pulmonary effort is normal. No respiratory distress.      Breath sounds: No wheezing, rhonchi or rales.   Chest:      Chest wall: No mass or crepitus.   Breasts:     Right: Normal. No swelling, bleeding, inverted nipple, mass, nipple discharge, skin change or tenderness.      Left: Normal. No swelling, bleeding, inverted nipple, mass, nipple discharge, skin change or tenderness.   Lymphadenopathy:      Upper Body:      Right upper body: No supraclavicular, axillary or pectoral adenopathy.      Left upper body: No supraclavicular, axillary or pectoral adenopathy.   Skin:     General: Skin is warm and dry.   Neurological:      General: No focal deficit present.      Mental Status: She is alert and oriented to person, place, and time.   Psychiatric:         Mood and Affect: Mood normal.         Behavior: Behavior normal.         Thought Content: Thought content normal.         Judgment: Judgment normal.                    Signed Electronically by: BELEN Pineda CNP

## 2024-08-04 ENCOUNTER — LAB (OUTPATIENT)
Dept: LAB | Facility: CLINIC | Age: 49
End: 2024-08-04
Payer: COMMERCIAL

## 2024-08-04 DIAGNOSIS — M32.9 SYSTEMIC LUPUS ERYTHEMATOSUS, UNSPECIFIED SLE TYPE, UNSPECIFIED ORGAN INVOLVEMENT STATUS (H): ICD-10-CM

## 2024-08-04 DIAGNOSIS — Z79.899 HIGH RISK MEDICATIONS (NOT ANTICOAGULANTS) LONG-TERM USE: ICD-10-CM

## 2024-08-04 LAB
ALBUMIN MFR UR ELPH: 6.6 MG/DL
ALBUMIN SERPL BCG-MCNC: 4.4 G/DL (ref 3.5–5.2)
ALBUMIN UR-MCNC: NEGATIVE MG/DL
ALP SERPL-CCNC: 49 U/L (ref 40–150)
ALT SERPL W P-5'-P-CCNC: 20 U/L (ref 0–50)
ANION GAP SERPL CALCULATED.3IONS-SCNC: 6 MMOL/L (ref 7–15)
APPEARANCE UR: CLEAR
AST SERPL W P-5'-P-CCNC: 26 U/L (ref 0–45)
BASOPHILS # BLD AUTO: 0 10E3/UL (ref 0–0.2)
BASOPHILS NFR BLD AUTO: 1 %
BILIRUB SERPL-MCNC: 0.3 MG/DL
BILIRUB UR QL STRIP: NEGATIVE
BUN SERPL-MCNC: 11.4 MG/DL (ref 6–20)
CALCIUM SERPL-MCNC: 9.1 MG/DL (ref 8.8–10.4)
CHLORIDE SERPL-SCNC: 104 MMOL/L (ref 98–107)
COLOR UR AUTO: YELLOW
CREAT SERPL-MCNC: 0.71 MG/DL (ref 0.51–0.95)
CREAT UR-MCNC: 64.4 MG/DL
CRP SERPL-MCNC: <3 MG/L
EGFRCR SERPLBLD CKD-EPI 2021: >90 ML/MIN/1.73M2
EOSINOPHIL # BLD AUTO: 0.1 10E3/UL (ref 0–0.7)
EOSINOPHIL NFR BLD AUTO: 2 %
ERYTHROCYTE [DISTWIDTH] IN BLOOD BY AUTOMATED COUNT: 11.6 % (ref 10–15)
ERYTHROCYTE [SEDIMENTATION RATE] IN BLOOD BY WESTERGREN METHOD: 5 MM/HR (ref 0–30)
GLUCOSE SERPL-MCNC: 103 MG/DL (ref 70–99)
GLUCOSE UR STRIP-MCNC: NEGATIVE MG/DL
HCO3 SERPL-SCNC: 29 MMOL/L (ref 22–29)
HCT VFR BLD AUTO: 39.1 % (ref 35–47)
HGB BLD-MCNC: 13.6 G/DL (ref 11.7–15.7)
HGB UR QL STRIP: NEGATIVE
IMM GRANULOCYTES # BLD: 0 10E3/UL
IMM GRANULOCYTES NFR BLD: 0 %
KETONES UR STRIP-MCNC: NEGATIVE MG/DL
LEUKOCYTE ESTERASE UR QL STRIP: NEGATIVE
LYMPHOCYTES # BLD AUTO: 1 10E3/UL (ref 0.8–5.3)
LYMPHOCYTES NFR BLD AUTO: 27 %
MCH RBC QN AUTO: 32.3 PG (ref 26.5–33)
MCHC RBC AUTO-ENTMCNC: 34.8 G/DL (ref 31.5–36.5)
MCV RBC AUTO: 93 FL (ref 78–100)
MONOCYTES # BLD AUTO: 0.3 10E3/UL (ref 0–1.3)
MONOCYTES NFR BLD AUTO: 8 %
NEUTROPHILS # BLD AUTO: 2.3 10E3/UL (ref 1.6–8.3)
NEUTROPHILS NFR BLD AUTO: 62 %
NITRATE UR QL: NEGATIVE
PH UR STRIP: 7.5 [PH] (ref 5–7)
PLATELET # BLD AUTO: 269 10E3/UL (ref 150–450)
POTASSIUM SERPL-SCNC: 4.4 MMOL/L (ref 3.4–5.3)
PROT SERPL-MCNC: 6.5 G/DL (ref 6.4–8.3)
PROT/CREAT 24H UR: 0.1 MG/MG CR (ref 0–0.2)
RBC # BLD AUTO: 4.21 10E6/UL (ref 3.8–5.2)
SODIUM SERPL-SCNC: 139 MMOL/L (ref 135–145)
SP GR UR STRIP: 1.02 (ref 1–1.03)
UROBILINOGEN UR STRIP-ACNC: 0.2 E.U./DL
WBC # BLD AUTO: 3.7 10E3/UL (ref 4–11)

## 2024-08-04 PROCEDURE — 86140 C-REACTIVE PROTEIN: CPT

## 2024-08-04 PROCEDURE — 84156 ASSAY OF PROTEIN URINE: CPT

## 2024-08-04 PROCEDURE — 36415 COLL VENOUS BLD VENIPUNCTURE: CPT

## 2024-08-04 PROCEDURE — 85652 RBC SED RATE AUTOMATED: CPT

## 2024-08-04 PROCEDURE — 81003 URINALYSIS AUTO W/O SCOPE: CPT

## 2024-08-04 PROCEDURE — 80053 COMPREHEN METABOLIC PANEL: CPT

## 2024-08-04 PROCEDURE — 85025 COMPLETE CBC W/AUTO DIFF WBC: CPT

## 2024-08-07 NOTE — PROGRESS NOTES
ASSESSMENT & PLAN    Ruma was seen today for pain.    Diagnoses and all orders for this visit:    Lateral pain of right hip  -     Large Joint Injection/Arthocentesis: R greater trochanteric bursa    Chronic right hip pain  -     XR Pelvis and Hip Right 1 View  -     Large Joint Injection/Arthocentesis: R greater trochanteric bursa      Overall this is a chronic issue, with increasing symptoms with time.  Injection done today.  See below.  Questions answered. Discussed signs and symptoms that may indicate more serious issues; the patient was instructed to seek appropriate care if noted. Ruma indicates understanding of these issues and agrees with the plan.      See Patient Instructions  Patient Instructions   Reviewed nature of right lateral hip pain, consistent with gluteal tendinopathy, trochanteric bursitis in the lateral hip.  Reviewed options, including with physical therapy, imaging, steroid injection.  Following discussion, right lateral hip steroid injection done today.  Regarding rehab exercises, home exercises reviewed today.  If interested in physical therapy, contact clinic.  Defer any imaging for now, await response to injection and therapy exercises.  Plan to monitor at least 2 weeks for maximal benefit from the injection, and up to 1 month with combination with exercises.  If improving, follow-up is open-ended.  Otherwise, contact clinic for other questions or concerns.    If you have any further questions for your physician or physician s care team you can contact them thru [employer] or by calling [phone].          Injection Discharge Instructions    You may shower, however avoid swimming, tub baths or hot tubs for 24 hours following your procedure  You may have a mild to moderate increase in pain for several days following the injection.  It may take up to 14 days for the steroid medication to start working although you may feel the effect as early as a few days after the procedure.  You may  use ice packs for 10-15 minutes, 3 to 4 times a day at the injection site for comfort  You may use anti-inflammatory medications (such as Ibuprofen or Aleve or Advil) if you are able to take safely, or acetaminophen (Tylenol) for pain control if necessary  If you were fasting, you may resume your normal diet and medications after the procedure  If you have diabetes, check your blood sugar more frequently than usual as your blood sugar may be higher than normal for 10-14 days following a steroid injection. Contact your doctor who manages your diabetes if your blood sugar is higher than usual    If you experience any of the following, call the clinic @ [phone]  - Fever over 100 degree F  - Swelling, bleeding, redness, drainage, warmth at the injection site  - New or worsening pain      [facility]    -----  Chief Complaint   Patient presents with    Right Hip - Pain       SUBJECTIVE  Yovana Enriquez is a/an 49 year old female who is seen in as a self referral for evaluation of right hip.     The patient is seen by themselves.    Onset: A few years(s) ago, noting increase in pain over the last few months. Reports insidious onset without acute precipitating event.  Location of Pain: right hip, lateral/GT, notes radiation into the upper leg  Worsened by: Laying on the side, sitting  Better with:  Treatments tried: PT, Home exercises, right sided GT steroid injection done 1+ years ago (notes good benefit, around 1 year of relief), various topicals, ice packs, hot backs, hot packs  Associated symptoms: no distal numbness or tingling; denies swelling or warmth    Orthopedic/Surgical history: NO  Social History/Occupation:  worker    **  Above information per rooming staff.  Additional history:  Pain right lateral hip, can radiate to lateral thigh, to level of knee.  Sometimes pain with sitting, more pain with lying on side (more on  "right).          REVIEW OF SYSTEMS:  Review of Systems    OBJECTIVE:  Ht 1.676 m (5' 6\")   Wt 63.5 kg (140 lb)   LMP  (LMP Unknown)   BMI 22.60 kg/m           Right hip exam    Inspection:      no edema or ecchymosis in hip area    ROM:     Full active and passive ROM   Lateral hip pain with rotation    Strength:      flexion no change       abduction some pain especially with sidelying       adduction mild lateral pain    Tender:      greater trochanter    Special Tests:      mild lateral pain with FADIR       Pain with Rena       Log roll neg      Straight leg raise neg      RADIOLOGY:  Final results and radiologist's interpretation, available in the [facility] health record.  Images were reviewed with the patient in the office today.  My personal interpretation of the performed imaging: no acute bony abnormality noted. Hip joint spaces appear fairly well preserved.      Recent Results (from the past 24 hour(s))   XR Pelvis and Hip Right 1 View    Narrative    XR PELVIS AND HIP RIGHT 1 VIEW  8/9/2024 9:17 AM     HISTORY: Suspect GT bursa, lateral hip pain; Chronic right hip pain  COMPARISON: None      Impression    IMPRESSION: No acute fracture or malalignment. Mild degenerative  changes throughout the pelvis. Moderate to severe degenerative changes  of the lower lumbar spine.     CHALINO WALTON MD         SYSTEM ID:  [id number]               Large Joint Injection/Arthocentesis: R greater trochanteric bursa    Date/Time: 8/9/2024 9:39 AM    Performed by: Zack Ibarra DO  Authorized by: Zack Ibarra DO    Indications:  Pain  Needle Size:  25 G  Guidance: landmark guided    Approach:  Lateral  Location:  Hip      Site:  R greater trochanteric bursa  Medications:  40 mg triamcinolone 40 MG/ML; 2 mL lidocaine 1 %  Outcome:  Tolerated well, no immediate complications  Procedure discussed: discussed risks, benefits, and alternatives    Consent Given by:  Patient  Timeout: timeout called immediately " prior to procedure    Prep: patient was prepped and draped in usual sterile fashion            Review of prior external note(s) from - pain management (injection), rheum

## 2024-08-09 ENCOUNTER — VIRTUAL VISIT (OUTPATIENT)
Dept: RHEUMATOLOGY | Facility: CLINIC | Age: 49
End: 2024-08-09
Payer: COMMERCIAL

## 2024-08-09 ENCOUNTER — ANCILLARY PROCEDURE (OUTPATIENT)
Dept: GENERAL RADIOLOGY | Facility: CLINIC | Age: 49
End: 2024-08-09
Attending: PEDIATRICS
Payer: COMMERCIAL

## 2024-08-09 ENCOUNTER — OFFICE VISIT (OUTPATIENT)
Dept: ORTHOPEDICS | Facility: CLINIC | Age: 49
End: 2024-08-09
Payer: COMMERCIAL

## 2024-08-09 VITALS — BODY MASS INDEX: 22.5 KG/M2 | HEIGHT: 66 IN | WEIGHT: 140 LBS

## 2024-08-09 DIAGNOSIS — M18.11 PRIMARY OSTEOARTHRITIS OF FIRST CARPOMETACARPAL JOINT OF RIGHT HAND: ICD-10-CM

## 2024-08-09 DIAGNOSIS — G89.29 CHRONIC RIGHT HIP PAIN: ICD-10-CM

## 2024-08-09 DIAGNOSIS — M25.551 CHRONIC RIGHT HIP PAIN: ICD-10-CM

## 2024-08-09 DIAGNOSIS — M35.01 SJOGREN'S SYNDROME WITH KERATOCONJUNCTIVITIS SICCA (H): ICD-10-CM

## 2024-08-09 DIAGNOSIS — M32.9 SYSTEMIC LUPUS ERYTHEMATOSUS, UNSPECIFIED SLE TYPE, UNSPECIFIED ORGAN INVOLVEMENT STATUS (H): Primary | ICD-10-CM

## 2024-08-09 DIAGNOSIS — I73.00 RAYNAUD'S PHENOMENON WITHOUT GANGRENE: ICD-10-CM

## 2024-08-09 DIAGNOSIS — Z79.899 HIGH RISK MEDICATIONS (NOT ANTICOAGULANTS) LONG-TERM USE: ICD-10-CM

## 2024-08-09 DIAGNOSIS — M25.551 LATERAL PAIN OF RIGHT HIP: Primary | ICD-10-CM

## 2024-08-09 PROCEDURE — G2211 COMPLEX E/M VISIT ADD ON: HCPCS | Mod: 95 | Performed by: INTERNAL MEDICINE

## 2024-08-09 PROCEDURE — 73502 X-RAY EXAM HIP UNI 2-3 VIEWS: CPT | Mod: TC | Performed by: RADIOLOGY

## 2024-08-09 PROCEDURE — 99203 OFFICE O/P NEW LOW 30 MIN: CPT | Mod: 25 | Performed by: PEDIATRICS

## 2024-08-09 PROCEDURE — 99214 OFFICE O/P EST MOD 30 MIN: CPT | Mod: 95 | Performed by: INTERNAL MEDICINE

## 2024-08-09 PROCEDURE — 20610 DRAIN/INJ JOINT/BURSA W/O US: CPT | Mod: RT | Performed by: PEDIATRICS

## 2024-08-09 RX ORDER — HYDROXYCHLOROQUINE SULFATE 200 MG/1
TABLET, FILM COATED ORAL
Qty: 135 TABLET | Refills: 0 | Status: SHIPPED | OUTPATIENT
Start: 2024-08-09

## 2024-08-09 RX ORDER — PREDNISONE 2.5 MG/1
2.5 TABLET ORAL DAILY
Qty: 90 TABLET | Refills: 2 | Status: SHIPPED | OUTPATIENT
Start: 2024-08-09

## 2024-08-09 RX ORDER — AMLODIPINE BESYLATE 5 MG/1
5 TABLET ORAL DAILY
Qty: 90 TABLET | Refills: 2 | Status: SHIPPED | OUTPATIENT
Start: 2024-08-09

## 2024-08-09 RX ORDER — AZATHIOPRINE 50 MG/1
75 TABLET ORAL DAILY
Qty: 135 TABLET | Refills: 2 | Status: SHIPPED | OUTPATIENT
Start: 2024-08-09

## 2024-08-09 RX ORDER — CEVIMELINE HYDROCHLORIDE 30 MG/1
30 CAPSULE ORAL 2 TIMES DAILY
Qty: 180 CAPSULE | Refills: 2 | Status: SHIPPED | OUTPATIENT
Start: 2024-08-09

## 2024-08-09 RX ADMIN — LIDOCAINE HYDROCHLORIDE 2 ML: 10 INJECTION, SOLUTION INFILTRATION; PERINEURAL at 09:39

## 2024-08-09 RX ADMIN — TRIAMCINOLONE ACETONIDE 40 MG: 40 INJECTION, SUSPENSION INTRA-ARTICULAR; INTRAMUSCULAR at 09:39

## 2024-08-09 NOTE — PATIENT INSTRUCTIONS
Reviewed nature of right lateral hip pain, consistent with gluteal tendinopathy, trochanteric bursitis in the lateral hip.  Reviewed options, including with physical therapy, imaging, steroid injection.  Following discussion, right lateral hip steroid injection done today.  Regarding rehab exercises, home exercises reviewed today.  If interested in physical therapy, contact clinic.  Defer any imaging for now, await response to injection and therapy exercises.  Plan to monitor at least 2 weeks for maximal benefit from the injection, and up to 1 month with combination with exercises.  If improving, follow-up is open-ended.  Otherwise, contact clinic for other questions or concerns.    If you have any further questions for your physician or physician s care team you can contact them thru [employer] or by calling [phone].          Injection Discharge Instructions    You may shower, however avoid swimming, tub baths or hot tubs for 24 hours following your procedure  You may have a mild to moderate increase in pain for several days following the injection.  It may take up to 14 days for the steroid medication to start working although you may feel the effect as early as a few days after the procedure.  You may use ice packs for 10-15 minutes, 3 to 4 times a day at the injection site for comfort  You may use anti-inflammatory medications (such as Ibuprofen or Aleve or Advil) if you are able to take safely, or acetaminophen (Tylenol) for pain control if necessary  If you were fasting, you may resume your normal diet and medications after the procedure  If you have diabetes, check your blood sugar more frequently than usual as your blood sugar may be higher than normal for 10-14 days following a steroid injection. Contact your doctor who manages your diabetes if your blood sugar is higher than usual    If you experience any of the following, call the clinic @ [phone]  - Fever over 100 degree F  - Swelling, bleeding,  redness, drainage, warmth at the injection site  - New or worsening pain

## 2024-08-09 NOTE — PATIENT INSTRUCTIONS
RHEUMATOLOGY    [facility] [place]  [address] [place] [address]  [place] [address]    Phone number: [phone]  Fax number: [phone]    If you need a medication refill, please contact us as you may need lab work and/or a follow up visit prior to your refill.      Thank you for choosing [facility]!    Qing Zapata CMA Rheumatology

## 2024-08-09 NOTE — PROGRESS NOTES
Yovana Enriquez  is a 49 year old year old who is being evaluated via a billable video visit.      How would you like to obtain your AVS? MyChart  If the video visit is dropped, the invitation should be resent by: Text to cell phone: [phone]   Will anyone else be joining your video visit? No      Rheumatology Video Visit      Yovana Enriquez MRN# [id number]   YOB: 1975 Age: 49 year old      Date of visit: 8/09/24   PCP: Bradford Mario  Onc: [facility]    Chief Complaint   Patient presents with:  Systemic lupus erythematosus    Assessment and Plan     1. Systemic lupus erythematosus (YANELIS by EIA positive in the past, leukopenia/lymphocytopenia, hypocomplementemia, polyarthralgias, oral sores, history of malar rash, photophobia, photosensitivity, Raynaud's Phenomenon): Previously on methotrexate that was discontinued for unclear reasons but the patient reports that she tolerated it well. Currently on azathioprine 75 mg daily (dose reduced on 11/8/2018 from 100mg daily without worsening symptoms at that time, eventually reduced to 50 mg daily and did well for a period of time; now on 75 mg daily), hydroxychloroquine 300 mg daily [avg], and Benlysta (worsening symptoms when stopped in the past).  Previously was doing well with regard to lupus.  However, more inflammatory arthritis symptoms at the second and third MCPs and left second-third PIPs, possibly due to missing several days of prednisone 2.5 mg daily; treat with a course of prednisone as noted below.   Note that she has likely adrenal insufficiency and has done much better with staying on prednisone 2.5 mg daily.  Chronic illness, stable.    - Continue azathioprine 75 mg daily   - Continue hydroxychloroquine  200mg daily with additional 200mg every other day (toxicity monitoring eye exam last done on 8/5/2022 by Dr. Queen)  - Continue Benlysta 200 mg SQ every 7 days  - Continue Prednisone 2.5mg daily (trouble reducing below this point,  likely adrenal insufficiency)  - Continue photoprotection  - Ophthalmology referral for hydroxychloroquine toxicity monitoring given previously and she reports having an appointment later this month with ophthalmology.  - Labs in 3 months: CBC, CMP, ESR, CRP, hepatitis B core antibody and surface antigen, hepatitis C antibody, QuantiFERON-TB gold plus, UA, Uprotein:creatinine    High risk medication requiring intensive toxicity monitoring at least quarterly.    2. Secondary Sjogren syndrome: Doing well with Evoxac and frequent sips of water, but then Evoxac became more expensive so pilocarpine was tried but not tolerated (GI upset).  Therefore, changed back to Evoxac and was doing well with BID dosing (GI upset when TID). Dry eyes well controled with artificial tears as needed.  Chronic illness  - Continue Evoxac 30 mg twice daily     3. Raynaud's Phenomenon: Amlodipine 5mg daily is effective.  Chronic illness, stable  - Continue amlodipine 5mg daily      4.  History of R>Ltrochanteric bursitis, possible iliotibial band syndrome as well: Steroid injections have helped.  Today she had a steroid injection from sports medicine clinic.       5. Fibromyalgia: Currently managed by PCP and she is considering pain management evaluation    6. Osteoarthritis of multiple joints: Primarily affecting the DIPs right first CMC joints.  Hand therapy referral.  Advised intermittent/nighttime immobilization of the thumb with a splint.  Could try paraffin wax bath.  Voltaren gel as needed.  - Hand therapy referral  - Voltaren gel 4 times daily as needed    7. MGUS: Followed at [facility] and the patient reports that she is going to have labs performed every 6 months methotrexate of her oncologist    8. Vaccinations: Vaccinations reviewed with Ms. Enriquez.    - Influenza: encouraged yearly vaccination  - [id number]: up to date  - [id number]: up to date  - Shingrix: Advised vaccination  - COVID-19: advised keeping updated, and  to hold azathioprine and benlysta for 1-2 weeks after COVID-19 vaccination        Total minutes spent in evaluation with patient, documentation, , and review of pertinent studies and chart notes: 20  The longitudinal plan of care for the rheumatology problem(s) were addressed during this visit.  Due to added complexity of care, we will continue to support the patient and the subsequent management of this condition with ongoing continuity of care.     Ms. Enriquez verbalized agreement with and understanding of the rational for the diagnosis and treatment plan.  All questions were answered to best of my ability and the patient's satisfaction. Ms. Enriquez was advised to contact the clinic with any questions that may arise after the clinic visit.      Thank you for involving me in the care of the patient    Return to clinic: 3 months    HPI   Yovana Enriquez is a 49 year old female with medical history significant for subclinical hyperthyroidism, hypertension, irritable bowel syndrome, fibromyalgia, hypertension, non-celiac gluten sensitivity (reportedly with bowel biopsies and laboratory workup that did not show celiac disease), anxiety, and systemic lupus erythematosus who presents for follow-up of SLE.    12/8/2023: Currently doing well.  Had mild ache of her toes on 1 foot for a few days that has since resolved.  Raynaud's phenomenon is controlled with amlodipine.  No lower extremity edema.  Evoxac is effective for Sjogren's syndrome; worsening symptoms with any missed dose.  Azathioprine, hydroxychloroquine, Benlysta, and prednisone are taken as prescribed.  Perimenopausal and occasionally feels extra warm but this tends to come and go.  Going to the gym now to maintain weight at around 140 pounds.    2/20/2024: Missed several days of prednisone 2.5 mg daily and felt increased pain, fatigue, and generally unwell while off prednisone; she missed prednisone 2.5 mg daily because she says she was just did  not make time to  her refill.  Now back on prednisone 2.5 mg daily.  Pain at the MCPs, PIPs, DIPs, right first CMC.  Diffuse body pain.    5/10/2024: Currently feels well.  Since last visit she went to see oncology at [facility] where she says that they are going to monitor her labs every 6 months.  Sugars controlled with Evoxac, frequent sips of water, seeing a dentist regularly.  Plans to establish care in the pain clinic for fibromyalgia management.  Lupus controlled at this time.  Overall she says she is happy with how well she is doing right now.    Today, 8/9/2024: Trochanteric bursitis injections were done today in the sports medicine clinic.  Right first CMC joint is more bothersome now and she is going to try splinting and topical Voltaren gel.  Mild fatigue that is stable.  Overall feels like lupus is controlled.  Medications are well-tolerated.  No missed doses of medication.  Has an eye exam hydroxychloroquine toxicity monitoring later this month.    Denies fevers, chills, nausea, vomiting. No abdominal pain. no chest pain/pressure, palpitations, or shortness of breath.  No rash currently. No LE swelling.  She has photosensitivity and photophobia.   No eye pain or redness.     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    ROS   12 point review of system was completed and negative except as noted in the HPI     Active Problem List     Patient Active Problem List   Diagnosis    Systemic lupus erythematosus (H)    Menorrhagia    History of ovarian cyst    Dysmenorrhea    History of gestational diabetes mellitus, not currently pregnant    Intramural leiomyoma of uterus    Hyperlipidemia LDL goal <130    Subclinical hyperthyroidism    Anxiety    High risk medication use    Fibromyalgia    Chronic constipation    Irritable bowel syndrome    Hypertension goal BP (blood pressure) < 140/90    Non-celiac gluten sensitivity    Raynaud's phenomenon without gangrene     Sjogren's syndrome (H24)    Intramural and submucous leiomyoma of uterus    Adrenal insufficiency (H24)    Screening for malignant neoplasm of cervix     Past Medical History     Past Medical History:   Diagnosis Date    Arthritis     Chronic constipation 12/16/2013    Dysmenorrhea 10/1/2012    Fibromyalgia 11/15/2013    Health Care Home 3/19/2014    **See Letters for [facility] Care Plan: Emergency Care Plan      High risk medication use 9/13/2013    History of gestational diabetes mellitus, not currently pregnant 10/1/2012    History of ovarian cyst 10/1/2012    Hyperlipidemia LDL goal <130 10/18/2012    Hypertension goal BP (blood pressure) < 140/90 1/19/2015    Intramural leiomyoma of uterus 10/5/2012    Irritable bowel syndrome 3/11/2014    Patient states no history colonoscopy      Menorrhagia 10/1/2012    Non-celiac gluten sensitivity 2/8/2016    PONV (postoperative nausea and vomiting)     Subclinical hyperthyroidism 1/17/2013    Systemic lupus erythematosus (H) 4/23/2012     Past Surgical History     Past Surgical History:   Procedure Laterality Date    ABDOMEN SURGERY  2004    Tubal ligation    COLONOSCOPY N/A 2/20/2015    Procedure: COMBINED COLONOSCOPY, SINGLE OR MULTIPLE BIOPSY/POLYPECTOMY BY BIOPSY;  Surgeon: Fred Cullen MD;  Location: [facility] OR    COLONOSCOPY N/A 10/29/2018    Procedure: COMBINED COLONOSCOPY, SINGLE OR MULTIPLE BIOPSY/POLYPECTOMY BY BIOPSY;  Surgeon: Inez Sawyer MD;  Location: [facility]    COLONOSCOPY WITH CO2 INSUFFLATION N/A 2/20/2015    Procedure: COLONOSCOPY WITH CO2 INSUFFLATION;  Surgeon: Fred Cullen MD;  Location: [facility] OR    ENT SURGERY  10-24-14    Bottom lip biopsies    ESOPHAGOSCOPY, GASTROSCOPY, DUODENOSCOPY (EGD), COMBINED N/A 2/20/2015    Procedure: COMBINED ESOPHAGOSCOPY, GASTROSCOPY, DUODENOSCOPY (EGD), BIOPSY SINGLE OR MULTIPLE;  Surgeon: Fred Cullen MD;  Location: [facility] OR    HC BREATH HYDROGEN TEST  12/31/2013    Procedure: HYDROGEN BREATH  TEST;  Surgeon: Camden Almazan MD;  Location: [facility] HYSTEROSCOPY, SURGICAL; W/ ENDOMETRIAL ABLATION, ANY METHOD  10-19-12    ORTHOPEDIC SURGERY  12/2010    SHOULDER SURGERY Right     R shoulder    TUBAL LIGATION  2004     Allergy     Allergies   Allergen Reactions    Fluticasone Propionate (Nasal) [Fluticasone] Anaphylaxis     Current Medication List     Current Outpatient Medications   Medication Sig Dispense Refill    amLODIPine (NORVASC) 5 MG tablet Take 1 tablet (5 mg) by mouth daily 90 tablet 2    azaTHIOprine (IMURAN) 50 MG tablet Take 1.5 tablets (75 mg) by mouth daily 135 tablet 2    azelastine (ASTELIN) 0.1 % nasal spray Spray 1 spray into both nostrils 2 times daily      belimumab (BENLYSTA) subcutaneous injection (prefilled autoinjector) Inject 1 mL (200 mg) Subcutaneous every 7 days . Hold for signs of infection and seek medical attention. Autoinjector 4 mL 6    blood glucose test strip Used for testing glucose 2-3 times daily 1 Month 5    Calcium Carb-Cholecalciferol (CALCIUM + D3) 600-200 MG-UNIT TABS Take 1 tablet by mouth daily      cevimeline (EVOXAC) 30 MG capsule Take 1 capsule (30 mg) by mouth 2 times daily 180 capsule 2    famotidine (PEPCID) 40 MG tablet Take 1 tablet (40 mg) by mouth nightly as needed for heartburn 90 tablet 0    Fezolinetant 45 MG TABS Take 45 mg by mouth daily 90 tablet 3    hydroxychloroquine (PLAQUENIL) 200 MG tablet Hydroxychloroquine 200mg daily; and an additional 200mg every other day. Yearly eye exam, including 10-2 VF and SD-OCT, is required 135 tablet 2    losartan (COZAAR) 25 MG tablet TAKE 1 TABLET BY MOUTH EVERY DAY 30 tablet 0    nortriptyline (PAMELOR) 10 MG capsule TAKE 1 CAPSULE BY MOUTH AT BEDTIME 90 capsule 1    predniSONE (DELTASONE) 2.5 MG tablet Take 1 tablet (2.5 mg) by mouth daily 90 tablet 2    SUMAtriptan (IMITREX) 50 MG tablet Take 50 mg by mouth at onset of headache for migraine      Vitamin D, Cholecalciferol, 1000 units CAPS Take  "4,000 Int'l Units by mouth daily       No current facility-administered medications for this visit.     Social History   See HPI    Family History     Family History   Problem Relation Age of Onset    Respiratory Maternal Grandfather     Cancer Maternal Grandfather     Other Cancer Maternal Grandfather         Skin cancer    Asthma Son     Circulatory Maternal Grandmother         Brain anurysm    Diabetes Maternal Grandmother     Hypertension Mother     Glaucoma Mother 50        drops    Cancer Mother     Hypertension Sister     Hypertension Sister     Diabetes Sister     Hypertension Sister     Anxiety Disorder Sister     Asthma Son     Diabetes No family hx of     Cerebrovascular Disease No family hx of     Thyroid Disease No family hx of     Macular Degeneration No family hx of        Physical Exam     Temp Readings from Last 3 Encounters:   07/31/24 97.9  F (36.6  C) (Tympanic)   04/13/24 98.1  F (36.7  C) (Tympanic)   03/05/24 98  F (36.7  C) (Tympanic)     BP Readings from Last 5 Encounters:   07/31/24 134/80   04/13/24 (!) 142/90   03/05/24 118/80   02/20/24 (!) 134/90   08/07/23 120/80     Pulse Readings from Last 1 Encounters:   07/31/24 80     Resp Readings from Last 1 Encounters:   07/31/24 20     Estimated body mass index is 22.6 kg/m  as calculated from the following:    Height as of an earlier encounter on 8/9/24: 1.676 m (5' 6\").    Weight as of an earlier encounter on 8/9/24: 63.5 kg (140 lb).      GEN: NAD. Healthy appearing adult.   HEENT: MMM.  Anicteric, noninjected sclera. No obvious external lesions of the ear and nose. Hearing intact.  PULM: No increased work of breathing  MSK:  Hands and wrists without swelling.  SKIN: No rash or jaundice seen  PSYCH: Alert. Appropriate.      Labs / Imaging (select studies)     YANELIS  Recent Labs   Lab Test 07/25/16  1433   ANAIGG <1:40  Reference range: <1:40  (Note)  <1:40  INTERPRETIVE INFORMATION: YANELIS by IFA, IgG  Anti-nuclear antibodies (YANELIS) are seen in a " variety of  systemic rheumatic diseases and are determined by indirect  fluorescence assay (IFA) using HEp-2 substrate with an  IgG-specific conjugate. YANELIS titers less than or equal to  1:80 have variable relevance while titers greater than or  equal to 1:160 are considered clinically significant. These  antibodies may precede clinical disease onset; however,  healthy individuals and those with advanced age have been  reported to be positive for YANELIS. When observed, one of the  five basic patterns is reported: homogeneous,  peripheral/rim, speckled, centromere, or nucleolar. If  cytoplasmic fluorescence is observed, it is noted. IFA  methodology is subjective and has occasionally been shown  to lack sensitivity for anti-SSA/Ro antibodies.  Negative results do not necessarily rule out the presence  of SSc. If clinical suspicion remains, consider further  te sting for U3-RNP, PM/Scl, or Th/To antibodies associated  with SSc.  Performed by [facility],  [address] [place] [id number] [phone]  [email or web address] Alcon Krishna MD, Lab. Director       dsDNA  Recent Labs   Lab Test 05/05/24  1205 04/23/23  1305 01/14/23  0917 07/02/22  0905 04/03/22  1236 10/10/21  1202 07/19/21  1820 04/20/21  1804 01/11/21  1805   DNA <0.6 <0.6 0.6 <0.6 0.7 <0.6 <0.6 1 <1     C3/C4  Recent Labs   Lab Test 05/05/24  1205 04/23/23  1305 07/02/22  0905 10/10/21  1202 07/19/21  1820 04/20/21  1804 01/11/21  1805 10/25/20  1059 04/07/20  1136   X3AWGKG 111 93 98 89 91 97 91 103 100   [id number] 23 22 22 17 20 20 20 >9* 16     CBC  Recent Labs   Lab Test 08/04/24  1152 05/05/24  1205 02/11/24  1158 07/19/21  1820 06/13/21  1312 04/20/21  1804 01/11/21  1805   WBC 3.7* 5.1 3.8*   < > 5.8 7.7 8.1   RBC 4.21 4.67 4.10   < > 4.19 4.22 4.00   HGB 13.6 14.8 13.4   < > 13.5 13.7 13.2   HCT 39.1 43.0 38.6   < > 40.6 40.0 38.7   MCV 93 92 94   < > 97 95 97   RDW 11.6 11.1 11.4   < > 11.6 11.5 11.5    321 278   < > 300 313 316   MCH 32.3  31.7 32.7   < > 32.2 32.5 33.0   MCHC 34.8 34.4 34.7   < > 33.3 34.3 34.1   NEUTROPHIL 62 68 63   < > 77.2 81.2 83.4   LYMPH 27 18 28   < > 13.7 10.8 8.8   MONOCYTE 8 5 7   < > 6.2 6.4 6.7   EOSINOPHIL 2 8 2   < > 1.7 0.8 0.6   BASOPHIL 1 1 1   < > 1.2 0.8 0.5   ANEU  --   --   --   --  4.5 6.3 6.7   ALYM  --   --   --   --  0.8 0.8 0.7*   ZOË  --   --   --   --  0.4 0.5 0.5   AEOS  --   --   --   --  0.1 0.1 0.1   ABAS  --   --   --   --  0.1 0.1 0.0   ANEUTAUTO 2.3 3.4 2.4   < >  --   --   --    ALYMPAUTO 1.0 0.9 1.1   < >  --   --   --    AMONOAUTO 0.3 0.3 0.3   < >  --   --   --    AEOSAUTO 0.1 0.4 0.1   < >  --   --   --    ABSBASO 0.0 0.1 0.0   < >  --   --   --     < > = values in this interval not displayed.     CMP  Recent Labs   Lab Test 08/04/24  1152 05/05/24  1205 02/11/24  1158 11/04/23  1043 07/23/23  1326 04/23/23  1305 04/23/23  1305 07/19/21  1820 04/20/21  1804 01/11/21  1805 10/25/20  1059 04/07/20  1136    139 141 140 139  --  140   < > 140 138 139 140   POTASSIUM 4.4 4.3 4.0 3.9 4.3   < > 3.9   < > 4.1 3.9 4.1 3.6   CHLORIDE 104 100 104 103 105   < > 108   < > 106 106 104 106   CO2 29 28 29 27 26   < > 30   < > 33* 31 31 31   ANIONGAP 6* 11 8 10 8   < > 2*   < > 1* 1* 4 3   * 96 102* 80 118*  --  103*   < > 97 99 86 104*   BUN 11.4 12.4 13.5 9.6 11.0   < > 15   < > 13 15 11 16   CR 0.71 0.74 0.75 0.70 0.64  --  0.72   < > 0.76 0.77 0.73 0.71   GFRESTIMATED >90 >90 >90 >90 >90  --  >90   < > >90 >90 >90 >90   GFRESTBLACK  --   --   --   --   --   --   --   --  >90 >90 >90 >90   MELANIA 9.1 9.7 9.2 8.9 8.7  --  8.8   < > 9.0 8.8 9.1 9.1   BILITOTAL 0.3 0.3 0.3 0.4 0.2  --  0.3   < > 0.3 0.2 0.4 0.3   ALBUMIN 4.4 4.9 4.3 4.2 4.1   < > 4.1   < > 4.1 4.0 4.4 4.3   PROTTOTAL 6.5 7.4 6.3* 6.4 6.3*  --  6.9   < > 6.8 6.5* 7.5 7.1   ALKPHOS 49 57 50 49 57  --  59   < > 57 55 62 54   AST 26 38 29 25 24  --  29   < > 17 14 22 13   ALT 20 36 24 18 14  --  38   < > 25 19 23 24    < > = values in  this interval not displayed.       HgA1c  Recent Labs   Lab Test 03/05/24  1808 05/30/19  0859   A1C 5.6 5.2       Calcium/VitaminD  Recent Labs   Lab Test 08/04/24  1152 05/05/24  1205 02/11/24  1158 10/25/20  1059 04/07/20  1136 07/17/19  1756 04/16/19  1759 01/30/18  1752 10/02/17  1814   MELANIA 9.1 9.7 9.2   < > 9.1   < > 8.4*   < > 8.7   VITDT  --   --   --   --  59  --  40  --  37    < > = values in this interval not displayed.     ESR/CRP  Recent Labs   Lab Test 08/04/24  1152 05/05/24  1205 11/04/23  1043 07/23/23  1326 04/23/23  1305 01/14/23  0917 10/16/22  1205   SED 5 5 4   < > 5 6 5   CRP  --   --   --   --  <2.9 <2.9 <2.9   CRPI <3.00 <3.00 <3.00   < >  --   --   --     < > = values in this interval not displayed.     CK/Aldolase  Recent Labs   Lab Test 10/10/21  1202 07/19/21  1820 04/20/21  1804   CKT 87 105 90     TSH/T4  Recent Labs   Lab Test 03/05/24  1808 11/04/23  1043 04/03/22  1236 04/10/19  1808 10/18/17  1445 10/07/16  0714   TSH 0.36 0.76 0.59   < > 0.33* 0.37*   T4  --   --   --   --  1.24 1.16    < > = values in this interval not displayed.     Lipid Panel  Recent Labs   Lab Test 11/04/23  1043 04/03/22  1236 01/26/19  0916   CHOL 154 177 133   TRIG 75 132 64   HDL 77 80 68   LDL 62 71 52   NHDL 77 97 65     Lyme ab screening  Recent Labs   Lab Test 05/05/24  1205   LYMEGM 0.35       Tuberculosis Screening  Recent Labs   Lab Test 01/08/22  1156 07/03/17  1447   TBRES Negative  --    TBRSLT  --  Negative   TBAGN  --  0.00     HIV Screening  Recent Labs   Lab Test 04/10/19  1808   HIAGAB Nonreactive     UA  Recent Labs   Lab Test 08/04/24  1153 05/05/24  1205 02/11/24  1211 07/23/23  1326 04/23/23  1305 01/14/23  1124 10/16/22  1205 10/10/21  1204 09/01/21  1522 04/20/21  1819 01/11/21  1810 10/25/20  1110 09/15/20  1655 07/03/20  1010 04/07/20  1140   COLOR Yellow Yellow Yellow   < > Yellow   < > Yellow   < > Yellow   < > Yellow Yellow Yellow   < > Yellow   APPEARANCE Clear Clear Clear   < >  Clear   < > Clear   < > Slightly Cloudy*   < > Clear Clear Clear   < > Clear   URINEGLC Negative Negative Negative   < > Negative   < > Negative   < > Negative   < > Negative Negative Negative   < > Negative   URINEBILI Negative Negative Negative   < > Negative   < > Negative   < > Negative   < > Negative Negative Negative   < > Negative   SG 1.020 1.010 >=1.030   < > <=1.005   < > 1.020   < > 1.015   < > >1.030 1.010 1.025   < > >1.030   URINEPH 7.5* 7.0 5.5   < > 6.5   < > 6.0   < > 7.0   < > 6.0 7.0 6.0   < > 6.5   PROTEIN Negative Negative 30*   < > Negative   < > Negative   < > Trace*   < > Negative Negative Negative   < > Negative   UROBILINOGEN 0.2 0.2 0.2   < > 0.2   < > 0.2   < > 0.2   < > 0.2 0.2 0.2   < > 0.2   NITRITE Negative Negative Negative   < > Negative   < > Negative   < > Negative   < > Negative Negative Negative   < > Negative   UBLD Negative Negative Negative   < > Negative   < > Negative   < > Moderate*   < > Small* Negative Negative   < > Negative   LEUKEST Negative Negative Moderate*   < > Trace*   < > Trace*   < > Moderate*   < > Negative Trace* Negative   < > Trace*   WBCU  --   --  25-50*  --  0-5  --  0-5   < > 25-50*   < > 0 - 5 0 - 5 5-10*   < > 0 - 5   RBCU  --   --  0-2  --  None Seen  --  0-2   < > 25-50*   < > O - 2 O - 2 O - 2   < > O - 2   SQUAMOUSEPI  --   --   --   --   --   --   --   --   --   --  Few Few Few   < > Few   BACTERIA  --   --  Many*  --   --   --   --   --  Few*  --  Few* Few* Few*   < > Few*   MUCOUS  --   --   --   --   --   --   --   --   --   --  Present*  --  Present*  --  Present*    < > = values in this interval not displayed.     Urine Microscopic  Recent Labs   Lab Test 02/11/24  1211 04/23/23  1305 10/16/22  1205 10/10/21  1204 09/01/21  1522 06/13/21  1309 01/11/21  1810 10/25/20  1110 09/15/20  1655 07/03/20  1010 04/07/20  1140   WBCU 25-50* 0-5 0-5   < > 25-50*   < > 0 - 5 0 - 5 5-10*   < > 0 - 5   RBCU 0-2 None Seen 0-2   < > 25-50*   < > O - 2 O -  2 O - 2   < > O - 2   SQUAMOUSEPI  --   --   --   --   --   --  Few Few Few   < > Few   BACTERIA Many*  --   --   --  Few*  --  Few* Few* Few*   < > Few*   MUCOUS  --   --   --   --   --   --  Present*  --  Present*  --  Present*    < > = values in this interval not displayed.     Urine Protein  GHUTP and UTP= Urine protein (random), GHUTPG and UTPG = urine protein:creatinine ratio (random), UCRR = urine creatinine (random)  Recent Labs   Lab Test 08/04/24  1153 05/05/24  1205 02/11/24  1210 11/04/23  1140 07/23/23  1326 07/02/22  0905 04/10/22  1232 01/08/22  1156 10/10/21  1202   GHUTP 6.6 <6.0 31.8   < > 9.2   < >  --   --   --    UTP  --   --   --   --   --   --  0.09 0.07 0.06   GHUTPG 0.10  --  0.17  --  0.13   < >  --   --   --    UTPG  --   --   --   --   --   --  0.11 0.09 0.12   UCRR 64.4 25.6 186.0   < > 69.7   < > 83 81 52    < > = values in this interval not displayed.       Immunization History     Immunization History   Administered Date(s) Administered    COVID-19 MONOVALENT 12+ (Pfizer) 03/13/2021, 04/03/2021    Influenza (H1N1) 12/02/2009    Influenza (IIV3) PF 02/08/2007, 09/17/2010, 10/01/2012, 09/23/2013    Influenza Vaccine >6 months,quad, PF 09/23/2013, 09/20/2016, 10/30/2017, 10/14/2019    Influenza Vaccine, 6+MO IM (QUADRIVALENT W/PRESERVATIVES) 08/11/2018    Pneumo Conj 13-V (2010&after) 04/25/2016    Pneumococcal 23 valent 10/01/2012, 11/06/2017    TDAP (Adacel,Boostrix) 12/17/2010, 06/24/2022    Td (Adult), Adsorbed 02/12/2001            Chart documentation done in part with Dragon Voice recognition Software. Although reviewed after completion, some word and grammatical error may remain.      Video-Visit Details    Type of service:  Video Visit    Video Start Time: 10:39 AM    Video End Time: 10:52 AM    Originating Location (pt. Location): Home, [place]    Distant Location (provider location):  off site, [place]    Platform used for Video Visit: Brianna Colvin MD

## 2024-08-09 NOTE — LETTER
8/9/2024      Yovana Enriquez  [address] [facility]  [place] [address]      Dear Colleague,    Thank you for referring your patient, Yovana Enriquez, to the [facility] [place]. Please see a copy of my visit note below.    ASSESSMENT & PLAN    Ruma was seen today for pain.    Diagnoses and all orders for this visit:    Lateral pain of right hip  -     Large Joint Injection/Arthocentesis: R greater trochanteric bursa    Chronic right hip pain  -     XR Pelvis and Hip Right 1 View  -     Large Joint Injection/Arthocentesis: R greater trochanteric bursa      Overall this is a chronic issue, with increasing symptoms with time.  Injection done today.  See below.  Questions answered. Discussed signs and symptoms that may indicate more serious issues; the patient was instructed to seek appropriate care if noted. Ruma indicates understanding of these issues and agrees with the plan.      See Patient Instructions  Patient Instructions   Reviewed nature of right lateral hip pain, consistent with gluteal tendinopathy, trochanteric bursitis in the lateral hip.  Reviewed options, including with physical therapy, imaging, steroid injection.  Following discussion, right lateral hip steroid injection done today.  Regarding rehab exercises, home exercises reviewed today.  If interested in physical therapy, contact clinic.  Defer any imaging for now, await response to injection and therapy exercises.  Plan to monitor at least 2 weeks for maximal benefit from the injection, and up to 1 month with combination with exercises.  If improving, follow-up is open-ended.  Otherwise, contact clinic for other questions or concerns.    If you have any further questions for your physician or physician s care team you can contact them thru [employer] or by calling [phone].          Injection Discharge Instructions    You may shower, however avoid swimming, tub baths or hot tubs for 24 hours following your  procedure  You may have a mild to moderate increase in pain for several days following the injection.  It may take up to 14 days for the steroid medication to start working although you may feel the effect as early as a few days after the procedure.  You may use ice packs for 10-15 minutes, 3 to 4 times a day at the injection site for comfort  You may use anti-inflammatory medications (such as Ibuprofen or Aleve or Advil) if you are able to take safely, or acetaminophen (Tylenol) for pain control if necessary  If you were fasting, you may resume your normal diet and medications after the procedure  If you have diabetes, check your blood sugar more frequently than usual as your blood sugar may be higher than normal for 10-14 days following a steroid injection. Contact your doctor who manages your diabetes if your blood sugar is higher than usual    If you experience any of the following, call the clinic @ [phone]  - Fever over 100 degree F  - Swelling, bleeding, redness, drainage, warmth at the injection site  - New or worsening pain      [facility] [place]    -----  Chief Complaint   Patient presents with     Right Hip - Pain       SUBJECTIVE  Yovana Enriquez is a/an 49 year old female who is seen in as a self referral for evaluation of right hip.     The patient is seen by themselves.    Onset: A few years(s) ago, noting increase in pain over the last few months. Reports insidious onset without acute precipitating event.  Location of Pain: right hip, lateral/GT, notes radiation into the upper leg  Worsened by: Laying on the side, sitting  Better with:  Treatments tried: PT, Home exercises, right sided GT steroid injection done 1+ years ago (notes good benefit, around 1 year of relief), various topicals, ice packs, hot backs, hot packs  Associated symptoms: no distal numbness or tingling; denies swelling or warmth    Orthopedic/Surgical history:  "NO  Social History/Occupation:  worker    **  Above information per rooming staff.  Additional history:  Pain right lateral hip, can radiate to lateral thigh, to level of knee.  Sometimes pain with sitting, more pain with lying on side (more on right).          REVIEW OF SYSTEMS:  Review of Systems    OBJECTIVE:  Ht 1.676 m (5' 6\")   Wt 63.5 kg (140 lb)   LMP  (LMP Unknown)   BMI 22.60 kg/m           Right hip exam    Inspection:      no edema or ecchymosis in hip area    ROM:     Full active and passive ROM   Lateral hip pain with rotation    Strength:      flexion no change       abduction some pain especially with sidelying       adduction mild lateral pain    Tender:      greater trochanter    Special Tests:      mild lateral pain with FADIR       Pain with Rena       Log roll neg      Straight leg raise neg      RADIOLOGY:  Final results and radiologist's interpretation, available in the [facility] health record.  Images were reviewed with the patient in the office today.  My personal interpretation of the performed imaging: no acute bony abnormality noted. Hip joint spaces appear fairly well preserved.      Recent Results (from the past 24 hour(s))   XR Pelvis and Hip Right 1 View    Narrative    XR PELVIS AND HIP RIGHT 1 VIEW  8/9/2024 9:17 AM     HISTORY: Suspect GT bursa, lateral hip pain; Chronic right hip pain  COMPARISON: None      Impression    IMPRESSION: No acute fracture or malalignment. Mild degenerative  changes throughout the pelvis. Moderate to severe degenerative changes  of the lower lumbar spine.     CHALINO WALTON MD         SYSTEM ID:  [id number]               Large Joint Injection/Arthocentesis: R greater trochanteric bursa    Date/Time: 8/9/2024 9:39 AM    Performed by: Zack Ibarra DO  Authorized by: Zack Ibarra DO    Indications:  Pain  Needle Size:  25 G  Guidance: landmark guided    Approach:  Lateral  Location:  Hip      Site:  R greater trochanteric " bursa  Outcome:  Tolerated well, no immediate complications  Procedure discussed: discussed risks, benefits, and alternatives    Consent Given by:  Patient  Timeout: timeout called immediately prior to procedure    Prep: patient was prepped and draped in usual sterile fashion            Review of prior external note(s) from - pain management (injection), rheum           Again, thank you for allowing me to participate in the care of your patient.        Sincerely,        Zack Ibarra DO

## 2024-08-12 ENCOUNTER — TELEPHONE (OUTPATIENT)
Dept: RHEUMATOLOGY | Facility: CLINIC | Age: 49
End: 2024-08-12
Payer: COMMERCIAL

## 2024-08-12 NOTE — TELEPHONE ENCOUNTER
Received fax from pharmacy stating patient requires Prior Authorization for Diclofenac Sodium 1% Gel.     Insurance information:   Name:   Phone number: [phone]  ID number:     Initiate prior authorization or change medication?    If a prior authorization is to be initiated, please list the following:    -any medications the patient has tried and failed or any contraindications.  -is the patient currently on this medication, or has tried before?  -What is the diagnosis?  -Justification or other information that may be helpful.

## 2024-08-13 NOTE — TELEPHONE ENCOUNTER
PA Initiation    Medication: DICLOFENAC SODIUM 1 % EX GEL  Insurance Company: [employer] Non-Specialty PA's - Phone [phone] Fax [phone]  Pharmacy Filling the Rx: [id number] [place] - [address]  Filling Pharmacy Phone: [phone]  Filling Pharmacy Fax:    Start Date: 8/13/2024

## 2024-08-13 NOTE — TELEPHONE ENCOUNTER
Prior Authorization Approval    Medication: DICLOFENAC SODIUM 1 % EX GEL  Authorization Effective Date: 7/14/2024  Authorization Expiration Date: 8/13/2025  Approved Dose/Quantity: Qty 400 50 day supply  Reference #: [id number]   Insurance Company: [employer] Non-Specialty PA's - Phone [phone] Fax [phone]  Which Pharm[facility] is filling the prescription[id number] [place] [place] [facility] [address]  Pharmacy Notified: YES  Patient Notified: NO

## 2024-08-19 DIAGNOSIS — I10 HYPERTENSION GOAL BP (BLOOD PRESSURE) < 140/90: ICD-10-CM

## 2024-08-20 RX ORDER — LIDOCAINE HYDROCHLORIDE 10 MG/ML
2 INJECTION, SOLUTION INFILTRATION; PERINEURAL
Status: SHIPPED | OUTPATIENT
Start: 2024-08-09

## 2024-08-20 RX ORDER — TRIAMCINOLONE ACETONIDE 40 MG/ML
40 INJECTION, SUSPENSION INTRA-ARTICULAR; INTRAMUSCULAR
Status: SHIPPED | OUTPATIENT
Start: 2024-08-09

## 2024-08-21 RX ORDER — LOSARTAN POTASSIUM 25 MG/1
25 TABLET ORAL DAILY
Qty: 90 TABLET | Refills: 0 | Status: SHIPPED | OUTPATIENT
Start: 2024-08-21

## 2024-08-29 ENCOUNTER — ANESTHESIA EVENT (OUTPATIENT)
Dept: GASTROENTEROLOGY | Facility: CLINIC | Age: 49
End: 2024-08-29
Payer: COMMERCIAL

## 2024-08-29 NOTE — ANESTHESIA PREPROCEDURE EVALUATION
Anesthesia Pre-Procedure Evaluation    Patient: Yovana Enriquez   MRN: [id number] : 1975        Procedure : Procedure(s):  Colonoscopy          Past Medical History:   Diagnosis Date    Arthritis     Chronic constipation 2013    Dysmenorrhea 10/1/2012    Fibromyalgia 11/15/2013    Health Care Home 3/19/2014    **See Letters for [facility] Care Plan: Emergency Care Plan      High risk medication use 2013    History of gestational diabetes mellitus, not currently pregnant 10/1/2012    History of ovarian cyst 10/1/2012    Hyperlipidemia LDL goal <130 10/18/2012    Hypertension goal BP (blood pressure) < 140/90 2015    Intramural leiomyoma of uterus 10/5/2012    Irritable bowel syndrome 3/11/2014    Patient states no history colonoscopy      Menorrhagia 10/1/2012    Non-celiac gluten sensitivity 2016    PONV (postoperative nausea and vomiting)     Subclinical hyperthyroidism 2013    Systemic lupus erythematosus (H) 2012      Past Surgical History:   Procedure Laterality Date    ABDOMEN SURGERY  2004    Tubal ligation    COLONOSCOPY N/A 2015    Procedure: COMBINED COLONOSCOPY, SINGLE OR MULTIPLE BIOPSY/POLYPECTOMY BY BIOPSY;  Surgeon: Fred Cullen MD;  Location: [facility] OR    COLONOSCOPY N/A 10/29/2018    Procedure: COMBINED COLONOSCOPY, SINGLE OR MULTIPLE BIOPSY/POLYPECTOMY BY BIOPSY;  Surgeon: Inez Sawyer MD;  Location: [facility] OR    COLONOSCOPY WITH CO2 INSUFFLATION N/A 2015    Procedure: COLONOSCOPY WITH CO2 INSUFFLATION;  Surgeon: Fred Cullen MD;  Location: [facility] OR    ENT SURGERY  10-24-14    Bottom lip biopsies    ESOPHAGOSCOPY, GASTROSCOPY, DUODENOSCOPY (EGD), COMBINED N/A 2015    Procedure: COMBINED ESOPHAGOSCOPY, GASTROSCOPY, DUODENOSCOPY (EGD), BIOPSY SINGLE OR MULTIPLE;  Surgeon: Fred Cullen MD;  Location: [facility] OR    HC BREATH HYDROGEN TEST  2013    Procedure: HYDROGEN BREATH TEST;  Surgeon: Camden Almazan MD;   Location: [facility]    HC HYSTEROSCOPY, SURGICAL; W/ ENDOMETRIAL ABLATION, ANY METHOD  10-19-12    ORTHOPEDIC SURGERY  12/2010    SHOULDER SURGERY Right     R shoulder    TUBAL LIGATION  2004      Allergies   Allergen Reactions    Fluticasone Propionate (Nasal) [Fluticasone] Anaphylaxis      Social History     Tobacco Use    Smoking status: Never    Smokeless tobacco: Never    Tobacco comments:     Lives in smoke free household   Substance Use Topics    Alcohol use: No     Alcohol/week: 0.0 standard drinks of alcohol      Wt Readings from Last 1 Encounters:   08/09/24 63.5 kg (140 lb)        Anesthesia Evaluation   Pt has had prior anesthetic.     History of anesthetic complications  - PONV.      ROS/MED HX  ENT/Pulmonary:       Neurologic:       Cardiovascular:     (+) Dyslipidemia hypertension- -   -  - -                                      METS/Exercise Tolerance:     Hematologic:       Musculoskeletal:   (+)  arthritis,             GI/Hepatic:       Renal/Genitourinary:       Endo:     (+)          thyroid problem, hyperthyroidism,           Psychiatric/Substance Use:     (+) psychiatric history anxiety       Infectious Disease:       Malignancy:       Other:            Physical Exam    Airway        Mallampati: I   TM distance: > 3 FB   Neck ROM: full   Mouth opening: > 3 cm    Respiratory Devices and Support         Dental       (+) Minor Abnormalities - some fillings, tiny chips      Cardiovascular   cardiovascular exam normal          Pulmonary   pulmonary exam normal                OUTSIDE LABS:  CBC:   Lab Results   Component Value Date    WBC 3.7 (L) 08/04/2024    WBC 5.1 05/05/2024    HGB 13.6 08/04/2024    HGB 14.8 05/05/2024    HCT 39.1 08/04/2024    HCT 43.0 05/05/2024     08/04/2024     05/05/2024     BMP:   Lab Results   Component Value Date     08/04/2024     05/05/2024    POTASSIUM 4.4 08/04/2024    POTASSIUM 4.3 05/05/2024    CHLORIDE 104 08/04/2024    CHLORIDE 100  "05/05/2024    CO2 29 08/04/2024    CO2 28 05/05/2024    BUN 11.4 08/04/2024    BUN 12.4 05/05/2024    CR 0.71 08/04/2024    CR 0.74 05/05/2024     (H) 08/04/2024    GLC 96 05/05/2024     COAGS: No results found for: \"PTT\", \"INR\", \"FIBR\"  POC:   Lab Results   Component Value Date    HCG Negative 10/29/2018     HEPATIC:   Lab Results   Component Value Date    ALBUMIN 4.4 08/04/2024    PROTTOTAL 6.5 08/04/2024    ALT 20 08/04/2024    AST 26 08/04/2024    ALKPHOS 49 08/04/2024    BILITOTAL 0.3 08/04/2024     OTHER:   Lab Results   Component Value Date    A1C 5.6 03/05/2024    MELANIA 9.1 08/04/2024    LIPASE 120 01/19/2015    TSH 0.36 03/05/2024    T4 1.24 10/18/2017    T3 105 01/17/2013    CRP <2.9 04/23/2023    SED 5 08/04/2024       Anesthesia Plan    ASA Status:  3       Anesthesia Type: General.              Consents    Anesthesia Plan(s) and associated risks, benefits, and realistic alternatives discussed. Questions answered and patient/representative(s) expressed understanding.     - Discussed: Risks, Benefits and Alternatives for BOTH SEDATION and the PROCEDURE were discussed     - Discussed with:  Patient            Postoperative Care       PONV prophylaxis: Ondansetron (or other 5HT-3), Dexamethasone or Solumedrol, Background Propofol Infusion     Comments:               BELEN Brewster CRNA    I have reviewed the pertinent notes and labs in the chart from the past 30 days and (re)examined the patient.  Any updates or changes from those notes are reflected in this note.                  "

## 2024-08-30 ENCOUNTER — OFFICE VISIT (OUTPATIENT)
Dept: OPHTHALMOLOGY | Facility: CLINIC | Age: 49
End: 2024-08-30
Payer: COMMERCIAL

## 2024-08-30 ENCOUNTER — HOSPITAL ENCOUNTER (OUTPATIENT)
Facility: CLINIC | Age: 49
Discharge: HOME | End: 2024-08-30
Attending: SURGERY | Admitting: SURGERY
Payer: COMMERCIAL

## 2024-08-30 ENCOUNTER — ANESTHESIA (OUTPATIENT)
Dept: GASTROENTEROLOGY | Facility: CLINIC | Age: 49
End: 2024-08-30
Payer: COMMERCIAL

## 2024-08-30 ENCOUNTER — TRANSFERRED RECORDS (OUTPATIENT)
Dept: HEALTH INFORMATION MANAGEMENT | Facility: CLINIC | Age: 49
End: 2024-08-30

## 2024-08-30 VITALS
SYSTOLIC BLOOD PRESSURE: 134 MMHG | HEART RATE: 68 BPM | OXYGEN SATURATION: 100 % | RESPIRATION RATE: 16 BRPM | DIASTOLIC BLOOD PRESSURE: 83 MMHG | TEMPERATURE: 97.6 F

## 2024-08-30 DIAGNOSIS — M32.9 SYSTEMIC LUPUS ERYTHEMATOSUS, UNSPECIFIED SLE TYPE, UNSPECIFIED ORGAN INVOLVEMENT STATUS (H): ICD-10-CM

## 2024-08-30 DIAGNOSIS — Z79.899 LONG-TERM USE OF HYDROXYCHLOROQUINE: Primary | ICD-10-CM

## 2024-08-30 LAB — COLONOSCOPY: NORMAL

## 2024-08-30 PROCEDURE — 370N000017 HC ANESTHESIA TECHNICAL FEE, PER MIN: Performed by: SURGERY

## 2024-08-30 PROCEDURE — 92083 EXTENDED VISUAL FIELD XM: CPT | Performed by: STUDENT IN AN ORGANIZED HEALTH CARE EDUCATION/TRAINING PROGRAM

## 2024-08-30 PROCEDURE — 250N000011 HC RX IP 250 OP 636: Performed by: NURSE ANESTHETIST, CERTIFIED REGISTERED

## 2024-08-30 PROCEDURE — 92134 CPTRZ OPH DX IMG PST SGM RTA: CPT | Performed by: STUDENT IN AN ORGANIZED HEALTH CARE EDUCATION/TRAINING PROGRAM

## 2024-08-30 PROCEDURE — 45378 DIAGNOSTIC COLONOSCOPY: CPT | Performed by: SURGERY

## 2024-08-30 PROCEDURE — G0121 COLON CA SCRN NOT HI RSK IND: HCPCS | Performed by: SURGERY

## 2024-08-30 PROCEDURE — 258N000003 HC RX IP 258 OP 636: Performed by: SURGERY

## 2024-08-30 PROCEDURE — 92012 INTRM OPH EXAM EST PATIENT: CPT | Performed by: STUDENT IN AN ORGANIZED HEALTH CARE EDUCATION/TRAINING PROGRAM

## 2024-08-30 RX ORDER — NALOXONE HYDROCHLORIDE 0.4 MG/ML
0.2 INJECTION, SOLUTION INTRAMUSCULAR; INTRAVENOUS; SUBCUTANEOUS
Status: CANCELLED | OUTPATIENT
Start: 2024-08-30

## 2024-08-30 RX ORDER — PROPOFOL 10 MG/ML
INJECTION, EMULSION INTRAVENOUS PRN
Status: DISCONTINUED | OUTPATIENT
Start: 2024-08-30 | End: 2024-08-30

## 2024-08-30 RX ORDER — LIDOCAINE 40 MG/G
CREAM TOPICAL
Status: DISCONTINUED | OUTPATIENT
Start: 2024-08-30 | End: 2024-08-30 | Stop reason: HOSPADM

## 2024-08-30 RX ORDER — SODIUM CHLORIDE, SODIUM LACTATE, POTASSIUM CHLORIDE, CALCIUM CHLORIDE 600; 310; 30; 20 MG/100ML; MG/100ML; MG/100ML; MG/100ML
INJECTION, SOLUTION INTRAVENOUS CONTINUOUS
Status: DISCONTINUED | OUTPATIENT
Start: 2024-08-30 | End: 2024-08-30 | Stop reason: HOSPADM

## 2024-08-30 RX ORDER — FLUMAZENIL 0.1 MG/ML
0.2 INJECTION, SOLUTION INTRAVENOUS
Status: CANCELLED | OUTPATIENT
Start: 2024-08-30 | End: 2024-08-30

## 2024-08-30 RX ORDER — NALOXONE HYDROCHLORIDE 0.4 MG/ML
0.4 INJECTION, SOLUTION INTRAMUSCULAR; INTRAVENOUS; SUBCUTANEOUS
Status: CANCELLED | OUTPATIENT
Start: 2024-08-30

## 2024-08-30 RX ORDER — ONDANSETRON 2 MG/ML
4 INJECTION INTRAMUSCULAR; INTRAVENOUS
Status: DISCONTINUED | OUTPATIENT
Start: 2024-08-30 | End: 2024-08-30 | Stop reason: HOSPADM

## 2024-08-30 RX ADMIN — PROPOFOL 100 MG: 10 INJECTION, EMULSION INTRAVENOUS at 08:54

## 2024-08-30 RX ADMIN — SODIUM CHLORIDE, POTASSIUM CHLORIDE, SODIUM LACTATE AND CALCIUM CHLORIDE: 600; 310; 30; 20 INJECTION, SOLUTION INTRAVENOUS at 07:59

## 2024-08-30 RX ADMIN — PROPOFOL 150 MG: 10 INJECTION, EMULSION INTRAVENOUS at 08:52

## 2024-08-30 RX ADMIN — PROPOFOL 150 MG: 10 INJECTION, EMULSION INTRAVENOUS at 08:50

## 2024-08-30 ASSESSMENT — VISUAL ACUITY
OD_CC: 20/20
METHOD: SNELLEN - LINEAR
CORRECTION_TYPE: GLASSES
OS_CC: 20/20

## 2024-08-30 ASSESSMENT — SLIT LAMP EXAM - LIDS
COMMENTS: NORMAL
COMMENTS: NORMAL

## 2024-08-30 ASSESSMENT — ACTIVITIES OF DAILY LIVING (ADL)
ADLS_ACUITY_SCORE: 33
ADLS_ACUITY_SCORE: 35
ADLS_ACUITY_SCORE: 35

## 2024-08-30 ASSESSMENT — EXTERNAL EXAM - RIGHT EYE: OD_EXAM: NORMAL

## 2024-08-30 ASSESSMENT — EXTERNAL EXAM - LEFT EYE: OS_EXAM: NORMAL

## 2024-08-30 NOTE — ANESTHESIA POSTPROCEDURE EVALUATION
Patient: Yovana Enriquez    Procedure: Procedure(s):  Colonoscopy       Anesthesia Type:  General    Note:  Disposition: Outpatient   Postop Pain Control: Uneventful            Sign Out: Well controlled pain   PONV: No   Neuro/Psych: Uneventful            Sign Out: Acceptable/Baseline neuro status   Airway/Respiratory: Uneventful            Sign Out: Acceptable/Baseline resp. status   CV/Hemodynamics: Uneventful            Sign Out: Acceptable CV status; No obvious hypovolemia; No obvious fluid overload   Other NRE: NONE   DID A NON-ROUTINE EVENT OCCUR? No           Last vitals:  There were no vitals filed for this visit.    Electronically Signed By: BELEN Phelan CRNA  August 30, 2024  9:09 AM

## 2024-08-30 NOTE — PROGRESS NOTES
Current Eye Medications:  none    Subjective:  referred by Dr. Colvin for high risk medication use - vision has been stable, no pain or discomfort either eye.    Hydroxychloroquine 200mg - orally 200mg/400mg alt QOD for Lupus.     Objective:  See Ophthalmology Exam.      Assessment:  Yovana Enriquez is a 49 year old female who presents with:   Encounter Diagnoses   Name Primary?    Long-term use of hydroxychloroquine Plaquenil for about 12 years now.     Macular SD-OCT within normal limits both eyes.     Grubbs visual field (HVF) 10-2 within normal limits both eyes.     No signs of Plaquenil retinal toxicity at present.     Systemic lupus erythematosus, unspecified SLE type, unspecified organ involvement status (H)        Plan:  Normal Plaquenil eye tests today.    Azael Queen MD  [phone]

## 2024-08-30 NOTE — LETTER
8/30/2024      Yovana Enriquez  [address] [facility] [address]      Dear Colleague,    Thank you for referring your patient, Yovana Enriquez, to the [facility]. Please see a copy of my visit note below.    Current Eye Medications:  none    Subjective:  referred by Dr. Colvin for high risk medication use - vision has been stable, no pain or discomfort either eye.    Hydroxychloroquine 200mg - orally 200mg/400mg alt QOD for Lupus.     Objective:  See Ophthalmology Exam.      Assessment:  Yovana Enriquez is a 49 year old female who presents with:   Encounter Diagnoses   Name Primary?     Long-term use of hydroxychloroquine Plaquenil for about 12 years now.     Macular SD-OCT within normal limits both eyes.     Grubbs visual field (HVF) 10-2 within normal limits both eyes.     No signs of Plaquenil retinal toxicity at present.      Systemic lupus erythematosus, unspecified SLE type, unspecified organ involvement status (H)        Plan:  Normal Plaquenil eye tests today.    Azael Queen MD  [phone]       Again, thank you for allowing me to participate in the care of your patient.        Sincerely,        Azael Queen MD

## 2024-08-30 NOTE — H&P
[facility]    Pre-Endoscopy History and Physical     Yovana Enriquez MRN# [id number]   YOB: 1975 Age: 49 year old     Date of Procedure: 8/30/2024  Primary care provider: Bradford Mario  Type of Endoscopy: Colonoscopy with possible biopsy, possible polypectomy  Reason for Procedure: Surveillance  Type of Anesthesia Anticipated: Conscious Sedation    HPI:    Yovana is a 49 year old female who will be undergoing the above procedure.      Last colonoscopy 2018, polyps removed.  Denies blood in stool or change in stool size.    A history and physical has been performed. The patient's medications and allergies have been reviewed. The risks and benefits of the procedure and the sedation options and risks were discussed with the patient.  All questions were answered and informed consent was obtained.      She denies a personal or family history of anesthesia complications or bleeding disorders.     Patient Active Problem List   Diagnosis    Systemic lupus erythematosus (H)    Menorrhagia    History of ovarian cyst    Dysmenorrhea    History of gestational diabetes mellitus, not currently pregnant    Intramural leiomyoma of uterus    Hyperlipidemia LDL goal <130    Subclinical hyperthyroidism    Anxiety    High risk medication use    Fibromyalgia    Chronic constipation    Irritable bowel syndrome    Hypertension goal BP (blood pressure) < 140/90    Non-celiac gluten sensitivity    Raynaud's phenomenon without gangrene    Sjogren's syndrome (H24)    Intramural and submucous leiomyoma of uterus    Adrenal insufficiency (H24)    Screening for malignant neoplasm of cervix        Past Medical History:   Diagnosis Date    Arthritis     Chronic constipation 12/16/2013    Dysmenorrhea 10/1/2012    Fibromyalgia 11/15/2013    Health Care Home 3/19/2014    **See Letters for [facility] Care Plan: Emergency Care Plan      High risk medication use 9/13/2013    History of gestational diabetes  mellitus, not currently pregnant 10/1/2012    History of ovarian cyst 10/1/2012    Hyperlipidemia LDL goal <130 10/18/2012    Hypertension goal BP (blood pressure) < 140/90 1/19/2015    Intramural leiomyoma of uterus 10/5/2012    Irritable bowel syndrome 3/11/2014    Patient states no history colonoscopy      Menorrhagia 10/1/2012    Non-celiac gluten sensitivity 2/8/2016    PONV (postoperative nausea and vomiting)     Subclinical hyperthyroidism 1/17/2013    Systemic lupus erythematosus (H) 4/23/2012        Past Surgical History:   Procedure Laterality Date    ABDOMEN SURGERY  2004    Tubal ligation    COLONOSCOPY N/A 2/20/2015    Procedure: COMBINED COLONOSCOPY, SINGLE OR MULTIPLE BIOPSY/POLYPECTOMY BY BIOPSY;  Surgeon: Fred Cullen MD;  Location: [facility] OR    COLONOSCOPY N/A 10/29/2018    Procedure: COMBINED COLONOSCOPY, SINGLE OR MULTIPLE BIOPSY/POLYPECTOMY BY BIOPSY;  Surgeon: Inez Sawyer MD;  Location: [facility] OR    COLONOSCOPY WITH CO2 INSUFFLATION N/A 2/20/2015    Procedure: COLONOSCOPY WITH CO2 INSUFFLATION;  Surgeon: Fred Cullen MD;  Location: [facility] OR    ENT SURGERY  10-24-14    Bottom lip biopsies    ESOPHAGOSCOPY, GASTROSCOPY, DUODENOSCOPY (EGD), COMBINED N/A 2/20/2015    Procedure: COMBINED ESOPHAGOSCOPY, GASTROSCOPY, DUODENOSCOPY (EGD), BIOPSY SINGLE OR MULTIPLE;  Surgeon: Fred Cullen MD;  Location: [facility] OR    HC BREATH HYDROGEN TEST  12/31/2013    Procedure: HYDROGEN BREATH TEST;  Surgeon: Camden Almazan MD;  Location: [facility]    HC HYSTEROSCOPY, SURGICAL; W/ ENDOMETRIAL ABLATION, ANY METHOD  10-19-12    ORTHOPEDIC SURGERY  12/2010    SHOULDER SURGERY Right     R shoulder    TUBAL LIGATION  2004       Social History     Tobacco Use    Smoking status: Never    Smokeless tobacco: Never    Tobacco comments:     Lives in smoke free household   Substance Use Topics    Alcohol use: No     Alcohol/week: 0.0 standard drinks of alcohol       Family History   Problem  Relation Age of Onset    Respiratory Maternal Grandfather     Cancer Maternal Grandfather     Other Cancer Maternal Grandfather         Skin cancer    Asthma Son     Circulatory Maternal Grandmother         Brain anurysm    Diabetes Maternal Grandmother     Hypertension Mother     Glaucoma Mother 50        drops    Cancer Mother     Hypertension Sister     Hypertension Sister     Diabetes Sister     Hypertension Sister     Anxiety Disorder Sister     Asthma Son     Diabetes No family hx of     Cerebrovascular Disease No family hx of     Thyroid Disease No family hx of     Macular Degeneration No family hx of        Prior to Admission medications    Medication Sig Start Date End Date Taking? Authorizing Provider   amLODIPine (NORVASC) 5 MG tablet Take 1 tablet (5 mg) by mouth daily 8/9/24  Yes Bradford Colvin MD   azaTHIOprine (IMURAN) 50 MG tablet Take 1.5 tablets (75 mg) by mouth daily 8/9/24  Yes Bradford Colvin MD   azelastine (ASTELIN) 0.1 % nasal spray Spray 1 spray into both nostrils 2 times daily   Yes Reported, Patient   blood glucose test strip Used for testing glucose 2-3 times daily 4/30/15  Yes Bradford Mario PA-C   Calcium Carb-Cholecalciferol (CALCIUM + D3) 600-200 MG-UNIT TABS Take 1 tablet by mouth daily   Yes Reported, Patient   cevimeline (EVOXAC) 30 MG capsule Take 1 capsule (30 mg) by mouth 2 times daily 8/9/24  Yes Bradford Colvin MD   diclofenac (VOLTAREN) 1 % topical gel Apply up to 2 grams of 1% gel to hands up to 4 times daily as needed for joint pain (maximum: 8 g per upper extremity per day) 8/9/24  Yes Bradford Colvin MD   famotidine (PEPCID) 40 MG tablet Take 1 tablet (40 mg) by mouth nightly as needed for heartburn 5/7/24  Yes Bradford Mario PA-C   Fezolinetant 45 MG TABS Take 45 mg by mouth daily 3/7/24  Yes Olesya Martins PA-C   hydroxychloroquine (PLAQUENIL) 200 MG tablet Hydroxychloroquine 200mg daily; and an additional 200mg every other day. Yearly eye exam, including  "10-2 VF and SD-OCT, is required 8/9/24  Yes Bradford Colvin MD   losartan (COZAAR) 25 MG tablet TAKE 1 TABLET BY MOUTH EVERY DAY 8/21/24  Yes Bradford Mario PA-C   nortriptyline (PAMELOR) 10 MG capsule TAKE 1 CAPSULE BY MOUTH AT BEDTIME 5/29/24  Yes Bradford Mario PA-C   predniSONE (DELTASONE) 2.5 MG tablet Take 1 tablet (2.5 mg) by mouth daily 8/9/24  Yes Bradford Colvin MD   SUMAtriptan (IMITREX) 50 MG tablet Take 50 mg by mouth at onset of headache for migraine   Yes Reported, Patient   Vitamin D, Cholecalciferol, 1000 units CAPS Take 4,000 Int'l Units by mouth daily   Yes Reported, Patient   belimumab (BENLYSTA) subcutaneous injection (prefilled autoinjector) Inject 1 mL (200 mg) Subcutaneous every 7 days . Hold for signs of infection and seek medical attention. Autoinjector 5/10/24   Bradford Colvin MD       Allergies   Allergen Reactions    Fluticasone Propionate (Nasal) [Fluticasone] Anaphylaxis        REVIEW OF SYSTEMS:   5 point ROS negative except as noted above in HPI, including Gen., Resp., CV, GI &  system review.    PHYSICAL EXAM:   LMP 08/19/2024 (Approximate)  Estimated body mass index is 22.6 kg/m  as calculated from the following:    Height as of 8/9/24: 1.676 m (5' 6\").    Weight as of 8/9/24: 63.5 kg (140 lb).   Constitutional: Awake, alert, no acute distress.  Eyes: No scleral icterus.  Conjunctiva are without injection.  ENMT: Mucous membranes moist, dentition and gums are intact.   Neck: Soft, supple, trachea midline.    Endocrine: n/a   Lymphatic: There is no cervical, submandibularadenopathy.  Respiratory: normal effortgs   Cardiovascular: S1, S2  Abdomen: Non-distended, non-tender,  No masses,  Musculoskeletal: No spinal or CVA tenderness. Full range of motion in the upper and lower extremities.    Skin: No skin rashes or lesions to inspection.  No petechia.    Neurologic: alerted and oriented 3x  Psychiatric: The patient's affect is not blunted and mood is " appropriate.  DIAGNOSTICS:    Not indicated    IMPRESSION   ASA Class 3 - Severe systemic disease, but not incapacitating    PLAN:   Plan for Colonoscopy with possible biopsy, possible polypectomy. We discussed the risks, benefits and alternatives and the patient wished to proceed.  Patient is cleared for the above procedure.    The above has been forwarded to the consulting provider.    Douglas Ludwig DO  [facility]

## 2024-08-30 NOTE — ANESTHESIA CARE TRANSFER NOTE
Patient: Yovana Enriquez    Procedure: Procedure(s):  Colonoscopy       Diagnosis: Colon cancer screening [Z12.11]  Diagnosis Additional Information: No value filed.    Anesthesia Type:   General     Note:    Oropharynx: oropharynx clear of all foreign objects and spontaneously breathing  Level of Consciousness: awake  Oxygen Supplementation: room air    Independent Airway: airway patency satisfactory and stable  Dentition: dentition unchanged  Vital Signs Stable: post-procedure vital signs reviewed and stable  Report to RN Given: handoff report given  Patient transferred to: Phase II    Handoff Report: Identifed the Patient, Identified the Reponsible Provider, Reviewed the pertinent medical history, Discussed the surgical course, Reviewed Intra-OP anesthesia mangement and issues during anesthesia, Set expectations for post-procedure period and Allowed opportunity for questions and acknowledgement of understanding      Vitals:  Vitals Value Taken Time   BP     Temp     Pulse     Resp     SpO2         Electronically Signed By: BELEN Phelan CRNA  August 30, 2024  9:08 AM

## 2024-11-03 ENCOUNTER — LAB (OUTPATIENT)
Dept: LAB | Facility: CLINIC | Age: 49
End: 2024-11-03
Payer: COMMERCIAL

## 2024-11-03 DIAGNOSIS — M32.9 SYSTEMIC LUPUS ERYTHEMATOSUS, UNSPECIFIED SLE TYPE, UNSPECIFIED ORGAN INVOLVEMENT STATUS (H): ICD-10-CM

## 2024-11-03 DIAGNOSIS — Z79.899 HIGH RISK MEDICATIONS (NOT ANTICOAGULANTS) LONG-TERM USE: ICD-10-CM

## 2024-11-03 LAB
ALBUMIN MFR UR ELPH: 23.6 MG/DL
ALBUMIN SERPL BCG-MCNC: 4.3 G/DL (ref 3.5–5.2)
ALBUMIN UR-MCNC: 30 MG/DL
ALP SERPL-CCNC: 52 U/L (ref 40–150)
ALT SERPL W P-5'-P-CCNC: 26 U/L (ref 0–50)
ANION GAP SERPL CALCULATED.3IONS-SCNC: 10 MMOL/L (ref 7–15)
APPEARANCE UR: CLEAR
AST SERPL W P-5'-P-CCNC: 26 U/L (ref 0–45)
BACTERIA #/AREA URNS HPF: ABNORMAL /HPF
BASOPHILS # BLD AUTO: 0 10E3/UL (ref 0–0.2)
BASOPHILS NFR BLD AUTO: 1 %
BILIRUB SERPL-MCNC: 0.2 MG/DL
BILIRUB UR QL STRIP: NEGATIVE
BUN SERPL-MCNC: 13.7 MG/DL (ref 6–20)
CALCIUM SERPL-MCNC: 9.3 MG/DL (ref 8.8–10.4)
CHLORIDE SERPL-SCNC: 103 MMOL/L (ref 98–107)
COLOR UR AUTO: YELLOW
CREAT SERPL-MCNC: 0.78 MG/DL (ref 0.51–0.95)
CREAT UR-MCNC: 221 MG/DL
CRP SERPL-MCNC: <3 MG/L
EGFRCR SERPLBLD CKD-EPI 2021: >90 ML/MIN/1.73M2
EOSINOPHIL # BLD AUTO: 0.1 10E3/UL (ref 0–0.7)
EOSINOPHIL NFR BLD AUTO: 2 %
ERYTHROCYTE [DISTWIDTH] IN BLOOD BY AUTOMATED COUNT: 11.5 % (ref 10–15)
ERYTHROCYTE [SEDIMENTATION RATE] IN BLOOD BY WESTERGREN METHOD: 5 MM/HR (ref 0–30)
GLUCOSE SERPL-MCNC: 98 MG/DL (ref 70–99)
GLUCOSE UR STRIP-MCNC: NEGATIVE MG/DL
HBV CORE AB SERPL QL IA: NONREACTIVE
HBV SURFACE AG SERPL QL IA: NONREACTIVE
HCO3 SERPL-SCNC: 29 MMOL/L (ref 22–29)
HCT VFR BLD AUTO: 37.9 % (ref 35–47)
HCV AB SERPL QL IA: NONREACTIVE
HGB BLD-MCNC: 13.1 G/DL (ref 11.7–15.7)
HGB UR QL STRIP: NEGATIVE
IMM GRANULOCYTES # BLD: 0 10E3/UL
IMM GRANULOCYTES NFR BLD: 0 %
KETONES UR STRIP-MCNC: ABNORMAL MG/DL
LEUKOCYTE ESTERASE UR QL STRIP: ABNORMAL
LYMPHOCYTES # BLD AUTO: 1 10E3/UL (ref 0.8–5.3)
LYMPHOCYTES NFR BLD AUTO: 24 %
MCH RBC QN AUTO: 32.3 PG (ref 26.5–33)
MCHC RBC AUTO-ENTMCNC: 34.6 G/DL (ref 31.5–36.5)
MCV RBC AUTO: 93 FL (ref 78–100)
MONOCYTES # BLD AUTO: 0.4 10E3/UL (ref 0–1.3)
MONOCYTES NFR BLD AUTO: 8 %
MUCOUS THREADS #/AREA URNS LPF: PRESENT /LPF
NEUTROPHILS # BLD AUTO: 2.8 10E3/UL (ref 1.6–8.3)
NEUTROPHILS NFR BLD AUTO: 65 %
NITRATE UR QL: NEGATIVE
PH UR STRIP: 6.5 [PH] (ref 5–7)
PLATELET # BLD AUTO: 290 10E3/UL (ref 150–450)
POTASSIUM SERPL-SCNC: 4.2 MMOL/L (ref 3.4–5.3)
PROT SERPL-MCNC: 6.5 G/DL (ref 6.4–8.3)
PROT/CREAT 24H UR: 0.11 MG/MG CR (ref 0–0.2)
RBC # BLD AUTO: 4.06 10E6/UL (ref 3.8–5.2)
RBC #/AREA URNS AUTO: ABNORMAL /HPF
SODIUM SERPL-SCNC: 142 MMOL/L (ref 135–145)
SP GR UR STRIP: >=1.03 (ref 1–1.03)
SQUAMOUS #/AREA URNS AUTO: ABNORMAL /LPF
UROBILINOGEN UR STRIP-ACNC: 1 E.U./DL
WBC # BLD AUTO: 4.3 10E3/UL (ref 4–11)
WBC #/AREA URNS AUTO: ABNORMAL /HPF

## 2024-11-03 PROCEDURE — 36415 COLL VENOUS BLD VENIPUNCTURE: CPT

## 2024-11-03 PROCEDURE — 84156 ASSAY OF PROTEIN URINE: CPT

## 2024-11-03 PROCEDURE — 86704 HEP B CORE ANTIBODY TOTAL: CPT

## 2024-11-03 PROCEDURE — 85652 RBC SED RATE AUTOMATED: CPT

## 2024-11-03 PROCEDURE — 87340 HEPATITIS B SURFACE AG IA: CPT

## 2024-11-03 PROCEDURE — 86481 TB AG RESPONSE T-CELL SUSP: CPT

## 2024-11-03 PROCEDURE — 85025 COMPLETE CBC W/AUTO DIFF WBC: CPT

## 2024-11-03 PROCEDURE — 80053 COMPREHEN METABOLIC PANEL: CPT

## 2024-11-03 PROCEDURE — 86140 C-REACTIVE PROTEIN: CPT

## 2024-11-03 PROCEDURE — 81001 URINALYSIS AUTO W/SCOPE: CPT

## 2024-11-03 PROCEDURE — 86803 HEPATITIS C AB TEST: CPT

## 2024-11-04 LAB
GAMMA INTERFERON BACKGROUND BLD IA-ACNC: 0 IU/ML
M TB IFN-G BLD-IMP: NEGATIVE
M TB IFN-G CD4+ BCKGRND COR BLD-ACNC: 10 IU/ML
MITOGEN IGNF BCKGRD COR BLD-ACNC: 0 IU/ML
MITOGEN IGNF BCKGRD COR BLD-ACNC: 0 IU/ML
QUANTIFERON MITOGEN: 10 IU/ML
QUANTIFERON NIL TUBE: 0 IU/ML
QUANTIFERON TB1 TUBE: 0 IU/ML
QUANTIFERON TB2 TUBE: 0

## 2024-11-11 ENCOUNTER — TELEPHONE (OUTPATIENT)
Dept: RHEUMATOLOGY | Facility: CLINIC | Age: 49
End: 2024-11-11

## 2024-11-11 ENCOUNTER — OFFICE VISIT (OUTPATIENT)
Dept: RHEUMATOLOGY | Facility: CLINIC | Age: 49
End: 2024-11-11
Payer: COMMERCIAL

## 2024-11-11 VITALS
BODY MASS INDEX: 23.05 KG/M2 | OXYGEN SATURATION: 98 % | DIASTOLIC BLOOD PRESSURE: 81 MMHG | HEART RATE: 88 BPM | WEIGHT: 142.8 LBS | SYSTOLIC BLOOD PRESSURE: 126 MMHG

## 2024-11-11 DIAGNOSIS — M32.9 SYSTEMIC LUPUS ERYTHEMATOSUS, UNSPECIFIED SLE TYPE, UNSPECIFIED ORGAN INVOLVEMENT STATUS (H): Primary | ICD-10-CM

## 2024-11-11 DIAGNOSIS — M70.62 TROCHANTERIC BURSITIS OF BOTH HIPS: ICD-10-CM

## 2024-11-11 DIAGNOSIS — M18.11 PRIMARY OSTEOARTHRITIS OF FIRST CARPOMETACARPAL JOINT OF RIGHT HAND: ICD-10-CM

## 2024-11-11 DIAGNOSIS — I73.00 RAYNAUD'S PHENOMENON WITHOUT GANGRENE: ICD-10-CM

## 2024-11-11 DIAGNOSIS — M35.01 SJOGREN'S SYNDROME WITH KERATOCONJUNCTIVITIS SICCA (H): ICD-10-CM

## 2024-11-11 DIAGNOSIS — Z79.899 HIGH RISK MEDICATIONS (NOT ANTICOAGULANTS) LONG-TERM USE: ICD-10-CM

## 2024-11-11 DIAGNOSIS — M70.61 TROCHANTERIC BURSITIS OF BOTH HIPS: ICD-10-CM

## 2024-11-11 PROCEDURE — 20610 DRAIN/INJ JOINT/BURSA W/O US: CPT | Mod: 50 | Performed by: INTERNAL MEDICINE

## 2024-11-11 PROCEDURE — 99214 OFFICE O/P EST MOD 30 MIN: CPT | Mod: 25 | Performed by: INTERNAL MEDICINE

## 2024-11-11 RX ORDER — AZATHIOPRINE 50 MG/1
75 TABLET ORAL DAILY
Qty: 135 TABLET | Refills: 2 | Status: SHIPPED | OUTPATIENT
Start: 2024-11-11

## 2024-11-11 RX ORDER — CEVIMELINE HYDROCHLORIDE 30 MG/1
30 CAPSULE ORAL 2 TIMES DAILY
Qty: 180 CAPSULE | Refills: 2 | Status: SHIPPED | OUTPATIENT
Start: 2024-11-11

## 2024-11-11 RX ORDER — METHYLPREDNISOLONE ACETATE 40 MG/ML
40 INJECTION, SUSPENSION INTRA-ARTICULAR; INTRALESIONAL; INTRAMUSCULAR; SOFT TISSUE ONCE
Status: COMPLETED | OUTPATIENT
Start: 2024-11-11 | End: 2024-11-11

## 2024-11-11 RX ORDER — AMLODIPINE BESYLATE 5 MG/1
5 TABLET ORAL DAILY
Qty: 90 TABLET | Refills: 2 | Status: SHIPPED | OUTPATIENT
Start: 2024-11-11

## 2024-11-11 RX ORDER — HYDROXYCHLOROQUINE SULFATE 200 MG/1
TABLET, FILM COATED ORAL
Qty: 135 TABLET | Refills: 2 | Status: SHIPPED | OUTPATIENT
Start: 2024-11-11

## 2024-11-11 RX ADMIN — METHYLPREDNISOLONE ACETATE 40 MG: 40 INJECTION, SUSPENSION INTRA-ARTICULAR; INTRALESIONAL; INTRAMUSCULAR; SOFT TISSUE at 13:28

## 2024-11-11 NOTE — PROGRESS NOTES
Rheumatology Clinic Visit      Yovana Enriquez MRN# [id number]   YOB: 1975 Age: 49 year old      Date of visit: 11/11/24   PCP: Bradford Mario  Onc: Dr. Wilmer Suarez [facility]    Chief Complaint   Patient presents with:  RECHECK: 3 mo follow up Systemic lupus erythematosus  Hip injections?    Assessment and Plan     1. Systemic lupus erythematosus (YANELIS by EIA positive in the past, leukopenia/lymphocytopenia, hypocomplementemia, polyarthralgias, oral sores, history of malar rash, photophobia, photosensitivity, Raynaud's Phenomenon): Previously on methotrexate that was discontinued for unclear reasons but the patient reports that she tolerated it well. Currently on azathioprine 75 mg daily (dose reduced on 11/8/2018 from 100mg daily without worsening symptoms at that time, eventually reduced to 50 mg daily and did well for a period of time; now on 75 mg daily), hydroxychloroquine 300 mg daily [avg], and Benlysta (worsening symptoms when stopped in the past).  Previously was doing well with regard to lupus.  However, more inflammatory arthritis symptoms at the second and third MCPs and left second-third PIPs, possibly due to missing several days of prednisone 2.5 mg daily; treat with a course of prednisone as noted below.   Note that she has likely adrenal insufficiency and has done much better with staying on prednisone 2.5 mg daily.  Chronic illness, stable.    - Continue azathioprine 75 mg daily   - Continue hydroxychloroquine  200mg daily with additional 200mg every other day (toxicity monitoring eye exam last done on 8/30/2024 by Dr. Queen)  - Continue Benlysta 200 mg SQ every 7 days  - Continue Prednisone 2.5mg daily (trouble reducing below this point, likely adrenal insufficiency)  - Continue photoprotection  - Labs in 3 months: CBC, CMP, ESR, CRP, C3, C4, dsDNA, UA, Uprotein:creatinine    High risk medication requiring intensive toxicity monitoring at least quarterly.    2. Secondary Sjogren  syndrome: Doing well with Evoxac and frequent sips of water, but then Evoxac became more expensive so pilocarpine was tried but not tolerated (GI upset).  Therefore, changed back to Evoxac and was doing well with BID dosing (GI upset when TID). Dry eyes well controled with artificial tears as needed.  Chronic illness  - Continue Evoxac 30 mg twice daily     3. Raynaud's Phenomenon: Amlodipine 5mg daily is effective.  Chronic illness, stable  - Continue amlodipine 5mg daily      4.  Bilateral trochanteric bursitis: Steroid injections have helped previously and she requests repeat steroid injections today.  Steroid injections were performed as documented in the procedure section.    5. Fibromyalgia: Currently managed by PCP and she is considering pain management evaluation    6. Osteoarthritis of multiple joints: Primarily affecting the DIPs right first CMC joints.  Hand therapy referral given previously.  Advised intermittent/nighttime immobilization of the thumb with a splint.  Could try paraffin wax bath.  Voltaren gel as needed.  - Voltaren gel 4 times daily as needed    7. MGUS: Followed by Dr. Wilmer Suarez at [facility] with plans to follow labs regularly and consider bone marrow biopsy in the future if needed.      8. Vaccinations: Vaccinations reviewed with Ms. Enriquez.    - Influenza: encouraged yearly vaccination  - [id number]: up to date  - [id number]: up to date  - Shingrix: Advised vaccination  - COVID-19: advised keeping updated, and to hold azathioprine and benlysta for 1-2 weeks after COVID-19 vaccination        Total minutes spent in evaluation with patient, documentation, , and review of pertinent studies and chart notes: 20  The longitudinal plan of care for the rheumatology problem(s) were addressed during this visit.  Due to added complexity of care, we will continue to support the patient and the subsequent management of this condition with ongoing continuity of care.     Ms.  Zoe verbalized agreement with and understanding of the rational for the diagnosis and treatment plan.  All questions were answered to best of my ability and the patient's satisfaction. Ms. Enriquez was advised to contact the clinic with any questions that may arise after the clinic visit.      Thank you for involving me in the care of the patient    Return to clinic: 3 months    HPI   Yovana Enriquez is a 49 year old female with medical history significant for subclinical hyperthyroidism, hypertension, irritable bowel syndrome, fibromyalgia, hypertension, non-celiac gluten sensitivity (reportedly with bowel biopsies and laboratory workup that did not show celiac disease), anxiety, and systemic lupus erythematosus who presents for follow-up of SLE.    12/8/2023: Currently doing well.  Had mild ache of her toes on 1 foot for a few days that has since resolved.  Raynaud's phenomenon is controlled with amlodipine.  No lower extremity edema.  Evoxac is effective for Sjogren's syndrome; worsening symptoms with any missed dose.  Azathioprine, hydroxychloroquine, Benlysta, and prednisone are taken as prescribed.  Perimenopausal and occasionally feels extra warm but this tends to come and go.  Going to the gym now to maintain weight at around 140 pounds.    2/20/2024: Missed several days of prednisone 2.5 mg daily and felt increased pain, fatigue, and generally unwell while off prednisone; she missed prednisone 2.5 mg daily because she says she was just did not make time to  her refill.  Now back on prednisone 2.5 mg daily.  Pain at the MCPs, PIPs, DIPs, right first CMC.  Diffuse body pain.    5/10/2024: Currently feels well.  Since last visit she went to see oncology at [facility] where she says that they are going to monitor her labs every 6 months.  Sugars controlled with Evoxac, frequent sips of water, seeing a dentist regularly.  Plans to establish care in the pain clinic for fibromyalgia  management.  Lupus controlled at this time.  Overall she says she is happy with how well she is doing right now.    8/9/2024: Trochanteric bursitis injections were done today in the sports medicine clinic.  Right first CMC joint is more bothersome now and she is going to try splinting and topical Voltaren gel.  Mild fatigue that is stable.  Overall feels like lupus is controlled.  Medications are well-tolerated.  No missed doses of medication.  Has an eye exam hydroxychloroquine toxicity monitoring later this month.    Denies fevers, chills, nausea, vomiting. No abdominal pain. no chest pain/pressure, palpitations, or shortness of breath.  No rash currently. No LE swelling.  She has photosensitivity and photophobia.   No eye pain or redness.     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    ROS   12 point review of system was completed and negative except as noted in the HPI     Active Problem List     Patient Active Problem List   Diagnosis    Systemic lupus erythematosus (H)    Menorrhagia    History of ovarian cyst    Dysmenorrhea    History of gestational diabetes mellitus, not currently pregnant    Intramural leiomyoma of uterus    Hyperlipidemia LDL goal <130    Subclinical hyperthyroidism    Anxiety    High risk medication use    Fibromyalgia    Chronic constipation    Irritable bowel syndrome    Hypertension goal BP (blood pressure) < 140/90    Non-celiac gluten sensitivity    Raynaud's phenomenon without gangrene    Sjogren's syndrome (H)    Intramural and submucous leiomyoma of uterus    Adrenal insufficiency (H)    Screening for malignant neoplasm of cervix     Past Medical History     Past Medical History:   Diagnosis Date    Arthritis     Chronic constipation 12/16/2013    Dysmenorrhea 10/1/2012    Fibromyalgia 11/15/2013    Health Care Home 3/19/2014    **See Letters for [facility] Care Plan: Emergency Care Plan      High risk medication use 9/13/2013    History of gestational  diabetes mellitus, not currently pregnant 10/1/2012    History of ovarian cyst 10/1/2012    Hyperlipidemia LDL goal <130 10/18/2012    Hypertension goal BP (blood pressure) < 140/90 1/19/2015    Intramural leiomyoma of uterus 10/5/2012    Irritable bowel syndrome 3/11/2014    Patient states no history colonoscopy      Menorrhagia 10/1/2012    Non-celiac gluten sensitivity 2/8/2016    PONV (postoperative nausea and vomiting)     Subclinical hyperthyroidism 1/17/2013    Systemic lupus erythematosus (H) 4/23/2012     Past Surgical History     Past Surgical History:   Procedure Laterality Date    ABDOMEN SURGERY  2004    Tubal ligation    COLONOSCOPY N/A 2/20/2015    Procedure: COMBINED COLONOSCOPY, SINGLE OR MULTIPLE BIOPSY/POLYPECTOMY BY BIOPSY;  Surgeon: Fred Cullen MD;  Location: [facility]    COLONOSCOPY N/A 10/29/2018    Procedure: COMBINED COLONOSCOPY, SINGLE OR MULTIPLE BIOPSY/POLYPECTOMY BY BIOPSY;  Surgeon: Inez Sawyer MD;  Location: [facility]    COLONOSCOPY N/A 8/30/2024    Procedure: Colonoscopy;  Surgeon: Douglas Ludwig DO;  Location: [facility]    COLONOSCOPY WITH CO2 INSUFFLATION N/A 2/20/2015    Procedure: COLONOSCOPY WITH CO2 INSUFFLATION;  Surgeon: Fred Cullen MD;  Location: [facility]    ENT SURGERY  10-24-14    Bottom lip biopsies    ESOPHAGOSCOPY, GASTROSCOPY, DUODENOSCOPY (EGD), COMBINED N/A 2/20/2015    Procedure: COMBINED ESOPHAGOSCOPY, GASTROSCOPY, DUODENOSCOPY (EGD), BIOPSY SINGLE OR MULTIPLE;  Surgeon: Fred Cullen MD;  Location: [facility]    HC BREATH HYDROGEN TEST  12/31/2013    Procedure: HYDROGEN BREATH TEST;  Surgeon: Camden Almazan MD;  [facility]    HC HYSTEROSCOPY, SURGICAL; W/ ENDOMETRIAL ABLATION, ANY METHOD  10-19-12    ORTHOPEDIC SURGERY  12/2010    SHOULDER SURGERY Right     R shoulder    TUBAL LIGATION  2004     Allergy     Allergies   Allergen Reactions    Fluticasone Propionate (Nasal) [Fluticasone] Anaphylaxis     Current Medication  List     Current Outpatient Medications   Medication Sig Dispense Refill    amLODIPine (NORVASC) 5 MG tablet Take 1 tablet (5 mg) by mouth daily 90 tablet 2    azaTHIOprine (IMURAN) 50 MG tablet Take 1.5 tablets (75 mg) by mouth daily 135 tablet 2    azelastine (ASTELIN) 0.1 % nasal spray Spray 1 spray into both nostrils 2 times daily      belimumab (BENLYSTA) subcutaneous injection (prefilled autoinjector) Inject 1 mL (200 mg) Subcutaneous every 7 days . Hold for signs of infection and seek medical attention. Autoinjector 4 mL 6    blood glucose test strip Used for testing glucose 2-3 times daily 1 Month 5    Calcium Carb-Cholecalciferol (CALCIUM + D3) 600-200 MG-UNIT TABS Take 1 tablet by mouth daily      cevimeline (EVOXAC) 30 MG capsule Take 1 capsule (30 mg) by mouth 2 times daily 180 capsule 2    diclofenac (VOLTAREN) 1 % topical gel Apply up to 2 grams of 1% gel to hands up to 4 times daily as needed for joint pain (maximum: 8 g per upper extremity per day) 400 g 0    famotidine (PEPCID) 40 MG tablet Take 1 tablet (40 mg) by mouth nightly as needed for heartburn 90 tablet 0    Fezolinetant 45 MG TABS Take 45 mg by mouth daily 90 tablet 3    losartan (COZAAR) 25 MG tablet TAKE 1 TABLET BY MOUTH EVERY DAY 90 tablet 0    nortriptyline (PAMELOR) 10 MG capsule TAKE 1 CAPSULE BY MOUTH AT BEDTIME 90 capsule 1    predniSONE (DELTASONE) 2.5 MG tablet Take 1 tablet (2.5 mg) by mouth daily 90 tablet 2    SUMAtriptan (IMITREX) 50 MG tablet Take 50 mg by mouth at onset of headache for migraine      Vitamin D, Cholecalciferol, 1000 units CAPS Take 4,000 Int'l Units by mouth daily      hydroxychloroquine (PLAQUENIL) 200 MG tablet Hydroxychloroquine 200mg daily; and an additional 200mg every other day. Yearly eye exam, including 10-2 VF and SD-OCT, is required (Patient not taking: Reported on 11/11/2024) 135 tablet 0     Current Facility-Administered Medications   Medication Dose Route Frequency Provider Last Rate Last Admin  "   lidocaine 1 % injection 2 mL  2 mL      2 mL at 08/09/24 0939    triamcinolone (KENALOG-40) injection 40 mg  40 mg      40 mg at 08/09/24 0939     Social History   See HPI    Family History     Family History   Problem Relation Age of Onset    Respiratory Maternal Grandfather     Cancer Maternal Grandfather     Other Cancer Maternal Grandfather         Skin cancer    Asthma Son     Circulatory Maternal Grandmother         Brain anurysm    Diabetes Maternal Grandmother     Hypertension Mother     Glaucoma Mother 50        drops    Cancer Mother     Hypertension Sister     Hypertension Sister     Diabetes Sister     Hypertension Sister     Anxiety Disorder Sister     Asthma Son     Diabetes No family hx of     Cerebrovascular Disease No family hx of     Thyroid Disease No family hx of     Macular Degeneration No family hx of        Physical Exam     Temp Readings from Last 3 Encounters:   08/30/24 97.6  F (36.4  C) (Oral)   07/31/24 97.9  F (36.6  C) (Tympanic)   04/13/24 98.1  F (36.7  C) (Tympanic)     BP Readings from Last 5 Encounters:   11/11/24 126/81   08/30/24 134/83   07/31/24 134/80   04/13/24 (!) 142/90   03/05/24 118/80     Pulse Readings from Last 1 Encounters:   11/11/24 88     Resp Readings from Last 1 Encounters:   08/30/24 16     Estimated body mass index is 23.05 kg/m  as calculated from the following:    Height as of 8/9/24: 1.676 m (5' 6\").    Weight as of this encounter: 64.8 kg (142 lb 12.8 oz).      GEN: NAD. Healthy appearing adult.   HEENT:  Anicteric, noninjected sclera. No obvious external lesions of the ear and nose. Hearing intact.  CV: S1, S2. RRR. No m/r/g  PULM: No increased work of breathing. CTA bilaterally   MSK: MCPs, PIPs, DIPs without swelling or tenderness to palpation.  Heberden's nodes.  Wrists without swelling or tenderness to palpation.  Elbows and shoulders without swelling or tenderness to palpation.  Lumbar spine mildly tender to palpation.  Hips tender to palpation " over the trochanteric bursae.  Knees, ankles, and MTPs without swelling or tenderness to palpation.  : No CVA tenderness to percussion.  SKIN: No rash or jaundice seen  PSYCH: Alert. Appropriate.      Labs / Imaging (select studies)     dsDNA  Recent Labs   Lab Test 05/05/24  1205 04/23/23  1305 01/14/23  0917 07/02/22  0905 04/03/22  1236 10/10/21  1202 07/19/21  1820 04/20/21  1804 01/11/21  1805   DNA <0.6 <0.6 0.6 <0.6 0.7 <0.6 <0.6 1 <1     C3/C4  Recent Labs   Lab Test 05/05/24  1205 04/23/23  1305 07/02/22  0905 10/10/21  1202 07/19/21  1820 04/20/21  1804 01/11/21  1805 10/25/20  1059 04/07/20  1136   N3GJTAB 111 93 98 89 91 97 91 103 100   N8IUKHN 23 22 22 17 20 20 20 >9* 16     CBC  Recent Labs   Lab Test 11/03/24  1215 08/04/24  1152 05/05/24  1205 07/19/21  1820 06/13/21  1312 04/20/21  1804 01/11/21  1805   WBC 4.3 3.7* 5.1   < > 5.8 7.7 8.1   RBC 4.06 4.21 4.67   < > 4.19 4.22 4.00   HGB 13.1 13.6 14.8   < > 13.5 13.7 13.2   HCT 37.9 39.1 43.0   < > 40.6 40.0 38.7   MCV 93 93 92   < > 97 95 97   RDW 11.5 11.6 11.1   < > 11.6 11.5 11.5    269 321   < > 300 313 316   MCH 32.3 32.3 31.7   < > 32.2 32.5 33.0   MCHC 34.6 34.8 34.4   < > 33.3 34.3 34.1   NEUTROPHIL 65 62 68   < > 77.2 81.2 83.4   LYMPH 24 27 18   < > 13.7 10.8 8.8   MONOCYTE 8 8 5   < > 6.2 6.4 6.7   EOSINOPHIL 2 2 8   < > 1.7 0.8 0.6   BASOPHIL 1 1 1   < > 1.2 0.8 0.5   ANEU  --   --   --   --  4.5 6.3 6.7   ALYM  --   --   --   --  0.8 0.8 0.7*   ZOË  --   --   --   --  0.4 0.5 0.5   AEOS  --   --   --   --  0.1 0.1 0.1   ABAS  --   --   --   --  0.1 0.1 0.0   ANEUTAUTO 2.8 2.3 3.4   < >  --   --   --    ALYMPAUTO 1.0 1.0 0.9   < >  --   --   --    AMONOAUTO 0.4 0.3 0.3   < >  --   --   --    AEOSAUTO 0.1 0.1 0.4   < >  --   --   --    ABSBASO 0.0 0.0 0.1   < >  --   --   --     < > = values in this interval not displayed.     CMP  Recent Labs   Lab Test 11/03/24  1215 08/04/24  1152 05/05/24  1205 02/11/24  1158 11/04/23  1043  07/23/23  1326 07/19/21  1820 04/20/21  1804 01/11/21  1805 10/25/20  1059 04/07/20  1136    139 139 141 140 139   < > 140 138 139 140   POTASSIUM 4.2 4.4 4.3 4.0 3.9 4.3   < > 4.1 3.9 4.1 3.6   CHLORIDE 103 104 100 104 103 105   < > 106 106 104 106   CO2 29 29 28 29 27 26   < > 33* 31 31 31   ANIONGAP 10 6* 11 8 10 8   < > 1* 1* 4 3   GLC 98 103* 96 102* 80 118*   < > 97 99 86 104*   BUN 13.7 11.4 12.4 13.5 9.6 11.0   < > 13 15 11 16   CR 0.78 0.71 0.74 0.75 0.70 0.64   < > 0.76 0.77 0.73 0.71   GFRESTIMATED >90 >90 >90 >90 >90 >90   < > >90 >90 >90 >90   GFRESTBLACK  --   --   --   --   --   --   --  >90 >90 >90 >90   MELANIA 9.3 9.1 9.7 9.2 8.9 8.7   < > 9.0 8.8 9.1 9.1   BILITOTAL 0.2 0.3 0.3 0.3 0.4 0.2   < > 0.3 0.2 0.4 0.3   ALBUMIN 4.3 4.4 4.9 4.3 4.2 4.1   < > 4.1 4.0 4.4 4.3   PROTTOTAL 6.5 6.5 7.4 6.3* 6.4 6.3*   < > 6.8 6.5* 7.5 7.1   ALKPHOS 52 49 57 50 49 57   < > 57 55 62 54   AST 26 26 38 29 25 24   < > 17 14 22 13   ALT 26 20 36 24 18 14   < > 25 19 23 24    < > = values in this interval not displayed.     HgA1c  Recent Labs   Lab Test 03/05/24  1808 05/30/19  0859   A1C 5.6 5.2     Calcium/VitaminD  Recent Labs   Lab Test 11/03/24  1215 08/04/24  1152 05/05/24  1205 10/25/20  1059 04/07/20  1136 07/17/19  1756 04/16/19  1759 01/30/18  1752 10/02/17  1814   MELANIA 9.3 9.1 9.7   < > 9.1   < > 8.4*   < > 8.7   VITDT  --   --   --   --  59  --  40  --  37    < > = values in this interval not displayed.     ESR/CRP  Recent Labs   Lab Test 11/03/24  1215 08/04/24  1152 05/05/24  1205 07/23/23  1326 04/23/23  1305 01/14/23  0917 10/16/22  1205   SED 5 5 5   < > 5 6 5   CRP  --   --   --   --  <2.9 <2.9 <2.9   CRPI <3.00 <3.00 <3.00   < >  --   --   --     < > = values in this interval not displayed.     CK/Aldolase  Recent Labs   Lab Test 10/10/21  1202 07/19/21  1820 04/20/21  1804   CKT 87 105 90     TSH/T4  Recent Labs   Lab Test 03/05/24  1808 11/04/23  1043 04/03/22  1236 04/10/19  1808 10/18/17  1445  10/07/16  0714   TSH 0.36 0.76 0.59   < > 0.33* 0.37*   T4  --   --   --   --  1.24 1.16    < > = values in this interval not displayed.     Lipid Panel  Recent Labs   Lab Test 11/04/23  1043 04/03/22  1236 01/26/19  0916   CHOL 154 177 133   TRIG 75 132 64   HDL 77 80 68   LDL 62 71 52   NHDL 77 97 65     Hepatitis B  Recent Labs   Lab Test 11/03/24  1215   HBCAB Nonreactive   HEPBANG Nonreactive     Hepatitis C  Recent Labs   Lab Test 11/03/24  1215   HCVAB Nonreactive     Lyme ab screening  Recent Labs   Lab Test 05/05/24  1205   LYMEGM 0.35     Tuberculosis Screening  Recent Labs   Lab Test 11/03/24  1215 01/08/22  1156 07/03/17  1447   TBRES Negative Negative  --    TBRSLT  --   --  Negative   TBAGN  --   --  0.00     HIV Screening  Recent Labs   Lab Test 04/10/19  1808   HIAGAB Nonreactive     UA  Recent Labs   Lab Test 11/03/24  1218 08/04/24  1153 05/05/24  1205 02/11/24  1211 07/23/23  1326 04/23/23  1305 10/10/21  1204 09/01/21  1522 04/20/21  1819 01/11/21  1810 10/25/20  1110 09/15/20  1655 07/03/20  1010 04/07/20  1140   COLOR Yellow Yellow Yellow Yellow   < > Yellow   < > Yellow   < > Yellow Yellow Yellow   < > Yellow   APPEARANCE Clear Clear Clear Clear   < > Clear   < > Slightly Cloudy*   < > Clear Clear Clear   < > Clear   URINEGLC Negative Negative Negative Negative   < > Negative   < > Negative   < > Negative Negative Negative   < > Negative   URINEBILI Negative Negative Negative Negative   < > Negative   < > Negative   < > Negative Negative Negative   < > Negative   SG >=1.030 1.020 1.010 >=1.030   < > <=1.005   < > 1.015   < > >1.030 1.010 1.025   < > >1.030   URINEPH 6.5 7.5* 7.0 5.5   < > 6.5   < > 7.0   < > 6.0 7.0 6.0   < > 6.5   PROTEIN 30* Negative Negative 30*   < > Negative   < > Trace*   < > Negative Negative Negative   < > Negative   UROBILINOGEN 1.0 0.2 0.2 0.2   < > 0.2   < > 0.2   < > 0.2 0.2 0.2   < > 0.2   NITRITE Negative Negative Negative Negative   < > Negative   < > Negative    < > Negative Negative Negative   < > Negative   UBLD Negative Negative Negative Negative   < > Negative   < > Moderate*   < > Small* Negative Negative   < > Negative   LEUKEST Trace* Negative Negative Moderate*   < > Trace*   < > Moderate*   < > Negative Trace* Negative   < > Trace*   WBCU 0-5  --   --  25-50*  --  0-5   < > 25-50*   < > 0 - 5 0 - 5 5-10*   < > 0 - 5   RBCU 0-2  --   --  0-2  --  None Seen   < > 25-50*   < > O - 2 O - 2 O - 2   < > O - 2   SQUAMOUSEPI  --   --   --   --   --   --   --   --   --  Few Few Few   < > Few   BACTERIA Few*  --   --  Many*  --   --   --  Few*  --  Few* Few* Few*   < > Few*   MUCOUS  --   --   --   --   --   --   --   --   --  Present*  --  Present*  --  Present*    < > = values in this interval not displayed.     Urine Microscopic  Recent Labs   Lab Test 11/03/24  1218 02/11/24  1211 04/23/23  1305 10/10/21  1204 09/01/21  1522 06/13/21  1309 01/11/21  1810 10/25/20  1110 09/15/20  1655 07/03/20  1010 04/07/20  1140   WBCU 0-5 25-50* 0-5   < > 25-50*   < > 0 - 5 0 - 5 5-10*   < > 0 - 5   RBCU 0-2 0-2 None Seen   < > 25-50*   < > O - 2 O - 2 O - 2   < > O - 2   SQUAMOUSEPI  --   --   --   --   --   --  Few Few Few   < > Few   BACTERIA Few* Many*  --   --  Few*  --  Few* Few* Few*   < > Few*   MUCOUS  --   --   --   --   --   --  Present*  --  Present*  --  Present*    < > = values in this interval not displayed.     Urine Protein  GHUTP and UTP= Urine protein (random), GHUTPG and UTPG = urine protein:creatinine ratio (random), UCRR = urine creatinine (random)  Recent Labs   Lab Test 11/03/24  1218 08/04/24  1153 05/05/24  1205 02/11/24  1210 07/02/22  0905 04/10/22  1232 01/08/22  1156 10/10/21  1202   GHUTP 23.6 6.6 <6.0 31.8   < >  --   --   --    UTP  --   --   --   --   --  0.09 0.07 0.06   GHUTPG 0.11 0.10  --  0.17   < >  --   --   --    UTPG  --   --   --   --   --  0.11 0.09 0.12   UCRR 221.0 64.4 25.6 186.0   < > 83 81 52    < > = values in this interval not  displayed.     Immunization History     Immunization History   Administered Date(s) Administered    COVID-19 MONOVALENT 12+ (Pfizer) 03/13/2021, 04/03/2021    Influenza (H1N1) 12/02/2009    Influenza (IIV3) PF 02/08/2007, 09/17/2010, 10/01/2012, 09/23/2013    Influenza Vaccine >6 months,quad, PF 09/23/2013, 09/20/2016, 10/30/2017, 10/14/2019    Influenza Vaccine, 6+MO IM (QUADRIVALENT W/PRESERVATIVES) 08/11/2018    Pneumo Conj 13-V (2010&after) 04/25/2016    Pneumococcal 23 valent 10/01/2012, 11/06/2017    TDAP (Adacel,Boostrix) 12/17/2010, 06/24/2022    Td (Adult), Adsorbed 02/12/2001       Procedure     Procedure: Steroid injection of each greater trochanteric bursa  Indication: Pain, bilateral greater trochanteric bursitis    The procedure was explained in detail. Risks including infection, pain, structural damage such as cartilage damage and tendon rupture, fat atrophy, skin hyper-/hypo-pigmentation, and medication reaction was explained. The need for rest of the affected joint for one week after the procedure was explained.  The option of not doing the procedure was also provided. All questions were answered and the patient consented to the procedure.     A time-out was performed and the correct patient, procedure, and laterality were verified.    The right greater trochanteric bursa was examined and location for injection was identified - over the most tender area. The area was cleaned with chlorhexidine, twice.  Ethyl chloride was then used for topical anaesthetic.  Then a mixture of lidocaine 1% 2 mL and Depo-Medrol 40mg was injected over the most tender area in a fan-like pattern.     The left greater trochanteric bursa was examined and location for injection was identified - over the most tender area. The area was cleaned with chlorhexidine, twice.  Ethyl chloride was then used for topical anaesthetic.  Then a mixture of lidocaine 1% 2 mL and Depo-Medrol 40mg was injected over the most tender area in a  fan-like pattern.     The patient tolerated the procedure well. No complications.    1% Lidocaine  : Hospira  Lot #: [id number]  EXPIRATION DATE: 2025-SEP  ND: 0178-5411-34    MEDICATION: Methylprednisolone  LOT #: [id number]  EXPIRATION DATE:  2026-02  NDC#: [address]-5    MEDICATION: Methylprednisolone  LOT #: [id number]  EXPIRATION DATE:  2026-02  NDC#: [address]-3         Chart documentation done in part with Dragon Voice recognition Software. Although reviewed after completion, some word and grammatical error may remain.    Bradford Colvin MD

## 2024-11-11 NOTE — TELEPHONE ENCOUNTER
PA Initiation    Medication: BENLYSTA 200 MG/ML SC SOAJ  Insurance Company: [employer] ([facility]) - Phone [phone] Fax [phone]  Pharmacy Filling the Rx: [facility] - [place], [place] - [address]  Filling Pharmacy Phone: [phone]  Filling Pharmacy Fax: [phone]  Start Date: 11/11/2024  KAREN (Key: [id number])    [email or web address]    Thank You,     Sadia Zavala [facility]  Specialty Pharmacy Clinic [facility] Specialty  [email or web address]  [email or web address]  Phone: [phone]  Fax: [phone]

## 2024-11-11 NOTE — TELEPHONE ENCOUNTER
Prior Authorization Approval    Medication: BENLYSTA 200 MG/ML SC SOAJ  Authorization Effective Date: 11/11/2024  Authorization Expiration Date: 5/11/2025  Approved Dose/Quantity: 4 ML per 28 day supply  Reference #: KAREN (Key: [id number])   Insurance Company: [employer] ([employer]) - Phone [phone] Fax [phone]  Expected CoPay: $    CoPay Card Available: Other (see comments)    Financial Assistance Needed: [email or web address]  Which Pharmacy is filling the prescription: [facility] - [address]  Pharmacy Notified: yes - ePA  Patient Notified: yes - Nuno        Thank You,     Karolina Zavala [facility]  Specialty Pharmacy Clinic [facility] Specialty  [email or web address]  [email or web address]  Phone: [phone]  Fax: [phone]

## 2024-11-17 DIAGNOSIS — I10 HYPERTENSION GOAL BP (BLOOD PRESSURE) < 140/90: ICD-10-CM

## 2024-11-17 DIAGNOSIS — K21.9 GASTROESOPHAGEAL REFLUX DISEASE WITHOUT ESOPHAGITIS: ICD-10-CM

## 2024-11-17 RX ORDER — LOSARTAN POTASSIUM 25 MG/1
25 TABLET ORAL DAILY
Qty: 90 TABLET | Refills: 0 | Status: SHIPPED | OUTPATIENT
Start: 2024-11-17

## 2024-11-17 RX ORDER — FAMOTIDINE 40 MG/1
TABLET, FILM COATED ORAL
Qty: 90 TABLET | Refills: 0 | Status: SHIPPED | OUTPATIENT
Start: 2024-11-17

## 2024-11-24 DIAGNOSIS — M54.2 CERVICALGIA: ICD-10-CM

## 2024-11-24 DIAGNOSIS — M47.819 FACET ARTHROPATHY: ICD-10-CM

## 2024-11-24 DIAGNOSIS — R51.9 CHRONIC DAILY HEADACHE: ICD-10-CM

## 2024-11-24 RX ORDER — NORTRIPTYLINE HYDROCHLORIDE 10 MG/1
10 CAPSULE ORAL AT BEDTIME
Qty: 90 CAPSULE | Refills: 0 | Status: SHIPPED | OUTPATIENT
Start: 2024-11-24

## 2024-12-28 ENCOUNTER — ANCILLARY PROCEDURE (OUTPATIENT)
Dept: MAMMOGRAPHY | Facility: CLINIC | Age: 49
End: 2024-12-28
Attending: PHYSICIAN ASSISTANT
Payer: COMMERCIAL

## 2024-12-28 DIAGNOSIS — Z12.31 ENCOUNTER FOR SCREENING MAMMOGRAM FOR BREAST CANCER: ICD-10-CM

## 2024-12-28 PROCEDURE — 77063 BREAST TOMOSYNTHESIS BI: CPT | Mod: TC | Performed by: RADIOLOGY

## 2024-12-28 PROCEDURE — 77067 SCR MAMMO BI INCL CAD: CPT | Mod: TC | Performed by: RADIOLOGY

## 2025-02-10 ENCOUNTER — MYC MEDICAL ADVICE (OUTPATIENT)
Dept: RHEUMATOLOGY | Facility: CLINIC | Age: 50
End: 2025-02-10
Payer: COMMERCIAL

## 2025-02-10 DIAGNOSIS — Z86.32 HISTORY OF GESTATIONAL DIABETES MELLITUS, NOT CURRENTLY PREGNANT: Primary | ICD-10-CM

## 2025-02-16 ENCOUNTER — LAB (OUTPATIENT)
Dept: LAB | Facility: CLINIC | Age: 50
End: 2025-02-16
Payer: COMMERCIAL

## 2025-02-16 DIAGNOSIS — Z79.899 HIGH RISK MEDICATIONS (NOT ANTICOAGULANTS) LONG-TERM USE: ICD-10-CM

## 2025-02-16 DIAGNOSIS — M32.9 SYSTEMIC LUPUS ERYTHEMATOSUS, UNSPECIFIED SLE TYPE, UNSPECIFIED ORGAN INVOLVEMENT STATUS (H): ICD-10-CM

## 2025-02-16 DIAGNOSIS — Z86.32 HISTORY OF GESTATIONAL DIABETES MELLITUS, NOT CURRENTLY PREGNANT: ICD-10-CM

## 2025-02-16 LAB
ALBUMIN MFR UR ELPH: <6 MG/DL
ALBUMIN SERPL BCG-MCNC: 4.3 G/DL (ref 3.5–5.2)
ALBUMIN UR-MCNC: NEGATIVE MG/DL
ALP SERPL-CCNC: 60 U/L (ref 40–150)
ALT SERPL W P-5'-P-CCNC: 22 U/L (ref 0–50)
ANION GAP SERPL CALCULATED.3IONS-SCNC: 10 MMOL/L (ref 7–15)
APPEARANCE UR: CLEAR
AST SERPL W P-5'-P-CCNC: 34 U/L (ref 0–45)
BASOPHILS # BLD AUTO: 0 10E3/UL (ref 0–0.2)
BASOPHILS NFR BLD AUTO: 1 %
BILIRUB SERPL-MCNC: 0.3 MG/DL
BILIRUB UR QL STRIP: NEGATIVE
BUN SERPL-MCNC: 13.1 MG/DL (ref 6–20)
CALCIUM SERPL-MCNC: 9.6 MG/DL (ref 8.8–10.4)
CHLORIDE SERPL-SCNC: 102 MMOL/L (ref 98–107)
COLOR UR AUTO: YELLOW
CREAT SERPL-MCNC: 0.75 MG/DL (ref 0.51–0.95)
CREAT UR-MCNC: 38.1 MG/DL
CRP SERPL-MCNC: <3 MG/L
EGFRCR SERPLBLD CKD-EPI 2021: >90 ML/MIN/1.73M2
EOSINOPHIL # BLD AUTO: 0.3 10E3/UL (ref 0–0.7)
EOSINOPHIL NFR BLD AUTO: 7 %
ERYTHROCYTE [DISTWIDTH] IN BLOOD BY AUTOMATED COUNT: 11.4 % (ref 10–15)
ERYTHROCYTE [SEDIMENTATION RATE] IN BLOOD BY WESTERGREN METHOD: 4 MM/HR (ref 0–30)
EST. AVERAGE GLUCOSE BLD GHB EST-MCNC: 114 MG/DL
GLUCOSE SERPL-MCNC: 90 MG/DL (ref 70–99)
GLUCOSE UR STRIP-MCNC: NEGATIVE MG/DL
HBA1C MFR BLD: 5.6 % (ref 0–5.6)
HCO3 SERPL-SCNC: 28 MMOL/L (ref 22–29)
HCT VFR BLD AUTO: 41.1 % (ref 35–47)
HGB BLD-MCNC: 14.2 G/DL (ref 11.7–15.7)
HGB UR QL STRIP: NEGATIVE
IMM GRANULOCYTES # BLD: 0 10E3/UL
IMM GRANULOCYTES NFR BLD: 0 %
KETONES UR STRIP-MCNC: NEGATIVE MG/DL
LEUKOCYTE ESTERASE UR QL STRIP: NEGATIVE
LYMPHOCYTES # BLD AUTO: 1 10E3/UL (ref 0.8–5.3)
LYMPHOCYTES NFR BLD AUTO: 21 %
MCH RBC QN AUTO: 31.9 PG (ref 26.5–33)
MCHC RBC AUTO-ENTMCNC: 34.5 G/DL (ref 31.5–36.5)
MCV RBC AUTO: 92 FL (ref 78–100)
MONOCYTES # BLD AUTO: 0.4 10E3/UL (ref 0–1.3)
MONOCYTES NFR BLD AUTO: 8 %
NEUTROPHILS # BLD AUTO: 2.9 10E3/UL (ref 1.6–8.3)
NEUTROPHILS NFR BLD AUTO: 63 %
NITRATE UR QL: NEGATIVE
PH UR STRIP: 6.5 [PH] (ref 5–7)
PLATELET # BLD AUTO: 309 10E3/UL (ref 150–450)
POTASSIUM SERPL-SCNC: 4.4 MMOL/L (ref 3.4–5.3)
PROT SERPL-MCNC: 6.8 G/DL (ref 6.4–8.3)
PROT/CREAT 24H UR: NORMAL MG/G{CREAT}
RBC # BLD AUTO: 4.45 10E6/UL (ref 3.8–5.2)
SODIUM SERPL-SCNC: 140 MMOL/L (ref 135–145)
SP GR UR STRIP: 1.01 (ref 1–1.03)
UROBILINOGEN UR STRIP-ACNC: 0.2 E.U./DL
WBC # BLD AUTO: 4.7 10E3/UL (ref 4–11)

## 2025-02-16 PROCEDURE — 86140 C-REACTIVE PROTEIN: CPT

## 2025-02-16 PROCEDURE — 86225 DNA ANTIBODY NATIVE: CPT

## 2025-02-16 PROCEDURE — 86160 COMPLEMENT ANTIGEN: CPT | Mod: 59

## 2025-02-16 PROCEDURE — 80053 COMPREHEN METABOLIC PANEL: CPT

## 2025-02-16 PROCEDURE — 85652 RBC SED RATE AUTOMATED: CPT

## 2025-02-16 PROCEDURE — 81003 URINALYSIS AUTO W/O SCOPE: CPT

## 2025-02-16 PROCEDURE — 85025 COMPLETE CBC W/AUTO DIFF WBC: CPT

## 2025-02-16 PROCEDURE — 36415 COLL VENOUS BLD VENIPUNCTURE: CPT

## 2025-02-16 PROCEDURE — 86160 COMPLEMENT ANTIGEN: CPT

## 2025-02-16 PROCEDURE — 83036 HEMOGLOBIN GLYCOSYLATED A1C: CPT

## 2025-02-16 PROCEDURE — 84156 ASSAY OF PROTEIN URINE: CPT

## 2025-02-17 LAB
C3 SERPL-MCNC: 96 MG/DL (ref 81–157)
C4 SERPL-MCNC: 20 MG/DL (ref 13–39)
DSDNA AB SER-ACNC: 0.7 IU/ML

## 2025-02-28 ENCOUNTER — TRANSFERRED RECORDS (OUTPATIENT)
Dept: HEALTH INFORMATION MANAGEMENT | Facility: CLINIC | Age: 50
End: 2025-02-28

## 2025-03-01 DIAGNOSIS — R51.9 CHRONIC DAILY HEADACHE: ICD-10-CM

## 2025-03-01 DIAGNOSIS — M54.2 CERVICALGIA: ICD-10-CM

## 2025-03-01 DIAGNOSIS — M47.819 FACET ARTHROPATHY: ICD-10-CM

## 2025-03-02 RX ORDER — NORTRIPTYLINE HYDROCHLORIDE 10 MG/1
10 CAPSULE ORAL AT BEDTIME
Qty: 90 CAPSULE | Refills: 0 | Status: SHIPPED | OUTPATIENT
Start: 2025-03-02

## 2025-03-04 DIAGNOSIS — I10 HYPERTENSION GOAL BP (BLOOD PRESSURE) < 140/90: ICD-10-CM

## 2025-03-04 RX ORDER — LOSARTAN POTASSIUM 25 MG/1
25 TABLET ORAL DAILY
Qty: 90 TABLET | Refills: 0 | Status: SHIPPED | OUTPATIENT
Start: 2025-03-04

## 2025-03-18 PROBLEM — Z12.4 SCREENING FOR MALIGNANT NEOPLASM OF CERVIX: Status: ACTIVE | Noted: 2022-06-30

## 2025-04-14 ENCOUNTER — TELEPHONE (OUTPATIENT)
Dept: RHEUMATOLOGY | Facility: CLINIC | Age: 50
End: 2025-04-14
Payer: COMMERCIAL

## 2025-04-14 NOTE — TELEPHONE ENCOUNTER
PA Initiation    Medication: BENLYSTA 200 MG/ML SC SOAJ  Insurance Company: [employer] ([facility]) - Phone [phone] Fax [phone]  Pharmacy Filling the Rx: [facility] - [place], [place] - [address]  Filling Pharmacy Phone: [phone]  Filling Pharmacy Fax: [phone]  Start Date: 4/14/2025  KAREN (Key: [id number])  PA Case ID #: [id number]        Thank You,     Sadia Zavala [facility]  Specialty Pharmacy Clinic [facility] Specialty  [email or web address]  [email or web address]  Phone: [phone]  Fax: [phone]

## 2025-04-15 NOTE — TELEPHONE ENCOUNTER
Prior Authorization Approval    Medication: BENLYSTA 200 MG/ML SC SOAJ  Authorization Effective Date: 4/14/2025  Authorization Expiration Date: 10/14/2025  Approved Dose/Quantity: 4 ML per 28-day supply  Reference #: KAREN (Key: [id number])   Insurance Company: [employer] ([facility]) - Phone [phone] Fax [phone]  Expected CoPay: $    CoPay Card Available: Other (see comments)    Financial Assistance Needed: [email or web address]  Which Pharmacy is filling the prescription: [facility] - [address]  Pharmacy Notified: renewal  Patient Notified: renewal          Thank You,     Sadia Zavala [facility]  Specialty Pharmacy Clinic [facility] Specialty  [email or web address]  [email or web address]  Phone: [phone]  Fax: [phone]

## 2025-05-05 ENCOUNTER — DOCUMENTATION ONLY (OUTPATIENT)
Dept: LAB | Facility: CLINIC | Age: 50
End: 2025-05-05
Payer: COMMERCIAL

## 2025-05-05 DIAGNOSIS — Z79.899 HIGH RISK MEDICATIONS (NOT ANTICOAGULANTS) LONG-TERM USE: ICD-10-CM

## 2025-05-05 DIAGNOSIS — M32.9 SYSTEMIC LUPUS ERYTHEMATOSUS, UNSPECIFIED SLE TYPE, UNSPECIFIED ORGAN INVOLVEMENT STATUS (H): Primary | ICD-10-CM

## 2025-05-05 NOTE — PROGRESS NOTES
PLEASE REVIEW AND PLACE FUTURE ORDERS  FOR PATIENT'S UPCOMING LAB ONLY APPOINTMENT ON  05.17.25     THANK YOU.  Radha Monteiro MLT at [facility]   May 5, 2025

## 2025-05-07 ENCOUNTER — MYC MEDICAL ADVICE (OUTPATIENT)
Dept: RHEUMATOLOGY | Facility: CLINIC | Age: 50
End: 2025-05-07
Payer: COMMERCIAL

## 2025-05-08 ENCOUNTER — TELEPHONE (OUTPATIENT)
Dept: RHEUMATOLOGY | Facility: CLINIC | Age: 50
End: 2025-05-08
Payer: COMMERCIAL

## 2025-05-08 NOTE — TELEPHONE ENCOUNTER
[facility] Call Center    Phone Message    May a detailed message be left on voicemail: yes     Reason for Call: Other: .     Patient states the appt offered for 5/19/2025 at 3:30pm does not work. Patient states she will be at her sons graduation ceremony. Patient states she is hoping to get another date offered around that time. Please advise.     Action Taken: Message routed to:  [facility] Rheum    Travel Screening: Not Applicable     Date of Service:

## 2025-05-13 NOTE — TELEPHONE ENCOUNTER
Patient was offered appointment 6/2/25 at 3:30pm. Patient took this appointment and has been scheduled.     Shannon DUEÑAS RN, Specialty Clinic 05/13/25 8:57 AM

## 2025-05-19 DIAGNOSIS — M32.9 SYSTEMIC LUPUS ERYTHEMATOSUS, UNSPECIFIED SLE TYPE, UNSPECIFIED ORGAN INVOLVEMENT STATUS (H): ICD-10-CM

## 2025-05-20 NOTE — TELEPHONE ENCOUNTER
After chart review and based on prior discussion with MD it was noted that this is appropriate for a refill.    AVNI LimonN RN Specialty Triage 5/20/2025 12:07 PM

## 2025-05-31 ENCOUNTER — LAB (OUTPATIENT)
Dept: LAB | Facility: CLINIC | Age: 50
End: 2025-05-31
Payer: COMMERCIAL

## 2025-05-31 DIAGNOSIS — Z79.899 HIGH RISK MEDICATIONS (NOT ANTICOAGULANTS) LONG-TERM USE: ICD-10-CM

## 2025-05-31 DIAGNOSIS — M32.9 SYSTEMIC LUPUS ERYTHEMATOSUS, UNSPECIFIED SLE TYPE, UNSPECIFIED ORGAN INVOLVEMENT STATUS (H): ICD-10-CM

## 2025-05-31 LAB
ALBUMIN MFR UR ELPH: 6.6 MG/DL
ALBUMIN SERPL BCG-MCNC: 4.2 G/DL (ref 3.5–5.2)
ALBUMIN UR-MCNC: NEGATIVE MG/DL
ALP SERPL-CCNC: 60 U/L (ref 40–150)
ALT SERPL W P-5'-P-CCNC: 22 U/L (ref 0–50)
ANION GAP SERPL CALCULATED.3IONS-SCNC: 9 MMOL/L (ref 7–15)
APPEARANCE UR: CLEAR
AST SERPL W P-5'-P-CCNC: 28 U/L (ref 0–45)
BACTERIA #/AREA URNS HPF: ABNORMAL /HPF
BASOPHILS # BLD AUTO: 0 10E3/UL (ref 0–0.2)
BASOPHILS NFR BLD AUTO: 1 %
BILIRUB SERPL-MCNC: 0.2 MG/DL
BILIRUB UR QL STRIP: NEGATIVE
BUN SERPL-MCNC: 8.8 MG/DL (ref 6–20)
CALCIUM SERPL-MCNC: 9.2 MG/DL (ref 8.8–10.4)
CHLORIDE SERPL-SCNC: 103 MMOL/L (ref 98–107)
COLOR UR AUTO: YELLOW
CREAT SERPL-MCNC: 0.84 MG/DL (ref 0.51–0.95)
CREAT UR-MCNC: 61.4 MG/DL
CRP SERPL-MCNC: <3 MG/L
EGFRCR SERPLBLD CKD-EPI 2021: 84 ML/MIN/1.73M2
EOSINOPHIL # BLD AUTO: 0.1 10E3/UL (ref 0–0.7)
EOSINOPHIL NFR BLD AUTO: 2 %
ERYTHROCYTE [DISTWIDTH] IN BLOOD BY AUTOMATED COUNT: 11.6 % (ref 10–15)
ERYTHROCYTE [SEDIMENTATION RATE] IN BLOOD BY WESTERGREN METHOD: 5 MM/HR (ref 0–30)
GLUCOSE SERPL-MCNC: 133 MG/DL (ref 70–99)
GLUCOSE UR STRIP-MCNC: NEGATIVE MG/DL
HCO3 SERPL-SCNC: 29 MMOL/L (ref 22–29)
HCT VFR BLD AUTO: 37 % (ref 35–47)
HGB BLD-MCNC: 12.7 G/DL (ref 11.7–15.7)
HGB UR QL STRIP: NEGATIVE
IMM GRANULOCYTES # BLD: 0 10E3/UL
IMM GRANULOCYTES NFR BLD: 0 %
KETONES UR STRIP-MCNC: NEGATIVE MG/DL
LEUKOCYTE ESTERASE UR QL STRIP: ABNORMAL
LYMPHOCYTES # BLD AUTO: 1.2 10E3/UL (ref 0.8–5.3)
LYMPHOCYTES NFR BLD AUTO: 29 %
MCH RBC QN AUTO: 31.4 PG (ref 26.5–33)
MCHC RBC AUTO-ENTMCNC: 34.3 G/DL (ref 31.5–36.5)
MCV RBC AUTO: 91 FL (ref 78–100)
MONOCYTES # BLD AUTO: 0.3 10E3/UL (ref 0–1.3)
MONOCYTES NFR BLD AUTO: 8 %
NEUTROPHILS # BLD AUTO: 2.4 10E3/UL (ref 1.6–8.3)
NEUTROPHILS NFR BLD AUTO: 60 %
NITRATE UR QL: NEGATIVE
PH UR STRIP: 6 [PH] (ref 5–7)
PLATELET # BLD AUTO: 291 10E3/UL (ref 150–450)
POTASSIUM SERPL-SCNC: 3.8 MMOL/L (ref 3.4–5.3)
PROT SERPL-MCNC: 6.2 G/DL (ref 6.4–8.3)
PROT/CREAT 24H UR: 0.11 MG/MG CR (ref 0–0.2)
RBC # BLD AUTO: 4.05 10E6/UL (ref 3.8–5.2)
RBC #/AREA URNS AUTO: ABNORMAL /HPF
SODIUM SERPL-SCNC: 141 MMOL/L (ref 135–145)
SP GR UR STRIP: 1.01 (ref 1–1.03)
SQUAMOUS #/AREA URNS AUTO: ABNORMAL /LPF
UROBILINOGEN UR STRIP-ACNC: 0.2 E.U./DL
WBC # BLD AUTO: 4.1 10E3/UL (ref 4–11)
WBC #/AREA URNS AUTO: ABNORMAL /HPF

## 2025-05-31 PROCEDURE — 84156 ASSAY OF PROTEIN URINE: CPT

## 2025-05-31 PROCEDURE — 81001 URINALYSIS AUTO W/SCOPE: CPT

## 2025-05-31 PROCEDURE — 36415 COLL VENOUS BLD VENIPUNCTURE: CPT

## 2025-05-31 PROCEDURE — 86140 C-REACTIVE PROTEIN: CPT

## 2025-05-31 PROCEDURE — 80053 COMPREHEN METABOLIC PANEL: CPT

## 2025-05-31 PROCEDURE — 85652 RBC SED RATE AUTOMATED: CPT

## 2025-05-31 PROCEDURE — 85025 COMPLETE CBC W/AUTO DIFF WBC: CPT

## 2025-06-02 ENCOUNTER — ANCILLARY PROCEDURE (OUTPATIENT)
Dept: GENERAL RADIOLOGY | Facility: CLINIC | Age: 50
End: 2025-06-02
Attending: INTERNAL MEDICINE
Payer: COMMERCIAL

## 2025-06-02 ENCOUNTER — OFFICE VISIT (OUTPATIENT)
Dept: RHEUMATOLOGY | Facility: CLINIC | Age: 50
End: 2025-06-02
Payer: COMMERCIAL

## 2025-06-02 VITALS
OXYGEN SATURATION: 98 % | SYSTOLIC BLOOD PRESSURE: 147 MMHG | WEIGHT: 136.2 LBS | RESPIRATION RATE: 16 BRPM | BODY MASS INDEX: 21.98 KG/M2 | DIASTOLIC BLOOD PRESSURE: 94 MMHG | HEART RATE: 91 BPM

## 2025-06-02 DIAGNOSIS — R73.9 HYPERGLYCEMIA: ICD-10-CM

## 2025-06-02 DIAGNOSIS — R20.0 NUMBNESS AND TINGLING OF UPPER AND LOWER EXTREMITIES OF BOTH SIDES: Primary | ICD-10-CM

## 2025-06-02 DIAGNOSIS — R20.0 NUMBNESS AND TINGLING OF UPPER AND LOWER EXTREMITIES OF BOTH SIDES: ICD-10-CM

## 2025-06-02 DIAGNOSIS — Z79.899 HIGH RISK MEDICATIONS (NOT ANTICOAGULANTS) LONG-TERM USE: ICD-10-CM

## 2025-06-02 DIAGNOSIS — M32.9 SYSTEMIC LUPUS ERYTHEMATOSUS, UNSPECIFIED SLE TYPE, UNSPECIFIED ORGAN INVOLVEMENT STATUS (H): ICD-10-CM

## 2025-06-02 DIAGNOSIS — M54.42 CHRONIC BILATERAL LOW BACK PAIN WITH BILATERAL SCIATICA: ICD-10-CM

## 2025-06-02 DIAGNOSIS — R20.2 NUMBNESS AND TINGLING OF UPPER AND LOWER EXTREMITIES OF BOTH SIDES: Primary | ICD-10-CM

## 2025-06-02 DIAGNOSIS — M70.61 TROCHANTERIC BURSITIS OF BOTH HIPS: ICD-10-CM

## 2025-06-02 DIAGNOSIS — R20.2 NUMBNESS AND TINGLING OF UPPER AND LOWER EXTREMITIES OF BOTH SIDES: ICD-10-CM

## 2025-06-02 DIAGNOSIS — M54.41 CHRONIC BILATERAL LOW BACK PAIN WITH BILATERAL SCIATICA: ICD-10-CM

## 2025-06-02 DIAGNOSIS — M54.2 NECK PAIN: ICD-10-CM

## 2025-06-02 DIAGNOSIS — I73.00 RAYNAUD'S PHENOMENON WITHOUT GANGRENE: ICD-10-CM

## 2025-06-02 DIAGNOSIS — G89.29 CHRONIC BILATERAL LOW BACK PAIN WITH BILATERAL SCIATICA: ICD-10-CM

## 2025-06-02 DIAGNOSIS — M70.62 TROCHANTERIC BURSITIS OF BOTH HIPS: ICD-10-CM

## 2025-06-02 DIAGNOSIS — M35.01 SJOGREN'S SYNDROME WITH KERATOCONJUNCTIVITIS SICCA: ICD-10-CM

## 2025-06-02 PROCEDURE — 3077F SYST BP >= 140 MM HG: CPT | Performed by: INTERNAL MEDICINE

## 2025-06-02 PROCEDURE — G2211 COMPLEX E/M VISIT ADD ON: HCPCS | Performed by: INTERNAL MEDICINE

## 2025-06-02 PROCEDURE — 72040 X-RAY EXAM NECK SPINE 2-3 VW: CPT | Mod: TC | Performed by: RADIOLOGY

## 2025-06-02 PROCEDURE — 99214 OFFICE O/P EST MOD 30 MIN: CPT | Performed by: INTERNAL MEDICINE

## 2025-06-02 PROCEDURE — 72100 X-RAY EXAM L-S SPINE 2/3 VWS: CPT | Mod: TC | Performed by: RADIOLOGY

## 2025-06-02 PROCEDURE — 3080F DIAST BP >= 90 MM HG: CPT | Performed by: INTERNAL MEDICINE

## 2025-06-02 RX ORDER — AMLODIPINE BESYLATE 5 MG/1
5 TABLET ORAL DAILY
Qty: 90 TABLET | Refills: 2 | Status: SHIPPED | OUTPATIENT
Start: 2025-06-02

## 2025-06-02 RX ORDER — HYDROXYCHLOROQUINE SULFATE 200 MG/1
TABLET, FILM COATED ORAL
Qty: 135 TABLET | Refills: 2 | Status: SHIPPED | OUTPATIENT
Start: 2025-06-02

## 2025-06-02 RX ORDER — CEVIMELINE HYDROCHLORIDE 30 MG/1
30 CAPSULE ORAL 2 TIMES DAILY
Qty: 180 CAPSULE | Refills: 2 | Status: SHIPPED | OUTPATIENT
Start: 2025-06-02

## 2025-06-02 RX ORDER — AZATHIOPRINE 50 MG/1
75 TABLET ORAL DAILY
Qty: 135 TABLET | Refills: 2 | Status: SHIPPED | OUTPATIENT
Start: 2025-06-02

## 2025-06-02 RX ORDER — PREDNISONE 1 MG/1
2 TABLET ORAL DAILY
Qty: 180 TABLET | Refills: 1 | Status: SHIPPED | OUTPATIENT
Start: 2025-06-02

## 2025-06-02 NOTE — NURSING NOTE
RAPID3 (0-30) Cumulative Score  8.3          RAPID3 Weighted Score (divide #4 by 3 and that is the weighted score)  2.7

## 2025-06-02 NOTE — PATIENT INSTRUCTIONS
RHEUMATOLOGY    [facility]  [address] [place] [address]  [place] [address]    Phone number: [phone]  Fax number: [phone]    If you need a medication refill, please contact us as you may need lab work and/or a follow up visit prior to your refill.      Thank you for choosing [facility]!    Qing Zapata CMA Rheumatology

## 2025-07-03 ENCOUNTER — THERAPY VISIT (OUTPATIENT)
Dept: PHYSICAL THERAPY | Facility: CLINIC | Age: 50
End: 2025-07-03
Attending: INTERNAL MEDICINE
Payer: COMMERCIAL

## 2025-07-03 DIAGNOSIS — M54.41 CHRONIC BILATERAL LOW BACK PAIN WITH BILATERAL SCIATICA: ICD-10-CM

## 2025-07-03 DIAGNOSIS — M54.42 CHRONIC BILATERAL LOW BACK PAIN WITH BILATERAL SCIATICA: ICD-10-CM

## 2025-07-03 DIAGNOSIS — M54.2 NECK PAIN: ICD-10-CM

## 2025-07-03 DIAGNOSIS — G89.29 CHRONIC BILATERAL LOW BACK PAIN WITH BILATERAL SCIATICA: ICD-10-CM

## 2025-07-03 DIAGNOSIS — R20.2 NUMBNESS AND TINGLING OF UPPER AND LOWER EXTREMITIES OF BOTH SIDES: ICD-10-CM

## 2025-07-03 DIAGNOSIS — R20.0 NUMBNESS AND TINGLING OF UPPER AND LOWER EXTREMITIES OF BOTH SIDES: ICD-10-CM
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
## 2025-07-09 DIAGNOSIS — I10 HYPERTENSION GOAL BP (BLOOD PRESSURE) < 140/90: ICD-10-CM

## 2025-07-10 ENCOUNTER — MYC REFILL (OUTPATIENT)
Dept: FAMILY MEDICINE | Facility: CLINIC | Age: 50
End: 2025-07-10
Payer: COMMERCIAL

## 2025-07-10 DIAGNOSIS — M54.2 CERVICALGIA: ICD-10-CM

## 2025-07-10 DIAGNOSIS — M47.819 FACET ARTHROPATHY: ICD-10-CM

## 2025-07-10 DIAGNOSIS — R51.9 CHRONIC DAILY HEADACHE: ICD-10-CM

## 2025-07-10 RX ORDER — NORTRIPTYLINE HYDROCHLORIDE 10 MG/1
10 CAPSULE ORAL AT BEDTIME
Qty: 90 CAPSULE | Refills: 0 | Status: SHIPPED | OUTPATIENT
Start: 2025-07-10

## 2025-07-10 RX ORDER — LOSARTAN POTASSIUM 25 MG/1
25 TABLET ORAL DAILY
Qty: 90 TABLET | Refills: 0 | Status: SHIPPED | OUTPATIENT
Start: 2025-07-10

## 2025-07-18 DIAGNOSIS — M32.9 SYSTEMIC LUPUS ERYTHEMATOSUS, UNSPECIFIED SLE TYPE, UNSPECIFIED ORGAN INVOLVEMENT STATUS (H): ICD-10-CM

## 2025-07-18 NOTE — TELEPHONE ENCOUNTER
Requested Prescriptions   Pending Prescriptions Disp Refills    predniSONE (DELTASONE) 1 MG tablet 180 tablet 1     Sig: Take 2 tablets (2 mg) by mouth daily.       There is no refill protocol information for this order        Rx request from pharmacy for Prednisone 2.5mg PO daily

## 2025-07-21 RX ORDER — PREDNISONE 1 MG/1
2 TABLET ORAL DAILY
Qty: 180 TABLET | Refills: 1 | OUTPATIENT
Start: 2025-07-21

## 2025-08-09 ENCOUNTER — THERAPY VISIT (OUTPATIENT)
Dept: PHYSICAL THERAPY | Facility: CLINIC | Age: 50
End: 2025-08-09
Attending: INTERNAL MEDICINE
Payer: COMMERCIAL

## 2025-08-09 DIAGNOSIS — R20.2 NUMBNESS AND TINGLING OF UPPER AND LOWER EXTREMITIES OF BOTH SIDES: Primary | ICD-10-CM

## 2025-08-09 DIAGNOSIS — M54.41 CHRONIC BILATERAL LOW BACK PAIN WITH BILATERAL SCIATICA: ICD-10-CM

## 2025-08-09 DIAGNOSIS — M54.2 NECK PAIN: ICD-10-CM

## 2025-08-09 DIAGNOSIS — G89.29 CHRONIC BILATERAL LOW BACK PAIN WITH BILATERAL SCIATICA: ICD-10-CM

## 2025-08-09 DIAGNOSIS — M54.42 CHRONIC BILATERAL LOW BACK PAIN WITH BILATERAL SCIATICA: ICD-10-CM

## 2025-08-09 DIAGNOSIS — R20.0 NUMBNESS AND TINGLING OF UPPER AND LOWER EXTREMITIES OF BOTH SIDES: Primary | ICD-10-CM

## 2025-08-09 PROCEDURE — 97530 THERAPEUTIC ACTIVITIES: CPT | Mod: GP | Performed by: PHYSICAL THERAPIST

## 2025-08-09 PROCEDURE — 97140 MANUAL THERAPY 1/> REGIONS: CPT | Mod: GP | Performed by: PHYSICAL THERAPIST

## 2025-08-09 PROCEDURE — 97110 THERAPEUTIC EXERCISES: CPT | Mod: GP | Performed by: PHYSICAL THERAPIST

## 2025-08-30 ENCOUNTER — LAB (OUTPATIENT)
Dept: LAB | Facility: CLINIC | Age: 50
End: 2025-08-30
Payer: COMMERCIAL

## 2025-08-30 DIAGNOSIS — M54.41 CHRONIC BILATERAL LOW BACK PAIN WITH BILATERAL SCIATICA: ICD-10-CM

## 2025-08-30 DIAGNOSIS — M32.9 SYSTEMIC LUPUS ERYTHEMATOSUS, UNSPECIFIED SLE TYPE, UNSPECIFIED ORGAN INVOLVEMENT STATUS (H): ICD-10-CM

## 2025-08-30 DIAGNOSIS — R20.0 NUMBNESS AND TINGLING OF UPPER AND LOWER EXTREMITIES OF BOTH SIDES: ICD-10-CM

## 2025-08-30 DIAGNOSIS — R73.9 HYPERGLYCEMIA: ICD-10-CM

## 2025-08-30 DIAGNOSIS — R20.2 NUMBNESS AND TINGLING OF UPPER AND LOWER EXTREMITIES OF BOTH SIDES: ICD-10-CM

## 2025-08-30 DIAGNOSIS — G89.29 CHRONIC BILATERAL LOW BACK PAIN WITH BILATERAL SCIATICA: ICD-10-CM

## 2025-08-30 DIAGNOSIS — Z79.899 HIGH RISK MEDICATIONS (NOT ANTICOAGULANTS) LONG-TERM USE: ICD-10-CM

## 2025-08-30 DIAGNOSIS — M54.42 CHRONIC BILATERAL LOW BACK PAIN WITH BILATERAL SCIATICA: ICD-10-CM

## 2025-08-30 LAB
ALBUMIN MFR UR ELPH: <6 MG/DL
ALBUMIN SERPL BCG-MCNC: 4.4 G/DL (ref 3.5–5.2)
ALBUMIN UR-MCNC: NEGATIVE MG/DL
ALP SERPL-CCNC: 59 U/L (ref 40–150)
ALT SERPL W P-5'-P-CCNC: 21 U/L (ref 0–50)
ANION GAP SERPL CALCULATED.3IONS-SCNC: 8 MMOL/L (ref 7–15)
APPEARANCE UR: CLEAR
AST SERPL W P-5'-P-CCNC: 31 U/L (ref 0–45)
BASOPHILS # BLD AUTO: <0.04 10E3/UL (ref 0–0.2)
BASOPHILS NFR BLD AUTO: 0.8 %
BILIRUB SERPL-MCNC: 0.3 MG/DL
BILIRUB UR QL STRIP: NEGATIVE
BUN SERPL-MCNC: 10.3 MG/DL (ref 6–20)
CALCIUM SERPL-MCNC: 9.4 MG/DL (ref 8.8–10.4)
CHLORIDE SERPL-SCNC: 102 MMOL/L (ref 98–107)
CK SERPL-CCNC: 126 U/L (ref 26–192)
COLOR UR AUTO: YELLOW
CREAT SERPL-MCNC: 0.74 MG/DL (ref 0.51–0.95)
CREAT UR-MCNC: 19.2 MG/DL
CRP SERPL-MCNC: <3 MG/L
EGFRCR SERPLBLD CKD-EPI 2021: >90 ML/MIN/1.73M2
EOSINOPHIL # BLD AUTO: 0.11 10E3/UL (ref 0–0.7)
EOSINOPHIL NFR BLD AUTO: 2.8 %
ERYTHROCYTE [DISTWIDTH] IN BLOOD BY AUTOMATED COUNT: 11.4 % (ref 10–15)
ERYTHROCYTE [SEDIMENTATION RATE] IN BLOOD BY WESTERGREN METHOD: 5 MM/HR (ref 0–30)
EST. AVERAGE GLUCOSE BLD GHB EST-MCNC: 117 MG/DL
GLUCOSE SERPL-MCNC: 88 MG/DL (ref 70–99)
GLUCOSE UR STRIP-MCNC: NEGATIVE MG/DL
HBA1C MFR BLD: 5.7 % (ref 0–5.6)
HCO3 SERPL-SCNC: 29 MMOL/L (ref 22–29)
HCT VFR BLD AUTO: 40.1 % (ref 35–47)
HGB BLD-MCNC: 13.9 G/DL (ref 11.7–15.7)
HGB UR QL STRIP: NEGATIVE
IMM GRANULOCYTES # BLD: <0.04 10E3/UL
IMM GRANULOCYTES NFR BLD: 0.3 %
KETONES UR STRIP-MCNC: NEGATIVE MG/DL
LEUKOCYTE ESTERASE UR QL STRIP: NEGATIVE
LYMPHOCYTES # BLD AUTO: 1.18 10E3/UL (ref 0.8–5.3)
LYMPHOCYTES NFR BLD AUTO: 29.7 %
MCH RBC QN AUTO: 31.6 PG (ref 26.5–33)
MCHC RBC AUTO-ENTMCNC: 34.7 G/DL (ref 31.5–36.5)
MCV RBC AUTO: 91.1 FL (ref 78–100)
MONOCYTES # BLD AUTO: 0.36 10E3/UL (ref 0–1.3)
MONOCYTES NFR BLD AUTO: 9.1 %
NEUTROPHILS # BLD AUTO: 2.28 10E3/UL (ref 1.6–8.3)
NEUTROPHILS NFR BLD AUTO: 57.3 %
NITRATE UR QL: NEGATIVE
PH UR STRIP: 7 [PH] (ref 5–7)
PLATELET # BLD AUTO: 278 10E3/UL (ref 150–450)
POTASSIUM SERPL-SCNC: 4.3 MMOL/L (ref 3.4–5.3)
PROT SERPL-MCNC: 6.7 G/DL (ref 6.4–8.3)
PROT/CREAT 24H UR: NORMAL MG/G{CREAT}
RBC # BLD AUTO: 4.4 10E6/UL (ref 3.8–5.2)
SODIUM SERPL-SCNC: 139 MMOL/L (ref 135–145)
SP GR UR STRIP: 1.01 (ref 1–1.03)
UROBILINOGEN UR STRIP-ACNC: 0.2 E.U./DL
VIT B12 SERPL-MCNC: 696 PG/ML (ref 232–1245)
WBC # BLD AUTO: 3.97 10E3/UL (ref 4–11)

## 2025-08-30 PROCEDURE — 36415 COLL VENOUS BLD VENIPUNCTURE: CPT

## 2025-08-30 PROCEDURE — 86160 COMPLEMENT ANTIGEN: CPT

## 2025-08-30 PROCEDURE — 86140 C-REACTIVE PROTEIN: CPT

## 2025-08-30 PROCEDURE — 84156 ASSAY OF PROTEIN URINE: CPT

## 2025-08-30 PROCEDURE — 86225 DNA ANTIBODY NATIVE: CPT

## 2025-08-30 PROCEDURE — 81003 URINALYSIS AUTO W/O SCOPE: CPT

## 2025-08-30 PROCEDURE — 82550 ASSAY OF CK (CPK): CPT

## 2025-08-30 PROCEDURE — 80053 COMPREHEN METABOLIC PANEL: CPT

## 2025-08-30 PROCEDURE — 85025 COMPLETE CBC W/AUTO DIFF WBC: CPT

## 2025-08-30 PROCEDURE — 82607 VITAMIN B-12: CPT

## 2025-08-30 PROCEDURE — 83036 HEMOGLOBIN GLYCOSYLATED A1C: CPT

## 2025-08-30 PROCEDURE — 85652 RBC SED RATE AUTOMATED: CPT

## 2025-09-02 LAB
C3 SERPL-MCNC: 96 MG/DL (ref 81–157)
C4 SERPL-MCNC: 20 MG/DL (ref 13–39)
DSDNA AB SER-ACNC: <0.6 IU/ML

## (undated) DEVICE — ENDO FORCEP ENDOJAW BIOPSY 2.8MMX230CM FB-220U

## (undated) DEVICE — SOL WATER IRRIG 1000ML BOTTLE 2F7114

## (undated) DEVICE — ENDO CAP AND TUBING STERILE FOR ENDOGATOR  100130

## (undated) DEVICE — SPECIMEN CONTAINER 3OZ W/FORMALIN 59901

## (undated) DEVICE — SUCTION MANIFOLD NEPTUNE 2 SYS 4 PORT 0702-020-000

## (undated) RX ORDER — FENTANYL CITRATE 50 UG/ML
INJECTION, SOLUTION INTRAMUSCULAR; INTRAVENOUS
Status: DISPENSED
Start: 2018-10-29

## (undated) RX ORDER — PROPOFOL 10 MG/ML
INJECTION, EMULSION INTRAVENOUS
Status: DISPENSED
Start: 2024-08-30

## (undated) RX ORDER — ONDANSETRON 2 MG/ML
INJECTION INTRAMUSCULAR; INTRAVENOUS
Status: DISPENSED
Start: 2018-10-29